# Patient Record
Sex: MALE | Race: WHITE | URBAN - METROPOLITAN AREA
[De-identification: names, ages, dates, MRNs, and addresses within clinical notes are randomized per-mention and may not be internally consistent; named-entity substitution may affect disease eponyms.]

---

## 2024-01-22 NOTE — CARE COORDINATION
Per PM&R physiatrist Dr. Danica De La Paz, patient appropriate for Acute Inpatient Rehabilitation level of care.    Eligibility & Benefits Verified - See Note    Prior authorization request for admission initiated with primary insurance [employer] via: Prior-Authorization Fax Request Form    Pending Case Reference/Authorization # Not Provided    Clinical documentation submitted via Fax to [phone]    Francy Alcazar CM: Via Telephone Conversation at this time at phone/extension: [phone]. Will need coordination with neurosurgery if approved.

## 2024-01-23 NOTE — CARE COORDINATION
Received Fax from payor Jeffy Magallanes.     Patient authorized for admission to Acute Inpatient Rehabilitation Stay under Auth/CaseRef# [id number]     Patient approved for 7 days for Acute Inpatient Rehabilitation level of care    Next Review date: 1/29/2024  (Please include clinical updates from 1/27/24-1/29/24)   Continued stay clinical documentation to be submitted via: Fax: [phone]  Utilization Management : Aliyah MATTA , Phone: [phone]    Updated Ottoniel PANTOJA: Via Telephone Conversation at this time at phone/extension: [phone]   Anticipated transport 1/25/24 due to flight availability. Patient will come via [employer] plane and ambulance from airport to [facility]. CM to call writer back with flight plans when available.     Update: Spoke with Aliyah Bardales CM. Authorization good for 14 days, send admission notice when patient arrives and NRD and LOS will be updated.

## 2024-01-24 NOTE — CARE COORDINATION
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  Yes, As Listed Below   On 23 Dr. Jameson Parsons (neurosurgery) performed R sided decompressive hemicraniectomy with evacuation of IPH x2 sites with dural repair.    Physicians:   Jameson Parsons MD  Neurosurgery    Kelsey Wheatley MD  Physical Medicine and Rehab    Risk for Clinical Complications:  High     Co-morbidities:    Hx CAD, MI, CABG, PCI, L ICA thrombus/CVA , chronic idiopathic thrombocytopenia, HLD, 2 PPD smoker, neurogenic bowel/bladder    Financial Information  Primary insurance: Commercial Insurance:[employer]      Secondary Insurance: None     Precautions:   []Cardiac Precautions: No Cardiac Precautions  []Total hip precautions: No Total Hip Precautions  []Weight Bearing status: No Weight Bearing Restrictions  [x]Safety Precautions/Concerns:  Fall Risk, General Precautions, Bone flap/helmet, impulsive  []Visually impaired: Within Functional Limits   []Hard of Hearing: Within Functional Limits       Communication Aids, Devices or Interpretation Services Required: None    Isolation Precautions:  None: Standard Precautions       Physiatrist: Dr. Danica De La Paz      Patients Occupation: Employed full time    Reviewed Lab and Diagnostic reports from Current Admission: Yes    Patients Prior Functional  Level:  Independent    History of current illness, per PM&R Consult:  59 year old male who admitted 23 with 4 days of new L arm weakness and headache. He  screening assessment completed by the Inpatient Rehabilitation Admissions Coordinator.

## 2024-01-25 NOTE — CARE COORDINATION
ACUTE INPATIENT REHABILITATION  Financial Disclosure Statement: Eligibility and Benefit Verification    Patient Name: Brennen Mcclure MRN: [id number]     Disclosure Statement  [facility] Acute Inpatient Rehabilitation at [facility] provides 24 hour individualized service to patients with functional limitations due to, but not limited to stroke, brain injury, spinal cord injury, major multiple trauma, hip fracture, amputation, and neurological disorders.  Acute Inpatient Rehabilitation provides rehabilitative nursing, physician coverage, as well as physical therapy, occupational therapy, speech therapy, recreational therapy and other services as deemed necessary by our Board Certified Physical Medicine and Rehabilitation Physicians, Dr. Beck Mason and Dr. Danica De La Paz and Dr. Dipika Angulo.    [facility] Acute Inpatient Rehabilitation at [facility] is fully accredited by the Commission on Accreditation of Rehabilitation Facilities (CARF) and The Joint Commission (TJC).    At a minimum, you will receive 5 days of therapy services totaling at least 15 hours per week. Your treatment program will consist of physical therapy at least 7.5 hours per week; occupational therapy 7.5 hours per week; and speech therapy 1.5 hours per week, as applicable.    Your estimated length of stay is currently:  Approximately 2 Weeks  Please Note: Estimated length of stay is based on individual condition and Acute Inpatient Rehabilitation specific needs. Length of stay may vary based upon interdisciplinary team assessment, insurance approval, and patient progression.    Your insurance coverage has been verified as follows:   Date Verified: 01/25/2024   Method:  Phone Call: Call Ref# [id number], Representative: Valeria     Primary Insurance: [employer]  Coverage: Per Benefit Period  Plan Effective Date: 01/01/2024 Status: Active  Deductible: $700 (Amount Currently Met: $0)  Co-Pay: None  Co-Insurance: 25%

## 2024-01-26 RX ORDER — ENOXAPARIN SODIUM 100 MG/ML
40 INJECTION SUBCUTANEOUS DAILY
Status: DISCONTINUED | OUTPATIENT
Start: 2024-01-26 | End: 2024-01-26

## 2024-01-26 RX ORDER — POLYETHYLENE GLYCOL 3350 17 G/17G
17 POWDER, FOR SOLUTION ORAL DAILY
Status: DISCONTINUED | OUTPATIENT
Start: 2024-01-27 | End: 2024-01-26

## 2024-01-26 RX ORDER — OXYCODONE HYDROCHLORIDE 10 MG/1
10 TABLET ORAL EVERY 6 HOURS PRN
Status: DISCONTINUED | OUTPATIENT
Start: 2024-01-26 | End: 2024-01-26

## 2024-01-26 RX ORDER — GAUZE BANDAGE 2" X 2"
100 BANDAGE TOPICAL DAILY
Status: DISCONTINUED | OUTPATIENT
Start: 2024-01-27 | End: 2024-01-26

## 2024-01-26 RX ORDER — CITALOPRAM 20 MG/1
20 TABLET ORAL DAILY
Status: DISCONTINUED | OUTPATIENT
Start: 2024-01-27 | End: 2024-01-26

## 2024-01-26 RX ORDER — LANOLIN ALCOHOL/MO/W.PET/CERES
400 CREAM (GRAM) TOPICAL DAILY
Status: DISCONTINUED | OUTPATIENT
Start: 2024-01-27 | End: 2024-01-26

## 2024-01-26 RX ORDER — VITAMIN B COMPLEX
5000 TABLET ORAL DAILY
Status: DISCONTINUED | OUTPATIENT
Start: 2024-01-27 | End: 2024-01-26

## 2024-01-26 RX ORDER — FAMOTIDINE 20 MG/1
20 TABLET, FILM COATED ORAL 2 TIMES DAILY
Status: DISCONTINUED | OUTPATIENT
Start: 2024-01-26 | End: 2024-01-26

## 2024-01-26 RX ORDER — OXYCODONE HYDROCHLORIDE 5 MG/1
5 TABLET ORAL EVERY 6 HOURS PRN
Status: DISCONTINUED | OUTPATIENT
Start: 2024-01-26 | End: 2024-01-26

## 2024-01-26 RX ORDER — LABETALOL 200 MG/1
200 TABLET, FILM COATED ORAL EVERY 8 HOURS SCHEDULED
Status: DISCONTINUED | OUTPATIENT
Start: 2024-01-26 | End: 2024-01-26

## 2024-01-26 RX ORDER — CEPHALEXIN 500 MG/1
500 CAPSULE ORAL 3 TIMES DAILY
Status: DISCONTINUED | OUTPATIENT
Start: 2024-01-26 | End: 2024-01-26

## 2024-01-26 RX ORDER — IPRATROPIUM BROMIDE AND ALBUTEROL SULFATE 2.5; .5 MG/3ML; MG/3ML
1 SOLUTION RESPIRATORY (INHALATION) EVERY 6 HOURS PRN
Status: DISCONTINUED | OUTPATIENT
Start: 2024-01-26 | End: 2024-01-26

## 2024-01-26 RX ORDER — SENNOSIDES A AND B 8.6 MG/1
1 TABLET, FILM COATED ORAL NIGHTLY
Status: DISCONTINUED | OUTPATIENT
Start: 2024-01-26 | End: 2024-01-26

## 2024-01-26 RX ORDER — AMLODIPINE BESYLATE 10 MG/1
10 TABLET ORAL DAILY
Status: DISCONTINUED | OUTPATIENT
Start: 2024-01-27 | End: 2024-01-26

## 2024-01-26 NOTE — CARE COORDINATION
ACUTE INPATIENT REHABILITATION  [facility]  PRE-ADMISSION ASSESSMENT     Patient Name: Brennen Mcclure        MRN:   [id number]    : 1965  (59 y.o.)  Gender: male   Ethnicity: Not , /a or Uzbek Origin  Race: White     Admitted from:  Other Facility: [facility]       Type of Admission:  New Admission      Date of Onset / Admission to the Acute Hospital:  23     Inpatient Rehabilitation Admitting Diagnosis:  Hemorrhagic CVA/IPH s/p craniectomy with L hemiparesis     Did patient have surgery/procedures?  Yes, As Listed Below   On 23 Dr. Jameson Parsons (neurosurgery) performed R sided decompressive hemicraniectomy with evacuation of IPH x2 sites with dural repair.     Physicians:   Jameson Parsons MD  Neurosurgery     Kelsey Wheatley MD  Physical Medicine and Rehab     Risk for Clinical Complications:  High      Co-morbidities:    Hx CAD, MI, CABG, PCI, L ICA thrombus/CVA , chronic idiopathic thrombocytopenia, HLD, 2 PPD smoker, neurogenic bowel/bladder     Financial Information  Primary insurance: Commercial Insurance:[employer]       Secondary Insurance: None      Precautions:   []Cardiac Precautions: No Cardiac Precautions  []Total hip precautions: No Total Hip Precautions  []Weight Bearing status: No Weight Bearing Restrictions  [x]Safety Precautions/Concerns:  Fall Risk, General Precautions, Bone flap/helmet, impulsive  []Visually impaired: Within Functional Limits              []Hard of Hearing: Within Functional Limits        Communication Aids, Devices or Interpretation Services Required: None     Isolation Precautions:  None: Standard Precautions                  Physiatrist: Dr. Danica De La Paz       Patients Occupation: Employed full time     Reviewed Lab and Diagnostic reports from Current Admission: Yes     Patients Prior Functional  Level:  Independent     History of current illness, per PM&R Consult:  59 year old male who admitted 23 with 4

## 2024-01-26 NOTE — CARE COORDINATION
Received voicemail from Padmini at [employer]. Patient's flight has been delayed due to weather. Flight had to be rescheduled for Saturday 1/27/24, anticipated arrival to [facility] at 9:00 pm. Anticipated arrival to [facility] by 10:00 pm.     [facility] at phone number [phone]. Confirmed information that was received and gave them ARU nursing phone number for any potential delays or change in plans should they occur on Saturday.     Electronically signed by Sarika Chávez PTA on 1/26/2024 at 8:26 AM    Received call from Ildefonso at [facility]. Flight plan confirmed to land in [place] at 1:45 pm on 1/27/2024. Patient should arrive to [facility] by 2:30 pm. Ildefonso direct number [phone]. Confirmed if any plans change over weekend to please call ARU nursing staff.     Electronically signed by Sarika Chávez PTA on 1/26/2024 at 10:32 AM    [facility] RN's will call report tomorrow morning, patient set to leave [place] at 7:00 am.     Any questions on weekend, please call [place] RN station at [phone]    Electronically signed by Sarika Chávez PTA on 1/26/2024 at 1:40 PM

## 2024-01-27 ENCOUNTER — HOSPITAL ENCOUNTER (INPATIENT)
Age: 59
LOS: 30 days | Discharge: HOME HEALTH | DRG: 056 | End: 2024-02-26
Attending: STUDENT IN AN ORGANIZED HEALTH CARE EDUCATION/TRAINING PROGRAM | Admitting: STUDENT IN AN ORGANIZED HEALTH CARE EDUCATION/TRAINING PROGRAM
Payer: COMMERCIAL

## 2024-01-27 DIAGNOSIS — I82.5Y1 CHRONIC DEEP VEIN THROMBOSIS (DVT) OF PROXIMAL VEIN OF RIGHT LOWER EXTREMITY (HCC): ICD-10-CM

## 2024-01-27 DIAGNOSIS — R56.9 SEIZURE (HCC): Primary | ICD-10-CM

## 2024-01-27 DIAGNOSIS — R41.840 ATTENTION AND CONCENTRATION DEFICIT: ICD-10-CM

## 2024-01-27 DIAGNOSIS — J43.9 PULMONARY EMPHYSEMA, UNSPECIFIED EMPHYSEMA TYPE (HCC): ICD-10-CM

## 2024-01-27 DIAGNOSIS — M79.605 PAIN OF LEFT LOWER EXTREMITY: ICD-10-CM

## 2024-01-27 PROBLEM — I69.152: Status: ACTIVE | Noted: 2024-01-27

## 2024-01-27 PROCEDURE — 1180000000 HC REHAB R&B

## 2024-01-27 PROCEDURE — 6370000000 HC RX 637 (ALT 250 FOR IP): Performed by: PHYSICAL MEDICINE & REHABILITATION

## 2024-01-27 PROCEDURE — 99223 1ST HOSP IP/OBS HIGH 75: CPT | Performed by: INTERNAL MEDICINE

## 2024-01-27 PROCEDURE — 6370000000 HC RX 637 (ALT 250 FOR IP): Performed by: STUDENT IN AN ORGANIZED HEALTH CARE EDUCATION/TRAINING PROGRAM

## 2024-01-27 RX ORDER — SENNOSIDES A AND B 8.6 MG/1
2 TABLET, FILM COATED ORAL NIGHTLY
Status: DISCONTINUED | OUTPATIENT
Start: 2024-01-27 | End: 2024-02-26 | Stop reason: HOSPADM

## 2024-01-27 RX ORDER — BISACODYL 10 MG
10 SUPPOSITORY, RECTAL RECTAL DAILY PRN
Status: DISCONTINUED | OUTPATIENT
Start: 2024-01-27 | End: 2024-02-26 | Stop reason: HOSPADM

## 2024-01-27 RX ORDER — ATORVASTATIN CALCIUM 80 MG/1
80 TABLET, FILM COATED ORAL NIGHTLY
Status: DISCONTINUED | OUTPATIENT
Start: 2024-01-27 | End: 2024-02-26 | Stop reason: HOSPADM

## 2024-01-27 RX ORDER — MODAFINIL 100 MG/1
100 TABLET ORAL DAILY
Status: DISCONTINUED | OUTPATIENT
Start: 2024-01-27 | End: 2024-01-27

## 2024-01-27 RX ORDER — ATORVASTATIN CALCIUM 40 MG/1
40 TABLET, FILM COATED ORAL NIGHTLY
Status: DISCONTINUED | OUTPATIENT
Start: 2024-01-27 | End: 2024-01-27

## 2024-01-27 RX ORDER — ACETAMINOPHEN 325 MG/1
650 TABLET ORAL EVERY 4 HOURS PRN
Status: DISCONTINUED | OUTPATIENT
Start: 2024-01-27 | End: 2024-01-27

## 2024-01-27 RX ORDER — M-VIT,TX,IRON,MINS/CALC/FOLIC 27MG-0.4MG
1 TABLET ORAL DAILY
Status: DISCONTINUED | OUTPATIENT
Start: 2024-01-28 | End: 2024-02-26 | Stop reason: HOSPADM

## 2024-01-27 RX ORDER — ACETAMINOPHEN 325 MG/1
650 TABLET ORAL EVERY 4 HOURS PRN
Status: DISCONTINUED | OUTPATIENT
Start: 2024-01-27 | End: 2024-02-26 | Stop reason: HOSPADM

## 2024-01-27 RX ORDER — CEPHALEXIN 500 MG/1
500 CAPSULE ORAL 4 TIMES DAILY
Status: COMPLETED | OUTPATIENT
Start: 2024-01-27 | End: 2024-01-29

## 2024-01-27 RX ORDER — MODAFINIL 100 MG/1
200 TABLET ORAL DAILY
Status: DISCONTINUED | OUTPATIENT
Start: 2024-01-28 | End: 2024-02-26 | Stop reason: HOSPADM

## 2024-01-27 RX ORDER — ASPIRIN 81 MG/1
81 TABLET, CHEWABLE ORAL DAILY
Status: DISCONTINUED | OUTPATIENT
Start: 2024-01-27 | End: 2024-02-26 | Stop reason: HOSPADM

## 2024-01-27 RX ORDER — FERROUS SULFATE 325(65) MG
325 TABLET ORAL EVERY OTHER DAY
Status: DISCONTINUED | OUTPATIENT
Start: 2024-01-28 | End: 2024-02-26 | Stop reason: HOSPADM

## 2024-01-27 RX ORDER — LACOSAMIDE 100 MG/1
100 TABLET ORAL 2 TIMES DAILY
Status: DISCONTINUED | OUTPATIENT
Start: 2024-01-27 | End: 2024-02-26 | Stop reason: HOSPADM

## 2024-01-27 RX ORDER — POLYETHYLENE GLYCOL 3350 17 G/17G
17 POWDER, FOR SOLUTION ORAL DAILY
Status: DISCONTINUED | OUTPATIENT
Start: 2024-01-27 | End: 2024-02-06

## 2024-01-27 RX ORDER — LISINOPRIL 20 MG/1
20 TABLET ORAL DAILY
Status: DISCONTINUED | OUTPATIENT
Start: 2024-01-27 | End: 2024-01-29

## 2024-01-27 RX ORDER — LIDOCAINE 4 G/G
1 PATCH TOPICAL DAILY PRN
Status: DISCONTINUED | OUTPATIENT
Start: 2024-01-27 | End: 2024-02-26 | Stop reason: HOSPADM

## 2024-01-27 RX ORDER — CEPHALEXIN 500 MG/1
500 CAPSULE ORAL 4 TIMES DAILY
Status: DISCONTINUED | OUTPATIENT
Start: 2024-01-27 | End: 2024-01-27

## 2024-01-27 RX ORDER — LANOLIN ALCOHOL/MO/W.PET/CERES
6 CREAM (GRAM) TOPICAL NIGHTLY
Status: DISCONTINUED | OUTPATIENT
Start: 2024-01-27 | End: 2024-02-26 | Stop reason: HOSPADM

## 2024-01-27 RX ORDER — SENNOSIDES A AND B 8.6 MG/1
2 TABLET, FILM COATED ORAL DAILY PRN
Status: DISCONTINUED | OUTPATIENT
Start: 2024-01-27 | End: 2024-01-27

## 2024-01-27 RX ORDER — CITALOPRAM HYDROBROMIDE 10 MG/1
10 TABLET ORAL DAILY
Status: DISCONTINUED | OUTPATIENT
Start: 2024-01-27 | End: 2024-02-26 | Stop reason: HOSPADM

## 2024-01-27 RX ORDER — GABAPENTIN 100 MG/1
100 CAPSULE ORAL 3 TIMES DAILY
Status: DISCONTINUED | OUTPATIENT
Start: 2024-01-27 | End: 2024-02-26 | Stop reason: HOSPADM

## 2024-01-27 RX ORDER — CARVEDILOL 3.12 MG/1
3.12 TABLET ORAL EVERY 12 HOURS
Status: DISCONTINUED | OUTPATIENT
Start: 2024-01-27 | End: 2024-01-28

## 2024-01-27 RX ORDER — LANOLIN ALCOHOL/MO/W.PET/CERES
3 CREAM (GRAM) TOPICAL NIGHTLY PRN
Status: DISCONTINUED | OUTPATIENT
Start: 2024-01-27 | End: 2024-01-27

## 2024-01-27 RX ADMIN — ATORVASTATIN CALCIUM 80 MG: 80 TABLET, FILM COATED ORAL at 20:54

## 2024-01-27 RX ADMIN — GABAPENTIN 100 MG: 100 CAPSULE ORAL at 20:54

## 2024-01-27 RX ADMIN — ACETAMINOPHEN 650 MG: 325 TABLET, FILM COATED ORAL at 18:13

## 2024-01-27 RX ADMIN — CEPHALEXIN 500 MG: 500 CAPSULE ORAL at 20:54

## 2024-01-27 RX ADMIN — Medication 6 MG: at 20:54

## 2024-01-27 RX ADMIN — APIXABAN 2.5 MG: 2.5 TABLET, FILM COATED ORAL at 20:54

## 2024-01-27 RX ADMIN — LACOSAMIDE 100 MG: 100 TABLET, FILM COATED ORAL at 20:54

## 2024-01-27 RX ADMIN — CARVEDILOL 3.12 MG: 3.12 TABLET, FILM COATED ORAL at 18:14

## 2024-01-27 RX ADMIN — CEPHALEXIN 500 MG: 500 CAPSULE ORAL at 18:14

## 2024-01-27 RX ADMIN — SENNOSIDES 17.2 MG: 8.6 TABLET, FILM COATED ORAL at 20:54

## 2024-01-27 RX ADMIN — GABAPENTIN 100 MG: 100 CAPSULE ORAL at 18:14

## 2024-01-27 ASSESSMENT — PAIN SCALES - GENERAL: PAINLEVEL_OUTOF10: 7

## 2024-01-27 ASSESSMENT — PAIN DESCRIPTION - LOCATION: LOCATION: HEAD

## 2024-01-27 NOTE — CONSULTS
file.    Review of Systems:     Positive and Negative as described in HPI.    CONSTITUTIONAL:  negative for fevers, chills, sweats, fatigue, weight loss  HEENT:  negative for vision, hearing changes, runny nose, throat pain  RESPIRATORY:  negative for shortness of breath, cough, congestion, wheezing.  CARDIOVASCULAR:  negative for chest pain, palpitations.  GASTROINTESTINAL:  negative for nausea, vomiting, diarrhea, constipation, change in bowel habits, abdominal pain   GENITOURINARY:  negative for difficulty of urination, burning with urination, frequency   INTEGUMENT:  negative for rash, skin lesions, easy bruising   HEMATOLOGIC/LYMPHATIC:  negative for swelling/edema   ALLERGIC/IMMUNOLOGIC:  negative for urticaria , itching  ENDOCRINE:  negative increase in drinking, increase in urination, hot or cold intolerance  MUSCULOSKELETAL:  negative joint pains, muscle aches, swelling of joints  NEUROLOGICAL: For weakness  BEHAVIOR/PSYCH:  negative for depression, anxiety    Physical Exam:   /76   Pulse 70   Temp 97.8 °F (36.6 °C) (Oral)   Resp 18   SpO2 98%   Temp (24hrs), Av.8 °F (36.6 °C), Min:97.8 °F (36.6 °C), Max:97.8 °F (36.6 °C)    No results for input(s): \"POCGLU\" in the last 72 hours.  No intake or output data in the 24 hours ending 24 1715    General Appearance: Lousy but easily arousable mental status: Oriented    Lungs: Bilateral equal air entry, clear to ausculation, no wheezing, rales or rhonchi, normal effort  Cardiovascular: normal rate, regular rhythm, no murmur, gallop, rub.  Abdomen: Soft, nontender, nondistended, normal bowel sounds, no hepatomegaly or splenomegaly  Neurologic: Left-sided hemiparesis skin: No gross lesions, rashes, bruising or bleeding on exposed skin area  Extremities:  peripheral pulses palpable, no pedal edema or calf pain with palpation      Investigations:      Laboratory Testing:  No results found for this or any previous visit (from the past 24  of recurrent DVTs was on Pradaxa earlier  Should consider hematology oncology consult due to his complicated history    ITP platelet count yesterday was 103, has been stable patient received IVIG at outlying facility, consider hematology oncology consult    On Flex for cellulitis last dose 1/29    CAD s/p CABG in 2009 status post PCI 2023, on aspirin Lipitor    Hypertension blood pressures currently stable on lisinopril, continue to monitor    DVT prophylaxis      Consultations:   IP CONSULT TO DIETITIAN  IP CONSULT TO SOCIAL WORK  IP CONSULT TO INTERNAL MEDICINE     Patient is admitted as inpatient status because of co-morbidities listed above, severity of signs and symptoms as outlined, requirement for current medical therapies and most importantly because of direct risk to patient if care not provided in a hospital setting.    Blanca Ren MD  1/27/2024  5:15 PM    Copy sent to Dr. Stack primary care provider on file.    Please note that this chart was generated using voice recognition Dragon dictation software.  Although every effort was made to ensure the accuracy of this automated transcription, some errors in transcription may have occurred.

## 2024-01-27 NOTE — PROGRESS NOTES
ACUTE INPATIENT REHABILITATION  [facility]    Case Management Assessment: IRF PAM 4.0     Patient Health Questionnaire-9 (PHQ-2 to 9)   Over the last 2 weeks, how often have you been bothered by any of the following problems?    1. Little Interest or pleasure in doing things?   Never or 1 Day - Score 0    2. Feeling down, depressed or hopeless?   Never or 1 Day - Score 0    3. Trouble falling or staying asleep, or sleeping too much?   Never or 1 Day - Score 0    4. Feeling tired or having little energy?   2-6 Days (Several Days) - Score 1    5. Poor appetite or overeating?   2-6 Days (Several Days) - Score 1    6. Feeling bad about yourself-or that you are a failure or have let yourself or your family down?   Never or 1 Day - Score 0    7. Trouble concentrating on things, such as reading the newspaper or watching television?    2-6 Days (Several Days) - Score 1    8. Moving or speaking so slowly that other people could have noticed? Or the opposite-being so fidgety or restless that you have been moving around a lot more than usual?  2-6 Days (Several Days) - Score 1    9. Thoughts that you would be better off dead or of hurting yourself in some way?   Never or 1 Day - Score 0    Total Score: 4  If score is above 15, Notify PM&R Physician    If you checked off any problems, how difficult have these problems made it for you to do your work, take care of things at home, or get along with other people?   [x] Not difficult at all     [] Somewhat Difficult     [] Very Difficult     [] Extremely Difficult         Social Isolation     How often do you feel lonely or isolated from those around you?  Never       Access To Transportation     2.In the past six months to a year, has lack of transportation kept you from medical appointments or from getting your medications?”     No    3.In the past six months to a year, has lack of transportation kept you from non-medical meetings, appointments, work, or from getting

## 2024-01-27 NOTE — PROGRESS NOTES
ACUTE INPATIENT REHABILITATION ADMISSION  [facility]    Patient Name: Brennen Mcclure  MRN: [id number]   Date of Admit: 1/27/2024  Time of Arrival: 1600    Patient admitted to the Acute Inpatient Rehabilitation Unit via Stretcher    Patient oriented to room, unit and fall prevention safety measures.  Education provided on the rehabilitation routine and therapy schedules.    Drug / Medication Regimen Review   Admitting medication orders compared with acute stay medications; home medication list reviewed with patient/family.      Medication Issues Identified ? Yes If Yes, Check All That Apply   []  Allergy to medication   []  Drug interactions (drug/drug, drug/food, drug/disease interactions)   []  Duplicate drug   []  Omission (drug missing from prescribed regimen)   []  Non adherence   []  Adverse reaction   []  Wrong patient, drug, dose, route, time error   []  Ineffective drug therapy       High-Risk Drug Classes: Use and Indication   Check if the patient is taking any medications by pharmacological classification If yes, check if there is an indication noted for all meds in the drug class   Antipsychotic No If yes: indication noted?  [] If no indication noted, follow up with provider for order clarification   Anticoagulant Yes If yes: indication noted?  []    Antibiotic Yes If yes: indication noted?  []    Opioid Yes If yes: indication noted?  []    Antiplatelet No If yes: indication noted?  []    Hypoglycemic (Including Insulin) No If yes: indication noted?  []      Attending Physical Medicine & Rehabilitation (PM&R) Admitting Order Review   Admission orders reviewed with Acute Inpatient Rehabilitation Attending PM&R Physician: Dipika Angulo MD     Skin Assessment   Skin assessment performed by two nurses: all active alterations in skin integrity, wounds, lines, drains and airways assessed, measured, recorded and reconciled. Refer to LDA avatar and LDA flowsheet for additional information.    Nurse 1

## 2024-01-28 LAB
ALBUMIN SERPL-MCNC: 3.9 G/DL (ref 3.5–5.2)
ALP SERPL-CCNC: 115 U/L (ref 40–129)
ALT SERPL-CCNC: 42 U/L (ref 5–41)
ANION GAP SERPL CALCULATED.3IONS-SCNC: 12 MMOL/L (ref 9–17)
AST SERPL-CCNC: 31 U/L
BASOPHILS # BLD: 0 K/UL (ref 0–0.2)
BASOPHILS NFR BLD: 0 % (ref 0–2)
BILIRUB DIRECT SERPL-MCNC: <0.1 MG/DL
BILIRUB INDIRECT SERPL-MCNC: ABNORMAL MG/DL (ref 0–1)
BILIRUB SERPL-MCNC: 0.2 MG/DL (ref 0.3–1.2)
BUN SERPL-MCNC: 24 MG/DL (ref 8–23)
CALCIUM SERPL-MCNC: 10.1 MG/DL (ref 8.6–10.4)
CHLORIDE SERPL-SCNC: 102 MMOL/L (ref 98–107)
CO2 SERPL-SCNC: 25 MMOL/L (ref 20–31)
CREAT SERPL-MCNC: 1 MG/DL (ref 0.7–1.2)
EOSINOPHIL # BLD: 0.53 K/UL (ref 0–0.4)
EOSINOPHILS RELATIVE PERCENT: 8 % (ref 0–4)
ERYTHROCYTE [DISTWIDTH] IN BLOOD BY AUTOMATED COUNT: 27 % (ref 11.5–14.9)
GFR SERPL CREATININE-BSD FRML MDRD: >60 ML/MIN/1.73M2
GLUCOSE SERPL-MCNC: 100 MG/DL (ref 70–99)
HCT VFR BLD AUTO: 43.9 % (ref 41–53)
HGB BLD-MCNC: 14.3 G/DL (ref 13.5–17.5)
LYMPHOCYTES NFR BLD: 0.73 K/UL (ref 1–4.8)
LYMPHOCYTES RELATIVE PERCENT: 11 % (ref 24–44)
MCH RBC QN AUTO: 27.5 PG (ref 26–34)
MCHC RBC AUTO-ENTMCNC: 32.5 G/DL (ref 31–37)
MCV RBC AUTO: 84.7 FL (ref 80–100)
MONOCYTES NFR BLD: 0.99 K/UL (ref 0.1–1.3)
MONOCYTES NFR BLD: 15 % (ref 1–7)
MORPHOLOGY: ABNORMAL
MORPHOLOGY: ABNORMAL
NEUTROPHILS NFR BLD: 66 % (ref 36–66)
NEUTS SEG NFR BLD: 4.35 K/UL (ref 1.3–9.1)
PLATELET # BLD AUTO: 153 K/UL (ref 150–450)
PMV BLD AUTO: 9.8 FL (ref 6–12)
POTASSIUM SERPL-SCNC: 5.1 MMOL/L (ref 3.7–5.3)
PROT SERPL-MCNC: 7.4 G/DL (ref 6.4–8.3)
RBC # BLD AUTO: 5.18 M/UL (ref 4.5–5.9)
SODIUM SERPL-SCNC: 139 MMOL/L (ref 135–144)
WBC OTHER # BLD: 6.6 K/UL (ref 3.5–11)

## 2024-01-28 PROCEDURE — 92610 EVALUATE SWALLOWING FUNCTION: CPT

## 2024-01-28 PROCEDURE — 97116 GAIT TRAINING THERAPY: CPT

## 2024-01-28 PROCEDURE — 85025 COMPLETE CBC W/AUTO DIFF WBC: CPT

## 2024-01-28 PROCEDURE — 92523 SPEECH SOUND LANG COMPREHEN: CPT

## 2024-01-28 PROCEDURE — 97530 THERAPEUTIC ACTIVITIES: CPT

## 2024-01-28 PROCEDURE — 36415 COLL VENOUS BLD VENIPUNCTURE: CPT

## 2024-01-28 PROCEDURE — 97535 SELF CARE MNGMENT TRAINING: CPT

## 2024-01-28 PROCEDURE — 80048 BASIC METABOLIC PNL TOTAL CA: CPT

## 2024-01-28 PROCEDURE — 99223 1ST HOSP IP/OBS HIGH 75: CPT | Performed by: STUDENT IN AN ORGANIZED HEALTH CARE EDUCATION/TRAINING PROGRAM

## 2024-01-28 PROCEDURE — 97112 NEUROMUSCULAR REEDUCATION: CPT

## 2024-01-28 PROCEDURE — 97163 PT EVAL HIGH COMPLEX 45 MIN: CPT

## 2024-01-28 PROCEDURE — 97110 THERAPEUTIC EXERCISES: CPT

## 2024-01-28 PROCEDURE — 80076 HEPATIC FUNCTION PANEL: CPT

## 2024-01-28 PROCEDURE — 6370000000 HC RX 637 (ALT 250 FOR IP): Performed by: STUDENT IN AN ORGANIZED HEALTH CARE EDUCATION/TRAINING PROGRAM

## 2024-01-28 PROCEDURE — 1180000000 HC REHAB R&B

## 2024-01-28 PROCEDURE — 97167 OT EVAL HIGH COMPLEX 60 MIN: CPT

## 2024-01-28 RX ORDER — CARVEDILOL 3.12 MG/1
3.12 TABLET ORAL 2 TIMES DAILY WITH MEALS
Status: DISCONTINUED | OUTPATIENT
Start: 2024-01-28 | End: 2024-02-03

## 2024-01-28 RX ORDER — BUTALBITAL, ACETAMINOPHEN AND CAFFEINE 300; 40; 50 MG/1; MG/1; MG/1
1 CAPSULE ORAL EVERY 4 HOURS PRN
Status: DISCONTINUED | OUTPATIENT
Start: 2024-01-28 | End: 2024-02-07

## 2024-01-28 RX ADMIN — APIXABAN 2.5 MG: 2.5 TABLET, FILM COATED ORAL at 09:15

## 2024-01-28 RX ADMIN — SENNOSIDES 17.2 MG: 8.6 TABLET, FILM COATED ORAL at 21:57

## 2024-01-28 RX ADMIN — Medication 6 MG: at 21:57

## 2024-01-28 RX ADMIN — CEPHALEXIN 500 MG: 500 CAPSULE ORAL at 21:57

## 2024-01-28 RX ADMIN — LACOSAMIDE 100 MG: 100 TABLET, FILM COATED ORAL at 09:15

## 2024-01-28 RX ADMIN — CARVEDILOL 3.12 MG: 3.12 TABLET, FILM COATED ORAL at 09:15

## 2024-01-28 RX ADMIN — CEPHALEXIN 500 MG: 500 CAPSULE ORAL at 16:52

## 2024-01-28 RX ADMIN — ATORVASTATIN CALCIUM 80 MG: 80 TABLET, FILM COATED ORAL at 21:57

## 2024-01-28 RX ADMIN — GABAPENTIN 100 MG: 100 CAPSULE ORAL at 09:16

## 2024-01-28 RX ADMIN — GABAPENTIN 100 MG: 100 CAPSULE ORAL at 14:57

## 2024-01-28 RX ADMIN — LISINOPRIL 20 MG: 20 TABLET ORAL at 09:16

## 2024-01-28 RX ADMIN — LACOSAMIDE 100 MG: 100 TABLET, FILM COATED ORAL at 21:57

## 2024-01-28 RX ADMIN — CITALOPRAM HYDROBROMIDE 10 MG: 10 TABLET ORAL at 09:15

## 2024-01-28 RX ADMIN — Medication 1 TABLET: at 09:15

## 2024-01-28 RX ADMIN — CEPHALEXIN 500 MG: 500 CAPSULE ORAL at 14:57

## 2024-01-28 RX ADMIN — APIXABAN 2.5 MG: 2.5 TABLET, FILM COATED ORAL at 21:57

## 2024-01-28 RX ADMIN — MODAFINIL 200 MG: 100 TABLET ORAL at 09:15

## 2024-01-28 RX ADMIN — FERROUS SULFATE TAB 325 MG (65 MG ELEMENTAL FE) 325 MG: 325 (65 FE) TAB at 09:15

## 2024-01-28 RX ADMIN — CEPHALEXIN 500 MG: 500 CAPSULE ORAL at 09:15

## 2024-01-28 RX ADMIN — ASPIRIN 81 MG 81 MG: 81 TABLET ORAL at 09:15

## 2024-01-28 RX ADMIN — GABAPENTIN 100 MG: 100 CAPSULE ORAL at 21:57

## 2024-01-28 RX ADMIN — BUTALBITA,ACETAMINOPHEN AND CAFFEINE 1 CAPSULE: 50; 300; 40 CAPSULE ORAL at 15:25

## 2024-01-28 ASSESSMENT — PAIN SCALES - GENERAL
PAINLEVEL_OUTOF10: 6
PAINLEVEL_OUTOF10: 5

## 2024-01-28 ASSESSMENT — PAIN DESCRIPTION - LOCATION
LOCATION: HEAD
LOCATION: HEAD

## 2024-01-28 ASSESSMENT — 9 HOLE PEG TEST
TESTTIME_SECONDS: 32.8
TEST_RESULT: IMPAIRED
TEST_RESULT: NOT TESTED

## 2024-01-28 NOTE — PROGRESS NOTES
SPEECH LANGUAGE PATHOLOGY  Facility/Department: [facility] ACUTE REHAB  Initial Speech/Language/Cognitive Assessment    NAME: Brennen Mcclure  : 1965   MRN: [id number]  ADMISSION DATE: 2024  ADMITTING DIAGNOSIS: has 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 (HCC) on their problem list.    Date of Eval: 2024   Evaluating Therapist: NISREEN Huff    RECENT RESULTS  CT OF HEAD/MRI: CT Head ():  FINDINGS:     Surgical changes right hemicraniectomy with minimal herniation of brain parenchyma through the posterior aspect of the craniectomy defect, unchanged. Intraparenchymal hemorrhages within the right temporal lobe and right centrum semiovale have slightly decreased in size compared 2024. No new or enlarging intracranial hemorrhage. Diffuse hypoattenuation throughout the right cerebral hemisphere is unchanged in extent compared to 2024. Unchanged loss of gray-white matter differentiation along the right posterior frontal and parietal lobes no new loss of gray-white matter differentiation. Similar hypoattenuating fluid collection deep to the right craniectomy measuring up to 8 mm in width on series 4 image 27. The ventricles are unchanged in size and morphology with similar mass effect on the right temporal horn. No hydrocephalus. No significant midline shift. Basal cistern effacement.     Status post bilateral lens surgery. The orbits are otherwise unremarkable. The paranasal sinuses are well-aerated. Trace right mastoid fluid. Atherosclerotic calcifications of the intracranial ICA is and left V4 vertebral artery.     Primary Complaint: Per PM&R consult:  59 year old male who admitted 23 with 4 days of new L arm weakness and headache. He had known history of MI and CABG - on dabigatran but with inconsistent use due to prescription running out. He had previous treatment for L ICA thrombus and R sided symptoms treated with “clot busting medication” in . CT head

## 2024-01-28 NOTE — PROGRESS NOTES
Physical Therapy  Facility/Department: [facility] ACUTE REHAB  Rehabilitation Physical Therapy Initial Assessment    NAME: Brennen Mcclure  : 1965 (59 y.o.)  MRN: [id number]  CODE STATUS: Full Code    Date of Service: 24      Past Medical History:   Diagnosis Date    CAD (coronary artery disease)     Chronic deep vein thrombosis (DVT) of both lower extremities (HCC)     Chronic idiopathic thrombocytopenia (HCC)     CVA (cerebral vascular accident) (HCC)     HLD (hyperlipidemia)      Past Surgical History:   Procedure Laterality Date    CORONARY ARTERY BYPASS GRAFT      CORONARY STENT PLACEMENT         Chart Reviewed: Yes  Patient assessed for rehabilitation services?: Yes  Additional Pertinent Hx: 59 year old male who admitted 23 with 4 days of new L arm weakness and headache. He had known history of MI and CABG - on dabigatran but with inconsistent use due to prescription running out. He had previous treatment for L ICA thrombus and R sided symptoms treated with “clot busting medication” in . 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 He was transferred from [place], [place] to [facility] for medical management and was admitted to neuro critical care. His LUE numbness/weakness worsened to near complete hemiparesis. Initial GCS 15. He was initially treated with heparin gtt for DVST. On 23 Dr. Jameson Parsons (neurosurgery) performed R sided decompressive hemicraniectomy with evacuation of IPH x2 sites with dural repair. He was stable post op on heparin drip and extubated 23 after stable head CT 23. He was then bridged to warfarin. He is currently being treated with warfarin with therapeutic INR since 23  Family / Caregiver Present: No  Referring Practitioner: Danica De La Paz MD  Referral Date : 24  Diagnosis: 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  General  Comment  Comments: Sling Use for Mobility/transfers only    Restrictions:  Restrictions/Precautions: General Precautions;Fall Risk;Up as Tolerated  Required Braces or Orthoses  Other:  (Helmet on when OOB)  Position Activity Restriction  Other position/activity restrictions: Helmet on when OOB     SUBJECTIVE  Subjective: Pt pleasant and agreeable for therapy assessment; highly motivated. Emotional lability t/o, pt easily becomes tearfulk in conversation  Pain: pt reports generalized Left UE/LE pain.       Prior Level of Function:  Social/Functional History  Lives With: Spouse (Anila)  Type of Home: House  Home Layout: One level  Home Access: Stairs to enter without rails  Entrance Stairs - Number of Steps: 3 APURVA; pt reports family is currently working on installing a ramp  Bathroom Shower/Tub: Walk-in shower, Shower chair with back  Bathroom Toilet: Handicap height  Bathroom Equipment:  (Toilet safety frame on wheels)  Bathroom Accessibility: Accessible  Home Equipment: Cane, Rollator  Receives Help From: Family  ADL Assistance: Independent  Homemaking Assistance: Independent  Homemaking Responsibilities: Yes  Ambulation Assistance: Independent  Transfer Assistance: Independent  Leisure & Hobbies: Deep Sea fishing  IADL Comments: cares for 3 Maori shepherds; adjustable bed recently purchased by Wife.  Additional Comments: Pt provides adequate info but d/t recent plans for home renovation and ramp installation Writer and pt call spouse Anila for further detail. Prior to Arrival in ARU, pt was working  with hospital PT to ambulate with Harrison walker.    OBJECTIVE  Vision  Vision: Within Functional Limits    Hearing  Hearing: Within functional limits    Cognition  Overall Cognitive Status: Exceptions  Arousal/Alertness: Appropriate responses to stimuli  Following Commands: Follows one step commands consistently  Attention Span: Attends with cues to redirect  Memory: Appears intact  Safety Judgement: Decreased  skilled nursing facility

## 2024-01-28 NOTE — PROGRESS NOTES
Physical Therapy  Facility/Department: [facility] [[email or web address] ACUTE REHAB  Rehabilitation Physical Therapy Daily Treatment Note    NAME: Brennen Mcclure  : 1965 (59 y.o.)  MRN: [id number]  CODE STATUS: Full Code    Date of Service: 24      Past Medical History:   Diagnosis Date    CAD (coronary artery disease)     Chronic deep vein thrombosis (DVT) of both lower extremities (HCC)     Chronic idiopathic thrombocytopenia (HCC)     CVA (cerebral vascular accident) (HCC)     HLD (hyperlipidemia)      Past Surgical History:   Procedure Laterality Date    CORONARY ARTERY BYPASS GRAFT      CORONARY STENT PLACEMENT         Chart Reviewed: Yes  Patient assessed for rehabilitation services?: Yes  Additional Pertinent Hx: 59 year old male who admitted 23 with 4 days of new L arm weakness and headache. He had known history of MI and CABG - on dabigatran but with inconsistent use due to prescription running out. He had previous treatment for L ICA thrombus and R sided symptoms treated with “clot busting medication” in . 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 He was transferred from [place], [place] to [facility] for medical management and was admitted to neuro critical care. His LUE numbness/weakness worsened to near complete hemiparesis. Initial GCS 15. He was initially treated with heparin gtt for DVST. On 23 Dr. Jameson Parsons (neurosurgery) performed R sided decompressive hemicraniectomy with evacuation of IPH x2 sites with dural repair. He was stable post op on heparin drip and extubated 23 after stable head CT 23. He was then bridged to warfarin. He is currently being treated with warfarin with therapeutic INR since 23  Family / Caregiver Present: No  Referring Practitioner: Danica De La Paz MD  Referral Date : 24  Diagnosis: Hemiplga following ntrm intcrbl hemor aff left dominant  during ADL participation  Additional Goals?: Yes  Long term goal 6: Pt to demo actual or simulated car transfer using device and Norm  Long term goal 7: Pt to demo WC propulsion of household distances with R/L turns and SBA.  Long term goal 8: Pt and family to demonstrate satisfactory performance/assistance for desired transfer and mobility tasks for a safe DC home.    PLAN OF CARE  Frequency: 1-2 treatment sessions per day, 5-7 days per week  Physical Therapy Plan  General Plan: Other (See Comment) (900 minutes of combined PT/OT/ST d/t pt's need for 2 skilled therapists to safely and therapeutically perform desired functional tasks)  Specific Instructions for Next Treatment: FES; Sitting/standing balance and midline orienting, pre-gait activity progressing to transfers/Amb with hemiwalker.  Current Treatment Recommendations: Strengthening;ROM;Balance training;Functional mobility training;Transfer training;Gait training;Stair training;Neuromuscular re-education;Pain management;Home exercise program;Safety education & training;Patient/Caregiver education & training;Equipment evaluation, education, & procurement;Positioning;Therapeutic activities (Appropriate for FES modalities ASAP and LiteGait)  Safety Devices  Type of Devices: All fall risk precautions in place;Call light within reach;Chair alarm in place;Gait belt;Patient at risk for falls;Nurse notified;Left in bed;Bed alarm in place  Restraints  Restraints Initially in Place: No    EDUCATION  Education  Education Given To: Patient;Family  Education Provided: Role of Therapy;Plan of Care;Safety;Mobility Training;Transfer Training;Equipment;Fall Prevention Strategies;DME/Home Modifications  Education Method: Demonstration;Verbal  Barriers to Learning: Cognition  Education Outcome: Verbalized understanding;Demonstrated understanding;Continued education needed        Therapy Time   Individual Concurrent Group Co-treatment   Time In 140         Time Out 6030

## 2024-01-28 NOTE — PROGRESS NOTES
[facility]   Acute Rehabilitation Occupational Therapy Evaluation     Date: 24  Patient Name: Brennen Mcclure       Room: 2633/2633-01  MRN: [id number]  Account: [id number]   : 1965  (59 y.o.) Gender: male     Referring Practitioner: Danica De La Paz MD  Diagnosis: Hemiplegia following ntrm intcrbl hemor aff left dominant side  Additional Pertinent Hx: 59 year old male who admitted 23 with 4 days of new L arm weakness and headache. He had known history of MI and CABG - on dabigatran but with inconsistent use due to prescription running out. He had previous treatment for L ICA thrombus and R sided symptoms treated with “clot busting medication” in . 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 He was transferred from [place], [place] to [facility] for medical management and was admitted to neuro critical care. His LUE numbness/weakness worsened to near complete hemiparesis. Initial GCS 15. He was initially treated with heparin gtt for DVST. On 23 Dr. Jameson Parsons (neurosurgery) performed R sided decompressive hemicraniectomy with evacuation of IPH x2 sites with dural repair. He was stable post op on heparin drip and extubated 23 after stable head CT 23. He was then bridged to warfarin. He is currently being treated with warfarin with therapeutic INR since 23. Hb stable at 13.6 on 24. Thrombocytopenia noted with platelet count 30 - hematology being reconsulted - previously recommended count >40. Currently requiring timed voids for incontinence. PM&R anticipating likely wheelchair mobility with Naveen for transfers.    Treatment Diagnosis: Impaired self-care status    Past Medical History:  has a past medical history of CAD (coronary artery disease), Chronic deep vein thrombosis (DVT) of both lower extremities (HCC), Chronic idiopathic thrombocytopenia  ambulate with Harrison walker.    Objective  Vision  Vision: Within Functional Limits    Hearing  Hearing: Within functional limits    Sensation  Overall Sensation Status: Impaired (Diminished sensation LUE/LLE)    Observation/Palpation  Posture: Poor  Observation: L lateral lean in sitting and standing    Cognition  Overall Orientation Status: Within Functional Limits  Orientation Level: Oriented X4    Cognition  Overall Cognitive Status: Exceptions  Arousal/Alertness: Appropriate responses to stimuli  Following Commands: Follows one step commands consistently  Attention Span: Attends with cues to redirect  Memory: Appears intact  Safety Judgement: Decreased awareness of need for safety, Decreased awareness of need for assistance  Problem Solving: Assistance required to generate solutions, Assistance required to implement solutions, Assistance required to identify errors made, Assistance required to correct errors made, Decreased awareness of errors  Insights: Decreased awareness of deficits  Initiation: Requires cues for some  Sequencing: Requires cues for all  Cognition Comment: emotional lability; becoming suddenly tearful during conversation  Patient affect::  (Emotionally Labile)    Activities of Daily Living  Feeding: Minimal assistance  Feeding Skilled Clinical Factors: Per pt report    Grooming: Minimal assistance  Grooming Skilled Clinical Factors: This writer opened mouthwash for pt, pt able to use R hand to pour mouthwash in mouth and spit into basin held by this writer    UE Bathing: Moderate assistance  UE Bathing Skilled Clinical Factors: Pt able to cleanse face and L arm/abdomen with SBA while seated in w/c. Assist to wash R arm and abdomen as well as back seated in w/c. Pt perseverating on cleansing L arm requiring VC to cease and let this writer assist with cleansing R arm    LE Bathing: Dependent/Total  LE Bathing Skilled Clinical Factors: TA to cleanse thighs, lower legs and rhiannon/buttocks area    UE

## 2024-01-28 NOTE — PLAN OF CARE
Problem: Discharge Planning  Goal: Discharge to home or other facility with appropriate resources  1/28/2024 1406 by Tamy Celestin LPN  Outcome: Progressing     Problem: Skin/Tissue Integrity  Goal: Absence of new skin breakdown  Description: 1.  Monitor for areas of redness and/or skin breakdown  2.  Assess vascular access sites hourly  3.  Every 4-6 hours minimum:  Change oxygen saturation probe site  4.  Every 4-6 hours:  If on nasal continuous positive airway pressure, respiratory therapy assess nares and determine need for appliance change or resting period.  1/28/2024 1406 by Tamy Celestin LPN  Outcome: Progressing     Problem: Safety - Adult  Goal: Free from fall injury  1/28/2024 1406 by Tamy Celestin LPN  Outcome: Progressing     Problem: ABCDS Injury Assessment  Goal: Absence of physical injury  1/28/2024 1406 by Tamy Celestin LPN  Outcome: Progressing

## 2024-01-28 NOTE — H&P
Physical Medicine & Rehabilitation History and Physical  [facility] Acute Rehabilitation Unit     Primary care provider:  No primary care provider on file.     Chief Complaint and Reason for Rehabilitation Admission:   ADL and mobility deficits secondary to right-sided hemorrhagic CVA secondary to dural venous sinus thrombosis    History of Present Illness:  Brennen Mcclure is a 59 y.o. male with history of CAD with MI s/p CABG (2009) and stenting (2023), left ICA thrombus/CVA (2021), HLD, chronic bilateral lower limb DVTs (on dabigatran with inconsistent use), chronic idiopathic thrombocytopenia admitted to [facility] on 1/27/2024.  He was originally admitted to [facility] on 11/22/23.    He initially presented with headache for 3 days and acute-onset left upper limb weakness.  CT head showed patchy right frontoparietal intraparenchymal hemorrhage, and he was also found to have dural venous sinus thrombosis in the sagittal sinus extending into the right jugular bulb on imaging.  He was transferred from [place], [place] to [facility] for management.  Left upper limb weakness worsened to near-complete paralysis.  Initial GCS 15.  He was initially treated with heparin drip for the dural venous sinus thrombosis.  MRI brain on 11/25/23 showed new right fontal and right temporal venous intraparenchymal hemorrhages.  He developed left lower limb weakness, dysarthria, and mild left facial droop.  He required intubation on 12/1/23 due to worsening exam to GCS 8.  Heparin was reverse with protamine.  He ultimately underwent right decompressive hemicraniectomy with evacuation of intraparenchymal hemorrhage at 2 sites with dural repair on 12/1/23 (Dr. Jameson Parsons).  He was stable post-operatively on heparin drip and then later started a bridged to coumadin.  Extubated 12/7/23.  He became therapeutic on coumadin on 12/25/23 with INR 2.  On 1/5/23, he was noted to have increased  to wash R arm and abdomen as well as back seated in w/c. Pt perseverating on cleansing L arm requiring VC to cease and let this writer assist with cleansing R arm  LE Bathing: Dependent/Total  LE Bathing Skilled Clinical Factors: TA to cleanse thighs, lower legs and rhiannon/buttocks area  UE Dressing: Maximum assistance  UE Dressing Skilled Clinical Factors: Seated in w/c, pt req assist to thread LUE into sleeve then assist to pull down OH  LE Dressing: Dependent/Total  LE Dressing Skilled Clinical Factors: TA to thread brief and pants over BLE, TA to manage up over hip with Max A x2 to maintain balance d/t L lateral lean  Toileting: Dependent/Total  Toileting Skilled Clinical Factors: Dependent for hygiene post BM in brief  Functional Mobility: Unable to assess(comment)  Functional Mobility Skilled Clinical Factors: Safety concerns  Additional Comments: Pt agreeable to completing self-care this AM in w/c. Use of scotty lyledy for standing portions, initially 2 assist for transfers however fatigues easily and required 2 assist to maintain balance while this writer completed LB self-care. Pt is currently limited by impaired balance, safety awareness, increased pain, decreased activity tolerance, and general weakness which impacts the pt's ability to safely and independently complete self-care/mobility          Balance  Sitting Balance: Dependent/Total (Dependent x1-2 in scotty stedy d/t heavy L lateral lean)  Standing Balance: Dependent/Total (Dependent x1-2 in scotty stedy d/t heavy L lateral lean)  Standing Balance  Time: 2-3 minutes  Activity: Functional transfers, static  scotty lyledy  Comment: RUE on scotty lyledy ANA long supported in sling  Functional Mobility  Functional - Mobility Device: Wheelchair  Activity: To/From therapy gym  Assist Level: Dependent/Total  Functional Mobility Comments: Did not self-propel     Transfers  Sit to stand: 2 Person assistance, Maximum assistance  Stand to sit: 2 Person assistance,

## 2024-01-28 NOTE — PROGRESS NOTES
Patient was off the floor not seen, chart reviewed borderline hyperkalemia, will check BMP tomorrow thrombocytopenia has ruled

## 2024-01-28 NOTE — PROGRESS NOTES
SPEECH LANGUAGE PATHOLOGY  Facility/Department: [facility] ACUTE REHAB   CLINICAL BEDSIDE SWALLOW EVALUATION    NAME: Brennen Mcclure  : 1965  MRN: [id number]    ADMISSION DATE: 2024  ADMITTING DIAGNOSIS: has 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 (HCC) on their problem list.    Recent Chest Xray/CT of Chest: n/a    Date of Eval: 2024  Evaluating Therapist: NISREEN Huff    Current Diet level:  Current Diet : Easy to chew  Current Liquid Diet : Thin    Primary Complaint  Pt denies trouble swallowing.    Pain:  Pain Assessment  Pain Assessment: 0-10  Pain Level: 6  Pain Location: Head    Reason for Referral  Brennen Mcclure was referred for a bedside swallow evaluation to assess the efficiency of his swallow function, identify signs and symptoms of aspiration and make recommendations regarding safe dietary consistencies, effective compensatory strategies, and safe eating environment.    Impression  Dysphagia Diagnosis: Mild oral stage dysphagia  Dysphagia Impression : Pt presents with probable safe swallow for Easy to chew diet with thin liquids as evidenced by no overt s/s of aspiration noted with consistencies tested. Recommend check for pocketing on L side, small sips and bites, only feed when alert and awake and upright at 90 degrees for all PO intake. Recommend close monitoring for overt/clinical s/s of aspiration and D/C PO intake and complete Modified Barium Swallow Study should they occur. Results and recommendations reported to RN.  Dysphagia Outcome Severity Scale: Level 5: Mild dysphagia- Distant supervision. May need one diet consistency restricted     Treatment Plan  Requires SLP Intervention: Yes     D/C Recommendations: Ongoing speech therapy is recommended during this hospitalization;Ongoing speech therapy is recommended at next level of care       Recommended Diet and Intervention  Diet Solids Recommendation: Easy to Chew  Liquid Consistency Recommendation:

## 2024-01-29 LAB
ANION GAP SERPL CALCULATED.3IONS-SCNC: 9 MMOL/L (ref 9–17)
BUN SERPL-MCNC: 25 MG/DL (ref 8–23)
CALCIUM SERPL-MCNC: 10.2 MG/DL (ref 8.6–10.4)
CHLORIDE SERPL-SCNC: 99 MMOL/L (ref 98–107)
CO2 SERPL-SCNC: 26 MMOL/L (ref 20–31)
CREAT SERPL-MCNC: 0.8 MG/DL (ref 0.7–1.2)
GFR SERPL CREATININE-BSD FRML MDRD: >60 ML/MIN/1.73M2
GLUCOSE SERPL-MCNC: 106 MG/DL (ref 70–99)
POTASSIUM SERPL-SCNC: 4.3 MMOL/L (ref 3.7–5.3)
SODIUM SERPL-SCNC: 134 MMOL/L (ref 135–144)

## 2024-01-29 PROCEDURE — 1180000000 HC REHAB R&B

## 2024-01-29 PROCEDURE — 97129 THER IVNTJ 1ST 15 MIN: CPT

## 2024-01-29 PROCEDURE — 99232 SBSQ HOSP IP/OBS MODERATE 35: CPT | Performed by: INTERNAL MEDICINE

## 2024-01-29 PROCEDURE — 6370000000 HC RX 637 (ALT 250 FOR IP): Performed by: STUDENT IN AN ORGANIZED HEALTH CARE EDUCATION/TRAINING PROGRAM

## 2024-01-29 PROCEDURE — 97112 NEUROMUSCULAR REEDUCATION: CPT

## 2024-01-29 PROCEDURE — 97110 THERAPEUTIC EXERCISES: CPT

## 2024-01-29 PROCEDURE — 36415 COLL VENOUS BLD VENIPUNCTURE: CPT

## 2024-01-29 PROCEDURE — 97530 THERAPEUTIC ACTIVITIES: CPT

## 2024-01-29 PROCEDURE — 99232 SBSQ HOSP IP/OBS MODERATE 35: CPT | Performed by: PHYSICAL MEDICINE & REHABILITATION

## 2024-01-29 PROCEDURE — 97535 SELF CARE MNGMENT TRAINING: CPT

## 2024-01-29 PROCEDURE — 92507 TX SP LANG VOICE COMM INDIV: CPT

## 2024-01-29 PROCEDURE — 6370000000 HC RX 637 (ALT 250 FOR IP): Performed by: PHYSICAL MEDICINE & REHABILITATION

## 2024-01-29 PROCEDURE — 80048 BASIC METABOLIC PNL TOTAL CA: CPT

## 2024-01-29 PROCEDURE — 97116 GAIT TRAINING THERAPY: CPT

## 2024-01-29 RX ORDER — LISINOPRIL 10 MG/1
10 TABLET ORAL DAILY
Status: DISCONTINUED | OUTPATIENT
Start: 2024-01-30 | End: 2024-02-09

## 2024-01-29 RX ADMIN — POLYETHYLENE GLYCOL 3350 17 G: 17 POWDER, FOR SOLUTION ORAL at 08:47

## 2024-01-29 RX ADMIN — BUTALBITA,ACETAMINOPHEN AND CAFFEINE 1 CAPSULE: 50; 300; 40 CAPSULE ORAL at 23:56

## 2024-01-29 RX ADMIN — CITALOPRAM HYDROBROMIDE 10 MG: 10 TABLET ORAL at 08:47

## 2024-01-29 RX ADMIN — ASPIRIN 81 MG 81 MG: 81 TABLET ORAL at 08:47

## 2024-01-29 RX ADMIN — GABAPENTIN 100 MG: 100 CAPSULE ORAL at 22:10

## 2024-01-29 RX ADMIN — SENNOSIDES 17.2 MG: 8.6 TABLET, FILM COATED ORAL at 22:10

## 2024-01-29 RX ADMIN — CEPHALEXIN 500 MG: 500 CAPSULE ORAL at 13:53

## 2024-01-29 RX ADMIN — GABAPENTIN 100 MG: 100 CAPSULE ORAL at 13:53

## 2024-01-29 RX ADMIN — CEPHALEXIN 500 MG: 500 CAPSULE ORAL at 22:10

## 2024-01-29 RX ADMIN — Medication 6 MG: at 22:10

## 2024-01-29 RX ADMIN — MODAFINIL 200 MG: 100 TABLET ORAL at 08:47

## 2024-01-29 RX ADMIN — APIXABAN 2.5 MG: 2.5 TABLET, FILM COATED ORAL at 08:47

## 2024-01-29 RX ADMIN — GABAPENTIN 100 MG: 100 CAPSULE ORAL at 08:47

## 2024-01-29 RX ADMIN — APIXABAN 2.5 MG: 2.5 TABLET, FILM COATED ORAL at 22:10

## 2024-01-29 RX ADMIN — BUTALBITA,ACETAMINOPHEN AND CAFFEINE 1 CAPSULE: 50; 300; 40 CAPSULE ORAL at 16:16

## 2024-01-29 RX ADMIN — CEPHALEXIN 500 MG: 500 CAPSULE ORAL at 08:47

## 2024-01-29 RX ADMIN — CEPHALEXIN 500 MG: 500 CAPSULE ORAL at 16:16

## 2024-01-29 RX ADMIN — LACOSAMIDE 100 MG: 100 TABLET, FILM COATED ORAL at 08:47

## 2024-01-29 RX ADMIN — BUTALBITA,ACETAMINOPHEN AND CAFFEINE 1 CAPSULE: 50; 300; 40 CAPSULE ORAL at 05:41

## 2024-01-29 RX ADMIN — LISINOPRIL 20 MG: 20 TABLET ORAL at 08:47

## 2024-01-29 RX ADMIN — ATORVASTATIN CALCIUM 80 MG: 80 TABLET, FILM COATED ORAL at 22:10

## 2024-01-29 RX ADMIN — LACOSAMIDE 100 MG: 100 TABLET, FILM COATED ORAL at 22:10

## 2024-01-29 RX ADMIN — CARVEDILOL 3.12 MG: 3.12 TABLET, FILM COATED ORAL at 08:47

## 2024-01-29 RX ADMIN — Medication 1 TABLET: at 08:47

## 2024-01-29 ASSESSMENT — PAIN DESCRIPTION - LOCATION
LOCATION: HEAD;LEG
LOCATION: HEAD

## 2024-01-29 ASSESSMENT — PAIN SCALES - GENERAL
PAINLEVEL_OUTOF10: 8
PAINLEVEL_OUTOF10: 8
PAINLEVEL_OUTOF10: 9

## 2024-01-29 ASSESSMENT — PAIN DESCRIPTION - DESCRIPTORS: DESCRIPTORS: THROBBING

## 2024-01-29 ASSESSMENT — PAIN DESCRIPTION - ORIENTATION: ORIENTATION: RIGHT

## 2024-01-29 NOTE — PLAN OF CARE
Problem: Discharge Planning  Goal: Discharge to home or other facility with appropriate resources  1/29/2024 0323 by Nanette Vidales, RN  Outcome: Progressing     Problem: Skin/Tissue Integrity  Goal: Absence of new skin breakdown  Description: 1.  Monitor for areas of redness and/or skin breakdown  1/29/2024 0323 by Nanette Vidales, RN  Outcome: Progressing     Problem: Safety - Adult  Goal: Free from fall injury  1/29/2024 0323 by Nanette Vidales, RN  Outcome: Progressing     Problem: ABCDS Injury Assessment  Goal: Absence of physical injury  1/29/2024 0323 by Nanette Vidales, RN  Outcome: Progressing     Problem: Pain  Goal: Verbalizes/displays adequate comfort level or baseline comfort level  Outcome: Progressing

## 2024-01-29 NOTE — PROGRESS NOTES
SPEECH LANGUAGE PATHOLOGY  Speech Language Pathology  [facility] ACUTE REHAB    Cognitive Treatment Note    Date: 1/29/2024  Patient’s Name: Brennen Mcclure  MRN: [id number]  Diagnosis:   Patient Active Problem List   Diagnosis Code    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 (HCC) I69.152       Pain: 0/10    Cognitive Treatment    Treatment time: 2983-4787      Subjective: [x] Alert [x] Cooperative     [] Confused     [] Agitated    [] Lethargic      Objective/Assessment:  Attention: Occasional repetition and redirection required.  Toward end of session, Pt lethargy level increasing, falling asleep, required more frequent cueing.      Orientation: Orientation log administered, O-Log score- 17/30 (disoriented to DENEEN, date, year, time and pathology deficits).     Recall: Image retention (yesica, 15 min delay)- 92% accuracy (I), increasing to 100% cued.     Organization: n/a    Problem Solving/Reasoning: n/a    Speech: Pt presents with mild-moderate dysarthria characterized by imprecise articulation, blended word boundaries, rapid rate and reduced vocal intensity.  Speech judged to be ~75% intelligible in conversation.  Education provided re: compensatory dysarthria strategies (e.g., overexaggeration of articulatory movement, breath support, maintaining vocal intensity and reduced rate).  Pt verbalized understanding of all strategies, but required mod A to facilitate strategies in conversation.  Pt able to complete OMEs x3 sets in reps of 20 c mod cues.         Other: No family present.  Pt demonstrated signs of Pseudobulbar affect during session.  Pt c/o difficulty controlling emotions (crying).    Plan:  [x] Continue ST services    [] Discharge from ST:      Discharge recommendations: [] Inpatient Rehab   [] Skilled Nursing Facility   [] Outpatient Therapy  [] Follow up at trauma clinic   [] Other:       Treatment completed by: Zofia Martinez M.A., CCC-SLP

## 2024-01-29 NOTE — CARE COORDINATION
[facility] Acute Inpatient Rehabilitation  Case Management Assessment  Initial Evaluation    Date/Time of Evaluation: 1/29/2024 3:57 PM  Assessment Completed by: Claude Barajas RN    If patient is discharged prior to next notation, then this note serves as note for discharge by case management.    Patient Name: Brennen Mcclure                     Date / Time: 1/27/2024  3:01 PM  YOB: 1965  Diagnosis: 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 (HCC) [I69.152]                     Patient Admission Status: REHAB IP   Readmission Risk (Low < 19, Mod (19-27), High > 27): Readmission Risk Score: 12.6      Current PCP: No primary care provider on file.  PCP verified by CM?    Chart Reviewed: Yes      History Provided by: Patient, spouse, and chart  Patient Orientation: Oriented x4  Patient Cognition: Alert    Advance Directives:    Code Status: Full Code   Patient's Primary Decision Maker is: Legal Next of Kin      Current Services Prior to Admission:  Current Financial resources: Employed  Current community resources: None  Current services prior to admission: None  Type of Home Care services:  None  Current Home DME: Cane, Adjustable bed, Toilet safety frame on wheels, Shower chair with back.  Do you have a wheelchair ramp/Plans to build? No-  pt states family is currently working on installing a ramp   Handicap Placard: Needs    Discharge Planning:  Patient lives with: Spouse  Type of Home: House-  one level. 3 steps to enter.  Primary Care Giver: Spouse  Marital Status:   Patient Support Systems include: Spouse, children  Family can provide assistance at DC: Yes  Does patient have 24 hour assistance at home:  Yes  Is patient agreeable to VNS or Outpatient therapy Yes  [place] of choice provided: Yes  List of Home Care Agencies/Outpatient therapy provided: Yes  VNS/Outpatient therapy chosen: Not yet    Does patient go to outpatient dialysis: No  If yes, location and

## 2024-01-29 NOTE — PLAN OF CARE
Individualized Plan of Care  [facility] Rehabilitation Unit    Rehabilitation physician: Dr. Mason     Admit Date: 1/27/2024     Rehabilitation Diagnosis: 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 (HCC)     Rehabilitation impairments: self care, mobility, motor dysfunction, bowel/bladder management, pain management, safety, and cognitive function    Factors facilitating achievement of predicted outcomes: Family support, Motivated, Cooperative, and Pleasant  Barriers to the achievement of predicted outcomes: Pain, Depression, Decreased endurance, Upper extremity weakness, Lower extremity weakness, Medical complications, Skin Care, and Medication managment    Patient Goals: Improve independence with mobility, Improvement of mobility at a wheelchair level, Increase overall strength and endurance, Increase balance, Increase endurance, Increase independence with activities of daily living, Improve cognition, Increase self-awareness, Increase safety awareness, Increase community integration, Increase socialization, Functional communication with caregivers, Integrate appropriate pain management plan, Assure adequate nutritional option for discharge, Continence of bowel and bladder, and Provide appropriate patient and family education      NURSING:  Nursing goals for Brennen Mcclure while on the rehabilitation unit will include:  Continence of bowel and bladder, Adequate number of bowel movements, Urinate with no urinary retention >300ml in bladder, Complete bladder protocol with rodriges removal, Maintain O2 SATs at an acceptable level during stay, Effective pain management while on the rehabilitation unit, Establish adequate pain control plan for discharge, Absence of skin breakdown while on the rehabilitation unit, Improved skin integrity via assessments including wound measurements, Avoidance of any hospital acquired infections, No signs/symptoms of infection at the wound site, Freedom from  rehabilitation team that will be involved in the patient's plan of care include recreational therapy, dietary, respiratory therapy, and neuropsychology.    Medical issues being managed closely and that require 24 hour availability of a physician:  Swallowing precautions, Bowel/Bladder function, Wound care, Pain management, Infection protection, DVT prophylaxis, Fall precautions, Fluid/Electrolyte balance, Nutritional status, Respiratory needs, Anemia, and History of heart disease                                           Physician anticipated functional outcomes: Improved independence with functional measures   Estimated length of stay for this admission 21 days  Medical Prognosis: Fair  Anticipated disposition: Home.      The potential to achieve the above medical and rehabilitative goals is fair.    This plan of care has been developed with the assistance and input of the multidisciplinary rehabilitation team.  The plan was reviewed with the patient on 1/29/2024.  The patient has had the opportunity to provide input to the therapy team.    I have reviewed this Individualized Plan of Care and agree with its contents.  Above documentation has been expanded, modified, adjusted to reflect the findings of my evaluations and goals for the patient.    Physician:  Electronically signed by Beck Shi MD on 1/29/24 at 1:22 PM EST

## 2024-01-29 NOTE — PROGRESS NOTES
[facility]   IN-PATIENT SERVICE   [facility]    Consult note            Date:   1/29/2024  Patient name:  Brennen Mcclure  Date of admission:  1/27/2024  3:01 PM  MRN:   [id number]  Account:  [id number]  YOB: 1965  PCP:    No primary care provider on file.  Room:   [address] [place] [address]  Code Status:    Full Code    Chief Complaint:     No chief complaint on file.      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, started on heparin drip also had patchy IPH, complicated by expansion of intraparenchymal hemorrhage with mass effect, s/p craniectomy, and evacuation of IPH x 2 with dural repair last CT head on on 1/24 showed evolution of the hemorrhage with reduction in the mass effect, Hospital course further complicated by ITP, was seen by hematology oncology, given IVIG and platelet transfusion,, started on Vimpat for seizure prevention, had cellulitis on Celexa  Patient was seen by hematology oncology over there and was started on Eliquis 2.5 twice daily  Patient was drowsy but arousable on my encounter,  Family was present at the bedside    Denies any chest pain shortness of breath        Past Medical History:     Past Medical History:   Diagnosis Date    CAD (coronary artery disease)     Chronic deep vein thrombosis (DVT) of both lower extremities (HCC)     Chronic idiopathic thrombocytopenia (HCC)     CVA (cerebral vascular accident) (HCC)     HLD (hyperlipidemia)         Past Surgical History:     Past Surgical History:   Procedure Laterality Date    CORONARY ARTERY BYPASS GRAFT      CORONARY STENT PLACEMENT          Medications Prior to Admission:     Prior to  requirement for current medical therapies and most importantly because of direct risk to patient if care not provided in a hospital setting.    Blanca Ren MD  1/29/2024  3:42 PM    Copy sent to Dr. Stack primary care provider on file.    Please note that this chart was generated using voice recognition Dragon dictation software.  Although every effort was made to ensure the accuracy of this automated transcription, some errors in transcription may have occurred.

## 2024-01-29 NOTE — INTERDISCIPLINARY ROUNDS
[facility] Acute Inpatient Rehabilitation  INTERDISCIPLINARY MEETING  Date: 24  Patient Name: Brennen Mcclure       Room: 2633/2633-01  MRN: [id number]       : 1965  (59 y.o.)     Gender: male     Patient's care team, including nursing, speech language pathologist, occupational therapist, and/or physical therapist, met to discuss patient's care needs. Any patient concerns, change in medical status, and current transfer/mobility status were identified at this time.     Any Additional Pertinent Information:   2 A with Radha lazaro. Emotionally labile.     Participating Team Members:  Nurse: Sophie Shell, GARY     Occupational Therapist:  Bhupinder Gutierrez OT   Physical Therapist: Fany Kelly PT

## 2024-01-29 NOTE — PLAN OF CARE
Problem: Discharge Planning  Goal: Discharge to home or other facility with appropriate resources  1/29/2024 1001 by Sophie Shell, RN  Outcome: Progressing     Problem: Skin/Tissue Integrity  Goal: Absence of new skin breakdown  Description: 1.  Monitor for areas of redness and/or skin breakdown  2.  Assess vascular access sites hourly  3.  Every 4-6 hours minimum:  Change oxygen saturation probe site  4.  Every 4-6 hours:  If on nasal continuous positive airway pressure, respiratory therapy assess nares and determine need for appliance change or resting period.  1/29/2024 1001 by Sophie Shell, RN  Outcome: Progressing     Problem: Safety - Adult  Goal: Free from fall injury  1/29/2024 1001 by Sophie Shell, RN  Outcome: Progressing     Problem: ABCDS Injury Assessment  Goal: Absence of physical injury  1/29/2024 1001 by Sophie Shell, RN  Outcome: Progressing     Problem: Pain  Goal: Verbalizes/displays adequate comfort level or baseline comfort level  1/29/2024 1001 by Sophie Shell, RN  Outcome: Progressing

## 2024-01-29 NOTE — PROGRESS NOTES
Chronic idiopathic thrombocytopenia (HCC), CVA (cerebral vascular accident) (HCC), and HLD (hyperlipidemia).    Past Surgical History:   has a past surgical history that includes Coronary artery bypass graft and Coronary stent placement.    Restrictions  Restrictions/Precautions  Restrictions/Precautions: General Precautions, Fall Risk, Up as Tolerated  Required Braces or Orthoses?: Yes (helmet on when on OOB)  Required Braces or Orthoses  Other:  (helmet on when OOB)  Left Upper Extremity Brace/Splint: Resting hand (pt has resting hand splint in room for nighttime wear)  Position Activity Restriction  Other position/activity restrictions: Helmet on when OOB        Subjective  Subjective  Subjective: Pt pleasant and cooperative for therapy. Emotionally liable throuhgout treatment with discussion of family and pets; emotional support provided.  Pain Assessment  Pain Assessment: None - Denies Pain  Pain Level: 8  Pain Location: Head;Leg    Objective  Cognition  Overall Orientation Status: Within Functional Limits  Orientation Level: Oriented X4    Cognition  Overall Cognitive Status: Exceptions  Arousal/Alertness: Appropriate responses to stimuli  Following Commands: Follows one step commands consistently;Follows one step commands with repetition  Attention Span: Attends with cues to redirect  Memory: Appears intact  Safety Judgement: Decreased awareness of need for safety;Decreased awareness of need for assistance  Problem Solving: Assistance required to generate solutions;Assistance required to implement solutions;Assistance required to identify errors made;Assistance required to correct errors made;Decreased awareness of errors  Insights: Decreased awareness of deficits  Initiation: Requires cues for some  Sequencing: Requires cues for some  Cognition Comment: emotional lability; becoming suddenly tearful during conversation    Activities of Daily Living     Grooming/Oral Hygiene  Assistance Level: Stand by  demonstrate improved awareness of LUE AEB ability to maintain safe positioning of L hand and wrist during ADL routine without assist  Long Term Goal 6: Pt will verbalize/demonstrate good understanding of home safety/fall prevention strategies to promote safety and independence with self-care and mobility  Long Term Goal 7: Pt will complete self-care with improved FMC and  strength AEB 10 second and 10# improvemenet on 9HPT and dynamometer  Long Term Goal 8: Pt will complete self-feeding and oral hygiene with Mod I using AE/DME/Modified techniques as needed  Long Term Goal 9: Pt will identify 3 positive leisure activities to increased overall quality of life  Long Term Goal 10: Pt will verbalize/demonstrate good understanding of 3 positive coping strategies to manage emotions/anxiety to promote maximal participation in self-care and functional tasks    Plan  Occupational Therapy Plan  Times Per Week: 900 minutes of OT/PT/SLP  Current Treatment Recommendations: Self-Care / ADL, Strengthening, ROM, Balance training, Functional mobility training, Endurance training, Wheelchair mobility training, Neuromuscular re-education, Cognitive reorientation, Pain management, Safety education & training, Patient/Caregiver education & training, Equipment evaluation, education, & procurement, Positioning, Home management training, Coordination training, Co-Treatment     01/29/24 1014 01/29/24 1538   OT Individual Minutes   Time In 0902 1400   Time Out 1003 1435   Minutes 61 35         Electronically signed by FORTINO Ortiz on 1/29/24 at 3:58 PM EST

## 2024-01-29 NOTE — PROGRESS NOTES
Physical Therapy  Facility/Department: [facility] ACUTE REHAB  Rehabilitation Physical Therapy Daily Treatment Note    NAME: Brennen Mcclure  : 1965 (59 y.o.)  MRN: [id number]  CODE STATUS: Full Code    Date of Service: 24      Past Medical History:   Diagnosis Date    CAD (coronary artery disease)     Chronic deep vein thrombosis (DVT) of both lower extremities (HCC)     Chronic idiopathic thrombocytopenia (HCC)     CVA (cerebral vascular accident) (HCC)     HLD (hyperlipidemia)      Past Surgical History:   Procedure Laterality Date    CORONARY ARTERY BYPASS GRAFT      CORONARY STENT PLACEMENT         Chart Reviewed: Yes  Patient assessed for rehabilitation services?: Yes  Additional Pertinent Hx: 59 year old male who admitted 23 with 4 days of new L arm weakness and headache. He had known history of MI and CABG - on dabigatran but with inconsistent use due to prescription running out. He had previous treatment for L ICA thrombus and R sided symptoms treated with “clot busting medication” in . 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 He was transferred from [place], [place] to [facility] for medical management and was admitted to neuro critical care. His LUE numbness/weakness worsened to near complete hemiparesis. Initial GCS 15. He was initially treated with heparin gtt for DVST. On 23 Dr. Jameson Parsons (neurosurgery) performed R sided decompressive hemicraniectomy with evacuation of IPH x2 sites with dural repair. He was stable post op on heparin drip and extubated 23 after stable head CT 23. He was then bridged to warfarin. He is currently being treated with warfarin with therapeutic INR since 23  Family / Caregiver Present: No  Referring Practitioner: Danica De La Paz MD  Referral Date : 24  Diagnosis: Hemiplga following ntrm intcrbl hemor aff left dominant

## 2024-01-29 NOTE — PATIENT CARE CONFERENCE
[facility] Acute Inpatient Rehabilitation  TEAM CONFERENCE NOTE  Date: 24  Patient Name: Brennen Mcclure       Room: 2633/2633-01  MRN: [id number]       : 1965  (59 y.o.)     Gender: male     Referring Practitioner: Danica De La Paz MD     PRINCIPAL DIAGNOSIS: 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 (HCC)    NURSING    Bladder Continence  Incontinent Daily  Bowel Continence Frequently Incontinent    Date of Last BM: 2024    Bladder/Bowel Program Interventions: Both Bowel & Bladder Program In Place     Wounds/Incisions/Ulcers: Incision healing well    Pain Control: Patient's pain currently uncontrolled, PM&R physician notified and adjustments to pain regimen will be made as follows:    Pain Medication Regimen Usage Pattern: MAR reviewed and pain medications are being used at the following frequency (Specify Medication, # Doses Administered on average per day, identified patterns of use - for example: time of day, prior to activity/therapy  acetaminophen (TYLENOL) tablet 650 mg  Q4 PRN given 2 times over the 3 days, usually during the night.     butalbital-APAP-caffeine -40 MG per capsule 1 capsule Q4 PRN  given 4 times in 2 days, 1 before breakfast, 2 before supper, 1 at HS    gabapentin (NEURONTIN) capsule 100 mg  scheduled 3x daily           Fall Risk:  Falling star program initiated    Medication Education Program: Patient able to manage medications and being educated by nursing    Discharge Preparation Patient/Responsible Party Education In Progress:   Use of pino when OOB; Use of Left hand splint    Nursing specific communication for TEAM: No additional information identified requiring communication at this time    PHYSICAL THERAPY  Bed mobility  Bridging: Minimal assistance (Position/support LLE, pt able to minimally clear buttocks from bed.)  Rolling to Left: Moderate assistance  Rolling to Right: Maximum assistance  Supine to Sit: Maximum assistance;2

## 2024-01-29 NOTE — PROGRESS NOTES
Physical Medicine & Rehabilitation  Progress Note    1/29/2024 1:13 PM     CC: Ambulatory and ADL dysfunction due to     Subjective:   No complaints.  Positive BM.  Very emotional    ROS:  Denies fevers, chills, sweats.  No chest pain, palpitations, lightheadedness.  Denies coughing, wheezing or shortness of breath.  Denies abdominal pain, nausea, diarrhea or constipation.  No new areas of joint pain.  Denies new areas of numbness or weakness.  Denies new anxiety or depression issues.  No new skin problems.    Rehabilitation:   PT:  Restrictions/Precautions: General Precautions, Fall Risk, Up as Tolerated  Other position/activity restrictions: Helmet on when OOB  Required Braces or Orthoses  Other:  (helmet on when OOB)  Left Upper Extremity Brace/Splint: Resting hand (pt has resting hand splint in room for nighttime wear)   Transfers  Sit to Stand: Maximum Assistance (2nd SBA for safety. In both // bars and with scotty stedy)  Stand to Sit: Moderate Assistance  Stand Pivot Transfers: 2 Person Assistance, Dependent/Total (Scotty Stedy)  Ambulation  Surface: Level tile  Device: Parallel Bars  Assistance: Maximum assistance, 2 Person assistance (3A. MaxA for advancing LLE, CGA with pt, and wheelchair follow)  Quality of Gait: Max assist to brace L knee and advance LLE. Verbal cues for sequencing  Gait Deviations: Slow Sabrina, Decreased step length, Decreased step height, Shuffles  Distance: 8' x 2  Comments: Distance limited 2* pt's complaints of LLE pain \"It really hurts\". Pt encouraged to complete full distance desired.      OT:  ADL  Feeding: Minimal assistance  Feeding Skilled Clinical Factors: Per pt report  Grooming: Minimal assistance  Grooming Skilled Clinical Factors: This writer opened mouthwash for pt, pt able to use R hand to pour mouthwash in mouth and spit into basin held by this writer  UE Bathing: Moderate assistance  UE Bathing Skilled Clinical Factors: Pt able to cleanse face and L arm/abdomen with SBA    Recent Labs     01/28/24  0619 01/29/24  0632    134*   K 5.1 4.3    99   CO2 25 26   BUN 24* 25*   CREATININE 1.0 0.8     BNP: No results for input(s): \"BNP\" in the last 72 hours.  PT/INR: No results for input(s): \"PROTIME\", \"INR\" in the last 72 hours.  APTT: No results for input(s): \"APTT\" in the last 72 hours.  CARDIAC ENZYMES: No results for input(s): \"CKMB\", \"CKMBINDEX\", \"TROPONINT\" in the last 72 hours.    Invalid input(s): \"CKTOTAL;3\"  FASTING LIPID PANEL:No results found for: \"CHOL\", \"HDL\", \"TRIG\"  LIVER PROFILE:   Recent Labs     01/28/24  0619   AST 31   ALT 42*   BILIDIR <0.1   BILITOT 0.2*   ALKPHOS 115        I/O (24Hr):  No intake or output data in the 24 hours ending 01/29/24 1313    Glu last 24 hour  No results for input(s): \"POCGLU\" in the last 72 hours.    No results for input(s): \"CLARITYU\", \"COLORU\", \"PHUR\", \"SPECGRAV\", \"PROTEINU\", \"RBCUA\", \"BLOODU\", \"BACTERIA\", \"NITRU\", \"WBCUA\", \"LEUKOCYTESUR\", \"YEAST\", \"GLUCOSEU\", \"BILIRUBINUR\" in the last 72 hours.      Impression/Plan:    Right-sided hemorrhagic CVA with left hemiparesis:  Secondary to dural venous sinus thrombosis.  S/p right decompressive hemicraniectomy with evacuation of intraparenchymal hemorrhage at 2 sites with dural repair on 12/1/23 (Dr. Jameson Parsons).  PT/OT for gait, mobility, strengthening, endurance, ADLs, and self care.  ST for speech, cognition.  Has helmet when head of bed greater than 45 degrees. ,To follow up with Dr. Ahammad for cranioplasty (discussed prior to transfer, will need appointment scheduled).  Discussed with therapist require significant assist of 2 disciplines question 900 minutes-agree  Dural venous sinus thrombosis on eliquis (started 1/18/24) with intentional dose reduction due to bleeding on therapeutic heparin and warfarin.    Seizure on vimpat for seizure prophylaxis.      On modafinil for neurostimulation.-Doing well, will consider tapering  Headache:  Has tylenol as needed.  States that

## 2024-01-29 NOTE — CONSULTS
Comprehensive Nutrition Assessment    Type and Reason for Visit:  Initial, Consult (Evaluate and Treat)    Nutrition Recommendations/Plan:   Continue current; cut foods for pt as needed.   Provide High Calorie, High Protein Oral Nutrition Supplement x 1 daily.     Malnutrition Assessment:  Malnutrition Status:  Moderate malnutrition (01/29/24 1604)    Context:  Acute Illness     Findings of the 6 clinical characteristics of malnutrition:  Energy Intake:  75% or less of estimated energy requirements for 7 or more days  Weight Loss:  Greater than 5% over 1 month     Body Fat Loss:  Moderate body fat loss Triceps   Muscle Mass Loss:  Unable to assess (Wife reports notable loss; some loss likely attributed to deconditioning.)    Fluid Accumulation:  No significant fluid accumulation     Strength:  Not Performed    Nutrition Assessment:    Pt admitted to rehab due to ADL and mobility deficits related to right-sided hemorrhagic CVA secondary to dural venous sinus thrombosis. Pt had been on a tube feeding for a portion of his acute hospital stay. It appears that oral diet was initiated in late December with the majority of intake since that time % of meals. Pt has lost a significant amount of wt. Wife states muscle mass loss noted with redistribution of fat in the abdomen. She also states pt needs soft foods and some items need to be cut up for him. Clinical Bedside Swallow Evaluation completed by SLP- Easy to Chew diet ordered. Pt  willing to receive Ensure x 1 daily.    Nutrition Related Findings:    Na: 134. Other labs and meds reviewed. No edema. Hx: CAD, Chronic deep vein thrombosis (DVT) of both lower extremities,Chronic idiopathic thrombocytopenia, CVA (cerebral vascular accident), HLD.         Current Nutrition Intake & Therapies:    Average Meal Intake: %     ADULT DIET; Easy to Chew    Anthropometric Measures:  Height: 177.8 cm (5' 10\")  Ideal Body Weight (IBW): 166 lbs (75 kg)    Admission Body

## 2024-01-30 PROBLEM — F48.2 PSEUDOBULBAR AFFECT: Status: ACTIVE | Noted: 2024-01-30

## 2024-01-30 PROCEDURE — 97129 THER IVNTJ 1ST 15 MIN: CPT

## 2024-01-30 PROCEDURE — 6370000000 HC RX 637 (ALT 250 FOR IP): Performed by: PHYSICAL MEDICINE & REHABILITATION

## 2024-01-30 PROCEDURE — 97530 THERAPEUTIC ACTIVITIES: CPT

## 2024-01-30 PROCEDURE — 99232 SBSQ HOSP IP/OBS MODERATE 35: CPT | Performed by: INTERNAL MEDICINE

## 2024-01-30 PROCEDURE — 99222 1ST HOSP IP/OBS MODERATE 55: CPT | Performed by: PSYCHIATRY & NEUROLOGY

## 2024-01-30 PROCEDURE — 97112 NEUROMUSCULAR REEDUCATION: CPT

## 2024-01-30 PROCEDURE — 6370000000 HC RX 637 (ALT 250 FOR IP): Performed by: STUDENT IN AN ORGANIZED HEALTH CARE EDUCATION/TRAINING PROGRAM

## 2024-01-30 PROCEDURE — 99232 SBSQ HOSP IP/OBS MODERATE 35: CPT | Performed by: PHYSICAL MEDICINE & REHABILITATION

## 2024-01-30 PROCEDURE — 97116 GAIT TRAINING THERAPY: CPT

## 2024-01-30 PROCEDURE — APPSS60 APP SPLIT SHARED TIME 46-60 MINUTES

## 2024-01-30 PROCEDURE — 92507 TX SP LANG VOICE COMM INDIV: CPT

## 2024-01-30 PROCEDURE — 6370000000 HC RX 637 (ALT 250 FOR IP): Performed by: INTERNAL MEDICINE

## 2024-01-30 PROCEDURE — 1180000000 HC REHAB R&B

## 2024-01-30 PROCEDURE — 6370000000 HC RX 637 (ALT 250 FOR IP): Performed by: PSYCHIATRY & NEUROLOGY

## 2024-01-30 PROCEDURE — 97110 THERAPEUTIC EXERCISES: CPT

## 2024-01-30 PROCEDURE — 97535 SELF CARE MNGMENT TRAINING: CPT

## 2024-01-30 RX ORDER — DEXTROMETHORPHAN POLISTIREX 30 MG/5ML
15 SUSPENSION ORAL DAILY
Status: DISCONTINUED | OUTPATIENT
Start: 2024-01-30 | End: 2024-01-31

## 2024-01-30 RX ADMIN — CITALOPRAM HYDROBROMIDE 10 MG: 10 TABLET ORAL at 07:55

## 2024-01-30 RX ADMIN — ATORVASTATIN CALCIUM 80 MG: 80 TABLET, FILM COATED ORAL at 21:16

## 2024-01-30 RX ADMIN — GABAPENTIN 100 MG: 100 CAPSULE ORAL at 07:54

## 2024-01-30 RX ADMIN — MODAFINIL 200 MG: 100 TABLET ORAL at 07:53

## 2024-01-30 RX ADMIN — BUTALBITA,ACETAMINOPHEN AND CAFFEINE 1 CAPSULE: 50; 300; 40 CAPSULE ORAL at 14:21

## 2024-01-30 RX ADMIN — DICLOFENAC SODIUM 2 G: 10 GEL TOPICAL at 21:17

## 2024-01-30 RX ADMIN — POLYETHYLENE GLYCOL 3350 17 G: 17 POWDER, FOR SOLUTION ORAL at 14:21

## 2024-01-30 RX ADMIN — DICLOFENAC SODIUM 2 G: 10 GEL TOPICAL at 18:25

## 2024-01-30 RX ADMIN — LACOSAMIDE 100 MG: 100 TABLET, FILM COATED ORAL at 21:16

## 2024-01-30 RX ADMIN — ACETAMINOPHEN 650 MG: 325 TABLET, FILM COATED ORAL at 02:34

## 2024-01-30 RX ADMIN — APIXABAN 2.5 MG: 2.5 TABLET, FILM COATED ORAL at 21:16

## 2024-01-30 RX ADMIN — FERROUS SULFATE TAB 325 MG (65 MG ELEMENTAL FE) 325 MG: 325 (65 FE) TAB at 07:54

## 2024-01-30 RX ADMIN — BUTALBITA,ACETAMINOPHEN AND CAFFEINE 1 CAPSULE: 50; 300; 40 CAPSULE ORAL at 07:56

## 2024-01-30 RX ADMIN — LISINOPRIL 10 MG: 10 TABLET ORAL at 07:53

## 2024-01-30 RX ADMIN — LACOSAMIDE 100 MG: 100 TABLET, FILM COATED ORAL at 07:55

## 2024-01-30 RX ADMIN — SENNOSIDES 17.2 MG: 8.6 TABLET, FILM COATED ORAL at 21:16

## 2024-01-30 RX ADMIN — CARVEDILOL 3.12 MG: 3.12 TABLET, FILM COATED ORAL at 18:25

## 2024-01-30 RX ADMIN — APIXABAN 2.5 MG: 2.5 TABLET, FILM COATED ORAL at 07:54

## 2024-01-30 RX ADMIN — GABAPENTIN 100 MG: 100 CAPSULE ORAL at 21:16

## 2024-01-30 RX ADMIN — GABAPENTIN 100 MG: 100 CAPSULE ORAL at 14:21

## 2024-01-30 RX ADMIN — Medication 1 TABLET: at 07:53

## 2024-01-30 RX ADMIN — Medication 15 MG: at 21:16

## 2024-01-30 RX ADMIN — CARVEDILOL 3.12 MG: 3.12 TABLET, FILM COATED ORAL at 07:54

## 2024-01-30 RX ADMIN — ASPIRIN 81 MG 81 MG: 81 TABLET ORAL at 07:54

## 2024-01-30 RX ADMIN — Medication 6 MG: at 21:16

## 2024-01-30 ASSESSMENT — PAIN SCALES - GENERAL
PAINLEVEL_OUTOF10: 9
PAINLEVEL_OUTOF10: 2
PAINLEVEL_OUTOF10: 9

## 2024-01-30 ASSESSMENT — PAIN DESCRIPTION - LOCATION
LOCATION: ABDOMEN
LOCATION: HEAD

## 2024-01-30 ASSESSMENT — PAIN DESCRIPTION - DESCRIPTORS
DESCRIPTORS: THROBBING
DESCRIPTORS: CRAMPING

## 2024-01-30 ASSESSMENT — PAIN DESCRIPTION - ORIENTATION: ORIENTATION: POSTERIOR

## 2024-01-30 NOTE — PROGRESS NOTES
Physical Medicine & Rehabilitation  Progress Note    1/30/2024 4:21 PM     CC: Ambulatory and ADL dysfunction due to     Subjective:   No complaints.  Very emotional notes pain in joints would like Voltaren gel.  Positive BM    ROS:  Denies fevers, chills, sweats.  No chest pain, palpitations, lightheadedness.  Denies coughing, wheezing or shortness of breath.  Denies abdominal pain, nausea, diarrhea or constipation.  No new areas of joint pain.  Denies new areas of numbness or weakness.  Denies new anxiety or depression issues.  No new skin problems.    Rehabilitation:   PT:  Restrictions/Precautions: General Precautions, Fall Risk, Up as Tolerated  Other position/activity restrictions: Helmet on when OOB  Required Braces or Orthoses  Other:  (helmet on when OOB)  Left Upper Extremity Brace/Splint: Resting hand (pt has resting hand splint in room for nighttime wear)   Transfers  Sit to Stand: Moderate Assistance, 2 Person Assistance (Heavy L lateral lean when standing. Completed most stands in // bars. 2 transfers completed in Scotty stedy when getting in/out of bed)  Stand to Sit: Moderate Assistance, 2 Person Assistance  Stand Pivot Transfers: Dependent/Total, 2 Person Assistance (scotty stedy)  Ambulation  Surface: Level tile  Device: Parallel Bars  Assistance: Maximum assistance, 2 Person assistance (3A. MaxA for advancing LLE, MaxAx2 to maintain standing balance, weight shift, and midline orientation)  Quality of Gait: Max assist to brace L knee and advance LLE.  Constant Verbal cues for sequencing. Requires assistance for upright posture, correcting L lateral lean, assistance with weight shifting  Gait Deviations: Slow Sabrina, Decreased step length, Decreased step height, Shuffles  Distance: 8ft fwd, 2 x 8ft fwd+ retro in AM ; 2 x 8ft fwd in PM  Comments: Pt may benefit from other supportive devices to ambulate, trial walker lift?      OT:  ADL  Feeding: Minimal assistance  Feeding Skilled Clinical Factors: Per  bilateral lower limb DVTs:  Home dabigatran discontinued.  On eliquis as above.  Depression, anxiety:  On celexa  Bowel Management: Miralax daily, senokot nightly, dulcolax prn.  DVT Prophylaxis:   On eliquis  Internal Medicine for medical management  Follow up PCP 1-2 weeks, Neurosurgery - Dr. Corbett-cranioplasty, PM&R 4-6 weeks, Hematology, infrarenal AAA 4.5 cm will need follow-up with vascular, severe emphysema will need follow-up with pulmonary      Beck Shi MD       This note is created with the assistance of a speech recognition program.  While intending to generate a document that actually reflects the content of the visit, the document can still have some errors including those of syntax and sound a like substitutions which may escape proof reading.  In such instances, actual meaning can be extrapolated by contextual diversion

## 2024-01-30 NOTE — PROGRESS NOTES
Monitor for symptoms   Physical Therapy  Facility/Department: [facility] ACUTE REHAB  Rehabilitation Physical Therapy Daily Treatment Note    NAME: Brennen Mcclure  : 1965 (59 y.o.)  MRN: [id number]  CODE STATUS: Full Code    Date of Service: 24      Past Medical History:   Diagnosis Date    CAD (coronary artery disease)     Chronic deep vein thrombosis (DVT) of both lower extremities (HCC)     Chronic idiopathic thrombocytopenia (HCC)     CVA (cerebral vascular accident) (HCC)     HLD (hyperlipidemia)      Past Surgical History:   Procedure Laterality Date    CORONARY ARTERY BYPASS GRAFT      CORONARY STENT PLACEMENT         Chart Reviewed: Yes  Patient assessed for rehabilitation services?: Yes  Additional Pertinent Hx: 59 year old male who admitted 23 with 4 days of new L arm weakness and headache. He had known history of MI and CABG - on dabigatran but with inconsistent use due to prescription running out. He had previous treatment for L ICA thrombus and R sided symptoms treated with “clot busting medication” in . 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 He was transferred from [place], [place] to [facility] for medical management and was admitted to neuro critical care. His LUE numbness/weakness worsened to near complete hemiparesis. Initial GCS 15. He was initially treated with heparin gtt for DVST. On 23 Dr. Jameson Parsons (neurosurgery) performed R sided decompressive hemicraniectomy with evacuation of IPH x2 sites with dural repair. He was stable post op on heparin drip and extubated 23 after stable head CT 23. He was then bridged to warfarin. He is currently being treated with warfarin with therapeutic INR since 23  Family / Caregiver Present: No  Referring Practitioner: Danica De La Paz MD  Referral Date : 24  Diagnosis: Hemiplga following ntrm intcrbl hemor aff left dominant

## 2024-01-30 NOTE — CONSULTS
drug use.    Social History:     RESIDENCE: House with spouse  : Yes    CHILDREN: 4 adult children  OCCUPATION:  however worried if able to return to work  EDUCATION: GED    Past Medical History:        Diagnosis Date    CAD (coronary artery disease)     Chronic deep vein thrombosis (DVT) of both lower extremities (HCC)     Chronic idiopathic thrombocytopenia (HCC)     CVA (cerebral vascular accident) (HCC)     HLD (hyperlipidemia)        Past Surgical History:        Procedure Laterality Date    CORONARY ARTERY BYPASS GRAFT      CORONARY STENT PLACEMENT         Family Medical and Psychiatric History:     Patient denies family psychiatric history        Problem Relation Age of Onset    Coronary Art Dis Mother     Stroke Other        Medications Prior to Admission:   No medications prior to admission.    Allergies:  Patient has no known allergies.    Lifetime Psychiatric Review of Systems         Obsessions and Compulsions: Denies       Sari or Hypomania: Denies     Hallucinations: Denies     Panic Attacks: Endorses recent panic attack     Delusions:  Denies     Phobias:  Denies     Trauma: Denies    Prior to Admission medications    Not on File        Medications:    Current Facility-Administered Medications: lisinopril (PRINIVIL;ZESTRIL) tablet 10 mg, 10 mg, Oral, Daily  carvedilol (COREG) tablet 3.125 mg, 3.125 mg, Oral, BID WC  butalbital-APAP-caffeine -40 MG per capsule 1 capsule, 1 capsule, Oral, Q4H PRN  acetaminophen (TYLENOL) tablet 650 mg, 650 mg, Oral, Q4H PRN  polyethylene glycol (GLYCOLAX) packet 17 g, 17 g, Oral, Daily  bisacodyl (DULCOLAX) suppository 10 mg, 10 mg, Rectal, Daily PRN  apixaban (ELIQUIS) tablet 2.5 mg, 2.5 mg, Oral, BID  citalopram (CELEXA) tablet 10 mg, 10 mg, Oral, Daily  ferrous sulfate (IRON 325) tablet 325 mg, 325 mg, Oral, Every Other Day  gabapentin (NEURONTIN) capsule 100 mg, 100 mg, Oral, TID  lacosamide (VIMPAT) tablet 100 mg, 100 mg, Oral,

## 2024-01-30 NOTE — PLAN OF CARE
Problem: Discharge Planning  Goal: Discharge to home or other facility with appropriate resources  Outcome: Progressing     Problem: Skin/Tissue Integrity  Goal: Absence of new skin breakdown  Description: 1.  Monitor for areas of redness and/or skin breakdown  2.  Assess vascular access sites hourly  3.  Every 4-6 hours minimum:  Change oxygen saturation probe site  4.  Every 4-6 hours:  If on nasal continuous positive airway pressure, respiratory therapy assess nares and determine need for appliance change or resting period.  Outcome: Progressing     Problem: Safety - Adult  Goal: Free from fall injury  Outcome: Progressing     Problem: ABCDS Injury Assessment  Goal: Absence of physical injury  Outcome: Progressing     Problem: Pain  Goal: Verbalizes/displays adequate comfort level or baseline comfort level  Outcome: Progressing     Problem: Nutrition Deficit:  Goal: Optimize nutritional status  Outcome: Progressing     Problem: Chronic Conditions and Co-morbidities  Goal: Patient's chronic conditions and co-morbidity symptoms are monitored and maintained or improved  Outcome: Progressing

## 2024-01-30 NOTE — PROGRESS NOTES
[facility]   IN-PATIENT SERVICE   [facility]    Consult note            Date:   1/30/2024  Patient name:  Brennen Mcclure  Date of admission:  1/27/2024  3:01 PM  MRN:   [id number]  Account:  [id number]  YOB: 1965  PCP:    No primary care provider on file.  Room:   [address] [place] [address]  Code Status:    Full Code    Chief Complaint:     No chief complaint on file.      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, started on heparin drip also had patchy IPH, complicated by expansion of intraparenchymal hemorrhage with mass effect, s/p craniectomy, and evacuation of IPH x 2 with dural repair last CT head on on 1/24 showed evolution of the hemorrhage with reduction in the mass effect, Hospital course further complicated by ITP, was seen by hematology oncology, given IVIG and platelet transfusion,, started on Vimpat for seizure prevention, had cellulitis on Celexa  Patient was seen by hematology oncology over there and was started on Eliquis 2.5 twice daily  Patient was drowsy but arousable on my encounter,  Family was present at the bedside    Denies any chest pain shortness of breath        Past Medical History:     Past Medical History:   Diagnosis Date    CAD (coronary artery disease)     Chronic deep vein thrombosis (DVT) of both lower extremities (HCC)     Chronic idiopathic thrombocytopenia (HCC)     CVA (cerebral vascular accident) (HCC)     HLD (hyperlipidemia)         Past Surgical History:     Past Surgical History:   Procedure Laterality Date    CORONARY ARTERY BYPASS GRAFT      CORONARY STENT PLACEMENT          Medications Prior to Admission:     Prior to  ausculation, no wheezing, rales or rhonchi, normal effort  Cardiovascular: normal rate, regular rhythm, no murmur, gallop, rub.  Abdomen: Soft, nontender, nondistended, normal bowel sounds, no hepatomegaly or splenomegaly  Neurologic: Left-sided hemiparesis skin: No gross lesions, rashes, bruising or bleeding on exposed skin area  Extremities:  peripheral pulses palpable, no pedal edema or calf pain with palpation      Investigations:      Laboratory Testing:  No results found for this or any previous visit (from the past 24 hour(s)).      Imaging/Diagnostics:    XR Chest Limited  ** FINAL **  Procedure:  RAD  Feb 5 2013 11:39AM [id number]  CHEST SINGLE VIEW  Reason for Exam:  CHEST PAIN ARM PAIN    FULL RESULT:   Examination: Chest one view    Date: February 5, 2013, 1129 hrs.    Clinical history: 48-year-old with chest pain.    Comparison: None    Findings:    A single portable frontal view of the chest was obtained.    Sternotomy wires are present. The cardiac silhouette is upper limits of   normal. The trachea is normally positioned. The lungs are clear of focal   airspace disease bilaterally. No sizable pleural effusion or convincing   evidence for pneumothorax is identified. No free air is noted beneath the   diaphragm.    IMPRESSION:       1. The cardiac silhouette is upper limits of normal.  2. No focal airspace disease.    Report Electronically signed by Esther Campos M.D. on 2/5/2013   12:23 PM  Transcribed by: [facility] on Feb 5 2013 12:25P   Read by:  ESTHER CAMPOS M.D.  [id number] on Feb 5 2013 12:25P   Electronically Signed by:  DR. ESTHER CAMPOS M.D. 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

## 2024-01-30 NOTE — PROGRESS NOTES
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  Toward middle of session, Pt lethargy level increasing, falling asleep, required more frequent cueing.      Orientation: Orientation log administered, O-Log score- 28/30 (disoriented to DENEEN and pathology deficits).     Recall: n/a    Organization: Convergent categorization, ID category of 3 similar items (concrete)- 90% accuracy (I), 100% cued.  Divergent thinking, add 1 to list of 3 similar items (concrete)- 90% accuracy (I), 100% cued.     Problem Solving/Reasoning: n/a    Speech: Pt presents with mild-moderate dysarthria characterized by imprecise articulation, blended word boundaries, rapid rate and reduced vocal intensity.  Speech judged to be ~70% intelligible in conversation.  Continued education provided re: compensatory dysarthria strategies (e.g., overexaggeration of articulatory movement, breath support, maintaining vocal intensity and reduced rate).  Pt verbalized understanding of all strategies, but required mod A to facilitate strategies in conversation and with articulation drills task (alliterations).  Pt able to complete OMEs x5 sets in reps of 20 c mod cues.  Reduced labial and lingual strength, coordination and ROM observed with exercise attempts.     Other: No family present.  Pt demonstrated signs of Pseudobulbar affect during session.  Pt c/o difficulty controlling emotions (crying).    Plan:  [x] Continue ST services    [] Discharge from ST:       Discharge recommendations: [] Inpatient Rehab   [] Skilled Nursing Facility   [] Outpatient Therapy  [] Follow up at trauma clinic   [] Other:       Treatment completed by: Zofia Martinez M.A., CCC-SLP

## 2024-01-30 NOTE — PROGRESS NOTES
Occupational Therapy  [facility]   Acute Rehabilitation Occupational Therapy Daily Treatment Note    Date: 24  Patient Name: Brennen Mcclure       Room: 2633/2633-01  MRN: [id number]  Account: [id number]   : 1965  (59 y.o.) Gender: male       Referring Practitioner: Danica De La Paz MD  Diagnosis: Hemiplegia following ntrm intcrbl hemor aff left dominant side  Additional Pertinent Hx: 59 year old male who admitted 23 with 4 days of new L arm weakness and headache. He had known history of MI and CABG - on dabigatran but with inconsistent use due to prescription running out. He had previous treatment for L ICA thrombus and R sided symptoms treated with “clot busting medication” in . 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 He was transferred from [place], [place] to [facility] for medical management and was admitted to neuro critical care. His LUE numbness/weakness worsened to near complete hemiparesis. Initial GCS 15. He was initially treated with heparin gtt for DVST. On 23 Dr. Jameson Parsons (neurosurgery) performed R sided decompressive hemicraniectomy with evacuation of IPH x2 sites with dural repair. He was stable post op on heparin drip and extubated 23 after stable head CT 23. He was then bridged to warfarin. He is currently being treated with warfarin with therapeutic INR since 23. Hb stable at 13.6 on 24. Thrombocytopenia noted with platelet count 30 - hematology being reconsulted - previously recommended count >40. Currently requiring timed voids for incontinence. PM&R anticipating likely wheelchair mobility with Naveen for transfers.    Treatment Diagnosis: Impaired self-care status    Past Medical History:  has a past medical history of CAD (coronary artery disease), Chronic deep vein thrombosis (DVT) of both lower extremities (HCC),  training, Functional mobility training, Endurance training, Wheelchair mobility training, Neuromuscular re-education, Cognitive reorientation, Pain management, Safety education & training, Patient/Caregiver education & training, Equipment evaluation, education, & procurement, Positioning, Home management training, Coordination training, Co-Treatment       01/30/24 1005 01/30/24 1412   OT Individual Minutes   Time In 0912  --    Time Out 1002  --    Minutes 50  --    OT Co-Treatment Minutes   Time In  --  1302   Time Out  --  1407   Minutes  --  65       Electronically signed by FORTINO Ortiz on 1/30/24 at 3:33 PM EST

## 2024-01-30 NOTE — PLAN OF CARE
Problem: Discharge Planning  Goal: Discharge to home or other facility with appropriate resources  Outcome: Progressing  Flowsheets (Taken 1/30/2024 1602)  Discharge to home or other facility with appropriate resources:   Identify barriers to discharge with patient and caregiver   Arrange for needed discharge resources and transportation as appropriate   Identify discharge learning needs (meds, wound care, etc)   Refer to discharge planning if patient needs post-hospital services based on physician order or complex needs related to functional status, cognitive ability or social support system     Problem: Skin/Tissue Integrity  Goal: Absence of new skin breakdown  Description: 1.  Monitor for areas of redness and/or skin breakdown  2.  Assess vascular access sites hourly  3.  Every 4-6 hours minimum:  Change oxygen saturation probe site  4.  Every 4-6 hours:  If on nasal continuous positive airway pressure, respiratory therapy assess nares and determine need for appliance change or resting period.  Outcome: Progressing  Note: Assess and document any skin issues every shift.     Problem: Safety - Adult  Goal: Free from fall injury  Outcome: Progressing  Flowsheets (Taken 1/30/2024 1602)  Free From Fall Injury: Instruct family/caregiver on patient safety     Problem: ABCDS Injury Assessment  Goal: Absence of physical injury  Outcome: Progressing  Flowsheets (Taken 1/30/2024 1602)  Absence of Physical Injury: Implement safety measures based on patient assessment  Note: Instruct pt to call out for assistance to get up. Call light in reach at all times.  Uses call light appropriately. Remains free from injury.     Problem: Nutrition Deficit:  Goal: Optimize nutritional status  Outcome: Progressing  Flowsheets (Taken 1/30/2024 1602)  Nutrient intake appropriate for improving, restoring, or maintaining nutritional needs:   Assess nutritional status and recommend course of action   Recommend appropriate diets, oral

## 2024-01-30 NOTE — PROGRESS NOTES
Pt requested to have a order placed for voltaren gel for arms, legs and shoulders. Perfect serve sent to [facility]. Pending response.

## 2024-01-31 ENCOUNTER — APPOINTMENT (OUTPATIENT)
Dept: CT IMAGING | Age: 59
DRG: 056 | End: 2024-01-31
Attending: STUDENT IN AN ORGANIZED HEALTH CARE EDUCATION/TRAINING PROGRAM
Payer: COMMERCIAL

## 2024-01-31 PROBLEM — I62.9 INTRACRANIAL HEMORRHAGE (HCC): Status: ACTIVE | Noted: 2024-01-31

## 2024-01-31 PROBLEM — R51.9 INTRACTABLE HEADACHE: Status: ACTIVE | Noted: 2024-01-31

## 2024-01-31 LAB
ANION GAP SERPL CALCULATED.3IONS-SCNC: 12 MMOL/L (ref 9–17)
BUN SERPL-MCNC: 18 MG/DL (ref 8–23)
CALCIUM SERPL-MCNC: 10.4 MG/DL (ref 8.6–10.4)
CHLORIDE SERPL-SCNC: 97 MMOL/L (ref 98–107)
CO2 SERPL-SCNC: 27 MMOL/L (ref 20–31)
CREAT SERPL-MCNC: 0.8 MG/DL (ref 0.7–1.2)
GFR SERPL CREATININE-BSD FRML MDRD: >60 ML/MIN/1.73M2
GLUCOSE SERPL-MCNC: 97 MG/DL (ref 70–99)
POTASSIUM SERPL-SCNC: 4.4 MMOL/L (ref 3.7–5.3)
SODIUM SERPL-SCNC: 136 MMOL/L (ref 135–144)

## 2024-01-31 PROCEDURE — 6370000000 HC RX 637 (ALT 250 FOR IP): Performed by: PHYSICAL MEDICINE & REHABILITATION

## 2024-01-31 PROCEDURE — 97110 THERAPEUTIC EXERCISES: CPT

## 2024-01-31 PROCEDURE — 70450 CT HEAD/BRAIN W/O DYE: CPT

## 2024-01-31 PROCEDURE — 97112 NEUROMUSCULAR REEDUCATION: CPT

## 2024-01-31 PROCEDURE — 97129 THER IVNTJ 1ST 15 MIN: CPT

## 2024-01-31 PROCEDURE — 6370000000 HC RX 637 (ALT 250 FOR IP): Performed by: PSYCHIATRY & NEUROLOGY

## 2024-01-31 PROCEDURE — 97116 GAIT TRAINING THERAPY: CPT

## 2024-01-31 PROCEDURE — 1180000000 HC REHAB R&B

## 2024-01-31 PROCEDURE — 6370000000 HC RX 637 (ALT 250 FOR IP): Performed by: INTERNAL MEDICINE

## 2024-01-31 PROCEDURE — 6370000000 HC RX 637 (ALT 250 FOR IP): Performed by: STUDENT IN AN ORGANIZED HEALTH CARE EDUCATION/TRAINING PROGRAM

## 2024-01-31 PROCEDURE — 99232 SBSQ HOSP IP/OBS MODERATE 35: CPT | Performed by: PSYCHIATRY & NEUROLOGY

## 2024-01-31 PROCEDURE — 80048 BASIC METABOLIC PNL TOTAL CA: CPT

## 2024-01-31 PROCEDURE — 36415 COLL VENOUS BLD VENIPUNCTURE: CPT

## 2024-01-31 PROCEDURE — 99223 1ST HOSP IP/OBS HIGH 75: CPT | Performed by: PSYCHIATRY & NEUROLOGY

## 2024-01-31 PROCEDURE — 92526 ORAL FUNCTION THERAPY: CPT

## 2024-01-31 PROCEDURE — 97530 THERAPEUTIC ACTIVITIES: CPT

## 2024-01-31 PROCEDURE — 97535 SELF CARE MNGMENT TRAINING: CPT

## 2024-01-31 PROCEDURE — 97542 WHEELCHAIR MNGMENT TRAINING: CPT

## 2024-01-31 PROCEDURE — 99232 SBSQ HOSP IP/OBS MODERATE 35: CPT | Performed by: PHYSICAL MEDICINE & REHABILITATION

## 2024-01-31 RX ORDER — DIVALPROEX SODIUM 500 MG/1
500 TABLET, EXTENDED RELEASE ORAL 2 TIMES DAILY
Status: DISCONTINUED | OUTPATIENT
Start: 2024-01-31 | End: 2024-02-07

## 2024-01-31 RX ORDER — DEXTROMETHORPHAN POLISTIREX 30 MG/5ML
20 SUSPENSION ORAL DAILY
Status: DISCONTINUED | OUTPATIENT
Start: 2024-02-01 | End: 2024-02-08

## 2024-01-31 RX ORDER — AMITRIPTYLINE HYDROCHLORIDE 25 MG/1
12.5 TABLET, FILM COATED ORAL NIGHTLY
Status: DISCONTINUED | OUTPATIENT
Start: 2024-01-31 | End: 2024-02-07

## 2024-01-31 RX ADMIN — Medication 6 MG: at 20:46

## 2024-01-31 RX ADMIN — LISINOPRIL 10 MG: 10 TABLET ORAL at 07:53

## 2024-01-31 RX ADMIN — DIVALPROEX SODIUM 500 MG: 500 TABLET, EXTENDED RELEASE ORAL at 21:44

## 2024-01-31 RX ADMIN — LACOSAMIDE 100 MG: 100 TABLET, FILM COATED ORAL at 20:46

## 2024-01-31 RX ADMIN — APIXABAN 2.5 MG: 2.5 TABLET, FILM COATED ORAL at 07:53

## 2024-01-31 RX ADMIN — SENNOSIDES 17.2 MG: 8.6 TABLET, FILM COATED ORAL at 20:46

## 2024-01-31 RX ADMIN — LACOSAMIDE 100 MG: 100 TABLET, FILM COATED ORAL at 07:53

## 2024-01-31 RX ADMIN — DICLOFENAC SODIUM 2 G: 10 GEL TOPICAL at 20:49

## 2024-01-31 RX ADMIN — BUTALBITA,ACETAMINOPHEN AND CAFFEINE 1 CAPSULE: 50; 300; 40 CAPSULE ORAL at 19:59

## 2024-01-31 RX ADMIN — APIXABAN 2.5 MG: 2.5 TABLET, FILM COATED ORAL at 20:46

## 2024-01-31 RX ADMIN — MODAFINIL 200 MG: 100 TABLET ORAL at 07:52

## 2024-01-31 RX ADMIN — CARVEDILOL 3.12 MG: 3.12 TABLET, FILM COATED ORAL at 07:53

## 2024-01-31 RX ADMIN — AMITRIPTYLINE HYDROCHLORIDE 12.5 MG: 25 TABLET, FILM COATED ORAL at 21:44

## 2024-01-31 RX ADMIN — BUTALBITA,ACETAMINOPHEN AND CAFFEINE 1 CAPSULE: 50; 300; 40 CAPSULE ORAL at 14:09

## 2024-01-31 RX ADMIN — ATORVASTATIN CALCIUM 80 MG: 80 TABLET, FILM COATED ORAL at 20:46

## 2024-01-31 RX ADMIN — GABAPENTIN 100 MG: 100 CAPSULE ORAL at 14:09

## 2024-01-31 RX ADMIN — ASPIRIN 81 MG 81 MG: 81 TABLET ORAL at 07:53

## 2024-01-31 RX ADMIN — ACETAMINOPHEN 650 MG: 325 TABLET, FILM COATED ORAL at 20:46

## 2024-01-31 RX ADMIN — DICLOFENAC SODIUM 2 G: 10 GEL TOPICAL at 07:58

## 2024-01-31 RX ADMIN — ACETAMINOPHEN 650 MG: 325 TABLET, FILM COATED ORAL at 02:19

## 2024-01-31 RX ADMIN — GABAPENTIN 100 MG: 100 CAPSULE ORAL at 20:46

## 2024-01-31 RX ADMIN — Medication 1 TABLET: at 07:53

## 2024-01-31 RX ADMIN — CITALOPRAM HYDROBROMIDE 10 MG: 10 TABLET ORAL at 07:53

## 2024-01-31 RX ADMIN — Medication 15 MG: at 07:53

## 2024-01-31 RX ADMIN — GABAPENTIN 100 MG: 100 CAPSULE ORAL at 07:53

## 2024-01-31 RX ADMIN — BUTALBITA,ACETAMINOPHEN AND CAFFEINE 1 CAPSULE: 50; 300; 40 CAPSULE ORAL at 06:15

## 2024-01-31 ASSESSMENT — PAIN DESCRIPTION - LOCATION
LOCATION: HEAD

## 2024-01-31 ASSESSMENT — PAIN SCALES - GENERAL
PAINLEVEL_OUTOF10: 6
PAINLEVEL_OUTOF10: 8

## 2024-01-31 NOTE — PROGRESS NOTES
in PM    Restrictions:  Restrictions/Precautions: General Precautions;Fall Risk;Up as Tolerated  Required Braces or Orthoses  Left Upper Extremity Brace/Splint: Resting hand  Position Activity Restriction  Other position/activity restrictions: Helmet on when OOB; Sling for transfers/mobility only     SUBJECTIVE  Subjective: Pt is pleasant and agreeable to therapy treatment, expressing fatigue at end of AM/PM sessions.  Pain: Pt reporting 7/10 pain in LUE, with mobility complaining of continued LLE pain, and Pain in Right side of head with coughing.     OBJECTIVE     Cognition  Overall Cognitive Status: Exceptions  Arousal/Alertness: Appropriate responses to stimuli  Following Commands: Follows one step commands with repetition  Attention Span: Difficulty dividing attention;Attends with cues to redirect;Unable to maintain attention  Memory: Appears intact  Safety Judgement: Decreased awareness of need for safety;Decreased awareness of need for assistance  Problem Solving: Assistance required to generate solutions;Assistance required to implement solutions;Assistance required to identify errors made;Assistance required to correct errors made;Decreased awareness of errors  Insights: Decreased awareness of deficits  Initiation: Requires cues for some  Sequencing: Requires cues for all  Cognition Comment: emotional lability; becoming suddenly tearful very briefly during conversation    Strength  Strength LLE  Strength LLE: Exception  Comment: 0/5 grossly; except pt newly able to wiggle toes. Noting Brisk and trace LLE quad activation when in stance phase and repositioning RLE during standing tasks.    Quality of Movement  Tone LLE  LLE Tone: Clonus  Tone Description: Multiple beats of clonus at foot/ankle     Functional Mobility  Bed mobility  Bridging: Minimal assistance (Position/support LLE, pt able to minimally clear buttocks from bed.)  Rolling to Left: Maximum assistance;2 Person assistance  Rolling to Right:  functional tasks)  Specific Instructions for Next Treatment: FES; Sitting/standing balance and midline orienting, pre-gait activity progressing to transfers/Amb with hemiwalker.  Current Treatment Recommendations: Strengthening;ROM;Balance training;Functional mobility training;Transfer training;Gait training;Stair training;Neuromuscular re-education;Pain management;Home exercise program;Safety education & training;Patient/Caregiver education & training;Equipment evaluation, education, & procurement;Positioning;Therapeutic activities (Appropriate for FES modalities ASAP and LiteGait)    EDUCATION  Education  Education Given To: Patient  Education Provided: Safety;Mobility Training;Transfer Training;Equipment;Fall Prevention Strategies;DME/Home Modifications;Plan of Care  Education Provided Comments: Functional Electrical stimulation  Education Method: Demonstration;Verbal  Barriers to Learning: Cognition (attention/awareness deficits)  Education Outcome: Verbalized understanding;Demonstrated understanding;Continued education needed      Therapy Time   01/31/24 1100 01/31/24 1300 01/31/24 1351   Time Code Minutes   Timed Code Treatment Minutes 65 Minutes 25 Minutes 21 Minutes   PT Individual Minutes   Time In 1100  --  1351   Time Out 1205  --  1412   Minutes 65  --  21   PT Concurrent Minutes   Time In  --    (1300)  --    Time Out  --    (1350)  --           Fany Kelly, PT, 01/31/24 at 3:07 PM

## 2024-01-31 NOTE — PROGRESS NOTES
Physical Medicine & Rehabilitation  Progress Note    1/31/2024 1:53 PM     CC: Ambulatory and ADL dysfunction due to     Subjective:   No complaints.  Feels Voltaren gel is helping, positive BM.  Continues with headache-discussed with wife, questions answered    ROS:  Denies fevers, chills, sweats.  No chest pain, palpitations, lightheadedness.  Denies coughing, wheezing or shortness of breath.  Denies abdominal pain, nausea, diarrhea or constipation.  No new areas of joint pain.  Denies new areas of numbness or weakness.  Denies new anxiety or depression issues.  No new skin problems.    Rehabilitation:   PT:  Restrictions/Precautions: General Precautions, Fall Risk, Up as Tolerated  Other position/activity restrictions: Helmet on when OOB  Required Braces or Orthoses  Other:  (Helmet on when OOB)  Left Upper Extremity Brace/Splint: Resting hand (Pt has resting hand split in room for nighttime wear)   Transfers  Sit to Stand: Moderate Assistance (L lateral lean with standing but improved from previous date. Completed with Radha steady, in // bars, and walker lift)  Stand to Sit: Moderate Assistance, 2 Person Assistance  Bed to Chair: Maximum assistance, 2 Person Assistance (SPT towards affected side from bed -> w/c. L knee blocked while completing. Cues for sequencing)  Stand Pivot Transfers: 2 Person Assistance, Maximum Assistance (Completed 2x this date, one time in each direction. Pt was MaxA1 to strong side transferring from w/c -> bed. L knee blocked while completing. Cues for sequencing)  Ambulation  Surface: Level tile  Device:  (Green walker lift)  Other Apparatus: Slider (L foot)  Assistance: Dependent/Total, Maximum assistance (4 assist. 1 assist on each side of pt, 1 assist to help with advancement of LLE and sequencing with gait pattern and 1 wheelchair follow. Constant cueing while completing. Requires assist to block L knee and maintain L hip positioning for improved stabi)  Quality of Gait: Max assist  Mental Manipulation: Word Progression 60% (I), increasing to 80% given mod-max A.      Problem Solving/Reasoning: Problem Solving: Answering Questions 100% (I). ST encouraged patient to provide more detail in answers.     Other: Patient completed 1 set x 10 reps OMEs given mod A. ST noticed reduced lingual and labial strength and coordination while completing exercises.  No family present. Patient had difficulty controlling his emotions (crying) when asked about his dogs.     Plan:  [x] Continue  services    [] Discharge from :      Objective:  /87   Pulse 68   Temp 98.1 °F (36.7 °C)   Resp 16   Ht 1.778 m (5' 10\")   Wt 77.1 kg (170 lb)   SpO2 95%   BMI 24.39 kg/m²  I Body mass index is 24.39 kg/m². I   Wt Readings from Last 1 Encounters:   24 77.1 kg (170 lb)      Temp (24hrs), Av.8 °F (36.6 °C), Min:97.5 °F (36.4 °C), Max:98.1 °F (36.7 °C)         GEN: well developed, well nourished, no acute distress  HEENT: Normocephalic atraumatic, EOMI, mucous membranes pink and moist, left facial droop, depressed skull right secondary craniotomy  CV: RRR, no murmurs, rubs or gallops  PULM: CTAB, no rales or rhonchi. Respirations WNL and unlabored  ABD: soft, NT, ND, +BS and equal  NEURO: A&O x3. Sensation intact to light touch.   MSK: 5/5 right upper extremity and distal right lower extremity straight leg raise, minimal movement left lower extremity and toes, no movement left upper extremity  EXTREMITIES: No calf tenderness to palpation bilaterally. No edema BLEs  SKIN: warm dry and intact with good turgor, right hemicraniectomy well-healed  PSYCH: appropriately interactive. Affect WNL.          Medications   Scheduled Meds:   diclofenac sodium  2 g Topical BID    dextromethorphan  15 mg Oral Daily    lisinopril  10 mg Oral Daily    carvedilol  3.125 mg Oral BID WC    polyethylene glycol  17 g Oral Daily    apixaban  2.5 mg Oral BID    citalopram  10 mg Oral Daily    ferrous sulfate  325 mg Oral

## 2024-01-31 NOTE — PLAN OF CARE
Problem: Discharge Planning  Goal: Discharge to home or other facility with appropriate resources  1/31/2024 1405 by Tamy Celestin LPN  Outcome: Progressing     Problem: Skin/Tissue Integrity  Goal: Absence of new skin breakdown  Description: 1.  Monitor for areas of redness and/or skin breakdown  2.  Assess vascular access sites hourly  3.  Every 4-6 hours minimum:  Change oxygen saturation probe site  4.  Every 4-6 hours:  If on nasal continuous positive airway pressure, respiratory therapy assess nares and determine need for appliance change or resting period.  1/31/2024 1405 by Tamy Celestin LPN  Outcome: Progressing     Problem: Safety - Adult  Goal: Free from fall injury  1/31/2024 1405 by Tamy Celestin LPN  Outcome: Progressing     Problem: ABCDS Injury Assessment  Goal: Absence of physical injury  1/31/2024 1405 by Tamy Celestin LPN  Outcome: Progressing     Problem: Pain  Goal: Verbalizes/displays adequate comfort level or baseline comfort level  1/31/2024 1405 by Tamy Celestin LPN  Outcome: Progressing     Problem: Nutrition Deficit:  Goal: Optimize nutritional status  1/31/2024 1405 by Tamy Celestin LPN  Outcome: Progressing     Problem: Chronic Conditions and Co-morbidities  Goal: Patient's chronic conditions and co-morbidity symptoms are monitored and maintained or improved  1/31/2024 1405 by Tamy Celestin LPN  Outcome: Progressing

## 2024-01-31 NOTE — PROGRESS NOTES
Danica De La Paz MD, 650 mg at 01/31/24 0219    polyethylene glycol (GLYCOLAX) packet 17 g, 17 g, Oral, Daily, Danica De La Paz MD, 17 g at 01/30/24 1421    bisacodyl (DULCOLAX) suppository 10 mg, 10 mg, Rectal, Daily PRN, Danica De La Paz MD    apixaban (ELIQUIS) tablet 2.5 mg, 2.5 mg, Oral, BID, Dipika Angulo MD, 2.5 mg at 01/31/24 0753    citalopram (CELEXA) tablet 10 mg, 10 mg, Oral, Daily, Dipika Angulo MD, 10 mg at 01/31/24 0753    ferrous sulfate (IRON 325) tablet 325 mg, 325 mg, Oral, Every Other Day, Dipika Angulo MD, 325 mg at 01/30/24 0754    gabapentin (NEURONTIN) capsule 100 mg, 100 mg, Oral, TID, Dipika Angulo MD, 100 mg at 01/31/24 1409    lacosamide (VIMPAT) tablet 100 mg, 100 mg, Oral, BID, Dipika Angulo MD, 100 mg at 01/31/24 0753    lidocaine 4 % external patch 1 patch, 1 patch, TransDERmal, Daily PRN, Dipika Angulo MD    aspirin chewable tablet 81 mg, 81 mg, Oral, Daily, Dipika Angulo MD, 81 mg at 01/31/24 0753    atorvastatin (LIPITOR) tablet 80 mg, 80 mg, Oral, Nightly, Dipika Angulo MD, 80 mg at 01/30/24 2116    melatonin tablet 6 mg, 6 mg, Oral, Nightly, Dipika Angulo MD, 6 mg at 01/30/24 2116    modafinil (PROVIGIL) tablet 200 mg, 200 mg, Oral, Daily, Dipika Angulo MD, 200 mg at 01/31/24 0752    therapeutic multivitamin-minerals 1 tablet, 1 tablet, Oral, Daily, Dipika Angulo MD, 1 tablet at 01/31/24 0753    senna (SENOKOT) tablet 17.2 mg, 2 tablet, Oral, Nightly, Dipika Angulo MD, 17.2 mg at 01/30/24 2116      Examination:  /87   Pulse 68   Temp 98.1 °F (36.7 °C) (Oral)   Resp 16   Ht 1.778 m (5' 10\")   Wt 77.1 kg (170 lb)   SpO2 95%   BMI 24.39 kg/m²   Gait - steady  Medication side effects(SE):     Mental Status Examination:    Level of consciousness:  within normal limits   Appearance:  fair grooming and fair hygiene  Behavior/Motor:  no abnormalities noted  Attitude toward examiner:  cooperative, inattentive, and poor eye  the patient and treatment team.    PSYCHOTHERAPY/COUNSELING:  [] Therapeutic interview  [x] Supportive  [] CBT  [] Ongoing  [] Other                                               Brennen Mcclure is a 59 y.o. male being evaluated face to face.     --Jenniffer Rodas MD on 1/31/2024 at 3:28 PM    An electronic signature was used to authenticate this note.     **This report has been created using voice recognition software. It may contain minor errors which are inherent in voice recognition technology.**  I independently saw and evaluated the patient.  I reviewed the  documentation above    .  Any additional comments or changes to the   documentation are stated below otherwise agree with assessment.      The patient is actively participating in therapy.  He appears to be in no distress when participating in therapy but becomes very labile over when asked about how he is feeling.  The patient has taken dextromethorphan but found little benefit with it.  He is taking Elavil and Celexa as well.  The hospital does not carry quinidine which is one of the components of Nuedexta    Medications Changed Today.  A discussion of risks, benefits, and alternatives was held with the patient and this provider with regards to medication changes.  After this discussion we mutually agreed to proceed with the medication changes.  Dextromethorphan increased to 20mg daily.   PLAN  Medications as noted above  Attempt to develop insight  Psycho-education conducted.  Supportive Therapy conducted.  Follow-up daily while on inpatient unit    Electronically signed by LEAH ESCALANTE MD on 1/31/24 at 10:02 PM EST

## 2024-01-31 NOTE — PROGRESS NOTES
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 Occasional repetition and redirection required.    Orientation: Patient was able to state his dogs names, wife's name, occupation and current location.    Recall: Picture Recall (kitchen) 40% (I), increasing to 70% given mod-max A.     Organization: Mental Manipulation: Word Progression 60% (I), increasing to 80% given mod-max A.     Problem Solving/Reasoning: Problem Solving: Answering Questions 100% (I). ST encouraged patient to provide more detail in answers.    Other: Patient completed 1 set x 10 reps OMEs given mod A. ST noticed reduced lingual and labial strength and coordination while completing exercises.  No family present. Patient had difficulty controlling his emotions (crying) when asked about his dogs.    Plan:  [x] Continue ST services    [] Discharge from ST:      Discharge recommendations:  [x] Further therapy recommended at discharge.   [] No therapy recommended at discharge.      Treatment completed by: KORTNEY Ahmadi,  Clinician

## 2024-01-31 NOTE — INTERDISCIPLINARY ROUNDS
[facility] Acute Inpatient Rehabilitation  INTERDISCIPLINARY MEETING  Date: 24  Patient Name: Brennen Mcclure       Room: 2633/2633-01  MRN: [id number]       : 1965  (59 y.o.)     Gender: male     Patient's care team, including nursing, speech language pathologist, occupational therapist, and/or physical therapist, met to discuss patient's care needs. Any patient concerns, change in medical status, and current transfer/mobility status were identified at this time.     Any Additional Pertinent Information:   Not Applicable    Participating Team Members:  Nurse: Tamy Celestin LPN    Occupational Therapist:  Bhupinder Gutierrez OT   Physical Therapist: Fany Kelly PT  Speech Therapist:  Radha Yarbrough M.A., ARUN-SLP

## 2024-01-31 NOTE — PLAN OF CARE
deterioration   Update acute care plan with appropriate goals if chronic or comorbid symptoms are exacerbated and prevent overall improvement and discharge

## 2024-01-31 NOTE — PROGRESS NOTES
standing frame activity prn for increased comfort.    OT Exercises  Dynamic Standing Balance Exercises: standoing at raised mat table WB into LUE forearm reaching across body with RUE to stack cones as directed.  Postural Correction Exercises: midline standing, weight shifting correction, cues for upright visual awareness to assist in standing posture, flucuating min-max A to correct due to L lateral lean.       Electrical Stimulation  Electrical Stimulation Action: Xcite completed with LUE for functional reaching patterns    XCITE -- please see patient profile to see parameters of electrical stim.   Patient ID : [id number]      Patient PIN: [id number]     Below are the muscles stimulated in the functional activities. Comments below reflect if parameters are changed from preloaded or previous sessions.   Muscle stimulated  Pad size Intensity comments  Pulse width comments   Frequency comments    L Scapula Stabilizer    290 40   L Ant. Delt.    220 40   L Triceps   230 40   L posterior delt    200 40                   Program  Activity  Reps  Comments/modifications   Completed 1/31/2024   Functional reaching patterns  Reaching for cones standi at raised humaira ~10 Little to no muscle activation noted this date                     **skin inspection: noted mild redness but no blisters on pad removal. This subsides by the time pt leaves clinic**     TOTAL TIME WITH XCITE: AM: ~30mins to set parameters PM: ~20mins functional reaching activity      Assessment  Assessment  Activity Tolerance: Treatment limited secondary to decreased cognition  Discharge Recommendations: Continue to assess pending progress    Safety Devices  Safety Devices in place: Yes  Type of devices: Left in chair;Chair alarm in place;Call light within reach;All fall risk precautions in place       Goals  Patient Goals   Patient goals : \"To get home\"  Short Term Goals  Time Frame for Short Term Goals: By 1 week  Short Term Goal 1: Pt will complete UB ADLs with Mod  A, good safety, and use of AE/DME/modified techniques as needed  Short Term Goal 2: Pt will complete LB ADLs with Max A, good safety, and use of AE/DME/Modified techniques as needed  Short Term Goal 3: Pt will complete sit to stand transfers in [facility] with Mod A x2 and good safety in preparation for functional transfers  Short Term Goal 4: Pt will actively participate in 30+ minutes of therapeutic exercise/functional activity/social participation for increased safety and independence with self-care for improved quality of life  Short Term Goal 5: Pt will be educated on and explore use of AE/DME/Modified techniques as needed to complete self-care and mobility  Additional Goals?: Yes  Short Term Goal 6: Pt will participate in dynamic unsupported sitting tasks with Max A  facilitating reaching out of base of support and weight bearing through L extremity to increase input through extremity and improved core strength to increase ease with LB self-care tasks  Short Term Goal 7: Pt will participate in neuromuscular re-education activities (weight bearing, e-stim) as tolerated, to facilitate movement in LUE  Short Term Goal 8: Patient will perform self-care routine with Fair sustained attention with 1-2 VCs for attention to task  Short Term Goal 9: Pt will tolerate standing for 2+ minutes, Max A, with 0-1 UE support and no LOB during self-care/functional activity for improved balance/tolerance  Short Term Goal 10: Pt will identify 2 non-pharmacological pain relief techniques during self-care tasks    Long Term Goals  Time Frame for Long Term Goals : By discharge  Long Term Goal 1: Pt will complete UB ADLs with CGA, good safety, and use of AE/DME/modified techniques as needed  Long Term Goal 2: Pt will complete LB ADLs with Mod A, good safety, and use of AE/DME/Modified techniques as needed  Long Term Goal 3: Pt will complete stand pivot transfer from bed <> wheelchair/chair/toilet with Min A, good safety, and use of

## 2024-02-01 PROCEDURE — 97535 SELF CARE MNGMENT TRAINING: CPT

## 2024-02-01 PROCEDURE — 99232 SBSQ HOSP IP/OBS MODERATE 35: CPT | Performed by: PHYSICAL MEDICINE & REHABILITATION

## 2024-02-01 PROCEDURE — 97112 NEUROMUSCULAR REEDUCATION: CPT

## 2024-02-01 PROCEDURE — 99232 SBSQ HOSP IP/OBS MODERATE 35: CPT | Performed by: PSYCHIATRY & NEUROLOGY

## 2024-02-01 PROCEDURE — 97542 WHEELCHAIR MNGMENT TRAINING: CPT

## 2024-02-01 PROCEDURE — 97129 THER IVNTJ 1ST 15 MIN: CPT

## 2024-02-01 PROCEDURE — 97530 THERAPEUTIC ACTIVITIES: CPT

## 2024-02-01 PROCEDURE — 6370000000 HC RX 637 (ALT 250 FOR IP): Performed by: INTERNAL MEDICINE

## 2024-02-01 PROCEDURE — 92507 TX SP LANG VOICE COMM INDIV: CPT

## 2024-02-01 PROCEDURE — 6370000000 HC RX 637 (ALT 250 FOR IP): Performed by: STUDENT IN AN ORGANIZED HEALTH CARE EDUCATION/TRAINING PROGRAM

## 2024-02-01 PROCEDURE — 6370000000 HC RX 637 (ALT 250 FOR IP): Performed by: PSYCHIATRY & NEUROLOGY

## 2024-02-01 PROCEDURE — 1180000000 HC REHAB R&B

## 2024-02-01 PROCEDURE — 99232 SBSQ HOSP IP/OBS MODERATE 35: CPT | Performed by: INTERNAL MEDICINE

## 2024-02-01 RX ADMIN — MODAFINIL 200 MG: 100 TABLET ORAL at 08:22

## 2024-02-01 RX ADMIN — CITALOPRAM HYDROBROMIDE 10 MG: 10 TABLET ORAL at 08:23

## 2024-02-01 RX ADMIN — ASPIRIN 81 MG 81 MG: 81 TABLET ORAL at 08:23

## 2024-02-01 RX ADMIN — AMITRIPTYLINE HYDROCHLORIDE 12.5 MG: 25 TABLET, FILM COATED ORAL at 20:56

## 2024-02-01 RX ADMIN — FERROUS SULFATE TAB 325 MG (65 MG ELEMENTAL FE) 325 MG: 325 (65 FE) TAB at 08:23

## 2024-02-01 RX ADMIN — BUTALBITA,ACETAMINOPHEN AND CAFFEINE 1 CAPSULE: 50; 300; 40 CAPSULE ORAL at 12:25

## 2024-02-01 RX ADMIN — GABAPENTIN 100 MG: 100 CAPSULE ORAL at 08:23

## 2024-02-01 RX ADMIN — GABAPENTIN 100 MG: 100 CAPSULE ORAL at 17:08

## 2024-02-01 RX ADMIN — APIXABAN 2.5 MG: 2.5 TABLET, FILM COATED ORAL at 08:23

## 2024-02-01 RX ADMIN — APIXABAN 2.5 MG: 2.5 TABLET, FILM COATED ORAL at 20:55

## 2024-02-01 RX ADMIN — DICLOFENAC SODIUM 2 G: 10 GEL TOPICAL at 08:27

## 2024-02-01 RX ADMIN — Medication 19.8 MG: at 08:22

## 2024-02-01 RX ADMIN — LISINOPRIL 10 MG: 10 TABLET ORAL at 08:23

## 2024-02-01 RX ADMIN — CARVEDILOL 3.12 MG: 3.12 TABLET, FILM COATED ORAL at 17:08

## 2024-02-01 RX ADMIN — GABAPENTIN 100 MG: 100 CAPSULE ORAL at 20:55

## 2024-02-01 RX ADMIN — CARVEDILOL 3.12 MG: 3.12 TABLET, FILM COATED ORAL at 08:23

## 2024-02-01 RX ADMIN — DICLOFENAC SODIUM 2 G: 10 GEL TOPICAL at 20:57

## 2024-02-01 RX ADMIN — LACOSAMIDE 100 MG: 100 TABLET, FILM COATED ORAL at 20:55

## 2024-02-01 RX ADMIN — BUTALBITA,ACETAMINOPHEN AND CAFFEINE 1 CAPSULE: 50; 300; 40 CAPSULE ORAL at 06:28

## 2024-02-01 RX ADMIN — ATORVASTATIN CALCIUM 80 MG: 80 TABLET, FILM COATED ORAL at 20:55

## 2024-02-01 RX ADMIN — DIVALPROEX SODIUM 500 MG: 500 TABLET, EXTENDED RELEASE ORAL at 08:26

## 2024-02-01 RX ADMIN — DIVALPROEX SODIUM 500 MG: 500 TABLET, EXTENDED RELEASE ORAL at 20:55

## 2024-02-01 RX ADMIN — LACOSAMIDE 100 MG: 100 TABLET, FILM COATED ORAL at 08:23

## 2024-02-01 RX ADMIN — SENNOSIDES 17.2 MG: 8.6 TABLET, FILM COATED ORAL at 20:55

## 2024-02-01 RX ADMIN — Medication 1 TABLET: at 08:22

## 2024-02-01 RX ADMIN — Medication 6 MG: at 20:55

## 2024-02-01 ASSESSMENT — PAIN DESCRIPTION - ORIENTATION: ORIENTATION: RIGHT

## 2024-02-01 ASSESSMENT — PAIN DESCRIPTION - LOCATION
LOCATION: HEAD
LOCATION: HEAD

## 2024-02-01 ASSESSMENT — PAIN SCALES - GENERAL
PAINLEVEL_OUTOF10: 8
PAINLEVEL_OUTOF10: 7

## 2024-02-01 NOTE — PROGRESS NOTES
Physical Therapy  Facility/Department: [facility] ACUTE REHAB  Treatment note    NAME: Brennen Mcclure  : 1965 (59 y.o.)  MRN: [id number]  CODE STATUS: Full Code  Date of Service: 24    Past Medical History:   Diagnosis Date    CAD (coronary artery disease)     Chronic deep vein thrombosis (DVT) of both lower extremities (HCC)     Chronic idiopathic thrombocytopenia (HCC)     CVA (cerebral vascular accident) (HCC)     HLD (hyperlipidemia)      Past Surgical History:   Procedure Laterality Date    CORONARY ARTERY BYPASS GRAFT      CORONARY STENT PLACEMENT         Family / Caregiver Present: Yes (daughter Tiffany partly in PM)  General Comment  Comments: emotionally labile several times, Pt states when he thinks about what he wants to do.    Restrictions:  Restrictions/Precautions: General Precautions;Fall Risk;Up as Tolerated  Required Braces or Orthoses  Left Upper Extremity Brace/Splint: Resting hand  Position Activity Restriction  Other position/activity restrictions: Helmet on when OOB; Sling for transfers/mobility only     SUBJECTIVE  Subjective: Pt is pleasant and agreeable to therapy treatment, states he wants to return to bed, but agreeable in PM to remain in wheelchair to complete phone meeting.  Pain: L UE pain, forearm AM & PM, shoulder PM.    OBJECTIVE  Functional Mobility  Bed mobility  Supine to Sit: Maximum assistance (HOB ~45º, rail used.)  Sit to Supine: Moderate assistance;2 Person assistance (Bed flat, rails down.)  Scooting: Maximal assistance (hips to EOB, laterally in wheelchair.)  Transfers  Sit to Stand: 2 Person Assistance;Moderate Assistance (parallel bars)  Stand to Sit: 2 Person Assistance;Moderate Assistance  Bed to Chair: 2 Person Assistance;Maximum assistance (Stand pivot to pt's L, no device, bed to wheelchair, armrest removed.)  Stand Pivot Transfers: 2 Person Assistance;Maximum Assistance (to pt's L, no device, bed to wheelchair, armrest removed.)  Balance  Posture: Poor  Sitting -

## 2024-02-01 NOTE — PROGRESS NOTES
[facility]   IN-PATIENT SERVICE   [facility]    Consult note            Date:   2/1/2024  Patient name:  Brennen Mcclure  Date of admission:  1/27/2024  3:01 PM  MRN:   [id number]  Account:  [id number]  YOB: 1965  PCP:    No primary care provider on file.  Room:   [address] [place] [address]  Code Status:    Full Code    Chief Complaint:     No chief complaint on file.      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, started on heparin drip also had patchy IPH, complicated by expansion of intraparenchymal hemorrhage with mass effect, s/p craniectomy, and evacuation of IPH x 2 with dural repair last CT head on on 1/24 showed evolution of the hemorrhage with reduction in the mass effect, Hospital course further complicated by ITP, was seen by hematology oncology, given IVIG and platelet transfusion,, started on Vimpat for seizure prevention, had cellulitis on Celexa  Patient was seen by hematology oncology over there and was started on Eliquis 2.5 twice daily  Patient was drowsy but arousable on my encounter,  Family was present at the bedside    Denies any chest pain shortness of breath        Past Medical History:     Past Medical History:   Diagnosis Date    CAD (coronary artery disease)     Chronic deep vein thrombosis (DVT) of both lower extremities (HCC)     Chronic idiopathic thrombocytopenia (HCC)     CVA (cerebral vascular accident) (HCC)     HLD (hyperlipidemia)         Past Surgical History:     Past Surgical History:   Procedure Laterality Date    CORONARY ARTERY BYPASS GRAFT      CORONARY STENT PLACEMENT          Medications Prior to Admission:     Prior to Admission  seen and examined, his vitals have been stable, labs reviewed, patient advised to follow-up with oncology as outpatient  Thrombocytopenia has resolved  Patient on Eliquis reduced dose as per oncology recommendations in [place]  Finish course of Keflex    2/1  Patient seen and examined  Currently alert oriented  On Eliquis at 2.5 twice daily as recommended by oncologist at [place]  Patient advised to follow-up with oncology as outpatient due to recurrent DVTs next  Vital stable thrombocytopenia resolved    Consultations:   IP CONSULT TO DIETITIAN  IP CONSULT TO SOCIAL WORK  IP CONSULT TO INTERNAL MEDICINE  IP CONSULT TO PSYCHIATRY  IP CONSULT TO NEUROLOGY     Patient is admitted as inpatient status because of co-morbidities listed above, severity of signs and symptoms as outlined, requirement for current medical therapies and most importantly because of direct risk to patient if care not provided in a hospital setting.    Blanca Ren MD  2/1/2024  5:10 PM    Copy sent to Dr. Stack primary care provider on file.    Please note that this chart was generated using voice recognition Dragon dictation software.  Although every effort was made to ensure the accuracy of this automated transcription, some errors in transcription may have occurred.

## 2024-02-01 NOTE — PROGRESS NOTES
Physical Medicine & Rehabilitation  Progress Note    2/1/2024 2:27 PM     CC: Ambulatory and ADL dysfunction due to     Subjective:   No complaints.  Feels Voltaren gel is helping, positive BM.      ROS:  Denies fevers, chills, sweats.  No chest pain, palpitations, lightheadedness.  Denies coughing, wheezing or shortness of breath.  Denies abdominal pain, nausea, diarrhea or constipation.  No new areas of joint pain.  Denies new areas of numbness or weakness.  Denies new anxiety or depression issues.  No new skin problems.    Rehabilitation:   PT:  Restrictions/Precautions: General Precautions, Fall Risk, Up as Tolerated  Other position/activity restrictions: Helmet on when OOB; Sling for transfers/mobility only  Required Braces or Orthoses  Other:  (helmet on when OOB)  Left Upper Extremity Brace/Splint: Resting hand   Transfers  Sit to Stand: 2 Person Assistance, Moderate Assistance (Multiple STS transfers from WC and EOM.)  Stand to Sit: 2 Person Assistance, Moderate Assistance  Bed to Chair: 2 Person Assistance, Moderate assistance (with hemiwalker, SPT)  Stand Pivot Transfers: 2 Person Assistance, Moderate Assistance (Completed 2x this date, one time in each direction. L knee blocked while completing. Cues for sequencing with hemiwalker)  Squat Pivot Transfers:  (Attempted however terminated as pt was unable to understand the technique, pt attempting to fully stand.)  Comment: 1 SPT was attempted without device, to strong side, from WC to bed without Hemiwalker device with Maximal A from this writer however pt was very unsteady, transferring without device not recommended.  Ambulation  Surface: Level tile  Device:  (Green forearm walker lift with support straps)  Other Apparatus:  (Left foot slider not used this date.)  Assistance: Dependent/Total, Maximum assistance (2 Assist with maximal cueing for posture/sequencing/attention to task; Writer advances LLE thru swing and braces hip/knee in stance. Pt not yet  able to initiate LLE activity volitionally. OT provides LUE stabilization and postural cues.)  Quality of Gait: Max assist to brace L knee and advance LLE.  Constant Verbal cues for sequencing. Requires assistance for upright posture, correcting L lateral lean, assistance with weight shifting, maintaining hip position/stability  Gait Deviations: Slow Sabrina, Decreased step length, Decreased step height, Shuffles  Distance: 19'  Comments: Pt may benefit from other supportive devices to ambulate, trial walker lift?      OT:  ADL  Feeding: Minimal assistance  Feeding Skilled Clinical Factors: Per pt report  Grooming: Minimal assistance  Grooming Skilled Clinical Factors: This writer opened mouthwash for pt, pt able to use R hand to pour mouthwash in mouth and spit into basin held by this writer  UE Bathing: Moderate assistance  UE Bathing Skilled Clinical Factors: Pt able to cleanse face and L arm/abdomen with SBA while seated in w/c. Assist to wash R arm and abdomen as well as back seated in w/c. Pt perseverating on cleansing L arm requiring VC to cease and let this writer assist with cleansing R arm  LE Bathing: Dependent/Total  LE Bathing Skilled Clinical Factors: TA to cleanse thighs, lower legs and rhiannon/buttocks area  UE Dressing: Maximum assistance  UE Dressing Skilled Clinical Factors: Seated in w/c, pt req assist to thread LUE into sleeve then assist to pull down OH  LE Dressing: Dependent/Total  LE Dressing Skilled Clinical Factors: TA to thread brief and pants over BLE, TA to manage up over hip with Max A x2 to maintain balance d/t L lateral lean  Toileting: Dependent/Total  Toileting Skilled Clinical Factors: Dependent for hygiene post BM in brief  Functional Mobility: Unable to assess(comment)  Functional Mobility Skilled Clinical Factors: Safety concerns  Additional Comments: Pt agreeable to completing self-care this AM in w/c. Use of scotty stedy for standing portions, initially 2 assist for transfers

## 2024-02-01 NOTE — PLAN OF CARE
Problem: Discharge Planning  Goal: Discharge to home or other facility with appropriate resources  2/1/2024 1515 by Tamy Celestin LPN  Outcome: Progressing     Problem: Skin/Tissue Integrity  Goal: Absence of new skin breakdown  Description: 1.  Monitor for areas of redness and/or skin breakdown  2.  Assess vascular access sites hourly  3.  Every 4-6 hours minimum:  Change oxygen saturation probe site  4.  Every 4-6 hours:  If on nasal continuous positive airway pressure, respiratory therapy assess nares and determine need for appliance change or resting period.  2/1/2024 1515 by Tamy Celestin LPN  Outcome: Progressing     Problem: Safety - Adult  Goal: Free from fall injury  2/1/2024 1515 by Tamy Celestin LPN  Outcome: Progressing     Problem: ABCDS Injury Assessment  Goal: Absence of physical injury  2/1/2024 1515 by Tamy Celestin LPN  Outcome: Progressing     Problem: Pain  Goal: Verbalizes/displays adequate comfort level or baseline comfort level  2/1/2024 1515 by Tamy Celestin LPN  Outcome: Progressing     Problem: Nutrition Deficit:  Goal: Optimize nutritional status  2/1/2024 1515 by Tamy Celestin LPN  Outcome: Progressing     Problem: Chronic Conditions and Co-morbidities  Goal: Patient's chronic conditions and co-morbidity symptoms are monitored and maintained or improved  2/1/2024 1515 by Tamy Celestin LPN  Outcome: Progressing

## 2024-02-01 NOTE — CARE COORDINATION
ARU CASE MANAGEMENT NOTE:    Patient is alert and oriented x4.    Spoke with patient and spouse Mara regarding discharge plan: Return home and would like to have OP therapy. Spouse states that she wants to wait to see how patient progresses with therapy if patient is not able to do car transfers with minimal assistance will consider home healthcare.     Outside appointments while in ARU: None    Will continue to follow for additional discharge needs.      Electronically signed by Claude Barajas RN on 2/1/2024 at 4:41 PM

## 2024-02-01 NOTE — PROGRESS NOTES
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      Orientation: Oriented x4.     Recall: Pt. Education provided re: compensatory strategies to facilitate recall (e.g., ID key words, repetition and rehearsal).  Pt. Verbalized understanding and facilitating strategy to complete the following task:    Paragraph recall (3-5 sentences): 89% accuracy (I), increasing to 100% c cues.    Organization: Convergent categorization, ID category of 3 similar items (abstract)- 90% accuracy (I), 100% cued.  Divergent thinking, add 1 to list of 3 similar items (abstract)- 90% accuracy (I), 100% cued.     Problem Solving/Reasoning: n/a    Speech: Pt presents with mild dysarthria characterized by imprecise articulation, blended word boundaries, rapid rate and reduced vocal intensity.  Speech judged to be ~80% intelligible in conversation.  Continued education provided re: compensatory dysarthria strategies.  Pt verbalized understanding of all strategies, but required mod A to facilitate strategies in conversation and with articulation drills task (alliterations).  Pt able to complete OMEs x4 sets in reps of 20 c mod cues.  Reduced labial and lingual strength, coordination and ROM observed with exercise attempts.     Other: No family present.  Pt demonstrated signs of Pseudobulbar affect during session.  Pt c/o difficulty controlling

## 2024-02-01 NOTE — PLAN OF CARE
Problem: Discharge Planning  Goal: Discharge to home or other facility with appropriate resources  2/1/2024 0115 by Tutu Wright RN  Outcome: Progressing  1/31/2024 1405 by Tamy Celestin LPN  Outcome: Progressing     Problem: Skin/Tissue Integrity  Goal: Absence of new skin breakdown  Description: 1.  Monitor for areas of redness and/or skin breakdown  2.  Assess vascular access sites hourly  3.  Every 4-6 hours minimum:  Change oxygen saturation probe site  4.  Every 4-6 hours:  If on nasal continuous positive airway pressure, respiratory therapy assess nares and determine need for appliance change or resting period.  2/1/2024 0115 by Tutu Wright RN  Outcome: Progressing  1/31/2024 1405 by Tamy Celestin LPN  Outcome: Progressing     Problem: Safety - Adult  Goal: Free from fall injury  2/1/2024 0115 by Tutu Wright RN  Outcome: Progressing  1/31/2024 1405 by Tamy Celestin LPN  Outcome: Progressing     Problem: ABCDS Injury Assessment  Goal: Absence of physical injury  2/1/2024 0115 by Tutu Wright RN  Outcome: Progressing  1/31/2024 1405 by Tamy Celestin LPN  Outcome: Progressing     Problem: Pain  Goal: Verbalizes/displays adequate comfort level or baseline comfort level  2/1/2024 0115 by Tutu Wright RN  Outcome: Progressing  1/31/2024 1405 by Tamy Celestin LPN  Outcome: Progressing     Problem: Nutrition Deficit:  Goal: Optimize nutritional status  2/1/2024 0115 by Tutu Wright RN  Outcome: Progressing  1/31/2024 1405 by Tamy Celestin LPN  Outcome: Progressing     Problem: Chronic Conditions and Co-morbidities  Goal: Patient's chronic conditions and co-morbidity symptoms are monitored and maintained or improved  2/1/2024 0115 by Tutu Wright RN  Outcome: Progressing  1/31/2024 1405 by Tamy Celestin LPN  Outcome: Progressing
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
## 2024-02-01 NOTE — PROGRESS NOTES
Occupational Therapy  [facility]   Acute Rehabilitation Occupational Therapy Daily Treatment Note    Date: 24  Patient Name: Brennen Mcclure       Room: 2633/2633-01  MRN: [id number]  Account: [id number]   : 1965  (59 y.o.) Gender: male       Referring Practitioner: Danica De La Paz MD  Diagnosis: Hemiplegia following ntrm intcrbl hemor aff left dominant side  Additional Pertinent Hx: 59 year old male who admitted 23 with 4 days of new L arm weakness and headache. He had known history of MI and CABG - on dabigatran but with inconsistent use due to prescription running out. He had previous treatment for L ICA thrombus and R sided symptoms treated with “clot busting medication” in . 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 He was transferred from [place], [place] to [facility] for medical management and was admitted to neuro critical care. His LUE numbness/weakness worsened to near complete hemiparesis. Initial GCS 15. He was initially treated with heparin gtt for DVST. On 23 Dr. Jameson Parsons (neurosurgery) performed R sided decompressive hemicraniectomy with evacuation of IPH x2 sites with dural repair. He was stable post op on heparin drip and extubated 23 after stable head CT 23. He was then bridged to warfarin. He is currently being treated with warfarin with therapeutic INR since 23. Hb stable at 13.6 on 24. Thrombocytopenia noted with platelet count 30 - hematology being reconsulted - previously recommended count >40. Currently requiring timed voids for incontinence. PM&R anticipating likely wheelchair mobility with Naveen for transfers.    Treatment Diagnosis: Impaired self-care status    Past Medical History:  has a past medical history of CAD (coronary artery disease), Chronic deep vein thrombosis (DVT) of both lower extremities (HCC),

## 2024-02-02 ENCOUNTER — APPOINTMENT (OUTPATIENT)
Dept: GENERAL RADIOLOGY | Age: 59
DRG: 056 | End: 2024-02-02
Attending: STUDENT IN AN ORGANIZED HEALTH CARE EDUCATION/TRAINING PROGRAM
Payer: COMMERCIAL

## 2024-02-02 PROCEDURE — 97112 NEUROMUSCULAR REEDUCATION: CPT

## 2024-02-02 PROCEDURE — 6370000000 HC RX 637 (ALT 250 FOR IP): Performed by: STUDENT IN AN ORGANIZED HEALTH CARE EDUCATION/TRAINING PROGRAM

## 2024-02-02 PROCEDURE — 97535 SELF CARE MNGMENT TRAINING: CPT

## 2024-02-02 PROCEDURE — 71045 X-RAY EXAM CHEST 1 VIEW: CPT

## 2024-02-02 PROCEDURE — 6370000000 HC RX 637 (ALT 250 FOR IP): Performed by: PSYCHIATRY & NEUROLOGY

## 2024-02-02 PROCEDURE — 1180000000 HC REHAB R&B

## 2024-02-02 PROCEDURE — 97130 THER IVNTJ EA ADDL 15 MIN: CPT

## 2024-02-02 PROCEDURE — 99232 SBSQ HOSP IP/OBS MODERATE 35: CPT | Performed by: PHYSICAL MEDICINE & REHABILITATION

## 2024-02-02 PROCEDURE — 99232 SBSQ HOSP IP/OBS MODERATE 35: CPT | Performed by: INTERNAL MEDICINE

## 2024-02-02 PROCEDURE — 99232 SBSQ HOSP IP/OBS MODERATE 35: CPT | Performed by: PSYCHIATRY & NEUROLOGY

## 2024-02-02 PROCEDURE — 94640 AIRWAY INHALATION TREATMENT: CPT

## 2024-02-02 PROCEDURE — 6370000000 HC RX 637 (ALT 250 FOR IP): Performed by: INTERNAL MEDICINE

## 2024-02-02 PROCEDURE — 97129 THER IVNTJ 1ST 15 MIN: CPT

## 2024-02-02 PROCEDURE — 6370000000 HC RX 637 (ALT 250 FOR IP): Performed by: PHYSICAL MEDICINE & REHABILITATION

## 2024-02-02 PROCEDURE — 97542 WHEELCHAIR MNGMENT TRAINING: CPT

## 2024-02-02 PROCEDURE — 97530 THERAPEUTIC ACTIVITIES: CPT

## 2024-02-02 PROCEDURE — 92526 ORAL FUNCTION THERAPY: CPT

## 2024-02-02 PROCEDURE — 97110 THERAPEUTIC EXERCISES: CPT

## 2024-02-02 RX ORDER — IPRATROPIUM BROMIDE AND ALBUTEROL SULFATE 2.5; .5 MG/3ML; MG/3ML
1 SOLUTION RESPIRATORY (INHALATION)
Status: DISCONTINUED | OUTPATIENT
Start: 2024-02-02 | End: 2024-02-11

## 2024-02-02 RX ORDER — BACLOFEN 10 MG/1
5 TABLET ORAL 2 TIMES DAILY
Status: DISCONTINUED | OUTPATIENT
Start: 2024-02-02 | End: 2024-02-26 | Stop reason: HOSPADM

## 2024-02-02 RX ADMIN — BUTALBITA,ACETAMINOPHEN AND CAFFEINE 1 CAPSULE: 50; 300; 40 CAPSULE ORAL at 21:14

## 2024-02-02 RX ADMIN — ASPIRIN 81 MG 81 MG: 81 TABLET ORAL at 08:30

## 2024-02-02 RX ADMIN — BUTALBITA,ACETAMINOPHEN AND CAFFEINE 1 CAPSULE: 50; 300; 40 CAPSULE ORAL at 06:35

## 2024-02-02 RX ADMIN — GABAPENTIN 100 MG: 100 CAPSULE ORAL at 08:30

## 2024-02-02 RX ADMIN — ACETAMINOPHEN 650 MG: 325 TABLET, FILM COATED ORAL at 11:52

## 2024-02-02 RX ADMIN — GABAPENTIN 100 MG: 100 CAPSULE ORAL at 21:15

## 2024-02-02 RX ADMIN — CARVEDILOL 3.12 MG: 3.12 TABLET, FILM COATED ORAL at 16:43

## 2024-02-02 RX ADMIN — CARVEDILOL 3.12 MG: 3.12 TABLET, FILM COATED ORAL at 08:30

## 2024-02-02 RX ADMIN — DICLOFENAC SODIUM 2 G: 10 GEL TOPICAL at 21:19

## 2024-02-02 RX ADMIN — APIXABAN 2.5 MG: 2.5 TABLET, FILM COATED ORAL at 08:30

## 2024-02-02 RX ADMIN — LACOSAMIDE 100 MG: 100 TABLET, FILM COATED ORAL at 08:30

## 2024-02-02 RX ADMIN — POLYETHYLENE GLYCOL 3350 17 G: 17 POWDER, FOR SOLUTION ORAL at 08:29

## 2024-02-02 RX ADMIN — AMITRIPTYLINE HYDROCHLORIDE 12.5 MG: 25 TABLET, FILM COATED ORAL at 21:15

## 2024-02-02 RX ADMIN — LISINOPRIL 10 MG: 10 TABLET ORAL at 08:30

## 2024-02-02 RX ADMIN — CITALOPRAM HYDROBROMIDE 10 MG: 10 TABLET ORAL at 08:30

## 2024-02-02 RX ADMIN — IPRATROPIUM BROMIDE AND ALBUTEROL SULFATE 1 DOSE: 2.5; .5 SOLUTION RESPIRATORY (INHALATION) at 21:42

## 2024-02-02 RX ADMIN — MODAFINIL 200 MG: 100 TABLET ORAL at 08:29

## 2024-02-02 RX ADMIN — Medication 6 MG: at 21:15

## 2024-02-02 RX ADMIN — DIVALPROEX SODIUM 500 MG: 500 TABLET, EXTENDED RELEASE ORAL at 08:31

## 2024-02-02 RX ADMIN — BACLOFEN 5 MG: 10 TABLET ORAL at 21:16

## 2024-02-02 RX ADMIN — Medication 19.8 MG: at 08:29

## 2024-02-02 RX ADMIN — ATORVASTATIN CALCIUM 80 MG: 80 TABLET, FILM COATED ORAL at 21:16

## 2024-02-02 RX ADMIN — GABAPENTIN 100 MG: 100 CAPSULE ORAL at 15:18

## 2024-02-02 RX ADMIN — Medication 1 TABLET: at 08:29

## 2024-02-02 RX ADMIN — DIVALPROEX SODIUM 500 MG: 500 TABLET, EXTENDED RELEASE ORAL at 21:14

## 2024-02-02 RX ADMIN — APIXABAN 2.5 MG: 2.5 TABLET, FILM COATED ORAL at 21:15

## 2024-02-02 RX ADMIN — DICLOFENAC SODIUM 2 G: 10 GEL TOPICAL at 08:31

## 2024-02-02 RX ADMIN — SENNOSIDES 17.2 MG: 8.6 TABLET, FILM COATED ORAL at 21:19

## 2024-02-02 RX ADMIN — BUTALBITA,ACETAMINOPHEN AND CAFFEINE 1 CAPSULE: 50; 300; 40 CAPSULE ORAL at 16:44

## 2024-02-02 RX ADMIN — LACOSAMIDE 100 MG: 100 TABLET, FILM COATED ORAL at 21:15

## 2024-02-02 ASSESSMENT — PAIN DESCRIPTION - LOCATION
LOCATION: SHOULDER
LOCATION: LEG
LOCATION: HEAD

## 2024-02-02 ASSESSMENT — PAIN SCALES - GENERAL
PAINLEVEL_OUTOF10: 8

## 2024-02-02 ASSESSMENT — PAIN DESCRIPTION - ORIENTATION: ORIENTATION: LEFT

## 2024-02-02 ASSESSMENT — PAIN DESCRIPTION - DESCRIPTORS: DESCRIPTORS: THROBBING

## 2024-02-02 ASSESSMENT — PAIN - FUNCTIONAL ASSESSMENT: PAIN_FUNCTIONAL_ASSESSMENT: PREVENTS OR INTERFERES SOME ACTIVE ACTIVITIES AND ADLS

## 2024-02-02 NOTE — PROGRESS NOTES
Physical Therapy  Facility/Department: [facility] [[email or web address] ACUTE REHAB  Treatment note    NAME: Brennen Mcclure  : 1965 (59 y.o.)  MRN: [id number]  CODE STATUS: Full Code  Date of Service: 24    Past Medical History:   Diagnosis Date    CAD (coronary artery disease)     Chronic deep vein thrombosis (DVT) of both lower extremities (HCC)     Chronic idiopathic thrombocytopenia (HCC)     CVA (cerebral vascular accident) (HCC)     HLD (hyperlipidemia)      Past Surgical History:   Procedure Laterality Date    CORONARY ARTERY BYPASS GRAFT      CORONARY STENT PLACEMENT       Family / Caregiver Present: Yes (daughter Tiffany partly in PM)  General Comment  Comments: emotionally lability continues; Pt states he did not receive medication to alleviate. Largely flat affect, occasional laughter.    Restrictions:  Restrictions/Precautions: General Precautions;Fall Risk;Up as Tolerated  Required Braces or Orthoses  Left Upper Extremity Brace/Splint: Resting hand  Position Activity Restriction  Other position/activity restrictions: Helmet on when OOB; Sling for transfers/mobility only     SUBJECTIVE  Subjective: Daughter expressing interest in family training.  Pain: Pt reporting pain in L shoulder, hip, shin (last during Radha Stedy transfer c shin firmly against guard); also reporting pain in back during Xcite due to stimulation when corresponding stimulation was reduced to 0.    OBJECTIVE  Functional Mobility  Bed mobility  Bridging: Minimal assistance (Minimal buttock clearance on L.)  Rolling to Left: Moderate assistance;2 Person assistance (Assist to initiate & maintain sidelying position.)  Rolling to Right: Maximum assistance;2 Person assistance (L LE & UE assist, assist to initate & hold position.)  Supine to Sit: Moderate assistance;2 Person assistance  Sit to Supine: Moderate assistance;2 Person assistance (Bed flat, rails down.)  Scooting: Maximal assistance;2 Person assistance (Max x1 hips to EOB, x2 to HOB.)  Bed Mobility  and mobility tasks for a safe DC home.    PLAN OF CARE  Frequency: 1-2 treatment sessions per day, 5-7 days per week  Physical Therapy Plan  General Plan: Other (See Comment) (900 minutes of combined PT/OT/ST d/t pt's need for 2 skilled therapists to safely and therapeutically perform desired functional tasks)  Specific Instructions for Next Treatment: FES; Sitting/standing balance and midline orienting, pre-gait activity progressing to transfers/Amb with hemiwalker.  Current Treatment Recommendations: Strengthening;ROM;Balance training;Functional mobility training;Transfer training;Gait training;Stair training;Neuromuscular re-education;Pain management;Home exercise program;Safety education & training;Patient/Caregiver education & training;Equipment evaluation, education, & procurement;Positioning;Therapeutic activities (Appropriate for FES modalities ASAP and LiteGait)  Safety Devices  Type of Devices: Gait belt;All fall risk precautions in place;Bed alarm in place;[place] light within reach;Patient at risk for falls;Left in bed;Left in chair (Pt left in bed AM c alarm; in wheelchair in PM c family.)    Therapy Time     02/02/24 0756 02/02/24 0757   Time Code Minutes   Timed Code Treatment Minutes 89 Minutes  (including 22 min. from 40 min. co-tx raya Sung)  --    PT Individual Minutes   Time In 0900 1500   Time Out 1007 1515   Minutes 67 15   PT Co-Treatment Minutes   Time In   (1300)  --    Time Out   (1340)  --      Keira Garcia PTA, 02/02/24 at 5:40 PM

## 2024-02-02 NOTE — PROGRESS NOTES
Occupational Therapy  [facility]   Acute Rehabilitation Occupational Therapy Daily Treatment Note    Date: 24  Patient Name: Brennen Mcclure       Room: 2633/2633-01  MRN: [id number]  Account: [id number]   : 1965  (59 y.o.) Gender: male       Referring Practitioner: Danica De La Paz MD  Diagnosis: Hemiplegia following ntrm intcrbl hemor aff left dominant side  Additional Pertinent Hx: 59 year old male who admitted 23 with 4 days of new L arm weakness and headache. He had known history of MI and CABG - on dabigatran but with inconsistent use due to prescription running out. He had previous treatment for L ICA thrombus and R sided symptoms treated with “clot busting medication” in . 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 He was transferred from [place], [place] to [facility] for medical management and was admitted to neuro critical care. His LUE numbness/weakness worsened to near complete hemiparesis. Initial GCS 15. He was initially treated with heparin gtt for DVST. On 23 Dr. Jameson Parsons (neurosurgery) performed R sided decompressive hemicraniectomy with evacuation of IPH x2 sites with dural repair. He was stable post op on heparin drip and extubated 23 after stable head CT 23. He was then bridged to warfarin. He is currently being treated with warfarin with therapeutic INR since 23. Hb stable at 13.6 on 24. Thrombocytopenia noted with platelet count 30 - hematology being reconsulted - previously recommended count >40. Currently requiring timed voids for incontinence. PM&R anticipating likely wheelchair mobility with Naveen for transfers.    Treatment Diagnosis: Impaired self-care status    Past Medical History:  has a past medical history of CAD (coronary artery disease), Chronic deep vein thrombosis (DVT) of both lower extremities (HCC),

## 2024-02-02 NOTE — PROGRESS NOTES
BEHAVIORAL HEALTH FOLLOW-UP NOTE     2/1/2024     Patient was seen and examined in person, Chart reviewed   Patient's case discussed with staff/team    Chief Complaint: Anxious    Interim History:     The patient was seen face-to-face.  He reports an improvement in his mood.  He did not have a breakdown today.  He denies any depressive symptoms today.  We discussed that the medications he is receiving seem to be helping.  He agrees with this assessment.  He has no suicidal thoughts.  No changes have been made to his medications  .    /87   Pulse 76   Temp 97.9 °F (36.6 °C)   Resp 18   Ht 1.778 m (5' 10\")   Wt 77.1 kg (170 lb)   SpO2 93%   BMI 24.39 kg/m²   Appetite:   [x] Normal/Unchanged  [] Increased  [] Decreased      Sleep:       [x] Normal/Unchanged  [] Fair       [] Poor              Energy:    [x] Normal/Unchanged  [] Increased  [] Decreased        Aggression:  [] yes  [x] no    Patient is [x] able  [] unable to CONTRACT FOR SAFETY ON THE UNIT    PAST MEDICAL/PSYCHIATRIC HISTORY:   Past Medical History:   Diagnosis Date    CAD (coronary artery disease)     Chronic deep vein thrombosis (DVT) of both lower extremities (HCC)     Chronic idiopathic thrombocytopenia (HCC)     CVA (cerebral vascular accident) (HCC)     HLD (hyperlipidemia)        FAMILY/SOCIAL HISTORY:  Family History   Problem Relation Age of Onset    Coronary Art Dis Mother     Stroke Other      Social History     Socioeconomic History    Marital status:      Spouse name: Not on file    Number of children: Not on file    Years of education: Not on file    Highest education level: Not on file   Occupational History    Not on file   Tobacco Use    Smoking status: Not on file    Smokeless tobacco: Not on file   Substance and Sexual Activity    Alcohol use: Not on file    Drug use: Not on file    Sexual activity: Not on file   Other Topics Concern    Not on file   Social History Narrative    Not on file     Social Determinants of  disturbance  Cognition:  oriented to person, place, and time   Concentration distractible  Insight fair   Judgement fair     ASSESSMENT:   Patient symptoms are:  [] Well controlled  [x] Improving  [] Worsening  [] No change      Diagnosis:   Principal Problem:    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 (HCC)  Active Problems:    Pseudobulbar affect    Intractable headache    Intracranial hemorrhage (HCC)  Resolved Problems:    * No resolved hospital problems. *      LABS:    No results for input(s): \"WBC\", \"HGB\", \"PLT\" in the last 72 hours.  Recent Labs     01/31/24  0618      K 4.4   CL 97*   CO2 27   BUN 18   CREATININE 0.8   GLUCOSE 97     No results for input(s): \"BILITOT\", \"ALKPHOS\", \"AST\", \"ALT\" in the last 72 hours.  No results found for: \"LABAMPH\", \"BARBSCNU\", \"LABBENZ\", \"CANNAB\", \"COCAINESCRN\", \"LABMETH\", \"OPIATESCREENURINE\", \"PHENCYCLIDINESCREENURINE\", \"PPXUR\", \"ETOH\"  No results found for: \"TSH\", \"FREET4\"  No results found for: \"LITHIUM\"  No results found for: \"VALPROATE\", \"CBMZ\"    RISK ASSESSMENT: moderate - not likely benefit from inpatient psychiatric hospitalization     Treatment Plan:  Reviewed current Medications with the patient.   No Medication Changes Today  On Dextromethorphan 20  mg oral daily.   Per pharmacy, quinidine is not available in [facility]. Will consider alternative.    Risks, benefits, side effects, drug-to-drug interactions and alternatives to treatment were discussed. The patient  consented to treatment.     Encourage patient to attend group and other milieu activities.  Discharge planning discussed with the patient and treatment team.    PSYCHOTHERAPY/COUNSELING:  [] Therapeutic interview  [x] Supportive  [] CBT  [] Ongoing  [] Other                                               Brennen Mcclure is a 59 y.o. male being evaluated face to face.     --LEAH ESCALANTE MD on 2/1/2024 at 9:25 PM    An electronic signature was used to authenticate this note.

## 2024-02-02 NOTE — PROGRESS NOTES
SPEECH LANGUAGE PATHOLOGY  Speech Language Pathology  [facility] ACUTE REHAB    Cognitive Treatment Note    Date: 2/2/2024  Patient’s Name: Brennen Mcclure  MRN: [id number]  Diagnosis:   Patient Active Problem List   Diagnosis Code    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 (HCC) I69.152    Pseudobulbar affect F48.2    Intractable headache R51.9    Intracranial hemorrhage (HCC) I62.9       Pain: 6/10 -- Left Leg     Cognitive Treatment    Treatment time: 1794-4542 and 2764-5432      Subjective: [x] Alert [x] Cooperative     [] Confused     [x] Agitated    [] Lethargic      Objective/Assessment:  Attention: AM and PM: Occasional repetition and redirection required.    Orientation: AM: Oriented x4.    Recall: AM and PM: NT    Organization:   AM: Category Exclusion 60% (I), increasing to 100% with mod-max A (semantic cues, phonemic cues, and phrase completion). Two-Step Directions 100% (I), progressing to three-step directions 90% (I) given min cues (extra time).   PM: Mental Manipulation (three objects in alphabetical order) 40% (I), increasing to 100% with mod-max A (semantic cues and phonemic cues).     Problem Solving/Reasoning:   AM: Multiple Uses for Objects, patient was able to name one use 100% (I), patient was able to name another use 100% with mod-max A (semantic cues, phonemic cues, phrase completion).   PM: Functional Time Calculations 70%, increasing to 100% given visual and mod-max cues.    Other:   AM: Patient completed facial strengthening exercises 2 sets x 10 reps given mod A. ST observed reduced lingual and labial strength and coordination while completing exercises. No family present.  PM: Patient's daughter and granddaughter present. Patient agitated t/o session due to being in the wheelchair and not in bed.    Plan:  [x] Continue ST services    [] Discharge from ST:      Discharge recommendations:  [x] Further therapy recommended at discharge.   [] No therapy recommended at  discharge.      Treatment completed by: KORTNEY Ahmadi,  Clinician

## 2024-02-02 NOTE — PROGRESS NOTES
along with worsening left-sided weakness extending to left lower extremity.  He was on ventilator in first week of December.  He underwent right decompressive hemicraniectomy.  Patient was extubated on 12/7/2023.  Since then patient was on Coumadin.    Beginning of this year he was noted to have increased lethargy.  CT head showed new right basal ganglia and right temporal lobe intraparenchymal hemorrhages for which he has had vitamin K and platelet transfusion.  Also has had thrombocytopenia for which he was under care of hematology.  Required platelet transfusions.  Also has had IVIG and Promacta.  Then his anticoagulant med was switched to Eliquis.  Patient has been on Vimpat for seizure prophylaxis.    Most recent CT head on 1/24/2024 was stable.  Patient has been on helmet.  Prior to discharge; neurosurgery team contact local neurosurgeon, Dr. Corbett, who accepted the transfer and planning for cranioplasty in future.     2/1/2024: Chart reviewed and discussed with caregivers.  Headaches are improving.  Patient's granddaughter is at bedside.  CT head done last night findings are reviewed with them.  Patient felt extremely happy with pseudobulbar affect.    2/2/2024: Chart reviewed and discussed with caregivers.  Patient states \"no headaches\".  Patient's family member at bedside.  Patient is presently on video chat with his wife.  She is wondering about Voltaren gel for left shoulder discomfort.  But patient denies any neck pain radiating down upper extremities.      No current facility-administered medications on file prior to encounter.     No current outpatient medications on file prior to encounter.     Allergies: Brennen BEDOYA Ren has No Known Allergies.    Past Medical History:   Diagnosis Date    CAD (coronary artery disease)     Chronic deep vein thrombosis (DVT) of both lower extremities (HCC)     Chronic idiopathic thrombocytopenia (HCC)     CVA (cerebral vascular accident) (HCC)     HLD (hyperlipidemia)   \"T3MXKQI\", \"C8OPFCJ\", \"FT4\", \"TSH\", \"AST\", \"ALT\", \"LDH\", \"AMMONIA\", \"CHOL\", \"HDL\", \"LDLCHOLESTEROL\", \"CHOLHDLRATIO\", \"TRIG\", \"VLDL\", \"CKTOTAL\", \"CKMB\", \"CKMBINDEX\", \"RF\", \"SILVA\" in the last 72 hours.    No results found for: \"PHENYTOIN\", \"PHENOBARB\", \"VALPROATE\", \"CBMZ\"    Diagnostic data reviewed:  CT head 1/17/2024 done at [facility] : Multifocal intraparenchymal hemorrhages including decreased size of the right temporal intracranial hemorrhage and unchanged right frontal intra-axial hemorrhage. No new or enlarging hemorrhage. Unchanged surrounding vasogenic edema. No midline shift. Basal cisterns are patent.  Postsurgical changes of prior right hemicraniectomy with unchanged mild herniation of parenchyma outside the craniectomy defect and a stable 7 mm extra-axial collection at the right frontal convexity.    CTA head and neck 1/17/2024: Unchanged sequela of prior dural venous thrombosis with wall thickening as above. No new/acute venous thrombosis.    MRI brain 11/25/2023 done at [facility]: 1. New hemorrhagic venous infarcts within the right frontal lobe and right temporal lobe with associated regional temporal lobe sulcal effacement and increased right frontal sulcal subarachnoid hemorrhage. No midline shift.     2. Unchanged previously described right frontal hemorrhagic venous infarct.   3. Persistent partially occlusive venous thrombosis from the superior sagittal sinus at the vertex through the torcula, right transverse sinus and right sigmoid sinus, with overall increase in the degree of luminal contrast opacification.     2D echo:      CT Head 1/31/2024: No acute intracranial abnormality. Right cerebral convexity encephalomalacia.         Impression and Plan: Mr. Brennen Mcclure is a 59 y.o. male with   Persistent migrainous headache; was started on Depakote ER for headache prophylaxis.  Also amitriptyline nightly for sleep; these are helpful; therefore to continue same.  CT head on 1/31/24 night was -ve for

## 2024-02-02 NOTE — PROGRESS NOTES
\"CHOL\", \"HDL\", \"TRIG\"  LIVER PROFILE:   No results for input(s): \"AST\", \"ALT\", \"ALB\", \"BILIDIR\", \"BILITOT\", \"ALKPHOS\" in the last 72 hours.       I/O (24Hr):    Intake/Output Summary (Last 24 hours) at 2/2/2024 1435  Last data filed at 2/2/2024 1401  Gross per 24 hour   Intake 480 ml   Output --   Net 480 ml         Glu last 24 hour  No results for input(s): \"POCGLU\" in the last 72 hours.    No results for input(s): \"CLARITYU\", \"COLORU\", \"PHUR\", \"SPECGRAV\", \"PROTEINU\", \"RBCUA\", \"BLOODU\", \"BACTERIA\", \"NITRU\", \"WBCUA\", \"LEUKOCYTESUR\", \"YEAST\", \"GLUCOSEU\", \"BILIRUBINUR\" in the last 72 hours.    CT HEAD WO CONTRAST    Result Date: 1/31/2024  No acute intracranial abnormality. Right cerebral convexity encephalomalacia.       Impression/Plan:    Right-sided hemorrhagic CVA with left hemiparesis:  Secondary to dural venous sinus thrombosis.  S/p right decompressive hemicraniectomy with evacuation of intraparenchymal hemorrhage at 2 sites with dural repair on 12/1/23 (Dr. Jameson Parsons).  Continue rehab efforts-PT/OT/speech/cognition has helmet when head of bed greater than 45 degrees. ,To follow up with Dr. Corbett for cranioplasty (discussed prior to transfer, will need appointment scheduled).    Questional mild spasticity left upper extremity monitor may we will add low-dose baclofen-monitor for sedation, continue range of motion  Dural venous sinus thrombosis on eliquis (started 1/18/24) with intentional dose reduction due to bleeding on therapeutic heparin and warfarin.    Headache continues, on Fioricet reviewed with wife, neurology consulted-appreciate follow-up started on Depakote and amitriptyline, had repeat CT head as above  Seizure on vimpat for seizure prophylaxis.      On modafinil for neurostimulation.-Doing well, consider tapering  Left upper limb pain:  On gabapentin.  Has lidocaine patch as needed.  Joint pain-knees-added Voltaren gel patient notes helping, also with shoulder pain-questional some increased  tone-will add baclofen for both muscle laxer and tone  Insomnia:  On melatonin nightly  Mild oral stage dysphagia:  On easy to chew solids, thin liquids.  SLP to follow.  Thrombocytopenia:  Improved.  S/p multiple platelet transfusions in acute care.  S/p IVIG on 1/9-1/10.  Treated with promacta.  Platelet goal > 75.  Platelets 153 on 1/28.  Will monitor.  Anemia:  Improved.  Hemoglobin 14.3 on 1/28.  S/p IV iron 12/15-12/17.  On oral iron supplementation.  Cellulitis related to peripheral IV:  On keflex through 1/29/24.  Severe emphysema:  Seen on CT chest at [facility].  Currently on room air.  Infrarenal AAA:  Seen on CT A/P at [facility], measure 4.5 cm.  Follow up as outpatient.  Mild hyponatremia 134 recheck 136  Extensive abdominal venous thrombosis:  On eliquis as above.  CAD with MI s/p CABG (2009), stenting (2023):  On aspirin, atorvastatin.  Home plavix discontinued.  History of left ICA thrombus/CVA:  On aspirin, atorvastatin  HTN:  SBP goal < 140.  On carvedilol (switched from home metoprolol), lisinopril decreased per internal medicine  HLD:  On atorvastatin  Chronic bilateral lower limb DVTs:  Home dabigatran discontinued.  On eliquis as above.  Depression, anxiety:  On celexa  Bowel Management: Miralax daily, senokot nightly, dulcolax prn.  Last BM 2/1  DVT Prophylaxis:   On eliquis  Internal Medicine for medical management  Follow up PCP 1-2 weeks, Neurosurgery - Dr. Corbett-cranioplasty, PM&R 4-6 weeks, Hematology, infrarenal AAA 4.5 cm will need follow-up with vascular, severe emphysema will need follow-up with pulmonary      Beck Shi MD     Brennen Mcclure requires a bedside commode due to being confined to a single room, and is physically incapable of utilizing regular toilet facilities. Current body weight: Weight - Scale: 77.1 kg (170 lb).     This note is created with the assistance of a speech recognition program.  While intending to generate a document that actually

## 2024-02-02 NOTE — PROGRESS NOTES
[facility]   IN-PATIENT SERVICE   [facility]    Consult note            Date:   2/2/2024  Patient name:  Brennen Mcclure  Date of admission:  1/27/2024  3:01 PM  MRN:   [id number]  Account:  [id number]  YOB: 1965  PCP:    No primary care provider on file.  Room:   [address] [place] [address]  Code Status:    Full Code    Chief Complaint:     No chief complaint on file.      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, started on heparin drip also had patchy IPH, complicated by expansion of intraparenchymal hemorrhage with mass effect, s/p craniectomy, and evacuation of IPH x 2 with dural repair last CT head on on 1/24 showed evolution of the hemorrhage with reduction in the mass effect, Hospital course further complicated by ITP, was seen by hematology oncology, given IVIG and platelet transfusion,, started on Vimpat for seizure prevention, had cellulitis on Celexa  Patient was seen by hematology oncology over there and was started on Eliquis 2.5 twice daily  Patient was drowsy but arousable on my encounter,  Family was present at the bedside    Denies any chest pain shortness of breath        Past Medical History:     Past Medical History:   Diagnosis Date    CAD (coronary artery disease)     Chronic deep vein thrombosis (DVT) of both lower extremities (HCC)     Chronic idiopathic thrombocytopenia (HCC)     CVA (cerebral vascular accident) (HCC)     HLD (hyperlipidemia)         Past Surgical History:     Past Surgical History:   Procedure Laterality Date    CORONARY ARTERY BYPASS GRAFT      CORONARY STENT PLACEMENT          Medications Prior to Admission:     Prior to Admission  as outpatient  Voiced understanding  Wife is present at the bedside updated  1/30  Patient seen and examined, his vitals have been stable, labs reviewed, patient advised to follow-up with oncology as outpatient  Thrombocytopenia has resolved  Patient on Eliquis reduced dose as per oncology recommendations in [place]  Finish course of Keflex    2/1  Patient seen and examined  Currently alert oriented  On Eliquis at 2.5 twice daily as recommended by oncologist at [place]  Patient advised to follow-up with oncology as outpatient due to recurrent DVTs next  Vital stable thrombocytopenia resolved    2/2  Intraparenchymal hemorrhage status post craniectomy, left-sided hemiparesis seizure prophylaxis, cerebral venous sinus thrombosis on Eliquis, recent ITP however platelet counts have normalized will continue to monitor next labs due tomorrow  Patient complaining of new cough which has been there for last couple of days; reports recent diagnosis of emphysema/COPD.  Will get chest x-ray, ordering DuoNebs as needed.  No wheezing on lung exam.  Patient also complaining of irritation in throat ordering Cepacol lozenges.    Consultations:   IP CONSULT TO DIETITIAN  IP CONSULT TO SOCIAL WORK  IP CONSULT TO INTERNAL MEDICINE  IP CONSULT TO PSYCHIATRY  IP CONSULT TO NEUROLOGY     Patient is admitted as inpatient status because of co-morbidities listed above, severity of signs and symptoms as outlined, requirement for current medical therapies and most importantly because of direct risk to patient if care not provided in a hospital setting.    Shannan Drake MD  2/2/2024  5:27 PM    Copy sent to Dr. Stack primary care provider on file.    Please note that this chart was generated using voice recognition Dragon dictation software.  Although every effort was made to ensure the accuracy of this automated transcription, some errors in transcription may have occurred.

## 2024-02-03 LAB
ERYTHROCYTE [DISTWIDTH] IN BLOOD BY AUTOMATED COUNT: 25 % (ref 11.5–14.9)
HCT VFR BLD AUTO: 41.1 % (ref 41–53)
HGB BLD-MCNC: 13.3 G/DL (ref 13.5–17.5)
MCH RBC QN AUTO: 27.4 PG (ref 26–34)
MCHC RBC AUTO-ENTMCNC: 32.3 G/DL (ref 31–37)
MCV RBC AUTO: 84.9 FL (ref 80–100)
PLATELET # BLD AUTO: 123 K/UL (ref 150–450)
PMV BLD AUTO: 9.3 FL (ref 6–12)
RBC # BLD AUTO: 4.84 M/UL (ref 4.5–5.9)
WBC OTHER # BLD: 4.7 K/UL (ref 3.5–11)

## 2024-02-03 PROCEDURE — 6370000000 HC RX 637 (ALT 250 FOR IP): Performed by: STUDENT IN AN ORGANIZED HEALTH CARE EDUCATION/TRAINING PROGRAM

## 2024-02-03 PROCEDURE — 6370000000 HC RX 637 (ALT 250 FOR IP): Performed by: INTERNAL MEDICINE

## 2024-02-03 PROCEDURE — 97535 SELF CARE MNGMENT TRAINING: CPT

## 2024-02-03 PROCEDURE — 1180000000 HC REHAB R&B

## 2024-02-03 PROCEDURE — 6370000000 HC RX 637 (ALT 250 FOR IP): Performed by: PHYSICAL MEDICINE & REHABILITATION

## 2024-02-03 PROCEDURE — 97110 THERAPEUTIC EXERCISES: CPT

## 2024-02-03 PROCEDURE — 97530 THERAPEUTIC ACTIVITIES: CPT

## 2024-02-03 PROCEDURE — 6370000000 HC RX 637 (ALT 250 FOR IP): Performed by: PSYCHIATRY & NEUROLOGY

## 2024-02-03 PROCEDURE — 97112 NEUROMUSCULAR REEDUCATION: CPT

## 2024-02-03 PROCEDURE — 99231 SBSQ HOSP IP/OBS SF/LOW 25: CPT | Performed by: INTERNAL MEDICINE

## 2024-02-03 PROCEDURE — 92507 TX SP LANG VOICE COMM INDIV: CPT

## 2024-02-03 PROCEDURE — 94640 AIRWAY INHALATION TREATMENT: CPT

## 2024-02-03 PROCEDURE — 94761 N-INVAS EAR/PLS OXIMETRY MLT: CPT

## 2024-02-03 PROCEDURE — 97129 THER IVNTJ 1ST 15 MIN: CPT

## 2024-02-03 PROCEDURE — 36415 COLL VENOUS BLD VENIPUNCTURE: CPT

## 2024-02-03 PROCEDURE — 85027 COMPLETE CBC AUTOMATED: CPT

## 2024-02-03 PROCEDURE — 99232 SBSQ HOSP IP/OBS MODERATE 35: CPT | Performed by: PSYCHIATRY & NEUROLOGY

## 2024-02-03 RX ADMIN — CITALOPRAM HYDROBROMIDE 10 MG: 10 TABLET ORAL at 08:14

## 2024-02-03 RX ADMIN — LACOSAMIDE 100 MG: 100 TABLET, FILM COATED ORAL at 21:37

## 2024-02-03 RX ADMIN — DIVALPROEX SODIUM 500 MG: 500 TABLET, EXTENDED RELEASE ORAL at 08:17

## 2024-02-03 RX ADMIN — BUTALBITA,ACETAMINOPHEN AND CAFFEINE 1 CAPSULE: 50; 300; 40 CAPSULE ORAL at 17:03

## 2024-02-03 RX ADMIN — BUTALBITA,ACETAMINOPHEN AND CAFFEINE 1 CAPSULE: 50; 300; 40 CAPSULE ORAL at 06:43

## 2024-02-03 RX ADMIN — GABAPENTIN 100 MG: 100 CAPSULE ORAL at 21:37

## 2024-02-03 RX ADMIN — GABAPENTIN 100 MG: 100 CAPSULE ORAL at 08:14

## 2024-02-03 RX ADMIN — IPRATROPIUM BROMIDE AND ALBUTEROL SULFATE 1 DOSE: 2.5; .5 SOLUTION RESPIRATORY (INHALATION) at 06:57

## 2024-02-03 RX ADMIN — MODAFINIL 200 MG: 100 TABLET ORAL at 08:14

## 2024-02-03 RX ADMIN — LACOSAMIDE 100 MG: 100 TABLET, FILM COATED ORAL at 08:15

## 2024-02-03 RX ADMIN — FERROUS SULFATE TAB 325 MG (65 MG ELEMENTAL FE) 325 MG: 325 (65 FE) TAB at 08:14

## 2024-02-03 RX ADMIN — DICLOFENAC SODIUM 2 G: 10 GEL TOPICAL at 08:17

## 2024-02-03 RX ADMIN — DICLOFENAC SODIUM 2 G: 10 GEL TOPICAL at 21:44

## 2024-02-03 RX ADMIN — CARVEDILOL 3.12 MG: 3.12 TABLET, FILM COATED ORAL at 08:14

## 2024-02-03 RX ADMIN — Medication 1 TABLET: at 08:14

## 2024-02-03 RX ADMIN — APIXABAN 2.5 MG: 2.5 TABLET, FILM COATED ORAL at 21:37

## 2024-02-03 RX ADMIN — ATORVASTATIN CALCIUM 80 MG: 80 TABLET, FILM COATED ORAL at 21:37

## 2024-02-03 RX ADMIN — APIXABAN 2.5 MG: 2.5 TABLET, FILM COATED ORAL at 08:15

## 2024-02-03 RX ADMIN — BACLOFEN 5 MG: 10 TABLET ORAL at 21:36

## 2024-02-03 RX ADMIN — DIVALPROEX SODIUM 500 MG: 500 TABLET, EXTENDED RELEASE ORAL at 21:55

## 2024-02-03 RX ADMIN — AMITRIPTYLINE HYDROCHLORIDE 12.5 MG: 25 TABLET, FILM COATED ORAL at 21:55

## 2024-02-03 RX ADMIN — ACETAMINOPHEN 650 MG: 325 TABLET, FILM COATED ORAL at 12:39

## 2024-02-03 RX ADMIN — Medication 6 MG: at 21:37

## 2024-02-03 RX ADMIN — LISINOPRIL 10 MG: 10 TABLET ORAL at 08:14

## 2024-02-03 RX ADMIN — Medication 19.8 MG: at 08:15

## 2024-02-03 RX ADMIN — GABAPENTIN 100 MG: 100 CAPSULE ORAL at 15:01

## 2024-02-03 RX ADMIN — IPRATROPIUM BROMIDE AND ALBUTEROL SULFATE 1 DOSE: 2.5; .5 SOLUTION RESPIRATORY (INHALATION) at 21:00

## 2024-02-03 RX ADMIN — BUTALBITA,ACETAMINOPHEN AND CAFFEINE 1 CAPSULE: 50; 300; 40 CAPSULE ORAL at 21:37

## 2024-02-03 RX ADMIN — BACLOFEN 5 MG: 10 TABLET ORAL at 08:14

## 2024-02-03 RX ADMIN — IPRATROPIUM BROMIDE AND ALBUTEROL SULFATE 1 DOSE: 2.5; .5 SOLUTION RESPIRATORY (INHALATION) at 15:02

## 2024-02-03 RX ADMIN — ASPIRIN 81 MG 81 MG: 81 TABLET ORAL at 08:14

## 2024-02-03 RX ADMIN — SENNOSIDES 17.2 MG: 8.6 TABLET, FILM COATED ORAL at 21:37

## 2024-02-03 ASSESSMENT — PAIN SCALES - GENERAL
PAINLEVEL_OUTOF10: 7
PAINLEVEL_OUTOF10: 8

## 2024-02-03 NOTE — PROGRESS NOTES
Physical Medicine & Rehabilitation  Progress Note    2/3/2024 12:09 PM     CC: Ambulatory and ADL dysfunction due to     Subjective:   No complaints. No events overnight. Feels Voltaren gel is helping his knees, continues with some shoulder discomfort, B/B ok . Asking about NS consult for the future cranial flap.    ROS:  Denies fevers, chills, sweats.  No chest pain, palpitations, lightheadedness.  Denies coughing, wheezing or shortness of breath.  Denies abdominal pain, nausea, diarrhea or constipation.  No new areas of joint pain.  Denies new areas of numbness or weakness.  Denies new anxiety or depression issues.  No new skin problems.    Rehabilitation:       PT:      Bed mobility  Bridging: Minimal assistance (Minimal buttock clearance on L.)  Rolling to Left: Moderate assistance;2 Person assistance (Assist to initiate & maintain sidelying position.)  Rolling to Right: Maximum assistance;2 Person assistance (L LE & UE assist, assist to initate & hold position.)  Supine to Sit: Moderate assistance;2 Person assistance  Sit to Supine: Moderate assistance;2 Person assistance (Bed flat, rails down.)  Scooting: Maximal assistance;2 Person assistance (Max x1 hips to EOB, x2 to HOB.)  Bed Mobility Comments: Rails, bed largely flat for most mobility.  Transfers  Sit to Stand: 2 Person Assistance;Moderate Assistance (Radha Morrison; strong L lean. Cues to align to vertical environment (doorways, window frames, cabinets))  Stand to Sit: 2 Person Assistance;Minimal Assistance (Tends to lean L upon sitting.)  Bed to Chair: Dependent/Total;Maximum assistance;2 Person Assistance (D/T c raya King fatigue; Max x2 stand pivot to Pt's strong side s device.)  Stand Pivot Transfers: 2 Person Assistance;Maximum Assistance (Max x2 stand pivot to Pt's strong side, s device.)  Comment: Take hemiwalker for transfer training.  Balance  Posture: Poor  Sitting - Static: Poor;+ (EOB, edge of mat. visual & verbal cues to align to  midline)  Sitting - Dynamic: Poor;- (EOB, posterior losses of balance c fatigue)  Standing - Static: Poor (Max cues for posture, weight shifting for pivot transfer.)  Standing - Dynamic: Poor (requires L LE & UE support to reach outside base of support/across midline.)     Environmental Mobility  Stairs/Curb  Stairs?: No  Wheelchair Activities  Wheelchair Size: 18\"  Wheelchair Type: Recliner (high back; Left Arm tray)  Wheelchair Cushion: Standard  Level of Assistance for pressure relief activities: 2 Person assistance;Maximum assistance  Wheelchair Parts Management: No  Propulsion: Yes  Propulsion 1  Propulsion: Manual  Level: Level Tile  Method: RUE  Level of Assistance: Minimal assistance  Description/ Details: Straight path, 2 90º L turns. Assistance for L-sided obstacle avoidance.  Distance: 275'    OT    Activities of Daily Living  Feeding  Assistance Level: Set-up     Grooming/Oral Hygiene  Assistance Level: Stand by assist  Skilled Clinical Factors: pt utilizes natacha tech to open/apply toothpaste. Writer provided dycem to assist in stabilizing toothbrush for toothpaste application.     Upper Extremity Dressing  Assistance Level: Moderate assistance  Skilled Clinical Factors: Assist to unthread/thread LUE using natacha technique, manage off/on trunk. minimal assist with proper orientation and minor cues for sequencing.     Lower Extremity Dressing  Assistance Level: Dependent;Requires x 2 assistance  Skilled Clinical Factors: TA to unthread/thread while seated EOB this date. Ax2 for standing in SS to manage over hips heavy lean noted this date.     Putting On/Taking Off Footwear  Assistance Level: Dependent  Skilled Clinical Factors: TA for B socks/shoes/L AFO. Pt declines TEDs     Toileting  Assistance Level: Dependent  Skilled Clinical Factors: TA for brief and hygiene      ST:       Subjective: [x] Alert     [x] Cooperative     [] Confused     [] Agitated    [x] Lethargic

## 2024-02-03 NOTE — PROGRESS NOTES
Physical Therapy  Facility/Department: [facility] ACUTE REHAB  Rehabilitation Physical Therapy Daily Treatment Note    NAME: Brennen Mcclure  : 1965 (59 y.o.)  MRN: [id number]  CODE STATUS: Full Code    Date of Service: 2/3/24      Past Medical History:   Diagnosis Date    CAD (coronary artery disease)     Chronic deep vein thrombosis (DVT) of both lower extremities (HCC)     Chronic idiopathic thrombocytopenia (HCC)     CVA (cerebral vascular accident) (HCC)     HLD (hyperlipidemia)      Past Surgical History:   Procedure Laterality Date    CORONARY ARTERY BYPASS GRAFT      CORONARY STENT PLACEMENT         Family / Caregiver Present: Yes (Wife Mara in AM and PM)  Referring Practitioner: Danica De La Paz MD  Referral Date : 24  Diagnosis: 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  General Comment  Comments: emotionally lability continues; but improved since writers previous session with pt from 3 days prior    Restrictions:  Restrictions/Precautions: General Precautions;Fall Risk;Up as Tolerated  Required Braces or Orthoses  Other:  (helmet on when OOB)  Left Upper Extremity Brace/Splint: Resting hand  Position Activity Restriction  Other position/activity restrictions: Helmet on when OOB; Sling for transfers/mobility only     SUBJECTIVE  Subjective: Pt up in wheelchair with CHO in AM finishing up OT session. Agreeable and cooperative throughout session. Cotx in PM with MARQUIS Jewell  Pain: Pt reporting pain through L side, primary complaints in L shoulder and hand        OBJECTIVE  Vision  Vision: Within Functional Limits    Hearing  Hearing: Within functional limits    Cognition  Overall Cognitive Status: Exceptions  Arousal/Alertness: Appropriate responses to stimuli  Following Commands: Follows one step commands with repetition  Attention Span: Difficulty dividing attention;Attends with cues to redirect  Memory: Appears intact  Safety Judgement: Decreased awareness of need for  chair    EDUCATION  Education  Education Given To: Patient  Education Provided: Safety;Mobility Training;Transfer Training;Equipment;Plan of Care;Fall Prevention Strategies  Education Provided Comments: Transfer training and postural awareness  Education Method: Demonstration;Verbal  Barriers to Learning: Cognition (attention/awareness deficits)  Education Outcome: Verbalized understanding;Demonstrated understanding;Continued education needed          Therapy Time   Individual Concurrent Group Co-treatment   Time In 1107      (1300)   Time Out 1210      (1353)   Minutes 63      53   53 minutes of co treatment time with pt, resulting in 26 minutes of billable time from writer.    Timed Code Treatment Minutes: 26 Minutes       Wilson Ordonez PTA, 02/03/24 at 4:45 PM

## 2024-02-03 NOTE — PLAN OF CARE
Problem: Discharge Planning  Goal: Discharge to home or other facility with appropriate resources  Outcome: Progressing  Flowsheets (Taken 2/2/2024 2105)  Discharge to home or other facility with appropriate resources:   Identify barriers to discharge with patient and caregiver   Arrange for needed discharge resources and transportation as appropriate   Identify discharge learning needs (meds, wound care, etc)   Refer to discharge planning if patient needs post-hospital services based on physician order or complex needs related to functional status, cognitive ability or social support system     Problem: Skin/Tissue Integrity  Goal: Absence of new skin breakdown  Description: 1.  Monitor for areas of redness and/or skin breakdown  2.  Assess vascular access sites hourly  3.  Every 4-6 hours minimum:  Change oxygen saturation probe site  4.  Every 4-6 hours:  If on nasal continuous positive airway pressure, respiratory therapy assess nares and determine need for appliance change or resting period.  Outcome: Progressing     Problem: Safety - Adult  Goal: Free from fall injury  Outcome: Progressing     Problem: ABCDS Injury Assessment  Goal: Absence of physical injury  Outcome: Progressing     Problem: Pain  Goal: Verbalizes/displays adequate comfort level or baseline comfort level  Outcome: Progressing     Problem: Nutrition Deficit:  Goal: Optimize nutritional status  Outcome: Progressing     Problem: Chronic Conditions and Co-morbidities  Goal: Patient's chronic conditions and co-morbidity symptoms are monitored and maintained or improved  Outcome: Progressing  Flowsheets (Taken 2/2/2024 2105)  Care Plan - Patient's Chronic Conditions and Co-Morbidity Symptoms are Monitored and Maintained or Improved:   Monitor and assess patient's chronic conditions and comorbid symptoms for stability, deterioration, or improvement   Collaborate with multidisciplinary team to address chronic and comorbid conditions and prevent  exacerbation or deterioration   Update acute care plan with appropriate goals if chronic or comorbid symptoms are exacerbated and prevent overall improvement and discharge

## 2024-02-03 NOTE — PLAN OF CARE
Problem: Discharge Planning  Goal: Discharge to home or other facility with appropriate resources  2/3/2024 1109 by Sophie Shell, RN  Outcome: Progressing     Problem: Skin/Tissue Integrity  Goal: Absence of new skin breakdown  Description: 1.  Monitor for areas of redness and/or skin breakdown  2.  Assess vascular access sites hourly  3.  Every 4-6 hours minimum:  Change oxygen saturation probe site  4.  Every 4-6 hours:  If on nasal continuous positive airway pressure, respiratory therapy assess nares and determine need for appliance change or resting period.  2/3/2024 1109 by Sophie Shell, RN  Outcome: Progressing     Problem: Safety - Adult  Goal: Free from fall injury  2/3/2024 1109 by Sophie Shell, RN  Outcome: Progressing     Problem: ABCDS Injury Assessment  Goal: Absence of physical injury  2/3/2024 1109 by Sophie Shell, RN  Outcome: Progressing     Problem: Nutrition Deficit:  Goal: Optimize nutritional status  2/3/2024 1109 by Sophie Shell, RN  Outcome: Progressing     Problem: Chronic Conditions and Co-morbidities  Goal: Patient's chronic conditions and co-morbidity symptoms are monitored and maintained or improved  2/3/2024 1109 by Sophie Shell, RN  Outcome: Progressing

## 2024-02-03 NOTE — PROGRESS NOTES
BEHAVIORAL HEALTH FOLLOW-UP NOTE     2/2/2024     Patient was seen and examined in person, Chart reviewed   Patient's case discussed with staff/team    Chief Complaint: Anxious    Interim History:     The patient does not feel much better and his mood when asked about it today but he did not start crying.  The patient seems to be improving overall.  He has no suicidal thoughts.  He has been actively participating in therapy.  He has no side effects with medications    .    /77   Pulse 85   Temp 98.3 °F (36.8 °C) (Oral)   Resp 18   Ht 1.778 m (5' 10\")   Wt 77.1 kg (170 lb)   SpO2 93%   BMI 24.39 kg/m²   Appetite:   [x] Normal/Unchanged  [] Increased  [] Decreased      Sleep:       [x] Normal/Unchanged  [] Fair       [] Poor              Energy:    [x] Normal/Unchanged  [] Increased  [] Decreased        Aggression:  [] yes  [x] no    Patient is [x] able  [] unable to CONTRACT FOR SAFETY ON THE UNIT    PAST MEDICAL/PSYCHIATRIC HISTORY:   Past Medical History:   Diagnosis Date    CAD (coronary artery disease)     Chronic deep vein thrombosis (DVT) of both lower extremities (HCC)     Chronic idiopathic thrombocytopenia (HCC)     CVA (cerebral vascular accident) (HCC)     HLD (hyperlipidemia)        FAMILY/SOCIAL HISTORY:  Family History   Problem Relation Age of Onset    Coronary Art Dis Mother     Stroke Other      Social History     Socioeconomic History    Marital status:      Spouse name: Not on file    Number of children: Not on file    Years of education: Not on file    Highest education level: Not on file   Occupational History    Not on file   Tobacco Use    Smoking status: Not on file    Smokeless tobacco: Not on file   Substance and Sexual Activity    Alcohol use: Not on file    Drug use: Not on file    Sexual activity: Not on file   Other Topics Concern    Not on file   Social History Narrative    Not on file     Social Determinants of Health     Financial Resource Strain: Not on file  Ongoing  [] Other                                               Brennen Mcclure is a 59 y.o. male being evaluated face to face.     --LEAH ESCALANTE MD on 2/2/2024 at 7:20 PM    An electronic signature was used to authenticate this note.     **This report has been created using voice recognition software. It may contain minor errors which are inherent in voice recognition technology.**
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
## 2024-02-03 NOTE — PROGRESS NOTES
[facility]   Acute Rehabilitation Occupational Therapy Daily Treatment Note    Date: 2/3/24  Patient Name: Brennen Mcclure       Room: 2633/2633-01  MRN: [id number]  Account: [id number]   : 1965  (59 y.o.) Gender: male       Referring Practitioner: Danica De La Paz MD  Diagnosis: Hemiplegia following ntrm intcrbl hemor aff left dominant side  Additional Pertinent Hx: 59 year old male who admitted 23 with 4 days of new L arm weakness and headache. He had known history of MI and CABG - on dabigatran but with inconsistent use due to prescription running out. He had previous treatment for L ICA thrombus and R sided symptoms treated with “clot busting medication” in . 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 He was transferred from [place], [place] to [facility] for medical management and was admitted to neuro critical care. His LUE numbness/weakness worsened to near complete hemiparesis. Initial GCS 15. He was initially treated with heparin gtt for DVST. On 23 Dr. Jameson Parsons (neurosurgery) performed R sided decompressive hemicraniectomy with evacuation of IPH x2 sites with dural repair. He was stable post op on heparin drip and extubated 23 after stable head CT 23. He was then bridged to warfarin. He is currently being treated with warfarin with therapeutic INR since 23. Hb stable at 13.6 on 24. Thrombocytopenia noted with platelet count 30 - hematology being reconsulted - previously recommended count >40. Currently requiring timed voids for incontinence. PM&R anticipating likely wheelchair mobility with [place] for transfers.    Treatment Diagnosis: Impaired self-care status    Past Medical History:  has a past medical history of CAD (coronary artery disease), Chronic deep vein thrombosis (DVT) of both lower extremities (HCC), Chronic idiopathic  to return to midline  Weight Bearing  Weight Bearing Technique: Yes  RUE Weight Bearing: Forearm seated  LUE Weight Bearing: Forearm seated  Response To Weight Bearing Technique: for core correction while seated EOM; LUE support provided throughout    OT Exercises  Dynamic Sitting Balance Exercises: forarm WB LUE and RUE returning to midline between sets completed 5x each                   Assessment  Assessment  Activity Tolerance: Treatment limited secondary to decreased cognition;Patient limited by fatigue;Patient tolerated treatment well;Patient limited by pain    Patient Education  Education  Education Given To: Family  Education Provided: Role of Therapy;Plan of Care;Safety;ADL Function;Transfer Training;Precautions;Mobility Training;Energy Conservation  Education Provided Comments: proper wearing schedule of L arm sling, proper LUE positioning. And use of SS                 Goals  Patient Goals   Patient goals : \"To get home\"  Short Term Goals  Time Frame for Short Term Goals: By 1 week  Short Term Goal 1: Pt will complete UB ADLs with Mod A, good safety, and use of AE/DME/modified techniques as needed  Short Term Goal 2: Pt will complete LB ADLs with Max A, good safety, and use of AE/DME/Modified techniques as needed  Short Term Goal 3: Pt will complete sit to stand transfers in [facility] with Mod A x2 and good safety in preparation for functional transfers  Short Term Goal 4: Pt will actively participate in 30+ minutes of therapeutic exercise/functional activity/social participation for increased safety and independence with self-care for improved quality of life  Short Term Goal 5: Pt will be educated on and explore use of AE/DME/Modified techniques as needed to complete self-care and mobility  Additional Goals?: Yes  Short Term Goal 6: Pt will participate in dynamic unsupported sitting tasks with Max A  facilitating reaching out of base of support and weight bearing through L extremity to increase input

## 2024-02-03 NOTE — PROGRESS NOTES
[facility]   IN-PATIENT SERVICE   [facility]    Consult note            Date:   2/3/2024  Patient name:  Brennen Mcclure  Date of admission:  1/27/2024  3:01 PM  MRN:   [id number]  Account:  [id number]  YOB: 1965  PCP:    No primary care provider on file.  Room:   [address] [place] [address]  Code Status:    Full Code    Chief Complaint:     No chief complaint on file.      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, started on heparin drip also had patchy IPH, complicated by expansion of intraparenchymal hemorrhage with mass effect, s/p craniectomy, and evacuation of IPH x 2 with dural repair last CT head on on 1/24 showed evolution of the hemorrhage with reduction in the mass effect, Hospital course further complicated by ITP, was seen by hematology oncology, given IVIG and platelet transfusion,, started on Vimpat for seizure prevention, had cellulitis on Celexa  Patient was seen by hematology oncology over there and was started on Eliquis 2.5 twice daily  Patient was drowsy but arousable on my encounter,  Family was present at the bedside    Denies any chest pain shortness of breath        Past Medical History:     Past Medical History:   Diagnosis Date    CAD (coronary artery disease)     Chronic deep vein thrombosis (DVT) of both lower extremities (HCC)     Chronic idiopathic thrombocytopenia (HCC)     CVA (cerebral vascular accident) (HCC)     HLD (hyperlipidemia)         Past Surgical History:     Past Surgical History:   Procedure Laterality Date    CORONARY ARTERY BYPASS GRAFT      CORONARY STENT PLACEMENT          Medications Prior to Admission:     Prior to Admission  craniectomy with evacuation x 2 and dural repair  Left sided hemiparesis secondary to above  On seizure prophylaxis with lacosamide,  Neurostimulation on modafinil    Cerebral venous sinus thrombosis, on Eliquis 2.5 twice daily, was seen by 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 was on Pradaxa earlier  Should consider hematology oncology consult due to his complicated history    ITP platelet count yesterday was 103, has been stable patient received IVIG at outlying facility, consider hematology oncology consult    On Flex for cellulitis last dose 1/29    CAD s/p CABG in 2009 status post PCI 2023, on aspirin Lipitor    Hypertension blood pressures currently stable on lisinopril, continue to monitor    DVT prophylaxis        1/29  Patient seen and examined  Working with PT OT  Blood pressure has been borderline lower side decrease lisinopril to 10  Patient denies any dizziness lightheadedness  Thrombocytopenia has resolved  Patient also had 4.5 cm abdominal aortic aneurysm  Advised to follow-up with vascular hematology oncology and PCP as outpatient  Voiced understanding  Wife is present at the bedside updated  1/30  Patient seen and examined, his vitals have been stable, labs reviewed, patient advised to follow-up with oncology as outpatient  Thrombocytopenia has resolved  Patient on Eliquis reduced dose as per oncology recommendations in [place]  Finish course of Keflex    2/1  Patient seen and examined  Currently alert oriented  On Eliquis at 2.5 twice daily as recommended by oncologist at [place]  Patient advised to follow-up with oncology as outpatient due to recurrent DVTs next  Vital stable thrombocytopenia resolved    2/2  Intraparenchymal hemorrhage status post craniectomy, left-sided hemiparesis seizure prophylaxis, cerebral venous sinus thrombosis on Eliquis, recent ITP however platelet counts have normalized will continue to monitor next labs due tomorrow  Patient

## 2024-02-03 NOTE — PROGRESS NOTES
SPEECH LANGUAGE PATHOLOGY  Speech Language Pathology  [facility]    Cognitive/Speech Treatment Note    Date: 2/3/2024  Patient’s Name: Brennen Mcclure  MRN: [id number]  Diagnosis:   Patient Active Problem List   Diagnosis Code    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 (HCC) I69.152    Pseudobulbar affect F48.2    Intractable headache R51.9    Intracranial hemorrhage (HCC) I62.9       Pain: Pt c/o headache, states already received pain meds    Cognitive/Speech Treatment    Treatment time:       Subjective: [x] Alert [x] Cooperative     [] Confused     [] Agitated    [] Lethargic      Objective/Assessment:  Attention: ST modified environment and cued Pt to put away phone to reduce distractions.    Orientation: Oriented  to place/purpose/time MI. Pt aware of planned discharge date and states he is \"counting down.\"    Recall: Delayed word recall: 1/3 min A, 3/3 max A.   Recalled 2/3 target activities from prior day's therapy session min A, improved to 3/3 mod A.     Organization: Convergent namin% (I), improved to 90% accuracy with mod verbal cues/repeated stimuli.     Problem Solving/Reasoning: NT    Speech: ST provided eduction to Pt re: dysarthria speaking strategies including over-articulation, decreased rate, and increased vocal intensity. Pt required mod cues to consistently implement during session.     Other: No family present.     Plan:  [x] Continue ST services    [] Discharge from ST:      Discharge recommendations:  [x] Further therapy recommended at discharge.   [] No therapy recommended at discharge.      Treatment completed by: Brian Multani M.S., CCC-SLP

## 2024-02-04 LAB
ANION GAP SERPL CALCULATED.3IONS-SCNC: 10 MMOL/L (ref 9–17)
BASOPHILS # BLD: 0.05 K/UL (ref 0–0.2)
BASOPHILS NFR BLD: 1 % (ref 0–2)
BUN SERPL-MCNC: 18 MG/DL (ref 8–23)
CALCIUM SERPL-MCNC: 9.9 MG/DL (ref 8.6–10.4)
CHLORIDE SERPL-SCNC: 98 MMOL/L (ref 98–107)
CO2 SERPL-SCNC: 27 MMOL/L (ref 20–31)
CREAT SERPL-MCNC: 0.7 MG/DL (ref 0.7–1.2)
EOSINOPHIL # BLD: 0.27 K/UL (ref 0–0.4)
EOSINOPHILS RELATIVE PERCENT: 5 % (ref 0–4)
ERYTHROCYTE [DISTWIDTH] IN BLOOD BY AUTOMATED COUNT: 25.2 % (ref 11.5–14.9)
GFR SERPL CREATININE-BSD FRML MDRD: >60 ML/MIN/1.73M2
GLUCOSE SERPL-MCNC: 107 MG/DL (ref 70–99)
HCT VFR BLD AUTO: 42.3 % (ref 41–53)
HGB BLD-MCNC: 13.4 G/DL (ref 13.5–17.5)
LYMPHOCYTES NFR BLD: 0.8 K/UL (ref 1–4.8)
LYMPHOCYTES RELATIVE PERCENT: 15 % (ref 24–44)
MCH RBC QN AUTO: 27.2 PG (ref 26–34)
MCHC RBC AUTO-ENTMCNC: 31.6 G/DL (ref 31–37)
MCV RBC AUTO: 86 FL (ref 80–100)
MONOCYTES NFR BLD: 0.42 K/UL (ref 0.1–1.3)
MONOCYTES NFR BLD: 8 % (ref 1–7)
MORPHOLOGY: ABNORMAL
MORPHOLOGY: ABNORMAL
NEUTROPHILS NFR BLD: 71 % (ref 36–66)
NEUTS SEG NFR BLD: 3.76 K/UL (ref 1.3–9.1)
PLATELET # BLD AUTO: 128 K/UL (ref 150–450)
PMV BLD AUTO: 9.8 FL (ref 6–12)
POTASSIUM SERPL-SCNC: 4.3 MMOL/L (ref 3.7–5.3)
RBC # BLD AUTO: 4.92 M/UL (ref 4.5–5.9)
SODIUM SERPL-SCNC: 135 MMOL/L (ref 135–144)
WBC OTHER # BLD: 5.3 K/UL (ref 3.5–11)

## 2024-02-04 PROCEDURE — 6370000000 HC RX 637 (ALT 250 FOR IP): Performed by: PSYCHIATRY & NEUROLOGY

## 2024-02-04 PROCEDURE — 80048 BASIC METABOLIC PNL TOTAL CA: CPT

## 2024-02-04 PROCEDURE — 97530 THERAPEUTIC ACTIVITIES: CPT

## 2024-02-04 PROCEDURE — 97112 NEUROMUSCULAR REEDUCATION: CPT

## 2024-02-04 PROCEDURE — 36415 COLL VENOUS BLD VENIPUNCTURE: CPT

## 2024-02-04 PROCEDURE — 97535 SELF CARE MNGMENT TRAINING: CPT

## 2024-02-04 PROCEDURE — 6370000000 HC RX 637 (ALT 250 FOR IP): Performed by: INTERNAL MEDICINE

## 2024-02-04 PROCEDURE — 94761 N-INVAS EAR/PLS OXIMETRY MLT: CPT

## 2024-02-04 PROCEDURE — 99232 SBSQ HOSP IP/OBS MODERATE 35: CPT | Performed by: PSYCHIATRY & NEUROLOGY

## 2024-02-04 PROCEDURE — 6370000000 HC RX 637 (ALT 250 FOR IP): Performed by: STUDENT IN AN ORGANIZED HEALTH CARE EDUCATION/TRAINING PROGRAM

## 2024-02-04 PROCEDURE — 97130 THER IVNTJ EA ADDL 15 MIN: CPT

## 2024-02-04 PROCEDURE — 99231 SBSQ HOSP IP/OBS SF/LOW 25: CPT | Performed by: INTERNAL MEDICINE

## 2024-02-04 PROCEDURE — 1180000000 HC REHAB R&B

## 2024-02-04 PROCEDURE — 94640 AIRWAY INHALATION TREATMENT: CPT

## 2024-02-04 PROCEDURE — 97129 THER IVNTJ 1ST 15 MIN: CPT

## 2024-02-04 PROCEDURE — 85025 COMPLETE CBC W/AUTO DIFF WBC: CPT

## 2024-02-04 PROCEDURE — 6370000000 HC RX 637 (ALT 250 FOR IP): Performed by: PHYSICAL MEDICINE & REHABILITATION

## 2024-02-04 PROCEDURE — 97110 THERAPEUTIC EXERCISES: CPT

## 2024-02-04 RX ADMIN — LISINOPRIL 10 MG: 10 TABLET ORAL at 08:08

## 2024-02-04 RX ADMIN — LACOSAMIDE 100 MG: 100 TABLET, FILM COATED ORAL at 21:03

## 2024-02-04 RX ADMIN — IPRATROPIUM BROMIDE AND ALBUTEROL SULFATE 1 DOSE: 2.5; .5 SOLUTION RESPIRATORY (INHALATION) at 20:08

## 2024-02-04 RX ADMIN — DIVALPROEX SODIUM 500 MG: 500 TABLET, EXTENDED RELEASE ORAL at 21:04

## 2024-02-04 RX ADMIN — APIXABAN 2.5 MG: 2.5 TABLET, FILM COATED ORAL at 21:03

## 2024-02-04 RX ADMIN — BACLOFEN 5 MG: 10 TABLET ORAL at 21:03

## 2024-02-04 RX ADMIN — BUTALBITA,ACETAMINOPHEN AND CAFFEINE 1 CAPSULE: 50; 300; 40 CAPSULE ORAL at 08:08

## 2024-02-04 RX ADMIN — Medication 19.8 MG: at 08:09

## 2024-02-04 RX ADMIN — DICLOFENAC SODIUM 2 G: 10 GEL TOPICAL at 21:05

## 2024-02-04 RX ADMIN — GABAPENTIN 100 MG: 100 CAPSULE ORAL at 08:08

## 2024-02-04 RX ADMIN — DICLOFENAC SODIUM 2 G: 10 GEL TOPICAL at 08:13

## 2024-02-04 RX ADMIN — DIVALPROEX SODIUM 500 MG: 500 TABLET, EXTENDED RELEASE ORAL at 08:09

## 2024-02-04 RX ADMIN — BUTALBITA,ACETAMINOPHEN AND CAFFEINE 1 CAPSULE: 50; 300; 40 CAPSULE ORAL at 12:17

## 2024-02-04 RX ADMIN — Medication 1 TABLET: at 08:08

## 2024-02-04 RX ADMIN — SENNOSIDES 17.2 MG: 8.6 TABLET, FILM COATED ORAL at 21:03

## 2024-02-04 RX ADMIN — IPRATROPIUM BROMIDE AND ALBUTEROL SULFATE 1 DOSE: 2.5; .5 SOLUTION RESPIRATORY (INHALATION) at 15:46

## 2024-02-04 RX ADMIN — APIXABAN 2.5 MG: 2.5 TABLET, FILM COATED ORAL at 08:08

## 2024-02-04 RX ADMIN — AMITRIPTYLINE HYDROCHLORIDE 12.5 MG: 25 TABLET, FILM COATED ORAL at 21:04

## 2024-02-04 RX ADMIN — CITALOPRAM HYDROBROMIDE 10 MG: 10 TABLET ORAL at 08:07

## 2024-02-04 RX ADMIN — MODAFINIL 200 MG: 100 TABLET ORAL at 08:08

## 2024-02-04 RX ADMIN — BUTALBITA,ACETAMINOPHEN AND CAFFEINE 1 CAPSULE: 50; 300; 40 CAPSULE ORAL at 21:02

## 2024-02-04 RX ADMIN — Medication 6 MG: at 21:03

## 2024-02-04 RX ADMIN — LACOSAMIDE 100 MG: 100 TABLET, FILM COATED ORAL at 08:08

## 2024-02-04 RX ADMIN — IPRATROPIUM BROMIDE AND ALBUTEROL SULFATE 1 DOSE: 2.5; .5 SOLUTION RESPIRATORY (INHALATION) at 07:04

## 2024-02-04 RX ADMIN — ATORVASTATIN CALCIUM 80 MG: 80 TABLET, FILM COATED ORAL at 21:03

## 2024-02-04 RX ADMIN — GABAPENTIN 100 MG: 100 CAPSULE ORAL at 14:09

## 2024-02-04 RX ADMIN — ASPIRIN 81 MG 81 MG: 81 TABLET ORAL at 08:08

## 2024-02-04 RX ADMIN — GABAPENTIN 100 MG: 100 CAPSULE ORAL at 21:03

## 2024-02-04 ASSESSMENT — PAIN DESCRIPTION - LOCATION: LOCATION: HEAD

## 2024-02-04 ASSESSMENT — PAIN SCALES - GENERAL: PAINLEVEL_OUTOF10: 8

## 2024-02-04 NOTE — PLAN OF CARE
Problem: Discharge Planning  Goal: Discharge to home or other facility with appropriate resources  2/4/2024 1020 by Sophie Shell RN  Outcome: Progressing     Problem: Skin/Tissue Integrity  Goal: Absence of new skin breakdown  Description: 1.  Monitor for areas of redness and/or skin breakdown  2.  Assess vascular access sites hourly  3.  Every 4-6 hours minimum:  Change oxygen saturation probe site  4.  Every 4-6 hours:  If on nasal continuous positive airway pressure, respiratory therapy assess nares and determine need for appliance change or resting period.  2/4/2024 1020 by Sophie Shell RN  Outcome: Progressing     Problem: Safety - Adult  Goal: Free from fall injury  2/4/2024 1020 by Sophie Shell RN  Outcome: Progressing     Problem: ABCDS Injury Assessment  Goal: Absence of physical injury  2/4/2024 1020 by Sophie Shell RN  Outcome: Progressing     Problem: Pain  Goal: Verbalizes/displays adequate comfort level or baseline comfort level  2/4/2024 1020 by Sophie Shell RN  Outcome: Progressing     Problem: Nutrition Deficit:  Goal: Optimize nutritional status  2/4/2024 1020 by Sophie Shell RN  Outcome: Progressing     Problem: Chronic Conditions and Co-morbidities  Goal: Patient's chronic conditions and co-morbidity symptoms are monitored and maintained or improved  2/4/2024 1020 by Sophie Shell RN  Outcome: Progressing

## 2024-02-04 NOTE — PROGRESS NOTES
Physical Medicine & Rehabilitation  Progress Note    2/4/2024 10:51 AM     CC: Ambulatory and ADL dysfunction due to     Subjective:   Feels well. No complaints. No events overnight. B/B ok .     ROS:  Denies fevers, chills, sweats.  No chest pain, palpitations, lightheadedness.  Denies coughing, wheezing or shortness of breath.  Denies abdominal pain, nausea, diarrhea or constipation.  No new areas of joint pain.  Denies new areas of numbness or weakness.  Denies new anxiety or depression issues.  No new skin problems.    Rehabilitation:       PT:     Functional Mobility  Bed mobility  Bridging: Minimal assistance  Sit to Supine: Maximum assistance;2 Person assistance (bed flat, rails down)  Scooting: Maximal assistance;2 Person assistance (MaxA1 hips to EOB, MaxA2 to HOB)  Transfers  Sit to Stand: Maximum Assistance (Therapist standing in front of pt, L knee blocked. Heavy L lateral lean. Cues for upright posture and forward gaze. Once standing, pt using hemiwalker for support)  Stand to Sit: Maximum Assistance (Leans to L upon sitting, cues to move hips towards R side when going to sit)  Bed to Chair: Maximum assistance;2 Person Assistance;Minimal assistance (SPT from bed to chair, with natacha walker requires increased assist from 2nd person. Pt with difficulty following direction in technique. Also performed without device. Decreased stability without hemiwalker, but pt was better able to follow cues.)  Stand Pivot Transfers: Maximum Assistance;2 Person Assistance;Minimal Assistance (Towards strong side. completed 7x total this date)  Balance  Posture: Poor  Sitting - Static: Poor;+ (EOB)  Sitting - Dynamic: Poor;- (EOB)  Standing - Static: Poor  Standing - Dynamic: Poor     Environmental Mobility  Propulsion 1  Propulsion: Manual  Level: Level Tile  Method: RUE;RLE  Level of Assistance: Minimal assistance  Description/ Details: Straight path, 2 turns, requires assistance to maintain straight path, assistance with  Last BM 2/1  DVT Prophylaxis:   On eliquis  Internal Medicine for medical management  Follow up PCP 1-2 weeks, Neurosurgery - Dr. Corbett as an out patient -cranioplasty, PM&R 4-6 weeks, Hematology, infrarenal AAA 4.5 cm will need follow-up with vascular, severe emphysema will need follow-up with pulmonary      KISHOR MOON MD     Brennen Mcclure requires a bedside commode due to being confined to a single room, and is physically incapable of utilizing regular toilet facilities. Current body weight: Weight - Scale: 77.1 kg (170 lb).
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
## 2024-02-04 NOTE — PROGRESS NOTES
[facility]   IN-PATIENT SERVICE   [facility]    Consult note            Date:   2/4/2024  Patient name:  Brennen Mcclure  Date of admission:  1/27/2024  3:01 PM  MRN:   [id number]  Account:  [id number]  YOB: 1965  PCP:    No primary care provider on file.  Room:   [address] [place] [address]  Code Status:    Full Code    Chief Complaint:     No chief complaint on file.      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, started on heparin drip also had patchy IPH, complicated by expansion of intraparenchymal hemorrhage with mass effect, s/p craniectomy, and evacuation of IPH x 2 with dural repair last CT head on on 1/24 showed evolution of the hemorrhage with reduction in the mass effect, Hospital course further complicated by ITP, was seen by hematology oncology, given IVIG and platelet transfusion,, started on Vimpat for seizure prevention, had cellulitis on Celexa  Patient was seen by hematology oncology over there and was started on Eliquis 2.5 twice daily  Patient was drowsy but arousable on my encounter,  Family was present at the bedside    Denies any chest pain shortness of breath        Past Medical History:     Past Medical History:   Diagnosis Date    CAD (coronary artery disease)     Chronic deep vein thrombosis (DVT) of both lower extremities (HCC)     Chronic idiopathic thrombocytopenia (HCC)     CVA (cerebral vascular accident) (HCC)     HLD (hyperlipidemia)         Past Surgical History:     Past Surgical History:   Procedure Laterality Date    CORONARY ARTERY BYPASS GRAFT      CORONARY STENT PLACEMENT          Medications Prior to Admission:     Prior to Admission  of direct risk to patient if care not provided in a hospital setting.    Shannan Drake MD  2/4/2024  6:28 PM    Copy sent to Dr. Stack primary care provider on file.    Please note that this chart was generated using voice recognition Dragon dictation software.  Although every effort was made to ensure the accuracy of this automated transcription, some errors in transcription may have occurred.

## 2024-02-04 NOTE — PROGRESS NOTES
SPEECH LANGUAGE PATHOLOGY  Speech Language Pathology  ST ACUTE REHAB    Cognitive Treatment Note    Date: 2/4/2024  Patient’s Name: Brennen Mcclure  MRN: [id number]  Diagnosis:   Patient Active Problem List   Diagnosis Code    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 (HCC) I69.152    Pseudobulbar affect F48.2    Intractable headache R51.9    Intracranial hemorrhage (HCC) I62.9       Pain: Pt c/o headache, states already received pain meds    Cognitive Treatment    Treatment time: 8089-8089      Subjective: [] Alert [x] Cooperative     [] Confused     [] Agitated    [x] Lethargic      Objective/Assessment:  Attention: Pt requiring increased cues as session progressed due to lethargy resulting in decreased attention.     Orientation: Referred to visual supports (I) to recall day/date/month. Required min A for year. Purpose and location (I).     Recall: Delayed image recall (house picture, 5 minutes): 50% (I)  Recalled 2/3 target activities from prior day's therapy session min A, improved to 3/3 mod A.     Organization: Object compare/contrast: 60% (I), improved to 90% with mod verbal cues to expand detail in responses.    Problem Solving/Reasoning: NT    Speech: ST provided eduction to Pt re: dysarthria speaking strategies including over-articulation, decreased rate, and increased vocal intensity. Pt required mod cues to consistently implement during session.     Other: Family present during session.     Plan:  [x] Continue ST services    [] Discharge from ST:      Discharge recommendations:  [x] Further therapy recommended at discharge.   [] No therapy recommended at discharge.      Treatment completed by: Brian Multani M.S., CCC-SLP

## 2024-02-04 NOTE — PROGRESS NOTES
[facility]   Acute Rehabilitation Occupational Therapy Daily Treatment Note    Date: 24  Patient Name: Brennen Mcclure       Room: 2633/2633-01  MRN: [id number]  Account: [id number]   : 1965  (59 y.o.) Gender: male       Referring Practitioner: Danica De La Paz MD  Diagnosis: Hemiplegia following ntrm intcrbl hemor aff left dominant side  Additional Pertinent Hx: 59 year old male who admitted 23 with 4 days of new L arm weakness and headache. He had known history of MI and CABG - on dabigatran but with inconsistent use due to prescription running out. He had previous treatment for L ICA thrombus and R sided symptoms treated with “clot busting medication” in . 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 He was transferred from [place], [place] to [facility] for medical management and was admitted to neuro critical care. His LUE numbness/weakness worsened to near complete hemiparesis. Initial GCS 15. He was initially treated with heparin gtt for DVST. On 23 Dr. Jameson Parsons (neurosurgery) performed R sided decompressive hemicraniectomy with evacuation of IPH x2 sites with dural repair. He was stable post op on heparin drip and extubated 23 after stable head CT 23. He was then bridged to warfarin. He is currently being treated with warfarin with therapeutic INR since 23. Hb stable at 13.6 on 24. Thrombocytopenia noted with platelet count 30 - hematology being reconsulted - previously recommended count >40. Currently requiring timed voids for incontinence. PM&R anticipating likely wheelchair mobility with Naveen for transfers.    Treatment Diagnosis: Impaired self-care status    Past Medical History:  has a past medical history of CAD (coronary artery disease), Chronic deep vein thrombosis (DVT) of both lower extremities (HCC), Chronic idiopathic  02/04/24 0800 02/04/24 1500 02/04/24 1520   Time Code Minutes    Timed Code Treatment Minutes 7 Minutes  (OT claiming 7 mins from co-tx)  --  23 Minutes  (OT claiming from co-tx)   OT Individual Minutes   Time In 0800 0914  --    Time Out 0900 0920  --    Minutes 60 6  --    OT Co-Treatment Minutes   Time In 0900  --  1300   Time Out 0914  --  1346   Minutes 14  --  46       Electronically signed by FORTINO Dave on 2/4/24 at 4:07 PM EST

## 2024-02-04 NOTE — PROGRESS NOTES
equipment and 2A  Long Term Goal 5: Pt to improve static/dynamic sitting & standing balance to Fair or better to dec risk for falls during ADL participation  Additional Goals?: Yes  Long term goal 6: Pt to demo actual or simulated car transfer using device and Norm  Long term goal 7: Pt to demo WC propulsion of household distances with R/L turns and SBA.  Long term goal 8: Pt and family to demonstrate satisfactory performance/assistance for desired transfer and mobility tasks for a safe DC home.    PLAN OF CARE  Frequency: 1-2 treatment sessions per day, 5-7 days per week  Physical Therapy Plan  General Plan: Other (See Comment) (900 mintues of combined PT/OT/ST d/t pt's need for 2 skilled therapists to safely and therapeutically performed desired functional tasks)  Specific Instructions for Next Treatment: FES; Sitting/standing balance and midline orienting, pre-gait activity progressing to transfers/Amb with hemiwalker.  Current Treatment Recommendations: Strengthening;ROM;Balance training;Functional mobility training;Transfer training;Gait training;Stair training;Neuromuscular re-education;Pain management;Home exercise program;Safety education & training;Patient/Caregiver education & training;Equipment evaluation, education, & procurement;Positioning;Therapeutic activities (Appropriate for FES modalities ASAP and LiteGait)  Safety Devices  Type of Devices: Gait belt;All fall risk precautions in place;Bed alarm in place;Call light within reach;Patient at risk for falls;Left in bed;Left in chair  Restraints  Restraints Initially in Place: No    EDUCATION  Education  Education Given To: Patient  Education Provided: Safety;Mobility Training;Transfer Training;Equipment;Plan of Care;Fall Prevention Strategies  Education Method: Demonstration;Verbal  Barriers to Learning: Cognition (attention/awareness deficits)  Education Outcome: Verbalized understanding;Demonstrated understanding;Continued education needed    ELOS:           Therapy Time     02/04/24 0900 02/04/24 1106 02/04/24 1300   PT Individual Minutes   Time In  --  1106  --    Time Out  --  1205  --    Minutes  --  59  --    PT Co-Treatment Minutes   Time In   (900)  --    (1300)   Time Out   (914)  --    (1346)     60 minutes of total cotx time with CHO, resulting in 30 minutes of billable time during cotx. 89 total minutes of billable time from writer.          Wilson Ordonez, PTA, 02/04/24 at 5:17 PM

## 2024-02-04 NOTE — PLAN OF CARE
Problem: Discharge Planning  Goal: Discharge to home or other facility with appropriate resources  2/4/2024 0104 by Greer Hernández LPN  Outcome: Progressing     Problem: Skin/Tissue Integrity  Goal: Absence of new skin breakdown  Description: 1.  Monitor for areas of redness and/or skin breakdown  2.  Assess vascular access sites hourly  3.  Every 4-6 hours minimum:  Change oxygen saturation probe site  4.  Every 4-6 hours:  If on nasal continuous positive airway pressure, respiratory therapy assess nares and determine need for appliance change or resting period.  2/4/2024 0104 by Greer Hernández LPN  Outcome: Progressing     Problem: Safety - Adult  Goal: Free from fall injury  2/4/2024 0104 by Greer Hernández LPN  Outcome: Progressing     Problem: ABCDS Injury Assessment  Goal: Absence of physical injury  2/4/2024 0104 by Greer Hernández LPN  Outcome: Progressing     Problem: Pain  Goal: Verbalizes/displays adequate comfort level or baseline comfort level  2/4/2024 0104 by Greer Hernández LPN  Outcome: Progressing     Problem: Nutrition Deficit:  Goal: Optimize nutritional status  2/4/2024 0104 by Greer Hernández LPN  Outcome: Progressing     Problem: Chronic Conditions and Co-morbidities  Goal: Patient's chronic conditions and co-morbidity symptoms are monitored and maintained or improved  2/4/2024 0104 by Greer Hernández LPN  Outcome: Progressing

## 2024-02-05 PROCEDURE — 94761 N-INVAS EAR/PLS OXIMETRY MLT: CPT

## 2024-02-05 PROCEDURE — 6370000000 HC RX 637 (ALT 250 FOR IP): Performed by: INTERNAL MEDICINE

## 2024-02-05 PROCEDURE — 97530 THERAPEUTIC ACTIVITIES: CPT

## 2024-02-05 PROCEDURE — 6370000000 HC RX 637 (ALT 250 FOR IP): Performed by: PSYCHIATRY & NEUROLOGY

## 2024-02-05 PROCEDURE — 6370000000 HC RX 637 (ALT 250 FOR IP): Performed by: PHYSICAL MEDICINE & REHABILITATION

## 2024-02-05 PROCEDURE — 97116 GAIT TRAINING THERAPY: CPT

## 2024-02-05 PROCEDURE — 99232 SBSQ HOSP IP/OBS MODERATE 35: CPT | Performed by: PSYCHIATRY & NEUROLOGY

## 2024-02-05 PROCEDURE — 97110 THERAPEUTIC EXERCISES: CPT

## 2024-02-05 PROCEDURE — 94760 N-INVAS EAR/PLS OXIMETRY 1: CPT

## 2024-02-05 PROCEDURE — 97129 THER IVNTJ 1ST 15 MIN: CPT

## 2024-02-05 PROCEDURE — 94640 AIRWAY INHALATION TREATMENT: CPT

## 2024-02-05 PROCEDURE — 99231 SBSQ HOSP IP/OBS SF/LOW 25: CPT | Performed by: INTERNAL MEDICINE

## 2024-02-05 PROCEDURE — 97542 WHEELCHAIR MNGMENT TRAINING: CPT

## 2024-02-05 PROCEDURE — 1180000000 HC REHAB R&B

## 2024-02-05 PROCEDURE — 97112 NEUROMUSCULAR REEDUCATION: CPT

## 2024-02-05 PROCEDURE — 6370000000 HC RX 637 (ALT 250 FOR IP): Performed by: STUDENT IN AN ORGANIZED HEALTH CARE EDUCATION/TRAINING PROGRAM

## 2024-02-05 PROCEDURE — 97535 SELF CARE MNGMENT TRAINING: CPT

## 2024-02-05 PROCEDURE — 92507 TX SP LANG VOICE COMM INDIV: CPT

## 2024-02-05 PROCEDURE — 99232 SBSQ HOSP IP/OBS MODERATE 35: CPT | Performed by: PHYSICAL MEDICINE & REHABILITATION

## 2024-02-05 RX ADMIN — Medication 19.8 MG: at 08:23

## 2024-02-05 RX ADMIN — IPRATROPIUM BROMIDE AND ALBUTEROL SULFATE 1 DOSE: 2.5; .5 SOLUTION RESPIRATORY (INHALATION) at 20:04

## 2024-02-05 RX ADMIN — APIXABAN 2.5 MG: 2.5 TABLET, FILM COATED ORAL at 22:01

## 2024-02-05 RX ADMIN — GABAPENTIN 100 MG: 100 CAPSULE ORAL at 22:02

## 2024-02-05 RX ADMIN — DIVALPROEX SODIUM 500 MG: 500 TABLET, EXTENDED RELEASE ORAL at 08:24

## 2024-02-05 RX ADMIN — LISINOPRIL 10 MG: 10 TABLET ORAL at 08:21

## 2024-02-05 RX ADMIN — DIVALPROEX SODIUM 500 MG: 500 TABLET, EXTENDED RELEASE ORAL at 22:02

## 2024-02-05 RX ADMIN — ASPIRIN 81 MG 81 MG: 81 TABLET ORAL at 08:21

## 2024-02-05 RX ADMIN — LACOSAMIDE 100 MG: 100 TABLET, FILM COATED ORAL at 08:21

## 2024-02-05 RX ADMIN — LACOSAMIDE 100 MG: 100 TABLET, FILM COATED ORAL at 22:01

## 2024-02-05 RX ADMIN — FERROUS SULFATE TAB 325 MG (65 MG ELEMENTAL FE) 325 MG: 325 (65 FE) TAB at 08:22

## 2024-02-05 RX ADMIN — POLYETHYLENE GLYCOL 3350 17 G: 17 POWDER, FOR SOLUTION ORAL at 08:20

## 2024-02-05 RX ADMIN — Medication 1 TABLET: at 08:21

## 2024-02-05 RX ADMIN — BACLOFEN 5 MG: 10 TABLET ORAL at 22:01

## 2024-02-05 RX ADMIN — IPRATROPIUM BROMIDE AND ALBUTEROL SULFATE 1 DOSE: 2.5; .5 SOLUTION RESPIRATORY (INHALATION) at 10:41

## 2024-02-05 RX ADMIN — MODAFINIL 200 MG: 100 TABLET ORAL at 08:21

## 2024-02-05 RX ADMIN — GABAPENTIN 100 MG: 100 CAPSULE ORAL at 16:39

## 2024-02-05 RX ADMIN — DICLOFENAC SODIUM 2 G: 10 GEL TOPICAL at 08:25

## 2024-02-05 RX ADMIN — GABAPENTIN 100 MG: 100 CAPSULE ORAL at 08:21

## 2024-02-05 RX ADMIN — IPRATROPIUM BROMIDE AND ALBUTEROL SULFATE 1 DOSE: 2.5; .5 SOLUTION RESPIRATORY (INHALATION) at 06:54

## 2024-02-05 RX ADMIN — ATORVASTATIN CALCIUM 80 MG: 80 TABLET, FILM COATED ORAL at 22:02

## 2024-02-05 RX ADMIN — IPRATROPIUM BROMIDE AND ALBUTEROL SULFATE 1 DOSE: 2.5; .5 SOLUTION RESPIRATORY (INHALATION) at 15:43

## 2024-02-05 RX ADMIN — DICLOFENAC SODIUM 2 G: 10 GEL TOPICAL at 22:06

## 2024-02-05 RX ADMIN — CITALOPRAM HYDROBROMIDE 10 MG: 10 TABLET ORAL at 08:21

## 2024-02-05 RX ADMIN — APIXABAN 2.5 MG: 2.5 TABLET, FILM COATED ORAL at 08:21

## 2024-02-05 RX ADMIN — AMITRIPTYLINE HYDROCHLORIDE 12.5 MG: 25 TABLET, FILM COATED ORAL at 22:02

## 2024-02-05 RX ADMIN — Medication 6 MG: at 22:02

## 2024-02-05 RX ADMIN — BUTALBITA,ACETAMINOPHEN AND CAFFEINE 1 CAPSULE: 50; 300; 40 CAPSULE ORAL at 06:40

## 2024-02-05 NOTE — PROGRESS NOTES
on Pradaxa earlier  Should consider hematology oncology consult due to his complicated history    ITP platelet count yesterday was 103, has been stable patient received IVIG at outlying facility, consider hematology oncology consult    On Flex for cellulitis last dose 1/29    CAD s/p CABG in 2009 status post PCI 2023, on aspirin Lipitor    Hypertension blood pressures currently stable on lisinopril, continue to monitor    DVT prophylaxis        1/29  Patient seen and examined  Working with PT OT  Blood pressure has been borderline lower side decrease lisinopril to 10  Patient denies any dizziness lightheadedness  Thrombocytopenia has resolved  Patient also had 4.5 cm abdominal aortic aneurysm  Advised to follow-up with vascular hematology oncology and PCP as outpatient  Voiced understanding  Wife is present at the bedside updated  1/30  Patient seen and examined, his vitals have been stable, labs reviewed, patient advised to follow-up with oncology as outpatient  Thrombocytopenia has resolved  Patient on Eliquis reduced dose as per oncology recommendations in [place]  Finish course of Keflex    2/1  Patient seen and examined  Currently alert oriented  On Eliquis at 2.5 twice daily as recommended by oncologist at [place]  Patient advised to follow-up with oncology as outpatient due to recurrent DVTs next  Vital stable thrombocytopenia resolved    2/2  Intraparenchymal hemorrhage status post craniectomy, left-sided hemiparesis seizure prophylaxis, cerebral venous sinus thrombosis on Eliquis, recent ITP however platelet counts have normalized will continue to monitor next labs due tomorrow  Patient complaining of new cough which has been there for last couple of days; reports recent diagnosis of emphysema/COPD.  Will get chest x-ray, ordering DuoNebs as needed.  No wheezing on lung exam.  Patient also complaining of irritation in throat ordering Cepacol lozenges.    2/3  Cough better.  Continue with DuoNebs as needed  Chest  x-ray unremarkable  Hemoglobin 13.3, platelets 123, will continue to monitor continuing with Eliquis  Blood pressure running low-Coreg discontinued no history of A-fib/CHF    2/4  Blood pressure control is good  Patient appears comfortable cough is improving patient worked with therapy  Labs reviewed from today and satisfactory  Continue current management    2/5  Patient reports no new complaints. Engaging with physical therapy. Labs and vitals reviewed. No new issues. Continue with current care.         Consultations:   IP CONSULT TO DIETITIAN  IP CONSULT TO SOCIAL WORK  IP CONSULT TO INTERNAL MEDICINE  IP CONSULT TO PSYCHIATRY  IP CONSULT TO NEUROLOGY     Patient is admitted as inpatient status because of co-morbidities listed above, severity of signs and symptoms as outlined, requirement for current medical therapies and most importantly because of direct risk to patient if care not provided in a hospital setting.    Shannan Drake MD  2/5/2024  2:45 PM    Copy sent to Dr. Stack primary care provider on file.    Please note that this chart was generated using voice recognition Dragon dictation software.  Although every effort was made to ensure the accuracy of this automated transcription, some errors in transcription may have occurred.

## 2024-02-05 NOTE — PROGRESS NOTES
Occupational Therapy  [facility]   Acute Rehabilitation Occupational Therapy Daily Treatment Note    Date: 24  Patient Name: Brennen Mcclure       Room: 2633/2633-01  MRN: [id number]  Account: [id number]   : 1965  (59 y.o.) Gender: male       Referring Practitioner: Danica De La Paz MD  Diagnosis: Hemiplegia following ntrm intcrbl hemor aff left dominant side  Additional Pertinent Hx: 59 year old male who admitted 23 with 4 days of new L arm weakness and headache. He had known history of MI and CABG - on dabigatran but with inconsistent use due to prescription running out. He had previous treatment for L ICA thrombus and R sided symptoms treated with “clot busting medication” in . 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 He was transferred from [place], [place] to [facility] for medical management and was admitted to neuro critical care. His LUE numbness/weakness worsened to near complete hemiparesis. Initial GCS 15. He was initially treated with heparin gtt for DVST. On 23 Dr. Jameson Parsons (neurosurgery) performed R sided decompressive hemicraniectomy with evacuation of IPH x2 sites with dural repair. He was stable post op on heparin drip and extubated 23 after stable head CT 23. He was then bridged to warfarin. He is currently being treated with warfarin with therapeutic INR since 23. Hb stable at 13.6 on 24. Thrombocytopenia noted with platelet count 30 - hematology being reconsulted - previously recommended count >40. Currently requiring timed voids for incontinence. PM&R anticipating likely wheelchair mobility with Naveen for transfers.    Treatment Diagnosis: Impaired self-care status    Past Medical History:  has a past medical history of CAD (coronary artery disease), Chronic deep vein thrombosis (DVT) of both lower extremities (HCC),  Chronic idiopathic thrombocytopenia (HCC), CVA (cerebral vascular accident) (HCC), and HLD (hyperlipidemia).    Past Surgical History:   has a past surgical history that includes Coronary artery bypass graft and Coronary stent placement.    Restrictions  Restrictions/Precautions  Restrictions/Precautions: General Precautions, Fall Risk, Up as Tolerated  Required Braces or Orthoses?: Yes  Required Braces or Orthoses  Other:  (Helmet on when OOB)  Left Upper Extremity Brace/Splint: Resting hand  Position Activity Restriction  Other position/activity restrictions: Helmet on when OOB; Sling for transfers/mobility only        Subjective  Subjective  Subjective: \"first time in two months\" pt reports happiness regarding receiving shower this weekend.  Pain Assessment  Pain Assessment: None - Denies Pain    Objective  Cognition  Overall Orientation Status: Within Functional Limits  Orientation Level: Oriented X4    Cognition  Overall Cognitive Status: Exceptions  Arousal/Alertness: Appropriate responses to stimuli  Following Commands: Follows one step commands consistently;Follows multistep commands with increased time;Follows multistep commands with repitition  Memory: Appears intact  Safety Judgement: Decreased awareness of need for safety;Decreased awareness of need for assistance  Problem Solving: Assistance required to generate solutions;Assistance required to implement solutions;Assistance required to identify errors made;Assistance required to correct errors made;Decreased awareness of errors  Insights: Decreased awareness of deficits  Initiation: Requires cues for some  Sequencing: Requires cues for some  Cognition Comment: emotional lability; becoming suddenly tearful very briefly during conversation.    Activities of Daily Living  Feeding  Assistance Level: Set-up    Grooming/Oral Hygiene  Assistance Level: Stand by assist  Skilled Clinical Factors: pt utilizes natacha tech to open/apply toothpaste. Writer provided

## 2024-02-05 NOTE — PROGRESS NOTES
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 Patient is  observed in physical therapy today working on sitting balance/trunk control. He reports daily morning headache which he feels is responding to medications.     ROS:  Denies fevers, chills, sweats.  No chest pain, palpitations, lightheadedness.  Denies coughing, wheezing or shortness of breath.  Denies abdominal pain, nausea, diarrhea or constipation.  No new areas of joint pain.  Denies new areas of numbness or weakness.  Denies new anxiety or depression issues.  No new skin problems.    Rehabilitation:       PT:    Bed mobility  Bridging: Minimal assistance  Rolling to Left: Moderate assistance (assist in reaching for rail)  Rolling to Right: Maximum assistance, 2 Person assistance (L LE & UE assist, assist to initate & hold position.)  Supine to Sit: Moderate assistance, 2 Person assistance (HOB slightly elevated)  Sit to Supine: Moderate assistance, 2 Person assistance (bed flat, rails down)  Scooting: Minimal assistance, Contact guard assistance, Stand by assistance (Scooting towards EOB and lateral scooting along EOB)  Bed Mobility Comments: Completed 2x         Transfers  Sit to Stand: Maximum Assistance (Therapist standing in front of pt, L knee blocked. Heavy L lateral lean, Cues for upright posture and forward gaze.)  Stand to Sit: Maximum Assistance (Leans to L upon sitting, cues to move hips towards R side when going to sit)  Bed to Chair: Moderate assistance, 2 Person Assistance (stand pivot from bed to chair towards strong side.)  Stand Pivot Transfers: Moderate Assistance, 2 Person Assistance (to strong side. L knee blocked by writers BLE, Cues for sequencing to pt. Manual assistance to advance LLE)  Squat Pivot Transfers:  (Attempted however terminated as pt was unable to understand the technique, pt attempting to fully stand.)  Comment: Take  bleeding/anemia  Dysphagia: SLP treating. Mild oral stage  Spasticity: on baclofen - low dose BID - wife has been refusing am dose per nursing staff for daytime drowsiness.  Headache: Neurology following. On depakote, amitriptyline, prn Fioricet  Seizure: on Vimpat  Impaired focus/attention: on modafinil - titrate to effective dose  Thrombocytopenia: improved. Monitoring   Anemia: improved. Monitoring  L arm neurologic pain: on gabapentin and lidoderm patch  OA B knees: has Voltaren gel  Cellulitis: related to peripheral IV. Completed Keflex 1/29/24.  Severe emphysema: stable on room air  Infrarenal AAA: measures 4.5 cm. For outpatient follow up  Mild hyponatremia: Stable. Monitoring  Insomnia: on melatonin  CAD: Hx MI, CVA and CABG, stents. On ASA, atorvastatin.   HTN: on carvedilol, lisinopril - IM titrating to effective dose  HLD: on atorvastatin.  Depression/anxiety: on Celexa. Psychiatry following.   Bowel Management: Miralax daily, Senokot prn, Dulcolax prn  Internal medicine for medical management  Chronic BLE DVT:  on Eliquis  Follow up: PCP 1-2 weeks, Neurosurgery, PM&R, Hematology, Vascular, Pulmonology    Electronically signed by MUKUND JOHNSON MD on 2/5/2024 at 10:39 AM      This note is created with the assistance of a speech recognition program.  While intending to generate a document that actually reflects the content of the visit, the document can still have some errors including those of syntax and sound a like substitutions which may escape proof reading.  In such instances, actual meaning can be extrapolated by contextual diversion.

## 2024-02-05 NOTE — CARE COORDINATION
Writer returned the pt cousin,kristal, phone call. The pt gave this writer verbal pemission to talk to kristal. Kristal had contacted the ability center and started the process of getting a temporary ramp. Writer checked the fax machine and there was a packet of info from the ability  [facility]. As per kristal request writer put the packet of info in the pt room by the sink. Writer also made a copy of the packet and put on the cm desk kristal phone number is [phone].

## 2024-02-05 NOTE — INTERDISCIPLINARY ROUNDS
[facility] Acute Inpatient Rehabilitation  INTERDISCIPLINARY MEETING  Date: 24  Patient Name: Brennen Mcclure       Room: 2633/2633-01  MRN: [id number]       : 1965  (59 y.o.)     Gender: male     Patient's care team, including nursing, speech language pathologist, occupational therapist, and/or physical therapist, met to discuss patient's care needs. Any patient concerns, change in medical status, and current transfer/mobility status were identified at this time.     Any Additional Pertinent Information:   Not Applicable    Participating Team Members:  Nurse: Tamy Celestin LPN    Occupational Therapist: Nga Mueller OT   Physical Therapist: Fany Kelly  PT  Speech Therapist:  Zofia Martinez M.A., CCC-SLP

## 2024-02-05 NOTE — PATIENT CARE CONFERENCE
[facility] Acute Inpatient Rehabilitation  TEAM CONFERENCE NOTE  Date: 24  Patient Name: Brennen Mcclure       Room: 2633/2633-01  MRN: [id number]       : 1965  (59 y.o.)     Gender: male     Referring Practitioner: Danica De La Paz MD     PRINCIPAL DIAGNOSIS: 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 (HCC)    NURSING    Bladder Continence  Incontinent Daily  Bowel Continence Always Continent    Date of Last BM: 2024    Bladder/Bowel Program Interventions: Both Bowel & Bladder Program In Place     Wounds/Incisions/Ulcers: Incision healing well     Pain Control: Patient's pain currently controlled and pain regimen effective as ordered    Pain Medication Regimen Usage Pattern: MAR reviewed and pain medications are being used at the following frequency (Specify Medication, # Doses Administered on average per day, identified patterns of use - for example: time of day, prior to activity/therapy)  Tyenol  650mg every day,  Toradol 30 mg once a day, Butalbital APAP caffeine 1 capsule once a day for headaches    Fall Risk:  Falling star program initiated    Medication Education Program: Patient able to manage medications and being educated by nursing    Discharge Preparation Patient/Responsible Party Education In Progress:   Family education with transfers, equipment and safety    Nursing specific communication for TEAM: No additional information identified requiring communication at this time    PHYSICAL THERAPY  Bed mobility  Bridging: Minimal assistance  Rolling to Left: Moderate assistance (assist in reaching for rail)  Supine to Sit: Moderate assistance;2 Person assistance  Sit to Supine: Moderate assistance;2 Person assistance  Scooting: Minimal assistance;Contact guard assistance;Stand by assistance  Bed Mobility Comments: Completed 2x    Transfers:  Sit to Stand: Maximum Assistance (Therapist standing in front of pt, L knee blocked. Heavy L lateral lean, Cues for upright   Zofia Martinez M.A., [facility]-SLP   Nurse: Devora Sam RN    Dietary/Nutrition: Laura Llamas RD, LD  Pastoral Care: Chaplain Macy Mims  CMG: Keyla Russell RN    I attest to leading the interdisciplinary team meeting, required attendees are present as listed above, I approve the established interdisciplinary plan of care as documented within the medical record of Brennen Mcclure.    Danica De La Paz MD

## 2024-02-05 NOTE — PROGRESS NOTES
BEHAVIORAL HEALTH FOLLOW-UP NOTE     2/5/2024     Patient was seen and examined in person, Chart reviewed   Patient's case discussed with staff/team    Chief Complaint: \"Still emotional\"    Interim History:   No acute overnight events. Vitals wnl.   Patient seen and examined at bedside. Niece present in room. He reports no change in mood. He does continue to have spontaneous crying episodes. His son passed away a year ago and it was his birthday yesterday. Patient becomes upset whenever he thinks about this. He does not endorse any suicidal or homicidal thoughts.  He has been actively participating in therapy.  He has no side effects with medications.    /73   Pulse 84   Temp 97.8 °F (36.6 °C) (Oral)   Resp 20   Ht 1.778 m (5' 10\")   Wt 77.1 kg (170 lb)   SpO2 92%   BMI 24.39 kg/m²   Appetite:   [x] Normal/Unchanged  [] Increased  [] Decreased      Sleep:       [x] Normal/Unchanged  [] Fair       [] Poor              Energy:    [x] Normal/Unchanged  [] Increased  [] Decreased        Aggression:  [] yes  [x] no    Patient is [x] able  [] unable to CONTRACT FOR SAFETY ON THE UNIT    PAST MEDICAL/PSYCHIATRIC HISTORY:   Past Medical History:   Diagnosis Date    CAD (coronary artery disease)     Chronic deep vein thrombosis (DVT) of both lower extremities (HCC)     Chronic idiopathic thrombocytopenia (HCC)     CVA (cerebral vascular accident) (HCC)     HLD (hyperlipidemia)        FAMILY/SOCIAL HISTORY:  Family History   Problem Relation Age of Onset    Coronary Art Dis Mother     Stroke Other      Social History     Socioeconomic History    Marital status:      Spouse name: Not on file    Number of children: Not on file    Years of education: Not on file    Highest education level: Not on file   Occupational History    Not on file   Tobacco Use    Smoking status: Not on file    Smokeless tobacco: Not on file   Substance and Sexual Activity    Alcohol use: Not on file    Drug use: Not on file    Sexual  planning discussed with the patient and treatment team.    PSYCHOTHERAPY/COUNSELING:  [] Therapeutic interview  [x] Supportive  [] CBT  [] Ongoing  [] Other      Brennen cMclure is a 59 y.o. male being evaluated face to face.     --Lanie Mukherjee MD on 2/5/2024 at 11:51 AM    An electronic signature was used to authenticate this note.     **This report has been created using voice recognition software. It may contain minor errors which are inherent in voice recognition technology.**  I independently saw and evaluated the patient.  I reviewed the  documentation above    .  Any additional comments or changes to the   documentation are stated below otherwise agree with assessment.      Vitals:    02/05/24 0800 02/05/24 1543 02/05/24 1940 02/05/24 2004   BP: 105/73  101/70    Pulse: 84 69 80    Resp: 20 16 18    Temp: 97.8 °F (36.6 °C)  97.5 °F (36.4 °C)    TempSrc: Oral      SpO2: 92% 94% 95% 93%   Weight:       Height:         Current Facility-Administered Medications   Medication Dose Route Frequency Provider Last Rate Last Admin    baclofen (LIORESAL) tablet 5 mg  5 mg Oral BID Beck Mason MD   5 mg at 02/05/24 2201    ipratropium 0.5 mg-albuterol 2.5 mg (DUONEB) nebulizer solution 1 Dose  1 Dose Inhalation Q4H WA RT Shannan Drake MD   1 Dose at 02/05/24 2004    Benzocaine-Menthol (CEPACOL) 1 lozenge  1 lozenge Oral Q2H PRN Shannan Drake MD        divalproex (DEPAKOTE ER) extended release tablet 500 mg  500 mg Oral BID Nhan Cha MD   500 mg at 02/05/24 2202    amitriptyline (ELAVIL) tablet 12.5 mg  12.5 mg Oral Nightly Nhan Cha MD   12.5 mg at 02/05/24 2202    dextromethorphan (DELSYM) 30 MG/5ML extended release liquid 19.8 mg  19.8 mg Oral Daily Asher Seo MD   19.8 mg at 02/05/24 0823    diclofenac sodium (VOLTAREN) 1 % gel 2 g  2 g Topical BID Beck Mason MD   2 g at 02/05/24 2206    lisinopril (PRINIVIL;ZESTRIL) tablet 10 mg  10 mg Oral Daily Blanca Ren MD

## 2024-02-05 NOTE — PROGRESS NOTES
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  Pt more lethargic in PM, falling asleep and requiring more cues/repetition for alertness.     Orientation: Oriented x4.     Recall: Pt able to recall 2/3 trained dysarthria strategies (I), increasing to 3/3 c min cue.     Organization: n/a    Problem Solving/Reasoning: Reasoning, ID similar category items/odd one out (field of 3)- 67% accuracy (I), increasing to 100% c min semantic cues.      Speech: Pt presents with mild dysarthria characterized by imprecise articulation, blended word boundaries, rapid rate and reduced vocal intensity.  Pt recalling some strategies independently, but required mod A (verbal cues) to facilitate strategies in conversation and MAX A with articulation drills task (paragraph reading task).  Speech judged to be ~80% intelligible in conversation.  Intelligibility noted to decline with duration -- ~50% intelligible at paragraph level.  Pt able to complete OMEs x5 sets in reps of 20 c mod cues.  Reduced labial and lingual strength, coordination and ROM observed with exercise attempts.     Other: No family present in AM.  Pt's uncle and cousin present for PM session.         Plan:  [x] Continue ST services    [] Discharge from ST:      Discharge recommendations: [] Inpatient Rehab   [] Skilled Nursing Facility   [] Outpatient Therapy  [] Follow up at trauma clinic   [] Other:       Treatment completed by: Zofia Martinez M.A., CCC-SLP

## 2024-02-05 NOTE — PLAN OF CARE
Problem: Discharge Planning  Goal: Discharge to home or other facility with appropriate resources  Outcome: Progressing  Flowsheets (Taken 2/4/2024 2055)  Discharge to home or other facility with appropriate resources:   Identify barriers to discharge with patient and caregiver   Arrange for needed discharge resources and transportation as appropriate   Identify discharge learning needs (meds, wound care, etc)   Refer to discharge planning if patient needs post-hospital services based on physician order or complex needs related to functional status, cognitive ability or social support system     Problem: Skin/Tissue Integrity  Goal: Absence of new skin breakdown  Description: 1.  Monitor for areas of redness and/or skin breakdown  2.  Assess vascular access sites hourly  3.  Every 4-6 hours minimum:  Change oxygen saturation probe site  4.  Every 4-6 hours:  If on nasal continuous positive airway pressure, respiratory therapy assess nares and determine need for appliance change or resting period.  Outcome: Progressing     Problem: Safety - Adult  Goal: Free from fall injury  Outcome: Progressing     Problem: ABCDS Injury Assessment  Goal: Absence of physical injury  Outcome: Progressing     Problem: Pain  Goal: Verbalizes/displays adequate comfort level or baseline comfort level  Outcome: Progressing     Problem: Nutrition Deficit:  Goal: Optimize nutritional status  Outcome: Progressing     Problem: Chronic Conditions and Co-morbidities  Goal: Patient's chronic conditions and co-morbidity symptoms are monitored and maintained or improved  Outcome: Progressing  Flowsheets (Taken 2/4/2024 2055)  Care Plan - Patient's Chronic Conditions and Co-Morbidity Symptoms are Monitored and Maintained or Improved:   Monitor and assess patient's chronic conditions and comorbid symptoms for stability, deterioration, or improvement   Collaborate with multidisciplinary team to address chronic and comorbid conditions and prevent

## 2024-02-05 NOTE — PROGRESS NOTES
Physical Therapy  Facility/Department: [facility] ACUTE REHAB      NAME: Brennen Mcclure  : 1965 (59 y.o.)  MRN: [id number]  CODE STATUS: Full Code    Date of Service: 24      Past Medical History:   Diagnosis Date    CAD (coronary artery disease)     Chronic deep vein thrombosis (DVT) of both lower extremities (HCC)     Chronic idiopathic thrombocytopenia (HCC)     CVA (cerebral vascular accident) (HCC)     HLD (hyperlipidemia)      Past Surgical History:   Procedure Laterality Date    CORONARY ARTERY BYPASS GRAFT      CORONARY STENT PLACEMENT         Chart Reviewed: Yes  Additional Pertinent Hx: 59 year old male who admitted 23 with 4 days of new L arm weakness and headache. He had known history of MI and CABG - on dabigatran but with inconsistent use due to prescription running out. He had previous treatment for L ICA thrombus and R sided symptoms treated with “clot busting medication” in . 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 He was transferred from [place], [place] to [facility] for medical management and was admitted to neuro critical care. His LUE numbness/weakness worsened to near complete hemiparesis. Initial GCS 15. He was initially treated with heparin gtt for DVST. On 23 Dr. Jameson Parsons (neurosurgery) performed R sided decompressive hemicraniectomy with evacuation of IPH x2 sites with dural repair. He was stable post op on heparin drip and extubated 23 after stable head CT 23. He was then bridged to warfarin. He is currently being treated with warfarin with therapeutic INR since 23  Family / Caregiver Present: No  Referring Practitioner: Danica De La Paz MD  Diagnosis: 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    Restrictions:  Restrictions/Precautions: General Precautions;Fall Risk;Up as Tolerated  Required Braces or Orthoses  Other:  (Helmet

## 2024-02-05 NOTE — PROGRESS NOTES
Comprehensive Nutrition Assessment    Type and Reason for Visit:  Reassess    Nutrition Recommendations/Plan:   Continue current diet.   Continue oral nutrition supplement.     Malnutrition Assessment:  Malnutrition Status:  Moderate malnutrition (01/29/24 1604)    Context:  Acute Illness     Findings of the 6 clinical characteristics of malnutrition:  Energy Intake:  75% or less of estimated energy requirements for 7 or more days  Weight Loss:  Greater than 5% over 1 month     Body Fat Loss:  Moderate body fat loss Triceps   Muscle Mass Loss:  Unable to assess (Wife reports notable loss; some loss likely attributed to deconditioning.)    Fluid Accumulation:  No significant fluid accumulation     Strength:  Not Performed    Nutrition Assessment:    Pt was working with therapy at time of attempted visit. Nurse states pt has been eating well. Observed lunch tray with % of meal consumed.    Nutrition Related Findings:    Na: 135. Other labs and meds reviewed. No edema. Wound Type: Skin Tears       Current Nutrition Intake & Therapies:    Average Meal Intake: %  Average Supplements Intake: 51-75% (Estimated)  ADULT DIET; Easy to Chew  ADULT ORAL NUTRITION SUPPLEMENT; Lunch; Standard High Calorie/High Protein Oral Supplement    Anthropometric Measures:  Height: 177.8 cm (5' 10\")  Ideal Body Weight (IBW): 166 lbs (75 kg)    Admission Body Weight: 77.1 kg (169 lb 15.6 oz)  Current Body Weight: 77.1 kg (169 lb 15.6 oz), 102.4 % IBW. Weight Source: Bed Scale  Current BMI (kg/m2): 24.4  Usual Body Weight: 96.9 kg (213 lb 10 oz) (11/22/23)  % Weight Change (Calculated): -20.4                    BMI Categories: Normal Weight (BMI 18.5-24.9)    Estimated Daily Nutrient Needs:  Energy Requirements Based On: Formula  Weight Used for Energy Requirements: Admission  Energy (kcal/day): 3192-7935 based on Josephine-St. Jeor with 1.2-1.3 factor  Weight Used for Protein Requirements: Admission  Protein (g/day): 100-108  based on 1.3-1.4 gm per kg     Fluid (ml/day): per physician    Nutrition Diagnosis:   Moderate malnutrition related to inadequate protein-energy intake as evidenced by weight loss 7.5% in 3 months, moderate loss of subcutaneous fat    Nutrition Interventions:   Food and/or Nutrient Delivery: Continue Current Diet, Continue Oral Nutrition Supplement  Nutrition Education/Counseling: No recommendation at this time  Coordination of Nutrition Care: Continue to monitor while inpatient       Goals:  Previous Goal Met: Progressing toward Goal(s)  Goals: Meet at least 75% of estimated needs       Nutrition Monitoring and Evaluation:      Food/Nutrient Intake Outcomes: Food and Nutrient Intake, Supplement Intake  Physical Signs/Symptoms Outcomes: Biochemical Data, Chewing or Swallowing, GI Status, Weight    Discharge Planning:    Too soon to determine     Laura Llamas RD, LD  Contact: [phone]

## 2024-02-06 PROCEDURE — 97535 SELF CARE MNGMENT TRAINING: CPT

## 2024-02-06 PROCEDURE — 6370000000 HC RX 637 (ALT 250 FOR IP): Performed by: INTERNAL MEDICINE

## 2024-02-06 PROCEDURE — 99233 SBSQ HOSP IP/OBS HIGH 50: CPT | Performed by: PHYSICAL MEDICINE & REHABILITATION

## 2024-02-06 PROCEDURE — 1180000000 HC REHAB R&B

## 2024-02-06 PROCEDURE — 6370000000 HC RX 637 (ALT 250 FOR IP): Performed by: STUDENT IN AN ORGANIZED HEALTH CARE EDUCATION/TRAINING PROGRAM

## 2024-02-06 PROCEDURE — 97112 NEUROMUSCULAR REEDUCATION: CPT

## 2024-02-06 PROCEDURE — 94760 N-INVAS EAR/PLS OXIMETRY 1: CPT

## 2024-02-06 PROCEDURE — 97530 THERAPEUTIC ACTIVITIES: CPT

## 2024-02-06 PROCEDURE — 6370000000 HC RX 637 (ALT 250 FOR IP): Performed by: PHYSICAL MEDICINE & REHABILITATION

## 2024-02-06 PROCEDURE — 6370000000 HC RX 637 (ALT 250 FOR IP): Performed by: PSYCHIATRY & NEUROLOGY

## 2024-02-06 PROCEDURE — 92507 TX SP LANG VOICE COMM INDIV: CPT

## 2024-02-06 PROCEDURE — 99231 SBSQ HOSP IP/OBS SF/LOW 25: CPT | Performed by: INTERNAL MEDICINE

## 2024-02-06 PROCEDURE — 97129 THER IVNTJ 1ST 15 MIN: CPT

## 2024-02-06 PROCEDURE — 94640 AIRWAY INHALATION TREATMENT: CPT

## 2024-02-06 RX ORDER — POLYETHYLENE GLYCOL 3350 17 G/17G
17 POWDER, FOR SOLUTION ORAL DAILY PRN
Status: DISCONTINUED | OUTPATIENT
Start: 2024-02-06 | End: 2024-02-26 | Stop reason: HOSPADM

## 2024-02-06 RX ADMIN — LACOSAMIDE 100 MG: 100 TABLET, FILM COATED ORAL at 20:17

## 2024-02-06 RX ADMIN — BACLOFEN 5 MG: 10 TABLET ORAL at 08:32

## 2024-02-06 RX ADMIN — APIXABAN 2.5 MG: 2.5 TABLET, FILM COATED ORAL at 08:31

## 2024-02-06 RX ADMIN — GABAPENTIN 100 MG: 100 CAPSULE ORAL at 14:05

## 2024-02-06 RX ADMIN — DICLOFENAC SODIUM 2 G: 10 GEL TOPICAL at 20:18

## 2024-02-06 RX ADMIN — BUTALBITA,ACETAMINOPHEN AND CAFFEINE 1 CAPSULE: 50; 300; 40 CAPSULE ORAL at 09:18

## 2024-02-06 RX ADMIN — CITALOPRAM HYDROBROMIDE 10 MG: 10 TABLET ORAL at 08:32

## 2024-02-06 RX ADMIN — IPRATROPIUM BROMIDE AND ALBUTEROL SULFATE 1 DOSE: 2.5; .5 SOLUTION RESPIRATORY (INHALATION) at 20:50

## 2024-02-06 RX ADMIN — Medication 6 MG: at 20:17

## 2024-02-06 RX ADMIN — DIVALPROEX SODIUM 500 MG: 500 TABLET, EXTENDED RELEASE ORAL at 08:31

## 2024-02-06 RX ADMIN — LISINOPRIL 10 MG: 10 TABLET ORAL at 08:31

## 2024-02-06 RX ADMIN — IPRATROPIUM BROMIDE AND ALBUTEROL SULFATE 1 DOSE: 2.5; .5 SOLUTION RESPIRATORY (INHALATION) at 10:33

## 2024-02-06 RX ADMIN — APIXABAN 2.5 MG: 2.5 TABLET, FILM COATED ORAL at 20:16

## 2024-02-06 RX ADMIN — LACOSAMIDE 100 MG: 100 TABLET, FILM COATED ORAL at 08:32

## 2024-02-06 RX ADMIN — BACLOFEN 5 MG: 10 TABLET ORAL at 20:16

## 2024-02-06 RX ADMIN — Medication 1 TABLET: at 08:32

## 2024-02-06 RX ADMIN — MODAFINIL 200 MG: 100 TABLET ORAL at 08:31

## 2024-02-06 RX ADMIN — ATORVASTATIN CALCIUM 80 MG: 80 TABLET, FILM COATED ORAL at 20:16

## 2024-02-06 RX ADMIN — AMITRIPTYLINE HYDROCHLORIDE 12.5 MG: 25 TABLET, FILM COATED ORAL at 20:17

## 2024-02-06 RX ADMIN — GABAPENTIN 100 MG: 100 CAPSULE ORAL at 20:17

## 2024-02-06 RX ADMIN — IPRATROPIUM BROMIDE AND ALBUTEROL SULFATE 1 DOSE: 2.5; .5 SOLUTION RESPIRATORY (INHALATION) at 14:43

## 2024-02-06 RX ADMIN — Medication 19.8 MG: at 08:31

## 2024-02-06 RX ADMIN — DIVALPROEX SODIUM 500 MG: 500 TABLET, EXTENDED RELEASE ORAL at 20:17

## 2024-02-06 RX ADMIN — GABAPENTIN 100 MG: 100 CAPSULE ORAL at 08:31

## 2024-02-06 RX ADMIN — ASPIRIN 81 MG 81 MG: 81 TABLET ORAL at 08:31

## 2024-02-06 RX ADMIN — DICLOFENAC SODIUM 2 G: 10 GEL TOPICAL at 09:16

## 2024-02-06 RX ADMIN — IPRATROPIUM BROMIDE AND ALBUTEROL SULFATE 1 DOSE: 2.5; .5 SOLUTION RESPIRATORY (INHALATION) at 06:59

## 2024-02-06 RX ADMIN — SENNOSIDES 17.2 MG: 8.6 TABLET, FILM COATED ORAL at 20:17

## 2024-02-06 ASSESSMENT — PAIN SCALES - GENERAL
PAINLEVEL_OUTOF10: 9
PAINLEVEL_OUTOF10: 9

## 2024-02-06 ASSESSMENT — PAIN DESCRIPTION - LOCATION
LOCATION: HEAD
LOCATION: HEAD

## 2024-02-06 NOTE — PROGRESS NOTES
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      Orientation: Oriented x4.     Recall: Pt. Education provided re: compensatory strategies to facilitate recall (e.g., ID key words, repetition and rehearsal).  Pt. Verbalized understanding and facilitating strategy to complete the following task:    Paragraph recall (3-5 sentences): 82% accuracy (I), increasing to 100% c cues.    Organization: n/a    Problem Solving/Reasoning: n/a    Speech: Pt presents with mild dysarthria characterized by imprecise articulation, blended word boundaries, rapid rate and reduced vocal intensity.  Pt required mod A (verbal cues) to facilitate strategies in conversation and MAX A with articulation drills task (paragraph reading task).  Speech judged to be ~90% intelligible in conversation.  Intelligibility noted to decline with duration -- ~50% intelligible at paragraph level.  Pt able to complete OMEs x5 sets in reps of 20 c mod cues.  Reduced labial and lingual strength, coordination and ROM observed with exercise attempts.     Other: No family present.        Plan:  [x] Continue ST services    [] Discharge from ST:      Discharge recommendations: [] Inpatient Rehab   [] Skilled Nursing Facility   [] Outpatient Therapy  [] Follow up at trauma clinic   [] Other:       Treatment completed by: Zofia

## 2024-02-06 NOTE — PROGRESS NOTES
Physical Therapy  Facility/Department: [facility] [[email or web address]] ACUTE REHAB  Treatment note    NAME: Brennen Mcclure  : 1965 (59 y.o.)  MRN: [id number]  CODE STATUS: Full Code  Date of Service: 24    Past Medical History:   Diagnosis Date    CAD (coronary artery disease)     Chronic deep vein thrombosis (DVT) of both lower extremities (HCC)     Chronic idiopathic thrombocytopenia (HCC)     CVA (cerebral vascular accident) (HCC)     HLD (hyperlipidemia)      Past Surgical History:   Procedure Laterality Date    CORONARY ARTERY BYPASS GRAFT      CORONARY STENT PLACEMENT       General Comment  Comments: Pt fatigued & easily distracted AM & PM, requires multiple attempts to redirect to task; increased L lateral & posterior lean as result of lack of attention. PM co-tx raya Jewell.    Restrictions:  Restrictions/Precautions: General Precautions;Fall Risk;Up as Tolerated  Required Braces or Orthoses  Other:  (Helmet on when OOB)  Left Upper Extremity Brace/Splint: Resting hand  Position Activity Restriction  Other position/activity restrictions: Helmet on when OOB; Sling for transfers/mobility only     SUBJECTIVE  Subjective: In wheelchair in AM, had hit call light to return to bed prior to entry but agreeable to PT. Returned to bed at end of session at his request (repeats previous statement that L LE gets painful on w/c legrest).  Pain: Reports L shoulder/UE pain.    OBJECTIVE  Functional Mobility  Bed mobility  Supine to Sit: Moderate assistance;2 Person assistance (Trunk assist)  Sit to Supine: Moderate assistance;2 Person assistance  Scooting: Moderate assistance (To EOB; less to adjust hips along edge of mat prior to stand pivot, but Pt demonstrates no recall of positioning s cue.)  Transfers  Sit to Stand: 2 Person Assistance;Moderate Assistance (L knee blocked, assist for L UE on parallel bar. Heavy L lateral lean, Cues for upright posture and forward gaze; P return, requires multiple cues. Mirror feedback PRN.)  Stand to  carryover.    ASSESSMENT  Activity Tolerance  Activity Tolerance: Treatment limited secondary to decreased cognition;Patient limited by fatigue;Patient limited by pain    GOALS  Short Term Goals  Time Frame for Short Term Goals: 10 days  Short Term Goal 1: Pt to demo bed mobility with ModA  Short Term Goal 2: Pt to Demo functional transfers with unilateral device and ModA  Short Term Goal 3: Pt to ambulate at least 10-15' with unilateral device using supportive equipment as needed and ModA  Short Term Goal 4: Pt to improve static/dynamic sitting & standing balance to Fair - or better to dec risk for falls during ADL participation  Long Term Goals  Time Frame for Long Term Goals : Until DC  Long Term Goal 1: Pt to demo bed mobility with adjustable HOB and Norm/CGA to dec burden of care  Long Term Goal 2: Pt to Demo functional transfers with unilateral device and Norm/CGA  Long Term Goal 3: Pt to ambulate household distances with unilateral device and  Min-ModAx1  Long Term Goal 4: pt to progress to step negotiation x3-4 steps using UE support, supportive lift equipment and 2A  Long Term Goal 5: Pt to improve static/dynamic sitting & standing balance to Fair or better to dec risk for falls during ADL participation  Additional Goals?: Yes  Long term goal 6: Pt to demo actual or simulated car transfer using device and Norm  Long term goal 7: Pt to demo WC propulsion of household distances with R/L turns and SBA.  Long term goal 8: Pt and family to demonstrate satisfactory performance/assistance for desired transfer and mobility tasks for a safe DC home.    PLAN OF CARE  Frequency: 1-2 treatment sessions per day, 5-7 days per week  Physical Therapy Plan  General Plan: Other (See Comment) (900 mintues of combined PT/OT/ST d/t pt's need for 2 skilled therapists to safely and therapeutically performed desired functional tasks)  Specific Instructions for Next Treatment: FES; Sitting/standing balance and midline orienting,

## 2024-02-06 NOTE — PLAN OF CARE
Problem: Discharge Planning  Goal: Discharge to home or other facility with appropriate resources  Outcome: Progressing     Problem: Skin/Tissue Integrity  Goal: Absence of new skin breakdown  Description: 1.  Monitor for areas of redness and/or skin breakdown  Outcome: Progressing     Problem: Safety - Adult  Goal: Free from fall injury  Outcome: Progressing     Problem: ABCDS Injury Assessment  Goal: Absence of physical injury  Outcome: Progressing     Problem: Pain  Goal: Verbalizes/displays adequate comfort level or baseline comfort level  Outcome: Progressing     Problem: Nutrition Deficit:  Goal: Optimize nutritional status  Outcome: Progressing     Problem: Chronic Conditions and Co-morbidities  Goal: Patient's chronic conditions and co-morbidity symptoms are monitored and maintained or improved  Outcome: Progressing

## 2024-02-06 NOTE — PROGRESS NOTES
for improved quality of life  Short Term Goal 5: Pt will be educated on and explore use of AE/DME/Modified techniques as needed to complete self-care and mobility  Additional Goals?: Yes  Short Term Goal 6: Pt will participate in dynamic unsupported sitting tasks with Max A  facilitating reaching out of base of support and weight bearing through L extremity to increase input through extremity and improved core strength to increase ease with LB self-care tasks  Short Term Goal 7: Pt will participate in neuromuscular re-education activities (weight bearing, e-stim) as tolerated, to facilitate movement in LUE  Short Term Goal 8: Patient will perform self-care routine with Fair sustained attention with 1-2 VCs for attention to task  Short Term Goal 9: Pt will tolerate standing for 2+ minutes, Max A, with 0-1 UE support and no LOB during self-care/functional activity for improved balance/tolerance  Short Term Goal 10: Pt will identify 2 non-pharmacological pain relief techniques during self-care tasks    Long Term Goals  Time Frame for Long Term Goals : By discharge  Long Term Goal 1: Pt will complete UB ADLs with CGA, good safety, and use of AE/DME/modified techniques as needed  Long Term Goal 2: Pt will complete LB ADLs with Mod A, good safety, and use of AE/DME/Modified techniques as needed  Long Term Goal 3: Pt will complete stand pivot transfer from bed <> wheelchair/chair/toilet with Min A, good safety, and use of least restrictive device  Long Term Goal 4: Pt will tolerate standing for 5+ minutes, min A, with 0-1 UE support and no LOB during self-care/functional activity for improved balance/tolerance  Long Term Goal 5: Pt will demonstrate improved awareness of LUE AEB ability to maintain safe positioning of L hand and wrist during ADL routine without assist  Long Term Goal 6: Pt will verbalize/demonstrate good understanding of home safety/fall prevention strategies to promote safety and independence with  self-care and mobility  Long Term Goal 7: Pt will complete self-care with improved FMC and  strength AEB 10 second and 10# improvemenet on 9HPT and dynamometer  Long Term Goal 8: Pt will complete self-feeding and oral hygiene with Mod I using AE/DME/Modified techniques as needed  Long Term Goal 9: Pt will identify 3 positive leisure activities to increased overall quality of life  Long Term Goal 10: Pt will verbalize/demonstrate good understanding of 3 positive coping strategies to manage emotions/anxiety to promote maximal participation in self-care and functional tasks    Plan  Occupational Therapy Plan  Times Per Week: 900 minutes of OT/PT/SLP  Current Treatment Recommendations: Self-Care / ADL, Strengthening, ROM, Balance training, Functional mobility training, Endurance training, Wheelchair mobility training, Neuromuscular re-education, Cognitive reorientation, Pain management, Safety education & training, Patient/Caregiver education & training, Equipment evaluation, education, & procurement, Positioning, Home management training, Coordination training, Co-Treatment       02/06/24 0930 02/06/24 1500   OT Individual Minutes   Time In 0930  --    Time Out 1035  --    Minutes 65  --    OT Co-Treatment Minutes   Time In  --  1300   Time Out  --  1340  (Co-tx with VICK ramirez OT claiming 20 mins ')   Minutes  --  40         Electronically signed by FORTINO Dave on 2/6/24 at 3:59 PM EST

## 2024-02-06 NOTE — PROGRESS NOTES
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 Patient is continuing with daily headache. It is responding to prn Fioricet but current medications are not preventing the headaches from occurring daily. No new issues with sleep, appetite, bowel, or bladder.     ROS:  Denies fevers, chills, sweats.  No chest pain, palpitations, lightheadedness.  Denies coughing, wheezing or shortness of breath.  Denies abdominal pain, nausea, diarrhea or constipation.  No new areas of joint pain.  Denies new areas of numbness or weakness.  Denies new anxiety or depression issues.  No new skin problems.    Rehabilitation:       PT:    Bed mobility  Bridging: Minimal assistance  Rolling to Left: Moderate assistance (assist in reaching for rail)  Rolling to Right: Maximum assistance, 2 Person assistance (L LE & UE assist, assist to initate & hold position.)  Supine to Sit: Moderate assistance, 2 Person assistance  Sit to Supine: Moderate assistance, 2 Person assistance  Scooting: Minimal assistance, Contact guard assistance, Stand by assistance  Bed Mobility Comments: Completed 2x         Transfers  Sit to Stand: Maximum Assistance (Therapist standing in front of pt, L knee blocked. Heavy L lateral lean, Cues for upright posture and forward gaze.)  Stand to Sit: Maximum Assistance (Leans to L upon sitting, cues to move hips towards R side when going to sit)  Bed to Chair: Moderate assistance, 2 Person Assistance  Stand Pivot Transfers: Moderate Assistance, 2 Person Assistance (to strong side. L knee blocked by writers BLE, Cues for sequencing to pt. Manual assistance to advance LLE)  Squat Pivot Transfers:  (Attempted however terminated as pt was unable to understand the technique, pt attempting to fully stand.)  Comment: Take [facility] for transfer training.         Ambulation  Surface: Level tile  Device:  (green walker lift)  Other  Apparatus: Slider (L foot)  Assistance: Dependent/Total, Maximum assistance (2 Assist with maximal cueing for posture/sequencing/attention to task; Writer advances LLE thru swing and braces hip/knee in stance. Pt not yet able to initiate LLE activity volitionally. PT provides LUE stabilization and postural cues.)  Quality of Gait: Max assist to brace L knee and advance LLE.  Constant Verbal cues for sequencing. Requires assistance for upright posture, correcting L lateral lean, assistance with weight shifting, maintaining hip position/stability  Gait Deviations: Slow Sabrina, Decreased step length, Decreased step height, Shuffles  Distance: 20ft  Comments: Pt may benefit from other supportive devices to ambulate, trial walker lift?       OT:  Grooming/Oral Hygiene  Assistance Level: Stand by assist  Skilled Clinical Factors: pt utilizes natacha tech to open/apply toothpaste. Writer provided dycem to assist in stabilizing LUE to hold toothbrush for toothpaste application. Pt engages in facial shaving with electric razor and face washing. Cues provided throughout to adjust sitting balance, noted good awareness of both sides of face.  Upper Extremity Bathing  Assistance Level: Dependent  Skilled Clinical Factors: Educated natacha technique with bathing under RUE, assist to stabilize LUE. Under arms only completed and deodorant applied this date.  Lower Extremity Bathing  Assistance Level: Dependent  Skilled Clinical Factors: Mod A to maintain standing balance GARY Barrios completed rhiannon care and LB bathing.  Upper Extremity Dressing  Assistance Level: Moderate assistance  Skilled Clinical Factors: Assist to unthread/thread LUE using natacha technique, manage off/on trunk. minimal assist with proper orientation and minor cues for sequencing.  Lower Extremity Dressing  Assistance Level: Maximum assistance  Skilled Clinical Factors: TA to thread LLE, pt able to thread RLE while long sitting upright in bed, pt able to pull over R hip

## 2024-02-07 PROCEDURE — 6370000000 HC RX 637 (ALT 250 FOR IP): Performed by: INTERNAL MEDICINE

## 2024-02-07 PROCEDURE — 99232 SBSQ HOSP IP/OBS MODERATE 35: CPT | Performed by: PHYSICAL MEDICINE & REHABILITATION

## 2024-02-07 PROCEDURE — 97110 THERAPEUTIC EXERCISES: CPT

## 2024-02-07 PROCEDURE — 6370000000 HC RX 637 (ALT 250 FOR IP): Performed by: PSYCHIATRY & NEUROLOGY

## 2024-02-07 PROCEDURE — 97130 THER IVNTJ EA ADDL 15 MIN: CPT

## 2024-02-07 PROCEDURE — 99233 SBSQ HOSP IP/OBS HIGH 50: CPT | Performed by: PSYCHIATRY & NEUROLOGY

## 2024-02-07 PROCEDURE — 6370000000 HC RX 637 (ALT 250 FOR IP): Performed by: PHYSICAL MEDICINE & REHABILITATION

## 2024-02-07 PROCEDURE — 97112 NEUROMUSCULAR REEDUCATION: CPT

## 2024-02-07 PROCEDURE — 99231 SBSQ HOSP IP/OBS SF/LOW 25: CPT | Performed by: INTERNAL MEDICINE

## 2024-02-07 PROCEDURE — 1180000000 HC REHAB R&B

## 2024-02-07 PROCEDURE — 94761 N-INVAS EAR/PLS OXIMETRY MLT: CPT

## 2024-02-07 PROCEDURE — 97535 SELF CARE MNGMENT TRAINING: CPT

## 2024-02-07 PROCEDURE — 97542 WHEELCHAIR MNGMENT TRAINING: CPT

## 2024-02-07 PROCEDURE — 6360000002 HC RX W HCPCS: Performed by: PSYCHIATRY & NEUROLOGY

## 2024-02-07 PROCEDURE — 94640 AIRWAY INHALATION TREATMENT: CPT

## 2024-02-07 PROCEDURE — 97129 THER IVNTJ 1ST 15 MIN: CPT

## 2024-02-07 PROCEDURE — 99232 SBSQ HOSP IP/OBS MODERATE 35: CPT | Performed by: PSYCHIATRY & NEUROLOGY

## 2024-02-07 PROCEDURE — 97530 THERAPEUTIC ACTIVITIES: CPT

## 2024-02-07 PROCEDURE — 92507 TX SP LANG VOICE COMM INDIV: CPT

## 2024-02-07 PROCEDURE — 6370000000 HC RX 637 (ALT 250 FOR IP): Performed by: STUDENT IN AN ORGANIZED HEALTH CARE EDUCATION/TRAINING PROGRAM

## 2024-02-07 RX ORDER — KETOROLAC TROMETHAMINE 30 MG/ML
30 INJECTION, SOLUTION INTRAMUSCULAR; INTRAVENOUS ONCE
Status: COMPLETED | OUTPATIENT
Start: 2024-02-07 | End: 2024-02-07

## 2024-02-07 RX ORDER — METOCLOPRAMIDE HYDROCHLORIDE 5 MG/ML
10 INJECTION INTRAMUSCULAR; INTRAVENOUS ONCE
Status: DISCONTINUED | OUTPATIENT
Start: 2024-02-07 | End: 2024-02-07

## 2024-02-07 RX ORDER — DIVALPROEX SODIUM 250 MG/1
250 TABLET, EXTENDED RELEASE ORAL 2 TIMES DAILY
Status: COMPLETED | OUTPATIENT
Start: 2024-02-07 | End: 2024-02-09

## 2024-02-07 RX ORDER — METOCLOPRAMIDE HYDROCHLORIDE 5 MG/ML
10 INJECTION INTRAMUSCULAR; INTRAVENOUS ONCE
Status: COMPLETED | OUTPATIENT
Start: 2024-02-07 | End: 2024-02-07

## 2024-02-07 RX ORDER — KETOROLAC TROMETHAMINE 30 MG/ML
30 INJECTION, SOLUTION INTRAMUSCULAR; INTRAVENOUS ONCE
Status: DISCONTINUED | OUTPATIENT
Start: 2024-02-07 | End: 2024-02-07

## 2024-02-07 RX ORDER — AMITRIPTYLINE HYDROCHLORIDE 25 MG/1
25 TABLET, FILM COATED ORAL NIGHTLY
Status: DISCONTINUED | OUTPATIENT
Start: 2024-02-07 | End: 2024-02-23

## 2024-02-07 RX ORDER — TOPIRAMATE 25 MG/1
25 TABLET ORAL 2 TIMES DAILY
Status: DISCONTINUED | OUTPATIENT
Start: 2024-02-07 | End: 2024-02-23

## 2024-02-07 RX ORDER — MAGNESIUM SULFATE 1 G/100ML
1000 INJECTION INTRAVENOUS ONCE
Status: DISCONTINUED | OUTPATIENT
Start: 2024-02-07 | End: 2024-02-07

## 2024-02-07 RX ORDER — BUTALBITAL, ACETAMINOPHEN AND CAFFEINE 300; 40; 50 MG/1; MG/1; MG/1
1 CAPSULE ORAL DAILY PRN
Status: DISCONTINUED | OUTPATIENT
Start: 2024-02-08 | End: 2024-02-24

## 2024-02-07 RX ADMIN — ACETAMINOPHEN 650 MG: 325 TABLET, FILM COATED ORAL at 07:23

## 2024-02-07 RX ADMIN — AMITRIPTYLINE HYDROCHLORIDE 25 MG: 25 TABLET, FILM COATED ORAL at 21:11

## 2024-02-07 RX ADMIN — FERROUS SULFATE TAB 325 MG (65 MG ELEMENTAL FE) 325 MG: 325 (65 FE) TAB at 07:24

## 2024-02-07 RX ADMIN — KETOROLAC TROMETHAMINE 30 MG: 30 INJECTION, SOLUTION INTRAMUSCULAR; INTRAVENOUS at 16:33

## 2024-02-07 RX ADMIN — GABAPENTIN 100 MG: 100 CAPSULE ORAL at 07:23

## 2024-02-07 RX ADMIN — LACOSAMIDE 100 MG: 100 TABLET, FILM COATED ORAL at 21:12

## 2024-02-07 RX ADMIN — DICLOFENAC SODIUM 2 G: 10 GEL TOPICAL at 21:10

## 2024-02-07 RX ADMIN — METOCLOPRAMIDE HYDROCHLORIDE 10 MG: 5 INJECTION INTRAMUSCULAR; INTRAVENOUS at 16:33

## 2024-02-07 RX ADMIN — BACLOFEN 5 MG: 10 TABLET ORAL at 07:24

## 2024-02-07 RX ADMIN — GABAPENTIN 100 MG: 100 CAPSULE ORAL at 14:46

## 2024-02-07 RX ADMIN — GABAPENTIN 100 MG: 100 CAPSULE ORAL at 21:12

## 2024-02-07 RX ADMIN — APIXABAN 2.5 MG: 2.5 TABLET, FILM COATED ORAL at 07:24

## 2024-02-07 RX ADMIN — ASPIRIN 81 MG 81 MG: 81 TABLET ORAL at 07:23

## 2024-02-07 RX ADMIN — ATORVASTATIN CALCIUM 80 MG: 80 TABLET, FILM COATED ORAL at 21:11

## 2024-02-07 RX ADMIN — IPRATROPIUM BROMIDE AND ALBUTEROL SULFATE 1 DOSE: 2.5; .5 SOLUTION RESPIRATORY (INHALATION) at 19:49

## 2024-02-07 RX ADMIN — IPRATROPIUM BROMIDE AND ALBUTEROL SULFATE 1 DOSE: 2.5; .5 SOLUTION RESPIRATORY (INHALATION) at 10:34

## 2024-02-07 RX ADMIN — Medication 1 TABLET: at 07:23

## 2024-02-07 RX ADMIN — LACOSAMIDE 100 MG: 100 TABLET, FILM COATED ORAL at 07:24

## 2024-02-07 RX ADMIN — Medication 6 MG: at 21:11

## 2024-02-07 RX ADMIN — CITALOPRAM HYDROBROMIDE 10 MG: 10 TABLET ORAL at 07:23

## 2024-02-07 RX ADMIN — TOPIRAMATE 25 MG: 25 TABLET, FILM COATED ORAL at 21:11

## 2024-02-07 RX ADMIN — LISINOPRIL 10 MG: 10 TABLET ORAL at 07:24

## 2024-02-07 RX ADMIN — APIXABAN 2.5 MG: 2.5 TABLET, FILM COATED ORAL at 21:11

## 2024-02-07 RX ADMIN — DIVALPROEX SODIUM 500 MG: 500 TABLET, EXTENDED RELEASE ORAL at 07:25

## 2024-02-07 RX ADMIN — Medication 19.8 MG: at 07:23

## 2024-02-07 RX ADMIN — BACLOFEN 5 MG: 10 TABLET ORAL at 21:11

## 2024-02-07 RX ADMIN — DICLOFENAC SODIUM 2 G: 10 GEL TOPICAL at 07:25

## 2024-02-07 RX ADMIN — IPRATROPIUM BROMIDE AND ALBUTEROL SULFATE 1 DOSE: 2.5; .5 SOLUTION RESPIRATORY (INHALATION) at 14:58

## 2024-02-07 RX ADMIN — SENNOSIDES 17.2 MG: 8.6 TABLET, FILM COATED ORAL at 21:12

## 2024-02-07 RX ADMIN — DIVALPROEX SODIUM 250 MG: 250 TABLET, EXTENDED RELEASE ORAL at 21:11

## 2024-02-07 RX ADMIN — IPRATROPIUM BROMIDE AND ALBUTEROL SULFATE 1 DOSE: 2.5; .5 SOLUTION RESPIRATORY (INHALATION) at 07:51

## 2024-02-07 RX ADMIN — MODAFINIL 200 MG: 100 TABLET ORAL at 07:23

## 2024-02-07 ASSESSMENT — PAIN DESCRIPTION - LOCATION
LOCATION: HEAD
LOCATION: LEG

## 2024-02-07 ASSESSMENT — PAIN SCALES - GENERAL
PAINLEVEL_OUTOF10: 8

## 2024-02-07 NOTE — PROGRESS NOTES
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 Patient is observed in physical therapy today. He continues with daily headache. Encouraged him today to spend more time out of bed and to be in chair for meals.    ROS:  Denies fevers, chills, sweats.  No chest pain, palpitations, lightheadedness.  Denies coughing, wheezing or shortness of breath.  Denies abdominal pain, nausea, diarrhea or constipation.  No new areas of joint pain.  Denies new areas of numbness or weakness.  Denies new anxiety or depression issues.  No new skin problems.    Rehabilitation:       PT:    Bed mobility  Bridging: Minimal assistance  Rolling to Left: Moderate assistance (assist in reaching for rail)  Rolling to Right: Maximum assistance, 2 Person assistance (L LE & UE assist, assist to initate & hold position.)  Supine to Sit: Moderate assistance, 2 Person assistance (Trunk assist)  Sit to Supine: Moderate assistance, 2 Person assistance  Scooting: Moderate assistance (To EOB; less to adjust hips along edge of mat prior to stand pivot, but Pt demonstrates no recall of positioning s cue.)  Bed Mobility Comments: Completed 2x         Transfers  Sit to Stand: 2 Person Assistance, Moderate Assistance (L knee blocked, assist for L UE on parallel bar. Heavy L lateral lean, Cues for upright posture and forward gaze; P return, requires multiple cues. Mirror feedback PRN.)  Stand to Sit: Maximum Assistance (Leans to L upon sitting, cues to move hips towards R side when going to sit)  Bed to Chair: 2 Person Assistance, Dependent/Total, Maximum assistance (AM transfers c Pt fatigued, to strong side, Max x2; Pt impulsive & attempting to sit before squared to chair. Radha Morrison used in AM 2º fatigue. Mod x2 PM, but continued distraction.)  Stand Pivot Transfers: Dependent/Total, Maximum Assistance, 2 Person Assistance (AM transfers c Pt fatigued, to

## 2024-02-07 NOTE — PLAN OF CARE
Problem: Discharge Planning  Goal: Discharge to home or other facility with appropriate resources  2/7/2024 1458 by Deepali Ovalle RN  Outcome: Progressing  2/7/2024 0236 by Nanette Vidales RN  Outcome: Progressing     Problem: Skin/Tissue Integrity  Goal: Absence of new skin breakdown  Description: 1.  Monitor for areas of redness and/or skin breakdown  2.  Assess vascular access sites hourly  3.  Every 4-6 hours minimum:  Change oxygen saturation probe site  4.  Every 4-6 hours:  If on nasal continuous positive airway pressure, respiratory therapy assess nares and determine need for appliance change or resting period.  2/7/2024 1458 by Deepali Ovalle RN  Outcome: Progressing  2/7/2024 0236 by Nanette Vidales RN  Outcome: Progressing     Problem: Safety - Adult  Goal: Free from fall injury  2/7/2024 1458 by Deepali Ovalle RN  Outcome: Progressing  2/7/2024 0236 by Nanette Vidales RN  Outcome: Progressing     Problem: ABCDS Injury Assessment  Goal: Absence of physical injury  2/7/2024 1458 by Deepali Ovalle RN  Outcome: Progressing  2/7/2024 0236 by Nanette Vidales RN  Outcome: Progressing     Problem: Nutrition Deficit:  Goal: Optimize nutritional status  2/7/2024 1458 by Deepali Ovalle RN  Outcome: Progressing  2/7/2024 0236 by Nanette Vidales RN  Outcome: Progressing     Problem: Chronic Conditions and Co-morbidities  Goal: Patient's chronic conditions and co-morbidity symptoms are monitored and maintained or improved  2/7/2024 1458 by Deepali Ovalle RN  Outcome: Progressing  2/7/2024 0236 by Nanette Vidales RN  Outcome: Progressing

## 2024-02-07 NOTE — PROGRESS NOTES
04-Nov-2021 19.8 mg, 19.8 mg, Oral, Daily, Asher Seo MD, 19.8 mg at 02/07/24 0723    diclofenac sodium (VOLTAREN) 1 % gel 2 g, 2 g, Topical, BID, Beck Mason MD, 2 g at 02/07/24 0725    lisinopril (PRINIVIL;ZESTRIL) tablet 10 mg, 10 mg, Oral, Daily, Blanca Ren MD, 10 mg at 02/07/24 0724    butalbital-APAP-caffeine -40 MG per capsule 1 capsule, 1 capsule, Oral, Q4H PRN, Dipika Angulo MD, 1 capsule at 02/06/24 0918    acetaminophen (TYLENOL) tablet 650 mg, 650 mg, Oral, Q4H PRN, Danica De La Paz MD, 650 mg at 02/07/24 0723    bisacodyl (DULCOLAX) suppository 10 mg, 10 mg, Rectal, Daily PRN, Danica De La Paz MD    apixaban (ELIQUIS) tablet 2.5 mg, 2.5 mg, Oral, BID, Dipika Angulo MD, 2.5 mg at 02/07/24 0724    citalopram (CELEXA) tablet 10 mg, 10 mg, Oral, Daily, Dipika Angulo MD, 10 mg at 02/07/24 0723    ferrous sulfate (IRON 325) tablet 325 mg, 325 mg, Oral, Every Other Day, Dipika Angulo MD, 325 mg at 02/07/24 0724    gabapentin (NEURONTIN) capsule 100 mg, 100 mg, Oral, TID, Dipika Angulo MD, 100 mg at 02/07/24 0723    lacosamide (VIMPAT) tablet 100 mg, 100 mg, Oral, BID, Dipika Angulo MD, 100 mg at 02/07/24 0724    lidocaine 4 % external patch 1 patch, 1 patch, TransDERmal, Daily PRN, Dipika Angulo MD    aspirin chewable tablet 81 mg, 81 mg, Oral, Daily, Dipika Angulo MD, 81 mg at 02/07/24 0723    atorvastatin (LIPITOR) tablet 80 mg, 80 mg, Oral, Nightly, Dipika Angulo MD, 80 mg at 02/06/24 2016    melatonin tablet 6 mg, 6 mg, Oral, Nightly, Dipika Angulo MD, 6 mg at 02/06/24 2017    modafinil (PROVIGIL) tablet 200 mg, 200 mg, Oral, Daily, Dipika Angulo MD, 200 mg at 02/07/24 0723    therapeutic multivitamin-minerals 1 tablet, 1 tablet, Oral, Daily, Dipika Angulo MD, 1 tablet at 02/07/24 0723    senna (SENOKOT) tablet 17.2 mg, 2 tablet, Oral, Nightly, Dipika Angulo MD, 17.2 mg at 02/06/24 2017      Examination:  BP (!) 149/65   Pulse 76   Temp 97.4  04-Nov-2021 baclofen (LIORESAL) tablet 5 mg  5 mg Oral BID Beck Mason MD   5 mg at 02/07/24 2111    ipratropium 0.5 mg-albuterol 2.5 mg (DUONEB) nebulizer solution 1 Dose  1 Dose Inhalation Q4H WA RT Shannan Drake MD   1 Dose at 02/07/24 1949    Benzocaine-Menthol (CEPACOL) 1 lozenge  1 lozenge Oral Q2H PRN Shannan Drake MD        dextromethorphan (DELSYM) 30 MG/5ML extended release liquid 19.8 mg  19.8 mg Oral Daily Asher Seo MD   19.8 mg at 02/07/24 0723    diclofenac sodium (VOLTAREN) 1 % gel 2 g  2 g Topical BID Beck Mason MD   2 g at 02/07/24 2110    lisinopril (PRINIVIL;ZESTRIL) tablet 10 mg  10 mg Oral Daily Blanca Ren MD   10 mg at 02/07/24 0724    acetaminophen (TYLENOL) tablet 650 mg  650 mg Oral Q4H PRN Danica De La Paz MD   650 mg at 02/07/24 0723    bisacodyl (DULCOLAX) suppository 10 mg  10 mg Rectal Daily PRN Danica De La Paz MD        apixaban (ELIQUIS) tablet 2.5 mg  2.5 mg Oral BID Dipika Angulo MD   2.5 mg at 02/07/24 2111    citalopram (CELEXA) tablet 10 mg  10 mg Oral Daily Dipika Angulo MD   10 mg at 02/07/24 0723    ferrous sulfate (IRON 325) tablet 325 mg  325 mg Oral Every Other Day Dipika Angulo MD   325 mg at 02/07/24 0724    gabapentin (NEURONTIN) capsule 100 mg  100 mg Oral TID Dipika Angulo MD   100 mg at 02/07/24 2112    lacosamide (VIMPAT) tablet 100 mg  100 mg Oral BID Dipika Angulo MD   100 mg at 02/07/24 2112    lidocaine 4 % external patch 1 patch  1 patch TransDERmal Daily PRN Dipika Angulo MD        aspirin chewable tablet 81 mg  81 mg Oral Daily Dipika Angulo MD   81 mg at 02/07/24 0723    atorvastatin (LIPITOR) tablet 80 mg  80 mg Oral Nightly Dipika Angulo MD   80 mg at 02/07/24 2111    melatonin tablet 6 mg  6 mg Oral Nightly Dipika Angulo MD   6 mg at 02/07/24 2111    modafinil (PROVIGIL) tablet 200 mg  200 mg Oral Daily Dipika Angulo MD   200 mg at 02/07/24 0723    therapeutic multivitamin-minerals 1 tablet  1  04-Nov-2021 04-Nov-2021 10-Nov-2021 04-Nov-2021 10-Nov-2021 04-Nov-2021 04-Nov-2021 04-Nov-2021 04-Nov-2021

## 2024-02-07 NOTE — PROGRESS NOTES
[facility]   IN-PATIENT SERVICE   [facility]    Consult note            Date:   2/7/2024  Patient name:  Brennen Mcclure  Date of admission:  1/27/2024  3:01 PM  MRN:   [id number]  Account:  [id number]  YOB: 1965  PCP:    No primary care provider on file.  Room:   [address] [place] [address]  Code Status:    Full Code    Chief Complaint:     No chief complaint on file.      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, started on heparin drip also had patchy IPH, complicated by expansion of intraparenchymal hemorrhage with mass effect, s/p craniectomy, and evacuation of IPH x 2 with dural repair last CT head on on 1/24 showed evolution of the hemorrhage with reduction in the mass effect, Hospital course further complicated by ITP, was seen by hematology oncology, given IVIG and platelet transfusion,, started on Vimpat for seizure prevention, had cellulitis on Celexa  Patient was seen by hematology oncology over there and was started on Eliquis 2.5 twice daily  Patient was drowsy but arousable on my encounter,  Family was present at the bedside    Denies any chest pain shortness of breath        Past Medical History:     Past Medical History:   Diagnosis Date    CAD (coronary artery disease)     Chronic deep vein thrombosis (DVT) of both lower extremities (HCC)     Chronic idiopathic thrombocytopenia (HCC)     CVA (cerebral vascular accident) (HCC)     HLD (hyperlipidemia)         Past Surgical History:     Past Surgical History:   Procedure Laterality Date    CORONARY ARTERY BYPASS GRAFT      CORONARY STENT PLACEMENT          Medications Prior to Admission:     Prior to Admission  ordering Cepacol lozenges.    2/3  Cough better.  Continue with DuoNebs as needed  Chest x-ray unremarkable  Hemoglobin 13.3, platelets 123, will continue to monitor continuing with Eliquis  Blood pressure running low-Coreg discontinued no history of A-fib/CHF    2/4  Blood pressure control is good  Patient appears comfortable cough is improving patient worked with therapy  Labs reviewed from today and satisfactory  Continue current management    2/7  Patient reports no new complaints. Engaging with physical therapy. Labs and vitals reviewed. No new issues. Continue with current care.         Consultations:   IP CONSULT TO DIETITIAN  IP CONSULT TO SOCIAL WORK  IP CONSULT TO INTERNAL MEDICINE  IP CONSULT TO PSYCHIATRY  IP CONSULT TO NEUROLOGY     Patient is admitted as inpatient status because of co-morbidities listed above, severity of signs and symptoms as outlined, requirement for current medical therapies and most importantly because of direct risk to patient if care not provided in a hospital setting.    Shannan Drake MD  2/7/2024  4:16 PM    Copy sent to Dr. Stack primary care provider on file.    Please note that this chart was generated using voice recognition Dragon dictation software.  Although every effort was made to ensure the accuracy of this automated transcription, some errors in transcription may have occurred.

## 2024-02-07 NOTE — PLAN OF CARE
Problem: Discharge Planning  Goal: Discharge to home or other facility with appropriate resources  2/7/2024 0236 by Nanette Vidales RN  Outcome: Progressing     Problem: Skin/Tissue Integrity  Goal: Absence of new skin breakdown  Description: 1.  Monitor for areas of redness and/or skin breakdown  2/7/2024 0236 by Nanette Vidales RN  Outcome: Progressing     Problem: Safety - Adult  Goal: Free from fall injury  2/7/2024 0236 by Nanette Vidales RN  Outcome: Progressing     Problem: ABCDS Injury Assessment  Goal: Absence of physical injury  2/7/2024 0236 by Nanette Vidales RN  Outcome: Progressing     Problem: Pain  Goal: Verbalizes/displays adequate comfort level or baseline comfort level  2/7/2024 0236 by Nanette Vidales RN  Outcome: Progressing     Problem: Nutrition Deficit:  Goal: Optimize nutritional status  2/7/2024 0236 by Nanette Vidales RN  Outcome: Progressing     Problem: Chronic Conditions and Co-morbidities  Goal: Patient's chronic conditions and co-morbidity symptoms are monitored and maintained or improved  2/7/2024 0236 by Nanette Vidales RN  Outcome: Progressing

## 2024-02-07 NOTE — PROGRESS NOTES
SPEECH LANGUAGE PATHOLOGY  Speech Language Pathology  [facility]    Cognitive Treatment Note    Date: 2/7/2024  Patient’s Name: Brennen Mcclure  MRN: [id number]  Diagnosis:   Patient Active Problem List   Diagnosis Code    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 (HCC) I69.152    Pseudobulbar affect F48.2    Intractable headache R51.9    Intracranial hemorrhage (HCC) I62.9       Pain: 7/10 (L side)    Cognitive Treatment    Treatment time: 2265-1586      Subjective: [x] Alert [x] Cooperative     [] Confused     [] Agitated    [x] Lethargic      Objective/Assessment:  Attention: Occasional repetition and redirection required.      Orientation: Oriented x4.     Recall: Pt. Education provided re: compensatory strategies to facilitate recall (e.g., ID key words, repetition and rehearsal).  Pt. Verbalized understanding and facilitating strategy to complete the following task:    Paragraph recall (4-7 sentences): 87% accuracy (I), increasing to 100% c cues.      Organization: n/a    Problem Solving/Reasoning: n/a    Speech: Pt presents with mild dysarthria characterized by imprecise articulation, blended word boundaries, rapid rate and reduced vocal intensity.  Pt required mod A (verbal cues) to facilitate strategies in conversation and MAX A with articulation drills task (paragraph reading task).  Speech judged to be ~90% intelligible in conversation.  Intelligibility noted to decline with duration -- ~50% intelligible at paragraph level.  Pt able to complete OMEs x4 sets in reps of 20 c mod cues.  Reduced labial and lingual strength, coordination and ROM observed with exercise attempts.  Reduced endurance noted with duration/repetitions.     Other: No family present.        Plan:  [x] Continue ST services    [] Discharge from ST:      Discharge recommendations: [] Inpatient Rehab   [] Skilled Nursing Facility   [] Outpatient Therapy  [] Follow up at trauma clinic   [] Other:       Treatment  completed by: Zofia Martinez M.A., CCC-SLP

## 2024-02-07 NOTE — PROGRESS NOTES
Physical Therapy  [facility]     Date: 24  Patient Name: Brennen Mcclure       Room: 2633/2633-01  MRN: [id number]  Account: [id number]   : 1965  (59 y.o.) Gender: male   Patient Height Height: 177.8 cm (5' 10\")  Patient Weight 77.1 kg (170 lb)     D/t patient's current deficits and mobility limitations, a custom manual WC is warranted for the pt to safely mobilize in a home environment. Writer discusses pt's clinical presentation and mobility deficits with [employer] representative Leo during his on-site visit for WC assessment. Measurements are recorded and recommendations are discussed for the neccessary accessories/components to the WC. Pt is present to receive education and writer's reasoning for ordering this mobility device as he likely will NOT be at Household-Ambulator status upon discharge.      24 1300   Time Code Minutes   Timed Code Treatment Minutes 20 Minutes   PT Individual Minutes   Time In 1300   Time Out 1320   Minutes 20     Electronically signed by Fany Kelly PT on 2024 at 2:49 PM

## 2024-02-07 NOTE — PROGRESS NOTES
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    Orientation: Oriented x3. Not oriented to the month (patient responded January).    Recall: Delayed Image Recall (downtown) 60% (I), increasing to 90% given mod-max cues.    Organization: Adding (one-three items) to [place] Categories (given first letter) 80% (I) increasing to 100% given mod cues. ST observed patient benefited from additional time to think of items to add to category.     Problem Solving/Reasoning: Paragraph Retention ([employer] [place]) 60% (I), increasing to 100% given mod-max cues. ST educated patient on compensatory strategies he could implement to help with problem solving/reasoning tasks.    Other: No family present. Patient left with occupational therapy at 0926.    Plan:  [x] Continue  services    [] Discharge from ST:      Discharge recommendations:  [x] Further therapy recommended at discharge.   [] No therapy recommended at discharge.      Treatment completed by: KORTNEY Ahmadi,  Clinician        Well Child Exam: age 4 year old     Chief Complaint   Patient presents with   • Physical     4 yr       History:  Anjum Davis  is a 4 year old  male and is here for a well child exam, he is here with his Mom, Dad and Brother    Parental concerns include:   Vomiting last night.    Nursing notes reviewed and accepted.    PAST MEDICAL HISTORY:  Patient Active Problem List    Diagnosis Date Noted   • Hypermetropia of both eyes 02/12/2018     Priority: Low   • JXG (juvenile xanthogranuloma)  02/02/2018     Priority: Low   • Twin birth 2018     Priority: Low     37 2/7 weeks, vaginal delivery, breech         MEDICATIONS:  Current Outpatient Medications   Medication Sig Dispense Refill   • acetaminophen, TYLENOL, 80 MG/0.8ML solution Take 10 mg/kg by mouth every 4 hours as needed for Fever.     • CHILDRENS IBUPROFEN PO Take by mouth as needed.        No current facility-administered medications for this visit.       ALLERGIES:  Allergies as of 11/02/2022   • (No Known Allergies)       Interim History:  Has been well overall    Nursing notes reviewed.      Hearing/vision concerns:    None   Hearing Screening    Method: Audiometry    125Hz 250Hz 500Hz 1000Hz 2000Hz 3000Hz 4000Hz 6000Hz 8000Hz   Right ear:   20 20 20  20     Left ear:   20 20 20  20        Visual Acuity Screening    Right eye Left eye Both eyes   Without correction: 20/20 20/20    With correction:          School:  Developmental/Behavior concerns:  None  Grade:  (5K)      School: [employer] school: Yes  Does well in school: Yes  Bullying: No    Social History:  Lives with Mom, Dad and Sisters and Brother  Recent changes/stresses: No    Nutrition/ Activity:  Drinking:  Mostly water, some milk at lunch    Diet:   Varied and balanced    Activities:   Play for halloween, drive remote control cars and legos    Elimination:   Normal.  No concerns    Sleep:  Concerns:  None    Safety/Screening:  Using appropriate booster seat /  seat belt:  Yes  Using helmet for bike riding (other protective equipment):  Yes    Guns in the home: Yes, locked  Second hand smoke exposure: No    Dental: Has had visit in past 12 months    Cholesterol / Heart Risk:  Low  TB Risk:  Low  Lead risk  Low    Anticipatory guidance:  Anticipatory guidance (discussed or on handout including but not limited to)  · Reading and brain development  · Safety (Car and other recreational activities)  · Nutrition  · Activity and limiting screen time    REVIEW OF SYSTEMS:   A 10 point review of systems was performed including constitutional, EENT, cardiovascular, respiratory, gastrointestinal, genitourinary, skin, musculoskeletal and neurologic systems.  Apart from those items mentioned above, it is negative.    Physical:   Visit Vitals  BP 94/60   Pulse 108   Temp 100.3 °F (37.9 °C) (Tympanic)   Resp 28   Ht 3' 9.5\" (1.156 m)   Wt 23 kg (50 lb 9.5 oz)   BMI 17.18 kg/m²     76 %ile (Z= 0.70) based on CDC (Boys, 2-20 Years) weight-for-age data using vitals from 11/2/2022.  51 %ile (Z= 0.02) based on CDC (Boys, 2-20 Years) Stature-for-age data based on Stature recorded on 11/2/2022.  87 %ile (Z= 1.12) based on CDC (Boys, 2-20 Years) BMI-for-age based on BMI available as of 11/2/2022.    General: Alert, interactive, well appearing  Eyes:  Normal conjunctivae and pupils. Funduscopic exam normal.  Ears:  Tympanic membrane on the right with some clear fluid on the left more cloudy with some erythema but also some clearing. Ear canals normal.  Nose: Normal  Oropharynx: Moist mucous membranes without erythema.  Tonsils are 3+  Neck: Supple without significant adenopathy  Lungs: CTA (clear to auscultation), no retractions  Heart: Regular rate and rhythm with normal heart sounds, no murmur. Precordium normal.  Abdomen:  Soft and nontender, without mass or hepatosplenomegaly  Skin: Well-perfused without rash. Normal skin turgor.  Extremities: Gross range of motion exam normal for upper and  lower extremities, normal gait  Back:  Straight  Neurologic: Normal muscle tone, strength and deep tendon reflexes. Facial movements normal and symmetric. Normal gait  Genitalia: Normal male Puberty Stage 1  and Testicles Normal Bilaterally    Assessment:   · 4 year old male  · 87 %ile (Z= 1.12) based on CDC (Boys, 2-20 Years) BMI-for-age based on BMI available as of 11/2/2022.   · Left-sided acute otitis media but with also history of what seems to be new onset viral gastroenteritis  · Sleeping with some stertor but no obvious snoring or apnea appreciated    Plan:   · We discussed the natural history of ear infections this age in for the time being will continue to monitor  · Discussed sleep disordered breathing with the family and will continue to monitor for the time being, ENT if noticing more concerning features or if other secondary symptoms of sleep disordered breathing occur    · Follow up in one year for well , sooner if concerns     · Handout for age was provided      · Labs: None  · Immunizations: Flu and COVID at another date   Patient WIR ([place] Immunization Registry) reviewed.         Cornel Covarrubias MD

## 2024-02-07 NOTE — PROGRESS NOTES
Physical Therapy  Facility/Department: [facility] ACUTE REHAB  Treatment note    NAME: Brennen Mcclure  : 1965 (59 y.o.)  MRN: [id number]  CODE STATUS: Full Code  Date of Service: 24  XCITE   Patient ID : [id number]      Patient PIN: [id number]   (More details below)    Past Medical History:   Diagnosis Date    CAD (coronary artery disease)     Chronic deep vein thrombosis (DVT) of both lower extremities (HCC)     Chronic idiopathic thrombocytopenia (HCC)     CVA (cerebral vascular accident) (HCC)     HLD (hyperlipidemia)      Past Surgical History:   Procedure Laterality Date    CORONARY ARTERY BYPASS GRAFT      CORONARY STENT PLACEMENT       Family / Caregiver Present: No  General Comment  Comments: Slightly improved focus/redirectability, but loses direction easily during tasks, particularly c fatigue. L lateral & posterior lean continues; Pt more redirectable for same today, but little carryover. Per Dr. De La Paz, Pt left in recliner to eat lunch, wedge at L shoulder for postural correction & L hand/UE on pillow; Pt declines LE elevation. Chair alarm applied & nursing notified.    Restrictions:  Restrictions/Precautions: General Precautions;Fall Risk;Up as Tolerated  Required Braces or Orthoses  Other:  (Helmet on when OOB)  Left Upper Extremity Brace/Splint: Resting hand  Position Activity Restriction  Other position/activity restrictions: Helmet on when OOB; Sling for transfers/mobility only     SUBJECTIVE  Subjective: In wheelchair in AM, agreeable to PT.  Pain: Per MARQUIS Jewell, Pt had reported increased L thigh pain.    OBJECTIVE  Functional Mobility  Bed mobility  Rolling to Left:  (Min x2 to roll to supine from R sidelying.)  Rolling to Right: Maximum assistance;2 Person assistance (Inc. positioning of L LE & max A to maintain sidelying for electrode application.)  Supine to Sit: Moderate assistance;2 Person assistance (G return to cues for R LE involvement/initiation.)  Sit to Supine: Moderate assistance;2 Person

## 2024-02-07 NOTE — PROGRESS NOTES
[facility]   Acute Rehabilitation Occupational Therapy Daily Treatment Note    Date: 24  Patient Name: Brennen Mcclure       Room: 2633/2633-01  MRN: [id number]  Account: [id number]   : 1965  (59 y.o.) Gender: male       Referring Practitioner: Danica De La Paz MD  Diagnosis: Hemiplegia following ntrm intcrbl hemor aff left dominant side  Additional Pertinent Hx: 59 year old male who admitted 23 with 4 days of new L arm weakness and headache. He had known history of MI and CABG - on dabigatran but with inconsistent use due to prescription running out. He had previous treatment for L ICA thrombus and R sided symptoms treated with “clot busting medication” in . 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 He was transferred from [place], [place] to [facility] for medical management and was admitted to neuro critical care. His LUE numbness/weakness worsened to near complete hemiparesis. Initial GCS 15. He was initially treated with heparin gtt for DVST. On 23 Dr. Jameson Parsons (neurosurgery) performed R sided decompressive hemicraniectomy with evacuation of IPH x2 sites with dural repair. He was stable post op on heparin drip and extubated 23 after stable head CT 23. He was then bridged to warfarin. He is currently being treated with warfarin with therapeutic INR since 23. Hb stable at 13.6 on 24. Thrombocytopenia noted with platelet count 30 - hematology being reconsulted - previously recommended count >40. Currently requiring timed voids for incontinence. PM&R anticipating likely wheelchair mobility with Naveen for transfers.    Treatment Diagnosis: Impaired self-care status    Past Medical History:  has a past medical history of CAD (coronary artery disease), Chronic deep vein thrombosis (DVT) of both lower extremities (HCC), Chronic idiopathic  thrombocytopenia (HCC), CVA (cerebral vascular accident) (HCC), and HLD (hyperlipidemia).    Past Surgical History:   has a past surgical history that includes Coronary artery bypass graft and Coronary stent placement.    Restrictions  Restrictions/Precautions  Restrictions/Precautions: General Precautions, Fall Risk, Up as Tolerated  Required Braces or Orthoses?: Yes  Required Braces or Orthoses  Other:  (Helmet on when OOB)  Left Upper Extremity Brace/Splint: Resting hand  Position Activity Restriction  Other position/activity restrictions: Helmet on when OOB; Sling for transfers/mobility only          Vitals        Subjective  Subjective  Subjective: \"Oh man can we please show Mara my hands going!\"  Pain: no c/o pain       Objective  Cognition  Overall Orientation Status: Within Functional Limits  Orientation Level: Oriented X4    Cognition  Overall Cognitive Status: Exceptions    Activities of Daily Living  Feeding  Assistance Level: Set-up              Lower Extremity Bathing  Assistance Level: Dependent  Skilled Clinical Factors: 2 people required for bed mobility, rolling in bed this date to change breif.    Upper Extremity Dressing  Assistance Level: Moderate assistance  Skilled Clinical Factors: Assist to thread LUE using natacha technique, manage off/on trunk. minimal assist with proper orientation and minor cues for sequencing. Mod A to maintain sitting balance throughout task    Lower Extremity Dressing  Assistance Level: Maximum assistance  Skilled Clinical Factors: TA to thread LLE, pt able to thread RLE whileSitting EOB but requires assist to maintian sitting balance, pt able to pull over R hip in bridging, Assist to mange over over L hip.    Putting On/Taking Off Footwear  Assistance Level: Dependent  Skilled Clinical Factors: TA for B socks/shoes/L AFO. Pt declines TEDs      Mobility  Bed Mobility  Overall Assistance Level: Maximum Assistance  Additional Factors: With handrails;Head of bed

## 2024-02-07 NOTE — PROGRESS NOTES
[facility]   IN-PATIENT SERVICE   [facility]    Consult note            Date:   2/6/2024  Patient name:  Brennen Mcclure  Date of admission:  1/27/2024  3:01 PM  MRN:   [id number]  Account:  [id number]  YOB: 1965  PCP:    No primary care provider on file.  Room:   [address] [place] [address]  Code Status:    Full Code    Chief Complaint:     No chief complaint on file.      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, started on heparin drip also had patchy IPH, complicated by expansion of intraparenchymal hemorrhage with mass effect, s/p craniectomy, and evacuation of IPH x 2 with dural repair last CT head on on 1/24 showed evolution of the hemorrhage with reduction in the mass effect, Hospital course further complicated by ITP, was seen by hematology oncology, given IVIG and platelet transfusion,, started on Vimpat for seizure prevention, had cellulitis on Celexa  Patient was seen by hematology oncology over there and was started on Eliquis 2.5 twice daily  Patient was drowsy but arousable on my encounter,  Family was present at the bedside    Denies any chest pain shortness of breath        Past Medical History:     Past Medical History:   Diagnosis Date    CAD (coronary artery disease)     Chronic deep vein thrombosis (DVT) of both lower extremities (HCC)     Chronic idiopathic thrombocytopenia (HCC)     CVA (cerebral vascular accident) (HCC)     HLD (hyperlipidemia)         Past Surgical History:     Past Surgical History:   Procedure Laterality Date    CORONARY ARTERY BYPASS GRAFT      CORONARY STENT PLACEMENT          Medications Prior to Admission:     Prior to Admission
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
## 2024-02-08 PROCEDURE — 97112 NEUROMUSCULAR REEDUCATION: CPT

## 2024-02-08 PROCEDURE — 6370000000 HC RX 637 (ALT 250 FOR IP): Performed by: INTERNAL MEDICINE

## 2024-02-08 PROCEDURE — 97110 THERAPEUTIC EXERCISES: CPT

## 2024-02-08 PROCEDURE — 6370000000 HC RX 637 (ALT 250 FOR IP): Performed by: PSYCHIATRY & NEUROLOGY

## 2024-02-08 PROCEDURE — 6370000000 HC RX 637 (ALT 250 FOR IP): Performed by: STUDENT IN AN ORGANIZED HEALTH CARE EDUCATION/TRAINING PROGRAM

## 2024-02-08 PROCEDURE — 92507 TX SP LANG VOICE COMM INDIV: CPT

## 2024-02-08 PROCEDURE — 1180000000 HC REHAB R&B

## 2024-02-08 PROCEDURE — 99232 SBSQ HOSP IP/OBS MODERATE 35: CPT | Performed by: PSYCHIATRY & NEUROLOGY

## 2024-02-08 PROCEDURE — 94664 DEMO&/EVAL PT USE INHALER: CPT

## 2024-02-08 PROCEDURE — 97530 THERAPEUTIC ACTIVITIES: CPT

## 2024-02-08 PROCEDURE — 6370000000 HC RX 637 (ALT 250 FOR IP): Performed by: PHYSICAL MEDICINE & REHABILITATION

## 2024-02-08 PROCEDURE — 97116 GAIT TRAINING THERAPY: CPT

## 2024-02-08 PROCEDURE — 94640 AIRWAY INHALATION TREATMENT: CPT

## 2024-02-08 PROCEDURE — 94760 N-INVAS EAR/PLS OXIMETRY 1: CPT

## 2024-02-08 PROCEDURE — 97129 THER IVNTJ 1ST 15 MIN: CPT

## 2024-02-08 PROCEDURE — 6360000002 HC RX W HCPCS: Performed by: PSYCHIATRY & NEUROLOGY

## 2024-02-08 RX ORDER — KETOROLAC TROMETHAMINE 30 MG/ML
30 INJECTION, SOLUTION INTRAMUSCULAR; INTRAVENOUS EVERY 6 HOURS PRN
Status: DISCONTINUED | OUTPATIENT
Start: 2024-02-08 | End: 2024-02-08

## 2024-02-08 RX ORDER — KETOROLAC TROMETHAMINE 30 MG/ML
30 INJECTION, SOLUTION INTRAMUSCULAR; INTRAVENOUS EVERY 6 HOURS PRN
Status: DISPENSED | OUTPATIENT
Start: 2024-02-08 | End: 2024-02-11

## 2024-02-08 RX ORDER — DEXTROMETHORPHAN POLISTIREX 30 MG/5ML
30 SUSPENSION ORAL DAILY
Status: DISCONTINUED | OUTPATIENT
Start: 2024-02-09 | End: 2024-02-26 | Stop reason: HOSPADM

## 2024-02-08 RX ADMIN — Medication 1 TABLET: at 08:39

## 2024-02-08 RX ADMIN — IPRATROPIUM BROMIDE AND ALBUTEROL SULFATE 1 DOSE: 2.5; .5 SOLUTION RESPIRATORY (INHALATION) at 15:15

## 2024-02-08 RX ADMIN — Medication 6 MG: at 20:44

## 2024-02-08 RX ADMIN — Medication 19.8 MG: at 08:39

## 2024-02-08 RX ADMIN — LISINOPRIL 10 MG: 10 TABLET ORAL at 08:39

## 2024-02-08 RX ADMIN — BACLOFEN 5 MG: 10 TABLET ORAL at 20:45

## 2024-02-08 RX ADMIN — MODAFINIL 200 MG: 100 TABLET ORAL at 08:39

## 2024-02-08 RX ADMIN — DICLOFENAC SODIUM 2 G: 10 GEL TOPICAL at 08:42

## 2024-02-08 RX ADMIN — ASPIRIN 81 MG 81 MG: 81 TABLET ORAL at 08:39

## 2024-02-08 RX ADMIN — LACOSAMIDE 100 MG: 100 TABLET, FILM COATED ORAL at 08:39

## 2024-02-08 RX ADMIN — DICLOFENAC SODIUM 2 G: 10 GEL TOPICAL at 20:44

## 2024-02-08 RX ADMIN — TOPIRAMATE 25 MG: 25 TABLET, FILM COATED ORAL at 20:47

## 2024-02-08 RX ADMIN — GABAPENTIN 100 MG: 100 CAPSULE ORAL at 20:45

## 2024-02-08 RX ADMIN — KETOROLAC TROMETHAMINE 30 MG: 30 INJECTION, SOLUTION INTRAMUSCULAR; INTRAVENOUS at 16:52

## 2024-02-08 RX ADMIN — LACOSAMIDE 100 MG: 100 TABLET, FILM COATED ORAL at 20:45

## 2024-02-08 RX ADMIN — TOPIRAMATE 25 MG: 25 TABLET, FILM COATED ORAL at 08:41

## 2024-02-08 RX ADMIN — IPRATROPIUM BROMIDE AND ALBUTEROL SULFATE 1 DOSE: 2.5; .5 SOLUTION RESPIRATORY (INHALATION) at 10:39

## 2024-02-08 RX ADMIN — GABAPENTIN 100 MG: 100 CAPSULE ORAL at 15:24

## 2024-02-08 RX ADMIN — DIVALPROEX SODIUM 250 MG: 250 TABLET, EXTENDED RELEASE ORAL at 20:47

## 2024-02-08 RX ADMIN — GABAPENTIN 100 MG: 100 CAPSULE ORAL at 08:38

## 2024-02-08 RX ADMIN — SENNOSIDES 17.2 MG: 8.6 TABLET, FILM COATED ORAL at 20:44

## 2024-02-08 RX ADMIN — APIXABAN 2.5 MG: 2.5 TABLET, FILM COATED ORAL at 08:39

## 2024-02-08 RX ADMIN — IPRATROPIUM BROMIDE AND ALBUTEROL SULFATE 1 DOSE: 2.5; .5 SOLUTION RESPIRATORY (INHALATION) at 07:02

## 2024-02-08 RX ADMIN — CITALOPRAM HYDROBROMIDE 10 MG: 10 TABLET ORAL at 08:39

## 2024-02-08 RX ADMIN — ATORVASTATIN CALCIUM 80 MG: 80 TABLET, FILM COATED ORAL at 20:45

## 2024-02-08 RX ADMIN — IPRATROPIUM BROMIDE AND ALBUTEROL SULFATE 1 DOSE: 2.5; .5 SOLUTION RESPIRATORY (INHALATION) at 20:13

## 2024-02-08 RX ADMIN — DIVALPROEX SODIUM 250 MG: 250 TABLET, EXTENDED RELEASE ORAL at 08:41

## 2024-02-08 RX ADMIN — APIXABAN 2.5 MG: 2.5 TABLET, FILM COATED ORAL at 20:45

## 2024-02-08 RX ADMIN — AMITRIPTYLINE HYDROCHLORIDE 25 MG: 25 TABLET, FILM COATED ORAL at 20:47

## 2024-02-08 RX ADMIN — BUTALBITA,ACETAMINOPHEN AND CAFFEINE 1 CAPSULE: 50; 300; 40 CAPSULE ORAL at 08:39

## 2024-02-08 ASSESSMENT — PAIN DESCRIPTION - LOCATION
LOCATION: HEAD
LOCATION: HEAD

## 2024-02-08 ASSESSMENT — PAIN SCALES - GENERAL
PAINLEVEL_OUTOF10: 8
PAINLEVEL_OUTOF10: 6
PAINLEVEL_OUTOF10: 9

## 2024-02-08 NOTE — PLAN OF CARE
Problem: Discharge Planning  Goal: Discharge to home or other facility with appropriate resources  2/8/2024 1506 by Tamy Celestin LPN  Outcome: Progressing     Problem: Skin/Tissue Integrity  Goal: Absence of new skin breakdown  Description: 1.  Monitor for areas of redness and/or skin breakdown  2.  Assess vascular access sites hourly  3.  Every 4-6 hours minimum:  Change oxygen saturation probe site  4.  Every 4-6 hours:  If on nasal continuous positive airway pressure, respiratory therapy assess nares and determine need for appliance change or resting period.  2/8/2024 1506 by Tamy Celestin LPN  Outcome: Progressing     Problem: Safety - Adult  Goal: Free from fall injury  2/8/2024 1506 by Tamy Celestin LPN  Outcome: Progressing     Problem: ABCDS Injury Assessment  Goal: Absence of physical injury  2/8/2024 1506 by Tamy Celestin LPN  Outcome: Progressing     Problem: Pain  Goal: Verbalizes/displays adequate comfort level or baseline comfort level  2/8/2024 1506 by Tamy Celestin LPN  Outcome: Progressing     Problem: Chronic Conditions and Co-morbidities  Goal: Patient's chronic conditions and co-morbidity symptoms are monitored and maintained or improved  2/8/2024 1506 by Tamy Celestin LPN  Outcome: Progressing

## 2024-02-08 NOTE — PROGRESS NOTES
BEHAVIORAL HEALTH FOLLOW-UP NOTE     2024     Patient was seen and examined in person, Chart reviewed   Patient's case discussed with staff/team    Chief Complaint: Depression    Interim History:   No acute overnight events.     Patient seen and examined at bedside.  His wife was present.  The patient states he feels good.  He has had several crying episodes today.  His wife agrees that he is having and less frequently but they are still happening every day.  The patient has no side effect with the medication.  He is agreeable to an increase in the dose dextromethorphan.    /71   Pulse 74   Temp 98.4 °F (36.9 °C)   Resp 16   Ht 1.778 m (5' 10\")   Wt 77.1 kg (170 lb)   SpO2 96%   BMI 24.39 kg/m²   Appetite:  [x] Normal/Unchanged  [] Increased  [] Decreased      Sleep:       [x] Normal/Unchanged  [] Fair       [] Poor              Energy:    [x] Normal/Unchanged  [] Increased  [] Decreased        Aggression:  [] yes  [x] no    Patient is [x] able  [] unable to CONTRACT FOR SAFETY ON THE UNIT    PAST MEDICAL/PSYCHIATRIC HISTORY:   Past Medical History:   Diagnosis Date    CAD (coronary artery disease)     Chronic deep vein thrombosis (DVT) of both lower extremities (HCC)     Chronic idiopathic thrombocytopenia (HCC)     CVA (cerebral vascular accident) (HCC)     HLD (hyperlipidemia)        FAMILY/SOCIAL HISTORY:  Family History   Problem Relation Age of Onset    Coronary Art Dis Mother     Stroke Other      Social History     Socioeconomic History    Marital status:      Spouse name: Not on file    Number of children: Not on file    Years of education: Not on file    Highest education level: Not on file   Occupational History    Not on file   Tobacco Use    Smoking status: Former     Current packs/day: 0.00     Types: Cigarettes     Quit date: 2023     Years since quittin.2    Smokeless tobacco: Never   Substance and Sexual Activity    Alcohol use: Not on file    Drug use: Not on file  ASSESSMENT: Low risk of suicide or harm to others    Treatment Plan:  Reviewed current Medications with the patient.   Increase dextromethorphan to 30 mg daily    - Continue Celexa 10 mg QD  - Neurology weaning depakote and initiating Topamax. Elavil also increased to 25 mg QHS.     Risks, benefits, side effects, drug-to-drug interactions and alternatives to treatment were discussed. The patient consented to treatment.     Encourage patient to attend group and other milieu activities.  Discharge planning discussed with the patient and treatment team.    PSYCHOTHERAPY/COUNSELING:  [] Therapeutic interview  [x] Supportive  [] CBT  [] Ongoing  [] Other      Brennen Mcclure is a 59 y.o. male being evaluated face to face.     --LEAH ESCALANTE MD on 2/8/2024 at 2:11 PM    An electronic signature was used to authenticate this note.     **This report has been created using voice recognition software. It may contain minor errors which are inherent in voice recognition technology.**

## 2024-02-08 NOTE — PROGRESS NOTES
SPEECH LANGUAGE PATHOLOGY  Speech Language Pathology  [facility] ACUTE REHAB    Cognitive Treatment Note    Date: 2/8/2024  Patient’s Name: Brennen Mcclure  MRN: [id number]  Diagnosis:   Patient Active Problem List   Diagnosis Code    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 (HCC) I69.152    Pseudobulbar affect F48.2    Intractable headache R51.9    Intracranial hemorrhage (HCC) I62.9       Pain: 0/10    Cognitive Treatment    Treatment time: 0422-3506 & 2723-4297  *late start d/t Pt still working with PT/OT upon arrival       Subjective: [x] Alert [x] Cooperative     [] Confused     [] Agitated    [x] Lethargic      Objective/Assessment:  Attention: Occasional repetition and redirection required.      Orientation: Oriented x4.     Recall: Word list retention (4 units, category inclusion)- 75% accuracy (I), increasing to 100% cued.     Organization: n/a    Problem Solving/Reasoning: Multiple definitions- 20% accuracy (I), increasing to 100% cued.  Max cues required for Pt to provide second meaning of target word; perseverating on initial meaning provided.      Speech: Pt presents with mild dysarthria characterized by imprecise articulation, blended word boundaries, rapid rate and reduced vocal intensity.  Pt required mod A (verbal cues) to facilitate strategies in conversation and MAX A with articulation drills tasks (alliterations and paragraph reading tasks).  Speech judged to be ~90% intelligible in conversation; ~80% intelligible during alliterations task; ~50% intelligible during paragraph reading task.  Intelligibility noted to decline with duration.  Pt able to complete OMEs x4 sets in reps of 20 c mod cues in AM and PM.  Reduced labial and lingual strength, coordination and ROM observed with exercise attempts.  Reduced endurance noted with duration/repetitions.     Other: No family present in AM.  Pt's wife present in PM.       Plan:  [x] Continue ST services    [] Discharge from ST:      Discharge

## 2024-02-08 NOTE — PROGRESS NOTES
[facility] Neurology   IN-PATIENT SERVICE      NEUROLOGY PROGRESS  NOTE                Interval History:     States headache is mildly better today.  Believes that the IM Toradol and Reglan helped yesterday.  Currently going to work with therapy.    History of Present Illness:     The patient is a 59 y.o. male who presents with right hemispheric stroke.  . The patient was seen and examined and the chart was reviewed.  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  He was hospitalized in [place] requiring right hemicraniectomy.  Patient had suffered from left-sided hemiplegia eventually being transferred to [facility] acute rehab unit for post stroke care.  He has been seen by our neurology team for headache management.    Physical Exam:   /71   Pulse 74   Temp 98.4 °F (36.9 °C)   Resp 16   Ht 1.778 m (5' 10\")   Wt 77.1 kg (170 lb)   SpO2 96%   BMI 24.39 kg/m²   Temp (24hrs), Av.4 °F (36.9 °C), Min:98.4 °F (36.9 °C), Max:98.4 °F (36.9 °C)      Neurological examination:    Mental status    Alert and oriented x 3; following all commands; speech is mildly dysarthric.   Cranial nerves    II -left visual field cut; pupils reactive  III, IV, VI - extraocular muscles intact; no EPHRAIM; no nystagmus; no ptosis   V - normal facial sensation                                                               VII -decreased left nasolabial fold.                                                           VIII - intact hearing                                                                             IX, X - symmetrical palate elevation                                               XI - symmetrical shoulder shrug                                                       XII - midline tongue without atrophy or fasciculation      Motor function  Strength: 0/5 LUE and LLE.  5/5 RUE and RLE.  Normal bulk and  tone.   No tremors                       Sensory function Intact to touch, pin, vibration, proprioception throughout      Cerebellar Intact finger-nose-finger testing on right.   Reflex function 2/4 on right.  3/4 on left.   Gait                  Not assessed due to left hemiplegia             Diagnostics:      Laboratory Testing:  CBC: No results for input(s): \"WBC\", \"HGB\", \"PLT\" in the last 72 hours.  BMP:  No results for input(s): \"NA\", \"K\", \"CL\", \"CO2\", \"BUN\", \"CREATININE\", \"GLUCOSE\" in the last 72 hours.      Lab Results   Component Value Date    ALT 42 (H) 01/28/2024    AST 31 01/28/2024         Impression:      Right cerebral hemorrhagic venous infarction in the setting of venous sinus thrombosis status post hemicrani November, 2023.  Residual left hemiplegia.  Chronic daily headache secondary to above    Plan:     Continue increased dose of Elavil 25 mg nightly as well as Topamax 25 mg twice daily for headache prophylaxis  Fioricet once in the morning daily PRN  Will also make IM Toradol as needed  Cannot give magnesium in rehab setting.  Continue therapies      Electronically signed by Heri Nguyen DO on 2/8/2024 at 12:55 PM      Heri Nguyen DO  [facility]  Neurology

## 2024-02-08 NOTE — PLAN OF CARE
Problem: Discharge Planning  Goal: Discharge to home or other facility with appropriate resources  2/8/2024 0124 by Agata Gramajo RN  Outcome: Progressing  Flowsheets (Taken 2/7/2024 2105)  Discharge to home or other facility with appropriate resources: Identify barriers to discharge with patient and caregiver     Problem: Skin/Tissue Integrity  Goal: Absence of new skin breakdown  Description: 1.  Monitor for areas of redness and/or skin breakdown  2.  Assess vascular access sites hourly  3.  Every 4-6 hours minimum:  Change oxygen saturation probe site  4.  Every 4-6 hours:  If on nasal continuous positive airway pressure, respiratory therapy assess nares and determine need for appliance change or resting period.  2/8/2024 0124 by Agata Gramajo RN  Outcome: Progressing     Problem: Safety - Adult  Goal: Free from fall injury  2/8/2024 0124 by Agata Gramajo RN  Outcome: Progressing     Problem: ABCDS Injury Assessment  Goal: Absence of physical injury  2/8/2024 0124 by Agata Gramajo RN  Outcome: Progressing     Problem: Pain  Goal: Verbalizes/displays adequate comfort level or baseline comfort level  2/8/2024 0124 by Agata Gramajo RN  Flowsheets (Taken 2/8/2024 0124)  Verbalizes/displays adequate comfort level or baseline comfort level:   Encourage patient to monitor pain and request assistance   Assess pain using appropriate pain scale   Implement non-pharmacological measures as appropriate and evaluate response   Administer analgesics based on type and severity of pain and evaluate response     Problem: Nutrition Deficit:  Goal: Optimize nutritional status  2/8/2024 0124 by Agata Gramajo RN  Outcome: Progressing     Problem: Chronic Conditions and Co-morbidities  Goal: Patient's chronic conditions and co-morbidity symptoms are monitored and maintained or improved  2/8/2024 0124 by Agata Gramajo RN  Outcome: Progressing  Flowsheets (Taken 2/7/2024 2105)  Care Plan

## 2024-02-08 NOTE — PROGRESS NOTES
(postural, sequential) today, but still attempts to sit before squaring to seat.)  Squat Pivot Transfers:  (Attempted however terminated as pt was unable to understand the technique, pt attempting to fully stand.)  Comment: Multiple cues to redirect c improved return.         Ambulation  Surface: Level tile  Device:  (green walker lift)  Other Apparatus: Slider (L foot)  Assistance: Dependent/Total, Maximum assistance (2 Assist with maximal cueing for posture/sequencing/attention to task; Writer advances LLE thru swing and braces hip/knee in stance. Pt not yet able to initiate LLE activity volitionally. PT provides LUE stabilization and postural cues.)  Quality of Gait: Max assist to brace L knee and advance LLE.  Constant Verbal cues for sequencing. Requires assistance for upright posture, correcting L lateral lean, assistance with weight shifting, maintaining hip position/stability  Gait Deviations: Slow Sabrina, Decreased step length, Decreased step height, Shuffles  Distance: 20ft  Comments: Pt may benefit from other supportive devices to ambulate, trial walker lift?       OT:  Grooming/Oral Hygiene  Assistance Level: Stand by assist  Skilled Clinical Factors: pt utilizes natacha tech to open/apply toothpaste. Writer provided dycem to assist in stabilizing LUE to hold toothbrush for toothpaste application. Pt engages in facial shaving with electric razor and face washing. Cues provided throughout to adjust sitting balance, noted good awareness of both sides of face.  Upper Extremity Bathing  Assistance Level: Dependent  Skilled Clinical Factors: Educated natacha technique with bathing under RUE, assist to stabilize LUE. Under arms only completed and deodorant applied this date.  Lower Extremity Bathing  Assistance Level: Dependent  Skilled Clinical Factors: 2 people required for bed mobility, rolling in bed this date to change breif.  Upper Extremity Dressing  Assistance Level: Moderate assistance  Skilled Clinical  \"TRIG\"  LIVER PROFILE: No results for input(s): \"AST\", \"ALT\", \"ALB\", \"BILIDIR\", \"BILITOT\", \"ALKPHOS\" in the last 72 hours.     Current Medications:   Current Facility-Administered Medications: divalproex (DEPAKOTE ER) extended release tablet 250 mg, 250 mg, Oral, BID  topiramate (TOPAMAX) tablet 25 mg, 25 mg, Oral, BID  amitriptyline (ELAVIL) tablet 25 mg, 25 mg, Oral, Nightly  butalbital-APAP-caffeine -40 MG per capsule 1 capsule, 1 capsule, Oral, Daily PRN  polyethylene glycol (GLYCOLAX) packet 17 g, 17 g, Oral, Daily PRN  baclofen (LIORESAL) tablet 5 mg, 5 mg, Oral, BID  ipratropium 0.5 mg-albuterol 2.5 mg (DUONEB) nebulizer solution 1 Dose, 1 Dose, Inhalation, Q4H WA RT  Benzocaine-Menthol (CEPACOL) 1 lozenge, 1 lozenge, Oral, Q2H PRN  dextromethorphan (DELSYM) 30 MG/5ML extended release liquid 19.8 mg, 19.8 mg, Oral, Daily  diclofenac sodium (VOLTAREN) 1 % gel 2 g, 2 g, Topical, BID  lisinopril (PRINIVIL;ZESTRIL) tablet 10 mg, 10 mg, Oral, Daily  acetaminophen (TYLENOL) tablet 650 mg, 650 mg, Oral, Q4H PRN  bisacodyl (DULCOLAX) suppository 10 mg, 10 mg, Rectal, Daily PRN  apixaban (ELIQUIS) tablet 2.5 mg, 2.5 mg, Oral, BID  citalopram (CELEXA) tablet 10 mg, 10 mg, Oral, Daily  ferrous sulfate (IRON 325) tablet 325 mg, 325 mg, Oral, Every Other Day  gabapentin (NEURONTIN) capsule 100 mg, 100 mg, Oral, TID  lacosamide (VIMPAT) tablet 100 mg, 100 mg, Oral, BID  lidocaine 4 % external patch 1 patch, 1 patch, TransDERmal, Daily PRN  aspirin chewable tablet 81 mg, 81 mg, Oral, Daily  atorvastatin (LIPITOR) tablet 80 mg, 80 mg, Oral, Nightly  melatonin tablet 6 mg, 6 mg, Oral, Nightly  modafinil (PROVIGIL) tablet 200 mg, 200 mg, Oral, Daily  therapeutic multivitamin-minerals 1 tablet, 1 tablet, Oral, Daily  senna (SENOKOT) tablet 17.2 mg, 2 tablet, Oral, Nightly      Impression/Plan:   Impaired ADLs, gait, and mobility due to:      L nondominant hemiparesis secondary to hemorrhagic CVA: S/p right decompressive

## 2024-02-08 NOTE — PROGRESS NOTES
Term Goals: By 1 week  Short Term Goal 1: Pt will complete UB ADLs with Mod A, good safety, and use of AE/DME/modified techniques as needed  Short Term Goal 2: Pt will complete LB ADLs with Max A, good safety, and use of AE/DME/Modified techniques as needed  Short Term Goal 3: Pt will complete sit to stand transfers in [facility] with Mod A x2 and good safety in preparation for functional transfers  Short Term Goal 4: Pt will actively participate in 30+ minutes of therapeutic exercise/functional activity/social participation for increased safety and independence with self-care for improved quality of life  Short Term Goal 5: Pt will be educated on and explore use of AE/DME/Modified techniques as needed to complete self-care and mobility  Additional Goals?: Yes  Short Term Goal 6: Pt will participate in dynamic unsupported sitting tasks with Max A  facilitating reaching out of base of support and weight bearing through L extremity to increase input through extremity and improved core strength to increase ease with LB self-care tasks  Short Term Goal 7: Pt will participate in neuromuscular re-education activities (weight bearing, e-stim) as tolerated, to facilitate movement in LUE  Short Term Goal 8: Patient will perform self-care routine with Fair sustained attention with 1-2 VCs for attention to task  Short Term Goal 9: Pt will tolerate standing for 2+ minutes, Max A, with 0-1 UE support and no LOB during self-care/functional activity for improved balance/tolerance  Short Term Goal 10: Pt will identify 2 non-pharmacological pain relief techniques during self-care tasks    Long Term Goals  Time Frame for Long Term Goals : By discharge  Long Term Goal 1: Pt will complete UB ADLs with CGA, good safety, and use of AE/DME/modified techniques as needed  Long Term Goal 2: Pt will complete LB ADLs with Mod A, good safety, and use of AE/DME/Modified techniques as needed  Long Term Goal 3: Pt will complete stand pivot  50         Electronically signed by FORTINO Dave on 2/8/24 at 4:09 PM EST

## 2024-02-08 NOTE — PROGRESS NOTES
Physical Therapy  Facility/Department: [facility] ACUTE REHAB  Treatment note    NAME: Brennen Mcclure  : 1965 (59 y.o.)  MRN: [id number]  CODE STATUS: Full Code  Date of Service: 24    Past Medical History:   Diagnosis Date    CAD (coronary artery disease)     Chronic deep vein thrombosis (DVT) of both lower extremities (HCC)     Chronic idiopathic thrombocytopenia (HCC)     CVA (cerebral vascular accident) (HCC)     HLD (hyperlipidemia)      Past Surgical History:   Procedure Laterality Date    CORONARY ARTERY BYPASS GRAFT      CORONARY STENT PLACEMENT       Family / Caregiver Present: Yes (SO Mara in PM)  General Comment  Comments: Slightly improved focus/redirectability, particularly in day room, but loses direction easily during tasks, particularly c fatigue, requiring multiple/max cues. L lateral & posterior lean continues (not safe to leave EOB). Pt reports recliner hurts his rear, continued to complain w/c legrest hurts L LE. agreeable to attempting use of waffle cushion; reported to writer get cárdenas that it worked well, but subsequently told SO that his \"butt hurts.\"    Restrictions:  Restrictions/Precautions: General Precautions;Fall Risk;Up as Tolerated  Required Braces or Orthoses  Other:  (Helmet on when OOB)  Left Upper Extremity Brace/Splint: Resting hand  Position Activity Restriction  Other position/activity restrictions: Helmet on when OOB; Sling for transfers/mobility only     SUBJECTIVE  Subjective: In wheelchair in AM, agreeable to PT.  Pain: C/o pain in L shoulder during prone positioning; MARQUIS Jewell reports that they have been discussing the difference between pain & soreness.    OBJECTIVE  Functional Mobility  Bed mobility  Rolling to Left: Moderate assistance;2 Person assistance (Postioning of L LE & assist to initate rolling from prone.)  Rolling to Right: Maximum assistance;2 Person assistance (Improved performance c use of momentum c R UE; to attain prone positioning for NMR of L

## 2024-02-09 LAB — GLUCOSE BLD-MCNC: 112 MG/DL (ref 75–110)

## 2024-02-09 PROCEDURE — 6370000000 HC RX 637 (ALT 250 FOR IP): Performed by: PSYCHIATRY & NEUROLOGY

## 2024-02-09 PROCEDURE — 6370000000 HC RX 637 (ALT 250 FOR IP): Performed by: PHYSICAL MEDICINE & REHABILITATION

## 2024-02-09 PROCEDURE — 82947 ASSAY GLUCOSE BLOOD QUANT: CPT

## 2024-02-09 PROCEDURE — 94761 N-INVAS EAR/PLS OXIMETRY MLT: CPT

## 2024-02-09 PROCEDURE — 6370000000 HC RX 637 (ALT 250 FOR IP): Performed by: INTERNAL MEDICINE

## 2024-02-09 PROCEDURE — 97535 SELF CARE MNGMENT TRAINING: CPT

## 2024-02-09 PROCEDURE — 97530 THERAPEUTIC ACTIVITIES: CPT

## 2024-02-09 PROCEDURE — 1180000000 HC REHAB R&B

## 2024-02-09 PROCEDURE — 99231 SBSQ HOSP IP/OBS SF/LOW 25: CPT | Performed by: PSYCHIATRY & NEUROLOGY

## 2024-02-09 PROCEDURE — 97542 WHEELCHAIR MNGMENT TRAINING: CPT

## 2024-02-09 PROCEDURE — 97129 THER IVNTJ 1ST 15 MIN: CPT

## 2024-02-09 PROCEDURE — 99232 SBSQ HOSP IP/OBS MODERATE 35: CPT | Performed by: INTERNAL MEDICINE

## 2024-02-09 PROCEDURE — 6370000000 HC RX 637 (ALT 250 FOR IP): Performed by: STUDENT IN AN ORGANIZED HEALTH CARE EDUCATION/TRAINING PROGRAM

## 2024-02-09 PROCEDURE — 99232 SBSQ HOSP IP/OBS MODERATE 35: CPT | Performed by: PHYSICAL MEDICINE & REHABILITATION

## 2024-02-09 PROCEDURE — 97116 GAIT TRAINING THERAPY: CPT

## 2024-02-09 PROCEDURE — 92507 TX SP LANG VOICE COMM INDIV: CPT

## 2024-02-09 PROCEDURE — 94640 AIRWAY INHALATION TREATMENT: CPT

## 2024-02-09 PROCEDURE — 99232 SBSQ HOSP IP/OBS MODERATE 35: CPT | Performed by: PSYCHIATRY & NEUROLOGY

## 2024-02-09 RX ORDER — ALBUTEROL SULFATE 2.5 MG/3ML
SOLUTION RESPIRATORY (INHALATION)
Status: DISPENSED
Start: 2024-02-09 | End: 2024-02-10

## 2024-02-09 RX ORDER — LISINOPRIL 5 MG/1
5 TABLET ORAL DAILY
Status: DISCONTINUED | OUTPATIENT
Start: 2024-02-10 | End: 2024-02-26 | Stop reason: HOSPADM

## 2024-02-09 RX ADMIN — IPRATROPIUM BROMIDE AND ALBUTEROL SULFATE 1 DOSE: 2.5; .5 SOLUTION RESPIRATORY (INHALATION) at 12:26

## 2024-02-09 RX ADMIN — BUTALBITA,ACETAMINOPHEN AND CAFFEINE 1 CAPSULE: 50; 300; 40 CAPSULE ORAL at 21:35

## 2024-02-09 RX ADMIN — DICLOFENAC SODIUM 2 G: 10 GEL TOPICAL at 21:35

## 2024-02-09 RX ADMIN — DIVALPROEX SODIUM 250 MG: 250 TABLET, EXTENDED RELEASE ORAL at 08:43

## 2024-02-09 RX ADMIN — MODAFINIL 200 MG: 100 TABLET ORAL at 08:40

## 2024-02-09 RX ADMIN — Medication 6 MG: at 21:35

## 2024-02-09 RX ADMIN — BACLOFEN 5 MG: 10 TABLET ORAL at 21:35

## 2024-02-09 RX ADMIN — ASPIRIN 81 MG 81 MG: 81 TABLET ORAL at 08:41

## 2024-02-09 RX ADMIN — Medication 1 TABLET: at 08:40

## 2024-02-09 RX ADMIN — DICLOFENAC SODIUM 2 G: 10 GEL TOPICAL at 08:44

## 2024-02-09 RX ADMIN — IPRATROPIUM BROMIDE AND ALBUTEROL SULFATE 1 DOSE: 2.5; .5 SOLUTION RESPIRATORY (INHALATION) at 15:35

## 2024-02-09 RX ADMIN — APIXABAN 2.5 MG: 2.5 TABLET, FILM COATED ORAL at 21:35

## 2024-02-09 RX ADMIN — TOPIRAMATE 25 MG: 25 TABLET, FILM COATED ORAL at 08:41

## 2024-02-09 RX ADMIN — GABAPENTIN 100 MG: 100 CAPSULE ORAL at 08:40

## 2024-02-09 RX ADMIN — TOPIRAMATE 25 MG: 25 TABLET, FILM COATED ORAL at 21:36

## 2024-02-09 RX ADMIN — GABAPENTIN 100 MG: 100 CAPSULE ORAL at 21:35

## 2024-02-09 RX ADMIN — CITALOPRAM HYDROBROMIDE 10 MG: 10 TABLET ORAL at 08:40

## 2024-02-09 RX ADMIN — GABAPENTIN 100 MG: 100 CAPSULE ORAL at 14:00

## 2024-02-09 RX ADMIN — AMITRIPTYLINE HYDROCHLORIDE 25 MG: 25 TABLET, FILM COATED ORAL at 21:36

## 2024-02-09 RX ADMIN — APIXABAN 2.5 MG: 2.5 TABLET, FILM COATED ORAL at 08:41

## 2024-02-09 RX ADMIN — ACETAMINOPHEN 650 MG: 325 TABLET, FILM COATED ORAL at 08:40

## 2024-02-09 RX ADMIN — ATORVASTATIN CALCIUM 80 MG: 80 TABLET, FILM COATED ORAL at 21:35

## 2024-02-09 RX ADMIN — LACOSAMIDE 100 MG: 100 TABLET, FILM COATED ORAL at 08:40

## 2024-02-09 RX ADMIN — Medication 30 MG: at 08:41

## 2024-02-09 RX ADMIN — FERROUS SULFATE TAB 325 MG (65 MG ELEMENTAL FE) 325 MG: 325 (65 FE) TAB at 08:40

## 2024-02-09 RX ADMIN — LACOSAMIDE 100 MG: 100 TABLET, FILM COATED ORAL at 21:34

## 2024-02-09 RX ADMIN — IPRATROPIUM BROMIDE AND ALBUTEROL SULFATE 1 DOSE: 2.5; .5 SOLUTION RESPIRATORY (INHALATION) at 07:44

## 2024-02-09 RX ADMIN — LISINOPRIL 10 MG: 10 TABLET ORAL at 08:41

## 2024-02-09 RX ADMIN — SENNOSIDES 17.2 MG: 8.6 TABLET, FILM COATED ORAL at 21:35

## 2024-02-09 RX ADMIN — IPRATROPIUM BROMIDE AND ALBUTEROL SULFATE 1 DOSE: 2.5; .5 SOLUTION RESPIRATORY (INHALATION) at 19:45

## 2024-02-09 RX ADMIN — BACLOFEN 5 MG: 10 TABLET ORAL at 08:41

## 2024-02-09 ASSESSMENT — PAIN DESCRIPTION - LOCATION
LOCATION: HEAD
LOCATION: HEAD

## 2024-02-09 ASSESSMENT — PAIN SCALES - GENERAL
PAINLEVEL_OUTOF10: 8
PAINLEVEL_OUTOF10: 8

## 2024-02-09 NOTE — PROGRESS NOTES
Physical Therapy  Facility/Department: [facility] ACUTE REHAB  Treatment note    NAME: Brennen Mcclure  : 1965 (59 y.o.)  MRN: [id number]  CODE STATUS: Full Code  Date of Service: 24    Past Medical History:   Diagnosis Date    CAD (coronary artery disease)     Chronic deep vein thrombosis (DVT) of both lower extremities (HCC)     Chronic idiopathic thrombocytopenia (HCC)     CVA (cerebral vascular accident) (HCC)     HLD (hyperlipidemia)      Past Surgical History:   Procedure Laterality Date    CORONARY ARTERY BYPASS GRAFT      CORONARY STENT PLACEMENT       Family / Caregiver Present: Yes (SO Mara in PM)  General Comment  Comments: Daughter present in PM, states pt's SO can be present for family training Saturday; MARQUIS Jewell reports receiving phone call later from SO stating they can't come Saturday.    Restrictions:  Restrictions/Precautions: General Precautions;Fall Risk;Up as Tolerated  Required Braces or Orthoses  Other:  (Helmet on when OOB)  Left Upper Extremity Brace/Splint: Resting hand  Position Activity Restriction  Other position/activity restrictions: Helmet on when OOB; Sling for transfers/mobility only     SUBJECTIVE  Subjective: Pt agreeable to staying in wheelchair for lunch as daughter is to visit and take him outside (Dr. De La Paz encouraging sitting up in chair as much as possible, including for lunch); in bed on PM return, continues to state L LE hurts when he sits in the wheelchair; transferred to recliner at end of PM session.  Pain: L UE/shoulder pain, L LE.    OBJECTIVE  Functional Mobility  Bed mobility  Supine to Sit: Moderate assistance (G return to cues for R LE involvement/initiation for bilateral LE movement.)  Scooting: Moderate assistance (For EOB positioning & SPT preparation; requires cues for completion of appropriate positioning.)  Bed Mobility Comments: HOB ~45º, rail used.  Transfers  Sit to Stand: Moderate Assistance (L knee blocked, L UE in sling or supported; standing in  simulated car transfer using device and Norm  Long term goal 7: Pt to demo WC propulsion of household distances with R/L turns and SBA.  Long term goal 8: Pt and family to demonstrate satisfactory performance/assistance for desired transfer and mobility tasks for a safe DC home.    PLAN OF CARE  Frequency: 1-2 treatment sessions per day, 5-7 days per week  Physical Therapy Plan  General Plan: Other (See Comment) (900 mintues of combined PT/OT/ST d/t pt's need for 2 skilled therapists to safely and therapeutically performed desired functional tasks)  Specific Instructions for Next Treatment: FES; Sitting/standing balance and midline orienting, pre-gait activity progressing to transfers/Amb with hemiwalker.  Current Treatment Recommendations: Strengthening;ROM;Balance training;Functional mobility training;Transfer training;Gait training;Stair training;Neuromuscular re-education;Pain management;Home exercise program;Safety education & training;Patient/Caregiver education & training;Equipment evaluation, education, & procurement;Positioning;Therapeutic activities  Safety Devices  Type of Devices: Gait belt;All fall risk precautions in place;Call light within reach;Patient at risk for falls;Chair alarm in place;Left in chair (Pt returned to room in PM by MARQUIS Jewell.)    Therapy Time     02/09/24 0757 02/09/24 0758   Time Code Minutes   Timed Code Treatment Minutes 30 Minutes  (Including 30 min. from 60-min. co-tx c CHO Fanta.) 19 Minutes  (19 min. from 38-min. co-tx c CHO Fanta.)   PT Co-Treatment Minutes   Time In   (1049)   (1301)   Time Out   (1149)   (1339)     Keira Garcia PTA, 02/09/24 at 6:30 PM

## 2024-02-09 NOTE — PLAN OF CARE
Problem: Discharge Planning  Goal: Discharge to home or other facility with appropriate resources  2/9/2024 1728 by Deepali Ovalle RN  Outcome: Progressing  2/9/2024 0410 by Agata Gramajo RN  Outcome: Progressing     Problem: Skin/Tissue Integrity  Goal: Absence of new skin breakdown  Description: 1.  Monitor for areas of redness and/or skin breakdown  2.  Assess vascular access sites hourly  3.  Every 4-6 hours minimum:  Change oxygen saturation probe site  4.  Every 4-6 hours:  If on nasal continuous positive airway pressure, respiratory therapy assess nares and determine need for appliance change or resting period.  Outcome: Progressing     Problem: Safety - Adult  Goal: Free from fall injury  2/9/2024 1728 by Deepali Ovalle RN  Outcome: Progressing  2/9/2024 0410 by Agata Gramajo RN  Outcome: Progressing     Problem: ABCDS Injury Assessment  Goal: Absence of physical injury  2/9/2024 1728 by Deepali Ovalle RN  Outcome: Progressing  2/9/2024 0410 by Agata Gramajo RN  Outcome: Progressing     Problem: Pain  Goal: Verbalizes/displays adequate comfort level or baseline comfort level  Outcome: Progressing     Problem: Nutrition Deficit:  Goal: Optimize nutritional status  2/9/2024 1728 by Deepali Ovalle RN  Outcome: Progressing  2/9/2024 0410 by Agata Gramajo RN  Outcome: Progressing     Problem: Chronic Conditions and Co-morbidities  Goal: Patient's chronic conditions and co-morbidity symptoms are monitored and maintained or improved  2/9/2024 1728 by Deepali Ovalle RN  Outcome: Progressing  2/9/2024 0410 by Agata Gramajo RN  Outcome: Progressing

## 2024-02-09 NOTE — PROGRESS NOTES
BEHAVIORAL HEALTH FOLLOW-UP NOTE     2024     Patient was seen and examined in person, Chart reviewed   Patient's case discussed with staff/team    Chief Complaint: Depression    Interim History:   No acute overnight events.     Patient seen and examined at bedside.  The patient daughter was present.  He is in a good mood today.  He has slept well.  He has no side effect from the increase in dextromethorphan.  The patient denies any suicidal thoughts.  He has had a lot of crying spells today which is lower than his usual frequency.  BP 90/67   Pulse 79   Temp 97 °F (36.1 °C) (Oral)   Resp 18   Ht 1.778 m (5' 10\")   Wt 77.1 kg (170 lb)   SpO2 93%   BMI 24.39 kg/m²   Appetite:  [x] Normal/Unchanged  [] Increased  [] Decreased      Sleep:       [x] Normal/Unchanged  [] Fair       [] Poor              Energy:    [x] Normal/Unchanged  [] Increased  [] Decreased        Aggression:  [] yes  [x] no    Patient is [x] able  [] unable to CONTRACT FOR SAFETY ON THE UNIT    PAST MEDICAL/PSYCHIATRIC HISTORY:   Past Medical History:   Diagnosis Date    CAD (coronary artery disease)     Chronic deep vein thrombosis (DVT) of both lower extremities (HCC)     Chronic idiopathic thrombocytopenia (HCC)     CVA (cerebral vascular accident) (HCC)     HLD (hyperlipidemia)        FAMILY/SOCIAL HISTORY:  Family History   Problem Relation Age of Onset    Coronary Art Dis Mother     Stroke Other      Social History     Socioeconomic History    Marital status:      Spouse name: Not on file    Number of children: Not on file    Years of education: Not on file    Highest education level: Not on file   Occupational History    Not on file   Tobacco Use    Smoking status: Former     Current packs/day: 0.00     Types: Cigarettes     Quit date: 2023     Years since quittin.2    Smokeless tobacco: Never   Substance and Sexual Activity    Alcohol use: Not on file    Drug use: Not on file    Sexual activity: Not on file   Other  patient.   Continue dextromethorphan at 30 mg daily  Noted that Depakote was discontinued by neurology.  No other medications changes today    Risks, benefits, side effects, drug-to-drug interactions and alternatives to treatment were discussed. The patient consented to treatment.     Encourage patient to attend group and other milieu activities.  Discharge planning discussed with the patient and treatment team.    PSYCHOTHERAPY/COUNSELING:  [] Therapeutic interview  [x] Supportive  [] CBT  [] Ongoing  [] Other      Brennen Mcclure is a 59 y.o. male being evaluated face to face.     --LEAH ESCALANTE MD on 2/9/2024 at 2:34 PM    An electronic signature was used to authenticate this note.     **This report has been created using voice recognition software. It may contain minor errors which are inherent in voice recognition technology.**

## 2024-02-09 NOTE — PLAN OF CARE
Problem: Discharge Planning  Goal: Discharge to home or other facility with appropriate resources  2/9/2024 0410 by Agata Gramajo RN  Outcome: Progressing     Problem: Safety - Adult  Goal: Free from fall injury  2/9/2024 0410 by Agata Gramajo RN  Outcome: Progressing     Problem: ABCDS Injury Assessment  Goal: Absence of physical injury  2/9/2024 0410 by Agata Gramajo, RN  Outcome: Progressing     Problem: Chronic Conditions and Co-morbidities  Goal: Patient's chronic conditions and co-morbidity symptoms are monitored and maintained or improved  2/9/2024 0410 by Agata Gramajo, RN  Outcome: Progressing     Problem: Nutrition Deficit:  Goal: Optimize nutritional status  2/9/2024 0410 by Agata Gramajo, RN  Outcome: Progressing

## 2024-02-09 NOTE — CARE COORDINATION
ARU CASE MANAGEMENT NOTE:    Patient is alert and oriented x4.    Spoke with patient and spouse regarding discharge plan: Return home with spouse and would like to have home healthcare. A list of Home Healthcare Agencies was provided. Spouse states ramp has been installed.     Outside appointments while in ARU: None    Will continue to follow for additional discharge needs.      Electronically signed by Claude Barajas RN on 2/9/2024 at 3:27 PM
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
## 2024-02-09 NOTE — PROGRESS NOTES
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 was on Pradaxa earlier  Should consider hematology oncology consult due to his complicated history    ITP platelet count yesterday was 103, has been stable patient received IVIG at outlying facility, consider hematology oncology consult    On Flex for cellulitis last dose 1/29    CAD s/p CABG in 2009 status post PCI 2023, on aspirin Lipitor    Hypertension blood pressures currently stable on lisinopril, continue to monitor    DVT prophylaxis        1/29  Patient seen and examined  Working with PT OT  Blood pressure has been borderline lower side decrease lisinopril to 10  Patient denies any dizziness lightheadedness  Thrombocytopenia has resolved  Patient also had 4.5 cm abdominal aortic aneurysm  Advised to follow-up with vascular hematology oncology and PCP as outpatient  Voiced understanding  Wife is present at the bedside updated  1/30  Patient seen and examined, his vitals have been stable, labs reviewed, patient advised to follow-up with oncology as outpatient  Thrombocytopenia has resolved  Patient on Eliquis reduced dose as per oncology recommendations in [place]  Finish course of Keflex    2/1  Patient seen and examined  Currently alert oriented  On Eliquis at 2.5 twice daily as recommended by oncologist at [place]  Patient advised to follow-up with oncology as outpatient due to recurrent DVTs next  Vital stable thrombocytopenia resolved    2/2  Intraparenchymal hemorrhage status post craniectomy, left-sided hemiparesis seizure prophylaxis, cerebral venous sinus thrombosis on Eliquis, recent ITP however platelet counts have normalized will continue to monitor next labs due tomorrow  Patient complaining of new cough which has been there for last couple of days; reports recent diagnosis of emphysema/COPD.  Will get chest x-ray, ordering DuoNebs as needed.  No wheezing on lung exam.  Patient also complaining of irritation in throat

## 2024-02-09 NOTE — PROGRESS NOTES
assistance  Skilled Clinical Factors: education on natacha tech. Mod A required to maintain sitting balance    Lower Extremity Bathing  Assistance Level: Maximum assistance  Skilled Clinical Factors: pt able to address LB requires cues to address LLE perseverates on RLE requires Max vc to maintain sitting balance. SBA-MAx A to maintain sitting balance throughout task    Upper Extremity Dressing  Assistance Level: Moderate assistance  Skilled Clinical Factors: Assist to thread LUE using natacha technique, manage off/on trunk. minimal assist with proper orientation and minor cues for sequencing. Mod A to maintain sitting balance throughout task    Lower Extremity Dressing  Assistance Level: Maximum assistance  Skilled Clinical Factors: TA to thread LLE, pt able to thread RLE whileSitting EOB but requires assist to maintian sitting balance, pt able to pull over R hip in bridging, Assist to mange over over L hip.    Putting On/Taking Off Footwear  Assistance Level: Dependent  Skilled Clinical Factors: TA for B socks/shoes/L AFO. Pt declines TEDs              Tub/Shower Transfers  Type: Shower  Transfer From: Wheelchair  Transfer To: Tub transfer bench  Additional Factors: With handrails;Cues for hand placement;Increased time to complete;Verbal cues  Assistance Level: Requires x 2 assistance;Moderate assistance  Skilled Clinical Factors: SPT with Ax2 vc for technique    Mobility     Sit to Supine  Assistance Level: Moderate assistance  Skilled Clinical Factors: to progress trunk and LLE  Supine to Sit  Assistance Level: Moderate assistance  Skilled Clinical Factors: to progress trunk and LLE  Scooting  Assistance Level: Moderate assistance  Skilled Clinical Factors: hips towards EOB/EOM       Sit to Stand  Assistance Level: Moderate assistance  Skilled Clinical Factors: Mod A and vc for technique  Stand to Sit  Assistance Level: Moderate assistance  Skilled Clinical Factors: for controlled sit     Stand Pivot  Assistance  in dynamic unsupported sitting tasks with Max A  facilitating reaching out of base of support and weight bearing through L extremity to increase input through extremity and improved core strength to increase ease with LB self-care tasks  Short Term Goal 7: Pt will participate in neuromuscular re-education activities (weight bearing, e-stim) as tolerated, to facilitate movement in LUE  Short Term Goal 8: Patient will perform self-care routine with Fair sustained attention with 1-2 VCs for attention to task  Short Term Goal 9: Pt will tolerate standing for 2+ minutes, Max A, with 0-1 UE support and no LOB during self-care/functional activity for improved balance/tolerance  Short Term Goal 10: Pt will identify 2 non-pharmacological pain relief techniques during self-care tasks    Long Term Goals  Time Frame for Long Term Goals : By discharge  Long Term Goal 1: Pt will complete UB ADLs with CGA, good safety, and use of AE/DME/modified techniques as needed  Long Term Goal 2: Pt will complete LB ADLs with Mod A, good safety, and use of AE/DME/Modified techniques as needed  Long Term Goal 3: Pt will complete stand pivot transfer from bed <> wheelchair/chair/toilet with Min A, good safety, and use of least restrictive device  Long Term Goal 4: Pt will tolerate standing for 5+ minutes, min A, with 0-1 UE support and no LOB during self-care/functional activity for improved balance/tolerance  Long Term Goal 5: Pt will demonstrate improved awareness of LUE AEB ability to maintain safe positioning of L hand and wrist during ADL routine without assist  Long Term Goal 6: Pt will verbalize/demonstrate good understanding of home safety/fall prevention strategies to promote safety and independence with self-care and mobility  Long Term Goal 7: Pt will complete self-care with improved FMC and  strength AEB 10 second and 10# improvemenet on 9HPT and dynamometer  Long Term Goal 8: Pt will complete self-feeding and oral hygiene with

## 2024-02-09 NOTE — INTERDISCIPLINARY ROUNDS
[facility] Inpatient Rehabilitation  INTERDISCIPLINARY MEETING  Date: 24  Patient Name: Brennen Mcclure       Room: 2633/2633-01  MRN: [id number]       : 1965  (59 y.o.)     Gender: male     Patient's care team, including nursing, speech language pathologist, occupational therapist, and/or physical therapist, met to discuss patient's care needs. Any patient concerns, change in medical status, and current transfer/mobility status were identified at this time.     Any Additional Pertinent Information:   Not Applicable    Participating Team Members:  Nurse: Deepali Ovalle RN     Occupational Therapist: Fifi Cantu OTR/L  Physical Therapist: Fany Kelly PT  Speech Therapist:  Brian Multani M.S., CCC-SLP
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
## 2024-02-09 NOTE — PROGRESS NOTES
SPEECH LANGUAGE PATHOLOGY  Speech Language Pathology  [facility] ACUTE REHAB    Cognitive Treatment Note    Date: 2/9/2024  Patient’s Name: Brennen Mcclure  MRN: [id number]  Diagnosis:   Patient Active Problem List   Diagnosis Code    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 (HCC) I69.152    Pseudobulbar affect F48.2    Intractable headache R51.9    Intracranial hemorrhage (HCC) I62.9       Pain: 0/10    Cognitive Treatment    Treatment time: 2953-3578 & 1785-6324  *shortened PM session d/t Pt returning to room late from PT       Subjective: [x] Alert [x] Cooperative     [] Confused     [] Agitated    [x] Lethargic      Objective/Assessment:  Attention: Occasional repetition and redirection required.  Pt more lethargic during PM session, demonstrating longer response latency and requiring additional repetition.      Orientation: Oriented x4.     Recall: n/a    Organization: n/a    Problem Solving/Reasoning: Reasoning, ID 3 reasons for scenario- 92% accuracy (I), increasing to 100% cued.  Reasoning, ID similar category items/odd one out (field of 3)- 70% accuracy (I), increasing to 100% cued.      Speech: Pt presents with mild dysarthria characterized by imprecise articulation, blended word boundaries, rapid rate and reduced vocal intensity.  Pt required mod A (verbal cues) to facilitate strategies in conversation and MAX A with articulation drills tasks (alliterations and paragraph reading tasks).  Speech judged to be ~90% intelligible in conversation; ~80% intelligible during alliterations task.  Intelligibility noted to decline with duration.  Pt able to complete OMEs x4 sets in reps of 20 c mod cues in AM and PM.  Reduced labial and lingual strength, coordination and ROM observed with exercise attempts.  Reduced endurance noted with duration/repetitions.     Dysphagia: Diet tolerance monitoring completed with lunch tray.  Therapeutic feeding trials of easy to chew solids x8 and thin liquid per cup x5  presented with no overt s/s of aspiration observed.  Prolonged mastication noted with easy to chew consistency, required additional time and double swallow to breakdown bolus and clear oral cavity.      Other: No family present in AM.  Pt's daughter present for PM session.       Plan:  [x] Continue ST services    [] Discharge from ST:      Discharge recommendations: [] Inpatient Rehab   [] Skilled Nursing Facility   [] Outpatient Therapy  [] Follow up at trauma clinic   [] Other:       Treatment completed by: Zofia Martinez M.A., CCC-SLP

## 2024-02-10 PROCEDURE — 1180000000 HC REHAB R&B

## 2024-02-10 PROCEDURE — 97112 NEUROMUSCULAR REEDUCATION: CPT

## 2024-02-10 PROCEDURE — 99232 SBSQ HOSP IP/OBS MODERATE 35: CPT | Performed by: PSYCHIATRY & NEUROLOGY

## 2024-02-10 PROCEDURE — 94761 N-INVAS EAR/PLS OXIMETRY MLT: CPT

## 2024-02-10 PROCEDURE — 97535 SELF CARE MNGMENT TRAINING: CPT

## 2024-02-10 PROCEDURE — 6370000000 HC RX 637 (ALT 250 FOR IP): Performed by: PSYCHIATRY & NEUROLOGY

## 2024-02-10 PROCEDURE — 94640 AIRWAY INHALATION TREATMENT: CPT

## 2024-02-10 PROCEDURE — 6370000000 HC RX 637 (ALT 250 FOR IP): Performed by: STUDENT IN AN ORGANIZED HEALTH CARE EDUCATION/TRAINING PROGRAM

## 2024-02-10 PROCEDURE — 6370000000 HC RX 637 (ALT 250 FOR IP): Performed by: INTERNAL MEDICINE

## 2024-02-10 PROCEDURE — 92507 TX SP LANG VOICE COMM INDIV: CPT

## 2024-02-10 PROCEDURE — 97110 THERAPEUTIC EXERCISES: CPT

## 2024-02-10 PROCEDURE — 99232 SBSQ HOSP IP/OBS MODERATE 35: CPT | Performed by: INTERNAL MEDICINE

## 2024-02-10 PROCEDURE — 97129 THER IVNTJ 1ST 15 MIN: CPT

## 2024-02-10 PROCEDURE — 97530 THERAPEUTIC ACTIVITIES: CPT

## 2024-02-10 PROCEDURE — 97542 WHEELCHAIR MNGMENT TRAINING: CPT

## 2024-02-10 PROCEDURE — 99232 SBSQ HOSP IP/OBS MODERATE 35: CPT | Performed by: PHYSICAL MEDICINE & REHABILITATION

## 2024-02-10 PROCEDURE — 6370000000 HC RX 637 (ALT 250 FOR IP): Performed by: PHYSICAL MEDICINE & REHABILITATION

## 2024-02-10 RX ADMIN — DICLOFENAC SODIUM 2 G: 10 GEL TOPICAL at 22:06

## 2024-02-10 RX ADMIN — AMITRIPTYLINE HYDROCHLORIDE 25 MG: 25 TABLET, FILM COATED ORAL at 22:05

## 2024-02-10 RX ADMIN — IPRATROPIUM BROMIDE AND ALBUTEROL SULFATE 1 DOSE: 2.5; .5 SOLUTION RESPIRATORY (INHALATION) at 16:14

## 2024-02-10 RX ADMIN — TOPIRAMATE 25 MG: 25 TABLET, FILM COATED ORAL at 09:41

## 2024-02-10 RX ADMIN — DICLOFENAC SODIUM 2 G: 10 GEL TOPICAL at 09:39

## 2024-02-10 RX ADMIN — GABAPENTIN 100 MG: 100 CAPSULE ORAL at 14:40

## 2024-02-10 RX ADMIN — GABAPENTIN 100 MG: 100 CAPSULE ORAL at 22:04

## 2024-02-10 RX ADMIN — IPRATROPIUM BROMIDE AND ALBUTEROL SULFATE 1 DOSE: 2.5; .5 SOLUTION RESPIRATORY (INHALATION) at 20:46

## 2024-02-10 RX ADMIN — Medication 6 MG: at 22:04

## 2024-02-10 RX ADMIN — APIXABAN 2.5 MG: 2.5 TABLET, FILM COATED ORAL at 09:38

## 2024-02-10 RX ADMIN — IPRATROPIUM BROMIDE AND ALBUTEROL SULFATE 1 DOSE: 2.5; .5 SOLUTION RESPIRATORY (INHALATION) at 07:25

## 2024-02-10 RX ADMIN — LACOSAMIDE 100 MG: 100 TABLET, FILM COATED ORAL at 09:38

## 2024-02-10 RX ADMIN — CITALOPRAM HYDROBROMIDE 10 MG: 10 TABLET ORAL at 09:38

## 2024-02-10 RX ADMIN — BACLOFEN 5 MG: 10 TABLET ORAL at 09:38

## 2024-02-10 RX ADMIN — SENNOSIDES 17.2 MG: 8.6 TABLET, FILM COATED ORAL at 22:04

## 2024-02-10 RX ADMIN — ASPIRIN 81 MG 81 MG: 81 TABLET ORAL at 09:38

## 2024-02-10 RX ADMIN — Medication 1 TABLET: at 09:38

## 2024-02-10 RX ADMIN — GABAPENTIN 100 MG: 100 CAPSULE ORAL at 09:38

## 2024-02-10 RX ADMIN — BUTALBITA,ACETAMINOPHEN AND CAFFEINE 1 CAPSULE: 50; 300; 40 CAPSULE ORAL at 16:04

## 2024-02-10 RX ADMIN — MODAFINIL 200 MG: 100 TABLET ORAL at 09:38

## 2024-02-10 RX ADMIN — LACOSAMIDE 100 MG: 100 TABLET, FILM COATED ORAL at 22:04

## 2024-02-10 RX ADMIN — APIXABAN 2.5 MG: 2.5 TABLET, FILM COATED ORAL at 22:04

## 2024-02-10 RX ADMIN — ATORVASTATIN CALCIUM 80 MG: 80 TABLET, FILM COATED ORAL at 22:04

## 2024-02-10 RX ADMIN — TOPIRAMATE 25 MG: 25 TABLET, FILM COATED ORAL at 22:05

## 2024-02-10 RX ADMIN — BACLOFEN 5 MG: 10 TABLET ORAL at 22:04

## 2024-02-10 NOTE — PLAN OF CARE
Problem: Discharge Planning  Goal: Discharge to home or other facility with appropriate resources  2/10/2024 1543 by Devora Sam RN  Outcome: Progressing  Flowsheets (Taken 2/10/2024 0900)  Discharge to home or other facility with appropriate resources:   Identify barriers to discharge with patient and caregiver   Arrange for needed discharge resources and transportation as appropriate  2/10/2024 0448 by Greer Hernández LPN  Outcome: Progressing  Flowsheets (Taken 2/9/2024 2125)  Discharge to home or other facility with appropriate resources:   Identify barriers to discharge with patient and caregiver   Arrange for needed discharge resources and transportation as appropriate   Identify discharge learning needs (meds, wound care, etc)   Refer to discharge planning if patient needs post-hospital services based on physician order or complex needs related to functional status, cognitive ability or social support system     Problem: Skin/Tissue Integrity  Goal: Absence of new skin breakdown  Description: 1.  Monitor for areas of redness and/or skin breakdown  2.  Assess vascular access sites hourly  3.  Every 4-6 hours minimum:  Change oxygen saturation probe site  4.  Every 4-6 hours:  If on nasal continuous positive airway pressure, respiratory therapy assess nares and determine need for appliance change or resting period.  2/10/2024 1543 by Devora Sam RN  Outcome: Progressing  2/10/2024 0448 by Greer Hernández LPN  Outcome: Progressing     Problem: Safety - Adult  Goal: Free from fall injury  2/10/2024 1543 by Devora Sam RN  Outcome: Progressing  2/10/2024 0448 by Greer Hernández LPN  Outcome: Progressing     Problem: ABCDS Injury Assessment  Goal: Absence of physical injury  2/10/2024 1543 by Devora Sam RN  Outcome: Progressing  2/10/2024 0448 by Greer Hernández LPN  Outcome: Progressing     Problem: Pain  Goal: Verbalizes/displays adequate comfort level or baseline comfort level  2/10/2024 1543 by Devora Sam
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
## 2024-02-10 NOTE — PROGRESS NOTES
Occupational Therapy  [facility]   Acute Rehabilitation Occupational Therapy Daily Treatment Note    Date: 2/10/24  Patient Name: Brennen Mcclure       Room: 2633/2633-01  MRN: [id number]  Account: [id number]   : 1965  (59 y.o.) Gender: male       Referring Practitioner: Danica De La Paz MD  Diagnosis: Hemiplegia following ntrm intcrbl hemor aff left dominant side  Additional Pertinent Hx: 59 year old male who admitted 23 with 4 days of new L arm weakness and headache. He had known history of MI and CABG - on dabigatran but with inconsistent use due to prescription running out. He had previous treatment for L ICA thrombus and R sided symptoms treated with “clot busting medication” in . 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 He was transferred from [place], [place] to [facility] for medical management and was admitted to neuro critical care. His LUE numbness/weakness worsened to near complete hemiparesis. Initial GCS 15. He was initially treated with heparin gtt for DVST. On 23 Dr. Jameson Parsons (neurosurgery) performed R sided decompressive hemicraniectomy with evacuation of IPH x2 sites with dural repair. He was stable post op on heparin drip and extubated 23 after stable head CT 23. He was then bridged to warfarin. He is currently being treated with warfarin with therapeutic INR since 23. Hb stable at 13.6 on 24. Thrombocytopenia noted with platelet count 30 - hematology being reconsulted - previously recommended count >40. Currently requiring timed voids for incontinence. PM&R anticipating likely wheelchair mobility with Naveen for transfers.    Treatment Diagnosis: Impaired self-care status    Past Medical History:  has a past medical history of CAD (coronary artery disease), Chronic deep vein thrombosis (DVT) of both lower extremities (HCC),  open/apply toothpaste. Writer provided dycem to assist in stabilizing LUE to hold toothbrush for toothpaste application.     Lower Extremity Bathing  Assistance Level: Maximum assistance    Upper Extremity Dressing  Assistance Level: Moderate assistance  Skilled Clinical Factors: per nursing and pt this date.    Lower Extremity Dressing  Assistance Level: Moderate assistance  Skilled Clinical Factors: TA to thread LLE, pt able to thread RLE while sitting upright in bed, pt able to pull over R hip in bridging, Assist to mange over L hip.    Putting On/Taking Off Footwear  Assistance Level: Dependent  Skilled Clinical Factors: TA for B socks/shoes/L AFO. Pt declines TEDs    Toileting  Assistance Level: Dependent  Skilled Clinical Factors: TA for brief and hygiene mgmt while in bed per nursing this date.    Mobility     Sit to Supine  Assistance Level: Maximum assistance  Skilled Clinical Factors: to progress trunk and BLEs towards R side of bed this date.     Scooting  Assistance Level: Moderate assistance  Skilled Clinical Factors: hips towards EOB/EOM     Sit to Stand  Assistance Level: Maximum assistance  Skilled Clinical Factors: Mod A and vc for technique, LUE support throughout.  Stand to Sit  Assistance Level: Moderate assistance  Skilled Clinical Factors: for controlled sit, cueing for RUE placement, support/protection to LUE     Stand Pivot  Assistance Level: Requires x 2 assistance;Maximum assistance  Skilled Clinical Factors: AM: EOB>w/c towards affected L side this date. PM: Max A x1 with CGA x1 for SPT w/c>EOM towards strong R side, Max A x2 SPT EOM>w/c towards affected L side.     Neuromuscular Education  Neuromuscular education: Yes  NDT Treatment: Upper extremity;Lower extremity  Taping: endura tape applied to L shoulder to prevent subluxation and winging. Fair tolerance with core stability in w/c for proper positioning.  Trunk Control: addressed while EOB/EOM with lateral weight shifting, postureal

## 2024-02-10 NOTE — PROGRESS NOTES
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 Patient is continuing to c/o moderate-severe daily headache. He is noting some shoulder pain at the site of a Toradol injection. He is observed performing scotty steady transfer today with significant left lean precluding him from using the toilet. No new issues with sleep, appetite, bowel, or bladder.     ROS:  Denies fevers, chills, sweats.  No chest pain, palpitations, lightheadedness.  Denies coughing, wheezing or shortness of breath.  Denies abdominal pain, nausea, diarrhea or constipation.  No new areas of joint pain.  Denies new areas of numbness or weakness.  Denies new anxiety or depression issues.  No new skin problems.    Rehabilitation:       PT:    Bed mobility  Bridging: Minimal assistance  Rolling to Left: Moderate assistance, 2 Person assistance (Postioning of L LE & assist to initate rolling from prone.)  Rolling to Right: Maximum assistance, 2 Person assistance (Improved performance c use of momentum c R UE; to attain prone positioning for NMR of L UE.)  Supine to Sit: Moderate assistance (G return to cues for R LE involvement/initiation for bilateral LE movement.)  Sit to Supine: 2 Person assistance, Minimal assistance (Trunk + L LE)  Scooting: Moderate assistance (For EOB positioning & SPT preparation; requires cues for completion of appropriate positioning.)  Bed Mobility Comments: HOB ~45º, rail used.         Transfers  Sit to Stand: Moderate Assistance (L knee blocked, L UE in sling or supported; standing in shower. Moderate L lateral lean, cues for upright posture and forward gaze; P return, requires multiple cues.)  Stand to Sit: Minimal Assistance, 2 Person Assistance (Min x2. Cues for eccentric control; situational support for L UE/LE PRN.)  Bed to Chair: 2 Person Assistance, Moderate assistance (Blocking/facilitating L knee extension & LE  to thread RLE while sitting upright in bed, pt able to pull over R hip in bridging, Assist to mange over L hip.  Putting On/Taking Off Footwear  Assistance Level: Dependent  Skilled Clinical Factors: TA for B socks/shoes/L AFO. Pt declines TEDs  Toileting  Assistance Level: Dependent  Skilled Clinical Factors: TA for brief and hygiene mgmt while in bed per nursing this date.   Toilet Transfers  Technique:  (scotty stedy)  Additional Factors: Verbal cues, Increased time to complete, Cues for hand placement, Set-up  Assistance Level: Requires x 2 assistance, Dependent  Skilled Clinical Factors: use of scotty stedy, 2 person assist for safety due to heavy L lateral lean. SBA-CGA provided for safety/correction of L lateral lean, pt remained within scotty stedy while seated on toilet.      SPEECH:  Subjective: [x] Alert     [x] Cooperative     [] Confused     [] Agitated    [] Lethargic        Objective/Assessment:  Attention: Occasional repetition and redirection required.  Long  response latency demo. T/o.       Recall: 3  word controlled memory and mental manipulation (uncontrolled)- 86%, 100% c cues.  Controlled- 42%, 75% c cues.     Organization: divergent naming (kitchen items)- 8 items in 60 seconds, +5 c additional time and cues.      Problem Solving/Reasoning: n/a     Speech: Pt presents with mild dysarthria characterized by imprecise articulation, blended word boundaries, rapid rate and reduced vocal intensity.  Pt required mod A (verbal cues) to facilitate strategies in conversation and mod A with articulation drills tasks (alliterations).  Speech judged to be ~90% intelligible in conversation and during structured tasks.  Intelligibility noted to decline with duration.  Pt able to complete OMEs x3 sets in reps of 10 c mod cues.  Reduced labial and lingual strength, coordination and ROM observed with exercise attempts.  Reduced endurance noted with duration/repetitions.      Dysphagia: n/a       Other: No family

## 2024-02-10 NOTE — PROGRESS NOTES
[facility]   IN-PATIENT SERVICE   [facility]    Consult note            Date:   2/10/2024  Patient name:  Brennen Mcclure  Date of admission:  1/27/2024  3:01 PM  MRN:   [id number]  Account:  [id number]  YOB: 1965  PCP:    No primary care provider on file.  Room:   [address] [place] [address]  Code Status:    Full Code    Chief Complaint:     No chief complaint on file.      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, started on heparin drip also had patchy IPH, complicated by expansion of intraparenchymal hemorrhage with mass effect, s/p craniectomy, and evacuation of IPH x 2 with dural repair last CT head on on 1/24 showed evolution of the hemorrhage with reduction in the mass effect, Hospital course further complicated by ITP, was seen by hematology oncology, given IVIG and platelet transfusion,, started on Vimpat for seizure prevention, had cellulitis on Celexa  Patient was seen by hematology oncology over there and was started on Eliquis 2.5 twice daily  Patient was drowsy but arousable on my encounter,  Family was present at the bedside    Denies any chest pain shortness of breath        Past Medical History:     Past Medical History:   Diagnosis Date    CAD (coronary artery disease)     Chronic deep vein thrombosis (DVT) of both lower extremities (HCC)     Chronic idiopathic thrombocytopenia (HCC)     CVA (cerebral vascular accident) (HCC)     HLD (hyperlipidemia)         Past Surgical History:     Past Surgical History:   Procedure Laterality Date    CORONARY ARTERY BYPASS GRAFT      CORONARY STENT PLACEMENT          Medications Prior to Admission:     Prior to  x-ray, ordering DuoNebs as needed.  No wheezing on lung exam.  Patient also complaining of irritation in throat ordering Cepacol lozenges.    2/3  Cough better.  Continue with DuoNebs as needed  Chest x-ray unremarkable  Hemoglobin 13.3, platelets 123, will continue to monitor continuing with Eliquis  Blood pressure running low-Coreg discontinued no history of A-fib/CHF    2/4  Blood pressure control is good  Patient appears comfortable cough is improving patient worked with therapy  Labs reviewed from today and satisfactory  Continue current management    2/10  Seen in face-to-face,  Labs, medications, vitals reviewed   blood pressure still running on the lower side,   Albert stop lisinopril   Patient reports no new complaints. Engaging with physical therapy. Labs and vitals reviewed. No new issues. Continue with current care.         Consultations:   IP CONSULT TO DIETITIAN  IP CONSULT TO SOCIAL WORK  IP CONSULT TO INTERNAL MEDICINE  IP CONSULT TO PSYCHIATRY     Patient is admitted as inpatient status because of co-morbidities listed above, severity of signs and symptoms as outlined, requirement for current medical therapies and most importantly because of direct risk to patient if care not provided in a hospital setting.    Evita Pinzon MD  2/10/2024  6:23 PM    Copy sent to Dr. Stack primary care provider on file.    Please note that this chart was generated using voice recognition Dragon dictation software.  Although every effort was made to ensure the accuracy of this automated transcription, some errors in transcription may have occurred.

## 2024-02-10 NOTE — PROGRESS NOTES
Physical Therapy  Facility/Department: [facility] ACUTE REHAB  Rehabilitation Physical Therapy Treatment Note    NAME: Brennen Mcclure  : 1965 (59 y.o.)  MRN: [id number]  CODE STATUS: Full Code    Date of Service: 2/10/24       Restrictions:  Restrictions/Precautions: General Precautions, Fall Risk, Up as Tolerated  Required Braces or Orthoses  Other:  (Helmet on when OOB)  Left Upper Extremity Brace/Splint: Resting hand  Position Activity Restriction  Other position/activity restrictions: Helmet on when OOB; Sling for transfers/mobility only     SUBJECTIVE  Subjective  Subjective: Pt seated in WC post OT tx, agreeable to continuing with PT tx and wanting to focus on balance this date.  Pain: pt denies            OBJECTIVE  Cognition  Overall Cognitive Status: Exceptions  Arousal/Alertness: Appropriate responses to stimuli  Following Commands: Follows one step commands consistently;Follows multistep commands with increased time;Follows multistep commands with repitition  Attention Span: Difficulty dividing attention;Attends with cues to redirect  Memory: Appears intact  Safety Judgement: Decreased awareness of need for safety;Decreased awareness of need for assistance  Problem Solving: Assistance required to generate solutions;Assistance required to implement solutions;Assistance required to identify errors made;Assistance required to correct errors made;Decreased awareness of errors  Insights: Decreased awareness of deficits  Initiation: Requires cues for some  Sequencing: Requires cues for some  Cognition Comment: emotional lability; becoming suddenly tearful very briefly during conversation. PT demo's increased difficulty to attend to task this date.  Orientation  Overall Orientation Status: Within Functional Limits    Functional Mobility  Transfers  Surface: Wheelchair  Additional Factors: With handrails  Device:  (// bars)  Sit to Stand  Assistance Level: Maximum assistance  Skilled Clinical Factors: Writer

## 2024-02-10 NOTE — PLAN OF CARE
Problem: Discharge Planning  Goal: Discharge to home or other facility with appropriate resources  2/10/2024 0448 by Greer Hernández LPN  Outcome: Progressing  Flowsheets (Taken 2/9/2024 2125)  Discharge to home or other facility with appropriate resources:   Identify barriers to discharge with patient and caregiver   Arrange for needed discharge resources and transportation as appropriate   Identify discharge learning needs (meds, wound care, etc)   Refer to discharge planning if patient needs post-hospital services based on physician order or complex needs related to functional status, cognitive ability or social support system     Problem: Skin/Tissue Integrity  Goal: Absence of new skin breakdown  Description: 1.  Monitor for areas of redness and/or skin breakdown  2.  Assess vascular access sites hourly  3.  Every 4-6 hours minimum:  Change oxygen saturation probe site  4.  Every 4-6 hours:  If on nasal continuous positive airway pressure, respiratory therapy assess nares and determine need for appliance change or resting period.  2/10/2024 0448 by Greer Hernández LPN  Outcome: Progressing     Problem: Safety - Adult  Goal: Free from fall injury  2/10/2024 0448 by Greer Hernández LPN  Outcome: Progressing     Problem: ABCDS Injury Assessment  Goal: Absence of physical injury  2/10/2024 0448 by Greer Hernández LPN  Outcome: Progressing     Problem: Pain  Goal: Verbalizes/displays adequate comfort level or baseline comfort level  2/10/2024 0448 by Greer Hernández LPN  Outcome: Progressing     Problem: Nutrition Deficit:  Goal: Optimize nutritional status  2/10/2024 0448 by Greer Hernández LPN  Outcome: Progressing     Problem: Chronic Conditions and Co-morbidities  Goal: Patient's chronic conditions and co-morbidity symptoms are monitored and maintained or improved  2/10/2024 0448 by Greer Hernández LPN  Outcome: Progressing  Flowsheets (Taken 2/9/2024 2125)  Care Plan - Patient's Chronic Conditions and Co-Morbidity Symptoms  are Monitored and Maintained or Improved:   Monitor and assess patient's chronic conditions and comorbid symptoms for stability, deterioration, or improvement   Collaborate with multidisciplinary team to address chronic and comorbid conditions and prevent exacerbation or deterioration   Update acute care plan with appropriate goals if chronic or comorbid symptoms are exacerbated and prevent overall improvement and discharge

## 2024-02-10 NOTE — PROGRESS NOTES
SPEECH LANGUAGE PATHOLOGY  Speech Language Pathology  [facility] ACUTE REHAB    Cognitive Treatment Note    Date: 2/10/2024  Patient’s Name: Brennen Mcclure  MRN: [id number]  Diagnosis:   Patient Active Problem List   Diagnosis Code    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 (HCC) I69.152    Pseudobulbar affect F48.2    Intractable headache R51.9    Intracranial hemorrhage (HCC) I62.9       Pain: 0/10    Cognitive Treatment    Treatment time: 0859-0929      Subjective: [x] Alert [x] Cooperative     [] Confused     [] Agitated    [] Lethargic      Objective/Assessment:  Attention: Occasional repetition and redirection required.  Long  response latency demo. T/o.      Recall: 3  word controlled memory and mental manipulation (uncontrolled)- 86%, 100% c cues.  Controlled- 42%, 75% c cues.    Organization: divergent naming (kitchen items)- 8 items in 60 seconds, +5 c additional time and cues.     Problem Solving/Reasoning: n/a    Speech: Pt presents with mild dysarthria characterized by imprecise articulation, blended word boundaries, rapid rate and reduced vocal intensity.  Pt required mod A (verbal cues) to facilitate strategies in conversation and mod A with articulation drills tasks (alliterations).  Speech judged to be ~90% intelligible in conversation and during structured tasks.  Intelligibility noted to decline with duration.  Pt able to complete OMEs x3 sets in reps of 10 c mod cues.  Reduced labial and lingual strength, coordination and ROM observed with exercise attempts.  Reduced endurance noted with duration/repetitions.     Dysphagia: n/a      Other: No family present.       Plan:  [x] Continue ST services    [] Discharge from ST:      Discharge recommendations: [] Inpatient Rehab   [] Skilled Nursing Facility   [] Outpatient Therapy  [] Follow up at trauma clinic   [] Other:       Treatment completed by: Radha Yarbrough M.A.CCC/SLP

## 2024-02-11 LAB
ANION GAP SERPL CALCULATED.3IONS-SCNC: 11 MMOL/L (ref 9–17)
BASOPHILS # BLD: 0.05 K/UL (ref 0–0.2)
BASOPHILS NFR BLD: 1 % (ref 0–2)
BUN SERPL-MCNC: 28 MG/DL (ref 8–23)
CALCIUM SERPL-MCNC: 10.3 MG/DL (ref 8.6–10.4)
CHLORIDE SERPL-SCNC: 98 MMOL/L (ref 98–107)
CO2 SERPL-SCNC: 26 MMOL/L (ref 20–31)
CREAT SERPL-MCNC: 0.9 MG/DL (ref 0.7–1.2)
EOSINOPHIL # BLD: 0.34 K/UL (ref 0–0.4)
EOSINOPHILS RELATIVE PERCENT: 7 % (ref 0–4)
ERYTHROCYTE [DISTWIDTH] IN BLOOD BY AUTOMATED COUNT: 24.3 % (ref 11.5–14.9)
ERYTHROCYTE [DISTWIDTH] IN BLOOD BY AUTOMATED COUNT: 24.7 % (ref 11.5–14.9)
GFR SERPL CREATININE-BSD FRML MDRD: >60 ML/MIN/1.73M2
GLUCOSE SERPL-MCNC: 91 MG/DL (ref 70–99)
HCT VFR BLD AUTO: 44.5 % (ref 41–53)
HCT VFR BLD AUTO: 47.8 % (ref 41–53)
HGB BLD-MCNC: 14.5 G/DL (ref 13.5–17.5)
HGB BLD-MCNC: 15.7 G/DL (ref 13.5–17.5)
LYMPHOCYTES NFR BLD: 1.06 K/UL (ref 1–4.8)
LYMPHOCYTES RELATIVE PERCENT: 22 % (ref 24–44)
MCH RBC QN AUTO: 27.8 PG (ref 26–34)
MCH RBC QN AUTO: 27.9 PG (ref 26–34)
MCHC RBC AUTO-ENTMCNC: 32.6 G/DL (ref 31–37)
MCHC RBC AUTO-ENTMCNC: 32.8 G/DL (ref 31–37)
MCV RBC AUTO: 85.1 FL (ref 80–100)
MCV RBC AUTO: 85.3 FL (ref 80–100)
MONOCYTES NFR BLD: 0.48 K/UL (ref 0.1–1.3)
MONOCYTES NFR BLD: 10 % (ref 1–7)
MORPHOLOGY: ABNORMAL
NEUTROPHILS NFR BLD: 60 % (ref 36–66)
NEUTS SEG NFR BLD: 2.87 K/UL (ref 1.3–9.1)
PLATELET # BLD AUTO: 63 K/UL (ref 150–450)
PLATELET # BLD AUTO: 83 K/UL (ref 150–450)
PMV BLD AUTO: 9.3 FL (ref 6–12)
PMV BLD AUTO: 9.8 FL (ref 6–12)
POTASSIUM SERPL-SCNC: 4.7 MMOL/L (ref 3.7–5.3)
RBC # BLD AUTO: 5.22 M/UL (ref 4.5–5.9)
RBC # BLD AUTO: 5.61 M/UL (ref 4.5–5.9)
SODIUM SERPL-SCNC: 135 MMOL/L (ref 135–144)
WBC OTHER # BLD: 4.8 K/UL (ref 3.5–11)
WBC OTHER # BLD: 9.1 K/UL (ref 3.5–11)

## 2024-02-11 PROCEDURE — 6370000000 HC RX 637 (ALT 250 FOR IP): Performed by: PHYSICAL MEDICINE & REHABILITATION

## 2024-02-11 PROCEDURE — 6370000000 HC RX 637 (ALT 250 FOR IP): Performed by: PSYCHIATRY & NEUROLOGY

## 2024-02-11 PROCEDURE — 97110 THERAPEUTIC EXERCISES: CPT

## 2024-02-11 PROCEDURE — 94760 N-INVAS EAR/PLS OXIMETRY 1: CPT

## 2024-02-11 PROCEDURE — 99231 SBSQ HOSP IP/OBS SF/LOW 25: CPT | Performed by: PSYCHIATRY & NEUROLOGY

## 2024-02-11 PROCEDURE — 85025 COMPLETE CBC W/AUTO DIFF WBC: CPT

## 2024-02-11 PROCEDURE — 99232 SBSQ HOSP IP/OBS MODERATE 35: CPT | Performed by: PHYSICAL MEDICINE & REHABILITATION

## 2024-02-11 PROCEDURE — 97530 THERAPEUTIC ACTIVITIES: CPT

## 2024-02-11 PROCEDURE — 97116 GAIT TRAINING THERAPY: CPT

## 2024-02-11 PROCEDURE — 6370000000 HC RX 637 (ALT 250 FOR IP): Performed by: STUDENT IN AN ORGANIZED HEALTH CARE EDUCATION/TRAINING PROGRAM

## 2024-02-11 PROCEDURE — 94640 AIRWAY INHALATION TREATMENT: CPT

## 2024-02-11 PROCEDURE — 80048 BASIC METABOLIC PNL TOTAL CA: CPT

## 2024-02-11 PROCEDURE — 36415 COLL VENOUS BLD VENIPUNCTURE: CPT

## 2024-02-11 PROCEDURE — 6370000000 HC RX 637 (ALT 250 FOR IP): Performed by: INTERNAL MEDICINE

## 2024-02-11 PROCEDURE — 99231 SBSQ HOSP IP/OBS SF/LOW 25: CPT | Performed by: INTERNAL MEDICINE

## 2024-02-11 PROCEDURE — 97542 WHEELCHAIR MNGMENT TRAINING: CPT

## 2024-02-11 PROCEDURE — 97535 SELF CARE MNGMENT TRAINING: CPT

## 2024-02-11 PROCEDURE — 85027 COMPLETE CBC AUTOMATED: CPT

## 2024-02-11 PROCEDURE — 1180000000 HC REHAB R&B

## 2024-02-11 RX ORDER — IPRATROPIUM BROMIDE AND ALBUTEROL SULFATE 2.5; .5 MG/3ML; MG/3ML
1 SOLUTION RESPIRATORY (INHALATION)
Status: DISCONTINUED | OUTPATIENT
Start: 2024-02-11 | End: 2024-02-19

## 2024-02-11 RX ADMIN — FERROUS SULFATE TAB 325 MG (65 MG ELEMENTAL FE) 325 MG: 325 (65 FE) TAB at 08:31

## 2024-02-11 RX ADMIN — APIXABAN 2.5 MG: 2.5 TABLET, FILM COATED ORAL at 08:31

## 2024-02-11 RX ADMIN — CITALOPRAM HYDROBROMIDE 10 MG: 10 TABLET ORAL at 08:31

## 2024-02-11 RX ADMIN — TOPIRAMATE 25 MG: 25 TABLET, FILM COATED ORAL at 08:47

## 2024-02-11 RX ADMIN — ACETAMINOPHEN 650 MG: 325 TABLET, FILM COATED ORAL at 22:27

## 2024-02-11 RX ADMIN — POLYETHYLENE GLYCOL 3350 17 G: 17 POWDER, FOR SOLUTION ORAL at 12:34

## 2024-02-11 RX ADMIN — IPRATROPIUM BROMIDE AND ALBUTEROL SULFATE 1 DOSE: 2.5; .5 SOLUTION RESPIRATORY (INHALATION) at 14:21

## 2024-02-11 RX ADMIN — MODAFINIL 200 MG: 100 TABLET ORAL at 08:31

## 2024-02-11 RX ADMIN — BACLOFEN 5 MG: 10 TABLET ORAL at 20:10

## 2024-02-11 RX ADMIN — SENNOSIDES 17.2 MG: 8.6 TABLET, FILM COATED ORAL at 20:09

## 2024-02-11 RX ADMIN — BACLOFEN 5 MG: 10 TABLET ORAL at 08:31

## 2024-02-11 RX ADMIN — DICLOFENAC SODIUM 2 G: 10 GEL TOPICAL at 20:15

## 2024-02-11 RX ADMIN — AMITRIPTYLINE HYDROCHLORIDE 25 MG: 25 TABLET, FILM COATED ORAL at 20:15

## 2024-02-11 RX ADMIN — Medication 1 TABLET: at 08:31

## 2024-02-11 RX ADMIN — GABAPENTIN 100 MG: 100 CAPSULE ORAL at 12:34

## 2024-02-11 RX ADMIN — IPRATROPIUM BROMIDE AND ALBUTEROL SULFATE 1 DOSE: 2.5; .5 SOLUTION RESPIRATORY (INHALATION) at 07:09

## 2024-02-11 RX ADMIN — BUTALBITA,ACETAMINOPHEN AND CAFFEINE 1 CAPSULE: 50; 300; 40 CAPSULE ORAL at 01:50

## 2024-02-11 RX ADMIN — Medication 6 MG: at 20:09

## 2024-02-11 RX ADMIN — LACOSAMIDE 100 MG: 100 TABLET, FILM COATED ORAL at 20:10

## 2024-02-11 RX ADMIN — GABAPENTIN 100 MG: 100 CAPSULE ORAL at 08:31

## 2024-02-11 RX ADMIN — TOPIRAMATE 25 MG: 25 TABLET, FILM COATED ORAL at 20:14

## 2024-02-11 RX ADMIN — GABAPENTIN 100 MG: 100 CAPSULE ORAL at 20:10

## 2024-02-11 RX ADMIN — APIXABAN 2.5 MG: 2.5 TABLET, FILM COATED ORAL at 20:10

## 2024-02-11 RX ADMIN — ASPIRIN 81 MG 81 MG: 81 TABLET ORAL at 08:31

## 2024-02-11 RX ADMIN — DICLOFENAC SODIUM 2 G: 10 GEL TOPICAL at 08:43

## 2024-02-11 RX ADMIN — ATORVASTATIN CALCIUM 80 MG: 80 TABLET, FILM COATED ORAL at 20:10

## 2024-02-11 RX ADMIN — LACOSAMIDE 100 MG: 100 TABLET, FILM COATED ORAL at 08:31

## 2024-02-11 ASSESSMENT — PAIN DESCRIPTION - LOCATION: LOCATION: HIP

## 2024-02-11 ASSESSMENT — PAIN SCALES - GENERAL: PAINLEVEL_OUTOF10: 9

## 2024-02-11 NOTE — PROGRESS NOTES
[facility]   IN-PATIENT SERVICE   [facility]    Consult note            Date:   2/11/2024  Patient name:  Brennen Mcclure  Date of admission:  1/27/2024  3:01 PM  MRN:   [id number]  Account:  [id number]  YOB: 1965  PCP:    No primary care provider on file.  Room:   [address] [place] [address]  Code Status:    Full Code    Chief Complaint:     No chief complaint on file.      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, started on heparin drip also had patchy IPH, complicated by expansion of intraparenchymal hemorrhage with mass effect, s/p craniectomy, and evacuation of IPH x 2 with dural repair last CT head on on 1/24 showed evolution of the hemorrhage with reduction in the mass effect, Hospital course further complicated by ITP, was seen by hematology oncology, given IVIG and platelet transfusion,, started on Vimpat for seizure prevention, had cellulitis on Celexa  Patient was seen by hematology oncology over there and was started on Eliquis 2.5 twice daily  Patient was drowsy but arousable on my encounter,  Family was present at the bedside    Denies any chest pain shortness of breath        Past Medical History:     Past Medical History:   Diagnosis Date    CAD (coronary artery disease)     Chronic deep vein thrombosis (DVT) of both lower extremities (HCC)     Chronic idiopathic thrombocytopenia (HCC)     CVA (cerebral vascular accident) (HCC)     HLD (hyperlipidemia)         Past Surgical History:     Past Surgical History:   Procedure Laterality Date    CORONARY ARTERY BYPASS GRAFT      CORONARY STENT PLACEMENT          Medications Prior to Admission:     Prior to  WORK  IP CONSULT TO INTERNAL MEDICINE  IP CONSULT TO PSYCHIATRY     Patient is admitted as inpatient status because of co-morbidities listed above, severity of signs and symptoms as outlined, requirement for current medical therapies and most importantly because of direct risk to patient if care not provided in a hospital setting.    Evita Pinzon MD  2/11/2024  5:07 PM    Copy sent to Dr. Stack primary care provider on file.    Please note that this chart was generated using voice recognition Dragon dictation software.  Although every effort was made to ensure the accuracy of this automated transcription, some errors in transcription may have occurred.

## 2024-02-11 NOTE — PROGRESS NOTES
Physical Therapy  Facility/Department: [facility] ACUTE REHAB  Rehabilitation Physical Therapy Treatment Note    NAME: Brennen Mcclure  : 1965 (59 y.o.)  MRN: [id number]  CODE STATUS: Full Code    Date of Service: 24       Restrictions:  Restrictions/Precautions: General Precautions, Fall Risk, Up as Tolerated  Required Braces or Orthoses  Other:  (Helmet on when OOB)  Left Upper Extremity Brace/Splint: Resting hand  Position Activity Restriction  Other position/activity restrictions: Helmet on when OOB; Sling for transfers/mobility only     SUBJECTIVE  Subjective  Subjective: Pt seate d  in    WC agreeable for tx.   RN Devora present  at the start of tx  Pain: Pt declines pain        OBJECTIVE  Cognition  Overall Cognitive Status: Exceptions  Arousal/Alertness: Delayed responses to stimuli  Following Commands: Follows one step commands consistently;Follows multistep commands with increased time;Follows multistep commands with repitition  Attention Span: Difficulty dividing attention;Attends with cues to redirect  Memory: Appears intact  Safety Judgement: Decreased awareness of need for safety;Decreased awareness of need for assistance  Problem Solving: Assistance required to generate solutions;Assistance required to implement solutions;Assistance required to identify errors made;Assistance required to correct errors made;Decreased awareness of errors  Insights: Decreased awareness of deficits  Initiation: Requires cues for all  Sequencing: Requires cues for some  Cognition Comment: Increased cueing for attention to task and L visual field this date.  Orientation  Overall Orientation Status: Within Functional Limits    Functional Mobility  Supine to Sit  Assistance Level: Maximum assistance  Skilled Clinical Factors: for trunk progression  Transfers  Surface: Wheelchair  Additional Factors: With handrails  Device:  (// bars)  Sit to Stand  Assistance Level: Maximum assistance  Skilled Clinical Factors: Writer

## 2024-02-11 NOTE — PLAN OF CARE
Problem: Discharge Planning  Goal: Discharge to home or other facility with appropriate resources  Outcome: Progressing     Problem: Skin/Tissue Integrity  Goal: Absence of new skin breakdown  Description: 1.  Monitor for areas of redness and/or skin breakdown  2.  Assess vascular access sites hourly  3.  Every 4-6 hours minimum:  Change oxygen saturation probe site  4.  Every 4-6 hours:  If on nasal continuous positive airway pressure, respiratory therapy assess nares and determine need for appliance change or resting period.  Outcome: Progressing     Problem: Safety - Adult  Goal: Free from fall injury  Outcome: Progressing     Problem: ABCDS Injury Assessment  Goal: Absence of physical injury  Outcome: Progressing     Problem: Pain  Goal: Verbalizes/displays adequate comfort level or baseline comfort level  Outcome: Progressing     Problem: Nutrition Deficit:  Goal: Optimize nutritional status  Outcome: Progressing     Problem: Chronic Conditions and Co-morbidities  Goal: Patient's chronic conditions and co-morbidity symptoms are monitored and maintained or improved  Outcome: Progressing     Problem: Respiratory - Adult  Goal: Achieves optimal ventilation and oxygenation  2/11/2024 1051 by Devora Sam RN  Outcome: Progressing  2/11/2024 0710 by Janay Underwood RCP  Outcome: Progressing

## 2024-02-11 NOTE — PROGRESS NOTES
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 Patient is noting improved headache today. He continues with moderate aching L shoulder pain but declining steroid injection. Having some constipation today. He was able to void on the toilet today with transfer/assist by OT.     ROS:  Denies fevers, chills, sweats.  No chest pain, palpitations, lightheadedness.  Denies coughing, wheezing or shortness of breath.  Denies abdominal pain, nausea, diarrhea  No new areas of joint pain.  Denies new areas of numbness or weakness.  Denies new anxiety or depression issues.  No new skin problems.    Rehabilitation:       PT:    Bed mobility  Bridging: Minimal assistance  Rolling to Left: Moderate assistance, 2 Person assistance (Postioning of L LE & assist to initate rolling from prone.)  Rolling to Right: Maximum assistance, 2 Person assistance (Improved performance c use of momentum c R UE; to attain prone positioning for NMR of L UE.)  Supine to Sit: Moderate assistance (G return to cues for R LE involvement/initiation for bilateral LE movement.)  Sit to Supine: 2 Person assistance, Minimal assistance (Trunk + L LE)  Scooting: Moderate assistance (For EOB positioning & SPT preparation; requires cues for completion of appropriate positioning.)  Bed Mobility Comments: HOB ~45º, rail used.         Transfers  Sit to Stand: Moderate Assistance (L knee blocked, L UE in sling or supported; standing in shower. Moderate L lateral lean, cues for upright posture and forward gaze; P return, requires multiple cues.)  Stand to Sit: Minimal Assistance, 2 Person Assistance (Min x2. Cues for eccentric control; situational support for L UE/LE PRN.)  Bed to Chair: 2 Person Assistance, Moderate assistance (Blocking/facilitating L knee extension & LE movement. Cues for weight shifting c limited return.)  Stand Pivot Transfers: 2 Person  Daily  ferrous sulfate (IRON 325) tablet 325 mg, 325 mg, Oral, Every Other Day  gabapentin (NEURONTIN) capsule 100 mg, 100 mg, Oral, TID  lacosamide (VIMPAT) tablet 100 mg, 100 mg, Oral, BID  lidocaine 4 % external patch 1 patch, 1 patch, TransDERmal, Daily PRN  aspirin chewable tablet 81 mg, 81 mg, Oral, Daily  atorvastatin (LIPITOR) tablet 80 mg, 80 mg, Oral, Nightly  melatonin tablet 6 mg, 6 mg, Oral, Nightly  modafinil (PROVIGIL) tablet 200 mg, 200 mg, Oral, Daily  therapeutic multivitamin-minerals 1 tablet, 1 tablet, Oral, Daily  senna (SENOKOT) tablet 17.2 mg, 2 tablet, Oral, Nightly      Impression/Plan:   Impaired ADLs, gait, and mobility due to:      L nondominant hemiparesis secondary to hemorrhagic CVA: S/p right decompressive hemicraniectomy with evacuation of intraparenchymal hemorrhage at 2 sites with dural repair on 12/1/23. PT/OT  for gait, mobility, strengthening, endurance, ADLs, and self care. For cranioplasty with Dr. Corbett - will need appointment to establish care.   Dural venous sinus thrombosis/abdominal venous thrombosis: on Eliquis - lower dose due to bleeding/anemia  Dysphagia: SLP treating. Mild oral stage  Spasticity: on baclofen - low dose BID  Headache: Neurology adjusted medications for headache. Weaned from Depakote. Increased Elavil and Topamax. Has Fioricet prn and Toradol IM prn. Neuro signed off.  Seizure: on Vimpat  Pseudobulbar affect: psychiatry treating with dextromethorphan.   Impaired focus/attention: Improved. on modafinil   Thrombocytopenia: worsened today. Discussed with Dr. Pinzon - he will recheck in am and monitor Eliquis closely.   Anemia: improved. Monitoring  L arm neurologic pain: on gabapentin and lidoderm patch  OA B knees: has Voltaren gel  Cellulitis: related to peripheral IV. Completed Keflex 1/29/24.  Severe emphysema: stable on room air  Infrarenal AAA: measures 4.5 cm. For outpatient follow up  Mild hyponatremia: Stable. Monitoring  Insomnia: on

## 2024-02-11 NOTE — PROGRESS NOTES
Occupational Therapy  [facility]   Acute Rehabilitation Occupational Therapy Daily Treatment Note    Date: 24  Patient Name: Brennen Mcclure       Room: 2633/2633-01  MRN: [id number]  Account: [id number]   : 1965  (59 y.o.) Gender: male       Referring Practitioner: Danica De La Paz MD  Diagnosis: Hemiplegia following ntrm intcrbl hemor aff left dominant side  Additional Pertinent Hx: 59 year old male who admitted 23 with 4 days of new L arm weakness and headache. He had known history of MI and CABG - on dabigatran but with inconsistent use due to prescription running out. He had previous treatment for L ICA thrombus and R sided symptoms treated with “clot busting medication” in . 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 He was transferred from [place], [place] to [facility] for medical management and was admitted to neuro critical care. His LUE numbness/weakness worsened to near complete hemiparesis. Initial GCS 15. He was initially treated with heparin gtt for DVST. On 23 Dr. Jameson Parsons (neurosurgery) performed R sided decompressive hemicraniectomy with evacuation of IPH x2 sites with dural repair. He was stable post op on heparin drip and extubated 23 after stable head CT 23. He was then bridged to warfarin. He is currently being treated with warfarin with therapeutic INR since 23. Hb stable at 13.6 on 24. Thrombocytopenia noted with platelet count 30 - hematology being reconsulted - previously recommended count >40. Currently requiring timed voids for incontinence. PM&R anticipating likely wheelchair mobility with [place] for transfers.    Treatment Diagnosis: Impaired self-care status    Past Medical History:  has a past medical history of CAD (coronary artery disease), Chronic deep vein thrombosis (DVT) of both lower extremities (HCC),

## 2024-02-11 NOTE — PROGRESS NOTES
[facility] Neurology   IN-PATIENT SERVICE      NEUROLOGY PROGRESS  NOTE                Interval History:     Doing well, overall.  States he still has a headache in the mornings but overall it is improved.    History of Present Illness:     The patient is a 59 y.o. male who presents with right hemispheric stroke.  . The patient was seen and examined and the chart was reviewed.  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  He was hospitalized in [place] requiring right hemicraniectomy.  Patient had suffered from left-sided hemiplegia eventually being transferred to [facility] acute rehab unit for post stroke care.  He has been seen by our neurology team for headache management.    Physical Exam:   /75   Pulse 87   Temp 98.1 °F (36.7 °C) (Oral)   Resp 18   Ht 1.778 m (5' 10\")   Wt 77.1 kg (170 lb)   SpO2 94%   BMI 24.39 kg/m²   Temp (24hrs), Av.2 °F (36.8 °C), Min:98.1 °F (36.7 °C), Max:98.2 °F (36.8 °C)      Neurological examination:    Mental status    Alert and oriented x 3; following all commands; speech is mildly dysarthric.   Cranial nerves    II -left visual field cut; pupils reactive  III, IV, VI - extraocular muscles intact; no EPHRAIM; no nystagmus; no ptosis   V - normal facial sensation                                                               VII -decreased left nasolabial fold.                                                           VIII - intact hearing                                                                             IX, X - symmetrical palate elevation                                               XI - symmetrical shoulder shrug                                                       XII - midline tongue without atrophy or fasciculation      Motor function  Strength: 0/5 LUE and LLE.  5/5 RUE and RLE.  Normal bulk and tone.   No tremors                        Sensory function Intact to touch, pin, vibration, proprioception throughout      Cerebellar Intact finger-nose-finger testing on right.   Reflex function 2/4 on right.  3/4 on left.   Gait                  Not assessed due to left hemiplegia             Diagnostics:      Laboratory Testing:  CBC:   Recent Labs     02/11/24  0654   WBC 4.8   HGB 14.5   PLT 63*     BMP:    Recent Labs     02/11/24  0654      K 4.7   CL 98   CO2 26   BUN 28*   CREATININE 0.9   GLUCOSE 91         Lab Results   Component Value Date    ALT 42 (H) 01/28/2024    AST 31 01/28/2024         Impression:      Right cerebral hemorrhagic venous infarction in the setting of venous sinus thrombosis status post hemicrani November, 2023.  Residual left hemiplegia.  Chronic daily headache secondary to above    Plan:     Maintain Elavil 50 mg nightly, Topamax 25 mg twice daily   Weaned off of Depakote  Fioricet once in the morning daily PRN  IM Toradol as needed  Continue therapies  Outpatient follow-up with neurology in 6 weeks.  We will sign off at this time, please do not hesitate to contact with any further questions or concerns.      Electronically signed by Heri Nguyen DO on 2/11/2024 at 12:37 PM      Heri Nguyen DO  [facility]  Neurology

## 2024-02-11 NOTE — RT PROTOCOL NOTE
RT Inhaler-Nebulizer Bronchodilator Protocol Note    There is a bronchodilator order in the chart from a provider indicating to follow the RT Bronchodilator Protocol and there is an “Initiate RT Inhaler-Nebulizer Bronchodilator Protocol” order as well (see protocol at bottom of note).    CXR Findings:  No results found.    The findings from the last RT Protocol Assessment were as follows:   History Pulmonary Disease: Chronic pulmonary disease  Respiratory Pattern: Regular pattern and RR 12-20 bpm  Breath Sounds: Slightly diminished and/or crackles  Cough: Weak, non-productive  Indication for Bronchodilator Therapy: None  Bronchodilator Assessment Score: 7    Aerosolized bronchodilator medication orders have been revised according to the RT Inhaler-Nebulizer Bronchodilator Protocol below.    Respiratory Therapist to perform RT Therapy Protocol Assessment initially then follow the protocol.  Repeat RT Therapy Protocol Assessment PRN for score 0-3 or on second treatment, BID, and PRN for scores above 3.    No Indications - adjust the frequency to every 6 hours PRN wheezing or bronchospasm, if no treatments needed after 48 hours then discontinue using Per Protocol order mode.     If indication present, adjust the RT bronchodilator orders based on the Bronchodilator Assessment Score as indicated below.  Use Inhaler orders unless patient has one or more of the following: on home nebulizer, not able to hold breath for 10 seconds, is not alert and oriented, cannot activate and use MDI correctly, or respiratory rate 25 breaths per minute or more, then use the equivalent nebulizer order(s) with same Frequency and PRN reasons based on the score.  If a patient is on this medication at home then do not decrease Frequency below that used at home.    0-3 - enter or revise RT bronchodilator order(s) to equivalent RT Bronchodilator order with Frequency of every 4 hours PRN for wheezing or increased work of breathing using Per

## 2024-02-12 ENCOUNTER — TELEPHONE (OUTPATIENT)
Dept: NEUROSURGERY | Age: 59
End: 2024-02-12

## 2024-02-12 PROBLEM — D69.6 THROMBOCYTOPENIA (HCC): Status: ACTIVE | Noted: 2024-02-12

## 2024-02-12 PROBLEM — D64.9 ANEMIA: Status: ACTIVE | Noted: 2024-02-12

## 2024-02-12 LAB
ALBUMIN SERPL-MCNC: 4.1 G/DL (ref 3.5–5.2)
ALP SERPL-CCNC: 122 U/L (ref 40–129)
ALT SERPL-CCNC: 29 U/L (ref 5–41)
ANION GAP SERPL CALCULATED.3IONS-SCNC: 12 MMOL/L (ref 9–17)
AST SERPL-CCNC: 27 U/L
BASOPHILS # BLD: 0.07 K/UL (ref 0–0.2)
BASOPHILS # BLD: 0.08 K/UL (ref 0–0.2)
BASOPHILS NFR BLD: 1 % (ref 0–2)
BASOPHILS NFR BLD: 1 % (ref 0–2)
BILIRUB SERPL-MCNC: 0.2 MG/DL (ref 0.3–1.2)
BUN SERPL-MCNC: 29 MG/DL (ref 8–23)
CALCIUM SERPL-MCNC: 10.4 MG/DL (ref 8.6–10.4)
CHLORIDE SERPL-SCNC: 98 MMOL/L (ref 98–107)
CO2 SERPL-SCNC: 25 MMOL/L (ref 20–31)
CREAT SERPL-MCNC: 0.9 MG/DL (ref 0.7–1.2)
EOSINOPHIL # BLD: 0.32 K/UL (ref 0–0.4)
EOSINOPHIL # BLD: 0.36 K/UL (ref 0–0.4)
EOSINOPHILS RELATIVE PERCENT: 4 % (ref 1–4)
EOSINOPHILS RELATIVE PERCENT: 5 % (ref 0–4)
ERYTHROCYTE [DISTWIDTH] IN BLOOD BY AUTOMATED COUNT: 21.7 % (ref 11.8–14.4)
ERYTHROCYTE [DISTWIDTH] IN BLOOD BY AUTOMATED COUNT: 23.9 % (ref 11.5–14.9)
FERRITIN SERPL-MCNC: 296 NG/ML (ref 30–400)
FOLATE SERPL-MCNC: 18.8 NG/ML
GFR SERPL CREATININE-BSD FRML MDRD: >60 ML/MIN/1.73M2
GLUCOSE SERPL-MCNC: 102 MG/DL (ref 70–99)
HCT VFR BLD AUTO: 47.2 % (ref 41–53)
HCT VFR BLD AUTO: 48.2 % (ref 40.7–50.3)
HGB BLD-MCNC: 15.4 G/DL (ref 13–17)
HGB BLD-MCNC: 15.6 G/DL (ref 13.5–17.5)
IMM GRANULOCYTES # BLD AUTO: 0 K/UL (ref 0–0.3)
IMM GRANULOCYTES NFR BLD: 0 %
IMM RETICS NFR: 15.6 % (ref 2.7–18.3)
IRON SATN MFR SERPL: 29 % (ref 20–55)
IRON SERPL-MCNC: 74 UG/DL (ref 59–158)
LYMPHOCYTES NFR BLD: 1.07 K/UL (ref 1–4.8)
LYMPHOCYTES NFR BLD: 1.52 K/UL (ref 1–4.8)
LYMPHOCYTES RELATIVE PERCENT: 15 % (ref 24–44)
LYMPHOCYTES RELATIVE PERCENT: 19 % (ref 24–44)
MCH RBC QN AUTO: 27.6 PG (ref 25.2–33.5)
MCH RBC QN AUTO: 28.1 PG (ref 26–34)
MCHC RBC AUTO-ENTMCNC: 32 G/DL (ref 28.4–34.8)
MCHC RBC AUTO-ENTMCNC: 33 G/DL (ref 31–37)
MCV RBC AUTO: 85.1 FL (ref 80–100)
MCV RBC AUTO: 86.5 FL (ref 82.6–102.9)
MONOCYTES NFR BLD: 0.71 K/UL (ref 0.1–1.3)
MONOCYTES NFR BLD: 0.88 K/UL (ref 0.1–0.8)
MONOCYTES NFR BLD: 10 % (ref 1–7)
MONOCYTES NFR BLD: 11 % (ref 1–7)
MORPHOLOGY: ABNORMAL
MORPHOLOGY: ABNORMAL
MORPHOLOGY: NORMAL
NEUTROPHILS NFR BLD: 65 % (ref 36–66)
NEUTROPHILS NFR BLD: 69 % (ref 36–66)
NEUTS SEG NFR BLD: 4.89 K/UL (ref 1.3–9.1)
NEUTS SEG NFR BLD: 5.2 K/UL (ref 1.8–7.7)
NRBC BLD-RTO: 0 PER 100 WBC
PLATELET # BLD AUTO: 64 K/UL (ref 150–450)
PLATELET # BLD AUTO: ABNORMAL K/UL (ref 138–453)
PLATELET, FLUORESCENCE: 82 K/UL (ref 138–453)
PLATELETS.RETICULATED NFR BLD AUTO: 11.3 % (ref 1.1–10.3)
PMV BLD AUTO: 9.8 FL (ref 6–12)
POTASSIUM SERPL-SCNC: 4.2 MMOL/L (ref 3.7–5.3)
PROT SERPL-MCNC: 7.4 G/DL (ref 6.4–8.3)
RBC # BLD AUTO: 5.54 M/UL (ref 4.5–5.9)
RBC # BLD AUTO: 5.57 M/UL (ref 4.21–5.77)
RETIC HEMOGLOBIN: 33.5 PG (ref 28.2–35.7)
RETICS # AUTO: 0.07 M/UL (ref 0.03–0.08)
RETICS/RBC NFR AUTO: 1.3 % (ref 0.5–1.9)
SODIUM SERPL-SCNC: 135 MMOL/L (ref 135–144)
TIBC SERPL-MCNC: 251 UG/DL (ref 250–450)
UNSATURATED IRON BINDING CAPACITY: 177 UG/DL (ref 112–347)
VIT B12 SERPL-MCNC: 915 PG/ML (ref 232–1245)
WBC OTHER # BLD: 7.1 K/UL (ref 3.5–11)
WBC OTHER # BLD: 8 K/UL (ref 3.5–11.3)

## 2024-02-12 PROCEDURE — 6370000000 HC RX 637 (ALT 250 FOR IP): Performed by: PHYSICAL MEDICINE & REHABILITATION

## 2024-02-12 PROCEDURE — 97535 SELF CARE MNGMENT TRAINING: CPT

## 2024-02-12 PROCEDURE — 82728 ASSAY OF FERRITIN: CPT

## 2024-02-12 PROCEDURE — 6370000000 HC RX 637 (ALT 250 FOR IP): Performed by: PSYCHIATRY & NEUROLOGY

## 2024-02-12 PROCEDURE — 94761 N-INVAS EAR/PLS OXIMETRY MLT: CPT

## 2024-02-12 PROCEDURE — 6370000000 HC RX 637 (ALT 250 FOR IP): Performed by: INTERNAL MEDICINE

## 2024-02-12 PROCEDURE — 97530 THERAPEUTIC ACTIVITIES: CPT

## 2024-02-12 PROCEDURE — 6370000000 HC RX 637 (ALT 250 FOR IP): Performed by: STUDENT IN AN ORGANIZED HEALTH CARE EDUCATION/TRAINING PROGRAM

## 2024-02-12 PROCEDURE — 97129 THER IVNTJ 1ST 15 MIN: CPT

## 2024-02-12 PROCEDURE — 80053 COMPREHEN METABOLIC PANEL: CPT

## 2024-02-12 PROCEDURE — 99231 SBSQ HOSP IP/OBS SF/LOW 25: CPT | Performed by: INTERNAL MEDICINE

## 2024-02-12 PROCEDURE — 85045 AUTOMATED RETICULOCYTE COUNT: CPT

## 2024-02-12 PROCEDURE — 82746 ASSAY OF FOLIC ACID SERUM: CPT

## 2024-02-12 PROCEDURE — 99232 SBSQ HOSP IP/OBS MODERATE 35: CPT | Performed by: PSYCHIATRY & NEUROLOGY

## 2024-02-12 PROCEDURE — 99232 SBSQ HOSP IP/OBS MODERATE 35: CPT | Performed by: PHYSICAL MEDICINE & REHABILITATION

## 2024-02-12 PROCEDURE — 97112 NEUROMUSCULAR REEDUCATION: CPT

## 2024-02-12 PROCEDURE — 83540 ASSAY OF IRON: CPT

## 2024-02-12 PROCEDURE — 82607 VITAMIN B-12: CPT

## 2024-02-12 PROCEDURE — 36415 COLL VENOUS BLD VENIPUNCTURE: CPT

## 2024-02-12 PROCEDURE — 85025 COMPLETE CBC W/AUTO DIFF WBC: CPT

## 2024-02-12 PROCEDURE — 1180000000 HC REHAB R&B

## 2024-02-12 PROCEDURE — 94664 DEMO&/EVAL PT USE INHALER: CPT

## 2024-02-12 PROCEDURE — 83550 IRON BINDING TEST: CPT

## 2024-02-12 PROCEDURE — 99254 IP/OBS CNSLTJ NEW/EST MOD 60: CPT | Performed by: INTERNAL MEDICINE

## 2024-02-12 PROCEDURE — 94640 AIRWAY INHALATION TREATMENT: CPT

## 2024-02-12 PROCEDURE — 97542 WHEELCHAIR MNGMENT TRAINING: CPT

## 2024-02-12 PROCEDURE — 92507 TX SP LANG VOICE COMM INDIV: CPT

## 2024-02-12 PROCEDURE — 85055 RETICULATED PLATELET ASSAY: CPT

## 2024-02-12 RX ADMIN — Medication 6 MG: at 20:39

## 2024-02-12 RX ADMIN — AMITRIPTYLINE HYDROCHLORIDE 25 MG: 25 TABLET, FILM COATED ORAL at 20:41

## 2024-02-12 RX ADMIN — IPRATROPIUM BROMIDE AND ALBUTEROL SULFATE 1 DOSE: 2.5; .5 SOLUTION RESPIRATORY (INHALATION) at 21:06

## 2024-02-12 RX ADMIN — Medication 1 TABLET: at 08:33

## 2024-02-12 RX ADMIN — TOPIRAMATE 25 MG: 25 TABLET, FILM COATED ORAL at 08:39

## 2024-02-12 RX ADMIN — IPRATROPIUM BROMIDE AND ALBUTEROL SULFATE 1 DOSE: 2.5; .5 SOLUTION RESPIRATORY (INHALATION) at 15:01

## 2024-02-12 RX ADMIN — ATORVASTATIN CALCIUM 80 MG: 80 TABLET, FILM COATED ORAL at 20:39

## 2024-02-12 RX ADMIN — DICLOFENAC SODIUM 2 G: 10 GEL TOPICAL at 20:40

## 2024-02-12 RX ADMIN — TOPIRAMATE 25 MG: 25 TABLET, FILM COATED ORAL at 20:40

## 2024-02-12 RX ADMIN — ACETAMINOPHEN 650 MG: 325 TABLET, FILM COATED ORAL at 17:48

## 2024-02-12 RX ADMIN — MODAFINIL 200 MG: 100 TABLET ORAL at 08:39

## 2024-02-12 RX ADMIN — APIXABAN 2.5 MG: 2.5 TABLET, FILM COATED ORAL at 20:39

## 2024-02-12 RX ADMIN — SENNOSIDES 17.2 MG: 8.6 TABLET, FILM COATED ORAL at 20:39

## 2024-02-12 RX ADMIN — GABAPENTIN 100 MG: 100 CAPSULE ORAL at 20:39

## 2024-02-12 RX ADMIN — IPRATROPIUM BROMIDE AND ALBUTEROL SULFATE 1 DOSE: 2.5; .5 SOLUTION RESPIRATORY (INHALATION) at 07:07

## 2024-02-12 RX ADMIN — BACLOFEN 5 MG: 10 TABLET ORAL at 08:33

## 2024-02-12 RX ADMIN — LACOSAMIDE 100 MG: 100 TABLET, FILM COATED ORAL at 20:39

## 2024-02-12 RX ADMIN — ASPIRIN 81 MG 81 MG: 81 TABLET ORAL at 08:33

## 2024-02-12 RX ADMIN — APIXABAN 2.5 MG: 2.5 TABLET, FILM COATED ORAL at 08:32

## 2024-02-12 RX ADMIN — CITALOPRAM HYDROBROMIDE 10 MG: 10 TABLET ORAL at 08:33

## 2024-02-12 RX ADMIN — BACLOFEN 5 MG: 10 TABLET ORAL at 20:39

## 2024-02-12 RX ADMIN — GABAPENTIN 100 MG: 100 CAPSULE ORAL at 08:33

## 2024-02-12 RX ADMIN — Medication 30 MG: at 08:33

## 2024-02-12 RX ADMIN — DICLOFENAC SODIUM 2 G: 10 GEL TOPICAL at 08:40

## 2024-02-12 RX ADMIN — GABAPENTIN 100 MG: 100 CAPSULE ORAL at 14:14

## 2024-02-12 RX ADMIN — LACOSAMIDE 100 MG: 100 TABLET, FILM COATED ORAL at 09:20

## 2024-02-12 ASSESSMENT — PAIN DESCRIPTION - ORIENTATION
ORIENTATION: LEFT
ORIENTATION: LEFT

## 2024-02-12 ASSESSMENT — PAIN DESCRIPTION - DESCRIPTORS
DESCRIPTORS: ACHING
DESCRIPTORS: ACHING

## 2024-02-12 ASSESSMENT — PAIN DESCRIPTION - LOCATION
LOCATION: LEG
LOCATION: LEG

## 2024-02-12 ASSESSMENT — PAIN SCALES - GENERAL
PAINLEVEL_OUTOF10: 8
PAINLEVEL_OUTOF10: 10

## 2024-02-12 NOTE — INTERDISCIPLINARY ROUNDS
[facility] Acute Inpatient Rehabilitation  INTERDISCIPLINARY MEETING  Date: 24  Patient Name: Brennen Mcclure       Room: 2633/2633-01  MRN: [id number]       : 1965  (59 y.o.)     Gender: male     Patient's care team, including nursing, speech language pathologist, occupational therapist, and/or physical therapist, met to discuss patient's care needs. Any patient concerns, change in medical status, and current transfer/mobility status were identified at this time.     Any Additional Pertinent Information:   Pt's Spouse participating in Training today with staff. Resting Hand splint to be used at rest and especially at Night Time, Nightly.     Participating Team Members:  Nurse: Macy Horne II, LPN    Occupational Therapist:  Erica Hernandez OT   Physical Therapist: Fany Kelly  PT  Speech Therapist:  Zofia Martinez M.A., CCC-SLP

## 2024-02-12 NOTE — PROGRESS NOTES
BEHAVIORAL HEALTH FOLLOW-UP NOTE     2024     Patient was seen and examined in person, Chart reviewed   Patient's case discussed with staff/team    Chief Complaint: Depression    Interim History:   No acute overnight events. Vitals wnl.     Patient seen and examined at bedside. His wife was also present. Patient has been actively participating in therapy. He was tired and sleepy following therapy. He is tolerating medications well. Denies any SI. He does continue to endorse crying episodes, but per his wife and nursing the episode have decreased in frequency and duration.     /85   Pulse (!) 101   Temp 97.5 °F (36.4 °C) (Oral)   Resp 16   Ht 1.778 m (5' 10\")   Wt 77.1 kg (170 lb)   SpO2 98%   BMI 24.39 kg/m²   Appetite:   [x] Normal/Unchanged  [] Increased  [] Decreased      Sleep:       [x] Normal/Unchanged  [] Fair       [] Poor              Energy:    [x] Normal/Unchanged  [] Increased  [] Decreased        Aggression:  [] yes  [x] no    Patient is [x] able  [] unable to CONTRACT FOR SAFETY ON THE UNIT    PAST MEDICAL/PSYCHIATRIC HISTORY:   Past Medical History:   Diagnosis Date    CAD (coronary artery disease)     Chronic deep vein thrombosis (DVT) of both lower extremities (HCC)     Chronic idiopathic thrombocytopenia (HCC)     CVA (cerebral vascular accident) (HCC)     HLD (hyperlipidemia)        FAMILY/SOCIAL HISTORY:  Family History   Problem Relation Age of Onset    Coronary Art Dis Mother     Stroke Other      Social History     Socioeconomic History    Marital status:      Spouse name: Not on file    Number of children: Not on file    Years of education: Not on file    Highest education level: Not on file   Occupational History    Not on file   Tobacco Use    Smoking status: Former     Current packs/day: 0.00     Types: Cigarettes     Quit date: 2023     Years since quittin.2    Smokeless tobacco: Never   Substance and Sexual Activity    Alcohol use: Not on file    Drug  found for: \"VALPROATE\", \"CBMZ\"    RISK ASSESSMENT: low risk for suicide or harm to others    Treatment Plan:  Reviewed current Medications with the patient.   No Medication Changes Today    Risks, benefits, side effects, drug-to-drug interactions and alternatives to treatment were discussed. The patient consented to treatment.     Encourage patient to attend group and other milieu activities.  Discharge planning discussed with the patient and treatment team.    PSYCHOTHERAPY/COUNSELING:  [] Therapeutic interview  [x] Supportive  [] CBT  [] Ongoing  [] Other    Brennen Mcclure is a 59 y.o. male being evaluated face to face.     --Lanie Mukherjee MD on 2/12/2024 at 11:25 AM    An electronic signature was used to authenticate this note.     **This report has been created using voice recognition software. It may contain minor errors which are inherent in voice recognition technology.**  I independently saw and evaluated the patient.  I reviewed the  documentation above    .  Any additional comments or changes to the   documentation are stated below otherwise agree with assessment.        The patient's mood is improving.  He still has some crying spells but they are infrequent.  He denies suicidal thoughts    No Medication Changes Today  PLAN  Medications as noted above  Attempt to develop insight  Psycho-education conducted.  Supportive Therapy conducted.  Follow-up daily while on inpatient unit    Electronically signed by LEAH ESCALANTE MD on 2/12/24 at 7:27 PM EST

## 2024-02-12 NOTE — PLAN OF CARE
Problem: Discharge Planning  Goal: Discharge to home or other facility with appropriate resources  Outcome: Progressing  Flowsheets (Taken 2/10/2024 0900 by Devora Sam, RN)  Discharge to home or other facility with appropriate resources:   Identify barriers to discharge with patient and caregiver   Arrange for needed discharge resources and transportation as appropriate     Problem: Skin/Tissue Integrity  Goal: Absence of new skin breakdown  Description: 1.  Monitor for areas of redness and/or skin breakdown  2.  Assess vascular access sites hourly  3.  Every 4-6 hours minimum:  Change oxygen saturation probe site  4.  Every 4-6 hours:  If on nasal continuous positive airway pressure, respiratory therapy assess nares and determine need for appliance change or resting period.  Outcome: Progressing     Problem: Safety - Adult  Goal: Free from fall injury  Outcome: Progressing  Flowsheets (Taken 2/2/2024 1405 by Fátima Callaway LPN)  Free From Fall Injury: Instruct family/caregiver on patient safety     Problem: ABCDS Injury Assessment  Goal: Absence of physical injury  Outcome: Progressing  Flowsheets (Taken 2/2/2024 1405 by Fátima Callaway LPN)  Absence of Physical Injury: Implement safety measures based on patient assessment     Problem: Pain  Goal: Verbalizes/displays adequate comfort level or baseline comfort level  Outcome: Progressing  Flowsheets (Taken 2/8/2024 0124 by Agata Gramajo, RN)  Verbalizes/displays adequate comfort level or baseline comfort level:   Encourage patient to monitor pain and request assistance   Assess pain using appropriate pain scale   Implement non-pharmacological measures as appropriate and evaluate response   Administer analgesics based on type and severity of pain and evaluate response     Problem: Nutrition Deficit:  Goal: Optimize nutritional status  Outcome: Progressing  Flowsheets (Taken 2/5/2024 0934 by Laura Llamas, RD, LD)  Nutrient intake appropriate for

## 2024-02-12 NOTE — PROGRESS NOTES
SPEECH LANGUAGE PATHOLOGY  Speech Language Pathology  [facility] ACUTE REHAB    Cognitive Treatment Note    Date: 2/12/2024  Patient’s Name: Brennen Mcclure  MRN: [id number]  Diagnosis:   Patient Active Problem List   Diagnosis Code    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 (HCC) I69.152    Pseudobulbar affect F48.2    Intractable headache R51.9    Intracranial hemorrhage (HCC) I62.9       Pain: 0/10    Cognitive Treatment    Treatment time: 9444-2623 & 3568-6982  *shortened PM session d/t Pt still working with PT/OT upon arrival       Subjective: [x] Alert [x] Cooperative     [] Confused     [] Agitated    [] Lethargic      Objective/Assessment:  Attention: Occasional repetition and redirection required.  Long response latency demo. T/o.      Visual scanning task, reading paragraph- Completed with 70% accuracy (I), increasing to 90% c MAX cues to attend to L side.     Recall: Working memory:   Mental manipulation (scrambled sentences, 3 units): 80% accuracy (I), increasing to 100% cued.   Mental manipulation (scrambled sentences, 4 units): 80% accuracy (I), increasing to 100% cued.     Organization: Multiple uses for objects- 80% accuracy (I), increasing to 100% cued.     Problem Solving/Reasoning: n/a    Speech: Pt presents with mild dysarthria characterized by imprecise articulation, blended word boundaries, rapid rate and reduced vocal intensity.  Pt required mod A (verbal cues) to facilitate strategies in conversation and max A with articulation drills tasks (paragraph reading task).  Speech judged to be ~90% intelligible in conversation and ~60% intelligible at paragraph level.  Intelligibility noted to decline with duration.  Pt able to complete OMEs x4 sets in reps of 20 c mod cues.  Reduced labial and lingual strength, coordination and ROM observed with exercise attempts.  Reduced endurance noted with duration/repetitions.      Other: Pt's wife present in AM and PM.  Pt and wife tearful during AM

## 2024-02-12 NOTE — TELEPHONE ENCOUNTER
Relayed message to Dr. Corbett about pt being currently located in [facility], but needs an appt with him per Dr. Cha's notes. Scheduled pt for first open available appt in 4/24.     Per Dr. Corbett 2/12/24: Please setup followup with me next 1-2wks     Called pt's wife and LM for her to call back to get pt rescheduled for sooner

## 2024-02-12 NOTE — PROGRESS NOTES
[facility]   IN-PATIENT SERVICE   [facility]    Consult note            Date:   2/12/2024  Patient name:  Brennen Mcclure  Date of admission:  1/27/2024  3:01 PM  MRN:   [id number]  Account:  [id number]  YOB: 1965  PCP:    No primary care provider on file.  Room:   [address] [place] [address]  Code Status:    Full Code    Chief Complaint:     No chief complaint on file.      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, started on heparin drip also had patchy IPH, complicated by expansion of intraparenchymal hemorrhage with mass effect, s/p craniectomy, and evacuation of IPH x 2 with dural repair last CT head on on 1/24 showed evolution of the hemorrhage with reduction in the mass effect, Hospital course further complicated by ITP, was seen by hematology oncology, given IVIG and platelet transfusion,, started on Vimpat for seizure prevention, had cellulitis on Celexa  Patient was seen by hematology oncology over there and was started on Eliquis 2.5 twice daily  Patient was drowsy but arousable on my encounter,  Family was present at the bedside    Denies any chest pain shortness of breath        Past Medical History:     Past Medical History:   Diagnosis Date    CAD (coronary artery disease)     Chronic deep vein thrombosis (DVT) of both lower extremities (HCC)     Chronic idiopathic thrombocytopenia (HCC)     CVA (cerebral vascular accident) (HCC)     HLD (hyperlipidemia)         Past Surgical History:     Past Surgical History:   Procedure Laterality Date    CORONARY ARTERY BYPASS GRAFT      CORONARY STENT PLACEMENT          Medications Prior to Admission:     Prior to    blood pressure running good now   Patient reports no new complaints.   Engaging with physical therapy.    Consultations:   IP CONSULT TO DIETITIAN  IP CONSULT TO SOCIAL WORK  IP CONSULT TO INTERNAL MEDICINE  IP CONSULT TO PSYCHIATRY  IP CONSULT TO ONCOLOGY     Patient is admitted as inpatient status because of co-morbidities listed above, severity of signs and symptoms as outlined, requirement for current medical therapies and most importantly because of direct risk to patient if care not provided in a hospital setting.    Evita Pinzon MD  2/12/2024  6:09 PM    Copy sent to Dr. Stack primary care provider on file.    Please note that this chart was generated using voice recognition Dragon dictation software.  Although every effort was made to ensure the accuracy of this automated transcription, some errors in transcription may have occurred.

## 2024-02-12 NOTE — PROGRESS NOTES
Physical Therapy  Facility/Department: [facility] ACUTE REHAB   Physical Therapy     NAME: Brennen Mcclure  : 1965 (59 y.o.)  MRN: [id number]  CODE STATUS: Full Code    Date of Service: 24      Past Medical History:   Diagnosis Date    CAD (coronary artery disease)     Chronic deep vein thrombosis (DVT) of both lower extremities (HCC)     Chronic idiopathic thrombocytopenia (HCC)     CVA (cerebral vascular accident) (HCC)     HLD (hyperlipidemia)      Past Surgical History:   Procedure Laterality Date    CORONARY ARTERY BYPASS GRAFT      CORONARY STENT PLACEMENT         Chart Reviewed: Yes  Patient assessed for rehabilitation services?: Yes  Additional Pertinent Hx: 59 year old male who admitted 23 with 4 days of new L arm weakness and headache. He had known history of MI and CABG - on dabigatran but with inconsistent use due to prescription running out. He had previous treatment for L ICA thrombus and R sided symptoms treated with “clot busting medication” in . 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 He was transferred from [place], [place] to [facility] for medical management and was admitted to neuro critical care. His LUE numbness/weakness worsened to near complete hemiparesis. Initial GCS 15. He was initially treated with heparin gtt for DVST. On 23 Dr. Jameson Parsons (neurosurgery) performed R sided decompressive hemicraniectomy with evacuation of IPH x2 sites with dural repair. He was stable post op on heparin drip and extubated 23 after stable head CT 23. He was then bridged to warfarin. He is currently being treated with warfarin with therapeutic INR since 23  Referring Practitioner: Danica De La Paz MD  Referral Date : 24  Diagnosis: Hemiplegia following ntrm intcrbl hemor effecting  left dominant side  General Comment  Comments: Pt's wife Anila present for family  chair    EDUCATION  Education  Education Given To: Patient;Family  Education Provided: Safety;Mobility Training;Transfer Training;Equipment;Fall Prevention Strategies  Education Provided Comments: Transfer training and postural awareness  Education Method: Demonstration;Verbal  Barriers to Learning: Cognition (attention/awareness deficits)  Education Outcome: Verbalized understanding;Demonstrated understanding;Continued education needed        Therapy Time   02/12/24 1714 02/12/24 1716   Time Code Minutes   Timed Code Treatment Minutes 80 Minutes 30 Minutes  (Co-treat with MARQUIS Sung. total time together 39 minutes. 19 minutes only billable by PT.)   PT Individual Minutes   Time In 1015 1339   Time Out 1135 1350   Minutes 80 11   PT Co-Treatment Minutes   Time In  --  1300   Time Out  --  1339   Minutes  --  39         Corinna Gallegos PTA, 02/12/24 at 5:33 PM

## 2024-02-12 NOTE — CONSULTS
Today's Date: 2/12/2024  Patient Name: Brennen Mcclure  Date of admission: 1/27/2024  3:01 PM  Patient's age: 59 y.o., 1965  Admission Dx: 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 (HCC) [I69.152]    Reason for Consult:  Low platelets   Requesting Physician: Dipika Angulo MD    CHIEF COMPLAINT:  No chief complaint on file.      History Obtained From:  patient and chart    HISTORY OF PRESENT ILLNESS:      Brennen Mcclure is a 59 y.o. male who is admitted to the hospital on 1/27/2024  for acute rehab after recent hospitalization for acute stroke.    Patient had recent hospitalization for left hemiparesis secondary to hemorrhagic CVA and subsequently discharged to acute rehab at [facility].  Patient has history of dural venous sinus thrombosis and also history of DVT and was chronically maintained on Eliquis.    Patient currently is on Eliquis and now noted to have thrombocytopenia which is progressively getting worse therefore hematology was consulted.  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  Patient had a evaluation at [facility] back in 2020 as well and as per the chart.    Past Medical History:   has a past medical history of CAD (coronary artery disease), Chronic deep vein thrombosis (DVT) of both lower extremities (HCC), Chronic idiopathic thrombocytopenia (HCC), CVA (cerebral vascular accident) (HCC), and HLD (hyperlipidemia).    Past Surgical History:   has a past surgical history that includes Coronary artery bypass graft and Coronary stent placement.     Medications:    Prior to Admission medications    Not on File     Current Facility-Administered Medications   Medication Dose Route Frequency Provider Last Rate Last Admin    ipratropium 0.5 mg-albuterol 2.5 mg (DUONEB) nebulizer solution 1 Dose  1 Dose Inhalation TID RT Jerome Hassan MD   1  01/31/2024    Pseudobulbar affect [F48.2] 01/30/2024    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 (HCC) [I69.152] 01/27/2024         IMPRESSION:   Recent acute hemorrhagic stroke  History of dural venous sinus thrombosis  History of DVT  Seizure   Thrombocytopenia  Anemia    RECOMMENDATIONS:  I reviewed the laboratory data, imaging studies, prior records, outside records, discussed diagnosis and treatment recommendations  He has a mild thrombocytopenia at baseline and subsequently appears to be getting worse over the past few days  I will order peripheral blood smear, B12 folic acid level and iron studies  Okay to continue anticoagulation if platelet count stays above 50K and there is no any evidence of bleeding  His thrombocytopenia appears chronic and has had evaluation with hematology in the past and was thought to be immune mediated  His baseline platelets around 80-90K  If platelet counts drop below 50 I would consider IVIG or prednisone treatment  Additionally recently his amitriptyline dose has been increased which can contribute to medication induced thrombocytopenia as well  Monitor counts closely and watch for symptoms of bleeding    Discussed with patient and Nurse.    Thank you for asking us to see this patient.  Lam Chang MD  Hematologist/Medical Oncologist      This note is created with the assistance of a speech recognition program.  While intending to generate a document that actually reflects the content of the visit, the document can still have some errors including those of syntax and sound a like substitutions which may escape proof reading.  It such instances, actual meaning can be extrapolated by contextual diversion.

## 2024-02-12 NOTE — PROGRESS NOTES
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 Patient is noting headache each morning when he wakes. He does feel that it is tolerable now and resolves quickly. No new issues with sleep, appetite, bowel, or bladder. He has used toilet with assist past 2 days. Wife noted some fatigue yesterday which was a symptom present when his platelet count was low in the acute hospital in [place].     ROS:  Denies fevers, chills, sweats.  No chest pain, palpitations, lightheadedness.  Denies coughing, wheezing or shortness of breath.  Denies abdominal pain, nausea, diarrhea  No new areas of joint pain.  Denies new areas of numbness or weakness.  Denies new anxiety or depression issues.  No new skin problems.    Rehabilitation:       PT:    Bed mobility  Bridging: Minimal assistance  Rolling to Left: Moderate assistance, 2 Person assistance (Postioning of L LE & assist to initate rolling from prone.)  Rolling to Right: Maximum assistance, 2 Person assistance (Improved performance c use of momentum c R UE; to attain prone positioning for NMR of L UE.)  Supine to Sit: Moderate assistance (G return to cues for R LE involvement/initiation for bilateral LE movement.)  Sit to Supine: 2 Person assistance, Minimal assistance (Trunk + L LE)  Scooting: Moderate assistance (For EOB positioning & SPT preparation; requires cues for completion of appropriate positioning.)  Bed Mobility Comments: HOB ~45º, rail used.  Supine to Sit  Assistance Level: Maximum assistance  Skilled Clinical Factors: for trunk progression      Transfers  Sit to Stand: Moderate Assistance (L knee blocked, L UE in sling or supported; standing in shower. Moderate L lateral lean, cues for upright posture and forward gaze; P return, requires multiple cues.)  Stand to Sit: Minimal Assistance, 2 Person Assistance (Min x2. Cues for eccentric control; situational support for

## 2024-02-12 NOTE — PLAN OF CARE
Problem: Discharge Planning  Goal: Discharge to home or other facility with appropriate resources  2/11/2024 2236 by Maddie Barragan RN  Outcome: Progressing  2/11/2024 1051 by Devora Sam RN  Outcome: Progressing     Problem: Skin/Tissue Integrity  Goal: Absence of new skin breakdown  Description: 1.  Monitor for areas of redness and/or skin breakdown  2.  Assess vascular access sites hourly  3.  Every 4-6 hours minimum:  Change oxygen saturation probe site  4.  Every 4-6 hours:  If on nasal continuous positive airway pressure, respiratory therapy assess nares and determine need for appliance change or resting period.  2/11/2024 2236 by Maddie Barragan RN  Outcome: Progressing  2/11/2024 1051 by Devora Sam RN  Outcome: Progressing     Problem: Safety - Adult  Goal: Free from fall injury  2/11/2024 2236 by Maddie Barragan RN  Outcome: Progressing  2/11/2024 1051 by Devora Sam RN  Outcome: Progressing     Problem: ABCDS Injury Assessment  Goal: Absence of physical injury  2/11/2024 2236 by Maddie Barragan RN  Outcome: Progressing  2/11/2024 1051 by Devora Sam RN  Outcome: Progressing     Problem: Pain  Goal: Verbalizes/displays adequate comfort level or baseline comfort level  2/11/2024 2236 by Maddie Barragan RN  Outcome: Progressing  2/11/2024 1051 by Devora Sam RN  Outcome: Progressing     Problem: Nutrition Deficit:  Goal: Optimize nutritional status  2/11/2024 2236 by Maddie Barragan RN  Outcome: Progressing  2/11/2024 1051 by Devora Sam RN  Outcome: Progressing     Problem: Chronic Conditions and Co-morbidities  Goal: Patient's chronic conditions and co-morbidity symptoms are monitored and maintained or improved  2/11/2024 2236 by Maddie Barragan RN  Outcome: Progressing  2/11/2024 1051 by Devora Sam RN  Outcome: Progressing     Problem: Respiratory - Adult  Goal: Achieves optimal ventilation and oxygenation  2/11/2024 2236 by Maddie Barragan RN  Outcome: Progressing  2/11/2024 1051 by

## 2024-02-12 NOTE — PATIENT CARE CONFERENCE
[facility] Acute Inpatient Rehabilitation  TEAM CONFERENCE NOTE  Date: 24  Patient Name: Brennen Mcclure       Room: 2633/2633-01  MRN: [id number]       : 1965  (59 y.o.)     Gender: male     Referring Practitioner: Danica De La Paz MD     PRINCIPAL DIAGNOSIS: 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 (HCC)    NURSING    Bladder Continence  Incontinent less than daily  - Time voids   Bowel Continence Always Continent    Date of Last BM: 2024    Bladder/Bowel Program Interventions: Both Bowel & Bladder Program In Place     Wounds/Incisions/Ulcers: No skin issues identified scars on right side of head from carionotomy    Pain Control: Patient's pain currently controlled and pain regimen effective as ordered    Pain Medication Regimen Usage Pattern: MAR reviewed and pain medications are being used at the following frequency (Specify Medication, # Doses Administered on average per day, identified patterns of use - for example: time of day, prior to activity/therapy)  Tylenol 650 mg PRN Q 4 Hrs  - Caffeine tablet  40Mg capsule daily- given this morning       Fall Risk:  Falling star program initiated    Medication Education Program: Patient family able to manage medications     Discharge Preparation Patient/Responsible Party Education In Progress:   Fall risk prevention, stroke education and helmet use education    Nursing specific communication for TEAM: No additional information identified requiring communication at this time    PHYSICAL THERAPY    Bed mobility  Bridging: Minimal assistance (with LLE placment/stabilization. Minimal clearance)  Rolling to Left: Moderate assistance (bed flat, using HR and gravity to assist)  Rolling to Right: Maximum assistance (bed flat using rail and gravity to assist along with spouse using comfort glide straps to pull on at hips.)  Supine to Sit: Moderate assistance (with HOB elevated and using  HR simulating home set up with  verbalize/demonstrate good understanding of 3 positive coping strategies to manage emotions/anxiety to promote maximal participation in self-care and functional tasks  ST: Mod I for expression (dysarthria) and memory; Supervision for problem solving; Diet at least restrictive consistency     Treating Interdisciplinary Team Professionals/Participating Team Members with current knowledge of Brennen Mcclure:  /:  Claude Barajas RN  Occupational Therapist:  Bhupinder Gutierrez OT   Physical Therapist: Fany Kelly PT  Speech Therapist:  Zofia Martinez M.A., CCC-SLP   Nurse: Maddie Barragan RN   Dietary/Nutrition: Laura Llamas RD, LD  Pastoral Care: Chaplain Macy Mims  CMG: Keyla Russell RN    I attest to leading the interdisciplinary team meeting, required attendees are present as listed above, I approve the established interdisciplinary plan of care as documented within the medical record of Brennen Mcclure. Patient still having fluctuating strength and requiring max assist for transfers. He is medically stable. Headache has improved.     Danica De La Paz MD

## 2024-02-12 NOTE — PROGRESS NOTES
Mobility  Additional Factors: Set-up;Verbal cues;Increased time to complete;Head of bed raised;With handrails     Supine to Sit  Assistance Level: Moderate assistance  Skilled Clinical Factors: Cues and increased time for hooking method to advance BLEs over EOB, A for trunk upright progression from R side.  Scooting  Assistance Level: Moderate assistance  Skilled Clinical Factors: hips towards EOB using bed rail for assist.       Sit to Stand  Assistance Level: Maximum assistance (mod-max A)  Skilled Clinical Factors: vc for technique, LUE support throughout.  Stand to Sit  Assistance Level: Moderate assistance  Skilled Clinical Factors: for controlled sit, cueing for RUE placement, support/protection to LUE     Stand Pivot  Assistance Level: Requires x 2 assistance;Maximum assistance  Skilled Clinical Factors: EOB>w/c<>recliner towards both R/L side this date for optimal training and practice to prepare for safe home discharge. Increased educ on proper set up/positioning/transfer technique including safe body mechanics for caregiver. x1 significant LOB with wife completing transfer (therapists provideding close SBA and cues), max assist provided from both CHO/PTA to correct LOB.              Functional Mobility  Device: Wheelchair  Activity: To/From bathroom  Assistance Level: Dependent (for manuverability in room due to multiple enviromental space limitations and to allow for proper positioning in prep for safe tranfers.)        OT Exercises  Dynamic Sitting Balance Exercises: 15 min tolerated EOB for dynamic reaching/core correction with mod-dep Assist  due to significant L lateral and posterior lean. Pt with decreased awareness to assist in correcting, increased cueing and time to correct, using hand rail at foot of bed for reaching and supporting self to midline using LUE.    Assessment  Assessment  Activity Tolerance: Patient tolerated treatment well  Discharge Recommendations: Continue to assess pending  Term Goal 7: Pt will complete self-care with improved FMC and  strength AEB 10 second and 10# improvemenet on 9HPT and dynamometer  Long Term Goal 8: Pt will complete self-feeding and oral hygiene with Mod I using AE/DME/Modified techniques as needed  Long Term Goal 9: Pt will identify 3 positive leisure activities to increased overall quality of life  Long Term Goal 10: Pt will verbalize/demonstrate good understanding of 3 positive coping strategies to manage emotions/anxiety to promote maximal participation in self-care and functional tasks    Plan  Occupational Therapy Plan  Times Per Week: 900 minutes of OT/PT/SLP  Current Treatment Recommendations: Self-Care / ADL, Strengthening, ROM, Balance training, Functional mobility training, Endurance training, Wheelchair mobility training, Neuromuscular re-education, Cognitive reorientation, Pain management, Safety education & training, Patient/Caregiver education & training, Equipment evaluation, education, & procurement, Positioning, Home management training, Coordination training, Co-Treatment       02/12/24 0930 02/12/24 1430   OT Individual Minutes   Time In 0802  --    Time Out 0920  --    Minutes 78  --    OT Co-Treatment Minutes   Time In  --  1305   Time Out  --  1337   Minutes  --  32   Time Code Minutes    Timed Code Treatment Minutes  --  94 Minutes         Electronically signed by FORTINO Ortiz on 2/12/24 at 3:09 PM EST

## 2024-02-13 LAB
BASOPHILS # BLD: 0.11 K/UL (ref 0–0.2)
BASOPHILS NFR BLD: 1 % (ref 0–2)
EOSINOPHIL # BLD: 0.33 K/UL (ref 0–0.4)
EOSINOPHILS RELATIVE PERCENT: 3 % (ref 0–4)
ERYTHROCYTE [DISTWIDTH] IN BLOOD BY AUTOMATED COUNT: 23.9 % (ref 11.5–14.9)
HCT VFR BLD AUTO: 45.9 % (ref 41–53)
HGB BLD-MCNC: 15 G/DL (ref 13.5–17.5)
LYMPHOCYTES NFR BLD: 0.99 K/UL (ref 1–4.8)
LYMPHOCYTES RELATIVE PERCENT: 9 % (ref 24–44)
MCH RBC QN AUTO: 27.9 PG (ref 26–34)
MCHC RBC AUTO-ENTMCNC: 32.6 G/DL (ref 31–37)
MCV RBC AUTO: 85.6 FL (ref 80–100)
MONOCYTES NFR BLD: 0.99 K/UL (ref 0.1–1.3)
MONOCYTES NFR BLD: 9 % (ref 1–7)
MORPHOLOGY: ABNORMAL
NEUTROPHILS NFR BLD: 78 % (ref 36–66)
NEUTS SEG NFR BLD: 8.58 K/UL (ref 1.3–9.1)
PATH REV BLD -IMP: NORMAL
PLATELET # BLD AUTO: 80 K/UL (ref 150–450)
PMV BLD AUTO: 9.9 FL (ref 6–12)
RBC # BLD AUTO: 5.36 M/UL (ref 4.5–5.9)
SURGICAL PATHOLOGY REPORT: NORMAL
WBC OTHER # BLD: 11 K/UL (ref 3.5–11)

## 2024-02-13 PROCEDURE — 6370000000 HC RX 637 (ALT 250 FOR IP): Performed by: PSYCHIATRY & NEUROLOGY

## 2024-02-13 PROCEDURE — 6370000000 HC RX 637 (ALT 250 FOR IP): Performed by: INTERNAL MEDICINE

## 2024-02-13 PROCEDURE — 99233 SBSQ HOSP IP/OBS HIGH 50: CPT | Performed by: PHYSICAL MEDICINE & REHABILITATION

## 2024-02-13 PROCEDURE — 6370000000 HC RX 637 (ALT 250 FOR IP): Performed by: STUDENT IN AN ORGANIZED HEALTH CARE EDUCATION/TRAINING PROGRAM

## 2024-02-13 PROCEDURE — 1180000000 HC REHAB R&B

## 2024-02-13 PROCEDURE — 97530 THERAPEUTIC ACTIVITIES: CPT

## 2024-02-13 PROCEDURE — 97535 SELF CARE MNGMENT TRAINING: CPT

## 2024-02-13 PROCEDURE — 85025 COMPLETE CBC W/AUTO DIFF WBC: CPT

## 2024-02-13 PROCEDURE — 94640 AIRWAY INHALATION TREATMENT: CPT

## 2024-02-13 PROCEDURE — 94761 N-INVAS EAR/PLS OXIMETRY MLT: CPT

## 2024-02-13 PROCEDURE — 92507 TX SP LANG VOICE COMM INDIV: CPT

## 2024-02-13 PROCEDURE — 6370000000 HC RX 637 (ALT 250 FOR IP): Performed by: PHYSICAL MEDICINE & REHABILITATION

## 2024-02-13 PROCEDURE — 99232 SBSQ HOSP IP/OBS MODERATE 35: CPT | Performed by: PSYCHIATRY & NEUROLOGY

## 2024-02-13 PROCEDURE — 36415 COLL VENOUS BLD VENIPUNCTURE: CPT

## 2024-02-13 PROCEDURE — 97129 THER IVNTJ 1ST 15 MIN: CPT

## 2024-02-13 PROCEDURE — 97112 NEUROMUSCULAR REEDUCATION: CPT

## 2024-02-13 RX ADMIN — GABAPENTIN 100 MG: 100 CAPSULE ORAL at 14:44

## 2024-02-13 RX ADMIN — BACLOFEN 5 MG: 10 TABLET ORAL at 21:40

## 2024-02-13 RX ADMIN — IPRATROPIUM BROMIDE AND ALBUTEROL SULFATE 1 DOSE: 2.5; .5 SOLUTION RESPIRATORY (INHALATION) at 07:07

## 2024-02-13 RX ADMIN — MODAFINIL 200 MG: 100 TABLET ORAL at 07:44

## 2024-02-13 RX ADMIN — DICLOFENAC SODIUM 2 G: 10 GEL TOPICAL at 07:50

## 2024-02-13 RX ADMIN — GABAPENTIN 100 MG: 100 CAPSULE ORAL at 07:45

## 2024-02-13 RX ADMIN — DICLOFENAC SODIUM 2 G: 10 GEL TOPICAL at 21:40

## 2024-02-13 RX ADMIN — FERROUS SULFATE TAB 325 MG (65 MG ELEMENTAL FE) 325 MG: 325 (65 FE) TAB at 07:45

## 2024-02-13 RX ADMIN — ASPIRIN 81 MG 81 MG: 81 TABLET ORAL at 07:44

## 2024-02-13 RX ADMIN — IPRATROPIUM BROMIDE AND ALBUTEROL SULFATE 1 DOSE: 2.5; .5 SOLUTION RESPIRATORY (INHALATION) at 21:13

## 2024-02-13 RX ADMIN — LACOSAMIDE 100 MG: 100 TABLET, FILM COATED ORAL at 21:40

## 2024-02-13 RX ADMIN — Medication 1 TABLET: at 07:45

## 2024-02-13 RX ADMIN — APIXABAN 2.5 MG: 2.5 TABLET, FILM COATED ORAL at 07:45

## 2024-02-13 RX ADMIN — APIXABAN 2.5 MG: 2.5 TABLET, FILM COATED ORAL at 21:41

## 2024-02-13 RX ADMIN — IPRATROPIUM BROMIDE AND ALBUTEROL SULFATE 1 DOSE: 2.5; .5 SOLUTION RESPIRATORY (INHALATION) at 14:50

## 2024-02-13 RX ADMIN — Medication 30 MG: at 07:44

## 2024-02-13 RX ADMIN — LACOSAMIDE 100 MG: 100 TABLET, FILM COATED ORAL at 07:44

## 2024-02-13 RX ADMIN — GABAPENTIN 100 MG: 100 CAPSULE ORAL at 21:40

## 2024-02-13 RX ADMIN — BUTALBITA,ACETAMINOPHEN AND CAFFEINE 1 CAPSULE: 50; 300; 40 CAPSULE ORAL at 07:47

## 2024-02-13 RX ADMIN — SENNOSIDES 17.2 MG: 8.6 TABLET, FILM COATED ORAL at 21:40

## 2024-02-13 RX ADMIN — ATORVASTATIN CALCIUM 80 MG: 80 TABLET, FILM COATED ORAL at 21:40

## 2024-02-13 RX ADMIN — AMITRIPTYLINE HYDROCHLORIDE 25 MG: 25 TABLET, FILM COATED ORAL at 20:05

## 2024-02-13 RX ADMIN — CITALOPRAM HYDROBROMIDE 10 MG: 10 TABLET ORAL at 07:45

## 2024-02-13 RX ADMIN — Medication 6 MG: at 21:40

## 2024-02-13 RX ADMIN — BACLOFEN 5 MG: 10 TABLET ORAL at 07:45

## 2024-02-13 RX ADMIN — TOPIRAMATE 25 MG: 25 TABLET, FILM COATED ORAL at 07:49

## 2024-02-13 RX ADMIN — TOPIRAMATE 25 MG: 25 TABLET, FILM COATED ORAL at 20:05

## 2024-02-13 ASSESSMENT — PAIN SCALES - GENERAL
PAINLEVEL_OUTOF10: 8

## 2024-02-13 ASSESSMENT — PAIN DESCRIPTION - LOCATION
LOCATION: HEAD
LOCATION: HEAD

## 2024-02-13 NOTE — PLAN OF CARE
Problem: Discharge Planning  Goal: Discharge to home or other facility with appropriate resources  2/12/2024 2235 by Maddie Barragan RN  Outcome: Progressing  2/12/2024 1446 by Macy Horne II, LPN  Outcome: Progressing  Flowsheets (Taken 2/10/2024 0900 by Devora Sam, RN)  Discharge to home or other facility with appropriate resources:   Identify barriers to discharge with patient and caregiver   Arrange for needed discharge resources and transportation as appropriate     Problem: Skin/Tissue Integrity  Goal: Absence of new skin breakdown  Description: 1.  Monitor for areas of redness and/or skin breakdown  2.  Assess vascular access sites hourly  3.  Every 4-6 hours minimum:  Change oxygen saturation probe site  4.  Every 4-6 hours:  If on nasal continuous positive airway pressure, respiratory therapy assess nares and determine need for appliance change or resting period.  2/12/2024 2235 by Maddie Barragan RN  Outcome: Progressing  2/12/2024 1446 by Macy Horne II, LPN  Outcome: Progressing     Problem: Safety - Adult  Goal: Free from fall injury  2/12/2024 2235 by Maddie Barragan RN  Outcome: Progressing  2/12/2024 1446 by Macy Horne II, LPN  Outcome: Progressing  Flowsheets (Taken 2/2/2024 1405 by Fátima Callaway LPN)  Free From Fall Injury: Instruct family/caregiver on patient safety     Problem: ABCDS Injury Assessment  Goal: Absence of physical injury  2/12/2024 2235 by Maddie Barragan RN  Outcome: Progressing  2/12/2024 1446 by Macy Horne II, LPN  Outcome: Progressing  Flowsheets (Taken 2/2/2024 1405 by Fátima Callaway LPN)  Absence of Physical Injury: Implement safety measures based on patient assessment     Problem: Pain  Goal: Verbalizes/displays adequate comfort level or baseline comfort level  2/12/2024 2235 by Maddie Barragan RN  Outcome: Progressing  2/12/2024 1446 by Macy Horne II, LPN  Outcome: Progressing  Flowsheets (Taken 2/8/2024 0124 by Agata Gramajo  RN)  Verbalizes/displays adequate comfort level or baseline comfort level:   Encourage patient to monitor pain and request assistance   Assess pain using appropriate pain scale   Implement non-pharmacological measures as appropriate and evaluate response   Administer analgesics based on type and severity of pain and evaluate response     Problem: Nutrition Deficit:  Goal: Optimize nutritional status  2/12/2024 2235 by Maddie Barragan RN  Outcome: Progressing  2/12/2024 1446 by Macy Horne II, LPN  Outcome: Progressing  Flowsheets (Taken 2/5/2024 0934 by Laura Llamas RD, LD)  Nutrient intake appropriate for improving, restoring, or maintaining nutritional needs: Monitor oral intake, labs, and treatment plans     Problem: Chronic Conditions and Co-morbidities  Goal: Patient's chronic conditions and co-morbidity symptoms are monitored and maintained or improved  2/12/2024 2235 by Maddie Barragan RN  Outcome: Progressing  2/12/2024 1446 by Macy Horne II, LPN  Outcome: Progressing  Flowsheets (Taken 2/10/2024 0900 by Devora Sam RN)  Care Plan - Patient's Chronic Conditions and Co-Morbidity Symptoms are Monitored and Maintained or Improved:   Monitor and assess patient's chronic conditions and comorbid symptoms for stability, deterioration, or improvement   Collaborate with multidisciplinary team to address chronic and comorbid conditions and prevent exacerbation or deterioration     Problem: Respiratory - Adult  Goal: Achieves optimal ventilation and oxygenation  2/12/2024 2235 by Maddie Barragan RN  Outcome: Progressing  2/12/2024 1446 by Macy Horne II, LPN  Outcome: Progressing  Flowsheets (Taken 2/11/2024 0800 by Devora Sam RN)  Achieves optimal ventilation and oxygenation:   Assess for changes in respiratory status   Assess for changes in mentation and behavior

## 2024-02-13 NOTE — PROGRESS NOTES
Oral, Nightly, Dipika Angulo MD, 6 mg at 02/12/24 2039    modafinil (PROVIGIL) tablet 200 mg, 200 mg, Oral, Daily, Dipika Angulo MD, 200 mg at 02/13/24 0744    therapeutic multivitamin-minerals 1 tablet, 1 tablet, Oral, Daily, Dipika Angulo MD, 1 tablet at 02/13/24 0745    senna (SENOKOT) tablet 17.2 mg, 2 tablet, Oral, Nightly, Dipika Angulo MD, 17.2 mg at 02/12/24 2039      Examination:  /69   Pulse 92   Temp 98.2 °F (36.8 °C) (Oral)   Resp 16   Ht 1.778 m (5' 10\")   Wt 77.1 kg (170 lb)   SpO2 95%   BMI 24.39 kg/m²     Medication side effects(SE): none    Mental Status Examination:    Level of consciousness:  within normal limits   Appearance:  fair grooming and fair hygiene  Behavior/Motor:  no abnormalities noted  Attitude toward examiner:  cooperative, attentive, and good eye contact  Speech:  spontaneous, normal rate, and normal volume   Mood: within normal limits  Affect:  mood congruent  Thought processes:  linear, goal directed, and coherent   Thought content:  Homicidal ideation - none  Suicidal Ideation:  denies suicidal ideation  Delusions:  no evidence of delusions  Perceptual Disturbance:  denies any perceptual disturbance  Cognition:  oriented to person, place, and time   Concentration intact  Insight fair   Judgement fair     ASSESSMENT:   Patient symptoms are:  [] Well controlled  [x] Improving  [] Worsening  [] No change      Diagnosis:   Principal Problem:    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 (HCC)  Active Problems:    Pseudobulbar affect    Intractable headache    Intracranial hemorrhage (HCC)    Thrombocytopenia (HCC)    Anemia  Resolved Problems:    * No resolved hospital problems. *      LABS:    Recent Labs     02/11/24  1725 02/12/24  0626 02/12/24  1548   WBC 9.1 7.1 8.0   HGB 15.7 15.6 15.4   PLT 83* 64* See Reflexed IPF Result       Recent Labs     02/11/24  0654 02/12/24  0626    135   K 4.7 4.2   CL 98 98   CO2 26 25   BUN

## 2024-02-13 NOTE — PROGRESS NOTES
Physical Therapy  Facility/Department: [facility] ACUTE REHAB  Treatment note    NAME: Brennen Mcclure  : 1965 (59 y.o.)  MRN: [id number]  CODE STATUS: Full Code  Date of Service: 24    Past Medical History:   Diagnosis Date    CAD (coronary artery disease)     Chronic deep vein thrombosis (DVT) of both lower extremities (HCC)     Chronic idiopathic thrombocytopenia (HCC)     CVA (cerebral vascular accident) (HCC)     HLD (hyperlipidemia)      Past Surgical History:   Procedure Laterality Date    CORONARY ARTERY BYPASS GRAFT      CORONARY STENT PLACEMENT         Family / Caregiver Present: Yes (SO Mara, daughter Tiffany)  General Comment  Comments: Pt's wife Mara & daughter Tiffany present for family training on this date, including use of Radha Plus for sit <> stand, toilet transfer & car transfer; also practiced car transfer without device to/from wheelchair. Pt does not demonstrate ability to move L LE today during transfers without assistive device, requiring 2nd pair of hands to facilitate + 3rd for safety.    Restrictions:  Restrictions/Precautions: General Precautions;Fall Risk;Up as Tolerated  Required Braces or Orthoses  Other:  (Helmet on when OOB)  Left Upper Extremity Brace/Splint: Resting hand  Position Activity Restriction  Other position/activity restrictions: Helmet on when OOB; Sling for transfers/mobility only     SUBJECTIVE  Subjective: Mara states she will try to determine if she needs something similar to Radha Plus in lieu of Radha Stedy-type lift she has.  Pain: Pt c/o pain (tender to touch, weight-bearing) in L UE throughout sessions, although he did state at one time his L UE was not in pain (while cradled in Radha Plus support, a position he'd previously complained about). C/o headache in PM, stated effective caffeine pills were d/c'd.     OBJECTIVE  Functional Mobility  Bed mobility  Bridging: Minimal assistance (with LLE placement/stabilization. Minimal clearance. Mara attempting to use

## 2024-02-13 NOTE — PLAN OF CARE
Problem: Discharge Planning  Goal: Discharge to home or other facility with appropriate resources  2/13/2024 1146 by Sophie Shell RN  Outcome: Progressing     Problem: Skin/Tissue Integrity  Goal: Absence of new skin breakdown  Description: 1.  Monitor for areas of redness and/or skin breakdown  2.  Assess vascular access sites hourly  3.  Every 4-6 hours minimum:  Change oxygen saturation probe site  4.  Every 4-6 hours:  If on nasal continuous positive airway pressure, respiratory therapy assess nares and determine need for appliance change or resting period.  2/13/2024 1146 by Sophie Shell RN  Outcome: Progressing     Problem: Safety - Adult  Goal: Free from fall injury  2/13/2024 1146 by Sophie Shell RN  Outcome: Progressing     Problem: ABCDS Injury Assessment  Goal: Absence of physical injury  2/13/2024 1146 by Sophie Shell RN  Outcome: Progressing     Problem: Nutrition Deficit:  Goal: Optimize nutritional status  2/13/2024 1146 by Sophie Shell RN  Outcome: Progressing     Problem: Chronic Conditions and Co-morbidities  Goal: Patient's chronic conditions and co-morbidity symptoms are monitored and maintained or improved  2/13/2024 1146 by Sophie Shell RN  Outcome: Progressing     Problem: Respiratory - Adult  Goal: Achieves optimal ventilation and oxygenation  2/13/2024 1146 by Sophie Shell RN  Outcome: Progressing

## 2024-02-13 NOTE — CARE COORDINATION
ARU CASE MANAGEMENT NOTE:    Patient is alert and oriented x4.    Spoke with patient and spouse regarding discharge plan: Return home with spouse and would like to have home healthcare. A referral was sent to [facility].    Outside appointments while in ARU: None    Will continue to follow for additional discharge needs.      Electronically signed by Claude Barajas RN on 2/13/2024 at 2:48 PM

## 2024-02-13 NOTE — PROGRESS NOTES
SPEECH LANGUAGE PATHOLOGY  Speech Language Pathology  [facility] ACUTE REHAB    Cognitive Treatment Note    Date: 2/13/2024  Patient’s Name: Brennen Mcclure  MRN: [id number]  Diagnosis:   Patient Active Problem List   Diagnosis Code    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 (HCC) I69.152    Pseudobulbar affect F48.2    Intractable headache R51.9    Intracranial hemorrhage (HCC) I62.9    Thrombocytopenia (HCC) D69.6    Anemia D64.9       Pain: 0/10    Cognitive Treatment    Treatment time: 0935-1001 & [id number]  *shortened AM session d/t Pt toileting (RN present) upon arrival        Subjective: [x] Alert [x] Cooperative     [] Confused     [] Agitated    [] Lethargic      Objective/Assessment:  Attention: Occasional repetition and redirection required.  Long response latency demo. T/o.      Recall: Working memory:  Mental manipulation (3 units, word order)- 90% accuracy (I), 100% c cues.      Organization: n/a    Problem Solving/Reasoning: Reasoning, ID similarities between 2 items- 100% accuracy (I).  ID differences between 2 items- 100% accuracy (I).      Speech: Pt presents with mild dysarthria characterized by imprecise articulation, blended word boundaries, rapid rate and reduced vocal intensity.  Pt required min-mod A (verbal cues) to facilitate strategies in conversation and with articulation drills tasks (alliterations).  Speech judged to be ~90% intelligible during session.  Pt able to complete OMEs x4 sets in reps of 20 c mod cues.  Pt continues to demonstrate reduced labial and lingual strength, coordination and ROM with exercise attempts.  Improved endurance noted with duration/repetitions.      Other: Pt's wife and daughter present in AM.  No family present in PM.  Pt and wife tearful during AM session re: deficits.  Emotional support provided by writer.        Plan:  [x] Continue ST services    [] Discharge from ST:      Discharge recommendations: [] Inpatient Rehab   [] Skilled Nursing

## 2024-02-13 NOTE — PROGRESS NOTES
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 Patient is doing well today. No new issues with pain, sleep, appetite, bowel, or bladder. Progressing with therapies.     ROS:  Denies fevers, chills, sweats.  No chest pain, palpitations, lightheadedness.  Denies coughing, wheezing or shortness of breath.  Denies abdominal pain, nausea, diarrhea  No new areas of joint pain.  Denies new areas of numbness or weakness.  Denies new anxiety or depression issues.  No new skin problems.    Rehabilitation:       PT:    Bed mobility  Bridging: Minimal assistance (with LLE placment/stabilization. Minimal clearance)  Rolling to Left: Moderate assistance (bed flat, using HR and gravity to assist)  Rolling to Right: Maximum assistance (bed flat using rail and gravity to assist along with spouse using comfort glide straps to pull on at hips.)  Supine to Sit: Moderate assistance (with HOB elevated and using  HR simulating home set up with hospital bed. Max cues for technique)  Sit to Supine: 2 Person assistance, Moderate assistance (trunk control and assist with LE's)  Scooting: Moderate assistance (For EOB positioning &  preparation for stand pivot; requires cues for completion of appropriate positioning.)  Bed Mobility Comments: family training  Supine to Sit  Assistance Level: Maximum assistance  Skilled Clinical Factors: for trunk progression      Transfers  Sit to Stand: Moderate Assistance (L knee blocked, L UE in sling or supported; Moderate L lateral lean, cues for upright posture and forward gaze; P return, requires multiple cues.)  Stand to Sit: Minimal Assistance, 2 Person Assistance, Moderate Assistance (Mod x 1/Min x 1. Cues for eccentric control; situational support for L UE/LE PRN.)  Bed to Chair: 2 Person Assistance, Moderate assistance (Blocking/facilitating L knee extension & LE movement. Cues for weight shifting c  limited return.)  Stand Pivot Transfers: 2 Person Assistance, Moderate Assistance (Blocking/facilitating L knee extension & LE movement. Cues for weight shifting c limited return.)  Squat Pivot Transfers:  (Attempted however terminated as pt was unable to understand the technique, pt attempting to fully stand.)  Comment: Attempt using scotty 3000 for safe transfers at home. Hoping sling around pt's trunk will provide him with increase lateral trunk support to allow pt's wife to be able to transfer pt safely with decreasing risk of getting hurt and to allow for increase ease with toileting/rhiannon care.  Transfers  Surface: Wheelchair  Additional Factors: With handrails  Device:  (// bars)  Sit to Stand  Assistance Level: Maximum assistance  Skilled Clinical Factors: Writer blocking L LE and  L UE support  Stand to Sit  Assistance Level: Maximum assistance  Stand Pivot  Assistance Level: Maximum assistance, Minimal assistance, Requires x 2 assistance  Skilled Clinical Factors: CGA utilized for L UE management      Ambulation  Surface: Level tile  Device: Enflick  Other Apparatus: Wheelchair follow (+ UE sling)  Assistance: Dependent/Total (hands-on assist x2 + follow)  Quality of Gait: Lacks L knee & hip extension, P weight shift; facilitation, cues & assist for same (largely dependent for L LE advancement). No return to postural cues or assist for trunk. Narrow base of support, minimal return to cues.  Gait Deviations: Slow Sabrina, Decreased step length, Decreased step height, Deviated path  Distance: 7'  Comments: Further ambulation should take place in LiteGait to help manage Pt's poor core control & minimize potential staff injury.  Ambulation  Surface: Level surface  Device:  (Lite Gait)  Distance: 6ft  Activity: Within Unit  Additional Factors: Increased time to complete, Left AFO, Hand placement cues  Assistance Level: Requires x 2 assistance, Dependent  Gait Deviations: Path deviations, Unsteady gait, Narrow

## 2024-02-13 NOTE — PROGRESS NOTES
Occupational Therapy  [facility]   Acute Rehabilitation Occupational Therapy Daily Treatment Note    Date: 24  Patient Name: Brennen Mcclure       Room: 2633/2633-01  MRN: [id number]  Account: [id number]   : 1965  (59 y.o.) Gender: male       Referring Practitioner: Danica De La Paz MD  Diagnosis: Hemiplegia following ntrm intcrbl hemor aff left dominant side  Additional Pertinent Hx: 59 year old male who admitted 23 with 4 days of new L arm weakness and headache. He had known history of MI and CABG - on dabigatran but with inconsistent use due to prescription running out. He had previous treatment for L ICA thrombus and R sided symptoms treated with “clot busting medication” in . 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 He was transferred from [place], [place] to [facility] for medical management and was admitted to neuro critical care. His LUE numbness/weakness worsened to near complete hemiparesis. Initial GCS 15. He was initially treated with heparin gtt for DVST. On 23 Dr. Jameson Parsons (neurosurgery) performed R sided decompressive hemicraniectomy with evacuation of IPH x2 sites with dural repair. He was stable post op on heparin drip and extubated 23 after stable head CT 23. He was then bridged to warfarin. He is currently being treated with warfarin with therapeutic INR since 23. Hb stable at 13.6 on 24. Thrombocytopenia noted with platelet count 30 - hematology being reconsulted - previously recommended count >40. Currently requiring timed voids for incontinence. PM&R anticipating likely wheelchair mobility with Naveen for transfers.    Treatment Diagnosis: Impaired self-care status    Past Medical History:  has a past medical history of CAD (coronary artery disease), Chronic deep vein thrombosis (DVT) of both lower extremities (HCC),  demonstrate improved awareness of LUE AEB ability to maintain safe positioning of L hand and wrist during ADL routine without assist  Long Term Goal 6: Pt will verbalize/demonstrate good understanding of home safety/fall prevention strategies to promote safety and independence with self-care and mobility  Long Term Goal 7: Pt will complete self-care with improved FMC and  strength AEB 10 second and 10# improvemenet on 9HPT and dynamometer  Long Term Goal 8: Pt will complete self-feeding and oral hygiene with Mod I using AE/DME/Modified techniques as needed  Long Term Goal 9: Pt will identify 3 positive leisure activities to increased overall quality of life  Long Term Goal 10: Pt will verbalize/demonstrate good understanding of 3 positive coping strategies to manage emotions/anxiety to promote maximal participation in self-care and functional tasks    Plan  Occupational Therapy Plan  Times Per Week: 900 minutes of OT/PT/SLP  Current Treatment Recommendations: Self-Care / ADL, Strengthening, ROM, Balance training, Functional mobility training, Endurance training, Wheelchair mobility training, Neuromuscular re-education, Cognitive reorientation, Pain management, Safety education & training, Patient/Caregiver education & training, Equipment evaluation, education, & procurement, Positioning, Home management training, Coordination training, Co-Treatment       02/13/24 1009 02/13/24 1423 02/13/24 1442   OT Individual Minutes   Time In 0805  --   --    Time Out 0906  --   --    Minutes 61  --   --    OT Co-Treatment Minutes   Time In  --  1304  --    Time Out  --  1421  --    Minutes  --  77  --    Time Code Minutes    Timed Code Treatment Minutes  --   --  99 Minutes           Electronically signed by FORTINO Ortiz on 2/13/24 at 3:04 PM EST

## 2024-02-14 PROCEDURE — 97535 SELF CARE MNGMENT TRAINING: CPT

## 2024-02-14 PROCEDURE — 92507 TX SP LANG VOICE COMM INDIV: CPT

## 2024-02-14 PROCEDURE — 6370000000 HC RX 637 (ALT 250 FOR IP): Performed by: PHYSICAL MEDICINE & REHABILITATION

## 2024-02-14 PROCEDURE — 97530 THERAPEUTIC ACTIVITIES: CPT

## 2024-02-14 PROCEDURE — 99232 SBSQ HOSP IP/OBS MODERATE 35: CPT | Performed by: PHYSICAL MEDICINE & REHABILITATION

## 2024-02-14 PROCEDURE — 6370000000 HC RX 637 (ALT 250 FOR IP): Performed by: PSYCHIATRY & NEUROLOGY

## 2024-02-14 PROCEDURE — 6370000000 HC RX 637 (ALT 250 FOR IP): Performed by: STUDENT IN AN ORGANIZED HEALTH CARE EDUCATION/TRAINING PROGRAM

## 2024-02-14 PROCEDURE — 97129 THER IVNTJ 1ST 15 MIN: CPT

## 2024-02-14 PROCEDURE — 99232 SBSQ HOSP IP/OBS MODERATE 35: CPT | Performed by: INTERNAL MEDICINE

## 2024-02-14 PROCEDURE — 1180000000 HC REHAB R&B

## 2024-02-14 PROCEDURE — 97130 THER IVNTJ EA ADDL 15 MIN: CPT

## 2024-02-14 PROCEDURE — 6370000000 HC RX 637 (ALT 250 FOR IP): Performed by: INTERNAL MEDICINE

## 2024-02-14 PROCEDURE — 94640 AIRWAY INHALATION TREATMENT: CPT

## 2024-02-14 PROCEDURE — 97542 WHEELCHAIR MNGMENT TRAINING: CPT

## 2024-02-14 PROCEDURE — 94761 N-INVAS EAR/PLS OXIMETRY MLT: CPT

## 2024-02-14 PROCEDURE — 99232 SBSQ HOSP IP/OBS MODERATE 35: CPT | Performed by: PSYCHIATRY & NEUROLOGY

## 2024-02-14 RX ADMIN — IPRATROPIUM BROMIDE AND ALBUTEROL SULFATE 1 DOSE: 2.5; .5 SOLUTION RESPIRATORY (INHALATION) at 06:57

## 2024-02-14 RX ADMIN — AMITRIPTYLINE HYDROCHLORIDE 25 MG: 25 TABLET, FILM COATED ORAL at 20:51

## 2024-02-14 RX ADMIN — LACOSAMIDE 100 MG: 100 TABLET, FILM COATED ORAL at 20:50

## 2024-02-14 RX ADMIN — ACETAMINOPHEN 650 MG: 325 TABLET, FILM COATED ORAL at 12:48

## 2024-02-14 RX ADMIN — CITALOPRAM HYDROBROMIDE 10 MG: 10 TABLET ORAL at 09:33

## 2024-02-14 RX ADMIN — ASPIRIN 81 MG 81 MG: 81 TABLET ORAL at 09:32

## 2024-02-14 RX ADMIN — BUTALBITA,ACETAMINOPHEN AND CAFFEINE 1 CAPSULE: 50; 300; 40 CAPSULE ORAL at 20:50

## 2024-02-14 RX ADMIN — Medication 1 TABLET: at 09:32

## 2024-02-14 RX ADMIN — APIXABAN 2.5 MG: 2.5 TABLET, FILM COATED ORAL at 09:33

## 2024-02-14 RX ADMIN — MODAFINIL 200 MG: 100 TABLET ORAL at 09:33

## 2024-02-14 RX ADMIN — BACLOFEN 5 MG: 10 TABLET ORAL at 20:50

## 2024-02-14 RX ADMIN — ATORVASTATIN CALCIUM 80 MG: 80 TABLET, FILM COATED ORAL at 20:50

## 2024-02-14 RX ADMIN — DICLOFENAC SODIUM 2 G: 10 GEL TOPICAL at 20:50

## 2024-02-14 RX ADMIN — GABAPENTIN 100 MG: 100 CAPSULE ORAL at 20:50

## 2024-02-14 RX ADMIN — GABAPENTIN 100 MG: 100 CAPSULE ORAL at 12:48

## 2024-02-14 RX ADMIN — TOPIRAMATE 25 MG: 25 TABLET, FILM COATED ORAL at 09:36

## 2024-02-14 RX ADMIN — LACOSAMIDE 100 MG: 100 TABLET, FILM COATED ORAL at 09:33

## 2024-02-14 RX ADMIN — APIXABAN 2.5 MG: 2.5 TABLET, FILM COATED ORAL at 20:50

## 2024-02-14 RX ADMIN — SENNOSIDES 17.2 MG: 8.6 TABLET, FILM COATED ORAL at 20:50

## 2024-02-14 RX ADMIN — GABAPENTIN 100 MG: 100 CAPSULE ORAL at 09:33

## 2024-02-14 RX ADMIN — IPRATROPIUM BROMIDE AND ALBUTEROL SULFATE 1 DOSE: 2.5; .5 SOLUTION RESPIRATORY (INHALATION) at 21:01

## 2024-02-14 RX ADMIN — DICLOFENAC SODIUM 2 G: 10 GEL TOPICAL at 09:39

## 2024-02-14 RX ADMIN — TOPIRAMATE 25 MG: 25 TABLET, FILM COATED ORAL at 20:51

## 2024-02-14 RX ADMIN — BACLOFEN 5 MG: 10 TABLET ORAL at 09:33

## 2024-02-14 RX ADMIN — Medication 6 MG: at 20:50

## 2024-02-14 RX ADMIN — IPRATROPIUM BROMIDE AND ALBUTEROL SULFATE 1 DOSE: 2.5; .5 SOLUTION RESPIRATORY (INHALATION) at 15:43

## 2024-02-14 RX ADMIN — Medication 30 MG: at 09:33

## 2024-02-14 ASSESSMENT — PAIN DESCRIPTION - LOCATION
LOCATION: HEAD
LOCATION: HEAD

## 2024-02-14 ASSESSMENT — PAIN SCALES - GENERAL
PAINLEVEL_OUTOF10: 8
PAINLEVEL_OUTOF10: 8

## 2024-02-14 ASSESSMENT — PAIN DESCRIPTION - DESCRIPTORS: DESCRIPTORS: ACHING

## 2024-02-14 NOTE — PROGRESS NOTES
Physical Therapy  Facility/Department: [facility] ACUTE REHAB  Treatment note    NAME: Brennen Mcclure  : 1965 (59 y.o.)  MRN: [id number]  CODE STATUS: Full Code  Date of Service: 24    Past Medical History:   Diagnosis Date    CAD (coronary artery disease)     Chronic deep vein thrombosis (DVT) of both lower extremities (HCC)     Chronic idiopathic thrombocytopenia (HCC)     CVA (cerebral vascular accident) (HCC)     HLD (hyperlipidemia)      Past Surgical History:   Procedure Laterality Date    CORONARY ARTERY BYPASS GRAFT      CORONARY STENT PLACEMENT         Family / Caregiver Present: Yes (SO Mara)  General Comment  Comments: Pt's wife Mara present for family training on this date, including use of Radha Stedy & bariatric lift (more similar to the one family has purchased for home use) for sit <> stand & toilet transfer.    Restrictions:  Restrictions/Precautions: General Precautions;Fall Risk;Up as Tolerated  Required Braces or Orthoses  Other:  (Helmet on when OOB)  Left Upper Extremity Brace/Splint: Resting hand  Position Activity Restriction  Other position/activity restrictions: Helmet on when OOB; Sling for transfers/mobility only     SUBJECTIVE  Subjective: Pt had several tearful episodes today regarding finances without him working, potential to hurt Mara during care.  Pain: Pt c/o pain in L LE during weight bearing during AM sessions; Pt's severe L lateral lean during transfer c Radha Stedy resulted in excess pressure on L UE and hip; MARQUIS Jewell educated Pt in PM regarding improving seated position (and education throughout regarding posture) to alleviate pain.    OBJECTIVE  Functional Mobility  Bed mobility  Scooting: Stand by assistance;Maximal assistance;Dependent/Total (Pt able to scoot forward in seat (SBA) & perform some positioning c cues, but as attention wanes & fatigue increases, Pt appears unable to focus on tasks & requires more A (up to Max A to scoot laterally in w/c & dependent to adjust  of combined PT/OT/ST d/t pt's need for 2 skilled therapists to safely and therapeutically performed desired functional tasks)  Specific Instructions for Next Treatment: FES; Sitting/standing balance and midline orienting, pre-gait activity progressing to transfers/Amb with hemiwalker.  Current Treatment Recommendations: Strengthening;ROM;Balance training;Functional mobility training;Transfer training;Gait training;Stair training;Neuromuscular re-education;Pain management;Home exercise program;Safety education & training;Patient/Caregiver education & training;Equipment evaluation, education, & procurement;Positioning;Therapeutic activities  Safety Devices  Type of Devices: Gait belt;All fall risk precautions in place;Call light within reach;Patient at risk for falls;Left in chair;Chair alarm in place (In recliner AM for lunch, wheelchair PM for ST.)    EDUCATION  Education  Education Given To: Patient;Family  Education Provided: Safety;Mobility Training;Transfer Training;Equipment;Fall Prevention Strategies  Education Method: Demonstration;Verbal;Teach Back  Barriers to Learning: Cognition (attention/awareness deficits)  Education Outcome: Verbalized understanding;Demonstrated understanding;Continued education needed    Therapy Time   Individual Concurrent Group Co-treatment   Time In 1108      (1300)   Time Out 1220      (1336)   Minutes 72           Timed Code Treatment Minutes: 90 Minutes (including 18 min. claimed from 36 min. co-tx raya Jewell.)     Keira Garcai PTA, 02/14/24 at 5:31 PM

## 2024-02-14 NOTE — PROGRESS NOTES
SPEECH LANGUAGE PATHOLOGY  Speech Language Pathology  [facility] ACUTE REHAB    Cognitive Treatment Note    Date: 2/14/2024  Patient’s Name: Brennen Mcclure  MRN: [id number]  Diagnosis:   Patient Active Problem List   Diagnosis Code    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 (HCC) I69.152    Pseudobulbar affect F48.2    Intractable headache R51.9    Intracranial hemorrhage (HCC) I62.9    Thrombocytopenia (HCC) D69.6    Anemia D64.9       Pain: 0/10    Cognitive Treatment    Treatment time: 1336--1400  *shortened tx time d/t Pt still working with OT/PT upon arrival       Subjective: [x] Alert [x] Cooperative     [] Confused     [] Agitated    [] Lethargic      Objective/Assessment:  Attention: Occasional repetition and redirection required.  Long response latency demo. T/o.      Recall: Pt education provided re: internal memory aide (i.e., association strategy) to assist in recall.  Pt verbalized understanding.  Following task completed with Pt utilizing trained strategy:   5 unit image retention (person/place association, immediate recall)- 100% accuracy (I).  5 unit image retention (person/place association, 15 min delay)- 100% accuracy (I).      Organization: n/a    Problem Solving/Reasoning: n/a    Speech: Pt presents with mild dysarthria characterized by imprecise articulation, blended word boundaries, rapid rate and reduced vocal intensity.  Pt required min-mod A (verbal cues) to facilitate strategies in conversation and with articulation drills tasks (alliterations).  Speech judged to be ~90% intelligible during session.  Pt able to complete OMEs x4 sets in reps of 20 c mod cues.  Pt continues to demonstrate reduced labial and lingual strength, coordination and ROM with exercise attempts.  Improved endurance noted with duration/repetitions.      Other: Pt's wife present during first half of session.        Plan:  [x] Continue ST services    [] Discharge from ST:      Discharge recommendations: []

## 2024-02-14 NOTE — PROGRESS NOTES
BEHAVIORAL HEALTH FOLLOW-UP NOTE     2/14/2024     Patient was seen and examined in person, Chart reviewed   Patient's case discussed with staff/team    Chief Complaint: Depression    Interim History:      Patient seen and examined at bedside. His wife was also present. Patient has been actively participating in PT/OT/Speech therapy. He is tolerating medications well with no side effects. Denies any SI. He is feeling anxious and nervous. He endorses crying episodes, but per his wife the episode have decreased in frequency and duration. Patient and wife endorses that he sees his dog and his sister that passed away years ago. Per wife, it occurs several times per week. A crying episode was witnessed again during examination. Patient's wife was talking about how Brennen sees his dog and sister who have passed away. Denies any delusions or paranoia. The crying stopped after a a few seconds. Patient was cooperative and pleasant. Per patient, he has been sleeping better.     The plan for discharge is to go home and continue his medications. Additionally, patient's wife asked if there was a way to do a tele visit for outpatient psychiatry appointments.     /79   Pulse 90   Temp 97.5 °F (36.4 °C) (Oral)   Resp 14   Ht 1.778 m (5' 10\")   Wt 77.1 kg (170 lb)   SpO2 95%   BMI 24.39 kg/m²   Appetite:   [x] Normal/Unchanged  [] Increased  [] Decreased      Sleep:       [x] Normal/Unchanged  [] Fair       [] Poor              Energy:    [x] Normal/Unchanged  [] Increased  [] Decreased        Aggression:  [] yes  [x] no    Patient is [x] able  [] unable to CONTRACT FOR SAFETY ON THE UNIT    PAST MEDICAL/PSYCHIATRIC HISTORY:   Past Medical History:   Diagnosis Date    CAD (coronary artery disease)     Chronic deep vein thrombosis (DVT) of both lower extremities (HCC)     Chronic idiopathic thrombocytopenia (HCC)     CVA (cerebral vascular accident) (HCC)     HLD (hyperlipidemia)        FAMILY/SOCIAL HISTORY:  Family  No resolved hospital problems. *      LABS:    Recent Labs     02/12/24  0626 02/12/24  1548 02/13/24  1615   WBC 7.1 8.0 11.0   HGB 15.6 15.4 15.0   PLT 64* See Reflexed IPF Result 80*       Recent Labs     02/12/24  0626      K 4.2   CL 98   CO2 25   BUN 29*   CREATININE 0.9   GLUCOSE 102*       Recent Labs     02/12/24  0626   BILITOT 0.2*   ALKPHOS 122   AST 27   ALT 29       No results found for: \"LABAMPH\", \"BARBSCNU\", \"LABBENZ\", \"CANNAB\", \"COCAINESCRN\", \"LABMETH\", \"OPIATESCREENURINE\", \"PHENCYCLIDINESCREENURINE\", \"PPXUR\", \"ETOH\"  No results found for: \"TSH\", \"FREET4\"  No results found for: \"LITHIUM\"  No results found for: \"VALPROATE\", \"CBMZ\"    RISK ASSESSMENT: low risk for suicide or harm to others    Treatment Plan:  Reviewed current Medications with the patient.   No Medication Changes Today    Risks, benefits, side effects, drug-to-drug interactions and alternatives to treatment were discussed. The patient consented to treatment.     Encourage patient to attend group and other milieu activities.  Discharge planning discussed with the patient and treatment team.    PSYCHOTHERAPY/COUNSELING:  [] Therapeutic interview  [x] Supportive  [] CBT  [] Ongoing  [] Other    Brennen Mcclure is a 59 y.o. male being evaluated face to face.     --Thomas Carroll MS3 on 2/14/2024 at 2:22 PM  Please wait for Dr. Seo's cosign     An electronic signature was used to authenticate this note.     **This report has been created using voice recognition software. It may contain minor errors which are inherent in voice recognition technology.**  I independently saw and evaluated the patient.  I reviewed the  documentation above    .  Any additional comments or changes to the   documentation are stated below otherwise agree with assessment.      No results found for: \"QTC\"    The patient's mood is stable.  He is denying any suicidal thoughts.  He has no side effect from his medication    No Medication Changes Today  PLAN  Will

## 2024-02-14 NOTE — CARE COORDINATION
[facility]  Acute Inpatient Rehabilitation Unit    Date: 2/14/2024  Patient’s Name: Brennen Mcclure  MRN: [id number]      UPDATE: Received call from [facility], they are OON with Pt's insurance. Orders sent to [facility] 2/14/24.    Order received for the following item(s): hospital bed, bedside commode. Face-to-face is complete; order sent to [facility] 2/14/24.      Brian Multani, SLP

## 2024-02-14 NOTE — PROGRESS NOTES
[facility]   IN-PATIENT SERVICE   [facility]    Consult note            Date:   2/14/2024  Patient name:  Brennen Mcclure  Date of admission:  1/27/2024  3:01 PM  MRN:   [id number]  Account:  [id number]  YOB: 1965  PCP:    No primary care provider on file.  Room:   [address] [place] [address]  Code Status:    Full Code    Chief Complaint:     No chief complaint on file.      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, started on heparin drip also had patchy IPH, complicated by expansion of intraparenchymal hemorrhage with mass effect, s/p craniectomy, and evacuation of IPH x 2 with dural repair last CT head on on 1/24 showed evolution of the hemorrhage with reduction in the mass effect, Hospital course further complicated by ITP, was seen by hematology oncology, given IVIG and platelet transfusion,, started on Vimpat for seizure prevention, had cellulitis on Celexa  Patient was seen by hematology oncology over there and was started on Eliquis 2.5 twice daily  Patient was drowsy but arousable on my encounter,  Family was present at the bedside    Denies any chest pain shortness of breath        Past Medical History:     Past Medical History:   Diagnosis Date    CAD (coronary artery disease)     Chronic deep vein thrombosis (DVT) of both lower extremities (HCC)     Chronic idiopathic thrombocytopenia (HCC)     CVA (cerebral vascular accident) (HCC)     HLD (hyperlipidemia)         Past Surgical History:     Past Surgical History:   Procedure Laterality Date    CORONARY ARTERY BYPASS GRAFT      CORONARY STENT PLACEMENT          Medications Prior to Admission:     Prior to  rales or rhonchi, normal effort  Cardiovascular: normal rate, regular rhythm, no murmur, gallop, rub.  Abdomen: Soft, nontender, nondistended, normal bowel sounds, no hepatomegaly or splenomegaly  Neurologic: Left-sided hemiparesis, depressed skull on the right skin: No gross lesions, rashes, bruising or bleeding on exposed skin area  Extremities:  peripheral pulses palpable, no pedal edema or calf pain with palpation      Investigations:      Laboratory Testing:  No results found for this or any previous visit (from the past 24 hour(s)).          Imaging/Diagnostics:    XR CHEST PORTABLE  Narrative: EXAMINATION:  ONE XRAY VIEW OF THE CHEST    2/2/2024 6:24 pm    COMPARISON:  None.    HISTORY:  ORDERING SYSTEM PROVIDED HISTORY: Cough , SOB  TECHNOLOGIST PROVIDED HISTORY:  Cough , SOB  Reason for Exam: Cough , SOB    FINDINGS:  Cardiomediastinal silhouette is unremarkable without a scleroses of the  aortic arch.  No pneumothorax or pleural effusion.  No focal lung  consolidation.  No acute osseous abnormality.  Median sternotomy wires appear  intact.  Impression: No acute cardiopulmonary process.       Assessment :      Primary Problem  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 (HCC)    Active Hospital Problems    Diagnosis Date Noted    Thrombocytopenia (HCC) [D69.6] 02/12/2024    Anemia [D64.9] 02/12/2024    Intractable headache [R51.9] 01/31/2024    Intracranial hemorrhage (HCC) [I62.9] 01/31/2024    Pseudobulbar affect [F48.2] 01/30/2024    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 (HCC) [I69.152] 01/27/2024       Plan:     Patient is 59-year-old male complicated medical history was admitted in hospital for over 2 months at [place] now admitted at [facility] acute rehab for further care.    Intraparenchymal hemorrhage s/p craniectomy with evacuation x 2 and dural repair  Left sided hemiparesis secondary to above  On seizure prophylaxis with lacosamide,  Neurostimulation

## 2024-02-14 NOTE — PLAN OF CARE
Problem: Discharge Planning  Goal: Discharge to home or other facility with appropriate resources  Outcome: Progressing  Flowsheets (Taken 2/13/2024 2115)  Discharge to home or other facility with appropriate resources:   Identify barriers to discharge with patient and caregiver   Arrange for needed discharge resources and transportation as appropriate   Identify discharge learning needs (meds, wound care, etc)   Refer to discharge planning if patient needs post-hospital services based on physician order or complex needs related to functional status, cognitive ability or social support system     Problem: Skin/Tissue Integrity  Goal: Absence of new skin breakdown  Description: 1.  Monitor for areas of redness and/or skin breakdown  2.  Assess vascular access sites hourly  3.  Every 4-6 hours minimum:  Change oxygen saturation probe site  4.  Every 4-6 hours:  If on nasal continuous positive airway pressure, respiratory therapy assess nares and determine need for appliance change or resting period.  Outcome: Progressing     Problem: Safety - Adult  Goal: Free from fall injury  Outcome: Progressing     Problem: ABCDS Injury Assessment  Goal: Absence of physical injury  Outcome: Progressing     Problem: Pain  Goal: Verbalizes/displays adequate comfort level or baseline comfort level  Outcome: Progressing     Problem: Nutrition Deficit:  Goal: Optimize nutritional status  Outcome: Progressing     Problem: Chronic Conditions and Co-morbidities  Goal: Patient's chronic conditions and co-morbidity symptoms are monitored and maintained or improved  Outcome: Progressing  Flowsheets (Taken 2/13/2024 2115)  Care Plan - Patient's Chronic Conditions and Co-Morbidity Symptoms are Monitored and Maintained or Improved:   Monitor and assess patient's chronic conditions and comorbid symptoms for stability, deterioration, or improvement   Collaborate with multidisciplinary team to address chronic and comorbid conditions and prevent  exacerbation or deterioration   Update acute care plan with appropriate goals if chronic or comorbid symptoms are exacerbated and prevent overall improvement and discharge     Problem: Respiratory - Adult  Goal: Achieves optimal ventilation and oxygenation  Outcome: Progressing

## 2024-02-14 NOTE — PROGRESS NOTES
[place]   Acute Rehabilitation Occupational Therapy Daily Treatment Note    Date: 24  Patient Name: Brennen Mcclure       Room: 2633/2633-01  MRN: [id number]  Account: [id number]   : 1965  (59 y.o.) Gender: male       Referring Practitioner: Danica De La Paz MD  Diagnosis: Hemiplegia following ntrm intcrbl hemor aff left dominant side  Additional Pertinent Hx: 59 year old male who admitted 23 with 4 days of new L arm weakness and headache. He had known history of MI and CABG - on dabigatran but with inconsistent use due to prescription running out. He had previous treatment for L ICA thrombus and R sided symptoms treated with “clot busting medication” in . 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 He was transferred from [place], [place] to [facility] for medical management and was admitted to neuro critical care. His LUE numbness/weakness worsened to near complete hemiparesis. Initial GCS 15. He was initially treated with heparin gtt for DVST. On 23 Dr. Jameson Parsons (neurosurgery) performed R sided decompressive hemicraniectomy with evacuation of IPH x2 sites with dural repair. He was stable post op on heparin drip and extubated 23 after stable head CT 23. He was then bridged to warfarin. He is currently being treated with warfarin with therapeutic INR since 23. Hb stable at 13.6 on 24. Thrombocytopenia noted with platelet count 30 - hematology being reconsulted - previously recommended count >40. Currently requiring timed voids for incontinence. PM&R anticipating likely wheelchair mobility with Naveen for transfers.    Treatment Diagnosis: Impaired self-care status    Past Medical History:  has a past medical history of CAD (coronary artery disease), Chronic deep vein thrombosis (DVT) of both lower extremities (HCC), Chronic idiopathic  for Long Term Goals : By discharge  Long Term Goal 1: Pt will complete UB ADLs with CGA, good safety, and use of AE/DME/modified techniques as needed  Long Term Goal 2: Pt will complete LB ADLs with Mod A, good safety, and use of AE/DME/Modified techniques as needed  Long Term Goal 3: Pt will complete stand pivot transfer from bed <> wheelchair/chair/toilet with Min A, good safety, and use of least restrictive device  Long Term Goal 4: Pt will tolerate standing for 5+ minutes, min A, with 0-1 UE support and no LOB during self-care/functional activity for improved balance/tolerance  Long Term Goal 5: Pt will demonstrate improved awareness of LUE AEB ability to maintain safe positioning of L hand and wrist during ADL routine without assist  Long Term Goal 6: Pt will verbalize/demonstrate good understanding of home safety/fall prevention strategies to promote safety and independence with self-care and mobility  Long Term Goal 7: Pt will complete self-care with improved FMC and  strength AEB 10 second and 10# improvemenet on 9HPT and dynamometer  Long Term Goal 8: Pt will complete self-feeding and oral hygiene with Mod I using AE/DME/Modified techniques as needed  Long Term Goal 9: Pt will identify 3 positive leisure activities to increased overall quality of life  Long Term Goal 10: Pt will verbalize/demonstrate good understanding of 3 positive coping strategies to manage emotions/anxiety to promote maximal participation in self-care and functional tasks    Plan  Occupational Therapy Plan  Times Per Week: 900 minutes of OT/PT/SLP  Current Treatment Recommendations: Self-Care / ADL, Strengthening, ROM, Balance training, Functional mobility training, Endurance training, Wheelchair mobility training, Neuromuscular re-education, Cognitive reorientation, Pain management, Safety education & training, Patient/Caregiver education & training, Equipment evaluation, education, & procurement, Positioning, Home management

## 2024-02-14 NOTE — PROGRESS NOTES
LiteGait to help manage Pt's poor core control & minimize potential staff injury.  Ambulation  Surface: Level surface  Device:  (Lite Gait)  Distance: 6ft  Activity: Within Unit  Additional Factors: Increased time to complete, Left AFO, Hand placement cues  Assistance Level: Requires x 2 assistance, Dependent  Gait Deviations: Path deviations, Unsteady gait, Narrow base of support, Slow tamanna  Skilled Clinical Factors: Pt with heavy L Lean, writer controling L LE, with 2nd assist maintaining balance and navigating machine.    OT:  Grooming/Oral Hygiene  Assistance Level: Stand by assist  Skilled Clinical Factors: pt utilizes natacha tech to open/apply toothpaste with use of dycem for stabilization support. Cues to scan towards L side of sink for some item location. Cues and assist for sitting midline in w/c at sinkside.  Upper Extremity Bathing  Assistance Level: Moderate assistance  Skilled Clinical Factors: education on natacha tech. Assist to support LUE, cues to address bathing, assist to bathe RUE. Cues for throughness of chest/abdomen.  Lower Extremity Bathing  Assistance Level: Maximum assistance  Skilled Clinical Factors: pt able to address LB requires cues to address LLE perseverates on RLE requires Max vc to maintain sitting balance. SBA-MAx A to maintain sitting balance throughout task  Upper Extremity Dressing  Assistance Level: Moderate assistance  Skilled Clinical Factors: Incre  Lower Extremity Dressing  Assistance Level: Moderate assistance  Skilled Clinical Factors: TA to thread LLE, pt able to thread RLE while sitting upright/pulling leg up in bed, pt able to pull over R hip in bridging, Assist to mange over L hip.  Putting On/Taking Off Footwear  Assistance Level: Maximum assistance  Skilled Clinical Factors: don B TEDs, shoes and L AFO  Toileting  Assistance Level: Maximum assistance  Skilled Clinical Factors: Pt engaged in rhiannon area hygiene while lying in bed. Max assist to brief mgmt, TA for  emphysema: stable on room air  Infrarenal AAA: measures 4.5 cm. For outpatient follow up  Mild hyponatremia: Stable. Monitoring  Insomnia: on melatonin  CAD: Hx MI, CVA and CABG, stents. On ASA, atorvastatin.   HTN: on carvedilol, lisinopril - IM titrating to effective dose and discontinued lisinopril for hypotension  HLD: on atorvastatin.  Seborrheic dermatitis: OK for wife to apply baby oil topically but with no pressure over craniectomy site  Depression/anxiety: on Celexa. Psychiatry following and managing.   Bowel Management: Miralax daily - will change to prn for multiple stools, Senokot prn, Dulcolax prn. Add Enemeez prn for constipation.   Internal medicine for medical management  Chronic BLE DVT:  on Eliquis  Follow up: PCP 1-2 weeks, Neurosurgery Dr. Corbett 2/26/24 to establish plan for cranioplasty, PM&R, Hematology, Vascular, Pulmonology    FACE TO FACE    Brennen Mcclure requires a bedside commode due to being confined to a single room, and is physically incapable of utilizing regular toilet facilities. Current body weight: Weight - Scale: 77.1 kg (170 lb).     Brennen Mcclure was evaluated today and a DME order was entered for a semi-electric hospital bed because he requires assistance for positioning needs not possible in an ordinary bed.  Patient requires frequent and immediate positioning changes for relief of pain and care needs related to medical diagnoses.  Patient needs variability of bed height requirements to perform patient transfers and for bathing, toileting, personal cares, ambulating, dressing upper body, and dressing lower body.  Current body Weight - Scale: 77.1 kg (170 lb).  The need for this equipment was discussed with the patient and he understands and is in agreement. Patient has no active movement of RUE and has significant trunk lean with all mobility. He is unable to perform any bed mobility without assistance. He will require positioning assistance from semi-electric bed with B

## 2024-02-14 NOTE — PROGRESS NOTES
SPEECH LANGUAGE PATHOLOGY  Speech Language Pathology  [facility] ACUTE REHAB    Cognitive Treatment Note    Date: 2/14/2024  Patient’s Name: Brennen Mcclure  MRN: [id number]  Diagnosis:   Patient Active Problem List   Diagnosis Code    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 (HCC) I69.152    Pseudobulbar affect F48.2    Intractable headache R51.9    Intracranial hemorrhage (HCC) I62.9    Thrombocytopenia (HCC) D69.6    Anemia D64.9       Pain: 8/10 -- head    Cognitive Treatment    Treatment time: 7514-4304      Subjective: [x] Alert [x] Cooperative     [] Confused     [] Agitated    [] Lethargic      Objective/Assessment:  Attention: Frequent redirection and repetition required.     Orientation: Oriented x1 (I), Oriented x3 with visual support (month, date, year)    Recall: Delayed Image Recall (rainy garden) 70% (I), increasing to 100% with mod-max A.    Organization: NT    Problem Solving/Reasoning: Multiple Definitions (two) 50% (I), increasing to 100% with mod-max A. Word Deduction 80% (I), increasing to 100% given min A (additional time).    Other: Wife arrived half way through session, patient needing frequent redirection and repetition as he appeared to be distracted by wife. Patient left with call light in recliner.     Plan:  [x] Continue ST services    [] Discharge from ST:      Discharge recommendations:  [x] Further therapy recommended at discharge.   [] No therapy recommended at discharge.      Treatment completed by: KORTNEY Ahmadi,  Clinician

## 2024-02-15 PROCEDURE — 97530 THERAPEUTIC ACTIVITIES: CPT

## 2024-02-15 PROCEDURE — 92526 ORAL FUNCTION THERAPY: CPT

## 2024-02-15 PROCEDURE — 99232 SBSQ HOSP IP/OBS MODERATE 35: CPT | Performed by: PHYSICAL MEDICINE & REHABILITATION

## 2024-02-15 PROCEDURE — 97542 WHEELCHAIR MNGMENT TRAINING: CPT

## 2024-02-15 PROCEDURE — 1180000000 HC REHAB R&B

## 2024-02-15 PROCEDURE — 99231 SBSQ HOSP IP/OBS SF/LOW 25: CPT | Performed by: INTERNAL MEDICINE

## 2024-02-15 PROCEDURE — 97535 SELF CARE MNGMENT TRAINING: CPT

## 2024-02-15 PROCEDURE — 92507 TX SP LANG VOICE COMM INDIV: CPT

## 2024-02-15 PROCEDURE — 97129 THER IVNTJ 1ST 15 MIN: CPT

## 2024-02-15 PROCEDURE — 94761 N-INVAS EAR/PLS OXIMETRY MLT: CPT

## 2024-02-15 PROCEDURE — 6370000000 HC RX 637 (ALT 250 FOR IP): Performed by: PHYSICAL MEDICINE & REHABILITATION

## 2024-02-15 PROCEDURE — 97112 NEUROMUSCULAR REEDUCATION: CPT

## 2024-02-15 PROCEDURE — 6370000000 HC RX 637 (ALT 250 FOR IP): Performed by: STUDENT IN AN ORGANIZED HEALTH CARE EDUCATION/TRAINING PROGRAM

## 2024-02-15 PROCEDURE — 99232 SBSQ HOSP IP/OBS MODERATE 35: CPT | Performed by: INTERNAL MEDICINE

## 2024-02-15 PROCEDURE — 6370000000 HC RX 637 (ALT 250 FOR IP): Performed by: PSYCHIATRY & NEUROLOGY

## 2024-02-15 PROCEDURE — 94640 AIRWAY INHALATION TREATMENT: CPT

## 2024-02-15 PROCEDURE — 97110 THERAPEUTIC EXERCISES: CPT

## 2024-02-15 PROCEDURE — 6370000000 HC RX 637 (ALT 250 FOR IP): Performed by: INTERNAL MEDICINE

## 2024-02-15 RX ADMIN — ASPIRIN 81 MG 81 MG: 81 TABLET ORAL at 08:34

## 2024-02-15 RX ADMIN — LACOSAMIDE 100 MG: 100 TABLET, FILM COATED ORAL at 21:17

## 2024-02-15 RX ADMIN — Medication 6 MG: at 21:17

## 2024-02-15 RX ADMIN — IPRATROPIUM BROMIDE AND ALBUTEROL SULFATE 1 DOSE: 2.5; .5 SOLUTION RESPIRATORY (INHALATION) at 06:57

## 2024-02-15 RX ADMIN — TOPIRAMATE 25 MG: 25 TABLET, FILM COATED ORAL at 08:36

## 2024-02-15 RX ADMIN — Medication 30 MG: at 08:36

## 2024-02-15 RX ADMIN — GABAPENTIN 100 MG: 100 CAPSULE ORAL at 08:34

## 2024-02-15 RX ADMIN — DICLOFENAC SODIUM 2 G: 10 GEL TOPICAL at 21:26

## 2024-02-15 RX ADMIN — TOPIRAMATE 25 MG: 25 TABLET, FILM COATED ORAL at 21:17

## 2024-02-15 RX ADMIN — GABAPENTIN 100 MG: 100 CAPSULE ORAL at 21:17

## 2024-02-15 RX ADMIN — GABAPENTIN 100 MG: 100 CAPSULE ORAL at 14:39

## 2024-02-15 RX ADMIN — DICLOFENAC SODIUM 2 G: 10 GEL TOPICAL at 08:37

## 2024-02-15 RX ADMIN — ACETAMINOPHEN 650 MG: 325 TABLET, FILM COATED ORAL at 21:16

## 2024-02-15 RX ADMIN — DOCUSATE SODIUM 283 MG: 283 LIQUID RECTAL at 18:00

## 2024-02-15 RX ADMIN — BUTALBITA,ACETAMINOPHEN AND CAFFEINE 1 CAPSULE: 50; 300; 40 CAPSULE ORAL at 08:38

## 2024-02-15 RX ADMIN — MODAFINIL 200 MG: 100 TABLET ORAL at 08:34

## 2024-02-15 RX ADMIN — CITALOPRAM HYDROBROMIDE 10 MG: 10 TABLET ORAL at 08:34

## 2024-02-15 RX ADMIN — FERROUS SULFATE TAB 325 MG (65 MG ELEMENTAL FE) 325 MG: 325 (65 FE) TAB at 08:34

## 2024-02-15 RX ADMIN — AMITRIPTYLINE HYDROCHLORIDE 25 MG: 25 TABLET, FILM COATED ORAL at 21:17

## 2024-02-15 RX ADMIN — BACLOFEN 5 MG: 10 TABLET ORAL at 21:16

## 2024-02-15 RX ADMIN — LACOSAMIDE 100 MG: 100 TABLET, FILM COATED ORAL at 08:34

## 2024-02-15 RX ADMIN — BACLOFEN 5 MG: 10 TABLET ORAL at 08:34

## 2024-02-15 RX ADMIN — ATORVASTATIN CALCIUM 80 MG: 80 TABLET, FILM COATED ORAL at 21:17

## 2024-02-15 RX ADMIN — APIXABAN 2.5 MG: 2.5 TABLET, FILM COATED ORAL at 08:34

## 2024-02-15 RX ADMIN — IPRATROPIUM BROMIDE AND ALBUTEROL SULFATE 1 DOSE: 2.5; .5 SOLUTION RESPIRATORY (INHALATION) at 14:43

## 2024-02-15 RX ADMIN — APIXABAN 2.5 MG: 2.5 TABLET, FILM COATED ORAL at 21:17

## 2024-02-15 RX ADMIN — Medication 1 TABLET: at 08:34

## 2024-02-15 RX ADMIN — SENNOSIDES 17.2 MG: 8.6 TABLET, FILM COATED ORAL at 21:17

## 2024-02-15 RX ADMIN — IPRATROPIUM BROMIDE AND ALBUTEROL SULFATE 1 DOSE: 2.5; .5 SOLUTION RESPIRATORY (INHALATION) at 20:02

## 2024-02-15 ASSESSMENT — PAIN SCALES - GENERAL
PAINLEVEL_OUTOF10: 9
PAINLEVEL_OUTOF10: 7
PAINLEVEL_OUTOF10: 7

## 2024-02-15 ASSESSMENT — PAIN DESCRIPTION - LOCATION: LOCATION: HEAD

## 2024-02-15 NOTE — PLAN OF CARE
Problem: Discharge Planning  Goal: Discharge to home or other facility with appropriate resources  Outcome: Progressing     Problem: Skin/Tissue Integrity  Goal: Absence of new skin breakdown  Description: 1.  Monitor for areas of redness and/or skin breakdown  2.  Assess vascular access sites hourly  3.  Every 4-6 hours minimum:  Change oxygen saturation probe site  4.  Every 4-6 hours:  If on nasal continuous positive airway pressure, respiratory therapy assess nares and determine need for appliance change or resting period.  Outcome: Progressing     Problem: Safety - Adult  Goal: Free from fall injury  Outcome: Progressing     Problem: ABCDS Injury Assessment  Goal: Absence of physical injury  Outcome: Progressing     Problem: Pain  Goal: Verbalizes/displays adequate comfort level or baseline comfort level  Outcome: Progressing     Problem: Nutrition Deficit:  Goal: Optimize nutritional status  Outcome: Progressing     Problem: Chronic Conditions and Co-morbidities  Goal: Patient's chronic conditions and co-morbidity symptoms are monitored and maintained or improved  Outcome: Progressing     Problem: Respiratory - Adult  Goal: Achieves optimal ventilation and oxygenation  Outcome: Progressing

## 2024-02-15 NOTE — PROGRESS NOTES
Physical Therapy  Facility/Department: [facility] ACUTE REHAB    NAME: Brennen Mcclure  : 1965 (59 y.o.)  MRN: [id number]  CODE STATUS: Full Code    Date of Service: 2/15/24      Past Medical History:   Diagnosis Date    CAD (coronary artery disease)     Chronic deep vein thrombosis (DVT) of both lower extremities (HCC)     Chronic idiopathic thrombocytopenia (HCC)     CVA (cerebral vascular accident) (HCC)     HLD (hyperlipidemia)      Past Surgical History:   Procedure Laterality Date    CORONARY ARTERY BYPASS GRAFT      CORONARY STENT PLACEMENT         Chart Reviewed: Yes  Additional Pertinent Hx: 59 year old male who admitted 23 with 4 days of new L arm weakness and headache. He had known history of MI and CABG - on dabigatran but with inconsistent use due to prescription running out. He had previous treatment for L ICA thrombus and R sided symptoms treated with “clot busting medication” in . 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 He was transferred from [place], [place] to [facility] for medical management and was admitted to neuro critical care. His LUE numbness/weakness worsened to near complete hemiparesis. Initial GCS 15. He was initially treated with heparin gtt for DVST. On 23 Dr. Jameson Parsons (neurosurgery) performed R sided decompressive hemicraniectomy with evacuation of IPH x2 sites with dural repair. He was stable post op on heparin drip and extubated 23 after stable head CT 23. He was then bridged to warfarin. He is currently being treated with warfarin with therapeutic INR since 23  Referring Practitioner: Danica De La Paz MD  Diagnosis: Hemiplegia following ntrm intcrbl hemor effecting  left dominant side  General Comment  Comments: Pt's wife Mara and daughter Tiffany present for family training on this date, including car rani in

## 2024-02-15 NOTE — CARE COORDINATION
Received a call from Abran with [employer], stating hospital bed and bedside commode will be delivered to patient's home. Abran states she will contact spouse Mara to schedule delivery time.

## 2024-02-15 NOTE — PROGRESS NOTES
SPEECH LANGUAGE PATHOLOGY  Speech Language Pathology  [facility] ACUTE REHAB    Cognitive Treatment Note    Date: 2/15/2024  Patient’s Name: Brennen Mcclure  MRN: [id number]  Diagnosis:   Patient Active Problem List   Diagnosis Code    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 (HCC) I69.152    Pseudobulbar affect F48.2    Intractable headache R51.9    Intracranial hemorrhage (HCC) I62.9    Thrombocytopenia (HCC) D69.6    Anemia D64.9       Pain: 0/10    Cognitive Treatment    Treatment time: 9585-1318 & 5113-7036       Subjective: [x] Alert [x] Cooperative     [] Confused     [] Agitated    [] Lethargic      Objective/Assessment:  Attention: Pt easily distracted when family members present in room (daughter and wife).  Family able to recognize attention deficit and leaving room to allow Pt ability to focus better on ST tasks.  Occasional repetition and redirection required throughout.  Long response latency demo. T/o.      Recall: n/a     Organization: Reading comprehension, prescription label- 70% accuracy (I), increasing to 100% cued.  Reviewed strategies (e.g., decreasing rate, rereading direction to ensure understanding, cueing self to attend to L side) to assist c completion of reading tasks in future.  Pt verbalized understanding and agreeable.     Problem Solving/Reasoning: Word deductions- 80% accuracy (I), increasing to 100% cued.     Speech: Pt presents with mild dysarthria characterized by imprecise articulation, blended word boundaries, rapid rate and reduced vocal intensity.  Pt required min-mod A (verbal cues) to facilitate strategies in conversation and with articulation drills tasks (alliterations -- 4-7 word phrases).  Speech judged to be ~90% intelligible during session.  Pt able to complete OMEs x5 sets in reps of 20 c mod cues.  Pt continues to demonstrate reduced labial and lingual strength, coordination and ROM with exercise attempts.     Dysphagia: Diet tolerance monitoring completed  with breakfast.  Pt self-feeding.  Therapeutic feeding trials of easy to chew solid x11 and thin liquid per straw x5 presented.  No overt s/s of aspiration observed with consistencies.  Mildly prolonged mastication noted with easy to chew solid, but with additional time, able to functionally breakdown bolus and clear oral cavity.  Provided education re: swallowing safety strategies (e.g., bolus size modification, rate modification, and sitting upright c PO).  Pt verbalized understanding and agreeable.     Other: Pt's daughter present during first half of AM session; wife and daughter initially present for PM session (leaving shortly after session began).        Plan:  [x] Continue ST services    [] Discharge from ST:      Discharge recommendations: [] Inpatient Rehab   [] Skilled Nursing Facility   [] Outpatient Therapy  [] Follow up at trauma clinic   [] Other:       Treatment completed by: Zofia Martinez M.A., CCC-SLP

## 2024-02-15 NOTE — CONSULTS
Today's Date: 2/14/2024  Patient Name: Brennen Mcclure  Date of admission: 1/27/2024  3:01 PM  Patient's age: 59 y.o., 1965  Admission Dx: 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 (HCC) [I69.152]    Reason for Consult:  Low platelets   Requesting Physician: Dipika Angulo MD    CHIEF COMPLAINT:  No chief complaint on file.      History Obtained From:  patient and chart  INTERVAL HISTORY:    Patient seen and examined  No new events  Platelets slightly better  No evidence of bleeding noted    HISTORY OF PRESENT ILLNESS:    Brennen Mcclure is a 59 y.o. male who is admitted to the hospital on 1/27/2024  for acute rehab after recent hospitalization for acute stroke.    Patient had recent hospitalization for left hemiparesis secondary to hemorrhagic CVA and subsequently discharged to acute rehab at [facility].  Patient has history of dural venous sinus thrombosis and also history of DVT and was chronically maintained on Eliquis.    Patient currently is on Eliquis and now noted to have thrombocytopenia which is progressively getting worse therefore hematology was consulted.  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  Patient had a evaluation at [facility] back in 2020 as well and as per the chart.    Past Medical History:   has a past medical history of CAD (coronary artery disease), Chronic deep vein thrombosis (DVT) of both lower extremities (HCC), Chronic idiopathic thrombocytopenia (HCC), CVA (cerebral vascular accident) (HCC), and HLD (hyperlipidemia).    Past Surgical History:   has a past surgical history that includes Coronary artery bypass graft and Coronary stent placement.     Medications:    Prior to Admission medications    Not on File     Current Facility-Administered Medications   Medication Dose Route Frequency Provider Last Rate Last Admin     bone pain   Neurological: negative for headaches, dizziness, seizures, weakness, numbness    PHYSICAL EXAM:      /81   Pulse 82   Temp 98.4 °F (36.9 °C)   Resp 16   Ht 1.778 m (5' 10\")   Wt 77.1 kg (170 lb)   SpO2 94%   BMI 24.39 kg/m²    Temp (24hrs), Av °F (36.7 °C), Min:97.5 °F (36.4 °C), Max:98.4 °F (36.9 °C)    General appearance - well appearing, no in pain or distress   Mental status - alert and cooperative   Eyes - pupils equal and reactive, extraocular eye movements intact   Ears - bilateral TM's and external ear canals normal   Mouth - mucous membranes moist, pharynx normal without lesions   Neck - supple, no significant adenopathy   Lymphatics - no palpable lymphadenopathy, no hepatosplenomegaly   Chest - clear to auscultation, no wheezes, rales or rhonchi, symmetric air entry   Heart - normal rate, regular rhythm, normal S1, S2, no murmurs  Abdomen - soft, nontender, nondistended, no masses or organomegaly   Neurological - alert, oriented, left-sided weakness  Musculoskeletal - no joint tenderness, deformity or swelling   Extremities - peripheral pulses normal, no pedal edema, no clubbing or cyanosis   Skin - normal coloration and turgor, no rashes, no suspicious skin lesions noted ,    DATA:    Labs:   CBC:   Recent Labs     24  1548 24  1615   WBC 8.0 11.0   HGB 15.4 15.0   HCT 48.2 45.9   PLT See Reflexed IPF Result 80*       BMP:   Recent Labs     24  0626      K 4.2   CO2 25   BUN 29*   CREATININE 0.9   LABGLOM >60   GLUCOSE 102*       PT/INR: No results for input(s): \"PROTIME\", \"INR\" in the last 72 hours.    IMAGING DATA:  XR CHEST PORTABLE   Final Result   No acute cardiopulmonary process.         CT HEAD WO CONTRAST   Final Result   No acute intracranial abnormality. Right cerebral convexity encephalomalacia.             Primary Problem  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 (HCC)    Active Hospital Problems    Diagnosis Date Noted

## 2024-02-15 NOTE — PROGRESS NOTES
rales or rhonchi, normal effort  Cardiovascular: normal rate, regular rhythm, no murmur, gallop, rub.  Abdomen: Soft, nontender, nondistended, normal bowel sounds, no hepatomegaly or splenomegaly  Neurologic: Left-sided hemiparesis, depressed skull on the right skin: No gross lesions, rashes, bruising or bleeding on exposed skin area  Extremities:  peripheral pulses palpable, no pedal edema or calf pain with palpation      Investigations:      Laboratory Testing:  No results found for this or any previous visit (from the past 24 hour(s)).          Imaging/Diagnostics:    XR CHEST PORTABLE  Narrative: EXAMINATION:  ONE XRAY VIEW OF THE CHEST    2/2/2024 6:24 pm    COMPARISON:  None.    HISTORY:  ORDERING SYSTEM PROVIDED HISTORY: Cough , SOB  TECHNOLOGIST PROVIDED HISTORY:  Cough , SOB  Reason for Exam: Cough , SOB    FINDINGS:  Cardiomediastinal silhouette is unremarkable without a scleroses of the  aortic arch.  No pneumothorax or pleural effusion.  No focal lung  consolidation.  No acute osseous abnormality.  Median sternotomy wires appear  intact.  Impression: No acute cardiopulmonary process.       Assessment :      Primary Problem  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 (HCC)    Active Hospital Problems    Diagnosis Date Noted    Thrombocytopenia (HCC) [D69.6] 02/12/2024    Anemia [D64.9] 02/12/2024    Intractable headache [R51.9] 01/31/2024    Intracranial hemorrhage (HCC) [I62.9] 01/31/2024    Pseudobulbar affect [F48.2] 01/30/2024    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 (HCC) [I69.152] 01/27/2024       Plan:     Patient is 59-year-old male complicated medical history was admitted in hospital for over 2 months at [place] now admitted at [facility] acute rehab for further care.    Intraparenchymal hemorrhage s/p craniectomy with evacuation x 2 and dural repair  Left sided hemiparesis secondary to above  On seizure prophylaxis with lacosamide,  Neurostimulation  on modafinil    Cerebral venous sinus thrombosis, on Eliquis 2.5 twice daily, was seen by 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 was on Pradaxa earlier  Should consider hematology oncology consult due to his complicated history    ITP platelet count yesterday was 103, has been stable patient received IVIG at outlying facility, consider hematology oncology consult    On Flex for cellulitis last dose 1/29    CAD s/p CABG in 2009 status post PCI 2023, on aspirin Lipitor    Hypertension blood pressures currently stable on lisinopril, continue to monitor    DVT prophylaxis        1/29  Patient seen and examined  Working with PT OT  Blood pressure has been borderline lower side decrease lisinopril to 10  Patient denies any dizziness lightheadedness  Thrombocytopenia has resolved  Patient also had 4.5 cm abdominal aortic aneurysm  Advised to follow-up with vascular hematology oncology and PCP as outpatient  Voiced understanding  Wife is present at the bedside updated  1/30  Patient seen and examined, his vitals have been stable, labs reviewed, patient advised to follow-up with oncology as outpatient  Thrombocytopenia has resolved  Patient on Eliquis reduced dose as per oncology recommendations in [place]  Finish course of Keflex    2/1  Patient seen and examined  Currently alert oriented  On Eliquis at 2.5 twice daily as recommended by oncologist at [place]  Patient advised to follow-up with oncology as outpatient due to recurrent DVTs next  Vital stable thrombocytopenia resolved    2/2  Intraparenchymal hemorrhage status post craniectomy, left-sided hemiparesis seizure prophylaxis, cerebral venous sinus thrombosis on Eliquis, recent ITP however platelet counts have normalized will continue to monitor next labs due tomorrow  Patient complaining of new cough which has been there for last couple of days; reports recent diagnosis of emphysema/COPD.  Will get chest x-ray, ordering Amanda as

## 2024-02-15 NOTE — CARE COORDINATION
ARU CASE MANAGEMENT NOTE:    Patient is alert and oriented x3.    Spoke with patient and spouse regarding discharge plan: Return home with spouse and would like to have home healthcare. Writer is having a hard time finding a home healthcare agency that accepts patient's insurance. [facility], [facility], [facility], [facility], [facility], [facility], [facility], [facility], [facility], [facility], and [facility] do not accept patient's insurance.     [facility] do not provide therapy services.    [facility]- out of service area. Do not provide services in [place].    [facility]- are unable to meet patient's needs.     Writer placed a call to insurance company and spoke with Charanjit. Charanjit states that [facility], [facility], and [facility] accepts insurance. Writer will send referral.    1642: [facility]- are only accepting referrals for residents of [facility].    [facility]- only accepting referrals for assisted living or independent living at this time.     Outside appointments while in ARU: None    Will continue to follow for additional discharge needs.      Electronically signed by Claude Barajas RN on 2/15/2024 at 3:17 PM

## 2024-02-15 NOTE — PROGRESS NOTES
pain    Patient Education  Education  Education Given To: Patient;Family;Caregiver  Education Provided: Role of Therapy;Plan of Care;Safety;ADL Function;Transfer Training;Precautions;Mobility Training;Energy Conservation;Family Education;Equipment;DME/Home Modifications  Education Provided Comments: proper wearing schedule of L arm sling, proper LUE positioning. functional transfers, Self care tasks.  Education Method: Verbal;Demonstration  Barriers to Learning: Cognition  Education Outcome: Verbalized understanding;Demonstrated understanding;Continued education needed         Safety Devices  Safety Devices in place: Yes  Type of devices: Left in chair;Chair alarm in place;Call light within reach;All fall risk precautions in place;Gait belt;Nurse notified       Goals  Patient Goals   Patient goals : \"To get home\"  Short Term Goals  Time Frame for Short Term Goals: By 1 week  Short Term Goal 1: Pt will complete UB ADLs with Mod A, good safety, and use of AE/DME/modified techniques as needed  Short Term Goal 2: Pt will complete LB ADLs with Max A, good safety, and use of AE/DME/Modified techniques as needed  Short Term Goal 3: Pt will complete sit to stand transfers in [facility] with Mod A x2 and good safety in preparation for functional transfers  Short Term Goal 4: Pt will actively participate in 30+ minutes of therapeutic exercise/functional activity/social participation for increased safety and independence with self-care for improved quality of life  Short Term Goal 5: Pt will be educated on and explore use of AE/DME/Modified techniques as needed to complete self-care and mobility  Additional Goals?: Yes  Short Term Goal 6: Pt will participate in dynamic unsupported sitting tasks with Max A  facilitating reaching out of base of support and weight bearing through L extremity to increase input through extremity and improved core strength to increase ease with LB self-care tasks  Short Term Goal 7: Pt will

## 2024-02-15 NOTE — PROGRESS NOTES
Thrombocytopenia (HCC) [D69.6] 02/12/2024    Anemia [D64.9] 02/12/2024    Intractable headache [R51.9] 01/31/2024    Intracranial hemorrhage (HCC) [I62.9] 01/31/2024    Pseudobulbar affect [F48.2] 01/30/2024    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 (HCC) [I69.152] 01/27/2024         IMPRESSION:   Recent acute hemorrhagic stroke  History of dural venous sinus thrombosis  History of DVT  Seizure   Thrombocytopenia  Anemia    RECOMMENDATIONS:  I reviewed the laboratory data, imaging studies, prior records, outside records, discussed diagnosis and treatment recommendations  He has a mild thrombocytopenia at baseline and subsequently appears to be getting worse over the past few days  Normal B12 folic acid level and iron studies  Okay to continue anticoagulation if platelet count stays above 50K and there is no any evidence of bleeding  His thrombocytopenia appears chronic and has had evaluation with hematology in the past and was thought to be immune mediated  His baseline platelets around 80-90K  If platelet counts drop below 50K I would consider IVIG or prednisone treatment  Discharge planning as per primary team       Discussed with patient and Nurse.    Thank you for asking us to see this patient.  Lam Chang MD  Hematologist/Medical Oncologist      This note is created with the assistance of a speech recognition program.  While intending to generate a document that actually reflects the content of the visit, the document can still have some errors including those of syntax and sound a like substitutions which may escape proof reading.  It such instances, actual meaning can be extrapolated by contextual diversion.

## 2024-02-15 NOTE — PLAN OF CARE
BRONCHOSPASM/BRONCHOCONSTRICTION   [x]  IMPROVE AERATION/BREATH SOUNDS  [x]   ADMINISTER BRONCHODILATOR THERAPY AS APPROPRIATE  [x]   ASSESS BREATH SOUNDS  []   IMPLEMENT AEROSOL/MDI PROTOCOL  [x]   PATIENT EDUCATION AS NEEDED

## 2024-02-15 NOTE — PROGRESS NOTES
Writer met patient's wife  Mara in corridor, patient in restroom before going to therapy.  Spiritual care will remain available to patient and family as needed.     02/15/24 1130   Encounter Summary   Encounter Overview/Reason  Spiritual/Emotional Needs   Service Provided For: Family  (Wife Mara)   [facility]   Support System Spouse   Last Encounter  02/15/24   Complexity of Encounter Low   Begin Time 1130   End Time  1140   Total Time Calculated 10 min   Spiritual/Emotional needs   Type Spiritual Support   Assessment/Intervention/Outcome   Assessment Calm   Intervention Active listening;Explored/Affirmed feelings, thoughts, concerns;Sustaining Presence/Ministry of presence   Outcome Acceptance

## 2024-02-15 NOTE — PLAN OF CARE
Problem: Discharge Planning  Goal: Discharge to home or other facility with appropriate resources  2/14/2024 2350 by Greer Hernández LPN  Outcome: Progressing  Flowsheets (Taken 2/14/2024 2133)  Discharge to home or other facility with appropriate resources:   Arrange for needed discharge resources and transportation as appropriate   Identify barriers to discharge with patient and caregiver   Identify discharge learning needs (meds, wound care, etc)   Refer to discharge planning if patient needs post-hospital services based on physician order or complex needs related to functional status, cognitive ability or social support system     Problem: Skin/Tissue Integrity  Goal: Absence of new skin breakdown  Description: 1.  Monitor for areas of redness and/or skin breakdown  2.  Assess vascular access sites hourly  3.  Every 4-6 hours minimum:  Change oxygen saturation probe site  4.  Every 4-6 hours:  If on nasal continuous positive airway pressure, respiratory therapy assess nares and determine need for appliance change or resting period.  2/14/2024 2350 by Greer Hernández LPN  Outcome: Progressing     Problem: Safety - Adult  Goal: Free from fall injury  2/14/2024 2350 by Greer Hernández LPN  Outcome: Progressing     Problem: ABCDS Injury Assessment  Goal: Absence of physical injury  2/14/2024 2350 by Greer Hernández LPN  Outcome: Progressing     Problem: Pain  Goal: Verbalizes/displays adequate comfort level or baseline comfort level  2/14/2024 2350 by Greer Hernández LPN  Outcome: Progressing     Problem: Nutrition Deficit:  Goal: Optimize nutritional status  2/14/2024 2350 by Greer Hernández LPN  Outcome: Progressing     Problem: Chronic Conditions and Co-morbidities  Goal: Patient's chronic conditions and co-morbidity symptoms are monitored and maintained or improved  2/14/2024 2350 by Greer Hernández LPN  Outcome: Progressing  Flowsheets (Taken 2/14/2024 2133)  Care Plan - Patient's Chronic Conditions and Co-Morbidity

## 2024-02-16 ENCOUNTER — APPOINTMENT (OUTPATIENT)
Dept: CT IMAGING | Age: 59
DRG: 056 | End: 2024-02-16
Attending: STUDENT IN AN ORGANIZED HEALTH CARE EDUCATION/TRAINING PROGRAM
Payer: COMMERCIAL

## 2024-02-16 LAB
BASOPHILS # BLD: 0.05 K/UL (ref 0–0.2)
BASOPHILS NFR BLD: 1 % (ref 0–2)
EOSINOPHIL # BLD: 0.39 K/UL (ref 0–0.4)
EOSINOPHILS RELATIVE PERCENT: 8 % (ref 0–4)
ERYTHROCYTE [DISTWIDTH] IN BLOOD BY AUTOMATED COUNT: 23.3 % (ref 11.5–14.9)
HCT VFR BLD AUTO: 41.8 % (ref 41–53)
HGB BLD-MCNC: 13.8 G/DL (ref 13.5–17.5)
LYMPHOCYTES NFR BLD: 0.98 K/UL (ref 1–4.8)
LYMPHOCYTES RELATIVE PERCENT: 20 % (ref 24–44)
MCH RBC QN AUTO: 28.3 PG (ref 26–34)
MCHC RBC AUTO-ENTMCNC: 32.9 G/DL (ref 31–37)
MCV RBC AUTO: 86.1 FL (ref 80–100)
MONOCYTES NFR BLD: 0.39 K/UL (ref 0.1–1.3)
MONOCYTES NFR BLD: 8 % (ref 1–7)
MORPHOLOGY: ABNORMAL
NEUTROPHILS NFR BLD: 63 % (ref 36–66)
NEUTS SEG NFR BLD: 3.09 K/UL (ref 1.3–9.1)
PLATELET # BLD AUTO: 67 K/UL (ref 150–450)
PMV BLD AUTO: 9.9 FL (ref 6–12)
RBC # BLD AUTO: 4.86 M/UL (ref 4.5–5.9)
WBC OTHER # BLD: 4.9 K/UL (ref 3.5–11)

## 2024-02-16 PROCEDURE — 99231 SBSQ HOSP IP/OBS SF/LOW 25: CPT | Performed by: INTERNAL MEDICINE

## 2024-02-16 PROCEDURE — 1180000000 HC REHAB R&B

## 2024-02-16 PROCEDURE — 99232 SBSQ HOSP IP/OBS MODERATE 35: CPT | Performed by: PHYSICAL MEDICINE & REHABILITATION

## 2024-02-16 PROCEDURE — 6370000000 HC RX 637 (ALT 250 FOR IP): Performed by: STUDENT IN AN ORGANIZED HEALTH CARE EDUCATION/TRAINING PROGRAM

## 2024-02-16 PROCEDURE — 6370000000 HC RX 637 (ALT 250 FOR IP): Performed by: PHYSICAL MEDICINE & REHABILITATION

## 2024-02-16 PROCEDURE — 97530 THERAPEUTIC ACTIVITIES: CPT

## 2024-02-16 PROCEDURE — 97535 SELF CARE MNGMENT TRAINING: CPT

## 2024-02-16 PROCEDURE — 94761 N-INVAS EAR/PLS OXIMETRY MLT: CPT

## 2024-02-16 PROCEDURE — 97542 WHEELCHAIR MNGMENT TRAINING: CPT

## 2024-02-16 PROCEDURE — 6370000000 HC RX 637 (ALT 250 FOR IP): Performed by: INTERNAL MEDICINE

## 2024-02-16 PROCEDURE — 70450 CT HEAD/BRAIN W/O DYE: CPT

## 2024-02-16 PROCEDURE — 94640 AIRWAY INHALATION TREATMENT: CPT

## 2024-02-16 PROCEDURE — 6370000000 HC RX 637 (ALT 250 FOR IP): Performed by: PSYCHIATRY & NEUROLOGY

## 2024-02-16 PROCEDURE — 36415 COLL VENOUS BLD VENIPUNCTURE: CPT

## 2024-02-16 PROCEDURE — 97129 THER IVNTJ 1ST 15 MIN: CPT

## 2024-02-16 PROCEDURE — 92507 TX SP LANG VOICE COMM INDIV: CPT

## 2024-02-16 PROCEDURE — 99232 SBSQ HOSP IP/OBS MODERATE 35: CPT | Performed by: INTERNAL MEDICINE

## 2024-02-16 PROCEDURE — 97110 THERAPEUTIC EXERCISES: CPT

## 2024-02-16 PROCEDURE — 85025 COMPLETE CBC W/AUTO DIFF WBC: CPT

## 2024-02-16 RX ADMIN — AMITRIPTYLINE HYDROCHLORIDE 25 MG: 25 TABLET, FILM COATED ORAL at 21:15

## 2024-02-16 RX ADMIN — Medication 1 TABLET: at 07:58

## 2024-02-16 RX ADMIN — GABAPENTIN 100 MG: 100 CAPSULE ORAL at 21:08

## 2024-02-16 RX ADMIN — GABAPENTIN 100 MG: 100 CAPSULE ORAL at 07:58

## 2024-02-16 RX ADMIN — IPRATROPIUM BROMIDE AND ALBUTEROL SULFATE 1 DOSE: 2.5; .5 SOLUTION RESPIRATORY (INHALATION) at 14:31

## 2024-02-16 RX ADMIN — DICLOFENAC SODIUM 2 G: 10 GEL TOPICAL at 21:15

## 2024-02-16 RX ADMIN — CITALOPRAM HYDROBROMIDE 10 MG: 10 TABLET ORAL at 07:58

## 2024-02-16 RX ADMIN — BUTALBITA,ACETAMINOPHEN AND CAFFEINE 1 CAPSULE: 50; 300; 40 CAPSULE ORAL at 07:56

## 2024-02-16 RX ADMIN — SENNOSIDES 17.2 MG: 8.6 TABLET, FILM COATED ORAL at 21:08

## 2024-02-16 RX ADMIN — DOCUSATE SODIUM 283 MG: 283 LIQUID RECTAL at 21:09

## 2024-02-16 RX ADMIN — IPRATROPIUM BROMIDE AND ALBUTEROL SULFATE 1 DOSE: 2.5; .5 SOLUTION RESPIRATORY (INHALATION) at 06:56

## 2024-02-16 RX ADMIN — Medication 30 MG: at 07:58

## 2024-02-16 RX ADMIN — IPRATROPIUM BROMIDE AND ALBUTEROL SULFATE 1 DOSE: 2.5; .5 SOLUTION RESPIRATORY (INHALATION) at 20:09

## 2024-02-16 RX ADMIN — MODAFINIL 200 MG: 100 TABLET ORAL at 07:56

## 2024-02-16 RX ADMIN — GABAPENTIN 100 MG: 100 CAPSULE ORAL at 14:45

## 2024-02-16 RX ADMIN — DICLOFENAC SODIUM 2 G: 10 GEL TOPICAL at 08:01

## 2024-02-16 RX ADMIN — BACLOFEN 5 MG: 10 TABLET ORAL at 07:57

## 2024-02-16 RX ADMIN — ATORVASTATIN CALCIUM 80 MG: 80 TABLET, FILM COATED ORAL at 21:08

## 2024-02-16 RX ADMIN — TOPIRAMATE 25 MG: 25 TABLET, FILM COATED ORAL at 08:01

## 2024-02-16 RX ADMIN — LACOSAMIDE 100 MG: 100 TABLET, FILM COATED ORAL at 07:58

## 2024-02-16 RX ADMIN — ASPIRIN 81 MG 81 MG: 81 TABLET ORAL at 07:57

## 2024-02-16 RX ADMIN — TOPIRAMATE 25 MG: 25 TABLET, FILM COATED ORAL at 21:15

## 2024-02-16 RX ADMIN — BACLOFEN 5 MG: 10 TABLET ORAL at 21:08

## 2024-02-16 RX ADMIN — APIXABAN 2.5 MG: 2.5 TABLET, FILM COATED ORAL at 21:08

## 2024-02-16 RX ADMIN — LACOSAMIDE 100 MG: 100 TABLET, FILM COATED ORAL at 21:08

## 2024-02-16 RX ADMIN — Medication 6 MG: at 21:08

## 2024-02-16 RX ADMIN — APIXABAN 2.5 MG: 2.5 TABLET, FILM COATED ORAL at 07:57

## 2024-02-16 RX ADMIN — ACETAMINOPHEN 650 MG: 325 TABLET, FILM COATED ORAL at 21:08

## 2024-02-16 ASSESSMENT — PAIN SCALES - GENERAL: PAINLEVEL_OUTOF10: 8

## 2024-02-16 ASSESSMENT — PAIN DESCRIPTION - LOCATION: LOCATION: HEAD

## 2024-02-16 NOTE — DISCHARGE INSTR - COC
Continuity of Care Form    Patient Name: Brennen Mcclure   :  1965  MRN:  [id number]    Admit date:  2024  Discharge date:  2024    Code Status Order: Full Code   Advance Directives:     Admitting Physician:  Dipika Angulo MD  PCP: No primary care provider on file.    Discharging Nurse: Tamy Mendez LPN  Discharging Hospital Unit/Room#: 2633/2633-01  Discharging Unit Phone Number: [phone]    Emergency Contact:   Extended Emergency Contact Information  Primary Emergency Contact: Mara Mcclure  Address: [address] [place] [address]  Home Phone: [phone]  Relation: Spouse    Past Surgical History:  Past Surgical History:   Procedure Laterality Date    CORONARY ARTERY BYPASS GRAFT      CORONARY STENT PLACEMENT         Immunization History:     There is no immunization history on file for this patient.    Active Problems:  Patient Active Problem List   Diagnosis Code    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 (HCC) I69.152    Pseudobulbar affect F48.2    Intractable headache R51.9    Intracranial hemorrhage (HCC) I62.9    Thrombocytopenia (HCC) D69.6    Anemia D64.9       Isolation/Infection:   Isolation            No Isolation          Patient Infection Status       None to display            Nurse Assessment:  Last Vital Signs: /76   Pulse 75   Temp 97.6 °F (36.4 °C) (Oral)   Resp 20   Ht 1.778 m (5' 10\")   Wt 77.1 kg (170 lb)   SpO2 93%   BMI 24.39 kg/m²     Last documented pain score (0-10 scale): Pain Level: 8  Last Weight:   Wt Readings from Last 1 Encounters:   24 77.1 kg (170 lb)     Mental Status:  oriented    IV Access:  - None    Nursing Mobility/ADLs:  Walking   Dependent  Transfer  Dependent  Bathing  Dependent  Dressing  Dependent  Toileting  Dependent  Feeding  Independent  Med Admin  Dependent  Med Delivery   whole    Wound Care Documentation and Therapy:        Elimination:  Continence:   Bowel: No  Bladder:

## 2024-02-16 NOTE — PROGRESS NOTES
[facility]   Acute Rehabilitation Occupational Therapy Daily Treatment Note    Date: 24  Patient Name: Brennen Mcclure       Room: 2633/2633-01  MRN: [id number]  Account: [id number]   : 1965  (59 y.o.) Gender: male       Referring Practitioner: Danica De La Paz MD  Diagnosis: Hemiplegia following ntrm intcrbl hemor aff left dominant side  Additional Pertinent Hx: 59 year old male who admitted 23 with 4 days of new L arm weakness and headache. He had known history of MI and CABG - on dabigatran but with inconsistent use due to prescription running out. He had previous treatment for L ICA thrombus and R sided symptoms treated with “clot busting medication” in . 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 He was transferred from [place], [place] to [facility] for medical management and was admitted to neuro critical care. His LUE numbness/weakness worsened to near complete hemiparesis. Initial GCS 15. He was initially treated with heparin gtt for DVST. On 23 Dr. Jameson Parsons (neurosurgery) performed R sided decompressive hemicraniectomy with evacuation of IPH x2 sites with dural repair. He was stable post op on heparin drip and extubated 23 after stable head CT 23. He was then bridged to warfarin. He is currently being treated with warfarin with therapeutic INR since 23. Hb stable at 13.6 on 24. Thrombocytopenia noted with platelet count 30 - hematology being reconsulted - previously recommended count >40. Currently requiring timed voids for incontinence. PM&R anticipating likely wheelchair mobility with Naveen for transfers.    Treatment Diagnosis: Impaired self-care status    Past Medical History:  has a past medical history of CAD (coronary artery disease), Chronic deep vein thrombosis (DVT) of both lower extremities (HCC), Chronic idiopathic

## 2024-02-16 NOTE — PROGRESS NOTES
Physical Therapy  Facility/Department: [facility] ACUTE REHAB  Treatment note    NAME: Brennen Mcclure  : 1965 (59 y.o.)  MRN: [id number]  CODE STATUS: Full Code  Date of Service: 24    Past Medical History:   Diagnosis Date    CAD (coronary artery disease)     Chronic deep vein thrombosis (DVT) of both lower extremities (HCC)     Chronic idiopathic thrombocytopenia (HCC)     CVA (cerebral vascular accident) (HCC)     HLD (hyperlipidemia)      Past Surgical History:   Procedure Laterality Date    CORONARY ARTERY BYPASS GRAFT      CORONARY STENT PLACEMENT       Restrictions:  Restrictions/Precautions: General Precautions;Fall Risk;Up as Tolerated  Required Braces or Orthoses  Other:  (Helmet on when OOB)  Left Upper Extremity Brace/Splint: Resting hand  Position Activity Restriction  Other position/activity restrictions: Helmet on when OOB; Sling for transfers/mobility only     SUBJECTIVE  Subjective: Pt sitting in recliner, agreeable to tx at this time.    OBJECTIVE  Functional Mobility  Transfers  Squat Pivot Transfers: 2 Person Assistance;Dependent/Total  Comment: Two transfers to Pt's R, w/c armrest removed; G R & L LE pivot on initial SPT, slightly short of squared to chair, Max x1. Second attempt, Pt scooted forward on cushion to very edge of chair, states he needs to scoot farther, but would be off seat; Pt requires several cues for safety. Pt does not get enough lift under his rear for this SPT, attempts to sit in space between w/c and recliner; second assist required to get Pt to recliner seat. Continues c impulsive movement, P ability to  fatigue.    ASSESSMENT  Activity Tolerance  Activity Tolerance: Patient limited by fatigue    GOALS  Short Term Goals  Time Frame for Short Term Goals: 10 days  Short Term Goal 1: Pt to demo bed mobility with ModA  Short Term Goal 2: Pt to Demo functional transfers with unilateral device and ModA  Short Term Goal 3: Pt to ambulate at least 10-15' with unilateral

## 2024-02-16 NOTE — CARE COORDINATION
Writer has not been able to find a home healthcare agency that accepts patient's insurance.   [facility], [facility], [facility], [facility] do not accept patient's insurance. Writer notified Dr. De La Paz and patient's spouse.    Awaiting to hear from [facility]. Writer left voicemail to return call.     Spoke with Carter from [facility], Carter states they are unable to provide speech therapy.       1444: Carter from [facility] states being able to accept patient. Spouse is okay with patient not having speech therapy at home.

## 2024-02-16 NOTE — INTERDISCIPLINARY ROUNDS
[facility] Acute Inpatient Rehabilitation  INTERDISCIPLINARY MEETING  Date: 24  Patient Name: Brennen Mcclure       Room: 2633/2633-01  MRN: [id number]       : 1965  (59 y.o.)     Gender: male     Patient's care team, including nursing, speech language pathologist, occupational therapist, and/or physical therapist, met to discuss patient's care needs. Any patient concerns, change in medical status, and current transfer/mobility status were identified at this time.     Any Additional Pertinent Information:     Heavy L side lean still. 2-3 with scotty lazaro. Completing home eval.     Participating Team Members:  Nurse: Sophie Shell RN     Occupational Therapist: Marivel CHO/MENDEZ  Physical Therapist: Asia Tran PTA  Speech Therapist:  Brian Multani M.S., CCC-SLP

## 2024-02-16 NOTE — PROGRESS NOTES
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 Patient is doing well today. He is noting some fullness/swelling at interior/posterior aspect of craniectomy defect. He had home evaluation today with spouse and therapy. They both feel that it went well and would like to keep discharge date for 2/19/24.     ROS:  Denies fevers, chills, sweats.  No chest pain, palpitations, lightheadedness.  Denies coughing, wheezing or shortness of breath.  Denies abdominal pain, nausea, diarrhea  No new areas of joint pain.  Denies new areas of numbness or weakness.  Denies new anxiety or depression issues.  No new skin problems.    Rehabilitation:       PT:    Bed mobility  Bridging: Minimal assistance (with LLE placement/stabilization. Minimal clearance. Mara attempting to use cushion designed to make pants up/down easier; Pt states it is not painful, is comfortable.)  Rolling to Left: Moderate assistance (bed flat, using rail, comfort glide.)  Rolling to Right: Minimal assistance (bed flat, using rail, comfort glide.)  Supine to Sit: Moderate assistance (with HOB elevated and using  HR simulating home set up with hospital bed. Max cues for technique)  Sit to Supine: 2 Person assistance, Moderate assistance (trunk control and assist with LE's)  Scooting: Stand by assistance, Maximal assistance, Dependent/Total (Pt able to scoot forward in seat (SBA) & perform some positioning c cues, but as attention wanes & fatigue increases, Pt appears unable to focus on tasks & requires more A (up to Max A to scoot laterally in w/c & dependent to adjust position on toilet).)  Bed Mobility Comments: family training throughout.  Supine to Sit  Assistance Level: Maximum assistance  Skilled Clinical Factors: for trunk progression      Transfers  Sit to Stand: Maximum Assistance  Stand to Sit: Maximum Assistance  Bed to Chair: Dependent/Total (Stand

## 2024-02-16 NOTE — PROGRESS NOTES
[facility]   IN-PATIENT SERVICE   [facility]    Consult note            Date:   2/16/2024  Patient name:  Brennen Mcclure  Date of admission:  1/27/2024  3:01 PM  MRN:   [id number]  Account:  [id number]  YOB: 1965  PCP:    No primary care provider on file.  Room:   [address] [place] [address]  Code Status:    Full Code    Chief Complaint:     No chief complaint on file.      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, started on heparin drip also had patchy IPH, complicated by expansion of intraparenchymal hemorrhage with mass effect, s/p craniectomy, and evacuation of IPH x 2 with dural repair last CT head on on 1/24 showed evolution of the hemorrhage with reduction in the mass effect, Hospital course further complicated by ITP, was seen by hematology oncology, given IVIG and platelet transfusion,, started on Vimpat for seizure prevention, had cellulitis on Celexa  Patient was seen by hematology oncology over there and was started on Eliquis 2.5 twice daily  Patient was drowsy but arousable on my encounter,  Family was present at the bedside    Denies any chest pain shortness of breath        Past Medical History:     Past Medical History:   Diagnosis Date    CAD (coronary artery disease)     Chronic deep vein thrombosis (DVT) of both lower extremities (HCC)     Chronic idiopathic thrombocytopenia (HCC)     CVA (cerebral vascular accident) (HCC)     HLD (hyperlipidemia)         Past Surgical History:     Past Surgical History:   Procedure Laterality Date    CORONARY ARTERY BYPASS GRAFT      CORONARY STENT PLACEMENT          Medications Prior to Admission:     Prior to

## 2024-02-16 NOTE — PLAN OF CARE
Problem: Discharge Planning  Goal: Discharge to home or other facility with appropriate resources  2/16/2024 0931 by Rand Dhaliwal RN  Outcome: Progressing     Problem: Skin/Tissue Integrity  Goal: Absence of new skin breakdown  Description: 1.  Monitor for areas of redness and/or skin breakdown  2.  Assess vascular access sites hourly  3.  Every 4-6 hours minimum:  Change oxygen saturation probe site  4.  Every 4-6 hours:  If on nasal continuous positive airway pressure, respiratory therapy assess nares and determine need for appliance change or resting period.  2/16/2024 0931 by Rand Dhaliwal RN  Outcome: Progressing    Problem: Skin/Tissue Integrity  Goal: Absence of new skin breakdown  Description: 1.  Monitor for areas of redness and/or skin breakdown  2.  Assess vascular access sites hourly  3.  Every 4-6 hours minimum:  Change oxygen saturation probe site  4.  Every 4-6 hours:  If on nasal continuous positive airway pressure, respiratory therapy assess nares and determine need for appliance change or resting period.  2/16/2024 0931 by Rand Dhaliwal RN  Outcome: Progressing     Problem: Safety - Adult  Goal: Free from fall injury  2/16/2024 0931 by Rand Dhaliwal RN  Outcome: Progressing     Problem: ABCDS Injury Assessment  Goal: Absence of physical injury  2/16/2024 0931 by Rand Dhaliwal RN  Outcome: Progressing     Problem: Pain  Goal: Verbalizes/displays adequate comfort level or baseline comfort level  2/16/2024 0931 by Rand Dhaliwal RN  Outcome: Progressing     Problem: Nutrition Deficit:  Goal: Optimize nutritional status  2/16/2024 0931 by Rand Dhaliwal RN  Outcome: Progressing     Problem: Chronic Conditions and Co-morbidities  Goal: Patient's chronic conditions and co-morbidity symptoms are monitored and maintained or improved  2/16/2024 0931 by Rand Dhaliwal RN  Outcome: Progressing     Problem: Respiratory - Adult  Goal: Achieves optimal ventilation and

## 2024-02-16 NOTE — PROGRESS NOTES
[facility] [address], [address]  Consent for Procedure/Sedation    Date:     Time:      1. I authorize the performance upon Kaylene Guillen the following:  INFERIOR VENA CAVA REMOVAL     2.  I authorize Dr. Alissa Handley (and whomever is ishan SPEECH LANGUAGE PATHOLOGY  Speech Language Pathology  [facility]    Cognitive Treatment Note    Date: 2/16/2024  Patient’s Name: Brennen Mcclure  MRN: [id number]  Diagnosis:   Patient Active Problem List   Diagnosis Code    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 (HCC) I69.152    Pseudobulbar affect F48.2    Intractable headache R51.9    Intracranial hemorrhage (HCC) I62.9    Thrombocytopenia (HCC) D69.6    Anemia D64.9       Pain: 0/10    Cognitive Treatment    Treatment time: 4039-4037      Subjective: [x] Alert [x] Cooperative     [] Confused     [] Agitated    [] Lethargic      Objective/Assessment:  Attention: Pt easily distracted when wife present in room.  Family able to recognize attention deficit and leaving room to allow Pt ability to focus better on ST tasks.  Occasional repetition and redirection required throughout.  Long response latency demo. T/o.      Recall: Image retention (fishing, 15 min delay)- 90% accuracy (I), increasing to 100% cued.      Organization: n/a    Problem Solving/Reasoning: Problem solving, missing equipment- 100% accuracy (I).  Improved response time and accuracy noted with problem solving task from time of initial evaluation/sessions.  Reinforced this.     Speech: Pt presents with mild dysarthria characterized by imprecise articulation, blended word boundaries, rapid rate and reduced vocal intensity.  Pt required min (verbal cues) to facilitate strategies in conversation and with articulation drills tasks (alliterations -- 4-7 word phrases).  Speech judged to be ~95% intelligible during session.  Pt able to complete OMEs x5 sets in reps of 20 c mod cues.  Pt continues to demonstrate reduced labial and lingual strength, coordination and ROM with exercise attempts.     Other: Pt's wife present for first half of session.  Pt and wife expressing excitement re: how well home evaluation with PT/OT when this AM.  Emotional support provided by writer.     ________________________________    ___________________    Witness: _________________________      Date: ___________________    Printed: 2018   4:24 PM  Patient Name: Betsey Musa        : 7/15/1948       Medical Record #: [id number]

## 2024-02-16 NOTE — PLAN OF CARE
Problem: Discharge Planning  Goal: Discharge to home or other facility with appropriate resources  2/16/2024 0028 by Greer Hernández LPN  Outcome: Progressing  Flowsheets (Taken 2/15/2024 2115)  Discharge to home or other facility with appropriate resources:   Identify barriers to discharge with patient and caregiver   Arrange for needed discharge resources and transportation as appropriate   Identify discharge learning needs (meds, wound care, etc)   Refer to discharge planning if patient needs post-hospital services based on physician order or complex needs related to functional status, cognitive ability or social support system     Problem: Skin/Tissue Integrity  Goal: Absence of new skin breakdown  Description: 1.  Monitor for areas of redness and/or skin breakdown  2.  Assess vascular access sites hourly  3.  Every 4-6 hours minimum:  Change oxygen saturation probe site  4.  Every 4-6 hours:  If on nasal continuous positive airway pressure, respiratory therapy assess nares and determine need for appliance change or resting period.  2/16/2024 0028 by Greer Hernández LPN  Outcome: Progressing     Problem: Safety - Adult  Goal: Free from fall injury  2/16/2024 0028 by Greer Hernández LPN  Outcome: Progressing     Problem: ABCDS Injury Assessment  Goal: Absence of physical injury  2/16/2024 0028 by Greer Hernández LPN  Outcome: Progressing     Problem: Pain  Goal: Verbalizes/displays adequate comfort level or baseline comfort level  2/16/2024 0028 by Greer Hernández LPN  Outcome: Progressing     Problem: Nutrition Deficit:  Goal: Optimize nutritional status  2/16/2024 0028 by Greer Hernández LPN  Outcome: Progressing     Problem: Chronic Conditions and Co-morbidities  Goal: Patient's chronic conditions and co-morbidity symptoms are monitored and maintained or improved  2/16/2024 0028 by Greer Hernández LPN  Outcome: Progressing  Flowsheets (Taken 2/15/2024 2115)  Care Plan - Patient's Chronic Conditions and Co-Morbidity  Symptoms are Monitored and Maintained or Improved:   Monitor and assess patient's chronic conditions and comorbid symptoms for stability, deterioration, or improvement   Collaborate with multidisciplinary team to address chronic and comorbid conditions and prevent exacerbation or deterioration   Update acute care plan with appropriate goals if chronic or comorbid symptoms are exacerbated and prevent overall improvement and discharge     Problem: Respiratory - Adult  Goal: Achieves optimal ventilation and oxygenation  2/16/2024 0028 by Greer Hernández LPN  Outcome: Progressing

## 2024-02-16 NOTE — PROGRESS NOTES
[facility]   OCCUPATIONAL THERAPY HOME EVALUATION NOTE  PHYSICAL THERAPY HOME EVALUATION NOTE   ACUTE REHAB  Date: 24  Patient Name: Brennen Mcclure       Room: 2633/2633-01  MRN: [id number]   Account #: [id number]    : 1965  (59 y.o.)  Gender: male   Referring Practitioner: Danica De La Paz MD  Diagnosis: Hemiplegia following ntrm intcrbl hemor aff left dominant side         Home evaluation completed this this date with wife Mara, daughter Tiffany, granddaughter Sandra, physical therapist Fany Kelly, occupational therapist Bhupinder Gutierrez, and Occupational therapy assistant Fanta Gan.     Therapy billable time:   [phone] 1210-1231    Physical therapy:   Patient and family/caregiver(s) participated in functional mobility tasks within home environment with use of current equipment and discussion about additional equipment and safety recommendations (recommendations can be found in paper home eval).     (See PT Tx note reflecting mobility tasks performed and assistance levels required.)  (See PT note regarding Education/cues provided to enhance caregiver assistance techniques to ensure use of proper body mechanics and safety of pt and caregiver(s)    In the event of a fall, writers recommend to call EMS/Fire department to provide safe lift assistance.    Safety recommendations include limiting distractions/clear communication/pacing task performance/Delegating a leader to safely navigate through the task and provide necessary cues. Edu provided on techniques for manual/verbal/visual cues and physical mechanics of blocking/supporting parts of the patient's body.    Family members very receptive to training and all questions are answered by this writer at time of assessment and training.     Further goals identified to target limitations in preparation for discharge with family in agreement. PT to continue to address safe techniques with transfers and WC mobility  mobility and access into kitchen with Good understanding of family support to provide meal set-up. Family/patient educated on fall prevention strategies in the house of removing area rugs or adding non skid taping for safety with transitions. Education provided on safety with pet care within the home. Suggest animals be maintained to one area until patient is ready and safe for animals with harness to maintain safety of the patient. Family/patient educated on adaptive sling to assist with pain management and reduce risk of contracture. OT to further assess during next session. Family and patient demonstrated increased confidence about home discharge and safety and all questions answered. Further goals identified to target limitations in preparation for discharge with family in agreement. OT to continue to address safety and transfers with toileting task with use of stand up lift with stand assist sling, sit pivot transfers, continued family educated on body mechanics and safety, wheelchair mobility, and left upper extremity positioning. See paper home evaluation for further information and measurement.     Electronically signed by GARRETT Browne/L on 2/16/2024 at 2:58 PM

## 2024-02-16 NOTE — DISCHARGE SUMMARY
transition to outpatient therapy)      Discharge Medications:         Medication List        START taking these medications      acetaminophen 325 MG tablet  Commonly known as: TYLENOL  Take 2 tablets by mouth every 6 hours as needed for Pain     amitriptyline 50 MG tablet  Commonly known as: ELAVIL  Take 1 tablet by mouth nightly     atorvastatin 80 MG tablet  Commonly known as: LIPITOR  Take 1 tablet by mouth nightly     Baclofen 5 MG tablet  Commonly known as: LIORESAL  Take 1 tablet by mouth 2 times daily     butalbital-APAP-caffeine -40 MG Caps per capsule  Take 1 capsule by mouth 2 times daily as needed for Headaches     citalopram 10 MG tablet  Commonly known as: CELEXA  Take 1 tablet by mouth daily     dextromethorphan 30 MG/5ML extended release liquid  Commonly known as: DELSYM  Take 5 mLs by mouth daily     diclofenac sodium 1 % Gel  Commonly known as: VOLTAREN  Apply 2 g topically 2 times daily Apply to knees     docusate sodium 283 MG enema  Commonly known as: ENEMEEZ  Place 5 mLs rectally every 24 hours     ferrous sulfate 325 (65 Fe) MG tablet  Commonly known as: IRON 325  Take 1 tablet by mouth every other day     gabapentin 100 MG capsule  Commonly known as: NEURONTIN  Take 1 capsule by mouth 3 times daily for 90 days.     Handicap Placard Misc  by Does not apply route Duration: 1 year / Dx: Stroke     ipratropium 0.5 mg-albuterol 2.5 mg 0.5-2.5 (3) MG/3ML Soln nebulizer solution  Commonly known as: DUONEB  Inhale 3 mLs into the lungs every 6 hours as needed for Shortness of Breath     lacosamide 100 MG Tabs tablet  Commonly known as: VIMPAT  Take 1 tablet by mouth 2 times daily for 90 days. Max Daily Amount: 200 mg     melatonin 3 MG Tabs tablet  Take 2 tablets by mouth at bedtime     modafinil 200 MG tablet  Commonly known as: PROVIGIL  Take 1 tablet by mouth daily for 30 days. Max Daily Amount: 200 mg     polyethylene glycol 17 g packet  Commonly known as: GLYCOLAX  Take 1 packet by mouth  daily as needed for Constipation     senna 8.6 MG tablet  Commonly known as: SENOKOT  Take 2 tablets by mouth nightly     therapeutic multivitamin-minerals tablet  Take 1 tablet by mouth daily     topiramate 50 MG tablet  Commonly known as: TOPAMAX  Take 1 tablet by mouth 2 times daily            STOP taking these medications      Magic Mouthwash  Commonly known as: Miracle Mouthwash     thiamine 100 MG/ML injection  Commonly known as: B-1     Vitamin D3 125 MCG (5000 UT) Tabs tablet  Generic drug: vitamin D3               Where to Get Your Medications        These medications were sent to [id number] - [place], [place] - [address] [facility] -  [phone] - [phone]  [address] [place] [address]      Phone: [phone]   amitriptyline 50 MG tablet  atorvastatin 80 MG tablet  Baclofen 5 MG tablet  butalbital-APAP-caffeine -40 MG Caps per capsule  citalopram 10 MG tablet  dextromethorphan 30 MG/5ML extended release liquid  ferrous sulfate 325 (65 Fe) MG tablet  gabapentin 100 MG capsule  ipratropium 0.5 mg-albuterol 2.5 mg 0.5-2.5 (3) MG/3ML Soln nebulizer solution  lacosamide 100 MG Tabs tablet  modafinil 200 MG tablet  topiramate 50 MG tablet       You can get these medications from any pharmacy    Bring a paper prescription for each of these medications  Handicap [facility]  You don't need a prescription for these medications  acetaminophen 325 MG tablet  diclofenac sodium 1 % Gel  docusate sodium 283 MG enema  melatonin 3 MG Tabs tablet  polyethylene glycol 17 g packet  senna 8.6 MG tablet  therapeutic multivitamin-minerals tablet          Discharge summary forwarded to PCP: No primary care provider on file.       Beck Shi MD

## 2024-02-16 NOTE — PROGRESS NOTES
Physical Therapy  Facility/Department: [facility] ACUTE REHAB  Physical Therapy Treatment/Home Assessment    Name: Brennen Mcclure  : 1965  MRN: [id number]  Date of Service: 2024    Discharge Recommendations:  Therapy recommended at discharge          Patient Diagnosis(es): There were no encounter diagnoses.  Past Medical History:  has a past medical history of CAD (coronary artery disease), Chronic deep vein thrombosis (DVT) of both lower extremities (HCC), Chronic idiopathic thrombocytopenia (HCC), CVA (cerebral vascular accident) (HCC), and HLD (hyperlipidemia).  Past Surgical History:  has a past surgical history that includes Coronary artery bypass graft and Coronary stent placement.    Assessment   Assessment: Patient and family able to demonstrate satisfactory performance of home environment mobility with Fair ability of family to provide adequate physical assist and cues to patient to maintain safety of all involved. This writer brings awareness to the importance of clear communications between pt and caregiver(s) as well as practicing pacing through the steps to complete a task all to ensure pt and caregiver safety. At this time PT/OT writers both agree that the patient is safest with 2A for all transfers, and 1 Assist for WC mobility. Mobility tasks to focus on moving forward until day of DC include: WC mobility skills, lateral transfers(using slide board if needed)/squat pivot transfers.  Specific Instructions for Next Treatment: FES; Sitting/standing balance and midline orienting, pre-gait activity progressing to transfers/Amb with hemiwalker.  Requires PT Follow-Up: Yes  Activity Tolerance  Activity Tolerance: Patient limited by fatigue;Patient tolerated treatment well  Activity Tolerance Comments: Pt able to tolerate entire length of home assessments/mobility tasks with seated rest breaks throughout.     Plan   Physical Therapy Plan  General Plan: Other (See Comment) (900 minutes of combined PT/OT/ST  Goals?: Yes  Long term goal 6: Pt to demo actual or simulated car transfer using device and Norm  Long term goal 7: Pt to demo WC propulsion of household distances with R/L turns and SBA.  Long term goal 8: Pt and family to demonstrate satisfactory performance/assistance for desired transfer and mobility tasks for a safe DC home.  Patient Goals   Patient Goals : Return home with family support       Education  Patient Education  Education Given To: Patient;Family;Caregiver  Education Provided: Plan of Care;Role of Therapy;Transfer Training;Equipment;Fall Prevention Strategies;Family Education;Energy Conservation  Education Provided Comments: Safety modifications in assist technique and home environment. Role of Home therapy to assess further aspects of mobility as pt progresses further beyond ARU DC  Education Method: Verbal;Demonstration;Teach Back  Barriers to Learning: Cognition (pt's cognition/attention; limited by fatigue)  Education Outcome: Verbalized understanding;Demonstrated understanding;Continued education needed       02/16/24 0901 02/16/24 0938 02/16/24 1210   Time Code Minutes   Timed Code Treatment Minutes 12 Minutes 71 Minutes 10 Minutes   PT Co-Treatment Minutes   Time In 0901 0938 1210   Time Out 0924 1200 1231   Minutes 23 142 21     Co-Tx and Co-HomeEval     Fany Kelly, PT

## 2024-02-16 NOTE — PROGRESS NOTES
Today's Date: 2/16/2024  Patient Name: Brennen Mcclure  Date of admission: 1/27/2024  3:01 PM  Patient's age: 59 y.o., 1965  Admission Dx: 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 (HCC) [I69.152]    Reason for Consult:  Low platelets   Requesting Physician: Dipika Angulo MD    CHIEF COMPLAINT:  No chief complaint on file.      History Obtained From:  patient and chart  INTERVAL HISTORY:    Patient seen and examined  No bleeding  No new events    HISTORY OF PRESENT ILLNESS:    Brennen Mcclure is a 59 y.o. male who is admitted to the hospital on 1/27/2024  for acute rehab after recent hospitalization for acute stroke.    Patient had recent hospitalization for left hemiparesis secondary to hemorrhagic CVA and subsequently discharged to acute rehab at [facility].  Patient has history of dural venous sinus thrombosis and also history of DVT and was chronically maintained on Eliquis.    Patient currently is on Eliquis and now noted to have thrombocytopenia which is progressively getting worse therefore hematology was consulted.  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  Patient had a evaluation at [facility] back in 2020 as well and as per the chart.    Past Medical History:   has a past medical history of CAD (coronary artery disease), Chronic deep vein thrombosis (DVT) of both lower extremities (HCC), Chronic idiopathic thrombocytopenia (HCC), CVA (cerebral vascular accident) (HCC), and HLD (hyperlipidemia).    Past Surgical History:   has a past surgical history that includes Coronary artery bypass graft and Coronary stent placement.     Medications:    Prior to Admission medications    Not on File     Current Facility-Administered Medications   Medication Dose Route Frequency Provider Last Rate Last Admin    docusate sodium (ENEMEEZ) enema 283 mg  1  Intractable headache [R51.9] 01/31/2024    Intracranial hemorrhage (HCC) [I62.9] 01/31/2024    Pseudobulbar affect [F48.2] 01/30/2024    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 (HCC) [I69.152] 01/27/2024         IMPRESSION:   Recent acute hemorrhagic stroke  History of dural venous sinus thrombosis  History of DVT  Seizure   Thrombocytopenia  Anemia    RECOMMENDATIONS:  I reviewed the laboratory data, imaging studies, prior records, outside records, discussed diagnosis and treatment recommendations  He has a mild thrombocytopenia at baseline and subsequently appears to be getting worse over the past few days  Normal B12 folic acid level and iron studies  Okay to continue anticoagulation if platelet count stays above 50K and there is no any evidence of bleeding  His thrombocytopenia appears chronic and has had evaluation with hematology in the past and was thought to be immune mediated  His baseline platelets around 80-90K  If platelet counts drop below 50K I would consider IVIG or prednisone treatment  Discharge planning as per primary team       Discussed with patient and Nurse.    Thank you for asking us to see this patient.  Lam Chang MD  Hematologist/Medical Oncologist      This note is created with the assistance of a speech recognition program.  While intending to generate a document that actually reflects the content of the visit, the document can still have some errors including those of syntax and sound a like substitutions which may escape proof reading.  It such instances, actual meaning can be extrapolated by contextual diversion.

## 2024-02-17 PROCEDURE — 94664 DEMO&/EVAL PT USE INHALER: CPT

## 2024-02-17 PROCEDURE — 94640 AIRWAY INHALATION TREATMENT: CPT

## 2024-02-17 PROCEDURE — 97535 SELF CARE MNGMENT TRAINING: CPT

## 2024-02-17 PROCEDURE — 6370000000 HC RX 637 (ALT 250 FOR IP): Performed by: PSYCHIATRY & NEUROLOGY

## 2024-02-17 PROCEDURE — 1180000000 HC REHAB R&B

## 2024-02-17 PROCEDURE — 6370000000 HC RX 637 (ALT 250 FOR IP): Performed by: INTERNAL MEDICINE

## 2024-02-17 PROCEDURE — 97530 THERAPEUTIC ACTIVITIES: CPT

## 2024-02-17 PROCEDURE — 6370000000 HC RX 637 (ALT 250 FOR IP): Performed by: PHYSICAL MEDICINE & REHABILITATION

## 2024-02-17 PROCEDURE — 99232 SBSQ HOSP IP/OBS MODERATE 35: CPT | Performed by: PHYSICAL MEDICINE & REHABILITATION

## 2024-02-17 PROCEDURE — 6370000000 HC RX 637 (ALT 250 FOR IP): Performed by: STUDENT IN AN ORGANIZED HEALTH CARE EDUCATION/TRAINING PROGRAM

## 2024-02-17 PROCEDURE — 94761 N-INVAS EAR/PLS OXIMETRY MLT: CPT

## 2024-02-17 PROCEDURE — 99232 SBSQ HOSP IP/OBS MODERATE 35: CPT | Performed by: INTERNAL MEDICINE

## 2024-02-17 PROCEDURE — 97542 WHEELCHAIR MNGMENT TRAINING: CPT

## 2024-02-17 PROCEDURE — 97129 THER IVNTJ 1ST 15 MIN: CPT

## 2024-02-17 PROCEDURE — 92526 ORAL FUNCTION THERAPY: CPT

## 2024-02-17 RX ADMIN — TOPIRAMATE 25 MG: 25 TABLET, FILM COATED ORAL at 07:44

## 2024-02-17 RX ADMIN — LACOSAMIDE 100 MG: 100 TABLET, FILM COATED ORAL at 21:51

## 2024-02-17 RX ADMIN — FERROUS SULFATE TAB 325 MG (65 MG ELEMENTAL FE) 325 MG: 325 (65 FE) TAB at 07:37

## 2024-02-17 RX ADMIN — TOPIRAMATE 25 MG: 25 TABLET, FILM COATED ORAL at 21:52

## 2024-02-17 RX ADMIN — ASPIRIN 81 MG 81 MG: 81 TABLET ORAL at 07:37

## 2024-02-17 RX ADMIN — AMITRIPTYLINE HYDROCHLORIDE 25 MG: 25 TABLET, FILM COATED ORAL at 21:52

## 2024-02-17 RX ADMIN — IPRATROPIUM BROMIDE AND ALBUTEROL SULFATE 1 DOSE: 2.5; .5 SOLUTION RESPIRATORY (INHALATION) at 07:10

## 2024-02-17 RX ADMIN — BACLOFEN 5 MG: 10 TABLET ORAL at 21:51

## 2024-02-17 RX ADMIN — GABAPENTIN 100 MG: 100 CAPSULE ORAL at 07:38

## 2024-02-17 RX ADMIN — APIXABAN 2.5 MG: 2.5 TABLET, FILM COATED ORAL at 07:37

## 2024-02-17 RX ADMIN — GABAPENTIN 100 MG: 100 CAPSULE ORAL at 21:51

## 2024-02-17 RX ADMIN — CITALOPRAM HYDROBROMIDE 10 MG: 10 TABLET ORAL at 07:37

## 2024-02-17 RX ADMIN — BUTALBITA,ACETAMINOPHEN AND CAFFEINE 1 CAPSULE: 50; 300; 40 CAPSULE ORAL at 07:37

## 2024-02-17 RX ADMIN — IPRATROPIUM BROMIDE AND ALBUTEROL SULFATE 1 DOSE: 2.5; .5 SOLUTION RESPIRATORY (INHALATION) at 15:20

## 2024-02-17 RX ADMIN — GABAPENTIN 100 MG: 100 CAPSULE ORAL at 13:34

## 2024-02-17 RX ADMIN — Medication 1 TABLET: at 07:37

## 2024-02-17 RX ADMIN — Medication 30 MG: at 07:36

## 2024-02-17 RX ADMIN — IPRATROPIUM BROMIDE AND ALBUTEROL SULFATE 1 DOSE: 2.5; .5 SOLUTION RESPIRATORY (INHALATION) at 20:47

## 2024-02-17 RX ADMIN — ATORVASTATIN CALCIUM 80 MG: 80 TABLET, FILM COATED ORAL at 21:51

## 2024-02-17 RX ADMIN — LACOSAMIDE 100 MG: 100 TABLET, FILM COATED ORAL at 07:37

## 2024-02-17 RX ADMIN — DOCUSATE SODIUM 283 MG: 283 LIQUID RECTAL at 17:34

## 2024-02-17 RX ADMIN — BACLOFEN 5 MG: 10 TABLET ORAL at 07:37

## 2024-02-17 RX ADMIN — DICLOFENAC SODIUM 2 G: 10 GEL TOPICAL at 07:38

## 2024-02-17 RX ADMIN — MODAFINIL 200 MG: 100 TABLET ORAL at 07:37

## 2024-02-17 RX ADMIN — Medication 6 MG: at 21:51

## 2024-02-17 RX ADMIN — DICLOFENAC SODIUM 2 G: 10 GEL TOPICAL at 21:52

## 2024-02-17 RX ADMIN — SENNOSIDES 17.2 MG: 8.6 TABLET, FILM COATED ORAL at 21:51

## 2024-02-17 RX ADMIN — APIXABAN 2.5 MG: 2.5 TABLET, FILM COATED ORAL at 21:51

## 2024-02-17 ASSESSMENT — PAIN SCALES - GENERAL
PAINLEVEL_OUTOF10: 6
PAINLEVEL_OUTOF10: 8
PAINLEVEL_OUTOF10: 8

## 2024-02-17 ASSESSMENT — PAIN DESCRIPTION - LOCATION
LOCATION: HEAD
LOCATION: HEAD

## 2024-02-17 NOTE — PROGRESS NOTES
w/c. Use of scotty stedy for standing portions, initially 2 assist for transfers however fatigues easily and required 2 assist to maintain balance while this writer completed LB self-care. Pt is currently limited by impaired balance, safety awareness, increased pain, decreased activity tolerance, and general weakness which impacts the pt's ability to safely and independently complete self-care/mobility  Skin Care: Bath wipes         Balance  Sitting Balance: Dependent/Total (Dependent x1-2 in scotty stedy d/t heavy L lateral lean)  Standing Balance: Dependent/Total (Dependent x1-2 in scotty stedy d/t heavy L lateral lean)   Standing Balance  Time: 2-3 minutes  Activity: Functional transfers, static  scotty stedy  Comment: RUE on scotty stedy bar, LUE supported in sling  Functional Mobility  Functional - Mobility Device: Wheelchair  Activity: To/From therapy gym  Assist Level: Dependent/Total  Functional Mobility Comments: Did not self-propel     Bed mobility  Bridging: Minimal assistance (with LLE placement/stabilization. Minimal clearance. Mara attempting to use cushion designed to make pants up/down easier; Pt states it is not painful, is comfortable.)  Rolling to Left: Moderate assistance (bed flat, using rail, comfort glide.)  Rolling to Right: Minimal assistance (bed flat, using rail, comfort glide.)  Supine to Sit: Moderate assistance (with HOB elevated and using  HR simulating home set up with hospital bed. Max cues for technique)  Sit to Supine: 2 Person assistance, Moderate assistance (trunk control and assist with LE's)  Scooting: Stand by assistance, Maximal assistance, Dependent/Total (Pt able to scoot forward in seat (SBA) & perform some positioning c cues, but as attention wanes & fatigue increases, Pt appears unable to focus on tasks & requires more A (up to Max A to scoot laterally in w/c & dependent to adjust position on toilet).)  Bed Mobility Comments: Bed mobility not assessed at hospital or home  Monitoring  Insomnia: on melatonin  CAD: Hx MI, CVA and CABG, stents. On ASA, atorvastatin.   HTN: on carvedilol, lisinopril - IM titrating to effective dose and discontinued lisinopril for hypotension  HLD: on atorvastatin.  Seborrheic dermatitis: OK for wife to apply baby oil topically but with no pressure over craniectomy site  Depression/anxiety: on Celexa. Psychiatry following and managing.   Bowel Management: Miralax prn, Senokot prn, Dulcolax prn.  Dr. De La Paz discussed with patient and family today. Will plan on daily Enemeez to regulate bowels.   Internal medicine for medical management  Chronic BLE DVT:  on Eliquis  Follow up: PCP 1-2 weeks, Neurosurgery Dr. Corbett 2/26/24 to establish plan for cranioplasty, PM&R, Hematology, Vascular, Pulmonology, psychiatry regarding pseudobulbar affect  Home evaluation went well 2/16. Family planning for discharge 2/19/24 with home health - aware that only provider who accepts their insurance does not provide speech therapy. They would like Meds to Beds      Beck Shi MD       This note is created with the assistance of a speech recognition program.  While intending to generate a document that actually reflects the content of the visit, the document can still have some errors including those of syntax and sound a like substitutions which may escape proof reading.  In such instances, actual meaning can be extrapolated by contextual diversion

## 2024-02-17 NOTE — PROGRESS NOTES
[facility]   IN-PATIENT SERVICE   [facility]    Consult note            Date:   2/17/2024  Patient name:  Brennen Mcclure  Date of admission:  1/27/2024  3:01 PM  MRN:   [id number]  Account:  [id number]  YOB: 1965  PCP:    No primary care provider on file.  Room:   [address] [place] [address]  Code Status:    Full Code    Chief Complaint:     No chief complaint on file.      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, started on heparin drip also had patchy IPH, complicated by expansion of intraparenchymal hemorrhage with mass effect, s/p craniectomy, and evacuation of IPH x 2 with dural repair last CT head on on 1/24 showed evolution of the hemorrhage with reduction in the mass effect, Hospital course further complicated by ITP, was seen by hematology oncology, given IVIG and platelet transfusion,, started on Vimpat for seizure prevention, had cellulitis on Celexa  Patient was seen by hematology oncology over there and was started on Eliquis 2.5 twice daily  Patient was drowsy but arousable on my encounter,  Family was present at the bedside    Denies any chest pain shortness of breath        Past Medical History:     Past Medical History:   Diagnosis Date    CAD (coronary artery disease)     Chronic deep vein thrombosis (DVT) of both lower extremities (HCC)     Chronic idiopathic thrombocytopenia (HCC)     CVA (cerebral vascular accident) (HCC)     HLD (hyperlipidemia)         Past Surgical History:     Past Surgical History:   Procedure Laterality Date    CORONARY ARTERY BYPASS GRAFT      CORONARY STENT PLACEMENT          Medications Prior to Admission:     Prior to  alert, depressed:  Lungs: Bilateral equal air entry, clear to ausculation, no wheezing, rales or rhonchi, normal effort  Cardiovascular: normal rate, regular rhythm, no murmur, gallop, rub.  Abdomen: Soft, nontender, nondistended, normal bowel sounds, no hepatomegaly or splenomegaly  Neurologic: Left-sided hemiparesis, depressed skull on the right skin: No gross lesions, rashes, bruising or bleeding on exposed skin area  Extremities:  peripheral pulses palpable, no pedal edema or calf pain with palpation      Investigations:      Laboratory Testing:  No results found for this or any previous visit (from the past 24 hour(s)).            Imaging/Diagnostics:    CT HEAD WO CONTRAST  Narrative: EXAMINATION:  CT OF THE HEAD WITHOUT CONTRAST  2/16/2024 5:23 pm    TECHNIQUE:  CT of the head was performed without the administration of intravenous  contrast. Automated exposure control, iterative reconstruction, and/or weight  based adjustment of the mA/kV was utilized to reduce the radiation dose to as  low as reasonably achievable.    COMPARISON:  01/31/2024    HISTORY:  New palpable fluid along right craniectomy defect. Persistent daily headache.    FINDINGS:  Evaluation is suboptimal as the patient is rotated and there is mild motion  artifact.    Stable postsurgical changes from right-sided craniectomy and cranioplasty.  Encephalomalacia in the right cerebral hemisphere is unchanged and there is  stable size of the ventricular system.  No acute hemorrhage.  No significant  midline shift.    No subcutaneous fluid collection.  No acute abnormality of the orbits and  globes.  No significant paranasal sinus disease.  Impression: Stable CT without acute abnormality.       Assessment :      Primary Problem  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 (HCC)    Active Hospital Problems    Diagnosis Date Noted    Thrombocytopenia (HCC) [D69.6] 02/12/2024    Anemia [D64.9] 02/12/2024    Intractable headache

## 2024-02-17 NOTE — PROGRESS NOTES
SPEECH LANGUAGE PATHOLOGY  Speech Language Pathology  [facility] ACUTE REHAB    Cognitive Treatment Note    Date: 2024  Patient’s Name: Brennen Mcclure  MRN: [id number]  Diagnosis:   Patient Active Problem List   Diagnosis Code    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 (HCC) I69.152    Pseudobulbar affect F48.2    Intractable headache R51.9    Intracranial hemorrhage (HCC) I62.9    Thrombocytopenia (HCC) D69.6    Anemia D64.9       Pain: 6/10 (L arm, Pt reports already received pain medication)    Cognitive Treatment    Treatment time: 2350-3507      Subjective: [x] Alert [x] Cooperative     [] Confused     [] Agitated    [] Lethargic      Objective/Assessment:  Attention: Environment modified to reduce distractions (television off,phone put away). Pt required occasional cues to redirect from tangential statements.    Orientation: x3/4 temporal, 2/2 spatial concepts (I). Pt responded to min cues for DENEEN.     Recall: Recalled previous day's events (home eval, therapy) with min details (I), improved to moderate level of detail with mod verbal cues.     Organization: Abstract namin% (I), improved to 93% with mod-max A.     Problem Solving/Reasoning: NT    Dysphagia: ST observed Pt for small portion of breakfast meal of easy to chew solids with thin liquids. Pt demo decreased labial sensation (L) side requiring cues to remove food residue from face and latent cough x 1 following thin liquids via     Other: No family present. Pt demo good carryover of facial exercises; ST reinforced independent completion of exercises after discharge.     Plan:  [x] Continue ST services    [] Discharge from ST:      Discharge recommendations:  [x] Further therapy recommended at discharge.   [] No therapy recommended at discharge.      Treatment completed by: Brian Multani M.S., CCC-SLP

## 2024-02-17 NOTE — PROGRESS NOTES
thread RLE and manage over hips supine in bed without assist.    Putting On/Taking Off Footwear  Assistance Level: Maximum assistance  Skilled Clinical Factors: dept to lotus Edmondson, pt attempting to provide some assist with shoes and L AFO    Toileting  Assistance Level: Requires x 2 assistance  Skilled Clinical Factors: requires 2 assist to maintain balance due to L side lean pt unable to maintain balance    Toilet Transfers  Technique:  (in SS)  Equipment: Raised toilet seat without arms  Additional Factors: Verbal cues;Increased time to complete;Cues for hand placement;Set-up  Assistance Level: Requires x 2 assistance;Moderate assistance  Skilled Clinical Factors: Mod x 2 si- SS from toilet increased L side lean noted d/t poor positioning after pt straining on toilet during BM.    Mobility     Bridging  Assistance Level: Stand by assist  Skilled Clinical Factors: pt able to bridge up R hip for managing pants.  Roll Left  Assistance Level: Minimal assistance  Skilled Clinical Factors: min A this date pt attending to cues well and utilizing grabbars PRN  Roll Right  Assistance Level: Minimal assistance  Skilled Clinical Factors: min A this date pt attending to cues well and utilizing grabbars PRN  Sit to Supine  Assistance Level: Minimal assistance  Skilled Clinical Factors: pt requires some assist to manage trunk but requires vc to hook LLE to assist into bed  Supine to Sit  Assistance Level: Moderate assistance  Skilled Clinical Factors: Cues and increased time for hooking method to advance BLEs over EOB, A for trunk upright progression from R side.          Sit to Stand  Assistance Level: Moderate assistance  Skilled Clinical Factors: completed in SS this date to increase pt's familiarity with use for return to home.  Stand to Sit  Assistance Level: Moderate assistance  Skilled Clinical Factors: for controlled sit       Functional Mobility  Device: Wheelchair  Activity: To/From therapy gym  Assistance  ROM, Balance training, Functional mobility training, Endurance training, Wheelchair mobility training, Neuromuscular re-education, Cognitive reorientation, Pain management, Safety education & training, Patient/Caregiver education & training, Equipment evaluation, education, & procurement, Positioning, Home management training, Coordination training, Co-Treatment       02/17/24 1132 02/17/24 1500 02/17/24 1508   Time Code Minutes    Timed Code Treatment Minutes  --   --  22 Minutes   OT Individual Minutes   Time In 0953 1336  --    Time Out 1105 1416  --    Minutes 72 40  --    OT Co-Treatment Minutes   Time In  --   --  1416   Time Out  --   --  1500   Minutes  --   --  44         Electronically signed by FORTINO Dave on 2/17/24 at 3:49 PM EST

## 2024-02-17 NOTE — PROGRESS NOTES
Today's Date: 2/17/2024  Patient Name: Brennen Mcclure  Date of admission: 1/27/2024  3:01 PM  Patient's age: 59 y.o., 1965  Admission Dx: 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 (HCC) [I69.152]    Reason for Consult:  Low platelets   Requesting Physician: Dipika Angulo MD    CHIEF COMPLAINT:  No chief complaint on file.      History Obtained From:  patient and chart  INTERVAL HISTORY:    Patient seen and examined  No bleeding  No new events    HISTORY OF PRESENT ILLNESS:    Brennen Mcclure is a 59 y.o. male who is admitted to the hospital on 1/27/2024  for acute rehab after recent hospitalization for acute stroke.    Patient had recent hospitalization for left hemiparesis secondary to hemorrhagic CVA and subsequently discharged to acute rehab at [facility].  Patient has history of dural venous sinus thrombosis and also history of DVT and was chronically maintained on Eliquis.    Patient currently is on Eliquis and now noted to have thrombocytopenia which is progressively getting worse therefore hematology was consulted.  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  Patient had a evaluation at [facility] back in 2020 as well and as per the chart.    Past Medical History:   has a past medical history of CAD (coronary artery disease), Chronic deep vein thrombosis (DVT) of both lower extremities (HCC), Chronic idiopathic thrombocytopenia (HCC), CVA (cerebral vascular accident) (HCC), and HLD (hyperlipidemia).    Past Surgical History:   has a past surgical history that includes Coronary artery bypass graft and Coronary stent placement.     Medications:    Prior to Admission medications    Not on File     Current Facility-Administered Medications   Medication Dose Route Frequency Provider Last Rate Last Admin    docusate sodium (ENEMEEZ) enema 283 mg  1  enema Rectal Q24H Danica De La Paz MD   283 mg at 02/16/24 2109    ipratropium 0.5 mg-albuterol 2.5 mg (DUONEB) nebulizer solution 1 Dose  1 Dose Inhalation TID RT Jerome Hassan MD   1 Dose at 02/17/24 0710    [Held by provider] lisinopril (PRINIVIL;ZESTRIL) tablet 5 mg  5 mg Oral Daily Evita Pinzon MD        dextromethorphan (DELSYM) 30 MG/5ML extended release liquid 30 mg  30 mg Oral Daily Asher Seo MD   30 mg at 02/17/24 0736    topiramate (TOPAMAX) tablet 25 mg  25 mg Oral BID Heri Nguyen, DO   25 mg at 02/17/24 0744    amitriptyline (ELAVIL) tablet 25 mg  25 mg Oral Nightly Chirri Heri, DO   25 mg at 02/16/24 2115    butalbital-APAP-caffeine -40 MG per capsule 1 capsule  1 capsule Oral Daily PRN Heri Nguyen, DO   1 capsule at 02/17/24 0737    polyethylene glycol (GLYCOLAX) packet 17 g  17 g Oral Daily PRN Danica De La Paz MD   17 g at 02/11/24 1234    baclofen (LIORESAL) tablet 5 mg  5 mg Oral BID Beck Mason MD   5 mg at 02/17/24 0737    Benzocaine-Menthol (CEPACOL) 1 lozenge  1 lozenge Oral Q2H PRN Shannan Drake MD        diclofenac sodium (VOLTAREN) 1 % gel 2 g  2 g Topical BID Beck Mason MD   2 g at 02/17/24 0738    acetaminophen (TYLENOL) tablet 650 mg  650 mg Oral Q4H PRN Danica De La Paz MD   650 mg at 02/16/24 2108    bisacodyl (DULCOLAX) suppository 10 mg  10 mg Rectal Daily PRN Danica De La Paz MD        apixaban (ELIQUIS) tablet 2.5 mg  2.5 mg Oral BID Dipika Angulo MD   2.5 mg at 02/17/24 0737    citalopram (CELEXA) tablet 10 mg  10 mg Oral Daily Dipika Angulo MD   10 mg at 02/17/24 0737    ferrous sulfate (IRON 325) tablet 325 mg  325 mg Oral Every Other Day Dipika Angulo MD   325 mg at 02/17/24 0737    gabapentin (NEURONTIN) capsule 100 mg  100 mg Oral TID Dipika Angulo MD   100 mg at 02/17/24 0738    lacosamide (VIMPAT) tablet 100 mg  100 mg Oral BID Dipika Angulo MD   100 mg at 02/17/24 0737    lidocaine 4 % external patch 1 patch

## 2024-02-17 NOTE — PROGRESS NOTES
tasks)  Specific Instructions for Next Treatment: FES; Sitting/standing balance and midline orienting, pre-gait activity progressing to transfers/Amb with hemiwalker.  Current Treatment Recommendations: Strengthening;ROM;Balance training;Functional mobility training;Transfer training;Gait training;Stair training;Neuromuscular re-education;Pain management;Home exercise program;Safety education & training;Patient/Caregiver education & training;Equipment evaluation, education, & procurement;Positioning;Therapeutic activities  Safety Devices  Type of Devices: Gait belt;All fall risk precautions in place;Call light within reach;Patient at risk for falls;Left in chair;Chair alarm in place;Heels elevated for pressure relief;Bed alarm in place;Left in bed (In chair AM, bed PM.)    EDUCATION  Education  Skilled Clinical Factors: HEP issued; to be discussed Sunday.    Therapy Time   Individual Concurrent Group Co-treatment   Time In 1105      (1416)   Time Out 1215      (1500)   Minutes 70           Timed Code Treatment Minutes: 92 Minutes (Including 22 min. from co-tx raya Jewell)     Keira Garcia PTA, 02/17/24 at 4:46 PM

## 2024-02-17 NOTE — PLAN OF CARE
Problem: Discharge Planning  Goal: Discharge to home or other facility with appropriate resources  Outcome: Progressing  Flowsheets (Taken 2/17/2024 0743)  Discharge to home or other facility with appropriate resources:   Identify barriers to discharge with patient and caregiver   Arrange for needed discharge resources and transportation as appropriate   Identify discharge learning needs (meds, wound care, etc)     Problem: Skin/Tissue Integrity  Goal: Absence of new skin breakdown  Description: 1.  Monitor for areas of redness and/or skin breakdown  2.  Assess vascular access sites hourly  3.  Every 4-6 hours minimum:  Change oxygen saturation probe site  4.  Every 4-6 hours:  If on nasal continuous positive airway pressure, respiratory therapy assess nares and determine need for appliance change or resting period.  Outcome: Progressing     Problem: Safety - Adult  Goal: Free from fall injury  Outcome: Progressing     Problem: ABCDS Injury Assessment  Goal: Absence of physical injury  Outcome: Progressing  Flowsheets (Taken 2/17/2024 1125)  Absence of Physical Injury: Implement safety measures based on patient assessment     Problem: Pain  Goal: Verbalizes/displays adequate comfort level or baseline comfort level  Outcome: Progressing     Problem: Nutrition Deficit:  Goal: Optimize nutritional status  Outcome: Progressing     Problem: Chronic Conditions and Co-morbidities  Goal: Patient's chronic conditions and co-morbidity symptoms are monitored and maintained or improved  Outcome: Progressing  Flowsheets (Taken 2/17/2024 0743)  Care Plan - Patient's Chronic Conditions and Co-Morbidity Symptoms are Monitored and Maintained or Improved:   Monitor and assess patient's chronic conditions and comorbid symptoms for stability, deterioration, or improvement   Collaborate with multidisciplinary team to address chronic and comorbid conditions and prevent exacerbation or deterioration   Update acute care plan with  appropriate goals if chronic or comorbid symptoms are exacerbated and prevent overall improvement and discharge     Problem: Respiratory - Adult  Goal: Achieves optimal ventilation and oxygenation  Outcome: Progressing  Flowsheets (Taken 2/17/2024 0613)  Achieves optimal ventilation and oxygenation:   Assess for changes in respiratory status   Assess for changes in mentation and behavior

## 2024-02-18 ENCOUNTER — APPOINTMENT (OUTPATIENT)
Dept: CT IMAGING | Age: 59
DRG: 056 | End: 2024-02-18
Attending: STUDENT IN AN ORGANIZED HEALTH CARE EDUCATION/TRAINING PROGRAM
Payer: COMMERCIAL

## 2024-02-18 PROBLEM — R56.9 SEIZURE (HCC): Status: ACTIVE | Noted: 2024-02-18

## 2024-02-18 PROBLEM — D69.3 CHRONIC IDIOPATHIC THROMBOCYTOPENIC PURPURA (HCC): Status: ACTIVE | Noted: 2024-02-18

## 2024-02-18 PROBLEM — G08 DURAL VENOUS SINUS THROMBOSIS: Status: ACTIVE | Noted: 2024-02-18

## 2024-02-18 LAB
ABSOLUTE BANDS: 0.09 K/UL (ref 0–1)
ANION GAP SERPL CALCULATED.3IONS-SCNC: 10 MMOL/L (ref 9–17)
BANDS: 2 % (ref 0–10)
BASOPHILS # BLD: 0 K/UL (ref 0–0.2)
BASOPHILS NFR BLD: 0 % (ref 0–2)
BUN SERPL-MCNC: 13 MG/DL (ref 8–23)
CALCIUM SERPL-MCNC: 10.1 MG/DL (ref 8.6–10.4)
CHLORIDE SERPL-SCNC: 104 MMOL/L (ref 98–107)
CO2 SERPL-SCNC: 27 MMOL/L (ref 20–31)
CREAT SERPL-MCNC: 0.8 MG/DL (ref 0.7–1.2)
EOSINOPHIL # BLD: 0.3 K/UL (ref 0–0.4)
EOSINOPHILS RELATIVE PERCENT: 7 % (ref 0–4)
ERYTHROCYTE [DISTWIDTH] IN BLOOD BY AUTOMATED COUNT: 23.3 % (ref 11.5–14.9)
GFR SERPL CREATININE-BSD FRML MDRD: >60 ML/MIN/1.73M2
GLUCOSE SERPL-MCNC: 98 MG/DL (ref 70–99)
HCT VFR BLD AUTO: 39 % (ref 41–53)
HGB BLD-MCNC: 12.7 G/DL (ref 13.5–17.5)
LYMPHOCYTES NFR BLD: 0.73 K/UL (ref 1–4.8)
LYMPHOCYTES RELATIVE PERCENT: 17 % (ref 24–44)
MCH RBC QN AUTO: 28.3 PG (ref 26–34)
MCHC RBC AUTO-ENTMCNC: 32.6 G/DL (ref 31–37)
MCV RBC AUTO: 86.6 FL (ref 80–100)
MONOCYTES NFR BLD: 0.69 K/UL (ref 0.1–1.3)
MONOCYTES NFR BLD: 16 % (ref 1–7)
MORPHOLOGY: ABNORMAL
MORPHOLOGY: ABNORMAL
NEUTROPHILS NFR BLD: 58 % (ref 36–66)
NEUTS SEG NFR BLD: 2.49 K/UL (ref 1.3–9.1)
PLATELET # BLD AUTO: 44 K/UL (ref 150–450)
PMV BLD AUTO: 9.9 FL (ref 6–12)
POTASSIUM SERPL-SCNC: 4.8 MMOL/L (ref 3.7–5.3)
RBC # BLD AUTO: 4.5 M/UL (ref 4.5–5.9)
SODIUM SERPL-SCNC: 141 MMOL/L (ref 135–144)
WBC OTHER # BLD: 4.3 K/UL (ref 3.5–11)

## 2024-02-18 PROCEDURE — 97530 THERAPEUTIC ACTIVITIES: CPT

## 2024-02-18 PROCEDURE — 6370000000 HC RX 637 (ALT 250 FOR IP): Performed by: INTERNAL MEDICINE

## 2024-02-18 PROCEDURE — 94761 N-INVAS EAR/PLS OXIMETRY MLT: CPT

## 2024-02-18 PROCEDURE — 85025 COMPLETE CBC W/AUTO DIFF WBC: CPT

## 2024-02-18 PROCEDURE — 97535 SELF CARE MNGMENT TRAINING: CPT

## 2024-02-18 PROCEDURE — 97542 WHEELCHAIR MNGMENT TRAINING: CPT

## 2024-02-18 PROCEDURE — 1180000000 HC REHAB R&B

## 2024-02-18 PROCEDURE — 99232 SBSQ HOSP IP/OBS MODERATE 35: CPT | Performed by: PHYSICAL MEDICINE & REHABILITATION

## 2024-02-18 PROCEDURE — 97112 NEUROMUSCULAR REEDUCATION: CPT

## 2024-02-18 PROCEDURE — 6370000000 HC RX 637 (ALT 250 FOR IP): Performed by: PHYSICAL MEDICINE & REHABILITATION

## 2024-02-18 PROCEDURE — 70450 CT HEAD/BRAIN W/O DYE: CPT

## 2024-02-18 PROCEDURE — 6360000002 HC RX W HCPCS: Performed by: INTERNAL MEDICINE

## 2024-02-18 PROCEDURE — 36415 COLL VENOUS BLD VENIPUNCTURE: CPT

## 2024-02-18 PROCEDURE — 99232 SBSQ HOSP IP/OBS MODERATE 35: CPT | Performed by: INTERNAL MEDICINE

## 2024-02-18 PROCEDURE — 99233 SBSQ HOSP IP/OBS HIGH 50: CPT | Performed by: INTERNAL MEDICINE

## 2024-02-18 PROCEDURE — 80048 BASIC METABOLIC PNL TOTAL CA: CPT

## 2024-02-18 PROCEDURE — 6370000000 HC RX 637 (ALT 250 FOR IP): Performed by: PSYCHIATRY & NEUROLOGY

## 2024-02-18 PROCEDURE — 94640 AIRWAY INHALATION TREATMENT: CPT

## 2024-02-18 PROCEDURE — 6370000000 HC RX 637 (ALT 250 FOR IP): Performed by: STUDENT IN AN ORGANIZED HEALTH CARE EDUCATION/TRAINING PROGRAM

## 2024-02-18 RX ORDER — MODAFINIL 200 MG/1
200 TABLET ORAL DAILY
Qty: 30 TABLET | Refills: 0 | Status: SHIPPED | OUTPATIENT
Start: 2024-02-19 | End: 2024-03-20

## 2024-02-18 RX ORDER — FERROUS SULFATE 325(65) MG
325 TABLET ORAL EVERY OTHER DAY
Qty: 30 TABLET | Refills: 3 | Status: SHIPPED | OUTPATIENT
Start: 2024-02-19

## 2024-02-18 RX ORDER — GABAPENTIN 100 MG/1
100 CAPSULE ORAL 3 TIMES DAILY
Qty: 90 CAPSULE | Refills: 2 | Status: SHIPPED | OUTPATIENT
Start: 2024-02-19 | End: 2024-05-19

## 2024-02-18 RX ORDER — ALBUTEROL SULFATE 2.5 MG/3ML
2.5 SOLUTION RESPIRATORY (INHALATION) EVERY 6 HOURS PRN
Qty: 120 EACH | Refills: 3 | Status: SHIPPED | OUTPATIENT
Start: 2024-02-18 | End: 2024-02-25 | Stop reason: HOSPADM

## 2024-02-18 RX ORDER — TOPIRAMATE 25 MG/1
25 TABLET ORAL 2 TIMES DAILY
Qty: 60 TABLET | Refills: 3 | Status: SHIPPED | OUTPATIENT
Start: 2024-02-19 | End: 2024-02-25

## 2024-02-18 RX ORDER — ASPIRIN 81 MG/1
81 TABLET, CHEWABLE ORAL DAILY
Qty: 30 TABLET | Refills: 3 | Status: SHIPPED | OUTPATIENT
Start: 2024-02-19 | End: 2024-02-25 | Stop reason: HOSPADM

## 2024-02-18 RX ORDER — LANOLIN ALCOHOL/MO/W.PET/CERES
6 CREAM (GRAM) TOPICAL NIGHTLY
Refills: 3 | COMMUNITY
Start: 2024-02-19

## 2024-02-18 RX ORDER — M-VIT,TX,IRON,MINS/CALC/FOLIC 27MG-0.4MG
1 TABLET ORAL DAILY
COMMUNITY
Start: 2024-02-19

## 2024-02-18 RX ORDER — BACLOFEN 5 MG/1
5 TABLET ORAL 2 TIMES DAILY
Qty: 60 TABLET | Refills: 2 | Status: SHIPPED | OUTPATIENT
Start: 2024-02-19

## 2024-02-18 RX ORDER — POLYETHYLENE GLYCOL 3350 17 G/17G
17 POWDER, FOR SOLUTION ORAL DAILY PRN
Qty: 527 G | Refills: 1 | COMMUNITY
Start: 2024-02-18

## 2024-02-18 RX ORDER — LACOSAMIDE 100 MG/1
100 TABLET ORAL 2 TIMES DAILY
Qty: 60 TABLET | Refills: 2 | Status: SHIPPED | OUTPATIENT
Start: 2024-02-19 | End: 2024-05-19

## 2024-02-18 RX ORDER — SENNOSIDES A AND B 8.6 MG/1
2 TABLET, FILM COATED ORAL NIGHTLY
Qty: 60 TABLET | Refills: 0 | COMMUNITY
Start: 2024-02-19 | End: 2024-03-20

## 2024-02-18 RX ORDER — LIDOCAINE 4 G/G
1 PATCH TOPICAL DAILY PRN
COMMUNITY
Start: 2024-02-18 | End: 2024-02-25 | Stop reason: HOSPADM

## 2024-02-18 RX ORDER — IPRATROPIUM BROMIDE AND ALBUTEROL SULFATE 2.5; .5 MG/3ML; MG/3ML
3 SOLUTION RESPIRATORY (INHALATION)
Qty: 360 ML | Refills: 1 | Status: SHIPPED | OUTPATIENT
Start: 2024-02-19 | End: 2024-02-25

## 2024-02-18 RX ORDER — ATORVASTATIN CALCIUM 80 MG/1
80 TABLET, FILM COATED ORAL NIGHTLY
Qty: 30 TABLET | Refills: 3 | Status: SHIPPED | OUTPATIENT
Start: 2024-02-19

## 2024-02-18 RX ORDER — AMITRIPTYLINE HYDROCHLORIDE 25 MG/1
25 TABLET, FILM COATED ORAL NIGHTLY
Qty: 30 TABLET | Refills: 3 | Status: SHIPPED | OUTPATIENT
Start: 2024-02-19 | End: 2024-02-25

## 2024-02-18 RX ORDER — BUTALBITAL, ACETAMINOPHEN AND CAFFEINE 300; 40; 50 MG/1; MG/1; MG/1
1 CAPSULE ORAL DAILY PRN
Qty: 84 CAPSULE | Refills: 0 | Status: SHIPPED | OUTPATIENT
Start: 2024-02-18 | End: 2024-02-25

## 2024-02-18 RX ORDER — CITALOPRAM HYDROBROMIDE 10 MG/1
10 TABLET ORAL DAILY
Qty: 30 TABLET | Refills: 3 | Status: SHIPPED | OUTPATIENT
Start: 2024-02-19

## 2024-02-18 RX ORDER — ALBUTEROL SULFATE 2.5 MG/3ML
SOLUTION RESPIRATORY (INHALATION)
Status: DISPENSED
Start: 2024-02-18 | End: 2024-02-18

## 2024-02-18 RX ORDER — DEXTROMETHORPHAN POLISTIREX 30 MG/5ML
30 SUSPENSION ORAL DAILY
Qty: 50 ML | Refills: 0 | Status: SHIPPED | OUTPATIENT
Start: 2024-02-19 | End: 2024-02-25

## 2024-02-18 RX ADMIN — DOCUSATE SODIUM 283 MG: 283 LIQUID RECTAL at 22:07

## 2024-02-18 RX ADMIN — CITALOPRAM HYDROBROMIDE 10 MG: 10 TABLET ORAL at 08:04

## 2024-02-18 RX ADMIN — SENNOSIDES 17.2 MG: 8.6 TABLET, FILM COATED ORAL at 22:05

## 2024-02-18 RX ADMIN — BACLOFEN 5 MG: 10 TABLET ORAL at 08:01

## 2024-02-18 RX ADMIN — IPRATROPIUM BROMIDE AND ALBUTEROL SULFATE 1 DOSE: 2.5; .5 SOLUTION RESPIRATORY (INHALATION) at 15:32

## 2024-02-18 RX ADMIN — APIXABAN 2.5 MG: 2.5 TABLET, FILM COATED ORAL at 08:01

## 2024-02-18 RX ADMIN — GABAPENTIN 100 MG: 100 CAPSULE ORAL at 13:30

## 2024-02-18 RX ADMIN — BUTALBITA,ACETAMINOPHEN AND CAFFEINE 1 CAPSULE: 50; 300; 40 CAPSULE ORAL at 14:00

## 2024-02-18 RX ADMIN — GABAPENTIN 100 MG: 100 CAPSULE ORAL at 22:05

## 2024-02-18 RX ADMIN — TOPIRAMATE 25 MG: 25 TABLET, FILM COATED ORAL at 08:09

## 2024-02-18 RX ADMIN — BACLOFEN 5 MG: 10 TABLET ORAL at 22:04

## 2024-02-18 RX ADMIN — ATORVASTATIN CALCIUM 80 MG: 80 TABLET, FILM COATED ORAL at 22:04

## 2024-02-18 RX ADMIN — LACOSAMIDE 100 MG: 100 TABLET, FILM COATED ORAL at 08:01

## 2024-02-18 RX ADMIN — DICLOFENAC SODIUM 2 G: 10 GEL TOPICAL at 08:04

## 2024-02-18 RX ADMIN — TOPIRAMATE 25 MG: 25 TABLET, FILM COATED ORAL at 22:06

## 2024-02-18 RX ADMIN — GABAPENTIN 100 MG: 100 CAPSULE ORAL at 08:06

## 2024-02-18 RX ADMIN — Medication 1 TABLET: at 08:01

## 2024-02-18 RX ADMIN — ASPIRIN 81 MG 81 MG: 81 TABLET ORAL at 08:01

## 2024-02-18 RX ADMIN — IPRATROPIUM BROMIDE AND ALBUTEROL SULFATE 1 DOSE: 2.5; .5 SOLUTION RESPIRATORY (INHALATION) at 20:11

## 2024-02-18 RX ADMIN — Medication 6 MG: at 22:05

## 2024-02-18 RX ADMIN — MODAFINIL 200 MG: 100 TABLET ORAL at 08:00

## 2024-02-18 RX ADMIN — Medication 30 MG: at 08:00

## 2024-02-18 RX ADMIN — AMITRIPTYLINE HYDROCHLORIDE 25 MG: 25 TABLET, FILM COATED ORAL at 22:06

## 2024-02-18 RX ADMIN — LACOSAMIDE 100 MG: 100 TABLET, FILM COATED ORAL at 22:05

## 2024-02-18 RX ADMIN — DEXAMETHASONE 40 MG: 6 TABLET ORAL at 23:24

## 2024-02-18 RX ADMIN — DICLOFENAC SODIUM 2 G: 10 GEL TOPICAL at 22:05

## 2024-02-18 RX ADMIN — IPRATROPIUM BROMIDE AND ALBUTEROL SULFATE 1 DOSE: 2.5; .5 SOLUTION RESPIRATORY (INHALATION) at 06:57

## 2024-02-18 RX ADMIN — ACETAMINOPHEN 650 MG: 325 TABLET, FILM COATED ORAL at 18:17

## 2024-02-18 ASSESSMENT — PAIN DESCRIPTION - ORIENTATION
ORIENTATION: RIGHT
ORIENTATION: RIGHT

## 2024-02-18 ASSESSMENT — PAIN DESCRIPTION - DESCRIPTORS
DESCRIPTORS: ACHING
DESCRIPTORS: THROBBING

## 2024-02-18 ASSESSMENT — PAIN SCALES - GENERAL
PAINLEVEL_OUTOF10: 5
PAINLEVEL_OUTOF10: 8
PAINLEVEL_OUTOF10: 8

## 2024-02-18 ASSESSMENT — PAIN DESCRIPTION - LOCATION
LOCATION: HEAD
LOCATION: HEAD

## 2024-02-18 NOTE — PROGRESS NOTES
[facility]   IN-PATIENT SERVICE   [facility]    Consult note            Date:   2/18/2024  Patient name:  Brennen Mcclure  Date of admission:  1/27/2024  3:01 PM  MRN:   [id number]  Account:  [id number]  YOB: 1965  PCP:    No primary care provider on file.  Room:   [address] [place] [address]  Code Status:    Full Code    Chief Complaint:     No chief complaint on file.      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, started on heparin drip also had patchy IPH, complicated by expansion of intraparenchymal hemorrhage with mass effect, s/p craniectomy, and evacuation of IPH x 2 with dural repair last CT head on on 1/24 showed evolution of the hemorrhage with reduction in the mass effect, Hospital course further complicated by ITP, was seen by hematology oncology, given IVIG and platelet transfusion,, started on Vimpat for seizure prevention, had cellulitis on Celexa  Patient was seen by hematology oncology over there and was started on Eliquis 2.5 twice daily  Patient was drowsy but arousable on my encounter,  Family was present at the bedside    Denies any chest pain shortness of breath        Past Medical History:     Past Medical History:   Diagnosis Date    CAD (coronary artery disease)     Chronic deep vein thrombosis (DVT) of both lower extremities (HCC)     Chronic idiopathic thrombocytopenia (HCC)     CVA (cerebral vascular accident) (HCC)     HLD (hyperlipidemia)         Past Surgical History:     Past Surgical History:   Procedure Laterality Date    CORONARY ARTERY BYPASS GRAFT      CORONARY STENT PLACEMENT          Medications Prior to Admission:     Prior to

## 2024-02-18 NOTE — PROGRESS NOTES
Physical Therapy  Facility/Department: [facility] ACUTE REHAB  Treatment note    NAME: Brennen Mcclure  : 1965 (59 y.o.)  MRN: [id number]  CODE STATUS: Full Code  Date of Service: 24    Past Medical History:   Diagnosis Date    CAD (coronary artery disease)     Chronic deep vein thrombosis (DVT) of both lower extremities (HCC)     Chronic idiopathic thrombocytopenia (HCC)     CVA (cerebral vascular accident) (HCC)     HLD (hyperlipidemia)      Past Surgical History:   Procedure Laterality Date    CORONARY ARTERY BYPASS GRAFT      CORONARY STENT PLACEMENT         Family / Caregiver Present: Yes (SO Mara, sister Radha OLIVEIRA)  General Comment  Comments: Slightly improved attention to tasks today.    Restrictions:  Restrictions/Precautions: General Precautions;Fall Risk;Up as Tolerated  Required Braces or Orthoses  Other:  (Helmet on when OOB)  Left Upper Extremity Brace/Splint: Resting hand  Position Activity Restriction  Other position/activity restrictions: Helmet on when OOB; Sling for transfers/mobility only     SUBJECTIVE  Subjective: Pt expressing pleasure at having sister present.    OBJECTIVE  Functional Mobility  Bed mobility  Sit to Supine: Moderate assistance (Nurse providing Mod A for bilateral LEs.)  Scooting: Minimal assistance (Shifting to L. Pt able to shift to R c SBA.)  Bed Mobility Comments: Bed flat, 2 pillows.  Transfers  Sit to Stand: Minimal Assistance;2 Person Assistance (Standing facing tall mat, delayed initiation. Min L UE + Min L LE)  Stand to Sit: 2 Person Assistance;Minimal Assistance (G eccentric control demonstrated today.)  Bed to Chair: Moderate assistance;Contact guard assistance;2 Person Assistance;Minimal assistance (Mod A + CGA (Pt attempting to stand) squat pivot to L/R; Min A + CGA slide board to L/R, with family providing most assist, writer CGA for safety.)  Stand Pivot Transfers: Maximum Assistance;2 Person Assistance (Pt starts squat pivot, but stands totally, necessitating more  progressing to transfers/Amb with hemiwalker.  Current Treatment Recommendations: Strengthening;ROM;Balance training;Functional mobility training;Transfer training;Gait training;Stair training;Neuromuscular re-education;Pain management;Home exercise program;Safety education & training;Patient/Caregiver education & training;Equipment evaluation, education, & procurement;Positioning;Therapeutic activities  Safety Devices  Type of Devices: Gait belt;All fall risk precautions in place;Call light within reach;Patient at risk for falls;Left in chair;Chair alarm in place;Heels elevated for pressure relief;Nurse notified (In bed AM for CT transfer, chair PM.)    Therapy Time   Individual Concurrent Group Co-treatment   Time In 1112      (1406)   Time Out 1215      (1506)   Minutes 63           Timed Code Treatment Minutes: 93 Minutes (Including 30 min. from co-tx raya Jewell)    Keira Garcia PTA, 02/18/24 at 4:00 PM

## 2024-02-18 NOTE — PROGRESS NOTES
Discussed with Dr. Armstrong of hematology and Dr. Hassan of internal medicine-in regards to low platelet.  Hematology plans to start steroid taper.  He is okay with discharge tomorrow however with discussion with internal medicine feel patient should wait at least 1 to 2 days to make sure he is tolerating okay and platelets are stable prior to discharge    CT HEAD WO CONTRAST    Result Date: 2/18/2024  There is now appropriate convexity of the right craniectomy site.  This may reflect interval change in differential atmospheric versus intracranial pressure.     CT HEAD WO CONTRAST    Result Date: 2/17/2024  Stable CT without acute abnormality.       Update CT as above.  PerfectServe Dr. Corbett-regards to the CTs.  Feel as long as he is clinically okay no further recommendations however, Dr. Almanza is covering and he will have Dr. Almanza look at CT and notify if anything else needs to be done.  CT reviewed by Dr. Ramirez-no further recommendations      Attempted to call wife 3 times over the last half hour-went to voicemail-between 3 and 430    discussed with wife at 5 PM reviewed CT results, hematology evaluation,-hematology plan to start steroids etc.  Would recommend to hold discharge for next 1 to 2 days to make sure platelets okay is tolerating meds well.

## 2024-02-18 NOTE — PROGRESS NOTES
Person Assistance (Mod A + SBA slide board to L, Min A + SBA to R.)  Car Transfer: 2 Person Assistance (x4)  Comment: Rest breaks between transfers to avoid fatigue.  Ambulation  Surface: Level tile  Device: Hemiwalker  Other Apparatus: Wheelchair follow (+ UE sling)  Assistance: Dependent/Total (hands-on assist x2 + follow)  Quality of Gait: Lacks L knee & hip extension, P weight shift; facilitation, cues & assist for same (largely dependent for L LE advancement). No return to postural cues or assist for trunk. Narrow base of support, minimal return to cues.  Gait Deviations: Slow Sabrina, Decreased step length, Decreased step height, Deviated path  Distance: 7'  Comments: No Ambulation performed with home environment; pt expected to be mobilizing at WC level for his safety as well as care giver's.      OT:  ADL  Feeding: Minimal assistance  Feeding Skilled Clinical Factors: Per pt report  Grooming: Minimal assistance  Grooming Skilled Clinical Factors: This writer opened mouthwash for pt, pt able to use R hand to pour mouthwash in mouth and spit into basin held by this writer  UE Bathing: Moderate assistance  UE Bathing Skilled Clinical Factors: Pt able to cleanse face and L arm/abdomen with SBA while seated in w/c. Assist to wash R arm and abdomen as well as back seated in w/c. Pt perseverating on cleansing L arm requiring VC to cease and let this writer assist with cleansing R arm  LE Bathing: Dependent/Total  LE Bathing Skilled Clinical Factors: TA to cleanse thighs, lower legs and rhiannon/buttocks area  UE Dressing: Maximum assistance  UE Dressing Skilled Clinical Factors: Seated in w/c, pt req assist to thread LUE into sleeve then assist to pull down OH  LE Dressing: Dependent/Total  LE Dressing Skilled Clinical Factors: TA to thread brief and pants over BLE, TA to manage up over hip with Max A x2 to maintain balance d/t L lateral lean  Toileting: Dependent/Total  Toileting Skilled Clinical Factors: Dependent  Celexa. Psychiatry following and managing.   Bowel Management: Miralax prn, Senokot prn, Dulcolax prn.  Dr. De La Paz discussed with patient and family today. Will plan on daily Enemeez to regulate bowels.   Internal medicine for medical management  Chronic BLE DVT:  on Eliquis  Follow up: 1.PCP 1-2 weeks, 2.Neurosurgery Dr. Corbett 2/26/24 to establish plan for cranioplasty, PM&R, 3.Hematology, 4.Vascular, 5.Pulmonology, 6. psychiatry regarding pseudobulbar affect  Home evaluation went well 2/16. Family planning for discharge 2/19/24 with home health - aware that only provider who accepts their insurance does not provide speech therapy. They would like Meds to Beds, may need to extend stay considering decreased platelets for monitoring/treatment- per hematology as above, discussed with wife, awaiting hematology input, patient has been approved through 2/24       Beck Shi MD       This note is created with the assistance of a speech recognition program.  While intending to generate a document that actually reflects the content of the visit, the document can still have some errors including those of syntax and sound a like substitutions which may escape proof reading.  In such instances, actual meaning can be extrapolated by contextual diversion

## 2024-02-18 NOTE — PROGRESS NOTES
[facility]   Acute Rehabilitation Occupational Therapy Daily Treatment Note    Date: 24  Patient Name: Brennen Mcclure       Room: 2633/2633-01  MRN: [id number]  Account: [id number]   : 1965  (59 y.o.) Gender: male       Referring Practitioner: Danica De La Paz MD  Diagnosis: Hemiplegia following ntrm intcrbl hemor aff left dominant side  Additional Pertinent Hx: 59 year old male who admitted 23 with 4 days of new L arm weakness and headache. He had known history of MI and CABG - on dabigatran but with inconsistent use due to prescription running out. He had previous treatment for L ICA thrombus and R sided symptoms treated with “clot busting medication” in . 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 He was transferred from [place], [place] to [facility] for medical management and was admitted to neuro critical care. His LUE numbness/weakness worsened to near complete hemiparesis. Initial GCS 15. He was initially treated with heparin gtt for DVST. On 23 Dr. Jameson Parsons (neurosurgery) performed R sided decompressive hemicraniectomy with evacuation of IPH x2 sites with dural repair. He was stable post op on heparin drip and extubated 23 after stable head CT 23. He was then bridged to warfarin. He is currently being treated with warfarin with therapeutic INR since 23. Hb stable at 13.6 on 24. Thrombocytopenia noted with platelet count 30 - hematology being reconsulted - previously recommended count >40. Currently requiring timed voids for incontinence. PM&R anticipating likely wheelchair mobility with Naveen for transfers.    Treatment Diagnosis: Impaired self-care status    Past Medical History:  has a past medical history of CAD (coronary artery disease), Chronic deep vein thrombosis (DVT) of both lower extremities (HCC), Chronic idiopathic  facilitating reaching out of base of support and weight bearing through L extremity to increase input through extremity and improved core strength to increase ease with LB self-care tasks  Short Term Goal 7: Pt will participate in neuromuscular re-education activities (weight bearing, e-stim) as tolerated, to facilitate movement in LUE  Short Term Goal 8: Patient will perform self-care routine with Fair sustained attention with 1-2 VCs for attention to task  Short Term Goal 9: Pt will tolerate standing for 2+ minutes, Max A, with 0-1 UE support and no LOB during self-care/functional activity for improved balance/tolerance  Short Term Goal 10: Pt will identify 2 non-pharmacological pain relief techniques during self-care tasks    Long Term Goals  Time Frame for Long Term Goals : By discharge  Long Term Goal 1: Pt will complete UB ADLs with CGA, good safety, and use of AE/DME/modified techniques as needed  Long Term Goal 2: Pt will complete LB ADLs with Mod A, good safety, and use of AE/DME/Modified techniques as needed  Long Term Goal 3: Pt will complete stand pivot transfer from bed <> wheelchair/chair/toilet with Min A, good safety, and use of least restrictive device  Long Term Goal 4: Pt will tolerate standing for 5+ minutes, min A, with 0-1 UE support and no LOB during self-care/functional activity for improved balance/tolerance  Long Term Goal 5: Pt will demonstrate improved awareness of LUE AEB ability to maintain safe positioning of L hand and wrist during ADL routine without assist  Long Term Goal 6: Pt will verbalize/demonstrate good understanding of home safety/fall prevention strategies to promote safety and independence with self-care and mobility  Long Term Goal 7: Pt will complete self-care with improved FMC and  strength AEB 10 second and 10# improvemenet on 9HPT and dynamometer  Long Term Goal 8: Pt will complete self-feeding and oral hygiene with Mod I using AE/DME/Modified techniques as

## 2024-02-18 NOTE — PLAN OF CARE
Problem: Discharge Planning  Goal: Discharge to home or other facility with appropriate resources  Outcome: Completed  Flowsheets (Taken 2/17/2024 0743 by Fátima Callaway LPN)  Discharge to home or other facility with appropriate resources:   Identify barriers to discharge with patient and caregiver   Arrange for needed discharge resources and transportation as appropriate   Identify discharge learning needs (meds, wound care, etc)     Problem: Skin/Tissue Integrity  Goal: Absence of new skin breakdown  Description: 1.  Monitor for areas of redness and/or skin breakdown  2.  Assess vascular access sites hourly  3.  Every 4-6 hours minimum:  Change oxygen saturation probe site  4.  Every 4-6 hours:  If on nasal continuous positive airway pressure, respiratory therapy assess nares and determine need for appliance change or resting period.  Outcome: Completed     Problem: Safety - Adult  Goal: Free from fall injury  Outcome: Completed  Flowsheets (Taken 2/18/2024 1245)  Free From Fall Injury:   Instruct family/caregiver on patient safety   Based on caregiver fall risk screen, instruct family/caregiver to ask for assistance with transferring infant if caregiver noted to have fall risk factors     Problem: ABCDS Injury Assessment  Goal: Absence of physical injury  Outcome: Completed  Flowsheets (Taken 2/17/2024 1125 by Fátima Callaway LPN)  Absence of Physical Injury: Implement safety measures based on patient assessment     Problem: Pain  Goal: Verbalizes/displays adequate comfort level or baseline comfort level  Outcome: Completed  Flowsheets (Taken 2/8/2024 0124 by Agata Gramajo, RN)  Verbalizes/displays adequate comfort level or baseline comfort level:   Encourage patient to monitor pain and request assistance   Assess pain using appropriate pain scale   Implement non-pharmacological measures as appropriate and evaluate response   Administer analgesics based on type and severity of pain and evaluate

## 2024-02-19 LAB
ERYTHROCYTE [DISTWIDTH] IN BLOOD BY AUTOMATED COUNT: 23.5 % (ref 11.5–14.9)
HCT VFR BLD AUTO: 41.1 % (ref 41–53)
HGB BLD-MCNC: 13.5 G/DL (ref 13.5–17.5)
MCH RBC QN AUTO: 28.3 PG (ref 26–34)
MCHC RBC AUTO-ENTMCNC: 32.9 G/DL (ref 31–37)
MCV RBC AUTO: 86 FL (ref 80–100)
PLATELET # BLD AUTO: 56 K/UL (ref 150–450)
PMV BLD AUTO: 10.1 FL (ref 6–12)
RBC # BLD AUTO: 4.78 M/UL (ref 4.5–5.9)
WBC OTHER # BLD: 5.3 K/UL (ref 3.5–11)

## 2024-02-19 PROCEDURE — 94640 AIRWAY INHALATION TREATMENT: CPT

## 2024-02-19 PROCEDURE — 6370000000 HC RX 637 (ALT 250 FOR IP): Performed by: PSYCHIATRY & NEUROLOGY

## 2024-02-19 PROCEDURE — 85027 COMPLETE CBC AUTOMATED: CPT

## 2024-02-19 PROCEDURE — 36415 COLL VENOUS BLD VENIPUNCTURE: CPT

## 2024-02-19 PROCEDURE — 94760 N-INVAS EAR/PLS OXIMETRY 1: CPT

## 2024-02-19 PROCEDURE — 92507 TX SP LANG VOICE COMM INDIV: CPT

## 2024-02-19 PROCEDURE — 6370000000 HC RX 637 (ALT 250 FOR IP): Performed by: STUDENT IN AN ORGANIZED HEALTH CARE EDUCATION/TRAINING PROGRAM

## 2024-02-19 PROCEDURE — 6370000000 HC RX 637 (ALT 250 FOR IP): Performed by: INTERNAL MEDICINE

## 2024-02-19 PROCEDURE — 99233 SBSQ HOSP IP/OBS HIGH 50: CPT | Performed by: INTERNAL MEDICINE

## 2024-02-19 PROCEDURE — 97530 THERAPEUTIC ACTIVITIES: CPT

## 2024-02-19 PROCEDURE — 99232 SBSQ HOSP IP/OBS MODERATE 35: CPT | Performed by: STUDENT IN AN ORGANIZED HEALTH CARE EDUCATION/TRAINING PROGRAM

## 2024-02-19 PROCEDURE — 97129 THER IVNTJ 1ST 15 MIN: CPT

## 2024-02-19 PROCEDURE — 99232 SBSQ HOSP IP/OBS MODERATE 35: CPT | Performed by: INTERNAL MEDICINE

## 2024-02-19 PROCEDURE — 6370000000 HC RX 637 (ALT 250 FOR IP): Performed by: PHYSICAL MEDICINE & REHABILITATION

## 2024-02-19 PROCEDURE — 97542 WHEELCHAIR MNGMENT TRAINING: CPT

## 2024-02-19 PROCEDURE — 97535 SELF CARE MNGMENT TRAINING: CPT

## 2024-02-19 PROCEDURE — 6360000002 HC RX W HCPCS: Performed by: INTERNAL MEDICINE

## 2024-02-19 PROCEDURE — 1180000000 HC REHAB R&B

## 2024-02-19 RX ORDER — IPRATROPIUM BROMIDE AND ALBUTEROL SULFATE 2.5; .5 MG/3ML; MG/3ML
1 SOLUTION RESPIRATORY (INHALATION)
Status: DISCONTINUED | OUTPATIENT
Start: 2024-02-19 | End: 2024-02-26

## 2024-02-19 RX ADMIN — BACLOFEN 5 MG: 10 TABLET ORAL at 10:11

## 2024-02-19 RX ADMIN — ATORVASTATIN CALCIUM 80 MG: 80 TABLET, FILM COATED ORAL at 21:17

## 2024-02-19 RX ADMIN — MODAFINIL 200 MG: 100 TABLET ORAL at 10:10

## 2024-02-19 RX ADMIN — TOPIRAMATE 25 MG: 25 TABLET, FILM COATED ORAL at 10:15

## 2024-02-19 RX ADMIN — LACOSAMIDE 100 MG: 100 TABLET, FILM COATED ORAL at 21:18

## 2024-02-19 RX ADMIN — BACLOFEN 5 MG: 10 TABLET ORAL at 21:18

## 2024-02-19 RX ADMIN — Medication 1 TABLET: at 10:11

## 2024-02-19 RX ADMIN — Medication 30 MG: at 10:12

## 2024-02-19 RX ADMIN — SENNOSIDES 17.2 MG: 8.6 TABLET, FILM COATED ORAL at 21:17

## 2024-02-19 RX ADMIN — GABAPENTIN 100 MG: 100 CAPSULE ORAL at 21:18

## 2024-02-19 RX ADMIN — AMITRIPTYLINE HYDROCHLORIDE 25 MG: 25 TABLET, FILM COATED ORAL at 22:25

## 2024-02-19 RX ADMIN — GABAPENTIN 100 MG: 100 CAPSULE ORAL at 14:07

## 2024-02-19 RX ADMIN — LACOSAMIDE 100 MG: 100 TABLET, FILM COATED ORAL at 10:11

## 2024-02-19 RX ADMIN — DICLOFENAC SODIUM 2 G: 10 GEL TOPICAL at 21:19

## 2024-02-19 RX ADMIN — IPRATROPIUM BROMIDE AND ALBUTEROL SULFATE 1 DOSE: 2.5; .5 SOLUTION RESPIRATORY (INHALATION) at 21:24

## 2024-02-19 RX ADMIN — Medication 6 MG: at 21:18

## 2024-02-19 RX ADMIN — APIXABAN 2.5 MG: 2.5 TABLET, FILM COATED ORAL at 21:18

## 2024-02-19 RX ADMIN — DICLOFENAC SODIUM 2 G: 10 GEL TOPICAL at 10:13

## 2024-02-19 RX ADMIN — CITALOPRAM HYDROBROMIDE 10 MG: 10 TABLET ORAL at 10:11

## 2024-02-19 RX ADMIN — GABAPENTIN 100 MG: 100 CAPSULE ORAL at 10:11

## 2024-02-19 RX ADMIN — TOPIRAMATE 25 MG: 25 TABLET, FILM COATED ORAL at 22:25

## 2024-02-19 RX ADMIN — FERROUS SULFATE TAB 325 MG (65 MG ELEMENTAL FE) 325 MG: 325 (65 FE) TAB at 10:11

## 2024-02-19 RX ADMIN — DEXAMETHASONE 40 MG: 6 TABLET ORAL at 10:13

## 2024-02-19 RX ADMIN — ASPIRIN 81 MG 81 MG: 81 TABLET ORAL at 10:11

## 2024-02-19 RX ADMIN — IPRATROPIUM BROMIDE AND ALBUTEROL SULFATE 1 DOSE: 2.5; .5 SOLUTION RESPIRATORY (INHALATION) at 06:51

## 2024-02-19 NOTE — INTERDISCIPLINARY ROUNDS
[facility] Acute Inpatient Rehabilitation  INTERDISCIPLINARY MEETING  Date: 24  Patient Name: Brennen Mcclure       Room: 2633/2633-01  MRN: [id number]       : 1965  (59 y.o.)     Gender: male     Patient's care team, including nursing, speech language pathologist, occupational therapist, and/or physical therapist, met to discuss patient's care needs. Any patient concerns, change in medical status, and current transfer/mobility status were identified at this time.     Any Additional Pertinent Information:   Requiring 2-3 A for scotty lazaro transfers    Participating Team Members:  Nurse: Devora Sam RN    Occupational Therapist:  Erica Hernandez OT   Physical Therapist: Fany Kelly PT  Speech Therapist:  Zofia Martinez M.A., CCC-SLP

## 2024-02-19 NOTE — PROGRESS NOTES
[facility]   IN-PATIENT SERVICE   [facility]    Consult note            Date:   2/19/2024  Patient name:  Brennen Mcclure  Date of admission:  1/27/2024  3:01 PM  MRN:   [id number]  Account:  [id number]  YOB: 1965  PCP:    No primary care provider on file.  Room:   [address] [place] [address]  Code Status:    Full Code    Chief Complaint:     No chief complaint on file.      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, started on heparin drip also had patchy IPH, complicated by expansion of intraparenchymal hemorrhage with mass effect, s/p craniectomy, and evacuation of IPH x 2 with dural repair last CT head on on 1/24 showed evolution of the hemorrhage with reduction in the mass effect, Hospital course further complicated by ITP, was seen by hematology oncology, given IVIG and platelet transfusion,, started on Vimpat for seizure prevention, had cellulitis on Celexa  Patient was seen by hematology oncology over there and was started on Eliquis 2.5 twice daily  Patient was drowsy but arousable on my encounter,  Family was present at the bedside    Denies any chest pain shortness of breath        Past Medical History:     Past Medical History:   Diagnosis Date    CAD (coronary artery disease)     Chronic deep vein thrombosis (DVT) of both lower extremities (HCC)     Chronic idiopathic thrombocytopenia (HCC)     CVA (cerebral vascular accident) (HCC)     HLD (hyperlipidemia)         Past Surgical History:     Past Surgical History:   Procedure Laterality Date    CORONARY ARTERY BYPASS GRAFT      CORONARY STENT PLACEMENT          Medications Prior to Admission:     Prior to  venous sinus thrombosis on Eliquis, recent ITP however platelet counts have normalized will continue to monitor next labs due tomorrow  Patient complaining of new cough which has been there for last couple of days; reports recent diagnosis of emphysema/COPD.  Will get chest x-ray, ordering DuoNebs as needed.  No wheezing on lung exam.  Patient also complaining of irritation in throat ordering Cepacol lozenges.    2/3  Cough better.  Continue with DuoNebs as needed  Chest x-ray unremarkable  Hemoglobin 13.3, platelets 123, will continue to monitor continuing with Eliquis  Blood pressure running low-Coreg discontinued no history of A-fib/CHF    2/4  Blood pressure control is good  Patient appears comfortable cough is improving patient worked with therapy  Labs reviewed from today and satisfactory  Continue current management    2/17  Seen in face-to-face,  Labs, medications, vitals reviewed   blood pressure running good now   Patient reports no new complaints.   Engaging with physical therapy.  Platelets 67, has chronically low platelets  Meds reviewed , adjusted , amytriptaline can cause low platelets , dose reduced   2/18   Patient, had drop in platelet to 44  Eliquis kept on hold  Discussed case with Dr. Mason, hematologist at bedside  Patient has likely ITP, will be given steroids  Discussed with patient, likely keep patient in in hospital for a day or more  Underwent CT head, Dr. Mason , discussed with Dr. Almanza     2/19   Patient, platelets are more than 50,000  Hematology following, may need to resume back Eliquis, once okay with hematology  Patient anxious to leave  On full doses of dexamethasone 40 mg p.o.  Consultations:   IP CONSULT TO DIETITIAN  IP CONSULT TO SOCIAL WORK  IP CONSULT TO INTERNAL MEDICINE  IP CONSULT TO PSYCHIATRY  IP CONSULT TO ONCOLOGY  IP CONSULT TO ONCOLOGY     Patient is admitted as inpatient status because of co-morbidities listed above, severity of signs and symptoms as outlined,

## 2024-02-19 NOTE — PROGRESS NOTES
SPEECH LANGUAGE PATHOLOGY  Speech Language Pathology  [facility] ACUTE REHAB    Cognitive Treatment Note    Date: 2/19/2024  Patient’s Name: Brennen Mcclure  MRN: [id number]  Diagnosis:   Patient Active Problem List   Diagnosis Code    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 (HCC) I69.152    Pseudobulbar affect F48.2    Intractable headache R51.9    Intracranial hemorrhage (HCC) I62.9    Thrombocytopenia (HCC) D69.6    Anemia D64.9    Chronic idiopathic thrombocytopenic purpura (HCC) D69.3    Seizure (HCC) R56.9    Dural venous sinus thrombosis G08       Pain: 8/10 (head)     Cognitive Treatment    Treatment time: 9160-8434 & 8291-6401  *late start in AM d/t Pt still working with OT upon arrival & late start in PM d/t nursing staff cleaning Pt up upon arrival        Subjective: [x] Alert [x] Cooperative     [] Confused     [] Agitated    [] Lethargic      Objective/Assessment:  Attention: Occasional repetition and redirection required throughout.  Long response latency demo. T/o.      Recall: Immediate recall, 3 units- 100% accuracy (I).  Delayed recall, 3 units (3 min delay)- 100% accuracy (I).      Pt education provided re: internal memory aide (i.e., association strategy) to assist in recall.  Pt verbalized understanding.  Following task completed with Pt utilizing trained strategy:   5 unit image retention (person/object association, immediate recall)- 80% accuracy (I), increasing to 100% c cues.  5 unit image retention (person/object association, 15 min delay)- 100% accuracy (I), using associations appropriately to assist in recall.      Organization: Category members (fill in missing letter, words in concrete category)- 50% accuracy (I), increasing to 100% c min-mod cues.      Problem Solving/Reasoning: n/a    Speech: Pt presents with mild dysarthria characterized by imprecise articulation, blended word boundaries, rapid rate and reduced vocal intensity.  Pt required min (verbal cues) to facilitate

## 2024-02-19 NOTE — PROGRESS NOTES
Today's Date: 2/19/2024  Patient Name: Brennen Mcclure  Date of admission: 1/27/2024  3:01 PM  Patient's age: 59 y.o., 1965  Admission Dx: 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 (HCC) [I69.152]    Reason for Consult:  Low platelets   Requesting Physician: Dipika Angulo MD    CHIEF COMPLAINT:  No chief complaint on file.      History Obtained From:  patient and chart  INTERVAL HISTORY:    Patient seen and examined  No bleeding  Platelet down to 42 and was started on dexamethasone and platelet above 50,000    HISTORY OF PRESENT ILLNESS:    Brennen Mcclure is a 59 y.o. male who is admitted to the hospital on 1/27/2024  for acute rehab after recent hospitalization for acute stroke.    Patient had recent hospitalization for left hemiparesis secondary to hemorrhagic CVA and subsequently discharged to acute rehab at [facility].  Patient has history of dural venous sinus thrombosis and also history of DVT and was chronically maintained on Eliquis.    Patient currently is on Eliquis and now noted to have thrombocytopenia which is progressively getting worse therefore hematology was consulted.  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  Patient had a evaluation at [facility] back in 2020 as well and as per the chart.    Past Medical History:   has a past medical history of CAD (coronary artery disease), Chronic deep vein thrombosis (DVT) of both lower extremities (HCC), Chronic idiopathic thrombocytopenia (HCC), CVA (cerebral vascular accident) (HCC), and HLD (hyperlipidemia).    Past Surgical History:   has a past surgical history that includes Coronary artery bypass graft and Coronary stent placement.     Medications:    Prior to Admission medications    Medication Sig Start Date End Date Taking? Authorizing Provider   aspirin 81 MG chewable tablet Take 1  studies, prior records, outside records, discussed diagnosis and treatment recommendations  He has a mild thrombocytopenia at baseline and subsequently appears to be getting worse over the past few days  Normal B12 folic acid level and iron studies  As platelet down below 50 will hold anticoagulation and will start patient on dexamethasone 40 mg daily for 4 days(patient has history of chronic ITP/autoimmune according to records)> platelet up to 56/resume Eliquis  Monitor response to dexamethasone> platelet up  Discussed with the wife      Discussed with patient and Nurse.    Thank you for asking us to see this patient.                                  Les Armstrong MD                          [facility]                            This note is created with the assistance of a speech recognition program.  While intending to generate a document that actually reflects the content of the visit, the document can still have some errors including those of syntax and sound a like substitutions which may escape proof reading.  It such instances, actual meaning can be extrapolated by contextual diversion.

## 2024-02-19 NOTE — PATIENT CARE CONFERENCE
support, L knee blocked for support)  Stand to Sit: Minimal Assistance  Bed to Chair: Moderate assistance (squat pivot to both L and R sides. From hospital in room and mat table in gym)  Squat Pivot Transfers: Moderate Assistance (to both L and R sides. completed 10x. 4x to pts R side, 6x to pts L side)    Propulsion: Manual  Level: Level Tile  Method: RUE;RLE  Level of Assistance: Minimal assistance  Description/ Details: slow paced, requires cues to navigate obstacles, wide turns- requires assistance, assistance to maintain straight path. Diffcult for pt to manage in tight spaces  Distance: 276'    Continued Therapy After Discharge: Home Therapy Recommended    DME Recommendations: DME ordered and being processed    OCCUPATIONAL THERAPY    SELF CARE        Feeding  Assistance Level: Set-up  Skilled Clinical Factors: per pt report          Grooming/Oral Hygiene  Assistance Level: Set-up  Skilled Clinical Factors: setup assist for oral care, cues to locate items on L side of sink.        Upper Extremity Bathing  Assistance Level: Minimal assistance  Skilled Clinical Factors: cueing for one handed technique, assist to support LUE to address.       Lower Extremity Bathing  Assistance Level: Moderate assistance  Skilled Clinical Factors: pt able to address rhiannon area, RLE with setup assist. Assist for all other areas.        Upper Extremity Dressing  Assistance Level: Moderate assistance  Skilled Clinical Factors: pt able to doff shirt OH and off UEs with increased time and cueing. pt requires increased cueing and mod assist with orientation and doning BUEs, min A for balance to don OH while seated EOB. Pt requires redirection with task throughout, poor carryover with cueing and natacha technique this date.         Lower Extremity Dressing  Assistance Level: Maximum assistance  Skilled Clinical Factors: Mod A to thread LLE and manage over hip on L side. pt able to thread RLE and manage over hips supine in bed without  CCC-SLP   Nurse: Arti Morales RN   Dietary/Nutrition: Milena Pineda RD, LD  Pastoral Care: Chaplain Macy Mims  CMG: Keyla Russell RN    I attest to leading the interdisciplinary team meeting, required attendees are present as listed above, I approve the established interdisciplinary plan of care as documented within the medical record of Brennen Mcclure.    Dipika Angulo MD

## 2024-02-19 NOTE — PROGRESS NOTES
Mobility  Additional Factors: Head of bed raised;Increased time to complete;Verbal cues;With handrails              Supine to Sit  Assistance Level: Moderate assistance  Skilled Clinical Factors: Cues and increased time for hooking method to advance BLEs over EOB, A for trunk upright progression from R side.  Scooting  Assistance Level: Stand by assist  Skilled Clinical Factors: hips towards EOB/foot of bed using bed rail for assist.     Sit to Stand  Assistance Level: Moderate assistance  Skilled Clinical Factors: stood from w/c to standing at tabletop.           Sit Pivot  Assistance Level: Moderate assistance (squat pivot EOB>w/c; spouse completes transfer in PM in good safety/technique)  Skilled Clinical Factors: arm rail removed for transfer           Neuromuscular Education  Trunk Control: addressed while seated on EOB. AM SBA-CGA, PM SBA-mod with cues to correct core.  Weight Bearing  Weight Bearing Technique: Yes  LUE Weight Bearing: Extended arm standing;Forearm standing  Response To Weight Bearing Technique: weight shifting while standing at tabletop for retrieval/transfer of weighted therapy balls. WB mostly    OT Exercises  PROM Exercises: LUE utilized in reaching/standing task to adress functional use/prevent non use.  Dynamic Standing Balance Exercises: functional reaching activity standing at raised table during;  melendrez assisting in completion of ROM with LUE to reach bilaterally with RUE for ball across tabletop. Mod-max A standing balance with dynamic activity without UE support, min A with RUE support on tabletop; cues to remove due to anxiety with falling.     Assessment  Assessment  Activity Tolerance: Patient tolerated treatment well  Discharge Recommendations: Continue to assess pending progress    Patient Education  Education  Education Given To: Patient;Family;Caregiver  Education Provided: Role of Therapy;Plan of Care;Safety;ADL Function;Transfer Training;Precautions;Mobility Training;Energy  minutes of OT/PT/SLP  Current Treatment Recommendations: Self-Care / ADL, Strengthening, ROM, Balance training, Functional mobility training, Endurance training, Wheelchair mobility training, Neuromuscular re-education, Cognitive reorientation, Pain management, Safety education & training, Patient/Caregiver education & training, Equipment evaluation, education, & procurement, Positioning, Home management training, Coordination training, Co-Treatment       02/19/24 1217 02/19/24 1529   OT Individual Minutes   Time In 0808 1404   Time Out 0906 1435   Minutes 58 31         Electronically signed by FORTINO Ortiz on 2/19/24 at 3:50 PM EST

## 2024-02-19 NOTE — PROGRESS NOTES
Comprehensive Nutrition Assessment    Type and Reason for Visit:  Reassess    Nutrition Recommendations/Plan:   Will continue to provide Easy to Chew diet  Supplements were discontinued per family's request.     Malnutrition Assessment:  Malnutrition Status:  Moderate malnutrition (01/29/24 1604)    Context:  Acute Illness     Findings of the 6 clinical characteristics of malnutrition:  Energy Intake:  75% or less of estimated energy requirements for 7 or more days  Weight Loss:  Greater than 5% over 1 month     Body Fat Loss:  Moderate body fat loss Triceps   Muscle Mass Loss:  Unable to assess (Wife reports notable loss; some loss likely attributed to deconditioning.)    Fluid Accumulation:  No significant fluid accumulation     Strength:  Not Performed    Nutrition Assessment:    Observed pt at lunch time eating Sloppy Porfirio brought in by family. Pt/Family request no more supplements on trays.    Nutrition Related Findings:    no edema, Labs: Reviewed, Meds: Decadron, BM 2/19 Wound Type: None       Current Nutrition Intake & Therapies:    Average Meal Intake: %, 51-75%  Average Supplements Intake: 51-75% (Estimated)  ADULT DIET; Easy to Chew    Anthropometric Measures:  Height: 177.8 cm (5' 10\")  Ideal Body Weight (IBW): 166 lbs (75 kg)    Admission Body Weight: 77.1 kg (169 lb 15.6 oz)  Current Body Weight: 77.1 kg (169 lb 15.6 oz), 102.4 % IBW. Weight Source: Bed Scale  Current BMI (kg/m2): 24.4  Usual Body Weight: 96.9 kg (213 lb 10 oz) (11/22/23)  % Weight Change (Calculated): -20.4                    BMI Categories: Normal Weight (BMI 18.5-24.9)    Estimated Daily Nutrient Needs:  Energy Requirements Based On: Formula  Weight Used for Energy Requirements: Admission  Energy (kcal/day): 4807-5109 based on [place]-St. Jeor with 1.2-1.3 factor  Weight Used for Protein Requirements: Admission  Protein (g/day): 100-108 based on 1.3-1.4 gm per kg     Fluid (ml/day): per physician    Nutrition Diagnosis:

## 2024-02-19 NOTE — PROGRESS NOTES
PANEL:No results found for: \"CHOL\", \"HDL\", \"TRIG\"  LIVER PROFILE: No results for input(s): \"AST\", \"ALT\", \"ALB\", \"BILIDIR\", \"BILITOT\", \"ALKPHOS\" in the last 72 hours.       Reviewed notes from internal medicine, hematology, PT, OT, ST.      Impression/Plan:   Impaired ADLs, gait, and mobility due to:    Left nondominant hemiparesis secondary to hemorrhagic CVA: S/p right decompressive hemicraniectomy with evacuation of intraparenchymal hemorrhage at 2 sites with dural repair on 12/1/23. PT/OT for gait, mobility, strengthening, endurance, ADLs, and self care. For cranioplasty with Dr. Corbett - has new patient appointment 2/26. For [facility] peer visit.  Insurance approval until 2/24 - patient would like discharge before then; however, discharge held today due to thrombocytopenia - discussed with patient and wife.  Dural venous sinus thrombosis/abdominal venous thrombosis: On Eliquis - lower dose due to bleeding/anemia but currently on hold due to decreased platelets per internal medicine - discussed with hematology, okay to restart eliquis today.  Dysphagia: SLP treating. Mild oral stage.  On easy to chew solids, thin liquids.  Spasticity: on baclofen - low dose BID  Headache: Neurology adjusted medications for headache. Weaned from Depakote. Increased Elavil and Topamax. Has Fioricet as needed. Neuro signed off.  Seizure: on Vimpat  Pseudobulbar affect: psychiatry treating with dextromethorphan.   Impaired focus/attention: Improved on modafinil   Thrombocytopenia: Worsened on 2/18 to 44, but improved to 56 on 2/19.  Started on dexamethasone on 2/18/24 by hematology.  Monitoring.  Eliquis restarted per discussion with hematology, as above.  Anemia: Hemoglobin 13.5 on 2/19, stable.  Monitoring.  Left arm neurologic pain: On gabapentin.  Has lidoderm patch as needed  OA bilateral knees: Has Voltaren gel BID  Cellulitis: Related to peripheral IV. Completed Keflex 1/29/24.  Severe emphysema: Stable on  room air  Infrarenal AAA: Measured 4.5 cm. For outpatient follow up  Mild hyponatremia: Resolved.  Insomnia: On melatonin nightly  CAD with history of MI, CVA:  S/p CABG, stents. On aspirin, atorvastatin.   HTN: Lisinopril on hold (not requiring)  HLD: On atorvastatin.  Seborrheic dermatitis: OK for wife to apply baby oil topically but with no pressure over craniectomy site  Depression/anxiety: On Celexa. Psychiatry following.   Moderate malnutrition:  Dietician following.  Patient declining oral nutrition supplements.  Bowel Management: Miralax prn, Senokot nightly, Dulcolax prn. Daily Enemeez to regulate bowels.   Internal medicine for medical management  Chronic BLE DVT:  On Eliquis  Follow up: 1.PCP 1-2 weeks, 2.Neurosurgery Dr. Corbett 2/26/24 to establish plan for cranioplasty, 3.Hematology, 4.Vascular, 5.Pulmonology, 6. Psychiatry regarding pseudobulbar affect, 7. PM&R, 8. Neurology  Home evaluation went well 2/16. Family planning for discharge 2/19/24 with home health - aware that only provider who accepts their insurance does not provide speech therapy. They would like Meds to Beds.  Discharge held today due to thrombocytopenia, possible discharge tomorrow.  Patient has been approved through 2/24/24.      Electronically signed by Dipika Angulo MD on 2/19/2024 at 11:57 PM

## 2024-02-19 NOTE — PROGRESS NOTES
Today's Date: 2/18/2024  Patient Name: Brennen Mcclure  Date of admission: 1/27/2024  3:01 PM  Patient's age: 59 y.o., 1965  Admission Dx: 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 (HCC) [I69.152]    Reason for Consult:  Low platelets   Requesting Physician: Dipkia Angulo MD    CHIEF COMPLAINT:  No chief complaint on file.      History Obtained From:  patient and chart  INTERVAL HISTORY:    Patient seen and examined  No bleeding  Platelet down to 42    HISTORY OF PRESENT ILLNESS:    Brennen Mcclure is a 59 y.o. male who is admitted to the hospital on 1/27/2024  for acute rehab after recent hospitalization for acute stroke.    Patient had recent hospitalization for left hemiparesis secondary to hemorrhagic CVA and subsequently discharged to acute rehab at [facility].  Patient has history of dural venous sinus thrombosis and also history of DVT and was chronically maintained on Eliquis.    Patient currently is on Eliquis and now noted to have thrombocytopenia which is progressively getting worse therefore hematology was consulted.  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  Patient had a evaluation at [facility] back in 2020 as well and as per the chart.    Past Medical History:   has a past medical history of CAD (coronary artery disease), Chronic deep vein thrombosis (DVT) of both lower extremities (HCC), Chronic idiopathic thrombocytopenia (HCC), CVA (cerebral vascular accident) (HCC), and HLD (hyperlipidemia).    Past Surgical History:   has a past surgical history that includes Coronary artery bypass graft and Coronary stent placement.     Medications:    Prior to Admission medications    Not on File     Current Facility-Administered Medications   Medication Dose Route Frequency Provider Last Rate Last Admin    albuterol (PROVENTIL) (2.5 MG/3ML)  actual meaning can be extrapolated by contextual diversion.

## 2024-02-19 NOTE — PROGRESS NOTES
Physical Therapy  Facility/Department: [facility] ACUTE REHAB  Rehabilitation Physical Therapy Daily Treatment Note    NAME: Brennen Mcclure  : 1965 (59 y.o.)  MRN: [id number]  CODE STATUS: Full Code    Date of Service: 24      Past Medical History:   Diagnosis Date    CAD (coronary artery disease)     Chronic deep vein thrombosis (DVT) of both lower extremities (HCC)     Chronic idiopathic thrombocytopenia (HCC)     CVA (cerebral vascular accident) (HCC)     HLD (hyperlipidemia)      Past Surgical History:   Procedure Laterality Date    CORONARY ARTERY BYPASS GRAFT      CORONARY STENT PLACEMENT         General Comment  Comments: Pt mentioning needing a brief change at start of PM session, completed.    Restrictions:  Restrictions/Precautions: General Precautions;Fall Risk;Up as Tolerated  Required Braces or Orthoses  Other:  (Helmet on when OOB)  Left Upper Extremity Brace/Splint: Resting hand  Position Activity Restriction  Other position/activity restrictions: Helmet on when OOB; Sling for transfers/mobility only     SUBJECTIVE  Subjective: Pt is pleasant and cooperative  Pain: reports headache this date    OBJECTIVE       Cognition  Overall Cognitive Status: Exceptions  Arousal/Alertness: Delayed responses to stimuli  Following Commands: Follows one step commands consistently;Follows multistep commands with increased time;Follows multistep commands with repitition  Attention Span: Difficulty dividing attention;Attends with cues to redirect  Memory: Appears intact  Safety Judgement: Decreased awareness of need for safety;Decreased awareness of need for assistance  Problem Solving: Assistance required to generate solutions;Assistance required to implement solutions;Assistance required to identify errors made;Assistance required to correct errors made;Decreased awareness of errors  Insights: Decreased awareness of deficits  Initiation: Requires cues for some  Sequencing: Requires cues for some      Functional

## 2024-02-19 NOTE — RT PROTOCOL NOTE
RT Inhaler-Nebulizer Bronchodilator Protocol Note    There is a bronchodilator order in the chart from a provider indicating to follow the RT Bronchodilator Protocol and there is an “Initiate RT Inhaler-Nebulizer Bronchodilator Protocol” order as well (see protocol at bottom of note).    CXR Findings:  No results found.    The findings from the last RT Protocol Assessment were as follows:   History Pulmonary Disease: Chronic pulmonary disease  Respiratory Pattern: Regular pattern and RR 12-20 bpm  Breath Sounds: Slightly diminished and/or crackles  Cough: Strong, spontaneous, non-productive  Indication for Bronchodilator Therapy: None  Bronchodilator Assessment Score: 4    Aerosolized bronchodilator medication orders have been revised according to the RT Inhaler-Nebulizer Bronchodilator Protocol below.    Respiratory Therapist to perform RT Therapy Protocol Assessment initially then follow the protocol.  Repeat RT Therapy Protocol Assessment PRN for score 0-3 or on second treatment, BID, and PRN for scores above 3.    No Indications - adjust the frequency to every 6 hours PRN wheezing or bronchospasm, if no treatments needed after 48 hours then discontinue using Per Protocol order mode.     If indication present, adjust the RT bronchodilator orders based on the Bronchodilator Assessment Score as indicated below.  Use Inhaler orders unless patient has one or more of the following: on home nebulizer, not able to hold breath for 10 seconds, is not alert and oriented, cannot activate and use MDI correctly, or respiratory rate 25 breaths per minute or more, then use the equivalent nebulizer order(s) with same Frequency and PRN reasons based on the score.  If a patient is on this medication at home then do not decrease Frequency below that used at home.    0-3 - enter or revise RT bronchodilator order(s) to equivalent RT Bronchodilator order with Frequency of every 4 hours PRN for wheezing or increased work of breathing  using Per Protocol order mode.        4-6 - enter or revise RT Bronchodilator order(s) to two equivalent RT bronchodilator orders with one order with BID Frequency and one order with Frequency of every 4 hours PRN wheezing or increased work of breathing using Per Protocol order mode.        7-10 - enter or revise RT Bronchodilator order(s) to two equivalent RT bronchodilator orders with one order with TID Frequency and one order with Frequency of every 4 hours PRN wheezing or increased work of breathing using Per Protocol order mode.       11-13 - enter or revise RT Bronchodilator order(s) to one equivalent RT bronchodilator order with QID Frequency and an Albuterol order with Frequency of every 4 hours PRN wheezing or increased work of breathing using Per Protocol order mode.      Greater than 13 - enter or revise RT Bronchodilator order(s) to one equivalent RT bronchodilator order with every 4 hours Frequency and an Albuterol order with Frequency of every 2 hours PRN wheezing or increased work of breathing using Per Protocol order mode.     RT to enter RT Home Evaluation for COPD & MDI Assessment order using Per Protocol order mode.    Electronically signed by Janay Underwood RCP on 2/19/2024 at 6:54 AM

## 2024-02-20 LAB
ERYTHROCYTE [DISTWIDTH] IN BLOOD BY AUTOMATED COUNT: 23.4 % (ref 11.5–14.9)
HCT VFR BLD AUTO: 38 % (ref 41–53)
HGB BLD-MCNC: 12.5 G/DL (ref 13.5–17.5)
MCH RBC QN AUTO: 28.2 PG (ref 26–34)
MCHC RBC AUTO-ENTMCNC: 33 G/DL (ref 31–37)
MCV RBC AUTO: 85.4 FL (ref 80–100)
PLATELET # BLD AUTO: 39 K/UL (ref 150–450)
PMV BLD AUTO: 10.1 FL (ref 6–12)
RBC # BLD AUTO: 4.45 M/UL (ref 4.5–5.9)
WBC OTHER # BLD: 5.3 K/UL (ref 3.5–11)

## 2024-02-20 PROCEDURE — 97530 THERAPEUTIC ACTIVITIES: CPT

## 2024-02-20 PROCEDURE — 97112 NEUROMUSCULAR REEDUCATION: CPT

## 2024-02-20 PROCEDURE — 1180000000 HC REHAB R&B

## 2024-02-20 PROCEDURE — 99232 SBSQ HOSP IP/OBS MODERATE 35: CPT | Performed by: INTERNAL MEDICINE

## 2024-02-20 PROCEDURE — 6370000000 HC RX 637 (ALT 250 FOR IP): Performed by: INTERNAL MEDICINE

## 2024-02-20 PROCEDURE — 94761 N-INVAS EAR/PLS OXIMETRY MLT: CPT

## 2024-02-20 PROCEDURE — 6370000000 HC RX 637 (ALT 250 FOR IP): Performed by: PSYCHIATRY & NEUROLOGY

## 2024-02-20 PROCEDURE — 6370000000 HC RX 637 (ALT 250 FOR IP): Performed by: STUDENT IN AN ORGANIZED HEALTH CARE EDUCATION/TRAINING PROGRAM

## 2024-02-20 PROCEDURE — 97535 SELF CARE MNGMENT TRAINING: CPT

## 2024-02-20 PROCEDURE — 97110 THERAPEUTIC EXERCISES: CPT

## 2024-02-20 PROCEDURE — 36415 COLL VENOUS BLD VENIPUNCTURE: CPT

## 2024-02-20 PROCEDURE — 6360000002 HC RX W HCPCS: Performed by: INTERNAL MEDICINE

## 2024-02-20 PROCEDURE — 85027 COMPLETE CBC AUTOMATED: CPT

## 2024-02-20 PROCEDURE — 97129 THER IVNTJ 1ST 15 MIN: CPT

## 2024-02-20 PROCEDURE — 92507 TX SP LANG VOICE COMM INDIV: CPT

## 2024-02-20 PROCEDURE — 94640 AIRWAY INHALATION TREATMENT: CPT

## 2024-02-20 PROCEDURE — 97542 WHEELCHAIR MNGMENT TRAINING: CPT

## 2024-02-20 PROCEDURE — 6370000000 HC RX 637 (ALT 250 FOR IP): Performed by: PHYSICAL MEDICINE & REHABILITATION

## 2024-02-20 PROCEDURE — 99232 SBSQ HOSP IP/OBS MODERATE 35: CPT | Performed by: STUDENT IN AN ORGANIZED HEALTH CARE EDUCATION/TRAINING PROGRAM

## 2024-02-20 RX ORDER — IPRATROPIUM BROMIDE AND ALBUTEROL SULFATE 2.5; .5 MG/3ML; MG/3ML
3 SOLUTION RESPIRATORY (INHALATION) 2 TIMES DAILY
Qty: 180 ML | Refills: 0 | Status: CANCELLED | OUTPATIENT
Start: 2024-02-20

## 2024-02-20 RX ADMIN — Medication 1 TABLET: at 08:17

## 2024-02-20 RX ADMIN — DICLOFENAC SODIUM 2 G: 10 GEL TOPICAL at 21:46

## 2024-02-20 RX ADMIN — MODAFINIL 200 MG: 100 TABLET ORAL at 08:15

## 2024-02-20 RX ADMIN — AMITRIPTYLINE HYDROCHLORIDE 25 MG: 25 TABLET, FILM COATED ORAL at 21:46

## 2024-02-20 RX ADMIN — IPRATROPIUM BROMIDE AND ALBUTEROL SULFATE 1 DOSE: 2.5; .5 SOLUTION RESPIRATORY (INHALATION) at 06:58

## 2024-02-20 RX ADMIN — GABAPENTIN 100 MG: 100 CAPSULE ORAL at 21:44

## 2024-02-20 RX ADMIN — BUTALBITA,ACETAMINOPHEN AND CAFFEINE 1 CAPSULE: 50; 300; 40 CAPSULE ORAL at 12:08

## 2024-02-20 RX ADMIN — CITALOPRAM HYDROBROMIDE 10 MG: 10 TABLET ORAL at 08:15

## 2024-02-20 RX ADMIN — BUTALBITA,ACETAMINOPHEN AND CAFFEINE 1 CAPSULE: 50; 300; 40 CAPSULE ORAL at 18:47

## 2024-02-20 RX ADMIN — GABAPENTIN 100 MG: 100 CAPSULE ORAL at 08:17

## 2024-02-20 RX ADMIN — DICLOFENAC SODIUM 2 G: 10 GEL TOPICAL at 12:00

## 2024-02-20 RX ADMIN — BACLOFEN 5 MG: 10 TABLET ORAL at 08:17

## 2024-02-20 RX ADMIN — LACOSAMIDE 100 MG: 100 TABLET, FILM COATED ORAL at 08:16

## 2024-02-20 RX ADMIN — GABAPENTIN 100 MG: 100 CAPSULE ORAL at 15:55

## 2024-02-20 RX ADMIN — ASPIRIN 81 MG 81 MG: 81 TABLET ORAL at 08:15

## 2024-02-20 RX ADMIN — SENNOSIDES 17.2 MG: 8.6 TABLET, FILM COATED ORAL at 21:44

## 2024-02-20 RX ADMIN — BACLOFEN 5 MG: 10 TABLET ORAL at 21:44

## 2024-02-20 RX ADMIN — LACOSAMIDE 100 MG: 100 TABLET, FILM COATED ORAL at 21:43

## 2024-02-20 RX ADMIN — TOPIRAMATE 25 MG: 25 TABLET, FILM COATED ORAL at 21:47

## 2024-02-20 RX ADMIN — TOPIRAMATE 25 MG: 25 TABLET, FILM COATED ORAL at 08:22

## 2024-02-20 RX ADMIN — IPRATROPIUM BROMIDE AND ALBUTEROL SULFATE 1 DOSE: 2.5; .5 SOLUTION RESPIRATORY (INHALATION) at 21:22

## 2024-02-20 RX ADMIN — DEXAMETHASONE 40 MG: 6 TABLET ORAL at 08:21

## 2024-02-20 RX ADMIN — Medication 30 MG: at 08:17

## 2024-02-20 RX ADMIN — ATORVASTATIN CALCIUM 80 MG: 80 TABLET, FILM COATED ORAL at 21:44

## 2024-02-20 RX ADMIN — Medication 6 MG: at 21:43

## 2024-02-20 RX ADMIN — APIXABAN 2.5 MG: 2.5 TABLET, FILM COATED ORAL at 07:15

## 2024-02-20 ASSESSMENT — PAIN DESCRIPTION - LOCATION
LOCATION: HEAD
LOCATION: LEG;HEAD
LOCATION: HEAD

## 2024-02-20 ASSESSMENT — PAIN SCALES - GENERAL
PAINLEVEL_OUTOF10: 8
PAINLEVEL_OUTOF10: 6
PAINLEVEL_OUTOF10: 8
PAINLEVEL_OUTOF10: 8
PAINLEVEL_OUTOF10: 7

## 2024-02-20 ASSESSMENT — PAIN DESCRIPTION - DESCRIPTORS
DESCRIPTORS: ACHING;THROBBING
DESCRIPTORS: PRESSURE
DESCRIPTORS: THROBBING

## 2024-02-20 ASSESSMENT — PAIN DESCRIPTION - ORIENTATION
ORIENTATION: POSTERIOR
ORIENTATION: RIGHT;LEFT

## 2024-02-20 NOTE — PLAN OF CARE
Problem: Respiratory - Adult  Goal: Achieves optimal ventilation and oxygenation  2/20/2024 1502 by Rand Dhaliwal RN  Flowsheets (Taken 2/20/2024 1502)  Achieves optimal ventilation and oxygenation:   Assess for changes in respiratory status   Assess for changes in mentation and behavior     Problem: Hematologic - Adult  Goal: Maintains hematologic stability  Flowsheets (Taken 2/20/2024 1502)  Maintains hematologic stability:   Assess for signs and symptoms of bleeding or hemorrhage   Monitor labs for bleeding or clotting disorders     Problem: Skin/Tissue Integrity  Goal: Absence of new skin breakdown  Description: 1.  Monitor for areas of redness and/or skin breakdown  2.  Assess vascular access sites hourly  3.  Every 4-6 hours minimum:  Change oxygen saturation probe site  4.  Every 4-6 hours:  If on nasal continuous positive airway pressure, respiratory therapy assess nares and determine need for appliance change or resting period.  Outcome: Progressing

## 2024-02-20 NOTE — PROGRESS NOTES
Physical Therapy  Facility/Department: [facility] ACUTE REHAB  Treatment note    NAME: Brennen Mcclure  : 1965 (59 y.o.)  MRN: [id number]  CODE STATUS: Full Code  Date of Service: 24    Past Medical History:   Diagnosis Date    CAD (coronary artery disease)     Chronic deep vein thrombosis (DVT) of both lower extremities (HCC)     Chronic idiopathic thrombocytopenia (HCC)     CVA (cerebral vascular accident) (HCC)     HLD (hyperlipidemia)      Past Surgical History:   Procedure Laterality Date    CORONARY ARTERY BYPASS GRAFT      CORONARY STENT PLACEMENT         Family / Caregiver Present: Yes (sister Radha & SO Mara)  General Comment  Comments: Pt loses focus easily in high stimulus environment.    Restrictions:  Restrictions/Precautions: General Precautions;Fall Risk;Up as Tolerated  Required Braces or Orthoses  Other:  (Helmet on when OOB)  Left Upper Extremity Brace/Splint: Resting hand  Position Activity Restriction  Other position/activity restrictions: Helmet on when OOB; Sling for transfers/mobility only     SUBJECTIVE  Subjective: States sneezing causes sharp head pain.  Pain: Pt c/o occasional pain in L UE c movement, c/o pain in L knee in PM during ex.    OBJECTIVE  Functional Mobility  Bed mobility  Rolling to Left: Minimal assistance (Using family's rolling cushion for LEs.)  Rolling to Right: Moderate assistance;2 Person assistance (Using family's rolling cushion for LEs.)  Sit to Supine: Moderate assistance (Cues to scoop L LE c R, but Pt states he's unable to fully lift LEs to bed height s assist.)  Bed Mobility Comments: Flat bed, rails used.  Transfers  Sit to Stand: Dependent/Total (No device, edge of chair; writer blocking L hip/knee + support of SO for trunk. Cues for posture, hip/knee extension.)  Stand to Sit: Minimal Assistance;2 Person Assistance  Bed to Chair: 2 Person Assistance;Moderate assistance;Minimal assistance;Maximum assistance (Mod to Max depending on attention + CGA/Min)  Stand

## 2024-02-20 NOTE — PROGRESS NOTES
Brennen Mcclure was evaluated and a DME order was entered for a nebulizer compressor in order to administer  duo-neb  due to the diagnosis of COPD.  The need for this equipment and treatment was discussed with the patient and he understands and is in agreement.       Dipika Angulo MD

## 2024-02-20 NOTE — PROGRESS NOTES
SPEECH LANGUAGE PATHOLOGY  Speech Language Pathology  ST ACUTE REHAB    Cognitive Treatment Note    Date: 2/20/2024  Patient’s Name: Brennen Mcclure  MRN: [id number]  Diagnosis:   Patient Active Problem List   Diagnosis Code    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 (HCC) I69.152    Pseudobulbar affect F48.2    Intractable headache R51.9    Intracranial hemorrhage (HCC) I62.9    Thrombocytopenia (HCC) D69.6    Anemia D64.9    Chronic idiopathic thrombocytopenic purpura (HCC) D69.3    Seizure (HCC) R56.9    Dural venous sinus thrombosis G08       Pain: 8/10 (head, legs)     Cognitive Treatment    Treatment time: 0904-0930 & [id number]  *late start in AM and PM d/t Pt still working with OT upon arrival      Subjective: [x] Alert [x] Cooperative     [] Confused     [] Agitated    [] Lethargic      Objective/Assessment:  Attention: Occasional repetition and redirection required throughout.  Long response latency demo. T/o.      Cog-Log attention tasks --    Counting backwards 20-1: 100% accuracy (I)   Naming months reversed: 75% accuracy (I), increasing to 100% cued.     30 second count task: 50% accuracy (I)   Fist-palm-edge task: 100% accuracy (I)   Go-no-go task: 100% accuracy (I)      Recall: Cog-Log memory task --    Immediate memory, repeat address: 100% accuracy (I)    Delayed recall (~8 min delay): 100% accuracy (I)     Organization: Orientation log administered, O-Log score- 29/30 (disoriented to DENEEN only). Improvement noted from previous administration at [facility] on 01/09 with score of 28/30.     Problem Solving/Reasoning: The Cognitive Log (Cog-Log) administered: Cog-Log score 24/30 (improvement from previous administration at [facility] on 01/09 with score of 17/30).     Speech: Pt presents with mild dysarthria characterized by imprecise articulation, blended word boundaries, rapid rate and reduced vocal intensity.  Pt required min (verbal  cues) to facilitate strategies in conversation.  Speech judged to be ~95% intelligible in conversation.  Pt able to complete OMEs x5 sets in reps of 20 c mod cues.  Improved labial and lingual strength, coordination and ROM with exercise attempts from previous sessions.     Other: No family present in AM.  Pt's wife and sister present for PM session.  Per chart and wife, Pt will not have ST home health services following d/c.  Extensive education provided re: speech tasks (e.g., reading aloud, alliterations, conversation) and cognitive tasks (e.g., handouts, card games, reading tasks, word puzzles, games on tablet) to practice at home.  Handouts of oral motor exercises, alliterations, dysarthria strategies, cognition handouts, memory strategies, attention strategies, etc. reviewed and given to Pt/wife.  Discussed [facility] (ST services at discounted rate and [facility] group) and Stroke [facility].  Pt and wife verbalized understanding of all information provided and agreeable.        Plan:  [x] Continue ST services    [] Discharge from ST:      Discharge recommendations: [] Inpatient Rehab   [] Skilled Nursing Facility   [] Outpatient Therapy  [] Follow up at trauma clinic   [] Other:       Treatment completed by: Zofia Martinez M.A., CCC-SLP

## 2024-02-20 NOTE — PROGRESS NOTES
[facility]   IN-PATIENT SERVICE   [facility]    Consult note            Date:   2/20/2024  Patient name:  Brennen Mcclure  Date of admission:  1/27/2024  3:01 PM  MRN:   [id number]  Account:  [id number]  YOB: 1965  PCP:    No primary care provider on file.  Room:   [address] [place] [address]  Code Status:    Full Code    Chief Complaint:     No chief complaint on file.      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, started on heparin drip also had patchy IPH, complicated by expansion of intraparenchymal hemorrhage with mass effect, s/p craniectomy, and evacuation of IPH x 2 with dural repair last CT head on on 1/24 showed evolution of the hemorrhage with reduction in the mass effect, Hospital course further complicated by ITP, was seen by hematology oncology, given IVIG and platelet transfusion,, started on Vimpat for seizure prevention, had cellulitis on Celexa  Patient was seen by hematology oncology over there and was started on Eliquis 2.5 twice daily  Patient was drowsy but arousable on my encounter,  Family was present at the bedside    Denies any chest pain shortness of breath        Past Medical History:     Past Medical History:   Diagnosis Date    CAD (coronary artery disease)     Chronic deep vein thrombosis (DVT) of both lower extremities (HCC)     Chronic idiopathic thrombocytopenia (HCC)     CVA (cerebral vascular accident) (HCC)     HLD (hyperlipidemia)         Past Surgical History:     Past Surgical History:   Procedure Laterality Date    CORONARY ARTERY BYPASS GRAFT      CORONARY STENT PLACEMENT          Medications Prior to Admission:     Prior to  co-morbidities listed above, severity of signs and symptoms as outlined, requirement for current medical therapies and most importantly because of direct risk to patient if care not provided in a hospital setting.    Jerome Hassan MD  2/20/2024  5:53 PM    Copy sent to Dr. Stack primary care provider on file.    Please note that this chart was generated using voice recognition Dragon dictation software.  Although every effort was made to ensure the accuracy of this automated transcription, some errors in transcription may have occurred.

## 2024-02-20 NOTE — PROGRESS NOTES
Occupational Therapy  [facility]   Acute Rehabilitation Occupational Therapy Daily Treatment Note    Date: 24  Patient Name: Brennen Mcclure       Room: 2633/2633-01  MRN: [id number]  Account: [id number]   : 1965  (59 y.o.) Gender: male       Referring Practitioner: Danica De La Paz MD  Diagnosis: Hemiplegia following ntrm intcrbl hemor aff left dominant side  Additional Pertinent Hx: 59 year old male who admitted 23 with 4 days of new L arm weakness and headache. He had known history of MI and CABG - on dabigatran but with inconsistent use due to prescription running out. He had previous treatment for L ICA thrombus and R sided symptoms treated with “clot busting medication” in . 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 He was transferred from [place], [place] to [facility] for medical management and was admitted to neuro critical care. His LUE numbness/weakness worsened to near complete hemiparesis. Initial GCS 15. He was initially treated with heparin gtt for DVST. On 23 Dr. Jameson Parsons (neurosurgery) performed R sided decompressive hemicraniectomy with evacuation of IPH x2 sites with dural repair. He was stable post op on heparin drip and extubated 23 after stable head CT 23. He was then bridged to warfarin. He is currently being treated with warfarin with therapeutic INR since 23. Hb stable at 13.6 on 24. Thrombocytopenia noted with platelet count 30 - hematology being reconsulted - previously recommended count >40. Currently requiring timed voids for incontinence. PM&R anticipating likely wheelchair mobility with Naveen for transfers.    Treatment Diagnosis: Impaired self-care status    Past Medical History:  has a past medical history of CAD (coronary artery disease), Chronic deep vein thrombosis (DVT) of both lower extremities (HCC),  improvemenet on 9HPT and dynamometer  Long Term Goal 8: Pt will complete self-feeding and oral hygiene with Mod I using AE/DME/Modified techniques as needed  Long Term Goal 9: Pt will identify 3 positive leisure activities to increased overall quality of life  Long Term Goal 10: Pt will verbalize/demonstrate good understanding of 3 positive coping strategies to manage emotions/anxiety to promote maximal participation in self-care and functional tasks    Plan  Occupational Therapy Plan  Times Per Week: 900 minutes of OT/PT/SLP  Current Treatment Recommendations: Self-Care / ADL, Strengthening, ROM, Balance training, Functional mobility training, Endurance training, Wheelchair mobility training, Neuromuscular re-education, Cognitive reorientation, Pain management, Safety education & training, Patient/Caregiver education & training, Equipment evaluation, education, & procurement, Positioning, Home management training, Coordination training, Co-Treatment       02/20/24 0736 02/20/24 0914 02/20/24 1339   OT Individual Minutes   Time In  --  0830 1300   Time Out  --  0904 1335   Minutes  --  34 35   Minute Variance   Variance 26  --   --    Reason Patient care needs  (vitals, med pass, breakfast at scheduled therapy time.)  --   --          Electronically signed by FORTINO Ortiz on 2/20/24 at 3:22 PM EST

## 2024-02-20 NOTE — PROGRESS NOTES
Physical Medicine & Rehabilitation  Progress Note      Subjective:      Brennen Mcclure is a 59 y.o. male with hemorrhagic CVA and left nondominant hemiparesis.    He reports doing okay today.  Discussed continuing to hold discharge due to drop in platelets again today to 39.  Wife present at bedside.  He wants to go home but is agreeable to staying.  He reports ongoing left shoulder pain.  Wife also notes bruising on the palm of the left hand of unclear etiology.  He denies any other acute concerns.    ROS:  Denies fevers, chills, sweats.  No chest pain, palpitations, lightheadedness.  Denies coughing, wheezing or shortness of breath.  Denies abdominal pain, nausea, diarrhea or constipation.  No new areas of joint pain.  Denies new areas of numbness or weakness.  Denies new anxiety or depression issues.  No new skin problems.    Rehabilitation:     Physical Therapy    Restrictions/Precautions: General Precautions, Fall Risk, Up as Tolerated  Other position/activity restrictions: Helmet on when OOB; Sling for transfers/mobility only  Required Braces or Orthoses  Other:  (Helmet on when OOB)  Left Upper Extremity Brace/Splint: Resting hand    Bed mobility  Bridging: Stand by assistance (partial bridge with RLE)  Rolling to Left: Minimal assistance (Using family's rolling cushion for LEs.)  Rolling to Right: Moderate assistance, 2 Person assistance (Using family's rolling cushion for LEs.)  Supine to Sit: Minimal assistance (requires assist to advance LLE out of bed, assist with trunk progression)  Sit to Supine: Moderate assistance (Cues to scoop L LE c R, but Pt states he's unable to fully lift LEs to bed height s assist.)  Scooting: Stand by assistance (scooting along EOB both L and R.)  Bed Mobility Comments: Flat bed, rails used.    Transfers  Sit to Stand: Dependent/Total (No device, edge of chair; writer blocking L hip/knee + support of SO for trunk. Cues for posture, hip/knee extension.)  Stand to Sit: Minimal  arm neurologic pain: On gabapentin.  Has lidoderm patch as needed  OA bilateral knees: Has Voltaren gel BID  Cellulitis: Related to peripheral IV. Completed Keflex 1/29/24.  Severe emphysema: Stable on room air  Infrarenal AAA: Measured 4.5 cm. For outpatient follow up  Mild hyponatremia: Resolved.  Insomnia: On melatonin nightly  CAD with history of MI, CVA:  S/p CABG, stents. On aspirin, atorvastatin.   HTN: Lisinopril on hold (not requiring)  HLD: On atorvastatin.  Seborrheic dermatitis: OK for wife to apply baby oil topically but with no pressure over craniectomy site  Depression/anxiety: On Celexa. Psychiatry following.   Moderate malnutrition:  Dietician following.  Patient declining oral nutrition supplements.  Bowel Management: Miralax prn, Senokot nightly, Dulcolax prn. Daily Enemeez to regulate bowels.   Internal medicine for medical management  Chronic BLE DVT:  On Eliquis  Follow up: 1.PCP 1-2 weeks, 2.Neurosurgery Dr. Corbett 2/26/24 to establish plan for cranioplasty, 3.Hematology, 4.Vascular, 5.Pulmonology, 6. Psychiatry regarding pseudobulbar affect, 7. PM&R, 8. Neurology  Home evaluation went well 2/16. Family planning for discharge 2/19/24 with home health - aware that only provider who accepts their insurance does not provide speech therapy. They would like Meds to Beds.  Discharge held today due to thrombocytopenia.  Patient has been approved through 2/24/24.      Electronically signed by Dipika Angulo MD on 2/20/2024 at 11:53 PM

## 2024-02-21 ENCOUNTER — APPOINTMENT (OUTPATIENT)
Dept: ULTRASOUND IMAGING | Age: 59
DRG: 056 | End: 2024-02-21
Attending: STUDENT IN AN ORGANIZED HEALTH CARE EDUCATION/TRAINING PROGRAM
Payer: COMMERCIAL

## 2024-02-21 LAB
BILIRUB DIRECT SERPL-MCNC: 0.1 MG/DL
BILIRUB SERPL-MCNC: 0.2 MG/DL (ref 0.3–1.2)
D DIMER PPP FEU-MCNC: 5.17 UG/ML FEU (ref 0–0.59)
DAT POLY-SP REAG RBC QL: NEGATIVE
ERYTHROCYTE [DISTWIDTH] IN BLOOD BY AUTOMATED COUNT: 22.9 % (ref 11.5–14.9)
ERYTHROCYTE [DISTWIDTH] IN BLOOD BY AUTOMATED COUNT: 22.9 % (ref 11.5–14.9)
FIBRINOGEN PPP-MCNC: 319 MG/DL (ref 210–530)
HCT VFR BLD AUTO: 37.9 % (ref 41–53)
HCT VFR BLD AUTO: 39 % (ref 41–53)
HGB BLD-MCNC: 12.4 G/DL (ref 13.5–17.5)
HGB BLD-MCNC: 12.6 G/DL (ref 13.5–17.5)
INR PPP: 1
LDH SERPL-CCNC: 169 U/L (ref 135–225)
MCH RBC QN AUTO: 27.9 PG (ref 26–34)
MCH RBC QN AUTO: 28.3 PG (ref 26–34)
MCHC RBC AUTO-ENTMCNC: 32.2 G/DL (ref 31–37)
MCHC RBC AUTO-ENTMCNC: 32.6 G/DL (ref 31–37)
MCV RBC AUTO: 86.5 FL (ref 80–100)
MCV RBC AUTO: 86.8 FL (ref 80–100)
PARTIAL THROMBOPLASTIN TIME: 24.5 SEC (ref 24–36)
PLATELET # BLD AUTO: 36 K/UL (ref 150–450)
PLATELET # BLD AUTO: 53 K/UL (ref 150–450)
PMV BLD AUTO: 10.1 FL (ref 6–12)
PMV BLD AUTO: 10.7 FL (ref 6–12)
PROTHROMBIN TIME: 13.8 SEC (ref 11.8–14.6)
RBC # BLD AUTO: 4.37 M/UL (ref 4.5–5.9)
RBC # BLD AUTO: 4.51 M/UL (ref 4.5–5.9)
WBC OTHER # BLD: 5.4 K/UL (ref 3.5–11)
WBC OTHER # BLD: 6.8 K/UL (ref 3.5–11)

## 2024-02-21 PROCEDURE — 6370000000 HC RX 637 (ALT 250 FOR IP): Performed by: INTERNAL MEDICINE

## 2024-02-21 PROCEDURE — 6370000000 HC RX 637 (ALT 250 FOR IP): Performed by: PSYCHIATRY & NEUROLOGY

## 2024-02-21 PROCEDURE — 97112 NEUROMUSCULAR REEDUCATION: CPT

## 2024-02-21 PROCEDURE — 97110 THERAPEUTIC EXERCISES: CPT

## 2024-02-21 PROCEDURE — 82247 BILIRUBIN TOTAL: CPT

## 2024-02-21 PROCEDURE — 97129 THER IVNTJ 1ST 15 MIN: CPT

## 2024-02-21 PROCEDURE — 85730 THROMBOPLASTIN TIME PARTIAL: CPT

## 2024-02-21 PROCEDURE — 6370000000 HC RX 637 (ALT 250 FOR IP): Performed by: PHYSICAL MEDICINE & REHABILITATION

## 2024-02-21 PROCEDURE — 94640 AIRWAY INHALATION TREATMENT: CPT

## 2024-02-21 PROCEDURE — 99232 SBSQ HOSP IP/OBS MODERATE 35: CPT | Performed by: STUDENT IN AN ORGANIZED HEALTH CARE EDUCATION/TRAINING PROGRAM

## 2024-02-21 PROCEDURE — 85384 FIBRINOGEN ACTIVITY: CPT

## 2024-02-21 PROCEDURE — 97530 THERAPEUTIC ACTIVITIES: CPT

## 2024-02-21 PROCEDURE — 85610 PROTHROMBIN TIME: CPT

## 2024-02-21 PROCEDURE — 6370000000 HC RX 637 (ALT 250 FOR IP): Performed by: STUDENT IN AN ORGANIZED HEALTH CARE EDUCATION/TRAINING PROGRAM

## 2024-02-21 PROCEDURE — 76705 ECHO EXAM OF ABDOMEN: CPT

## 2024-02-21 PROCEDURE — 85027 COMPLETE CBC AUTOMATED: CPT

## 2024-02-21 PROCEDURE — 99233 SBSQ HOSP IP/OBS HIGH 50: CPT | Performed by: INTERNAL MEDICINE

## 2024-02-21 PROCEDURE — 1180000000 HC REHAB R&B

## 2024-02-21 PROCEDURE — 92526 ORAL FUNCTION THERAPY: CPT

## 2024-02-21 PROCEDURE — 6360000002 HC RX W HCPCS: Performed by: INTERNAL MEDICINE

## 2024-02-21 PROCEDURE — 83010 ASSAY OF HAPTOGLOBIN QUANT: CPT

## 2024-02-21 PROCEDURE — 99232 SBSQ HOSP IP/OBS MODERATE 35: CPT | Performed by: PSYCHIATRY & NEUROLOGY

## 2024-02-21 PROCEDURE — 94761 N-INVAS EAR/PLS OXIMETRY MLT: CPT

## 2024-02-21 PROCEDURE — 86880 COOMBS TEST DIRECT: CPT

## 2024-02-21 PROCEDURE — 92507 TX SP LANG VOICE COMM INDIV: CPT

## 2024-02-21 PROCEDURE — 99232 SBSQ HOSP IP/OBS MODERATE 35: CPT | Performed by: INTERNAL MEDICINE

## 2024-02-21 PROCEDURE — 97535 SELF CARE MNGMENT TRAINING: CPT

## 2024-02-21 PROCEDURE — 83615 LACTATE (LD) (LDH) ENZYME: CPT

## 2024-02-21 PROCEDURE — 82248 BILIRUBIN DIRECT: CPT

## 2024-02-21 PROCEDURE — 36415 COLL VENOUS BLD VENIPUNCTURE: CPT

## 2024-02-21 PROCEDURE — 85379 FIBRIN DEGRADATION QUANT: CPT

## 2024-02-21 RX ADMIN — DEXAMETHASONE 40 MG: 6 TABLET ORAL at 10:11

## 2024-02-21 RX ADMIN — LACOSAMIDE 100 MG: 100 TABLET, FILM COATED ORAL at 21:19

## 2024-02-21 RX ADMIN — GABAPENTIN 100 MG: 100 CAPSULE ORAL at 21:19

## 2024-02-21 RX ADMIN — IPRATROPIUM BROMIDE AND ALBUTEROL SULFATE 1 DOSE: 2.5; .5 SOLUTION RESPIRATORY (INHALATION) at 20:19

## 2024-02-21 RX ADMIN — BUTALBITA,ACETAMINOPHEN AND CAFFEINE 1 CAPSULE: 50; 300; 40 CAPSULE ORAL at 15:57

## 2024-02-21 RX ADMIN — FERROUS SULFATE TAB 325 MG (65 MG ELEMENTAL FE) 325 MG: 325 (65 FE) TAB at 10:03

## 2024-02-21 RX ADMIN — BACLOFEN 5 MG: 10 TABLET ORAL at 10:05

## 2024-02-21 RX ADMIN — ACETAMINOPHEN 650 MG: 325 TABLET, FILM COATED ORAL at 10:06

## 2024-02-21 RX ADMIN — GABAPENTIN 100 MG: 100 CAPSULE ORAL at 14:25

## 2024-02-21 RX ADMIN — Medication 6 MG: at 21:19

## 2024-02-21 RX ADMIN — ATORVASTATIN CALCIUM 80 MG: 80 TABLET, FILM COATED ORAL at 21:19

## 2024-02-21 RX ADMIN — BACLOFEN 5 MG: 10 TABLET ORAL at 21:19

## 2024-02-21 RX ADMIN — GABAPENTIN 100 MG: 100 CAPSULE ORAL at 10:04

## 2024-02-21 RX ADMIN — CITALOPRAM HYDROBROMIDE 10 MG: 10 TABLET ORAL at 10:05

## 2024-02-21 RX ADMIN — AMITRIPTYLINE HYDROCHLORIDE 25 MG: 25 TABLET, FILM COATED ORAL at 21:21

## 2024-02-21 RX ADMIN — IPRATROPIUM BROMIDE AND ALBUTEROL SULFATE 1 DOSE: 2.5; .5 SOLUTION RESPIRATORY (INHALATION) at 06:58

## 2024-02-21 RX ADMIN — MODAFINIL 200 MG: 100 TABLET ORAL at 10:07

## 2024-02-21 RX ADMIN — TOPIRAMATE 25 MG: 25 TABLET, FILM COATED ORAL at 11:38

## 2024-02-21 RX ADMIN — LACOSAMIDE 100 MG: 100 TABLET, FILM COATED ORAL at 10:04

## 2024-02-21 RX ADMIN — Medication 1 TABLET: at 10:05

## 2024-02-21 RX ADMIN — SENNOSIDES 17.2 MG: 8.6 TABLET, FILM COATED ORAL at 21:19

## 2024-02-21 RX ADMIN — Medication 30 MG: at 10:07

## 2024-02-21 RX ADMIN — DICLOFENAC SODIUM 2 G: 10 GEL TOPICAL at 10:09

## 2024-02-21 RX ADMIN — DICLOFENAC SODIUM 2 G: 10 GEL TOPICAL at 21:21

## 2024-02-21 ASSESSMENT — PAIN SCALES - GENERAL
PAINLEVEL_OUTOF10: 9
PAINLEVEL_OUTOF10: 8

## 2024-02-21 ASSESSMENT — PAIN DESCRIPTION - ORIENTATION
ORIENTATION: POSTERIOR
ORIENTATION: LEFT;POSTERIOR

## 2024-02-21 ASSESSMENT — PAIN DESCRIPTION - LOCATION
LOCATION: HEAD
LOCATION: HEAD;HIP

## 2024-02-21 ASSESSMENT — PAIN DESCRIPTION - DESCRIPTORS
DESCRIPTORS: POUNDING
DESCRIPTORS: THROBBING

## 2024-02-21 NOTE — CARE COORDINATION
ARU CASE MANAGEMENT NOTE:    Patient is alert and oriented x3.    Spoke with patient and spouse Mara regarding discharge plan: Return home with spouse and will have [facility].    Outside appointments while in ARU: None    Will continue to follow for additional discharge needs.      Electronically signed by Claude Barajas RN on 2/21/2024 at 3:50 PM

## 2024-02-21 NOTE — PROGRESS NOTES
SPEECH LANGUAGE PATHOLOGY  Speech Language Pathology  [facility] ACUTE REHAB    Cognitive/Dysphagia Treatment Note    Date: 2/21/2024  Patient’s Name: Brennen Mcclure  MRN: [id number]  Diagnosis:   Patient Active Problem List   Diagnosis Code    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 (HCC) I69.152    Pseudobulbar affect F48.2    Intractable headache R51.9    Intracranial hemorrhage (HCC) I62.9    Thrombocytopenia (HCC) D69.6    Anemia D64.9    Chronic idiopathic thrombocytopenic purpura (HCC) D69.3    Seizure (HCC) R56.9    Dural venous sinus thrombosis G08       Pain: 7/10 -- head, patient stated \"it's throbbing\"   RN came in to administer pain medication.    Cognitive Treatment    Treatment time: 5104-6051      Subjective: [x] Alert [x] Cooperative     [] Confused     [] Agitated    [] Lethargic      Objective/Assessment:  Attention: Frequent redirection and repetition required. Patient easily distracted when nursing staff in the room.    Orientation: Oriented x4 with visual support.    Recall: Three Words (3 minutes) 3/3 (I), (5 minutes) 3/3 with min cues (extra time).    Organization: Word Deduction 80% (I), increased to 100% with min cues (extra time).     Problem Solving/Reasoning: Multiple Uses for Objects 70% (I), increasing to 100% given mod-max A (extra time, phonemic cues, semantic cues).     Dysphagia: Patient completed OMEs x3 sets x10 reps with mod A. ST observed reduced labial and lingual strength.     Other: No family present. Patient left with RN to administer pain medication.    Plan:  [x] Continue ST services    [] Discharge from ST:      Discharge recommendations:  [x] Further therapy recommended at discharge.   [] No therapy recommended at discharge.      Treatment completed by: KORTNEY Ahmadi,  Clinician

## 2024-02-21 NOTE — PROGRESS NOTES
Today's Date: 2/20/2024  Patient Name: Brennen Mcclure  Date of admission: 1/27/2024  3:01 PM  Patient's age: 59 y.o., 1965  Admission Dx: 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 (HCC) [I69.152]    Reason for Consult:  Low platelets   Requesting Physician: Dipika Angulo MD    CHIEF COMPLAINT:  No chief complaint on file.      History Obtained From:  patient and chart  INTERVAL HISTORY:    Patient seen and examined  No bleeding  Platelet down to 42 and was started on dexamethasone and platelet above 50,000    HISTORY OF PRESENT ILLNESS:    Brennen Mcclure is a 59 y.o. male who is admitted to the hospital on 1/27/2024  for acute rehab after recent hospitalization for acute stroke.    Patient had recent hospitalization for left hemiparesis secondary to hemorrhagic CVA and subsequently discharged to acute rehab at [facility].  Patient has history of dural venous sinus thrombosis and also history of DVT and was chronically maintained on Eliquis.    Patient currently is on Eliquis and now noted to have thrombocytopenia which is progressively getting worse therefore hematology was consulted.  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  Patient had a evaluation at [facility] back in 2020 as well and as per the chart.    Past Medical History:   has a past medical history of CAD (coronary artery disease), Chronic deep vein thrombosis (DVT) of both lower extremities (HCC), Chronic idiopathic thrombocytopenia (HCC), CVA (cerebral vascular accident) (HCC), and HLD (hyperlipidemia).    Past Surgical History:   has a past surgical history that includes Coronary artery bypass graft and Coronary stent placement.     Medications:    Prior to Admission medications    Medication Sig Start Date End Date Taking? Authorizing Provider   aspirin 81 MG chewable tablet Take 1  baclofen (LIORESAL) tablet 5 mg  5 mg Oral BID Beck Mason MD   5 mg at 02/20/24 2144    Benzocaine-Menthol (CEPACOL) 1 lozenge  1 lozenge Oral Q2H PRN Shannan Drake MD        diclofenac sodium (VOLTAREN) 1 % gel 2 g  2 g Topical BID Beck Mason MD   2 g at 02/20/24 2146    acetaminophen (TYLENOL) tablet 650 mg  650 mg Oral Q4H PRN Danica De LaP az MD   650 mg at 02/18/24 1817    bisacodyl (DULCOLAX) suppository 10 mg  10 mg Rectal Daily PRN Danica De La Paz MD        citalopram (CELEXA) tablet 10 mg  10 mg Oral Daily Dipika Angulo MD   10 mg at 02/20/24 0815    ferrous sulfate (IRON 325) tablet 325 mg  325 mg Oral Every Other Day Dipika Angulo MD   325 mg at 02/19/24 1011    gabapentin (NEURONTIN) capsule 100 mg  100 mg Oral TID Dipika Angulo MD   100 mg at 02/20/24 2144    lacosamide (VIMPAT) tablet 100 mg  100 mg Oral BID Dipika Angulo MD   100 mg at 02/20/24 2143    lidocaine 4 % external patch 1 patch  1 patch TransDERmal Daily PRN Dipika Angulo MD        aspirin chewable tablet 81 mg  81 mg Oral Daily Dipika Angulo MD   81 mg at 02/20/24 0815    atorvastatin (LIPITOR) tablet 80 mg  80 mg Oral Nightly Dipika Angulo MD   80 mg at 02/20/24 2144    melatonin tablet 6 mg  6 mg Oral Nightly Dipika Angulo MD   6 mg at 02/20/24 2143    modafinil (PROVIGIL) tablet 200 mg  200 mg Oral Daily Dipika Angulo MD   200 mg at 02/20/24 0815    therapeutic multivitamin-minerals 1 tablet  1 tablet Oral Daily Dipika Angulo MD   1 tablet at 02/20/24 0817    senna (SENOKOT) tablet 17.2 mg  2 tablet Oral Nightly Dipika Angulo MD   17.2 mg at 02/20/24 2144       Allergies:  Patient has no known allergies.    Social History:   reports that he quit smoking about 3 months ago. His smoking use included cigarettes. He has never used smokeless tobacco.     Family History: family history includes Coronary Art Dis in his mother; Stroke in an other family member.    REVIEW OF SYSTEMS:

## 2024-02-21 NOTE — PROGRESS NOTES
Perfect serve note sent to Dr. Armstrong regarding if Aspirin 81 mg daily should be continued. Waiting response.

## 2024-02-21 NOTE — PROGRESS NOTES
side ([facility]) [I69.152] 01/27/2024       Plan:     Patient is 59-year-old male complicated medical history was admitted in hospital for over 2 months at [place] now admitted at [facility] acute rehab for further care.    Intraparenchymal hemorrhage s/p craniectomy with evacuation x 2 and dural repair  Left sided hemiparesis secondary to above  On seizure prophylaxis with lacosamide,  Neurostimulation on modafinil    Cerebral venous sinus thrombosis, on Eliquis 2.5 twice daily, was seen by 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 was on Pradaxa earlier  Should consider hematology oncology consult due to his complicated history    ITP platelet count yesterday was 103, has been stable patient received IVIG at outlying facility, consider hematology oncology consult    On Flex for cellulitis last dose 1/29    CAD s/p CABG in 2009 status post PCI 2023, on aspirin Lipitor    Hypertension blood pressures currently stable on lisinopril, continue to monitor    DVT prophylaxis        1/29  Patient seen and examined  Working with PT OT  Blood pressure has been borderline lower side decrease lisinopril to 10  Patient denies any dizziness lightheadedness  Thrombocytopenia has resolved  Patient also had 4.5 cm abdominal aortic aneurysm  Advised to follow-up with vascular hematology oncology and PCP as outpatient  Voiced understanding  Wife is present at the bedside updated  1/30  Patient seen and examined, his vitals have been stable, labs reviewed, patient advised to follow-up with oncology as outpatient  Thrombocytopenia has resolved  Patient on Eliquis reduced dose as per oncology recommendations in [place]  Finish course of Keflex    2/1  Patient seen and examined  Currently alert oriented  On Eliquis at 2.5 twice daily as recommended by oncologist at [place]  Patient advised to follow-up with oncology as outpatient due to recurrent DVTs next  Vital stable thrombocytopenia  TO SOCIAL WORK  IP CONSULT TO INTERNAL MEDICINE  IP CONSULT TO PSYCHIATRY  IP CONSULT TO ONCOLOGY  IP CONSULT TO ONCOLOGY     Patient is admitted as inpatient status because of co-morbidities listed above, severity of signs and symptoms as outlined, requirement for current medical therapies and most importantly because of direct risk to patient if care not provided in a hospital setting.    Jerome Hassan MD  2/21/2024  2:40 PM    Copy sent to Dr. Stack primary care provider on file.    Please note that this chart was generated using voice recognition Dragon dictation software.  Although every effort was made to ensure the accuracy of this automated transcription, some errors in transcription may have occurred.

## 2024-02-21 NOTE — PROGRESS NOTES
77.1 kg (170 lb)   SpO2 92%   BMI 24.39 kg/m²       GEN: Well developed, well nourished, no acute distress  HEENT: Right craniectomy defect present.  EOM grossly intact.  Hearing grossly intact.  Mucous membranes pink and moist.  RESP: Normal breath sounds with no wheezing, rales, or rhonchi. Respirations WNL and unlabored.  CV: Regular rate and rhythm. No murmurs, rubs, or gallops.  ABD: Soft, non-distended, bowel sounds present and equal.  NEURO: Alert.  Speech fluent.  Sensation to light touch intact.  MSK: Muscle bulk is normal bilaterally. No movement noted in the left upper and lower limbs.  Moving right upper limb with at least antigravity strength.  LIMBS: No edema in bilateral lower limbs.  SKIN: Warm and dry with good turgor.  PSYCH: Mood WNL. Affect WNL. Appropriately interactive.    Diagnostics:     CBC:   Recent Labs     02/20/24  0730 02/21/24  0726 02/21/24 2232   WBC 5.3 5.4 6.8   RBC 4.45* 4.37* 4.51   HGB 12.5* 12.4* 12.6*   HCT 38.0* 37.9* 39.0*   MCV 85.4 86.8 86.5   RDW 23.4* 22.9* 22.9*   PLT 39* 36* 53*     BMP:   No results for input(s): \"NA\", \"K\", \"CL\", \"CO2\", \"PHOS\", \"BUN\", \"CREATININE\", \"CA\", \"GLUCOSE\" in the last 72 hours.    BNP: No results for input(s): \"BNP\" in the last 72 hours.  PT/INR:   Recent Labs     02/21/24 2232   PROTIME 13.8   INR 1.0     APTT:   Recent Labs     02/21/24 2232   APTT 24.5     CARDIAC ENZYMES: No results for input(s): \"CKMB\", \"CKMBINDEX\", \"TROPONINT\" in the last 72 hours.    Invalid input(s): \"CKTOTAL;3\"  FASTING LIPID PANEL:No results found for: \"CHOL\", \"HDL\", \"TRIG\"  LIVER PROFILE:   Recent Labs     02/21/24  2232   BILIDIR 0.1   BILITOT 0.2*        US abdomen limited, 2/21/24:  IMPRESSION:  Cholelithiasis.  No cholecystitis.     Heterogeneous echotexture throughout the liver, compatible with diffuse  hepatocellular disease such as fatty infiltration      Reviewed notes from internal medicine, hematology, PT, OT, ST.      Impression/Plan:   Impaired  starting 2/21, atorvastatin.   HTN: Lisinopril on hold (not requiring)  HLD: On atorvastatin.  Seborrheic dermatitis: OK for wife to apply baby oil topically but with no pressure over craniectomy site  Depression/anxiety: On Celexa. Psychiatry following.   Moderate malnutrition:  Dietician following.  Patient declining oral nutrition supplements.  Bowel Management: Miralax prn, Senokot nightly, Dulcolax prn. Daily Enemeez to regulate bowels.   Internal medicine for medical management  Chronic BLE DVT:  On Eliquis - discontinued on 2/20 by hematology  Follow up: 1.PCP 1-2 weeks, 2.Neurosurgery Dr. Corbett 2/26/24 to establish plan for cranioplasty, 3.Hematology, 4.Vascular, 5.Pulmonology, 6. Psychiatry regarding pseudobulbar affect, 7. PM&R, 8. Neurology  Home evaluation completed 2/16/24  Family planning for discharge with home health - aware that only provider who accepts their insurance does not provide speech therapy.  Discharge held today due to thrombocytopenia.  Patient has been approved through 2/24/24.      Electronically signed by Dipika Angulo MD on 2/21/2024 at 11:16 PM

## 2024-02-21 NOTE — PLAN OF CARE
Problem: Respiratory - Adult  Goal: Achieves optimal ventilation and oxygenation  Outcome: Progressing     Problem: Hematologic - Adult  Goal: Maintains hematologic stability  Outcome: Progressing     Problem: Skin/Tissue Integrity  Goal: Absence of new skin breakdown  Description: 1.  Monitor for areas of redness and/or skin breakdown  2.  Assess vascular access sites hourly  3.  Every 4-6 hours minimum:  Change oxygen saturation probe site  4.  Every 4-6 hours:  If on nasal continuous positive airway pressure, respiratory therapy assess nares and determine need for appliance change or resting period.  Outcome: Progressing

## 2024-02-21 NOTE — PROGRESS NOTES
thrombocytopenic purpura (HCC)    Seizure (HCC)    Dural venous sinus thrombosis  Resolved Problems:    * No resolved hospital problems. *      LABS:    Recent Labs     02/19/24  0703 02/20/24  0730 02/21/24  0726   WBC 5.3 5.3 5.4   HGB 13.5 12.5* 12.4*   PLT 56* 39* 36*       No results for input(s): \"NA\", \"K\", \"CL\", \"CO2\", \"BUN\", \"CREATININE\", \"GLUCOSE\" in the last 72 hours.    No results for input(s): \"BILITOT\", \"ALKPHOS\", \"AST\", \"ALT\" in the last 72 hours.    No results found for: \"LABAMPH\", \"BARBSCNU\", \"LABBENZ\", \"CANNAB\", \"COCAINESCRN\", \"LABMETH\", \"OPIATESCREENURINE\", \"PHENCYCLIDINESCREENURINE\", \"PPXUR\", \"ETOH\"  No results found for: \"TSH\", \"FREET4\"  No results found for: \"LITHIUM\"  No results found for: \"VALPROATE\", \"CBMZ\"    RISK ASSESSMENT: low risk for suicide or harm to others    Treatment Plan:  Reviewed current Medications with the patient.   No Medication Changes Today    Risks, benefits, side effects, drug-to-drug interactions and alternatives to treatment were discussed. The patient consented to treatment.     Encourage patient to attend group and other milieu activities.  Discharge planning discussed with the patient and treatment team.    PSYCHOTHERAPY/COUNSELING:  [] Therapeutic interview  [x] Supportive  [] CBT  [] Ongoing  [] Other    Brennen Mcclure is a 59 y.o. male being evaluated face to face.     --Thomas Carroll MS3 on 2/21/2024 at 10:42 AM  Please wait for Dr. Seo's cosign     An electronic signature was used to authenticate this note.     **This report has been created using voice recognition software. It may contain minor errors which are inherent in voice recognition technology.**  I independently saw and evaluated the patient.  I reviewed the  documentation above    .  Any additional comments or changes to the   documentation are stated below otherwise agree with assessment.      No results found for: \"QTC\"    The patient was seen face-to-face.  He was also seeing the .  His  wife was present.  The patient has been filling out POA paperwork and this has made him feel a little sad at this time.  Overall he has been doing much better.  His crying spells are much less frequent compared to the time of his admission.  He has been taking his antidepressant and his mood remains stable.  He has no suicidal thoughts  Also taking Dextromethrophan in addition to above medications  No Medication Changes Today  PLAN  Medications as noted above  Attempt to develop insight  Psycho-education conducted.  Supportive Therapy conducted.  Follow-up daily while on inpatient unit    Electronically signed by LEAH ESCALANTE MD on 2/21/24 at 6:37 PM EST

## 2024-02-21 NOTE — PROGRESS NOTES
thrombocytopenia (HCC), CVA (cerebral vascular accident) (HCC), and HLD (hyperlipidemia).    Past Surgical History:   has a past surgical history that includes Coronary artery bypass graft and Coronary stent placement.    Restrictions  Restrictions/Precautions  Restrictions/Precautions: General Precautions, Fall Risk, Up as Tolerated  Required Braces or Orthoses?: Yes  Required Braces or Orthoses  Other:  (Helmet on when OOB)  Left Upper Extremity Brace/Splint: Resting hand  Position Activity Restriction  Other position/activity restrictions: Helmet on when OOB; Sling for transfers/mobility only     Subjective  Subjective  Subjective: pt plesant and agreeable for therapy this date, agreeable to was up in bed this date.  Pain: c/o pain in R UE this date and R hip this date but no formal rating given.       Objective  Cognition  Overall Orientation Status: Within Functional Limits  Orientation Level: Oriented X4    Cognition  Overall Cognitive Status: Exceptions  Arousal/Alertness: Delayed responses to stimuli  Following Commands: Follows one step commands with repetition  Attention Span:  (improved attention span this date)  Memory: Appears intact  Problem Solving: Assistance required to generate solutions;Assistance required to implement solutions;Assistance required to identify errors made;Assistance required to correct errors made;Decreased awareness of errors  Sequencing: Requires cues for some    Activities of Daily Living  Feeding  Assistance Level: Set-up  Skilled Clinical Factors: per pt report    Grooming/Oral Hygiene  Assistance Level: Set-up  Skilled Clinical Factors: setup assist for oral care, cues to locate items on L side of sink.    Upper Extremity Bathing  Assistance Level: Set-up  Skilled Clinical Factors: pt arnold CLEMENTS use of natacha technique this date propping LUE up in order to address L axilla. Assist required to was back this date    Lower Extremity Bathing  Assistance Level: Set-up  Skilled  tasks    Plan  Occupational Therapy Plan  Times Per Week: 900 minutes of OT/PT/SLP  Current Treatment Recommendations: Self-Care / ADL, Strengthening, ROM, Balance training, Functional mobility training, Endurance training, Wheelchair mobility training, Neuromuscular re-education, Cognitive reorientation, Pain management, Safety education & training, Patient/Caregiver education & training, Equipment evaluation, education, & procurement, Positioning, Home management training, Coordination training, Co-Treatment      OT Individual Minutes  OT Individual Minutes  Time In: 0800  Time Out: 0930  Minutes: 90     Electronically signed by LAZARO Dave on 2/21/24 at 3:51 PM EST

## 2024-02-21 NOTE — PROGRESS NOTES
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  Long response latency demo. T/o.       Recall: n/a    Organization: Fill in missing letters (given concrete category)- 48% accuracy (I), increasing to 100% c mod cues.  Frequent cueing required for Pt to attend to L side of handout for task.       Problem Solving/Reasoning: n/a    Speech: Pt presents with mild dysarthria characterized by imprecise articulation, blended word boundaries, rapid rate and reduced vocal intensity.  Pt required min (verbal cues) to facilitate strategies in conversation.  Speech judged to be ~95% intelligible in conversation.  Pt able to complete OMEs x6 sets in reps of 20 c mod cues.  Pt continues to demonstrate improved labial and lingual strength, coordination and ROM with exercises.  Reinforced this.     Other: Pt's wife present for session.  Shortened overall tx time d/t Pt toileting during session.       Plan:  [x] Continue ST services    [] Discharge from ST:      Discharge recommendations: [] Inpatient Rehab   [] Skilled Nursing Facility   [] Outpatient Therapy  [] Follow up at trauma clinic   [] Other:       Treatment completed by: Zofia Martinez M.A.,  CCC-SLP

## 2024-02-22 ENCOUNTER — APPOINTMENT (OUTPATIENT)
Dept: VASCULAR LAB | Age: 59
DRG: 056 | End: 2024-02-22
Attending: INTERNAL MEDICINE
Payer: COMMERCIAL

## 2024-02-22 PROBLEM — I82.5Y1 CHRONIC DEEP VEIN THROMBOSIS (DVT) OF PROXIMAL VEIN OF RIGHT LOWER EXTREMITY (HCC): Status: ACTIVE | Noted: 2024-02-22

## 2024-02-22 LAB
ECHO BSA: 1.95 M2
HAPTOGLOB SERPL-MCNC: 192 MG/DL (ref 30–200)
PLATELET # BLD AUTO: 53 K/UL (ref 150–450)

## 2024-02-22 PROCEDURE — 6370000000 HC RX 637 (ALT 250 FOR IP): Performed by: INTERNAL MEDICINE

## 2024-02-22 PROCEDURE — 97112 NEUROMUSCULAR REEDUCATION: CPT

## 2024-02-22 PROCEDURE — 94761 N-INVAS EAR/PLS OXIMETRY MLT: CPT

## 2024-02-22 PROCEDURE — 6370000000 HC RX 637 (ALT 250 FOR IP): Performed by: PSYCHIATRY & NEUROLOGY

## 2024-02-22 PROCEDURE — 6370000000 HC RX 637 (ALT 250 FOR IP): Performed by: PHYSICAL MEDICINE & REHABILITATION

## 2024-02-22 PROCEDURE — 36415 COLL VENOUS BLD VENIPUNCTURE: CPT

## 2024-02-22 PROCEDURE — 85049 AUTOMATED PLATELET COUNT: CPT

## 2024-02-22 PROCEDURE — 97535 SELF CARE MNGMENT TRAINING: CPT

## 2024-02-22 PROCEDURE — 97530 THERAPEUTIC ACTIVITIES: CPT

## 2024-02-22 PROCEDURE — 93971 EXTREMITY STUDY: CPT

## 2024-02-22 PROCEDURE — 97542 WHEELCHAIR MNGMENT TRAINING: CPT

## 2024-02-22 PROCEDURE — 6370000000 HC RX 637 (ALT 250 FOR IP): Performed by: STUDENT IN AN ORGANIZED HEALTH CARE EDUCATION/TRAINING PROGRAM

## 2024-02-22 PROCEDURE — 99232 SBSQ HOSP IP/OBS MODERATE 35: CPT | Performed by: INTERNAL MEDICINE

## 2024-02-22 PROCEDURE — 97110 THERAPEUTIC EXERCISES: CPT

## 2024-02-22 PROCEDURE — 93971 EXTREMITY STUDY: CPT | Performed by: SURGERY

## 2024-02-22 PROCEDURE — 92507 TX SP LANG VOICE COMM INDIV: CPT

## 2024-02-22 PROCEDURE — 94640 AIRWAY INHALATION TREATMENT: CPT

## 2024-02-22 PROCEDURE — 97129 THER IVNTJ 1ST 15 MIN: CPT

## 2024-02-22 PROCEDURE — 99233 SBSQ HOSP IP/OBS HIGH 50: CPT | Performed by: INTERNAL MEDICINE

## 2024-02-22 PROCEDURE — 1180000000 HC REHAB R&B

## 2024-02-22 PROCEDURE — 99233 SBSQ HOSP IP/OBS HIGH 50: CPT | Performed by: STUDENT IN AN ORGANIZED HEALTH CARE EDUCATION/TRAINING PROGRAM

## 2024-02-22 RX ORDER — IPRATROPIUM BROMIDE AND ALBUTEROL SULFATE 2.5; .5 MG/3ML; MG/3ML
3 SOLUTION RESPIRATORY (INHALATION) 2 TIMES DAILY
Qty: 180 ML | Refills: 0 | Status: CANCELLED | OUTPATIENT
Start: 2024-02-22

## 2024-02-22 RX ADMIN — ATORVASTATIN CALCIUM 80 MG: 80 TABLET, FILM COATED ORAL at 21:01

## 2024-02-22 RX ADMIN — IPRATROPIUM BROMIDE AND ALBUTEROL SULFATE 1 DOSE: 2.5; .5 SOLUTION RESPIRATORY (INHALATION) at 10:06

## 2024-02-22 RX ADMIN — TOPIRAMATE 25 MG: 25 TABLET, FILM COATED ORAL at 07:38

## 2024-02-22 RX ADMIN — GABAPENTIN 100 MG: 100 CAPSULE ORAL at 21:00

## 2024-02-22 RX ADMIN — LACOSAMIDE 100 MG: 100 TABLET, FILM COATED ORAL at 07:37

## 2024-02-22 RX ADMIN — CITALOPRAM HYDROBROMIDE 10 MG: 10 TABLET ORAL at 07:37

## 2024-02-22 RX ADMIN — Medication 30 MG: at 07:37

## 2024-02-22 RX ADMIN — SENNOSIDES 17.2 MG: 8.6 TABLET, FILM COATED ORAL at 21:01

## 2024-02-22 RX ADMIN — TOPIRAMATE 25 MG: 25 TABLET, FILM COATED ORAL at 21:01

## 2024-02-22 RX ADMIN — Medication 1 TABLET: at 07:37

## 2024-02-22 RX ADMIN — BACLOFEN 5 MG: 10 TABLET ORAL at 21:00

## 2024-02-22 RX ADMIN — ACETAMINOPHEN 650 MG: 325 TABLET, FILM COATED ORAL at 12:03

## 2024-02-22 RX ADMIN — AMITRIPTYLINE HYDROCHLORIDE 25 MG: 25 TABLET, FILM COATED ORAL at 21:01

## 2024-02-22 RX ADMIN — BACLOFEN 5 MG: 10 TABLET ORAL at 07:36

## 2024-02-22 RX ADMIN — DICLOFENAC SODIUM 2 G: 10 GEL TOPICAL at 07:37

## 2024-02-22 RX ADMIN — LACOSAMIDE 100 MG: 100 TABLET, FILM COATED ORAL at 21:01

## 2024-02-22 RX ADMIN — MODAFINIL 200 MG: 100 TABLET ORAL at 07:37

## 2024-02-22 RX ADMIN — GABAPENTIN 100 MG: 100 CAPSULE ORAL at 14:14

## 2024-02-22 RX ADMIN — GABAPENTIN 100 MG: 100 CAPSULE ORAL at 07:36

## 2024-02-22 RX ADMIN — DICLOFENAC SODIUM 2 G: 10 GEL TOPICAL at 21:06

## 2024-02-22 RX ADMIN — Medication 6 MG: at 21:01

## 2024-02-22 RX ADMIN — DOCUSATE SODIUM 283 MG: 283 LIQUID RECTAL at 17:25

## 2024-02-22 ASSESSMENT — PAIN DESCRIPTION - LOCATION: LOCATION: HEAD

## 2024-02-22 ASSESSMENT — PAIN SCALES - GENERAL: PAINLEVEL_OUTOF10: 8

## 2024-02-22 NOTE — PROGRESS NOTES
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  Long response latency demo. T/o.     Visual scanning task, claudio letters/numbers- Initially (x2 attempts) 50% accuracy (I), increasing to 100% c mod-max cues (visual and verbal -- cues to attend to task and attend to items on L side).  Pt easily distracted with other prompts on handout and items around room.      Following extensive education re: sustained attention strategies and L neglect strategies (including use pencil to guide vision, self-cue), Pt able to complete visual scanning task with 98% accuracy (I), increasing to 100% c min cues.       Recall: n/a    Organization: n/a    Problem Solving/Reasoning: n/a    Speech: Pt presents with mild dysarthria characterized by imprecise articulation, blended word boundaries, rapid rate and reduced vocal intensity.  Pt required min (verbal cues) to facilitate strategies in conversation and mod cues with articulation drills task (paragraph reading -- 7-9 sentences and story telling task).  Speech judged to be ~95% intelligible in conversation; 70% intelligible during paragraph reading task; 80% intelligible during story telling task.

## 2024-02-22 NOTE — PROGRESS NOTES
Writer received referral from patient's nurse regarding ACP; SC visit with patient's wife, Mara in Acute Rehab Day Room; wife tearful, overwhelmed, anxious, frustrated and worried as she provided medical update and waited on medical testing; wife also shared patient's medical history and chronic medical issues; walked with wife to patient's room; SC visit with patient and wife regarding ACP; explained and executed HCPOA and Living Will documents; patient tearful at times but engaging and coping; patient clearly stated he \"wanted a \"DNR\"; patient realistic and hopeful as he talked about his medical issues; patient worried about outcome of medical testing; listening presence and support; patient declined prayer, stating he \"doesn't pray\";  see writer's ACP note thi date;       02/21/24 1550   Encounter Summary   Encounter Overview/Reason  Advance Care Planning   Service Provided For: Patient and family together   Referral/Consult From: Family   Support System Spouse;Children;Family members   Last Encounter  02/21/24   Complexity of Encounter Moderate   Begin Time 1410   End Time  1550   Total Time Calculated 100 min   Grief, Loss, and Adjustments   Type Adjustment to illness   Assessment/Intervention/Outcome   Assessment Anxious;Coping;Fearful;Powerlessness;Sad;Tearful   Intervention Active listening;Discussed illness injury and it’s impact;Explored/Affirmed feelings, thoughts, concerns;Sustaining Presence/Ministry of presence   Outcome Comfort;Coping;Engaged in conversation;Expressed feelings, needs, and concerns;Expressed Gratitude;Receptive

## 2024-02-22 NOTE — PLAN OF CARE
Problem: Skin/Tissue Integrity  Goal: Absence of new skin breakdown  Description: 1.  Monitor for areas of redness and/or skin breakdown  2.  Assess vascular access sites hourly  3.  Every 4-6 hours minimum:  Change oxygen saturation probe site  4.  Every 4-6 hours:  If on nasal continuous positive airway pressure, respiratory therapy assess nares and determine need for appliance change or resting period.  2/22/2024 1120 by Sophie Shell, RN  Outcome: Progressing     Problem: Hematologic - Adult  Goal: Maintains hematologic stability  2/22/2024 1120 by Sophie Shell, RN  Outcome: Progressing     Problem: Respiratory - Adult  Goal: Achieves optimal ventilation and oxygenation  2/22/2024 1120 by Sophie Shell, RN  Outcome: Progressing

## 2024-02-22 NOTE — PROGRESS NOTES
Physical Therapy  Facility/Department: [facility] ACUTE REHAB  Treatment note    NAME: Brennen Mcclure  : 1965 (59 y.o.)  MRN: [id number]  CODE STATUS: DNR-CCA  Date of Service: 24    Past Medical History:   Diagnosis Date    CAD (coronary artery disease)     Chronic deep vein thrombosis (DVT) of both lower extremities (HCC)     Chronic idiopathic thrombocytopenia (HCC)     CVA (cerebral vascular accident) (HCC)     HLD (hyperlipidemia)      Past Surgical History:   Procedure Laterality Date    CORONARY ARTERY BYPASS GRAFT      CORONARY STENT PLACEMENT       Family / Caregiver Present: Yes (SO Mara PM)  General Comment  Comments: Pt appears fatigued on AM entry. Left in bed AM, recliner PM.    Restrictions:  Restrictions/Precautions: General Precautions;Fall Risk;Up as Tolerated  Required Braces or Orthoses  Other:  (Helmet on when OOB)  Left Upper Extremity Brace/Splint: Resting hand  Position Activity Restriction  Other position/activity restrictions: Helmet on when OOB; Sling for transfers/mobility only     SUBJECTIVE  Subjective: States he did not get shower this AM, performed a sink bath c OT instead. Spouse states on writer's PM entry that Pt was \"soaked\" in bed when she arrived (had been dry on writer's AM exit 2 hours before), necessitating brief, clothes, linen change c nursing, just completed. Pt subsequently states he needs to urinate. Assisted c doffing clothes to use urinal; upon Pt's stated completion, he continues to urinate as urinal is pulled away, spilling urine onto brief, absorbent pad. Pericare and clean brief provided; upon preparation for exit, Pt states he needs to have a BM. Nursing assistance sought to complete prior to exit.  Pain: Denies most pain today during exercises.    OBJECTIVE  Functional Mobility  Bed mobility  Bridging: Minimal assistance (L LE anchoring.)  Rolling to Left: Minimal assistance (Using family's rolling cushion for LEs; positioning L LE.)  Rolling to Right: Maximum  maintain focus on tasks.)    GOALS  Short Term Goals  Time Frame for Short Term Goals: 10 days  Short Term Goal 1: Pt to demo bed mobility with ModA  Short Term Goal 2: Pt to Demo functional transfers with unilateral device and ModA  Short Term Goal 3: Pt to ambulate at least 10-15' with unilateral device using supportive equipment as needed and ModA  Short Term Goal 4: Pt to improve static/dynamic sitting & standing balance to Fair - or better to dec risk for falls during ADL participation  Long Term Goals  Time Frame for Long Term Goals : Until DC  Long Term Goal 1: Pt to demo bed mobility with adjustable HOB and Norm/CGA to dec burden of care  Long Term Goal 2: Pt to Demo functional transfers with unilateral device and Norm/CGA  Long Term Goal 3: Pt to ambulate household distances with unilateral device and  Min-ModAx1  Long Term Goal 4: pt to progress to step negotiation x3-4 steps using UE support, supportive lift equipment and 2A  Long Term Goal 5: Pt to improve static/dynamic sitting & standing balance to Fair or better to dec risk for falls during ADL participation  Additional Goals?: Yes  Long term goal 6: Pt to demo actual or simulated car transfer using device and Norm  Long term goal 7: Pt to demo WC propulsion of household distances with R/L turns and SBA.  Long term goal 8: Pt and family to demonstrate satisfactory performance/assistance for desired transfer and mobility tasks for a safe DC home.    PLAN OF CARE  Frequency: 1-2 treatment sessions per day, 5-7 days per week  Physical Therapy Plan  General Plan: Other (See Comment) (900 minutes of combined PT/OT/ST d/t pt's need for 2 skilled therapists safely and therapeutically perform desired functional tasks)  Specific Instructions for Next Treatment: FES; Sitting/standing balance and midline orienting, pre-gait activity progressing to transfers/Amb with hemiwalker.  Current Treatment Recommendations: Strengthening;ROM;Balance training;Functional

## 2024-02-22 NOTE — PROGRESS NOTES
[facility]   Acute Rehabilitation Occupational Therapy Daily Treatment Note    Date: 24  Patient Name: Brennen Mcclure       Room: 2633/2633-01  MRN: [id number]  Account: [id number]   : 1965  (59 y.o.) Gender: male       Referring Practitioner: Danica De La Paz MD  Diagnosis: Hemiplegia following ntrm intcrbl hemor aff left dominant side  Additional Pertinent Hx: 59 year old male who admitted 23 with 4 days of new L arm weakness and headache. He had known history of MI and CABG - on dabigatran but with inconsistent use due to prescription running out. He had previous treatment for L ICA thrombus and R sided symptoms treated with “clot busting medication” in . 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 He was transferred from [place], [place] to [facility] for medical management and was admitted to neuro critical care. His LUE numbness/weakness worsened to near complete hemiparesis. Initial GCS 15. He was initially treated with heparin gtt for DVST. On 23 Dr. Jameson Parsons (neurosurgery) performed R sided decompressive hemicraniectomy with evacuation of IPH x2 sites with dural repair. He was stable post op on heparin drip and extubated 23 after stable head CT 23. He was then bridged to warfarin. He is currently being treated with warfarin with therapeutic INR since 23. Hb stable at 13.6 on 24. Thrombocytopenia noted with platelet count 30 - hematology being reconsulted - previously recommended count >40. Currently requiring timed voids for incontinence. PM&R anticipating likely wheelchair mobility with Naveen for transfers.    Treatment Diagnosis: Impaired self-care status    Past Medical History:  has a past medical history of CAD (coronary artery disease), Chronic deep vein thrombosis (DVT) of both lower extremities (HCC), Chronic idiopathic  and mobility  Long Term Goal 7: Pt will complete self-care with improved FMC and  strength AEB 10 second and 10# improvemenet on 9HPT and dynamometer  Long Term Goal 8: Pt will complete self-feeding and oral hygiene with Mod I using AE/DME/Modified techniques as needed  Long Term Goal 9: Pt will identify 3 positive leisure activities to increased overall quality of life  Long Term Goal 10: Pt will verbalize/demonstrate good understanding of 3 positive coping strategies to manage emotions/anxiety to promote maximal participation in self-care and functional tasks    Plan  Occupational Therapy Plan  Times Per Week: 900 minutes of OT/PT/SLP  Current Treatment Recommendations: Self-Care / ADL, Strengthening, ROM, Balance training, Functional mobility training, Endurance training, Wheelchair mobility training, Neuromuscular re-education, Cognitive reorientation, Pain management, Safety education & training, Patient/Caregiver education & training, Equipment evaluation, education, & procurement, Positioning, Home management training, Coordination training, Co-Treatment       02/22/24 0800   OT Individual Minutes   Time In 0800   Time Out 0932   Minutes 92         Electronically signed by FORTINO Dave on 2/22/24 at 2:20 PM EST

## 2024-02-22 NOTE — PROGRESS NOTES
performed    Restrictions:  Restrictions/Precautions: General Precautions;Fall Risk;Up as Tolerated  Required Braces or Orthoses  Other:  (Helmet on when OOB)  Left Upper Extremity Brace/Splint: Resting hand  Position Activity Restriction  Other position/activity restrictions: Helmet on when OOB; Sling for transfers/mobility only     SUBJECTIVE  Subjective: Pt states he did  not get his morning nap and was very fatigued in AM.  OBJECTIVE   Functional Mobility  Bed mobility  Sit to Supine: Minimal assistance  Bed Mobility Comments: Flat bed except where noted, rails used PRN  Transfers  Sit to Stand: Maximum Assistance  Stand to Sit: Maximum Assistance  Squat Pivot Transfers: Moderate Assistance  Comment: No device used today. Pt completing transfers to L side better than to R side this date    Environmental Mobility  Stairs/Curb  Stairs?: No  Propulsion 1  Propulsion: Manual  Level: Level Tile  Method: RUE;RLE  Level of Assistance: Minimal assistance  Description/ Details: slow paced, requires cues to navigate obstacles, wide turns- requires assistance, assistance to maintain straight path.  Distance: 276' x2 in AM; x1 in PM  Propulsion 2  Propulsion 2: Manual  Level 2: Ramp  Method 2: RUE;RLE  Level of Assistance 2: Minimal assistance  Description/ Details 2: pt ascended ramp going backwards  Distance 2: 30ft x2    PT Exercises  Resistive Exercises: Seated R LE ex's x20 with #2 and LGTB  Functional Mobility Circuit Training: Bed mobility, 2x (getting back to bed post AM and PM sessions),  Squat pivot transfers, 3x to Pt's R and L side in AM, x 5 in PM to both sides  Static Sitting Balance Exercises: dangling edge of mat ~8 minutes total between transfers. cues to maintain midline orientation  Dynamic Sitting Balance Exercises: Dangling edge of mat ~20 minutes- working on midline orientation with good return. Dynamic steated balance reaching outside RJ with cones and balloon tap. Pt very fatigued in  AM    ASSESSMENT       Activity Tolerance  Activity Tolerance: Patient tolerated treatment well;Patient limited by fatigue    Assessment  Treatment Diagnosis: Impaired balance, strength, and mobility  Discharge Recommendations: Therapy recommended at discharge  PT D/C Equipment  Equipment Needed:  (CTA)    GOALS  Patient Goals   Patient Goals : Return home with family support  Short Term Goals  Time Frame for Short Term Goals: 10 days  Short Term Goal 1: Pt to demo bed mobility with ModA  Short Term Goal 2: Pt to Demo functional transfers with unilateral device and ModA  Short Term Goal 3: Pt to ambulate at least 10-15' with unilateral device using supportive equipment as needed and ModA  Short Term Goal 4: Pt to improve static/dynamic sitting & standing balance to Fair - or better to dec risk for falls during ADL participation  Long Term Goals  Time Frame for Long Term Goals : Until DC  Long Term Goal 1: Pt to demo bed mobility with adjustable HOB and Norm/CGA to dec burden of care  Long Term Goal 2: Pt to Demo functional transfers with unilateral device and Norm/CGA  Long Term Goal 3: Pt to ambulate household distances with unilateral device and  Min-ModAx1  Long Term Goal 4: pt to progress to step negotiation x3-4 steps using UE support, supportive lift equipment and 2A  Long Term Goal 5: Pt to improve static/dynamic sitting & standing balance to Fair or better to dec risk for falls during ADL participation  Additional Goals?: Yes  Long term goal 6: Pt to demo actual or simulated car transfer using device and Norm  Long term goal 7: Pt to demo WC propulsion of household distances with R/L turns and SBA.  Long term goal 8: Pt and family to demonstrate satisfactory performance/assistance for desired transfer and mobility tasks for a safe DC home.    PLAN OF CARE  Physical Therapy Plan  General Plan: Other (See Comment) (900 minutes of combined PT/OT/ST d/t pt's need for 2 skilled therapists to safely and

## 2024-02-22 NOTE — PROGRESS NOTES
[facility]   IN-PATIENT SERVICE   [facility]    Consult note            Date:   2/22/2024  Patient name:  Brennen Mcclure  Date of admission:  1/27/2024  3:01 PM  MRN:   [id number]  Account:  [id number]  YOB: 1965  PCP:    No primary care provider on file.  Room:   [address] [place] [address]  Code Status:    DNR-CCA    Chief Complaint:     No chief complaint on file.      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, started on heparin drip also had patchy IPH, complicated by expansion of intraparenchymal hemorrhage with mass effect, s/p craniectomy, and evacuation of IPH x 2 with dural repair last CT head on on 1/24 showed evolution of the hemorrhage with reduction in the mass effect, Hospital course further complicated by ITP, was seen by hematology oncology, given IVIG and platelet transfusion,, started on Vimpat for seizure prevention, had cellulitis on Celexa  Patient was seen by hematology oncology over there and was started on Eliquis 2.5 twice daily  Patient was drowsy but arousable on my encounter,  Family was present at the bedside    Denies any chest pain shortness of breath        Past Medical History:     Past Medical History:   Diagnosis Date    CAD (coronary artery disease)     Chronic deep vein thrombosis (DVT) of both lower extremities (HCC)     Chronic idiopathic thrombocytopenia (HCC)     CVA (cerebral vascular accident) (HCC)     HLD (hyperlipidemia)         Past Surgical History:     Past Surgical History:   Procedure Laterality Date    CORONARY ARTERY BYPASS GRAFT      CORONARY STENT PLACEMENT          Medications Prior to Admission:     Prior to Admission  bedtime 2/19/24  Yes Danica De La Paz MD   Benzocaine-Menthol (CEPACOL) 6-10 MG LOZG lozenge Take 1 lozenge by mouth every 2 hours as needed for Sore Throat 2/18/24  Yes Danica De aL Paz MD   Multiple Vitamins-Minerals (THERAPEUTIC MULTIVITAMIN-MINERALS) tablet Take 1 tablet by mouth daily 2/19/24  Yes Danica De La Paz MD   baclofen (LIORESAL) 5 MG tablet Take 1 tablet by mouth 2 times daily 2/19/24  Yes Danica De La Paz MD   albuterol (PROVENTIL) (2.5 MG/3ML) 0.083% nebulizer solution Take 3 mLs by nebulization every 6 hours as needed for Wheezing 2/18/24  Yes Danica De La Paz MD   ipratropium 0.5 mg-albuterol 2.5 mg (DUONEB) 0.5-2.5 (3) MG/3ML SOLN nebulizer solution Inhale 3 mLs into the lungs three times daily 2/19/24  Yes Danica De La Paz MD   modafinil (PROVIGIL) 200 MG tablet Take 1 tablet by mouth daily for 30 days. Max Daily Amount: 200 mg 2/19/24 3/20/24 Yes Danica De La Paz MD        Allergies:     Patient has no known allergies.    Social History:     Tobacco:    reports that he quit smoking about 3 months ago. His smoking use included cigarettes. He has never used smokeless tobacco.  Alcohol:      has no history on file for alcohol use.  Drug Use:  has no history on file for drug use.    Family History:     Family History   Problem Relation Age of Onset    Coronary Art Dis Mother     Stroke Other        Review of Systems:     Positive and Negative as described in HPI.    CONSTITUTIONAL:  negative for fevers, chills, sweats, fatigue, weight loss  HEENT:  negative for vision, hearing changes, runny nose, throat pain  RESPIRATORY:  negative for shortness of breath, cough, congestion, wheezing.  CARDIOVASCULAR:  negative for chest pain, palpitations.  GASTROINTESTINAL:  negative for nausea, vomiting, diarrhea, constipation, change in bowel habits, abdominal pain   GENITOURINARY:  negative for difficulty of urination, burning with urination, frequency   INTEGUMENT:  negative for rash, skin

## 2024-02-22 NOTE — PROGRESS NOTES
Today's Date: 2/21/2024  Patient Name: Brennen Mcclure  Date of admission: 1/27/2024  3:01 PM  Patient's age: 59 y.o., 1965  Admission Dx: 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 (HCC) [I69.152]    Reason for Consult:  Low platelets   Requesting Physician: Dipika Angulo MD    CHIEF COMPLAINT:  No chief complaint on file.      History Obtained From:  patient and chart  INTERVAL HISTORY:    Patient seen and examined  No bleeding  Platelet trending down and no response on dexamethasone    HISTORY OF PRESENT ILLNESS:    Brennen Mcclure is a 59 y.o. male who is admitted to the hospital on 1/27/2024  for acute rehab after recent hospitalization for acute stroke.    Patient had recent hospitalization for left hemiparesis secondary to hemorrhagic CVA and subsequently discharged to acute rehab at [facility].  Patient has history of dural venous sinus thrombosis and also history of DVT and was chronically maintained on Eliquis.    Patient currently is on Eliquis and now noted to have thrombocytopenia which is progressively getting worse therefore hematology was consulted.  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  Patient had a evaluation at [facility] back in 2020 as well and as per the chart.    Past Medical History:   has a past medical history of CAD (coronary artery disease), Chronic deep vein thrombosis (DVT) of both lower extremities (HCC), Chronic idiopathic thrombocytopenia (HCC), CVA (cerebral vascular accident) (HCC), and HLD (hyperlipidemia).    Past Surgical History:   has a past surgical history that includes Coronary artery bypass graft and Coronary stent placement.     Medications:    Prior to Admission medications    Medication Sig Start Date End Date Taking? Authorizing Provider   aspirin 81 MG chewable tablet Take 1 tablet by mouth daily  clubbing or cyanosis   Skin - normal coloration and turgor, no rashes, no suspicious skin lesions noted ,    DATA:    Labs:   CBC:   Recent Labs     02/20/24  0730 02/21/24  0726   WBC 5.3 5.4   HGB 12.5* 12.4*   HCT 38.0* 37.9*   PLT 39* 36*       BMP:   No results for input(s): \"NA\", \"K\", \"CO2\", \"BUN\", \"CREATININE\", \"LABGLOM\", \"GLUCOSE\" in the last 72 hours.    PT/INR: No results for input(s): \"PROTIME\", \"INR\" in the last 72 hours.    IMAGING DATA:  US ABDOMEN LIMITED Specify organ? LIVER, SPLEEN   Final Result   Cholelithiasis.  No cholecystitis.      Heterogeneous echotexture throughout the liver, compatible with diffuse   hepatocellular disease such as fatty infiltration         CT HEAD WO CONTRAST   Final Result   There is now appropriate convexity of the right craniectomy site.  This may   reflect interval change in differential atmospheric versus intracranial   pressure.         CT HEAD WO CONTRAST   Final Result   Stable CT without acute abnormality.         XR CHEST PORTABLE   Final Result   No acute cardiopulmonary process.         CT HEAD WO CONTRAST   Final Result   No acute intracranial abnormality. Right cerebral convexity encephalomalacia.             Primary Problem  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 (HCC)    Active Hospital Problems    Diagnosis Date Noted    Chronic idiopathic thrombocytopenic purpura (HCC) [D69.3] 02/18/2024    Seizure (HCC) [R56.9] 02/18/2024    Dural venous sinus thrombosis [G08] 02/18/2024    Thrombocytopenia (HCC) [D69.6] 02/12/2024    Anemia [D64.9] 02/12/2024    Intractable headache [R51.9] 01/31/2024    Intracranial hemorrhage (HCC) [I62.9] 01/31/2024    Pseudobulbar affect [F48.2] 01/30/2024    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 (HCC) [I69.152] 01/27/2024         IMPRESSION:   Recent acute hemorrhagic stroke  History of dural venous sinus thrombosis  History of DVT  Seizure   Thrombocytopenia  New onset

## 2024-02-22 NOTE — ACP (ADVANCE CARE PLANNING)
Advance Care Planning     Advance Care Planning Inpatient Note  [facility]    Today's Date: 2/21/2024  Unit: [facility]    Received request from family and Other: nurse .  Upon review of chart and communication with care team, patient's decision making abilities are not in question.. Patient and Spouse was/were present in the room during visit.    Goals of ACP Conversation:  Discuss advance care planning documents  Facilitate a discussion related to patient's goals of care as they align with the patient's values and beliefs.    Health Care Decision Makers:       Primary Decision Maker: Mara Mcclure - Spouse - [phone]    Secondary Decision Maker: Keturah Grayson - Brother/Sister - [phone]  Summary:  Completed New Documents  Verified Healthcare Decision Maker  Updated Healthcare Decision Maker    Advance Care Planning Documents (Patient Wishes):  Healthcare Power of /Advance Directive Appointment of Health Care Agent  Living Will/Advance Directive     Assessment:  PATIENT CLEARLY EXPLAINED THAT:  1) HE DOES NOT WANT A FEEDING TUBE; 2) HE DOES NOT WANT TO BE ON A VENTILATOR UNDER ANY CIRCUMSTANCES; AND 3) HE DOES NOT WANT CPR.  PATIENT ASKED FOR A \"DNR\".  Facilitated communication between patient and his nurse in this regard.      Interventions:  Provided education on documents for clarity and greater understanding  Discussed and provided education on state decision maker hierarchy  Assisted in the completion of documents according to patient's wishes at this time  Encouraged ongoing ACP conversation with future decision makers and loved ones    Care Preferences Communicated:     Hospitalization:  If the patient's health worsens and it becomes clear that the chance of recovery is unlikely,     the patient wants hospitalization.    Ventilation:   If the patient, in their present state of health, suddenly became very ill and unable to breathe on their own,     the patient would  NOT desire the use of a ventilator (breathing machine).    If their health worsens and it becomes clear that the change of recovery is unlikely,     the patient would NOT desire the use of a ventilator (breathing machine).    Resuscitation:  In the event the patient's heart stopped as a result of an underlying serious health condition, the patient communicates a preference for      a natural death (no CPR).    Outcomes/Plan:  ACP Discussion: Completed  New advance directive completed.  Returned original document(s) to patient, as well as copies for distribution to appointed agents  Copy of advance directive given to staff to scan into medical record.    Electronically signed by Chaplain Reilly on 2/21/2024 at 7:09 PM

## 2024-02-22 NOTE — PROGRESS NOTES
Per Yair EITENNE the patients preliminary results show right leg scan is worse than patients previous scan on  11/23/23 from [place]. Has clot in Common femoral, superficial femoral, popliteal and Deep calf muscle none are patent compared to previous study.     Leg leg is negative for clot      Writer called  regarding patients Dopplers of leg. No response. Message left to call back RN.    Dr. Mart notified and will talk to Dr. Armstrong personally and get back to writer.    Dr. Angulo called but no response. Perfect serve sent to call back writer asap.      Copy of preliminary results are place in patients chart.      HOLD THERAPY PER DR. MART

## 2024-02-22 NOTE — CONSULTS
Division of Vascular Surgery          Consult received and case discussed with Dr. Angulo.  Patient had DVT in [place] last November, reports available in care everywhere.  Patient had follow up scan done today which I reviewed myself and reveals chronic changes and non-occlusive thrombus in the right femoral, popliteal and gastrocnemius veins.  Patient has been having issues with intraparenchymal hemorrhage, hematology is on board for thrombocytopenia.  At this time I would not recommend anticoagulation given issues with bleeding and thrombocytopenia.  Thrombus is chronic and non-occlusive correlates with thrombus location from before and resolving.  I do not recommend IVC filter, no reduction in mortality.  I would be concerned about caval thrombosis given clinical state.  D-dimer is elevated to 5, but do not suspect it is related to her chronic DVT in right leg.  Ok to work with therapy, sequential compression devices will all help reduce risk of DVT.  If patient develops new onset unilateral swelling then can re-evaluate with repeat duplex.  No acute vascular surgical intervention, please call if there are any questions or concerns.    Electronically signed by Tavia De Jesus MD on 2/22/24 at 6:01 PM [facility]  O: [phone]  C: [phone]  Email: [email or web address]

## 2024-02-22 NOTE — PROGRESS NOTES
Physical Medicine & Rehabilitation  Progress Note      Subjective:      Brennen Mcclure is a 59 y.o. male with hemorrhagic CVA and left nondominant hemiparesis.    He reports doing okay today.  He notes ongoing headache, which seems to be more persistent and not improving with fioricet.  Discussed re-consulting neurology.  His wife also notes that he seems to be more fatigued the last couple of days.  Discussed continuing to hold discharge at this time.  He denies any other acute concerns.    Later in the day, bilateral lower limb venous duplex was completed with findings of age-indeterminate extensive thrombus in the right lower limb on preliminary report.  Discussed with Dr. Hassan who had spoken with Dr. Armstrong - vascular surgery consult placed.  Discussed case with Dr. De Jesus, who reviewed venous duplex and found it to be consistent with previous bilateral lower limb venous duplex in 11/2023 in [place].  Final report now reads chronic non-occlusive thrombus in right gastrocnemius, popliteal, femoral, and common femoral veins.  Discussed with patient and family.    ROS:  Denies fevers, chills, sweats.  No chest pain, palpitations, lightheadedness.  Denies coughing, wheezing or shortness of breath.  Denies abdominal pain, nausea, diarrhea or constipation.  No new areas of joint pain.  Denies new areas of numbness or weakness.  Denies new anxiety or depression issues.  No new skin problems.    Rehabilitation:     Physical Therapy    Restrictions/Precautions: General Precautions, Fall Risk, Up as Tolerated  Other position/activity restrictions: Helmet on when OOB; Sling for transfers/mobility only  Required Braces or Orthoses  Other:  (Helmet on when OOB)  Left Upper Extremity Brace/Splint: Resting hand    Bed mobility  Bridging: Minimal assistance (L LE anchoring.)  Rolling to Left: Minimal assistance (Using family's rolling cushion for LEs; positioning L LE.)  Rolling to Right: Maximum assistance (Using family's  liquid 30 mg, 30 mg, Oral, Daily  topiramate (TOPAMAX) tablet 25 mg, 25 mg, Oral, BID  amitriptyline (ELAVIL) tablet 25 mg, 25 mg, Oral, Nightly  butalbital-APAP-caffeine -40 MG per capsule 1 capsule, 1 capsule, Oral, Daily PRN  polyethylene glycol (GLYCOLAX) packet 17 g, 17 g, Oral, Daily PRN  baclofen (LIORESAL) tablet 5 mg, 5 mg, Oral, BID  Benzocaine-Menthol (CEPACOL) 1 lozenge, 1 lozenge, Oral, Q2H PRN  diclofenac sodium (VOLTAREN) 1 % gel 2 g, 2 g, Topical, BID  acetaminophen (TYLENOL) tablet 650 mg, 650 mg, Oral, Q4H PRN  bisacodyl (DULCOLAX) suppository 10 mg, 10 mg, Rectal, Daily PRN  citalopram (CELEXA) tablet 10 mg, 10 mg, Oral, Daily  ferrous sulfate (IRON 325) tablet 325 mg, 325 mg, Oral, Every Other Day  gabapentin (NEURONTIN) capsule 100 mg, 100 mg, Oral, TID  lacosamide (VIMPAT) tablet 100 mg, 100 mg, Oral, BID  lidocaine 4 % external patch 1 patch, 1 patch, TransDERmal, Daily PRN  [Held by provider] aspirin chewable tablet 81 mg, 81 mg, Oral, Daily  atorvastatin (LIPITOR) tablet 80 mg, 80 mg, Oral, Nightly  melatonin tablet 6 mg, 6 mg, Oral, Nightly  modafinil (PROVIGIL) tablet 200 mg, 200 mg, Oral, Daily  therapeutic multivitamin-minerals 1 tablet, 1 tablet, Oral, Daily  senna (SENOKOT) tablet 17.2 mg, 2 tablet, Oral, Nightly    Objective:  /71   Pulse 71   Temp 97.9 °F (36.6 °C)   Resp 16   Ht 1.778 m (5' 10\")   Wt 77.1 kg (170 lb)   SpO2 95%   BMI 24.39 kg/m²       GEN: Well developed, well nourished, no acute distress  HEENT: Right craniectomy defect present.  EOM grossly intact.  Hearing grossly intact.  Mucous membranes pink and moist.  RESP: Normal breath sounds with no wheezing, rales, or rhonchi. Respirations WNL and unlabored.  CV: Regular rate and rhythm. No murmurs, rubs, or gallops.  ABD: Soft, non-distended, bowel sounds present and equal.  NEURO: Alert.  Speech fluent.  Sensation to light touch intact.  MSK: Muscle bulk is normal bilaterally. No movement noted in

## 2024-02-23 LAB
ANION GAP SERPL CALCULATED.3IONS-SCNC: 13 MMOL/L (ref 9–17)
BASOPHILS # BLD: 0 K/UL (ref 0–0.2)
BASOPHILS NFR BLD: 1 % (ref 0–2)
BUN SERPL-MCNC: 24 MG/DL (ref 8–23)
CALCIUM SERPL-MCNC: 9.5 MG/DL (ref 8.6–10.4)
CHLORIDE SERPL-SCNC: 105 MMOL/L (ref 98–107)
CO2 SERPL-SCNC: 24 MMOL/L (ref 20–31)
CREAT SERPL-MCNC: 0.8 MG/DL (ref 0.7–1.2)
EOSINOPHIL # BLD: 0.3 K/UL (ref 0–0.4)
EOSINOPHILS RELATIVE PERCENT: 3 % (ref 0–4)
ERYTHROCYTE [DISTWIDTH] IN BLOOD BY AUTOMATED COUNT: 23 % (ref 11.5–14.9)
GFR SERPL CREATININE-BSD FRML MDRD: >60 ML/MIN/1.73M2
GLUCOSE SERPL-MCNC: 116 MG/DL (ref 70–99)
HCT VFR BLD AUTO: 43.4 % (ref 41–53)
HGB BLD-MCNC: 14.1 G/DL (ref 13.5–17.5)
LYMPHOCYTES NFR BLD: 1.3 K/UL (ref 1–4.8)
LYMPHOCYTES RELATIVE PERCENT: 17 % (ref 24–44)
MCH RBC QN AUTO: 28.6 PG (ref 26–34)
MCHC RBC AUTO-ENTMCNC: 32.6 G/DL (ref 31–37)
MCV RBC AUTO: 87.7 FL (ref 80–100)
MONOCYTES NFR BLD: 0.6 K/UL (ref 0.1–1.3)
MONOCYTES NFR BLD: 7 % (ref 1–7)
NEUTROPHILS NFR BLD: 72 % (ref 36–66)
NEUTS SEG NFR BLD: 5.8 K/UL (ref 1.3–9.1)
PLATELET # BLD AUTO: 63 K/UL (ref 150–450)
PMV BLD AUTO: 9.9 FL (ref 6–12)
POTASSIUM SERPL-SCNC: 3.8 MMOL/L (ref 3.7–5.3)
RBC # BLD AUTO: 4.95 M/UL (ref 4.5–5.9)
SODIUM SERPL-SCNC: 142 MMOL/L (ref 135–144)
WBC OTHER # BLD: 8 K/UL (ref 3.5–11)

## 2024-02-23 PROCEDURE — 97112 NEUROMUSCULAR REEDUCATION: CPT

## 2024-02-23 PROCEDURE — 99232 SBSQ HOSP IP/OBS MODERATE 35: CPT | Performed by: INTERNAL MEDICINE

## 2024-02-23 PROCEDURE — 99232 SBSQ HOSP IP/OBS MODERATE 35: CPT | Performed by: STUDENT IN AN ORGANIZED HEALTH CARE EDUCATION/TRAINING PROGRAM

## 2024-02-23 PROCEDURE — 97129 THER IVNTJ 1ST 15 MIN: CPT

## 2024-02-23 PROCEDURE — 97535 SELF CARE MNGMENT TRAINING: CPT

## 2024-02-23 PROCEDURE — 99254 IP/OBS CNSLTJ NEW/EST MOD 60: CPT | Performed by: PSYCHIATRY & NEUROLOGY

## 2024-02-23 PROCEDURE — 94760 N-INVAS EAR/PLS OXIMETRY 1: CPT

## 2024-02-23 PROCEDURE — 36415 COLL VENOUS BLD VENIPUNCTURE: CPT

## 2024-02-23 PROCEDURE — 6370000000 HC RX 637 (ALT 250 FOR IP): Performed by: PHYSICAL MEDICINE & REHABILITATION

## 2024-02-23 PROCEDURE — 1180000000 HC REHAB R&B

## 2024-02-23 PROCEDURE — 94640 AIRWAY INHALATION TREATMENT: CPT

## 2024-02-23 PROCEDURE — 97530 THERAPEUTIC ACTIVITIES: CPT

## 2024-02-23 PROCEDURE — 85025 COMPLETE CBC W/AUTO DIFF WBC: CPT

## 2024-02-23 PROCEDURE — 6370000000 HC RX 637 (ALT 250 FOR IP): Performed by: PSYCHIATRY & NEUROLOGY

## 2024-02-23 PROCEDURE — 97542 WHEELCHAIR MNGMENT TRAINING: CPT

## 2024-02-23 PROCEDURE — 92526 ORAL FUNCTION THERAPY: CPT

## 2024-02-23 PROCEDURE — 80048 BASIC METABOLIC PNL TOTAL CA: CPT

## 2024-02-23 PROCEDURE — 6370000000 HC RX 637 (ALT 250 FOR IP): Performed by: STUDENT IN AN ORGANIZED HEALTH CARE EDUCATION/TRAINING PROGRAM

## 2024-02-23 PROCEDURE — 6370000000 HC RX 637 (ALT 250 FOR IP): Performed by: INTERNAL MEDICINE

## 2024-02-23 RX ORDER — AMITRIPTYLINE HYDROCHLORIDE 50 MG/1
50 TABLET, FILM COATED ORAL NIGHTLY
Status: DISCONTINUED | OUTPATIENT
Start: 2024-02-23 | End: 2024-02-26 | Stop reason: HOSPADM

## 2024-02-23 RX ORDER — TOPIRAMATE 50 MG/1
50 TABLET, FILM COATED ORAL 2 TIMES DAILY
Status: DISCONTINUED | OUTPATIENT
Start: 2024-02-23 | End: 2024-02-26 | Stop reason: HOSPADM

## 2024-02-23 RX ORDER — IBUPROFEN 400 MG/1
400 TABLET ORAL ONCE
Status: COMPLETED | OUTPATIENT
Start: 2024-02-23 | End: 2024-02-23

## 2024-02-23 RX ORDER — KETOROLAC TROMETHAMINE 10 MG/1
15 TABLET, FILM COATED ORAL ONCE
Status: DISCONTINUED | OUTPATIENT
Start: 2024-02-23 | End: 2024-02-23 | Stop reason: CLARIF

## 2024-02-23 RX ADMIN — IBUPROFEN 400 MG: 400 TABLET, FILM COATED ORAL at 23:18

## 2024-02-23 RX ADMIN — AMITRIPTYLINE HYDROCHLORIDE 50 MG: 50 TABLET, FILM COATED ORAL at 21:59

## 2024-02-23 RX ADMIN — BACLOFEN 5 MG: 10 TABLET ORAL at 21:47

## 2024-02-23 RX ADMIN — FERROUS SULFATE TAB 325 MG (65 MG ELEMENTAL FE) 325 MG: 325 (65 FE) TAB at 07:58

## 2024-02-23 RX ADMIN — TOPIRAMATE 25 MG: 25 TABLET, FILM COATED ORAL at 07:59

## 2024-02-23 RX ADMIN — DICLOFENAC SODIUM 2 G: 10 GEL TOPICAL at 08:03

## 2024-02-23 RX ADMIN — LACOSAMIDE 100 MG: 100 TABLET, FILM COATED ORAL at 21:46

## 2024-02-23 RX ADMIN — GABAPENTIN 100 MG: 100 CAPSULE ORAL at 07:57

## 2024-02-23 RX ADMIN — Medication 6 MG: at 21:47

## 2024-02-23 RX ADMIN — MODAFINIL 200 MG: 100 TABLET ORAL at 07:58

## 2024-02-23 RX ADMIN — Medication 30 MG: at 07:57

## 2024-02-23 RX ADMIN — CITALOPRAM HYDROBROMIDE 10 MG: 10 TABLET ORAL at 07:57

## 2024-02-23 RX ADMIN — DICLOFENAC SODIUM 2 G: 10 GEL TOPICAL at 22:01

## 2024-02-23 RX ADMIN — ATORVASTATIN CALCIUM 80 MG: 80 TABLET, FILM COATED ORAL at 21:47

## 2024-02-23 RX ADMIN — TOPIRAMATE 50 MG: 50 TABLET, FILM COATED ORAL at 22:00

## 2024-02-23 RX ADMIN — GABAPENTIN 100 MG: 100 CAPSULE ORAL at 13:43

## 2024-02-23 RX ADMIN — BACLOFEN 5 MG: 10 TABLET ORAL at 07:57

## 2024-02-23 RX ADMIN — GABAPENTIN 100 MG: 100 CAPSULE ORAL at 21:47

## 2024-02-23 RX ADMIN — SENNOSIDES 17.2 MG: 8.6 TABLET, FILM COATED ORAL at 21:47

## 2024-02-23 RX ADMIN — BUTALBITA,ACETAMINOPHEN AND CAFFEINE 1 CAPSULE: 50; 300; 40 CAPSULE ORAL at 16:57

## 2024-02-23 RX ADMIN — IPRATROPIUM BROMIDE AND ALBUTEROL SULFATE 1 DOSE: 2.5; .5 SOLUTION RESPIRATORY (INHALATION) at 08:24

## 2024-02-23 RX ADMIN — LACOSAMIDE 100 MG: 100 TABLET, FILM COATED ORAL at 07:58

## 2024-02-23 ASSESSMENT — PAIN SCALES - GENERAL
PAINLEVEL_OUTOF10: 8
PAINLEVEL_OUTOF10: 8

## 2024-02-23 NOTE — PROGRESS NOTES
Physical Medicine & Rehabilitation  Progress Note      Subjective:      Brennen Mcclure is a 59 y.o. male with hemorrhagic CVA and left nondominant hemiparesis.    He reports doing okay today.  He continues to report headache - neurology adjusted medications.  He also notes ongoing fatigue.  He denies any other acute concerns.    ROS:  Denies fevers, chills, sweats.  No chest pain, palpitations, lightheadedness.  Denies coughing, wheezing or shortness of breath.  Denies abdominal pain, nausea, diarrhea or constipation.  No new areas of joint pain.  Denies new areas of numbness or weakness.  Denies new anxiety or depression issues.  No new skin problems.    Rehabilitation:     Physical Therapy    Restrictions/Precautions: General Precautions, Fall Risk, Up as Tolerated  Other position/activity restrictions: Helmet on when OOB; Sling for transfers/mobility only  Required Braces or Orthoses  Other:  (Helmet on when OOB)  Left Upper Extremity Brace/Splint: Resting hand (Pt's wife purchased Giv Jovon sling.)    Bed mobility  Bridging: Minimal assistance (L LE anchoring.)  Rolling to Left: Minimal assistance (Using family's rolling cushion for LEs; positioning L LE.)  Rolling to Right: Maximum assistance (Using family's rolling cushion for LEs, cues for L UE safety, to maintain position.)  Supine to Sit: Contact guard assistance (Pt scooting L LE c R; CGA for slow trunk progression. HOB <30º)  Sit to Supine: Minimal assistance (L LE assist from SO; cues to position toward HOB (G lateral scoot & EOB balance CGA), reach R hand back for rail to brace trunk.)  Scooting: Contact guard assistance  Bed Mobility Comments: Flat bed except where noted, rails used PRN    Transfers  Sit to Stand: 2 Person Assistance, Contact guard assistance (x1 // bars; x2 in bariatric scotty stedy for toilet transfer, during which Pt demonstrates increased L lateral lean, F return to cues for posture.)  Stand to Sit: Minimal Assistance, 2 Person  tablet 50 mg, 50 mg, Oral, BID  magic (miracle) mouthwash, 5 mL, Swish & Spit, 4x Daily PRN  ipratropium 0.5 mg-albuterol 2.5 mg (DUONEB) nebulizer solution 1 Dose, 1 Dose, Inhalation, BID RT  docusate sodium (ENEMEEZ) enema 283 mg, 1 enema, Rectal, Q24H  [Held by provider] lisinopril (PRINIVIL;ZESTRIL) tablet 5 mg, 5 mg, Oral, Daily  dextromethorphan (DELSYM) 30 MG/5ML extended release liquid 30 mg, 30 mg, Oral, Daily  butalbital-APAP-caffeine -40 MG per capsule 1 capsule, 1 capsule, Oral, Daily PRN  polyethylene glycol (GLYCOLAX) packet 17 g, 17 g, Oral, Daily PRN  baclofen (LIORESAL) tablet 5 mg, 5 mg, Oral, BID  Benzocaine-Menthol (CEPACOL) 1 lozenge, 1 lozenge, Oral, Q2H PRN  diclofenac sodium (VOLTAREN) 1 % gel 2 g, 2 g, Topical, BID  acetaminophen (TYLENOL) tablet 650 mg, 650 mg, Oral, Q4H PRN  bisacodyl (DULCOLAX) suppository 10 mg, 10 mg, Rectal, Daily PRN  citalopram (CELEXA) tablet 10 mg, 10 mg, Oral, Daily  ferrous sulfate (IRON 325) tablet 325 mg, 325 mg, Oral, Every Other Day  gabapentin (NEURONTIN) capsule 100 mg, 100 mg, Oral, TID  lacosamide (VIMPAT) tablet 100 mg, 100 mg, Oral, BID  lidocaine 4 % external patch 1 patch, 1 patch, TransDERmal, Daily PRN  [Held by provider] aspirin chewable tablet 81 mg, 81 mg, Oral, Daily  atorvastatin (LIPITOR) tablet 80 mg, 80 mg, Oral, Nightly  melatonin tablet 6 mg, 6 mg, Oral, Nightly  modafinil (PROVIGIL) tablet 200 mg, 200 mg, Oral, Daily  therapeutic multivitamin-minerals 1 tablet, 1 tablet, Oral, Daily  senna (SENOKOT) tablet 17.2 mg, 2 tablet, Oral, Nightly    Objective:  /72   Pulse 70   Temp 98.2 °F (36.8 °C)   Resp 16   Ht 1.778 m (5' 10\")   Wt 77.1 kg (170 lb)   SpO2 91%   BMI 24.39 kg/m²       GEN: Well developed, well nourished, no acute distress  HEENT: Right craniectomy defect present.  EOM grossly intact.  Hearing grossly intact.  Mucous membranes pink and moist.  RESP: Normal breath sounds with no wheezing, rales, or rhonchi.

## 2024-02-23 NOTE — PROGRESS NOTES
Today's Date: 2/22/2024  Patient Name: Brennen Mcclure  Date of admission: 1/27/2024  3:01 PM  Patient's age: 59 y.o., 1965  Admission Dx: 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 (HCC) [I69.152]    Reason for Consult:  Low platelets   Requesting Physician: Dipika Angulo MD    CHIEF COMPLAINT:  No chief complaint on file.      History Obtained From:  patient and chart  INTERVAL HISTORY:    Patient seen and examined  No bleeding  Platelet trending down and no response on dexamethasone  Did have leg pain and bilateral lower extremity venous Doppler was done and is a chronic DVT    HISTORY OF PRESENT ILLNESS:    Brennen Mcclure is a 59 y.o. male who is admitted to the hospital on 1/27/2024  for acute rehab after recent hospitalization for acute stroke.    Patient had recent hospitalization for left hemiparesis secondary to hemorrhagic CVA and subsequently discharged to acute rehab at [facility].  Patient has history of dural venous sinus thrombosis and also history of DVT and was chronically maintained on Eliquis.    Patient currently is on Eliquis and now noted to have thrombocytopenia which is progressively getting worse therefore hematology was consulted.  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  Patient had a evaluation at [facility] back in 2020 as well and as per the chart.    Past Medical History:   has a past medical history of CAD (coronary artery disease), Chronic deep vein thrombosis (DVT) of both lower extremities (HCC), Chronic idiopathic thrombocytopenia (HCC), CVA (cerebral vascular accident) (HCC), and HLD (hyperlipidemia).    Past Surgical History:   has a past surgical history that includes Coronary artery bypass graft and Coronary stent placement.     Medications:    Prior to Admission medications    Medication Sig Start Date End Date

## 2024-02-23 NOTE — PROGRESS NOTES
[facility]   IN-PATIENT SERVICE   [facility]    Consult note            Date:   2/23/2024  Patient name:  Brennen Mcclure  Date of admission:  1/27/2024  3:01 PM  MRN:   [id number]  Account:  [id number]  YOB: 1965  PCP:    No primary care provider on file.  Room:   [address] [place] [address]  Code Status:    DNR-CCA    Chief Complaint:     No chief complaint on file.      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, started on heparin drip also had patchy IPH, complicated by expansion of intraparenchymal hemorrhage with mass effect, s/p craniectomy, and evacuation of IPH x 2 with dural repair last CT head on on 1/24 showed evolution of the hemorrhage with reduction in the mass effect, Hospital course further complicated by ITP, was seen by hematology oncology, given IVIG and platelet transfusion,, started on Vimpat for seizure prevention, had cellulitis on Celexa  Patient was seen by hematology oncology over there and was started on Eliquis 2.5 twice daily  Patient was drowsy but arousable on my encounter,  Family was present at the bedside    Denies any chest pain shortness of breath        Past Medical History:     Past Medical History:   Diagnosis Date    CAD (coronary artery disease)     Chronic deep vein thrombosis (DVT) of both lower extremities (HCC)     Chronic idiopathic thrombocytopenia (HCC)     CVA (cerebral vascular accident) (HCC)     HLD (hyperlipidemia)         Past Surgical History:     Past Surgical History:   Procedure Laterality Date    CORONARY ARTERY BYPASS GRAFT      CORONARY STENT PLACEMENT          Medications Prior to Admission:     Prior to Admission  with vascular hematology oncology and PCP as outpatient  Voiced understanding  Wife is present at the bedside updated  1/30  Patient seen and examined, his vitals have been stable, labs reviewed, patient advised to follow-up with oncology as outpatient  Thrombocytopenia has resolved  Patient on Eliquis reduced dose as per oncology recommendations in [place]  Finish course of Keflex    2/1  Patient seen and examined  Currently alert oriented  On Eliquis at 2.5 twice daily as recommended by oncologist at [place]  Patient advised to follow-up with oncology as outpatient due to recurrent DVTs next  Vital stable thrombocytopenia resolved    2/2  Intraparenchymal hemorrhage status post craniectomy, left-sided hemiparesis seizure prophylaxis, cerebral venous sinus thrombosis on Eliquis, recent ITP however platelet counts have normalized will continue to monitor next labs due tomorrow  Patient complaining of new cough which has been there for last couple of days; reports recent diagnosis of emphysema/COPD.  Will get chest x-ray, ordering DuoNebs as needed.  No wheezing on lung exam.  Patient also complaining of irritation in throat ordering Cepacol lozenges.    2/3  Cough better.  Continue with DuoNebs as needed  Chest x-ray unremarkable  Hemoglobin 13.3, platelets 123, will continue to monitor continuing with Eliquis  Blood pressure running low-Coreg discontinued no history of A-fib/CHF    2/4  Blood pressure control is good  Patient appears comfortable cough is improving patient worked with therapy  Labs reviewed from today and satisfactory  Continue current management    2/17  Seen in face-to-face,  Labs, medications, vitals reviewed   blood pressure running good now   Patient reports no new complaints.   Engaging with physical therapy.  Platelets 67, has chronically low platelets  Meds reviewed , adjusted , amytriptaline can cause low platelets , dose reduced   2/18   Patient, had drop in platelet to 44  Eliquis kept on

## 2024-02-23 NOTE — CONSULTS
[facility] Neurology   IN-PATIENT SERVICE      NEUROLOGY CONSULT  NOTE            Date:   2/23/2024  Patient name:  Brennen Mcclure  Date of admission:  1/27/2024  YOB: 1965      Chief Complaint:     No chief complaint on file.      Reason for Consult:      Headache     History of Present Illness:     The patient is a 59 y.o. male who presents with No chief complaint on file.  . The patient was seen and examined and the chart was reviewed.     The patient is a 59 y.o. male who presents with right hemispheric stroke to ARU.  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  He was hospitalized in [place] requiring right hemicraniectomy.  Patient had suffered from left-sided hemiplegia. Eventually he was transferred to [facility] acute rehab unit for post stroke care.  He has been seen by our neurology team for headache management.    Past Medical History:     Past Medical History:   Diagnosis Date    CAD (coronary artery disease)     Chronic deep vein thrombosis (DVT) of both lower extremities (HCC)     Chronic idiopathic thrombocytopenia (HCC)     CVA (cerebral vascular accident) (HCC)     HLD (hyperlipidemia)         Past Surgical History:     Past Surgical History:   Procedure Laterality Date    CORONARY ARTERY BYPASS GRAFT      CORONARY STENT PLACEMENT          Medications Prior to Admission:     Prior to Admission medications    Medication Sig Start Date End Date Taking? Authorizing Provider   aspirin 81 MG chewable tablet Take 1 tablet by mouth daily 2/19/24  Yes Danica De La Paz MD   butalbital-APAP-caffeine -40 MG CAPS per capsule Take 1 capsule by mouth daily as needed for Headaches 2/18/24  Yes Danica De La Paz MD   gabapentin (NEURONTIN) 100 MG capsule Take 1 capsule by mouth 3 times daily for 90 days. 2/19/24 5/19/24 Yes Danica De La Paz MD   lacosamide (VIMPAT) 100 MG  100 MG TABS tablet Take 1 tablet by mouth 2 times daily for 90 days. Max Daily Amount: 200 mg 2/19/24 5/19/24 Yes Danica De La Paz MD   topiramate (TOPAMAX) 25 MG tablet Take 1 tablet by mouth 2 times daily 2/19/24  Yes Danica De La Paz MD   amitriptyline (ELAVIL) 25 MG tablet Take 1 tablet by mouth nightly 2/19/24  Yes Danica De La Paz MD   citalopram (CELEXA) 10 MG tablet Take 1 tablet by mouth daily 2/19/24  Yes Danica De La Paz MD   atorvastatin (LIPITOR) 80 MG tablet Take 1 tablet by mouth nightly 2/19/24  Yes Danica De La Paz MD   dextromethorphan (DELSYM) 30 MG/5ML extended release liquid Take 5 mLs by mouth daily for 10 days 2/19/24 2/29/24 Yes Danica De La Paz MD   diclofenac sodium (VOLTAREN) 1 % GEL Apply 2 g topically 2 times daily 2/19/24  Yes Danica De La Paz MD   lidocaine 4 % external patch Place 1 patch onto the skin daily as needed (apply for 12 hours) 2/18/24  Yes Danica De La Paz MD   ferrous sulfate (IRON 325) 325 (65 Fe) MG tablet Take 1 tablet by mouth every other day 2/19/24  Yes Danica De La Paz MD   docusate sodium (ENEMEEZ) 283 MG enema Place 5 mLs rectally every 24 hours 2/19/24  Yes Danica De La Paz MD   polyethylene glycol (GLYCOLAX) 17 g packet Take 1 packet by mouth daily as needed for Constipation 2/18/24  Yes Danica De La Paz MD   senna (SENOKOT) 8.6 MG tablet Take 2 tablets by mouth nightly 2/19/24 3/20/24 Yes Danica De La Paz MD   melatonin 3 MG TABS tablet Take 2 tablets by mouth at bedtime 2/19/24  Yes Danica De La Paz MD   Benzocaine-Menthol (CEPACOL) 6-10 MG LOZG lozenge Take 1 lozenge by mouth every 2 hours as needed for Sore Throat 2/18/24  Yes Danica De La Paz MD   Multiple Vitamins-Minerals (THERAPEUTIC MULTIVITAMIN-MINERALS) tablet Take 1 tablet by mouth daily 2/19/24  Yes Danica De La Paz MD   baclofen (LIORESAL) 5 MG tablet Take 1 tablet by mouth 2 times daily 2/19/24  Yes Danica De La Paz MD   albuterol (PROVENTIL) (2.5 MG/3ML) 0.083%

## 2024-02-23 NOTE — PROGRESS NOTES
SPEECH LANGUAGE PATHOLOGY  Speech Language Pathology  [facility] ACUTE REHAB    Dysphagia Treatment Note    Date: 2/23/2024  Patient’s Name: Brennen Mcclure  MRN: [id number]  Diagnosis:   Patient Active Problem List   Diagnosis Code    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 (HCC) I69.152    Pseudobulbar affect F48.2    Intractable headache R51.9    Intracranial hemorrhage (HCC) I62.9    Thrombocytopenia (HCC) D69.6    Anemia D64.9    Chronic idiopathic thrombocytopenic purpura (HCC) D69.3    Seizure (HCC) R56.9    Dural venous sinus thrombosis G08    Chronic deep vein thrombosis (DVT) of proximal vein of right lower extremity (HCC) I82.5Y1       Pain: Patient did not complain of any pain.    Dysphagia Treatment  Treatment time:[id number]    Subjective: [x] Alert [x] Cooperative     [] Confused     [] Agitated    [] Lethargic    Objective/Assessment:    Patient seen for diet tolerance monitoring while eating lunch meal of easy to chew texture. No s/s of aspiration and observed effective oral clearance. ST instructed patient in compensatory swallow strategies including bolus size/rate moderation, upright posture, and oral care following meal.    Plan:  [x] Continue ST services    [] Discharge from ST:      Discharge recommendations:  [x] Further therapy recommended at discharge.   [] No therapy recommended at discharge.     Treatment completed by: KORTNEY Ahmadi,  Clinician

## 2024-02-23 NOTE — PLAN OF CARE
Problem: Respiratory - Adult  Goal: Achieves optimal ventilation and oxygenation  2/23/2024 1331 by Tamy Celestin LPN  Outcome: Progressing     Problem: Hematologic - Adult  Goal: Maintains hematologic stability  2/23/2024 1331 by Tamy Celestin LPN  Outcome: Progressing     Problem: Skin/Tissue Integrity  Goal: Absence of new skin breakdown  Description: 1.  Monitor for areas of redness and/or skin breakdown  2.  Assess vascular access sites hourly  3.  Every 4-6 hours minimum:  Change oxygen saturation probe site  4.  Every 4-6 hours:  If on nasal continuous positive airway pressure, respiratory therapy assess nares and determine need for appliance change or resting period.  2/23/2024 1331 by Tamy Celestin LPN  Outcome: Progressing

## 2024-02-23 NOTE — PROGRESS NOTES
SPEECH LANGUAGE PATHOLOGY  Speech Language Pathology  [facility] ACUTE REHAB    Cognitive/Dysphagia Treatment Note    Date: 2/23/2024  Patient’s Name: Brennen Mcclure  MRN: [id number]  Diagnosis:   Patient Active Problem List   Diagnosis Code    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 (HCC) I69.152    Pseudobulbar affect F48.2    Intractable headache R51.9    Intracranial hemorrhage (HCC) I62.9    Thrombocytopenia (HCC) D69.6    Anemia D64.9    Chronic idiopathic thrombocytopenic purpura (HCC) D69.3    Seizure (HCC) R56.9    Dural venous sinus thrombosis G08    Chronic deep vein thrombosis (DVT) of proximal vein of right lower extremity ([facility]) I82.5Y1       Pain: 0/10    Cognitive Treatment    Treatment time: 0047-7614      Subjective: [x] Alert [x] Cooperative     [] Confused     [] Agitated    [x] Lethargic       Objective/Assessment:  Attention: Frequent redirection and repetition required.     Orientation: Oriented x4 with visual support (month and year).    Recall: Paragraph Recall ([place]) 60% (I), increasing to 100% when multiple choices reduced from four to two.    Organization: NT    Problem Solving/Reasoning: Sentence Inconsistencies 100% (I). Sentence Formulation (given two words) 50% (I), increasing to 100% given min-mod cues. ST observed patient to only use one word in sentences. Encouraged to use both words.    Dysphagia: Patient completed OMEs x2 sets x10 reps with mod A. ST observed patient to become lethargic during exercises. Reduced labial and lingual strength observed.    Other: No family present. Patient left with call light with him in wheelchair.    Plan:  [x] Continue ST services    [] Discharge from ST:      Discharge recommendations:  [x] Further therapy recommended at discharge.   [] No therapy recommended at discharge.      Treatment completed by: KORTNEY Ahmadi,  Clinician

## 2024-02-23 NOTE — PROGRESS NOTES
give-morh sling    Lower Extremity Dressing  Assistance Level: Moderate assistance  Skilled Clinical Factors: Mod A to prudencio L side of pants, education provided over use of reacher to prudencio pants    Putting On/Taking Off Footwear  Assistance Level: Maximum assistance  Skilled Clinical Factors: pt providing some assist with shoes and L AFO      Mobility     Sit to Supine  Assistance Level: Minimal assistance  Skilled Clinical Factors: pt requires some assist to manage trunk but requires vc to hook LLE to assist into bed  Supine to Sit  Assistance Level: Minimal assistance  Skilled Clinical Factors: Cues and increased time for hooking method to advance BLEs over EOB, A for trunk upright progression from R side.  Scooting  Assistance Level: Stand by assist  Skilled Clinical Factors: hips towards EOB/foot of bed using bed rail for assist in prep for SS transfer       Sit to Stand  Assistance Level: Contact guard assist  Skilled Clinical Factors: CGA this date from bed to SS  Stand to Sit  Assistance Level: Minimal assistance  Skilled Clinical Factors: Min A for controlled sit and propper positioning of hips        Sit Pivot  Assistance Level: Minimal assistance  Skilled Clinical Factors: arm rail removed for transfer. Pt with good followthrough of hand placement/technique towards strong R side             Functional Mobility  Device: Wheelchair  Activity: To/From therapy gym  Assistance Level: Modified independent  Skilled Clinical Factors: noted G ability to utilize loar chair and G positioning in chair despite lack of arm tray    Neuromuscular Education  Joint Compression: Joint compressions completed L shoulder to increase joint stability, strength and provide proprioceptive input. pt with G tolerance no reported pain  Functional Movement Patterns: pt instructed to slide the washclothin into therapists hang with LUE, completing an elbow flexion movement with gravity eliminated, poor return noted this date possibly d/t  increased distractions this date.       OT Exercises  PROM Exercises: LUE PROM to prevent contractures and continued non use. Pt with noted bruise on palm of hand extending into digits; limted tolerance with digit/wrist ROM. noted tightness in elbow however able to tolerate full mvmt.      Assessment  Assessment  Activity Tolerance: Patient tolerated treatment well;Patient limited by fatigue    Safety Devices  Safety Devices in place: Yes  Type of devices: Left in chair;Chair alarm in place;Call light within reach;All fall risk precautions in place;Gait belt;Nurse notified       Goals  Patient Goals   Patient goals : \"To get home\"  Short Term Goals  Time Frame for Short Term Goals: By 1 week  Short Term Goal 1: Pt will complete UB ADLs with Mod A, good safety, and use of AE/DME/modified techniques as needed  Short Term Goal 2: Pt will complete LB ADLs with Max A, good safety, and use of AE/DME/Modified techniques as needed  Short Term Goal 3: Pt will complete sit to stand transfers in [facility] with Mod A x2 and good safety in preparation for functional transfers  Short Term Goal 4: Pt will actively participate in 30+ minutes of therapeutic exercise/functional activity/social participation for increased safety and independence with self-care for improved quality of life  Short Term Goal 5: Pt will be educated on and explore use of AE/DME/Modified techniques as needed to complete self-care and mobility  Additional Goals?: Yes  Short Term Goal 6: Pt will participate in dynamic unsupported sitting tasks with Max A  facilitating reaching out of base of support and weight bearing through L extremity to increase input through extremity and improved core strength to increase ease with LB self-care tasks  Short Term Goal 7: Pt will participate in neuromuscular re-education activities (weight bearing, e-stim) as tolerated, to facilitate movement in LUE  Short Term Goal 8: Patient will perform self-care routine with [place]

## 2024-02-23 NOTE — CARE COORDINATION
Writer faxed ppw to [employer]. They will pick the pt up 02/26/24 at 1245. Ppw is on cm desk. Ppw has to be faxed 02/26 in the am

## 2024-02-23 NOTE — PROGRESS NOTES
training;Neuromuscular re-education;Pain management;Home exercise program;Safety education & training;Patient/Caregiver education & training;Equipment evaluation, education, & procurement;Positioning;Therapeutic activities  Safety Devices  Type of Devices: Gait belt;All fall risk precautions in place;Call light within reach;Patient at risk for falls;Bed alarm in place;Left in bed;Nurse notified    EDUCATION  Education  Education Given To: Family;Patient  Education Provided: Equipment  Education Provided Comments: Demonstrated to sister, SO how to fold wheelchair for transport & return to open position. Also demo'd legrests, armrests.  Education Method: Demonstration;Verbal  Barriers to Learning: Cognition  Education Outcome: Verbalized understanding (Mara states chair feels heavy.)    Therapy Time     02/23/24 0846 02/23/24 0847   PT Individual Minutes   Time In 1015 1410   Time Out 1105 1500   Minutes 50 50     Keira Garcia PTA, 02/23/24 at 4:56 PM

## 2024-02-23 NOTE — PROGRESS NOTES
Facts: Writer and  Jovani visited with patient, his wife and his sister; medical update provided; patient/family waiting on discharge plans and date; patient receptive and in good spirits; writer spoke with patient's wife in hallway outside of patient's room; present during meeting between Dr. Angulo and wife;    Feelings: Wife is overwhelmed, exhausted, fearful and crying as she vented emotion; wife talked about \"losing 9 people\" in 2023, including her son; wife is worried patient will not get better and that she will be unable to continue to care for patient because of her physical limitations;     Family: Patient has a good [place] of support, including step children, in-laws, and cousins;    Kira: wife venting emotion about her anger at God; listening presence, support and words of affirmation offered as wife vocalized out her pain and feelings;     Follow-up; SC remains available     02/23/24 1407   Encounter Summary   Encounter Overview/Reason  Spiritual/Emotional Needs   Service Provided For: Patient and family together   Referral/Consult From: [facility]   Support System Spouse;Family members   Last Encounter  02/23/24   Complexity of Encounter Moderate   Begin Time 1300   End Time  1407   Total Time Calculated 67 min   Spiritual/Emotional needs   Type Spiritual Support   Grief, Loss, and Adjustments   Type Adjustment to illness;Grief and loss   Assessment/Intervention/Outcome   Assessment Anxious;Angry;Complicated grieving;Fearful;Powerlessness;Questioning kira;Sad;Stress overload;Tearful   Intervention Active listening;Discussed belief system/Advent practices/kira;Discussed death, afterlife;Discussed illness injury and it’s impact;Discussed relationship with God;Explored/Affirmed feelings, thoughts, concerns;Grief Care;Prayer (assurance of)/[place];Nurtured Hope;Sustaining Presence/Ministry of presence   Outcome Comfort;Deescalated;Engaged in conversation;Expressed feelings, needs, and

## 2024-02-23 NOTE — INTERDISCIPLINARY ROUNDS
[facility] Acute Inpatient Rehabilitation  INTERDISCIPLINARY MEETING  Date: 24  Patient Name: Brennen Mcclure       Room: 2633/2633-01  MRN: [id number]       : 1965  (59 y.o.)     Gender: male     Patient's care team, including nursing, speech language pathologist, occupational therapist, and/or physical therapist, met to discuss patient's care needs. Any patient concerns, change in medical status, and current transfer/mobility status were identified at this time.     Any Additional Pertinent Information:   Hold compression socks until further clarifications.     Participating Team Members:  Nurse: Tamy Celestin LPN    Occupational Therapist:  Bhupinder Gutierrez OT   Physical Therapist: Dev Ho PT  Speech Therapist:  Radha Yarbrough M.A., CCC-SLP

## 2024-02-24 LAB
ERYTHROCYTE [DISTWIDTH] IN BLOOD BY AUTOMATED COUNT: 22.9 % (ref 11.5–14.9)
HCT VFR BLD AUTO: 40.8 % (ref 41–53)
HGB BLD-MCNC: 13.2 G/DL (ref 13.5–17.5)
MCH RBC QN AUTO: 28.5 PG (ref 26–34)
MCHC RBC AUTO-ENTMCNC: 32.3 G/DL (ref 31–37)
MCV RBC AUTO: 88.1 FL (ref 80–100)
PLATELET # BLD AUTO: 45 K/UL (ref 150–450)
PMV BLD AUTO: 10 FL (ref 6–12)
RBC # BLD AUTO: 4.63 M/UL (ref 4.5–5.9)
WBC OTHER # BLD: 5.4 K/UL (ref 3.5–11)

## 2024-02-24 PROCEDURE — 97110 THERAPEUTIC EXERCISES: CPT

## 2024-02-24 PROCEDURE — 36415 COLL VENOUS BLD VENIPUNCTURE: CPT

## 2024-02-24 PROCEDURE — 99232 SBSQ HOSP IP/OBS MODERATE 35: CPT | Performed by: INTERNAL MEDICINE

## 2024-02-24 PROCEDURE — 6370000000 HC RX 637 (ALT 250 FOR IP): Performed by: PHYSICAL MEDICINE & REHABILITATION

## 2024-02-24 PROCEDURE — 97542 WHEELCHAIR MNGMENT TRAINING: CPT

## 2024-02-24 PROCEDURE — 6370000000 HC RX 637 (ALT 250 FOR IP): Performed by: PSYCHIATRY & NEUROLOGY

## 2024-02-24 PROCEDURE — 97535 SELF CARE MNGMENT TRAINING: CPT

## 2024-02-24 PROCEDURE — 97530 THERAPEUTIC ACTIVITIES: CPT

## 2024-02-24 PROCEDURE — 6370000000 HC RX 637 (ALT 250 FOR IP): Performed by: STUDENT IN AN ORGANIZED HEALTH CARE EDUCATION/TRAINING PROGRAM

## 2024-02-24 PROCEDURE — 85027 COMPLETE CBC AUTOMATED: CPT

## 2024-02-24 PROCEDURE — 1180000000 HC REHAB R&B

## 2024-02-24 RX ORDER — SODIUM CHLORIDE 9 MG/ML
INJECTION, SOLUTION INTRAVENOUS PRN
Status: DISCONTINUED | OUTPATIENT
Start: 2024-02-24 | End: 2024-02-24

## 2024-02-24 RX ORDER — BUTALBITAL, ACETAMINOPHEN AND CAFFEINE 300; 40; 50 MG/1; MG/1; MG/1
1 CAPSULE ORAL 2 TIMES DAILY PRN
Status: DISCONTINUED | OUTPATIENT
Start: 2024-02-24 | End: 2024-02-26 | Stop reason: HOSPADM

## 2024-02-24 RX ADMIN — BUTALBITA,ACETAMINOPHEN AND CAFFEINE 1 CAPSULE: 50; 300; 40 CAPSULE ORAL at 19:22

## 2024-02-24 RX ADMIN — BACLOFEN 5 MG: 10 TABLET ORAL at 21:01

## 2024-02-24 RX ADMIN — AMITRIPTYLINE HYDROCHLORIDE 50 MG: 50 TABLET, FILM COATED ORAL at 21:01

## 2024-02-24 RX ADMIN — BACLOFEN 5 MG: 10 TABLET ORAL at 07:45

## 2024-02-24 RX ADMIN — LACOSAMIDE 100 MG: 100 TABLET, FILM COATED ORAL at 21:01

## 2024-02-24 RX ADMIN — TOPIRAMATE 50 MG: 50 TABLET, FILM COATED ORAL at 21:02

## 2024-02-24 RX ADMIN — Medication 1 TABLET: at 07:45

## 2024-02-24 RX ADMIN — BUTALBITA,ACETAMINOPHEN AND CAFFEINE 1 CAPSULE: 50; 300; 40 CAPSULE ORAL at 12:22

## 2024-02-24 RX ADMIN — Medication 30 MG: at 07:46

## 2024-02-24 RX ADMIN — SENNOSIDES 17.2 MG: 8.6 TABLET, FILM COATED ORAL at 21:01

## 2024-02-24 RX ADMIN — MODAFINIL 200 MG: 100 TABLET ORAL at 07:45

## 2024-02-24 RX ADMIN — CITALOPRAM HYDROBROMIDE 10 MG: 10 TABLET ORAL at 07:46

## 2024-02-24 RX ADMIN — LACOSAMIDE 100 MG: 100 TABLET, FILM COATED ORAL at 07:46

## 2024-02-24 RX ADMIN — Medication 6 MG: at 21:01

## 2024-02-24 RX ADMIN — GABAPENTIN 100 MG: 100 CAPSULE ORAL at 21:01

## 2024-02-24 RX ADMIN — GABAPENTIN 100 MG: 100 CAPSULE ORAL at 13:45

## 2024-02-24 RX ADMIN — DOCUSATE SODIUM 283 MG: 283 LIQUID RECTAL at 16:24

## 2024-02-24 RX ADMIN — ACETAMINOPHEN 650 MG: 325 TABLET, FILM COATED ORAL at 07:48

## 2024-02-24 RX ADMIN — DICLOFENAC SODIUM 2 G: 10 GEL TOPICAL at 07:50

## 2024-02-24 RX ADMIN — TOPIRAMATE 50 MG: 50 TABLET, FILM COATED ORAL at 12:40

## 2024-02-24 RX ADMIN — DICLOFENAC SODIUM 2 G: 10 GEL TOPICAL at 21:03

## 2024-02-24 RX ADMIN — GABAPENTIN 100 MG: 100 CAPSULE ORAL at 07:46

## 2024-02-24 RX ADMIN — ATORVASTATIN CALCIUM 80 MG: 80 TABLET, FILM COATED ORAL at 21:01

## 2024-02-24 ASSESSMENT — PAIN - FUNCTIONAL ASSESSMENT
PAIN_FUNCTIONAL_ASSESSMENT: ACTIVITIES ARE NOT PREVENTED
PAIN_FUNCTIONAL_ASSESSMENT: ACTIVITIES ARE NOT PREVENTED

## 2024-02-24 ASSESSMENT — PAIN DESCRIPTION - LOCATION
LOCATION: BACK;HIP
LOCATION: HEAD
LOCATION: BACK

## 2024-02-24 ASSESSMENT — PAIN SCALES - GENERAL
PAINLEVEL_OUTOF10: 7
PAINLEVEL_OUTOF10: 7
PAINLEVEL_OUTOF10: 8

## 2024-02-24 ASSESSMENT — PAIN DESCRIPTION - ORIENTATION: ORIENTATION: LOWER

## 2024-02-24 ASSESSMENT — PAIN DESCRIPTION - DESCRIPTORS: DESCRIPTORS: SHARP

## 2024-02-24 NOTE — PROGRESS NOTES
bedtime 2/19/24  Yes Danica De La Paz MD   Benzocaine-Menthol (CEPACOL) 6-10 MG LOZG lozenge Take 1 lozenge by mouth every 2 hours as needed for Sore Throat 2/18/24  Yes Danica De La Paz MD   Multiple Vitamins-Minerals (THERAPEUTIC MULTIVITAMIN-MINERALS) tablet Take 1 tablet by mouth daily 2/19/24  Yes Danica De La Paz MD   baclofen (LIORESAL) 5 MG tablet Take 1 tablet by mouth 2 times daily 2/19/24  Yes Danica De La Paz MD   albuterol (PROVENTIL) (2.5 MG/3ML) 0.083% nebulizer solution Take 3 mLs by nebulization every 6 hours as needed for Wheezing 2/18/24  Yes Danica De La Paz MD   ipratropium 0.5 mg-albuterol 2.5 mg (DUONEB) 0.5-2.5 (3) MG/3ML SOLN nebulizer solution Inhale 3 mLs into the lungs three times daily 2/19/24  Yes Danica De La Paz MD   modafinil (PROVIGIL) 200 MG tablet Take 1 tablet by mouth daily for 30 days. Max Daily Amount: 200 mg 2/19/24 3/20/24 Yes Danica De La Paz MD        Allergies:     Patient has no known allergies.    Social History:     Tobacco:    reports that he quit smoking about 3 months ago. His smoking use included cigarettes. He has never used smokeless tobacco.  Alcohol:      has no history on file for alcohol use.  Drug Use:  has no history on file for drug use.    Family History:     Family History   Problem Relation Age of Onset    Coronary Art Dis Mother     Stroke Other        Review of Systems:     Positive and Negative as described in HPI.    CONSTITUTIONAL:  negative for fevers, chills, sweats, fatigue, weight loss  HEENT:  negative for vision, hearing changes, runny nose, throat pain  RESPIRATORY:  negative for shortness of breath, cough, congestion, wheezing.  CARDIOVASCULAR:  negative for chest pain, palpitations.  GASTROINTESTINAL:  negative for nausea, vomiting, diarrhea, constipation, change in bowel habits, abdominal pain   GENITOURINARY:  negative for difficulty of urination, burning with urination, frequency   INTEGUMENT:  negative for rash, skin  normal compressibility, color   filling, and phasic and spontaneous flow.    Chronic non-occlusive thrombus in right gastrocnemius, popliteal, femoral   and common femoral veins.       Assessment :      Primary Problem  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 (HCC)    Active Hospital Problems    Diagnosis Date Noted    Chronic deep vein thrombosis (DVT) of proximal vein of right lower extremity (HCC) [I82.5Y1] 02/22/2024    Chronic idiopathic thrombocytopenic purpura (HCC) [D69.3] 02/18/2024    Seizure (HCC) [R56.9] 02/18/2024    Dural venous sinus thrombosis [G08] 02/18/2024    Thrombocytopenia (HCC) [D69.6] 02/12/2024    Anemia [D64.9] 02/12/2024    Intractable headache [R51.9] 01/31/2024    Intracranial hemorrhage (HCC) [I62.9] 01/31/2024    Pseudobulbar affect [F48.2] 01/30/2024    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 (HCC) [I69.152] 01/27/2024       Plan:     Patient is 59-year-old male complicated medical history was admitted in hospital for over 2 months at [place] now admitted at [facility] acute rehab for further care.    Intraparenchymal hemorrhage s/p craniectomy with evacuation x 2 and dural repair  Left sided hemiparesis secondary to above  On seizure prophylaxis with lacosamide,  Neurostimulation on modafinil    Cerebral venous sinus thrombosis, on Eliquis 2.5 twice daily, was seen by 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 was on Pradaxa earlier  Should consider hematology oncology consult due to his complicated history    ITP platelet count yesterday was 103, has been stable patient received IVIG at outlying facility, consider hematology oncology consult    On Flex for cellulitis last dose 1/29    CAD s/p CABG in 2009 status post PCI 2023, on aspirin Lipitor    Hypertension blood pressures currently stable on lisinopril, continue to monitor    DVT prophylaxis        1/29  Patient seen and examined  Working with

## 2024-02-24 NOTE — PROGRESS NOTES
Occupational Therapy  [facility]   Acute Rehabilitation Occupational Therapy Daily Treatment Note    Date: 24  Patient Name: Brennen Mcclure       Room: 2633/2633-01  MRN: [id number]  Account: [id number]   : 1965  (59 y.o.) Gender: male       Referring Practitioner: Danica De La Paz MD  Diagnosis: Hemiplegia following ntrm intcrbl hemor aff left dominant side  Additional Pertinent Hx: 59 year old male who admitted 23 with 4 days of new L arm weakness and headache. He had known history of MI and CABG - on dabigatran but with inconsistent use due to prescription running out. He had previous treatment for L ICA thrombus and R sided symptoms treated with “clot busting medication” in . 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 He was transferred from [place], [place] to [facility] for medical management and was admitted to neuro critical care. His LUE numbness/weakness worsened to near complete hemiparesis. Initial GCS 15. He was initially treated with heparin gtt for DVST. On 23 Dr. Jameson Parsons (neurosurgery) performed R sided decompressive hemicraniectomy with evacuation of IPH x2 sites with dural repair. He was stable post op on heparin drip and extubated 23 after stable head CT 23. He was then bridged to warfarin. He is currently being treated with warfarin with therapeutic INR since 23. Hb stable at 13.6 on 24. Thrombocytopenia noted with platelet count 30 - hematology being reconsulted - previously recommended count >40. Currently requiring timed voids for incontinence. PM&R anticipating likely wheelchair mobility with [place] for transfers.    Treatment Diagnosis: Impaired self-care status    Past Medical History:  has a past medical history of CAD (coronary artery disease), Chronic deep vein thrombosis (DVT) of both lower extremities (HCC),  Dressing  Assistance Level: Minimal assistance  Skilled Clinical Factors: Min A required to pull shirt over R shoulder. pt requires extended time for all parts.    Lower Extremity Dressing  Assistance Level: Moderate assistance  Skilled Clinical Factors: Mod A to prudencio L side of pants, education provided over use of reacher to doff/don pants. pt rolls to L to don pants over hip    Putting On/Taking Off Footwear  Assistance Level: Maximum assistance  Skilled Clinical Factors: pt providing some assist c L AFO    Mobility              Sit to Supine  Assistance Level: Minimal assistance  Skilled Clinical Factors: pt requires some assist to manage trunk but requires vc to hook LLE to assist into bed  Supine to Sit  Assistance Level: Minimal assistance  Skilled Clinical Factors: Cues and increased time for hooking method to advance BLEs over EOB, A for trunk upright progression from R side.  Scooting  Assistance Level: Stand by assist  Skilled Clinical Factors: hips towards EOB/foot of bed using bed rail for assist    Assessment  Assessment  Activity Tolerance: Patient tolerated treatment well;Patient limited by fatigue    Patient Education  Education  Education Given To: Patient  Education Provided: Plan of Care;Transfer Training (AE)    OT Equipment Recommendations  Equipment Needed: Yes  Mobility Devices: ADL Assistive Devices  Hospital Bed : Electric - Full (Head of Bed);Electric - Full  (Height of Bed)  ADL Assistive Devices: Long-handled Sponge;Leg ;Sock-Aid Hard;Reacher;Grab Bars - toilet;Grab Bars - shower;Grab Bars - tub;Hand-held Shower;Transfer Tub Bench;Long-handled Shoe Horn;Gait Belt;Toileting - 3-in-1 Commode;Toileting - Raised Toilet Seat without arms    Safety Devices  Safety Devices in place: Yes  Type of devices: Call light within reach;All fall risk precautions in place;Gait belt;Nurse notified;Left in bed;Bed alarm in place       Goals  Patient Goals   Patient goals : \"To get home\"  Short Term

## 2024-02-24 NOTE — PROGRESS NOTES
Today's Date: 2/23/2024  Patient Name: Brennen Mcclure  Date of admission: 1/27/2024  3:01 PM  Patient's age: 59 y.o., 1965  Admission Dx: 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 (HCC) [I69.152]    Reason for Consult:  Low platelets   Requesting Physician: Dipika Angulo MD    CHIEF COMPLAINT:  No chief complaint on file.      History Obtained From:  patient and chart  INTERVAL HISTORY:    Patient seen and examined  No bleeding  Platelet trending up   Did have leg pain and bilateral lower extremity venous Doppler was done and is a chronic DVT    HISTORY OF PRESENT ILLNESS:    Brennen Mcclure is a 59 y.o. male who is admitted to the hospital on 1/27/2024  for acute rehab after recent hospitalization for acute stroke.    Patient had recent hospitalization for left hemiparesis secondary to hemorrhagic CVA and subsequently discharged to acute rehab at [facility].  Patient has history of dural venous sinus thrombosis and also history of DVT and was chronically maintained on Eliquis.    Patient currently is on Eliquis and now noted to have thrombocytopenia which is progressively getting worse therefore hematology was consulted.  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  Patient had a evaluation at [facility] back in 2020 as well and as per the chart.    Past Medical History:   has a past medical history of CAD (coronary artery disease), Chronic deep vein thrombosis (DVT) of both lower extremities (HCC), Chronic idiopathic thrombocytopenia (HCC), CVA (cerebral vascular accident) (HCC), and HLD (hyperlipidemia).    Past Surgical History:   has a past surgical history that includes Coronary artery bypass graft and Coronary stent placement.     Medications:    Prior to Admission medications    Medication Sig Start Date End Date Taking? Authorizing Provider    aspirin 81 MG chewable tablet Take 1 tablet by mouth daily 2/19/24  Yes Danica De La Paz MD   butalbital-APAP-caffeine -40 MG CAPS per capsule Take 1 capsule by mouth daily as needed for Headaches 2/18/24  Yes Danica De La Paz MD   gabapentin (NEURONTIN) 100 MG capsule Take 1 capsule by mouth 3 times daily for 90 days. 2/19/24 5/19/24 Yes Danica De La Paz MD   lacosamide (VIMPAT) 100 MG TABS tablet Take 1 tablet by mouth 2 times daily for 90 days. Max Daily Amount: 200 mg 2/19/24 5/19/24 Yes Danica De La Paz MD   topiramate (TOPAMAX) 25 MG tablet Take 1 tablet by mouth 2 times daily 2/19/24  Yes Danica De La Paz MD   amitriptyline (ELAVIL) 25 MG tablet Take 1 tablet by mouth nightly 2/19/24  Yes Danica De La Paz MD   citalopram (CELEXA) 10 MG tablet Take 1 tablet by mouth daily 2/19/24  Yes Danica De La Paz MD   atorvastatin (LIPITOR) 80 MG tablet Take 1 tablet by mouth nightly 2/19/24  Yes Danica De La Paz MD   dextromethorphan (DELSYM) 30 MG/5ML extended release liquid Take 5 mLs by mouth daily for 10 days 2/19/24 2/29/24 Yes Danica De La Paz MD   diclofenac sodium (VOLTAREN) 1 % GEL Apply 2 g topically 2 times daily 2/19/24  Yes Danica De La Paz MD   lidocaine 4 % external patch Place 1 patch onto the skin daily as needed (apply for 12 hours) 2/18/24  Yes Danica De La Paz MD   ferrous sulfate (IRON 325) 325 (65 Fe) MG tablet Take 1 tablet by mouth every other day 2/19/24  Yes Danica De La Paz MD   docusate sodium (ENEMEEZ) 283 MG enema Place 5 mLs rectally every 24 hours 2/19/24  Yes Danica De La Paz MD   polyethylene glycol (GLYCOLAX) 17 g packet Take 1 packet by mouth daily as needed for Constipation 2/18/24  Yes Danica De La Paz MD   senna (SENOKOT) 8.6 MG tablet Take 2 tablets by mouth nightly 2/19/24 3/20/24 Yes Danica De La Paz MD   melatonin 3 MG TABS tablet Take 2 tablets by mouth at bedtime 2/19/24  Yes Danica De La Paz MD   Benzocaine-Menthol (CEPACOL) 6-10 MG

## 2024-02-24 NOTE — PROGRESS NOTES
1657    polyethylene glycol (GLYCOLAX) packet 17 g  17 g Oral Daily PRN Danica De La Paz MD   17 g at 02/11/24 1234    baclofen (LIORESAL) tablet 5 mg  5 mg Oral BID Beck Mason MD   5 mg at 02/24/24 0745    Benzocaine-Menthol (CEPACOL) 1 lozenge  1 lozenge Oral Q2H PRN Shannan Drake MD        diclofenac sodium (VOLTAREN) 1 % gel 2 g  2 g Topical BID Beck Mason MD   2 g at 02/24/24 0750    acetaminophen (TYLENOL) tablet 650 mg  650 mg Oral Q4H PRN Danica De La Paz MD   650 mg at 02/24/24 0748    bisacodyl (DULCOLAX) suppository 10 mg  10 mg Rectal Daily PRN Danica De La Paz MD        citalopram (CELEXA) tablet 10 mg  10 mg Oral Daily Dipika Angulo MD   10 mg at 02/24/24 0746    ferrous sulfate (IRON 325) tablet 325 mg  325 mg Oral Every Other Day Dipika Angulo MD   325 mg at 02/23/24 0758    gabapentin (NEURONTIN) capsule 100 mg  100 mg Oral TID Dipika Angulo MD   100 mg at 02/24/24 0746    lacosamide (VIMPAT) tablet 100 mg  100 mg Oral BID Dipika Angulo MD   100 mg at 02/24/24 0746    lidocaine 4 % external patch 1 patch  1 patch TransDERmal Daily PRN Dipika Angulo MD        [Held by provider] aspirin chewable tablet 81 mg  81 mg Oral Daily Dipika Angulo MD   81 mg at 02/20/24 0815    atorvastatin (LIPITOR) tablet 80 mg  80 mg Oral Nightly Dipika Angulo MD   80 mg at 02/23/24 2147    melatonin tablet 6 mg  6 mg Oral Nightly Dipika Angulo MD   6 mg at 02/23/24 2147    modafinil (PROVIGIL) tablet 200 mg  200 mg Oral Daily Dipika Angulo MD   200 mg at 02/24/24 0745    therapeutic multivitamin-minerals 1 tablet  1 tablet Oral Daily Dipika Angulo MD   1 tablet at 02/24/24 0745    senna (SENOKOT) tablet 17.2 mg  2 tablet Oral Nightly Dipika Angulo MD   17.2 mg at 02/23/24 1011       Allergies:  Patient has no known allergies.    Social History:   reports that he quit smoking about 3 months ago. His smoking use included cigarettes. He has never used smokeless     Anemia [D64.9] 02/12/2024    Intractable headache [R51.9] 01/31/2024    Intracranial hemorrhage (HCC) [I62.9] 01/31/2024    Pseudobulbar affect [F48.2] 01/30/2024    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 (HCC) [I69.152] 01/27/2024         IMPRESSION:   Recent acute hemorrhagic stroke  History of dural venous sinus thrombosis  History of DVT  Seizure   Thrombocytopenia  New onset anemia  Chronic DVT of the right leg  Ultrasound of the liver did show fatty liver    RECOMMENDATIONS:  I reviewed the laboratory data, imaging studies, prior records, outside records, discussed diagnosis and treatment recommendations  He has a mild thrombocytopenia at baseline and subsequently appears to be getting worse over the past few days  Normal B12 folic acid level and iron studies  As platelet down below 50 will hold anticoagulation and will start patient on dexamethasone 40 mg daily for 4 days(patient has history of chronic ITP/autoimmune according to records)> no response and I question the diagnosis of ITP  Done with high-dose dexamethasone and no response> this is unlikely ITP> other etiology for thrombocytopenia likely liver disease> ultrasound did show fatty liver and this is likely the reason/contributing for thrombocytopenia  Hold anticoagulation for platelet below 50  May need outpatient bone marrow biopsy and aspirate.  Discussed with Dr. Angulo.  Discussed with patient wife.  CBC daily  Right leg DVT which is a chronic, recommend vascular surgery consult for possible IVC filter as patient had thrombocytopenia and high risk for anticoagulation  Consider serial ultrasound of the leg to assure no thrombus propagation(if no IVC filter)as patient not on anticoagulation  Discussed with the wife  Okay to discharge once medically stable and patient completed rehab with outpatient follow-up  Will follow as an outpatient      Discussed with patient and Nurse.    Thank you for asking us to see this

## 2024-02-24 NOTE — PROGRESS NOTES
Physical Therapy  Facility/Department: [facility] ACUTE REHAB  Rehabilitation Physical Therapy    NAME: Brennen Mcclure  : 1965 (59 y.o.)  MRN: [id number]  CODE STATUS: DNR-CCA    Date of Service: 24      Past Medical History:   Diagnosis Date    CAD (coronary artery disease)     Chronic deep vein thrombosis (DVT) of both lower extremities (HCC)     Chronic idiopathic thrombocytopenia (HCC)     CVA (cerebral vascular accident) (HCC)     HLD (hyperlipidemia)      Past Surgical History:   Procedure Laterality Date    CORONARY ARTERY BYPASS GRAFT      CORONARY STENT PLACEMENT         Family / Caregiver Present: Yes (sister Radha, SOHAN Mara PM; daughter Tiffany AM)  General Comment  Comments: Pt appears very fatigued this date which was confirmed when pt's wife arrived.    Restrictions:  Restrictions/Precautions: General Precautions;Fall Risk;Up as Tolerated  Required Braces or Orthoses  Other:  (Helmet on when OOB)  Left Upper Extremity Brace/Splint: Resting hand (Pt's wife purchased Giv Jovon sling.)  Position Activity Restriction  Other position/activity restrictions: Helmet on when OOB; Sling for transfers/mobility only     SUBJECTIVE  Subjective: Pt is agreeable to try and work hard with PT.    Functional Mobility  Bed mobility  Rolling to Left: Stand by assistance  Supine to Sit: Contact guard assistance  Scooting: Contact guard assistance  Bed Mobility Comments: Flat bed however bedrail needed    Transfers  Sit to Stand: Contact guard assistance (CGA x1 in //bars, 2nd person SBA for safety)  Stand to Sit: Moderate Assistance (pt is very distracted and needed extra assist to move hips back as pt was pushing chair backwards.)  Bed to Chair: Moderate assistance (Squat pivot transfers to right)  Squat Pivot Transfers: Moderate Assistance (to R. Mod)  Comment: No device used today.    Balance  Posture: Poor  Sitting - Static: Fair;- (CGA EOB)  Sitting - Dynamic: Poor;- (easily distracted, extra time and cues for  care  Long Term Goal 2: Pt to Demo functional transfers with unilateral device and Norm/CGA  Long Term Goal 3: Pt to ambulate household distances with unilateral device and  Min-ModAx1  Long Term Goal 4: pt to progress to step negotiation x3-4 steps using UE support, supportive lift equipment and 2A  Long Term Goal 5: Pt to improve static/dynamic sitting & standing balance to Fair or better to dec risk for falls during ADL participation  Additional Goals?: Yes  Long term goal 6: Pt to demo actual or simulated car transfer using device and Norm  Long term goal 7: Pt to demo WC propulsion of household distances with R/L turns and SBA.  Long term goal 8: Pt and family to demonstrate satisfactory performance/assistance for desired transfer and mobility tasks for a safe DC home.    PLAN OF CARE  Frequency: 1-2 treatment sessions per day, 5-7 days per week  Physical Therapy Plan  General Plan: Other (See Comment) (900 minutes of combined PT/OT/ST d/t pt's need for 2 skilled therapists to safely and therapeutically perform desired functional tasks)  Specific Instructions for Next Treatment: FES; Sitting/standing balance and midline orienting, pre-gait activity progressing to transfers/Amb with hemiwalker.  Current Treatment Recommendations: Strengthening;ROM;Balance training;Functional mobility training;Transfer training;Gait training;Stair training;Neuromuscular re-education;Pain management;Home exercise program;Safety education & training;Patient/Caregiver education & training;Equipment evaluation, education, & procurement;Positioning;Therapeutic activities  Safety Devices  Type of Devices: Gait belt;All fall risk precautions in place;Call light within reach;Patient at risk for falls;Bed alarm in place;Left in bed;Nurse notified    EDUCATION  Education  Education Given To: Family;Patient  Education Provided: Equipment  Education Provided Comments: Demonstrated to sister, SO how to fold wheelchair for transport & return

## 2024-02-24 NOTE — PROGRESS NOTES
hemicraniectomy with evacuation of intraparenchymal hemorrhage at 2 sites with dural repair on 12/1/23. PT/OT for gait, mobility, strengthening, endurance, ADLs, and self care. For cranioplasty with Dr. Corbett - has new patient appointment 2/26. For [facility] peer visit.  Dural venous sinus thrombosis/abdominal venous thrombosis: On Eliquis - lower dose due to bleeding/anemia - discontinued by hematology on 2/20 due to thrombocytopenia.  Dysphagia: SLP treating. Mild oral stage.  On easy to chew solids, thin liquids.  Spasticity: on baclofen - low dose BID  Headache: Neurology adjusted medications for headache. Weaned from Depakote. Increased Elavil and Topamax. Has Fioricet as needed - increased to BID as needed on 2/24. Neuro signed off - re-consulted on 2/22/24 due to more persistent headache without improvement after taking fioricet - increased amitriptyline and topamax on 2/23.  Seizure: on Vimpat  Pseudobulbar affect: psychiatry treating with dextromethorphan.   Impaired focus/attention: Improved on modafinil   Thrombocytopenia: Platelets 45 on 2/24.  Started on dexamethasone for 4 days on 2/18/24 by hematology without response.  Monitoring.  Abdominal US showed heterogeneous echotexture throughout the liver, possible fatty infiltration.  D-dimer elevated to 5.17 on 2/21.  Other additional labs ordered by hematology WNL.  May need bone marrow biopsy per their note - could be done outpatient per Dr. Thomas.  No other treatment recommended at this time per Dr. Thomas.  Follow up with hematology 1 week after discharge (Dr. Chang).  Anemia: Hemoglobin 13.2 on 2/24, stable.  Monitoring.  Left arm neurologic pain: On gabapentin.  Has lidoderm patch as needed  OA bilateral knees: Has Voltaren gel BID  Cellulitis: Related to peripheral IV. Completed Keflex 1/29/24.  Severe emphysema: Stable on room air  Infrarenal AAA: Measured 4.5 cm. For outpatient follow up  Mild hyponatremia:

## 2024-02-25 VITALS
HEART RATE: 69 BPM | OXYGEN SATURATION: 95 % | DIASTOLIC BLOOD PRESSURE: 79 MMHG | RESPIRATION RATE: 16 BRPM | SYSTOLIC BLOOD PRESSURE: 122 MMHG | TEMPERATURE: 98 F | BODY MASS INDEX: 25.91 KG/M2 | WEIGHT: 181 LBS | HEIGHT: 70 IN

## 2024-02-25 LAB
ERYTHROCYTE [DISTWIDTH] IN BLOOD BY AUTOMATED COUNT: 23.2 % (ref 11.5–14.9)
HCT VFR BLD AUTO: 42.9 % (ref 41–53)
HGB BLD-MCNC: 14.1 G/DL (ref 13.5–17.5)
MCH RBC QN AUTO: 28.6 PG (ref 26–34)
MCHC RBC AUTO-ENTMCNC: 32.8 G/DL (ref 31–37)
MCV RBC AUTO: 87.1 FL (ref 80–100)
PLATELET # BLD AUTO: 43 K/UL (ref 150–450)
PMV BLD AUTO: 9.7 FL (ref 6–12)
RBC # BLD AUTO: 4.92 M/UL (ref 4.5–5.9)
WBC OTHER # BLD: 6.3 K/UL (ref 3.5–11)

## 2024-02-25 PROCEDURE — 92507 TX SP LANG VOICE COMM INDIV: CPT

## 2024-02-25 PROCEDURE — 85027 COMPLETE CBC AUTOMATED: CPT

## 2024-02-25 PROCEDURE — 99232 SBSQ HOSP IP/OBS MODERATE 35: CPT | Performed by: INTERNAL MEDICINE

## 2024-02-25 PROCEDURE — 99232 SBSQ HOSP IP/OBS MODERATE 35: CPT | Performed by: STUDENT IN AN ORGANIZED HEALTH CARE EDUCATION/TRAINING PROGRAM

## 2024-02-25 PROCEDURE — 97129 THER IVNTJ 1ST 15 MIN: CPT

## 2024-02-25 PROCEDURE — 6370000000 HC RX 637 (ALT 250 FOR IP): Performed by: STUDENT IN AN ORGANIZED HEALTH CARE EDUCATION/TRAINING PROGRAM

## 2024-02-25 PROCEDURE — 6370000000 HC RX 637 (ALT 250 FOR IP): Performed by: PHYSICAL MEDICINE & REHABILITATION

## 2024-02-25 PROCEDURE — 97542 WHEELCHAIR MNGMENT TRAINING: CPT

## 2024-02-25 PROCEDURE — 97110 THERAPEUTIC EXERCISES: CPT

## 2024-02-25 PROCEDURE — 1180000000 HC REHAB R&B

## 2024-02-25 PROCEDURE — 97535 SELF CARE MNGMENT TRAINING: CPT

## 2024-02-25 PROCEDURE — 97530 THERAPEUTIC ACTIVITIES: CPT

## 2024-02-25 PROCEDURE — 86038 ANTINUCLEAR ANTIBODIES: CPT

## 2024-02-25 PROCEDURE — 36415 COLL VENOUS BLD VENIPUNCTURE: CPT

## 2024-02-25 PROCEDURE — 6370000000 HC RX 637 (ALT 250 FOR IP): Performed by: PSYCHIATRY & NEUROLOGY

## 2024-02-25 PROCEDURE — 86225 DNA ANTIBODY NATIVE: CPT

## 2024-02-25 RX ORDER — AMITRIPTYLINE HYDROCHLORIDE 50 MG/1
50 TABLET, FILM COATED ORAL NIGHTLY
Qty: 30 TABLET | Refills: 0 | Status: SHIPPED | OUTPATIENT
Start: 2024-02-25

## 2024-02-25 RX ORDER — ACETAMINOPHEN 325 MG/1
650 TABLET ORAL EVERY 6 HOURS PRN
COMMUNITY
Start: 2024-02-25

## 2024-02-25 RX ORDER — DEXTROMETHORPHAN POLISTIREX 30 MG/5ML
30 SUSPENSION ORAL DAILY
Qty: 150 ML | Refills: 0 | Status: SHIPPED | OUTPATIENT
Start: 2024-02-25 | End: 2024-03-26

## 2024-02-25 RX ORDER — TOPIRAMATE 50 MG/1
50 TABLET, FILM COATED ORAL 2 TIMES DAILY
Qty: 60 TABLET | Refills: 0 | Status: SHIPPED | OUTPATIENT
Start: 2024-02-25

## 2024-02-25 RX ORDER — IPRATROPIUM BROMIDE AND ALBUTEROL SULFATE 2.5; .5 MG/3ML; MG/3ML
3 SOLUTION RESPIRATORY (INHALATION) EVERY 6 HOURS PRN
Qty: 360 ML | Refills: 0 | Status: SHIPPED | OUTPATIENT
Start: 2024-02-25

## 2024-02-25 RX ORDER — BUTALBITAL, ACETAMINOPHEN AND CAFFEINE 300; 40; 50 MG/1; MG/1; MG/1
1 CAPSULE ORAL 2 TIMES DAILY PRN
Qty: 60 CAPSULE | Refills: 0 | Status: SHIPPED | OUTPATIENT
Start: 2024-02-25

## 2024-02-25 RX ADMIN — BACLOFEN 5 MG: 10 TABLET ORAL at 07:43

## 2024-02-25 RX ADMIN — DOCUSATE SODIUM 283 MG: 283 LIQUID RECTAL at 17:23

## 2024-02-25 RX ADMIN — AMITRIPTYLINE HYDROCHLORIDE 50 MG: 50 TABLET, FILM COATED ORAL at 20:33

## 2024-02-25 RX ADMIN — TOPIRAMATE 50 MG: 50 TABLET, FILM COATED ORAL at 07:42

## 2024-02-25 RX ADMIN — MODAFINIL 200 MG: 100 TABLET ORAL at 07:42

## 2024-02-25 RX ADMIN — DICLOFENAC SODIUM 2 G: 10 GEL TOPICAL at 23:23

## 2024-02-25 RX ADMIN — TOPIRAMATE 50 MG: 50 TABLET, FILM COATED ORAL at 20:33

## 2024-02-25 RX ADMIN — Medication 30 MG: at 07:42

## 2024-02-25 RX ADMIN — SENNOSIDES 17.2 MG: 8.6 TABLET, FILM COATED ORAL at 20:32

## 2024-02-25 RX ADMIN — BACLOFEN 5 MG: 10 TABLET ORAL at 20:32

## 2024-02-25 RX ADMIN — DICLOFENAC SODIUM 2 G: 10 GEL TOPICAL at 07:47

## 2024-02-25 RX ADMIN — Medication 1 TABLET: at 07:42

## 2024-02-25 RX ADMIN — Medication 6 MG: at 20:32

## 2024-02-25 RX ADMIN — GABAPENTIN 100 MG: 100 CAPSULE ORAL at 07:42

## 2024-02-25 RX ADMIN — GABAPENTIN 100 MG: 100 CAPSULE ORAL at 14:25

## 2024-02-25 RX ADMIN — ATORVASTATIN CALCIUM 80 MG: 80 TABLET, FILM COATED ORAL at 20:33

## 2024-02-25 RX ADMIN — CITALOPRAM HYDROBROMIDE 10 MG: 10 TABLET ORAL at 07:42

## 2024-02-25 RX ADMIN — GABAPENTIN 100 MG: 100 CAPSULE ORAL at 20:33

## 2024-02-25 RX ADMIN — LACOSAMIDE 100 MG: 100 TABLET, FILM COATED ORAL at 20:33

## 2024-02-25 RX ADMIN — BUTALBITA,ACETAMINOPHEN AND CAFFEINE 1 CAPSULE: 50; 300; 40 CAPSULE ORAL at 07:41

## 2024-02-25 RX ADMIN — LACOSAMIDE 100 MG: 100 TABLET, FILM COATED ORAL at 07:42

## 2024-02-25 RX ADMIN — FERROUS SULFATE TAB 325 MG (65 MG ELEMENTAL FE) 325 MG: 325 (65 FE) TAB at 07:42

## 2024-02-25 ASSESSMENT — PAIN DESCRIPTION - LOCATION
LOCATION: HEAD
LOCATION: HEAD

## 2024-02-25 ASSESSMENT — PAIN SCALES - GENERAL
PAINLEVEL_OUTOF10: 8

## 2024-02-25 ASSESSMENT — PAIN DESCRIPTION - ORIENTATION: ORIENTATION: OTHER (COMMENT)

## 2024-02-25 ASSESSMENT — PAIN DESCRIPTION - DESCRIPTORS
DESCRIPTORS: SHARP
DESCRIPTORS: THROBBING

## 2024-02-25 NOTE — PROGRESS NOTES
Physical Therapy  Facility/Department: [facility] ACUTE REHAB  Rehabilitation Physical Therapy    NAME: Brennen Mcclure  : 1965 (59 y.o.)  MRN: [id number]  CODE STATUS: DNR-CCA    Date of Service: 24      Past Medical History:   Diagnosis Date    CAD (coronary artery disease)     Chronic deep vein thrombosis (DVT) of both lower extremities (HCC)     Chronic idiopathic thrombocytopenia (HCC)     CVA (cerebral vascular accident) (HCC)     HLD (hyperlipidemia)      Past Surgical History:   Procedure Laterality Date    CORONARY ARTERY BYPASS GRAFT      CORONARY STENT PLACEMENT         Chart Reviewed: Yes  Family / Caregiver Present: No  General Comment  Comments: Pt's wife called and cancelled car transfer on  due to cold temps, but reports will do a car transfer later. Pt's wife did not come in on  and no car transfer completed. Pt's wife said car simulator in the gym is too hard to use and is not the same as when they did the car transfer at the home evaluation last week.    Restrictions:  Restrictions/Precautions: General Precautions;Fall Risk;Up as Tolerated  Required Braces or Orthoses  Other:  (Helmet on when OOB)  Left Upper Extremity Brace/Splint: Resting hand (Pt's wife purchased Giv Jovon sling.)  Position Activity Restriction  Other position/activity restrictions: Helmet on when OOB; Sling for transfers/mobility only     SUBJECTIVE  Subjective: Pt is agreeable to try and work hard with PT.  Pain: Denies headache or any pain this date.      Functional Mobility  Bed mobility  Rolling to Left: Stand by assistance  Rolling to Right: Maximum assistance  Supine to Sit: Contact guard assistance (stabilizing torso)  Sit to Supine: Minimum assistance (assisting left LE)  Scooting: Contact guard assistance  Bed Mobility Comments: Flat bed however bedrail needed    Transfers  Sit to Stand: Contact guard assistance (CGA x1 in //bars, 2nd person SBA for safety)  Stand to Sit: Moderate Assistance  (pt is very distracted and needed extra assist to move hips back as pt was pushing chair backwards.)  Bed to Chair: Moderate assistance (Squat pivot transfers to right)  Squat Pivot Transfers: Moderate Assistance;Minimal Assistance (to R & L. Varies from Minx2 to Mod x1 (to L, Pt has increased difficulty managing pivot c L LE/foot))  Lateral Transfers: Contact guard assistance (CGA while EOB scooting toward HOB)    Balance  Posture: Poor  Sitting - Static: Fair;- (CGA EOB)  Sitting - Dynamic: Poor;- (easily distracted, extra time and cues for following hand and objects)  Standing - Static: Poor;+ (Bracing L knee, hip)  Standing - Dynamic: Poor;+ (Reaching outside base of support c R UE, writer blocking L knee/facilitating L hip extension. Occasional LOB & diminishing posture, but able to correct c extra time.)    Environmental Mobility  Stairs/Curb  Stairs?: No      Propulsion 1  Propulsion: Manual  Level: Level Tile  Method: RUE;RLE  Level of Assistance: Stand by assistance (frequent cues to look on the left)  Description/ Details: slow paced, requires cues to navigate obstacles, wide turns- requires assistance, assistance to maintain straight path. Easily distracted  Distance: 276' x2 in AM    Propulsion 2  Propulsion 2: Manual  Level 2: Ramp  Method 2: RUE;RLE  Level of Assistance 2: Minimal assistance  Description/ Details 2: pt ascended ramp going backwards, cues for direction.  Distance 2: 50ft (short ramp)    PT Exercises  Exercise Treatment: Toilet transfer x1 c bariatric scotty Stedy. CGA x2 + dependent for donning/doffing/pericare.  Pressure Relief Exercises: Practice squat pivot transfers from w/c to mat x8reps varying surface height on mat  Functional Mobility Circuit Training: Bed mobility, x2  Static Sitting Balance Exercises: Dangle unsupported EOB 15 min., seated unsupported Edge of mat 15 min.  Dynamic Sitting Balance Exercises: seated reaching out of base of support 2 LOB to the right due to pushing,

## 2024-02-25 NOTE — PROGRESS NOTES
Today's Date: 2/25/2024  Patient Name: Brennen Mcclure  Date of admission: 1/27/2024  3:01 PM  Patient's age: 59 y.o., 1965  Admission Dx: 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 (HCC) [I69.152]    Reason for Consult:  Low platelets   Requesting Physician: Dipika Angulo MD    CHIEF COMPLAINT:  No chief complaint on file.      History Obtained From:  patient and chart  INTERVAL HISTORY:              Interval history:    Patient seen and examined  Labs vital reviewed  Hemoglobin stable at 14.1.  Platelet count down to 43,000.  Patient working with rehab.  No new complaint or interval event overnight.  Aspirin remains on hold  Peripheral smear from 12/12 showed some circulating giant forms.  Negative for schistocytes.    HISTORY OF PRESENT ILLNESS:    Brennen Mcclure is a 59 y.o. male who is admitted to the hospital on 1/27/2024  for acute rehab after recent hospitalization for acute stroke.    Patient had recent hospitalization for left hemiparesis secondary to hemorrhagic CVA and subsequently discharged to acute rehab at [facility].  Patient has history of dural venous sinus thrombosis and also history of DVT and was chronically maintained on Eliquis.    Patient currently is on Eliquis and now noted to have thrombocytopenia which is progressively getting worse therefore hematology was consulted.  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  Patient had a evaluation at [facility] back in 2020 as well and as per the chart.    Past Medical History:   has a past medical history of CAD (coronary artery disease), Chronic deep vein thrombosis (DVT) of both lower extremities (HCC), Chronic idiopathic thrombocytopenia (HCC), CVA (cerebral vascular accident) (HCC), and HLD (hyperlipidemia).    Past Surgical History:   has a past surgical history that includes  with outpatient follow-up  Will follow as an outpatient    Discussed with patient and Nurse.    Thank you for asking us to see this patient.        CHANA KHAN MD                        This note is created with the assistance of a speech recognition program.  While intending to generate a document that actually reflects the content of the visit, the document can still have some errors including those of syntax and sound a like substitutions which may escape proof reading.  It such instances, actual meaning can be extrapolated by contextual diversion.

## 2024-02-25 NOTE — PROGRESS NOTES
way, Assist in rolling to R    Assessment  Assessment  Activity Tolerance: Patient tolerated treatment well;Patient limited by fatigue  Discharge Recommendations: Continue to assess pending progress    Patient Education  Education  Education Given To: Patient  Education Provided: Plan of Care;Transfer Training  Education Provided Comments: Self care tasks.  Education Method: Verbal;Demonstration  Barriers to Learning: Cognition  Education Outcome: Verbalized understanding;Demonstrated understanding;Continued education needed    OT Equipment Recommendations  ADL Assistive Devices: Long-handled Sponge;Leg ;Sock-Aid Hard;Reacher;Grab Bars - toilet;Grab Bars - shower;Grab Bars - tub;Hand-held Shower;Transfer Tub Bench;Long-handled Shoe Horn;Gait Belt;Toileting - 3-in-1 Commode;Toileting - Raised Toilet Seat without arms  Other: TBD    Safety Devices  Safety Devices in place: Yes  Type of devices: Call light within reach;All fall risk precautions in place;Gait belt;Nurse notified;Left in bed;Bed alarm in place       Goals  Patient Goals   Patient goals : \"To get home\"  Short Term Goals  Time Frame for Short Term Goals: By 1 week  Short Term Goal 1: Pt will complete UB ADLs with Mod A, good safety, and use of AE/DME/modified techniques as needed  Short Term Goal 2: Pt will complete LB ADLs with Max A, good safety, and use of AE/DME/Modified techniques as needed  Short Term Goal 3: Pt will complete sit to stand transfers in [facility] with Mod A x2 and good safety in preparation for functional transfers  Short Term Goal 4: Pt will actively participate in 30+ minutes of therapeutic exercise/functional activity/social participation for increased safety and independence with self-care for improved quality of life  Short Term Goal 5: Pt will be educated on and explore use of AE/DME/Modified techniques as needed to complete self-care and mobility  Additional Goals?: Yes  Short Term Goal 6: Pt will participate in dynamic  AE/DME/Modified techniques as needed  Long Term Goal 9: Pt will identify 3 positive leisure activities to increased overall quality of life  Long Term Goal 10: Pt will verbalize/demonstrate good understanding of 3 positive coping strategies to manage emotions/anxiety to promote maximal participation in self-care and functional tasks    Plan  Occupational Therapy Plan  Times Per Week: 900 minutes of OT/PT/SLP  Current Treatment Recommendations: Self-Care / ADL, Strengthening, ROM, Balance training, Functional mobility training, Endurance training, Wheelchair mobility training, Neuromuscular re-education, Cognitive reorientation, Pain management, Safety education & training, Patient/Caregiver education & training, Equipment evaluation, education, & procurement, Positioning, Home management training, Coordination training, Co-Treatment         02/25/24 0802 02/25/24 1415   OT Individual Minutes   Time In 0802 1409   Time Out 0902 1440   Minutes 60 31         Electronically signed by FORTINO Ashford on 2/25/24 at 4:20 PM EST

## 2024-02-25 NOTE — PROGRESS NOTES
SPEECH LANGUAGE PATHOLOGY  Speech Language Pathology  [facility] ACUTE REHAB    Cognitive Treatment Note    Date: 2/25/2024  Patient’s Name: Brennen Mcclure  MRN: [id number]  Diagnosis:   Patient Active Problem List   Diagnosis Code    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 (HCC) I69.152    Pseudobulbar affect F48.2    Intractable headache R51.9    Intracranial hemorrhage (HCC) I62.9    Thrombocytopenia (HCC) D69.6    Anemia D64.9    Chronic idiopathic thrombocytopenic purpura (HCC) D69.3    Seizure (HCC) R56.9    Dural venous sinus thrombosis G08    Chronic deep vein thrombosis (DVT) of proximal vein of right lower extremity (HCC) I82.5Y1       Pain: 8/10 (head, L hip)     Cognitive Treatment    Treatment time: 1352-2234      Subjective: [x] Alert [x] Cooperative     [] Confused     [] Agitated    [] Lethargic      Objective/Assessment:  Attention: Occasional repetition and redirection required throughout.  Long response latency demo. T/o.     Continued education provided re: sustained attention strategies and L visual neglect strategies (including use of pencil to guide vision, self-cue).  Pt verbalized understanding, independently turning off TV to minimize distractions.  Visual scanning task, claudio letters/numbers x2- 50% accuracy (I), increasing to 100% c mod cues (visual and verbal -- cues to attend to task and attend to items on L side).  Improved sustained attention with task.  Pt continues to demonstrate significant L side visual neglect and requiring cueing to attend to L side.      Recall: Immediate recall, 3 units- 100% accuracy (I).  Delayed recall, 3 units (5 min delay)- 100% accuracy (I).      Organization: n/a    Problem Solving/Reasoning: n/a    Speech: Pt presents with mild dysarthria characterized by imprecise articulation, blended word boundaries, rapid rate and reduced vocal intensity.  Pt required min (verbal cues) to facilitate strategies in conversation and mod cues with

## 2024-02-25 NOTE — PROGRESS NOTES
Physical Medicine & Rehabilitation  Progress Note      Subjective:      Brennen Mcclure is a 59 y.o. male with hemorrhagic CVA and left nondominant hemiparesis.    He reports doing fine today.  He notes ongoing headache but states that fioricet helps some.  He denies any other acute concerns.  Discussed plan for discharge tomorrow with patient and his wife  - they are agreeable.  He is okay for discharge, per hematologist, with outpatient follow up - no further inpatient testing recommended.    ROS:  Denies fevers, chills, sweats.  No chest pain, palpitations, lightheadedness.  Denies coughing, wheezing or shortness of breath.  Denies abdominal pain, nausea, diarrhea or constipation.  No new areas of joint pain.  Denies new areas of numbness or weakness.  Denies new anxiety or depression issues.  No new skin problems.    Rehabilitation:     Physical Therapy    Restrictions/Precautions: General Precautions, Fall Risk, Up as Tolerated  Other position/activity restrictions: Helmet on when OOB; Sling for transfers/mobility only  Required Braces or Orthoses  Other:  (Helmet on when OOB)  Left Upper Extremity Brace/Splint: Resting hand (Pt's wife purchased Giv Jovon sling.)    Bed mobility  Bridging: Minimal assistance (Stabilizing Left LE pt able to bridge.)  Rolling to Left: Stand by assistance  Rolling to Right: Maximum assistance  Supine to Sit: Contact guard assistance  Sit to Supine: Minimal assistance (assisting left LE into bed, pt able to assist left UE onto chest)  Scooting: Contact guard assistance  Bed Mobility Comments: Flat bed however bedrail needed    Transfers  Sit to Stand: Contact guard assistance (CGA x1 in //bars, 2nd person SBA for safety)  Stand to Sit: Moderate Assistance (pt is very distracted and needed extra assist to move hips back as pt was pushing chair backwards.)  Bed to Chair: Moderate assistance (Squat pivot transfers to right)  Stand Pivot Transfers: Dependent/Total (Bariatric scotty  tejas)  Squat Pivot Transfers: Moderate Assistance, Minimal Assistance (to R & L. Varies from Minx2 to Mod x1 (to L, Pt has increased difficulty managing pivot c L LE/foot))  Lateral Transfers: Contact guard assistance (CGA while EOB scooting toward HOB)  Car Transfer: 2 Person Assistance (x4)  Comment: No device used today.    Ambulation  Surface: Level tile  Device: Hemiwalker  Other Apparatus: Wheelchair follow (+ UE sling)  Assistance: Dependent/Total (hands-on assist x2 + follow)  Quality of Gait: Lacks L knee & hip extension, P weight shift; facilitation, cues & assist for same (largely dependent for L LE advancement). No return to postural cues or assist for trunk. Narrow base of support, minimal return to cues.  Gait Deviations: Slow Sabrina, Decreased step length, Decreased step height, Deviated path  Distance: 7'  Comments: No Ambulation performed with home environment; pt expected to be mobilizing at WC level for his safety as well as care giver's.      Occupational Therapy  ADL  Feeding  Assistance Level: Minimal assistance  Skilled Clinical Factors: PM: Per spouse report, Pt req assist in opening containers and cutting up food.     Grooming/Oral Hygiene  Assistance Level: Supervision  Skilled Clinical Factors: AM: pt washes face/neck with HOB elevated PM: Pt shaves face and brushes gums/tongue HOB is elevated, SUP for safety and cues for thoroughness. uses mouth/LUE to stablize tube to twist open tooth paste, RUE to prudencio toothpaste and brush gums and tongue.     Upper Extremity Bathing  Assistance Level: Supervision  Skilled Clinical Factors: AM: pt demo's G use of natacha technique supine in bed with HOB elevated,  propping LUE up in order to reach L axilla. SUP for  cues to initiate and attention to task.     Lower Extremity Bathing  Assistance Level: Minimal assistance  Skilled Clinical Factors: AM: pt rolling in bed to address rhiannon area and BLE, min A required to wash/dry L foot however pt utilizing

## 2024-02-25 NOTE — PROGRESS NOTES
bedtime 2/19/24  Yes Danica De La Paz MD   Benzocaine-Menthol (CEPACOL) 6-10 MG LOZG lozenge Take 1 lozenge by mouth every 2 hours as needed for Sore Throat 2/18/24  Yes Danica De La Paz MD   Multiple Vitamins-Minerals (THERAPEUTIC MULTIVITAMIN-MINERALS) tablet Take 1 tablet by mouth daily 2/19/24  Yes Danica De La Paz MD   baclofen (LIORESAL) 5 MG tablet Take 1 tablet by mouth 2 times daily 2/19/24  Yes Danica De La Paz MD   albuterol (PROVENTIL) (2.5 MG/3ML) 0.083% nebulizer solution Take 3 mLs by nebulization every 6 hours as needed for Wheezing 2/18/24  Yes Danica De La Paz MD   ipratropium 0.5 mg-albuterol 2.5 mg (DUONEB) 0.5-2.5 (3) MG/3ML SOLN nebulizer solution Inhale 3 mLs into the lungs three times daily 2/19/24  Yes Danica De La Paz MD   modafinil (PROVIGIL) 200 MG tablet Take 1 tablet by mouth daily for 30 days. Max Daily Amount: 200 mg 2/19/24 3/20/24 Yes Danica De La Paz MD        Allergies:     Patient has no known allergies.    Social History:     Tobacco:    reports that he quit smoking about 3 months ago. His smoking use included cigarettes. He has never used smokeless tobacco.  Alcohol:      has no history on file for alcohol use.  Drug Use:  has no history on file for drug use.    Family History:     Family History   Problem Relation Age of Onset    Coronary Art Dis Mother     Stroke Other        Review of Systems:     Positive and Negative as described in HPI.    CONSTITUTIONAL:  negative for fevers, chills, sweats, fatigue, weight loss  HEENT:  negative for vision, hearing changes, runny nose, throat pain  RESPIRATORY:  negative for shortness of breath, cough, congestion, wheezing.  CARDIOVASCULAR:  negative for chest pain, palpitations.  GASTROINTESTINAL:  negative for nausea, vomiting, diarrhea, constipation, change in bowel habits, abdominal pain   GENITOURINARY:  negative for difficulty of urination, burning with urination, frequency   INTEGUMENT:  negative for rash, skin  setting.    Blanca Ren MD  2/25/2024  2:38 PM    Copy sent to Dr. Stack primary care provider on file.    Please note that this chart was generated using voice recognition Dragon dictation software.  Although every effort was made to ensure the accuracy of this automated transcription, some errors in transcription may have occurred.

## 2024-02-26 ENCOUNTER — TELEPHONE (OUTPATIENT)
Dept: ONCOLOGY | Age: 59
End: 2024-02-26

## 2024-02-26 ENCOUNTER — OFFICE VISIT (OUTPATIENT)
Dept: NEUROSURGERY | Age: 59
End: 2024-02-26
Payer: COMMERCIAL

## 2024-02-26 VITALS — SYSTOLIC BLOOD PRESSURE: 123 MMHG | DIASTOLIC BLOOD PRESSURE: 80 MMHG | HEART RATE: 73 BPM

## 2024-02-26 DIAGNOSIS — M95.2 ACQUIRED SKULL DEFECT: ICD-10-CM

## 2024-02-26 DIAGNOSIS — G81.14 LEFT SPASTIC HEMIPLEGIA (HCC): Primary | ICD-10-CM

## 2024-02-26 LAB
ANA SER QL IA: NEGATIVE
DSDNA IGG SER QL IA: 2.2 IU/ML
ERYTHROCYTE [DISTWIDTH] IN BLOOD BY AUTOMATED COUNT: 22.9 % (ref 11.5–14.9)
HCT VFR BLD AUTO: 41.1 % (ref 41–53)
HGB BLD-MCNC: 13.6 G/DL (ref 13.5–17.5)
MCH RBC QN AUTO: 28.8 PG (ref 26–34)
MCHC RBC AUTO-ENTMCNC: 33 G/DL (ref 31–37)
MCV RBC AUTO: 87.4 FL (ref 80–100)
NUCLEAR IGG SER IA-RTO: 0.1 U/ML
PLATELET # BLD AUTO: 44 K/UL (ref 150–450)
PMV BLD AUTO: 10.2 FL (ref 6–12)
RBC # BLD AUTO: 4.7 M/UL (ref 4.5–5.9)
WBC OTHER # BLD: 5.9 K/UL (ref 3.5–11)

## 2024-02-26 PROCEDURE — 36415 COLL VENOUS BLD VENIPUNCTURE: CPT

## 2024-02-26 PROCEDURE — 97530 THERAPEUTIC ACTIVITIES: CPT

## 2024-02-26 PROCEDURE — 6370000000 HC RX 637 (ALT 250 FOR IP): Performed by: PSYCHIATRY & NEUROLOGY

## 2024-02-26 PROCEDURE — 85027 COMPLETE CBC AUTOMATED: CPT

## 2024-02-26 PROCEDURE — 6370000000 HC RX 637 (ALT 250 FOR IP): Performed by: PHYSICAL MEDICINE & REHABILITATION

## 2024-02-26 PROCEDURE — 92507 TX SP LANG VOICE COMM INDIV: CPT

## 2024-02-26 PROCEDURE — 3017F COLORECTAL CA SCREEN DOC REV: CPT | Performed by: NEUROLOGICAL SURGERY

## 2024-02-26 PROCEDURE — 99203 OFFICE O/P NEW LOW 30 MIN: CPT | Performed by: NEUROLOGICAL SURGERY

## 2024-02-26 PROCEDURE — G8427 DOCREV CUR MEDS BY ELIG CLIN: HCPCS | Performed by: NEUROLOGICAL SURGERY

## 2024-02-26 PROCEDURE — G8419 CALC BMI OUT NRM PARAM NOF/U: HCPCS | Performed by: NEUROLOGICAL SURGERY

## 2024-02-26 PROCEDURE — 99239 HOSP IP/OBS DSCHRG MGMT >30: CPT | Performed by: PHYSICAL MEDICINE & REHABILITATION

## 2024-02-26 PROCEDURE — 97542 WHEELCHAIR MNGMENT TRAINING: CPT

## 2024-02-26 PROCEDURE — 6370000000 HC RX 637 (ALT 250 FOR IP): Performed by: STUDENT IN AN ORGANIZED HEALTH CARE EDUCATION/TRAINING PROGRAM

## 2024-02-26 PROCEDURE — 97112 NEUROMUSCULAR REEDUCATION: CPT

## 2024-02-26 PROCEDURE — 97129 THER IVNTJ 1ST 15 MIN: CPT

## 2024-02-26 PROCEDURE — G8484 FLU IMMUNIZE NO ADMIN: HCPCS | Performed by: NEUROLOGICAL SURGERY

## 2024-02-26 PROCEDURE — 4004F PT TOBACCO SCREEN RCVD TLK: CPT | Performed by: NEUROLOGICAL SURGERY

## 2024-02-26 PROCEDURE — 97535 SELF CARE MNGMENT TRAINING: CPT

## 2024-02-26 RX ORDER — IPRATROPIUM BROMIDE AND ALBUTEROL SULFATE 2.5; .5 MG/3ML; MG/3ML
1 SOLUTION RESPIRATORY (INHALATION) EVERY 4 HOURS PRN
Status: DISCONTINUED | OUTPATIENT
Start: 2024-02-26 | End: 2024-02-26 | Stop reason: HOSPADM

## 2024-02-26 RX ORDER — THIAMINE HYDROCHLORIDE 100 MG/ML
100 INJECTION, SOLUTION INTRAMUSCULAR; INTRAVENOUS DAILY
Status: ON HOLD | COMMUNITY
End: 2024-02-26 | Stop reason: HOSPADM

## 2024-02-26 RX ADMIN — CITALOPRAM HYDROBROMIDE 10 MG: 10 TABLET ORAL at 07:55

## 2024-02-26 RX ADMIN — DICLOFENAC SODIUM 2 G: 10 GEL TOPICAL at 07:57

## 2024-02-26 RX ADMIN — BUTALBITA,ACETAMINOPHEN AND CAFFEINE 1 CAPSULE: 50; 300; 40 CAPSULE ORAL at 15:06

## 2024-02-26 RX ADMIN — GABAPENTIN 100 MG: 100 CAPSULE ORAL at 15:06

## 2024-02-26 RX ADMIN — Medication 1 TABLET: at 07:55

## 2024-02-26 RX ADMIN — TOPIRAMATE 50 MG: 50 TABLET, FILM COATED ORAL at 07:59

## 2024-02-26 RX ADMIN — Medication 30 MG: at 07:57

## 2024-02-26 RX ADMIN — BACLOFEN 5 MG: 10 TABLET ORAL at 07:55

## 2024-02-26 RX ADMIN — GABAPENTIN 100 MG: 100 CAPSULE ORAL at 07:55

## 2024-02-26 RX ADMIN — MODAFINIL 200 MG: 100 TABLET ORAL at 07:55

## 2024-02-26 RX ADMIN — LACOSAMIDE 100 MG: 100 TABLET, FILM COATED ORAL at 07:55

## 2024-02-26 ASSESSMENT — PAIN SCALES - GENERAL: PAINLEVEL_OUTOF10: 6

## 2024-02-26 ASSESSMENT — PAIN DESCRIPTION - LOCATION: LOCATION: HEAD

## 2024-02-26 NOTE — PROGRESS NOTES
SPEECH LANGUAGE PATHOLOGY  Speech Language Pathology  [facility] ACUTE REHAB    Speech/Cognitive Treatment Note    Date: 2/26/2024  Patient’s Name: Brennen Mcclure  MRN: [id number]  Diagnosis:   Patient Active Problem List   Diagnosis Code    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 (HCC) I69.152    Pseudobulbar affect F48.2    Intractable headache R51.9    Intracranial hemorrhage (HCC) I62.9    Thrombocytopenia (HCC) D69.6    Anemia D64.9    Chronic idiopathic thrombocytopenic purpura (HCC) D69.3    Seizure (HCC) R56.9    Dural venous sinus thrombosis G08    Chronic deep vein thrombosis (DVT) of proximal vein of right lower extremity (HCC) I82.5Y1       Pain: 8/10 (head, L hip)     Speech/Cognitive Treatment    Treatment time: 5445-8416      Subjective: [x] Alert [x] Cooperative     [] Confused     [] Agitated    [] Lethargic      Objective/Assessment:  Attention: Occasional repetition and redirection required throughout.  Occasional long response latency demo. T/o.     Continued education provided re: sustained attention strategies and L visual neglect strategies.  Pt verbalized understanding.  Visual scanning task, paragraph reading- 60% accuracy (I), increasing to 100% c mod cues (visual and verbal -- cues to attend to items on L side).  Pt continues to demonstrate significant L side visual neglect and requiring cueing to attend to L side.  Discussed self cuing and attending to L side at home following d/c.      Recall: Education provided re: use of internal and external memory aides (e.g., calendar, notepad, cell-phone) to facilitate recall of important information.  Pt/wife verbalized understanding and agreeable.      Organization: n/a    Problem Solving/Reasoning: Reasoning, ID similar category items/odd one out (field of 3)- 90% accuracy (I), increasing to 100% cued.      Speech: Pt presents with mild dysarthria characterized by imprecise articulation, blended word boundaries, rapid rate and reduced  vocal intensity.  Pt required min (verbal cues) to facilitate strategies in conversation and mod cues with articulation drills task (paragraph reading -- 5-7 sentences).  Speech judged to be ~95% intelligible in conversation; 75% intelligible during paragraph reading task.  Pt able to complete OMEs x4 sets in reps of 20 c mod cues.  Pt continues to demonstrate impaired labial and lingual strength, coordination and ROM with exercises.  Discussed practice of speech tasks and OMEs at home following d/c.     Other: Pt's wife present for session.  Pt reports eager for potential d/c home today.      Pt and wife education provided re: cognitive stimulation throughout day and at home following d/c (e.g., worksheets provided by writer, word puzzles, card games, reading tasks, etc).  Pt/wife verbalized understanding and agreeable.      Continued ST recommended following d/c (per wife will not initially have ST services therefore grateful for all education and ST tasks to practice provided by writer for at home).       Plan:  [x] Continue ST services    [] Discharge from ST:      Discharge recommendations: [] Inpatient Rehab   [] Skilled Nursing Facility   [] Outpatient Therapy  [] Follow up at trauma clinic   [] Other:       Treatment completed by: Zofia Martinez M.A., CCC-SLP

## 2024-02-26 NOTE — PLAN OF CARE
Problem: Respiratory - Adult  Goal: Achieves optimal ventilation and oxygenation  2/26/2024 1459 by Tamy Celestin LPN  Outcome: Completed     Problem: Hematologic - Adult  Goal: Maintains hematologic stability  2/26/2024 1459 by Tamy Celestin LPN  Outcome: Completed     Problem: Skin/Tissue Integrity  Goal: Absence of new skin breakdown  Description: 1.  Monitor for areas of redness and/or skin breakdown  2.  Assess vascular access sites hourly  3.  Every 4-6 hours minimum:  Change oxygen saturation probe site  4.  Every 4-6 hours:  If on nasal continuous positive airway pressure, respiratory therapy assess nares and determine need for appliance change or resting period.  2/26/2024 1459 by Tamy Celestin LPN  Outcome: Completed

## 2024-02-26 NOTE — DISCHARGE INSTR - DIET

## 2024-02-26 NOTE — CARE COORDINATION
[facility] Acute Inpatient Rehabilitation  Case Management Discharge Note    Discharge Disposition: Home with spouse    Discharge Transportation Method: car ,  Time: afternoon    IMM Letter Status: Not Applicable: Patient does not have Medicare as a payor.    Home Healthcare Services:  [facility]    Outpatient Therapy Services: Not Applicable    DME: No DME Needs Identified    Current reconciled medication list sent to subsequent provider via: Paper Based (e.g. fax, copies, printouts)      Patient Health Questionnaire-9 (PHQ-2 to 9)   Over the last 2 weeks, how often have you been bothered by any of the following problems?    1. Little Interest or pleasure in doing things?   Never or 1 Day - Score 0    2. Feeling down, depressed or hopeless?   Never or 1 Day - Score 0    3. Trouble falling or staying asleep, or sleeping too much?   Never or 1 Day - Score 0    4. Feeling tired or having little energy?   Never or 1 Day - Score 0    5. Poor appetite or overeating?   Never or 1 Day - Score 0    6. Feeling bad about yourself-or that you are a failure or have let yourself or your family down?   Never or 1 Day - Score 0    7. Trouble concentrating on things, such as reading the newspaper or watching television?    Never or 1 Day - Score 0    8. Moving or speaking so slowly that other people could have noticed? Or the opposite-being so fidgety or restless that you have been moving around a lot more than usual?  Never or 1 Day - Score 0    9. Thoughts that you would be better off dead or of hurting yourself in some way?   Never or 1 Day - Score 0    Total Score: 0  If score is above 15, Notify PM&R Physician    If you checked off any problems, how difficult have these problems made it for you to do your work, take care of things at home, or get along with other people?   [x] Not difficult at all     [] Somewhat Difficult     [] Very Difficult     [] Extremely Difficult         Social Isolation     How

## 2024-02-26 NOTE — PROGRESS NOTES
CLINICAL PHARMACY NOTE: MEDS TO BEDS    Total # of Prescriptions Filled:  12   The following medications were delivered to the patient:  Topiramate 50mg  Amitritpyline 50mg  Butalbital/APAP/Caffeine 50/325/40 tabs  Cough DM 30mg/5ml(Generic Delsym)  Ipratropium/Albuterol 0.5/2.5 vials  Modafinil 200mg  Lacosamide 100mg  Gabapentin 100mg  Citalopram 10mg  Atorvastatin 80mg  Ferosul 325(Iron)  Baclofen 5mg     Additional Documentation: delivered to pt and wife in room 2/26/24 4:08pm Nurse Tamy pappas [id number] complete kbg

## 2024-02-26 NOTE — INTERDISCIPLINARY ROUNDS
[facility] Acute Inpatient Rehabilitation  INTERDISCIPLINARY MEETING  Date: 24  Patient Name: Brennen Mcclure       Room: 2633/2633-01  MRN: [id number]       : 1965  (59 y.o.)     Gender: male     Patient's care team, including nursing, speech language pathologist, occupational therapist, and/or physical therapist, met to discuss patient's care needs. Any patient concerns, change in medical status, and current transfer/mobility status were identified at this time.     Any Additional Pertinent Information:   Pt has Neuro Appt at 130 PM,  at 1245. Plans are for pt to DC today.     Participating Team Members:  Nurse: Tamy Celestin LPN    Occupational Therapist:  Bhupinder Gutierrez OT   Physical Therapist: Fany Kelly  PT  Speech Therapist:  Zofia Martinez M.A., CCC-SLP

## 2024-02-26 NOTE — RT PROTOCOL NOTE
RT Inhaler-Nebulizer Bronchodilator Protocol Note    There is a bronchodilator order in the chart from a provider indicating to follow the RT Bronchodilator Protocol and there is an “Initiate RT Inhaler-Nebulizer Bronchodilator Protocol” order as well (see protocol at bottom of note).    CXR Findings:  No results found.    The findings from the last RT Protocol Assessment were as follows:   History Pulmonary Disease: Chronic pulmonary disease  Respiratory Pattern: Regular pattern and RR 12-20 bpm  Breath Sounds: Clear breath sounds  Cough: Strong, spontaneous, non-productive  Indication for Bronchodilator Therapy: None  Bronchodilator Assessment Score: 2    Aerosolized bronchodilator medication orders have been revised according to the RT Inhaler-Nebulizer Bronchodilator Protocol below.    Respiratory Therapist to perform RT Therapy Protocol Assessment initially then follow the protocol.  Repeat RT Therapy Protocol Assessment PRN for score 0-3 or on second treatment, BID, and PRN for scores above 3.    No Indications - adjust the frequency to every 6 hours PRN wheezing or bronchospasm, if no treatments needed after 48 hours then discontinue using Per Protocol order mode.     If indication present, adjust the RT bronchodilator orders based on the Bronchodilator Assessment Score as indicated below.  Use Inhaler orders unless patient has one or more of the following: on home nebulizer, not able to hold breath for 10 seconds, is not alert and oriented, cannot activate and use MDI correctly, or respiratory rate 25 breaths per minute or more, then use the equivalent nebulizer order(s) with same Frequency and PRN reasons based on the score.  If a patient is on this medication at home then do not decrease Frequency below that used at home.    0-3 - enter or revise RT bronchodilator order(s) to equivalent RT Bronchodilator order with Frequency of every 4 hours PRN for wheezing or increased work of breathing using Per  Protocol order mode.        4-6 - enter or revise RT Bronchodilator order(s) to two equivalent RT bronchodilator orders with one order with BID Frequency and one order with Frequency of every 4 hours PRN wheezing or increased work of breathing using Per Protocol order mode.        7-10 - enter or revise RT Bronchodilator order(s) to two equivalent RT bronchodilator orders with one order with TID Frequency and one order with Frequency of every 4 hours PRN wheezing or increased work of breathing using Per Protocol order mode.       11-13 - enter or revise RT Bronchodilator order(s) to one equivalent RT bronchodilator order with QID Frequency and an Albuterol order with Frequency of every 4 hours PRN wheezing or increased work of breathing using Per Protocol order mode.      Greater than 13 - enter or revise RT Bronchodilator order(s) to one equivalent RT bronchodilator order with every 4 hours Frequency and an Albuterol order with Frequency of every 2 hours PRN wheezing or increased work of breathing using Per Protocol order mode.     RT to enter RT Home Evaluation for COPD & MDI Assessment order using Per Protocol order mode.    Electronically signed by Janay Underwood RCP on 2/26/2024 at 6:50 AM

## 2024-02-26 NOTE — PROGRESS NOTES
[facility] NEUROSURGERY CENTER [place]  [address]  [id number]  [address]  [facility] [address]  Dept: [phone]    Patient:  Brennen Mcclure  YOB: 1965  Date: 2/26/24    The patient is a 59 y.o. male who presents today for consult of the following problems:     Chief Complaint   Patient presents with    New Patient    Consultation             HPI:     Brennen Mcclure is a 59 y.o. male on whom neurosurgical consultation was requested by No primary care provider on file. for management of right-sided cranial defect.  The patient is a  and while in [place] started having a severe headache and the next morning woke up with complete plegia of his left upper extremity.  He did go to the hospital and workup revealed significant thrombosis along with a right-sided infarct.  Patient underwent a right-sided decompressive hemicraniectomy and was subsequently placed on Eliquis as well as aspirin.  After this timeframe the patient had multiple episodes of significant thrombocytopenia with extensive workup with hematology.  They have been unable to elicit the actual etiology of the thrombocytopenia thus far but he is currently in inpatient rehabilitation and being followed by hematology.  There has been Decadron that has been placed and it appears that he has been off of the Eliquis and the aspirin for period of approximately 6 to 7 days at this point.      History:     Past Medical History:   Diagnosis Date    CAD (coronary artery disease)     Chronic deep vein thrombosis (DVT) of both lower extremities (HCC)     Chronic idiopathic thrombocytopenia (HCC)     CVA (cerebral vascular accident) (HCC)     HLD (hyperlipidemia)      Past Surgical History:   Procedure Laterality Date    CORONARY ARTERY BYPASS GRAFT      CORONARY STENT PLACEMENT       Family History   Problem Relation Age of Onset    Coronary Art Dis Mother     Stroke

## 2024-02-26 NOTE — TELEPHONE ENCOUNTER
Received a call from MARCELA Nelson at [facility] that Bebo at [phone] was reaching out to schedule a f/u appt with Dr Chang for pt.  Consult was completed in house on 01/27/2024. PT is still admitted.  Called Bebo to schedule post hospital f/u.  Left detailed message for Bebo at [phone] to call writer at the [facility] to get appt scheduled for pt.    Electronically signed by Sushma Real on 2/26/2024 at 11:36 AM

## 2024-02-26 NOTE — PROGRESS NOTES
[facility]   Acute Rehabilitation Occupational Therapy Daily Treatment Note    Date: 24  Patient Name: Brennen Mcclure       Room: 2633/2633-01  MRN: [id number]  Account: [id number]   : 1965  (59 y.o.) Gender: male       Referring Practitioner: Danica De La Paz MD  Diagnosis: Hemiplegia following ntrm intcrbl hemor aff left dominant side  Additional Pertinent Hx: 59 year old male who admitted 23 with 4 days of new L arm weakness and headache. He had known history of MI and CABG - on dabigatran but with inconsistent use due to prescription running out. He had previous treatment for L ICA thrombus and R sided symptoms treated with “clot busting medication” in . 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 He was transferred from [place], [place] to [facility] for medical management and was admitted to neuro critical care. His LUE numbness/weakness worsened to near complete hemiparesis. Initial GCS 15. He was initially treated with heparin gtt for DVST. On 23 Dr. Jameson Parsons (neurosurgery) performed R sided decompressive hemicraniectomy with evacuation of IPH x2 sites with dural repair. He was stable post op on heparin drip and extubated 23 after stable head CT 23. He was then bridged to warfarin. He is currently being treated with warfarin with therapeutic INR since 23. Hb stable at 13.6 on 24. Thrombocytopenia noted with platelet count 30 - hematology being reconsulted - previously recommended count >40. Currently requiring timed voids for incontinence. PM&R anticipating likely wheelchair mobility with [facility] for transfers.    Treatment Diagnosis: Impaired self-care status    Past Medical History:  has a past medical history of CAD (coronary artery disease), Chronic deep vein thrombosis (DVT) of both lower extremities (HCC), Chronic idiopathic  able to feed self.    Putting On/Taking Off Footwear  Assistance Level: Moderate assistance  Skilled Clinical Factors: Max A pt providing some assist by placing LLE into shoe and it able to prudencio R shoe mod I seated in wc this date      Pet Care  Pet Care: Education on safety with pets within the home provided during home eval         Mobility     Sit to Stand  Assistance Level: Moderate assistance  Skilled Clinical Factors: Mod A to stand from wc this date pt requires vc to sequence tf  Stand to Sit  Assistance Level: Minimal assistance  Skilled Clinical Factors: Min A for controlled sit and propper positioning of hips       Functional Mobility  Device: Wheelchair  Activity: To/From therapy gym  Assistance Level: Modified independent  Skilled Clinical Factors: noted G ability to utilize loaner chair and G positioning in chair despite lack of arm tray    Neuromuscular Education  Neuromuscular education: Yes  Joint Compression: Joint compressions completed L shoulder to increase joint stability, strength and provide proprioceptive input. pt with G tolerance no reported pain  Vibration: applied to L elbow for improved facilitation to promote functional mvmt, increased elbow flexion noted this date with fair sensory tolerance. Pt demo'd increased elbow flexion with application of vibration this date.  Functional Movement Patterns: pt instructed to slide the washcloth in into therapists hang with LUE, completing an elbow flexion movement with gravity eliminated, fair return noted this date possibly d/t increased distractions this date.       OT Exercises  A/AROM Exercises: AAROM to complete elbow flexion during functional task      Assessment  Assessment  Activity Tolerance: Patient tolerated treatment well  Discharge Recommendations: Continue to assess pending progress    Patient Education  Education  Education Given To: Family;Patient  Education Provided: Equipment (Give more sling)  Education Provided Comments:

## 2024-02-26 NOTE — PROGRESS NOTES
Assessment   Over the past 5 days, how much of the time has pain made it hard for you to sleep at night?   Rarely or not at all   Over the past 5 days, how often have you limited your participation in rehabilitation therapy sessions due to pain?   Rarely or not at all   Over the past 5 days, how often have you limited your day-to-day activities (excluding rehabilitation therapy session)?   Rarely or not at all     Special Treatments, Procedures, and Programs   Check all of the following treatments, procedures, and programs that apply on admission.    Cancer Treatments   Chemotherapy No   If Yes, Check All That Apply   []IV Chemotherapy    []Oral Chemotherapy    [] Chemotherapy    Radiation No   Respiratory Therapies   Oxygen Therapy No  If Yes, Check All That Apply   []Continuous   []Intermittent    []High-Concentration    Suctioning No  If Yes, Check All That Apply   []Scheduled   []As Needed   Tracheostomy Care No   Invasive Mechanical Ventilator   (Ventilator or Respirator) No  If Yes, Check All That Apply   [] Non-invasive Mechanical Ventilator   []BiPAP   []CPAP   Other   IV Medications No  If Yes, Check All That Apply   [] IV Vasoactive Medications   []IV Antibiotics   []IV Anticoagulation   [] IV Medications   Transfusions No   Dialysis No  If Yes, Check All That Apply   []Hemodialysis   [] Dialysis   IV Access No  If Yes, Check All That Apply   []Peripheral   []Midline   [] (PICC, tunneled, port)

## 2024-02-26 NOTE — PROGRESS NOTES
Physical Therapy  Facility/Department: [facility] ACUTE REHAB   Physical Therapy     NAME: Brennen Mcclure  : 1965 (59 y.o.)  MRN: [id number]  CODE STATUS: DNR-CCA    Date of Service: 24      Past Medical History:   Diagnosis Date    CAD (coronary artery disease)     Chronic deep vein thrombosis (DVT) of both lower extremities (HCC)     Chronic idiopathic thrombocytopenia (HCC)     CVA (cerebral vascular accident) (HCC)     HLD (hyperlipidemia)      Past Surgical History:   Procedure Laterality Date    CORONARY ARTERY BYPASS GRAFT      CORONARY STENT PLACEMENT         Chart Reviewed: Yes  Patient assessed for rehabilitation services?: Yes  Additional Pertinent Hx: 59 year old male who admitted 23 with 4 days of new L arm weakness and headache. He had known history of MI and CABG - on dabigatran but with inconsistent use due to prescription running out. He had previous treatment for L ICA thrombus and R sided symptoms treated with “clot busting medication” in . 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 He was transferred from [place], [place] to [facility] for medical management and was admitted to neuro critical care. His LUE numbness/weakness worsened to near complete hemiparesis. Initial GCS 15. He was initially treated with heparin gtt for DVST. On 23 Dr. Jameson Parsons (neurosurgery) performed R sided decompressive hemicraniectomy with evacuation of IPH x2 sites with dural repair. He was stable post op on heparin drip and extubated 23 after stable head CT 23. He was then bridged to warfarin. He is currently being treated with warfarin with therapeutic INR since 23  Family / Caregiver Present: No  Referring Practitioner: Danica De La Paz MD  Referral Date : 24  Diagnosis: Hemiplegia following ntrm intcrbl hemor effecting  left dominant

## 2024-02-26 NOTE — PROGRESS NOTES
Today's Date: 2/26/2024  Patient Name: Brennen Mcclure  Date of admission: 1/27/2024  3:01 PM  Patient's age: 59 y.o., 1965  Admission Dx: 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 (HCC) [I69.152]    Reason for Consult:  Low platelets   Requesting Physician: Dipika Angulo MD    CHIEF COMPLAINT:  No chief complaint on file.      History Obtained From:  patient and chart  INTERVAL HISTORY:              Interval history:    Patient seen and examined  Labs vital reviewed  Hemoglobin stable at 14.1.  Platelet count down to 43,000.  Patient working with rehab.  No new complaint or interval event overnight.  Aspirin remains on hold  Peripheral smear from 12/12 showed some circulating giant forms.  Negative for schistocytes.      Platelet count 44,000.  SILVA screen negative      HISTORY OF PRESENT ILLNESS:    Brennen Mcclure is a 59 y.o. male who is admitted to the hospital on 1/27/2024  for acute rehab after recent hospitalization for acute stroke.    Patient had recent hospitalization for left hemiparesis secondary to hemorrhagic CVA and subsequently discharged to acute rehab at [facility].  Patient has history of dural venous sinus thrombosis and also history of DVT and was chronically maintained on Eliquis.    Patient currently is on Eliquis and now noted to have thrombocytopenia which is progressively getting worse therefore hematology was consulted.  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  Patient had a evaluation at [facility] back in 2020 as well and as per the chart.    Past Medical History:   has a past medical history of CAD (coronary artery disease), Chronic deep vein thrombosis (DVT) of both lower extremities (HCC), Chronic idiopathic thrombocytopenia (HCC), CVA (cerebral vascular accident) (HCC), and HLD (hyperlipidemia).    Past Surgical

## 2024-02-27 ENCOUNTER — TELEPHONE (OUTPATIENT)
Dept: INTERNAL MEDICINE CLINIC | Age: 59
End: 2024-02-27

## 2024-02-27 DIAGNOSIS — D69.3 CHRONIC IDIOPATHIC THROMBOCYTOPENIC PURPURA (HCC): Primary | ICD-10-CM

## 2024-02-27 NOTE — TELEPHONE ENCOUNTER
Kristal called stating patient was evaluated for home care after discharge from hospital and needs an order for an INR/PT to be drawn twice a week. Patient is Homebound and will need an order for someone to come to the house to do his blood draws.    Patient see's Oncology and they ordered the blood draws. Informed Kristal that she will need to call Dr Chang's office for the order. Phone number for that physician given to Kristal.    Patient has a NTP appt with Dr Ren 3/5/24.

## 2024-02-28 ENCOUNTER — HOSPITAL ENCOUNTER (OUTPATIENT)
Age: 59
Discharge: HOME | End: 2024-02-28
Payer: COMMERCIAL

## 2024-02-28 DIAGNOSIS — D69.3 CHRONIC IDIOPATHIC THROMBOCYTOPENIC PURPURA (HCC): ICD-10-CM

## 2024-02-28 LAB
ERYTHROCYTE [DISTWIDTH] IN BLOOD BY AUTOMATED COUNT: 22.6 % (ref 12.5–15.4)
HCT VFR BLD AUTO: 43.1 % (ref 41–53)
HGB BLD-MCNC: 14.2 G/DL (ref 13.5–17.5)
MCH RBC QN AUTO: 28.7 PG (ref 26–34)
MCHC RBC AUTO-ENTMCNC: 32.9 G/DL (ref 31–37)
MCV RBC AUTO: 87.3 FL (ref 80–100)
PLATELET # BLD AUTO: 49 K/UL (ref 140–450)
PMV BLD AUTO: 9.6 FL (ref 6–12)
RBC # BLD AUTO: 4.95 M/UL (ref 4.5–5.9)
WBC OTHER # BLD: 5.2 K/UL (ref 3.5–11)

## 2024-02-28 PROCEDURE — 85027 COMPLETE CBC AUTOMATED: CPT

## 2024-02-28 PROCEDURE — 36415 COLL VENOUS BLD VENIPUNCTURE: CPT

## 2024-02-28 PROCEDURE — 86038 ANTINUCLEAR ANTIBODIES: CPT

## 2024-02-28 PROCEDURE — 86225 DNA ANTIBODY NATIVE: CPT

## 2024-02-29 ENCOUNTER — TELEPHONE (OUTPATIENT)
Dept: ONCOLOGY | Age: 59
End: 2024-02-29

## 2024-02-29 ENCOUNTER — OFFICE VISIT (OUTPATIENT)
Dept: ONCOLOGY | Age: 59
End: 2024-02-29
Payer: COMMERCIAL

## 2024-02-29 VITALS
BODY MASS INDEX: 25.97 KG/M2 | SYSTOLIC BLOOD PRESSURE: 120 MMHG | OXYGEN SATURATION: 97 % | HEART RATE: 73 BPM | WEIGHT: 181 LBS | TEMPERATURE: 98.3 F | DIASTOLIC BLOOD PRESSURE: 82 MMHG

## 2024-02-29 DIAGNOSIS — D69.6 THROMBOCYTOPENIA (HCC): Primary | ICD-10-CM

## 2024-02-29 DIAGNOSIS — Z09 HOSPITAL DISCHARGE FOLLOW-UP: ICD-10-CM

## 2024-02-29 PROCEDURE — G8419 CALC BMI OUT NRM PARAM NOF/U: HCPCS | Performed by: INTERNAL MEDICINE

## 2024-02-29 PROCEDURE — 1111F DSCHRG MED/CURRENT MED MERGE: CPT | Performed by: INTERNAL MEDICINE

## 2024-02-29 PROCEDURE — 1036F TOBACCO NON-USER: CPT | Performed by: INTERNAL MEDICINE

## 2024-02-29 PROCEDURE — 99211 OFF/OP EST MAY X REQ PHY/QHP: CPT | Performed by: INTERNAL MEDICINE

## 2024-02-29 PROCEDURE — G8427 DOCREV CUR MEDS BY ELIG CLIN: HCPCS | Performed by: INTERNAL MEDICINE

## 2024-02-29 PROCEDURE — 3017F COLORECTAL CA SCREEN DOC REV: CPT | Performed by: INTERNAL MEDICINE

## 2024-02-29 PROCEDURE — 99204 OFFICE O/P NEW MOD 45 MIN: CPT | Performed by: INTERNAL MEDICINE

## 2024-02-29 PROCEDURE — G8484 FLU IMMUNIZE NO ADMIN: HCPCS | Performed by: INTERNAL MEDICINE

## 2024-02-29 NOTE — PROGRESS NOTES
Today's Date: 2/29/2024  Patient Name: Brennen Mcclure  Patient's age: 59 y.o., 1965    DIAGNOSIS:   Chronic thrombocytopenia, patient had a evaluation in [place] and also in the past and thought to be immune mediated  History of dural sinus thrombosis and history of DVT in 2019  Chronic anticoagulation with Eliquis  Had a hemorrhagic stroke in December 2023  Patient received platelet transfusion during the hospitalization and also was given IVIG on 1/9/2024 and 1/10/2024  Patient was treated with Decadron 40 mg on 2/18 - 2/20 1-24, with no response    CHIEF COMPLAINT:    Chief Complaint   Patient presents with    Follow-Up from Hospital     Patient was in [place] with anemia        INTERVAL HISTORY:    Patient is return for follow visit and to discuss lab results and further recommendations.  During the hospitalization patient had no response to IVIG or Decadron.  His platelet continue to be low and currently he is off Eliquis because of the risk of bleeding.  He denies any chest pain shortness of breath, no nosebleeds.  During this visit patient's allergy, social, medical, surgical history and medications were reviewed and updated.       HISTORY OF PRESENT ILLNESS:    Brennen Mcclure is a 59 y.o. male who is admitted to the hospital on (Not on file)  for acute rehab after recent hospitalization for acute stroke.    Patient had recent hospitalization for left hemiparesis secondary to hemorrhagic CVA and subsequently discharged to acute rehab at [facility].  Patient has history of dural venous sinus thrombosis and also history of DVT 2019, and June 2023, and was chronically maintained on Eliquis.    Patient currently is on Eliquis and now noted to have thrombocytopenia which is progressively getting worse therefore hematology was consulted.  During his previous admission at outside hospital he was seen by hematology for chronic thrombocytopenia\" thought to be chronic ITP related

## 2024-02-29 NOTE — TELEPHONE ENCOUNTER
IR guide BM biopsy at [facility]  RT one week after Biopsy     Biopsy scheduled for 3/6/24 @ 9:00am @ [facility]      RV scheduled for 3/15/24 @ 10:30am @ [facility]    PT was given AVS and appt schedule    Electronically signed by Cherise Barajas on 2/29/2024 at 12:01 PM

## 2024-03-01 LAB
ANA SER QL IA: NEGATIVE
DSDNA IGG SER QL IA: 2.2 IU/ML
NUCLEAR IGG SER IA-RTO: 0.2 U/ML

## 2024-03-07 ENCOUNTER — HOSPITAL ENCOUNTER (OUTPATIENT)
Dept: VASCULAR LAB | Age: 59
Discharge: HOME | End: 2024-03-09
Attending: PHYSICAL MEDICINE & REHABILITATION

## 2024-03-07 ENCOUNTER — HOSPITAL ENCOUNTER (OUTPATIENT)
Dept: CT IMAGING | Age: 59
Discharge: HOME | End: 2024-03-09
Attending: NEUROLOGICAL SURGERY

## 2024-03-07 ENCOUNTER — HOSPITAL ENCOUNTER (OUTPATIENT)
Age: 59
Discharge: HOME | End: 2024-03-07
Attending: NEUROLOGICAL SURGERY

## 2024-03-07 ENCOUNTER — OFFICE VISIT (OUTPATIENT)
Dept: VASCULAR SURGERY | Age: 59
End: 2024-03-07
Payer: COMMERCIAL

## 2024-03-07 VITALS
SYSTOLIC BLOOD PRESSURE: 124 MMHG | HEART RATE: 78 BPM | OXYGEN SATURATION: 97 % | RESPIRATION RATE: 18 BRPM | HEIGHT: 70 IN | DIASTOLIC BLOOD PRESSURE: 80 MMHG | WEIGHT: 181 LBS | BODY MASS INDEX: 25.91 KG/M2

## 2024-03-07 DIAGNOSIS — I82.511 CHRONIC DEEP VEIN THROMBOSIS (DVT) OF FEMORAL VEIN OF RIGHT LOWER EXTREMITY (HCC): ICD-10-CM

## 2024-03-07 DIAGNOSIS — D69.6 THROMBOCYTOPENIA (HCC): ICD-10-CM

## 2024-03-07 DIAGNOSIS — L81.8 HEMOSIDERIN PIGMENTATION OF SKIN: ICD-10-CM

## 2024-03-07 DIAGNOSIS — I82.5Y1 CHRONIC DEEP VEIN THROMBOSIS (DVT) OF PROXIMAL VEIN OF RIGHT LOWER EXTREMITY (HCC): ICD-10-CM

## 2024-03-07 DIAGNOSIS — M79.89 RIGHT LEG SWELLING: ICD-10-CM

## 2024-03-07 DIAGNOSIS — I87.2 CHRONIC VENOUS INSUFFICIENCY OF LOWER EXTREMITY: ICD-10-CM

## 2024-03-07 DIAGNOSIS — M95.2 ACQUIRED SKULL DEFECT: ICD-10-CM

## 2024-03-07 DIAGNOSIS — I82.521 CHRONIC DEEP VEIN THROMBOSIS (DVT) OF RIGHT ILIAC VEIN (HCC): Primary | ICD-10-CM

## 2024-03-07 LAB
BASOPHILS # BLD: 0.04 K/UL (ref 0–0.2)
BASOPHILS NFR BLD: 1 % (ref 0–2)
EOSINOPHIL # BLD: 0.11 K/UL (ref 0–0.44)
EOSINOPHILS RELATIVE PERCENT: 2 % (ref 1–4)
ERYTHROCYTE [DISTWIDTH] IN BLOOD BY AUTOMATED COUNT: 18.7 % (ref 11.8–14.4)
HCT VFR BLD AUTO: 45.9 % (ref 40.7–50.3)
HGB BLD-MCNC: 14.8 G/DL (ref 13–17)
IMM GRANULOCYTES # BLD AUTO: 0.03 K/UL (ref 0–0.3)
IMM GRANULOCYTES NFR BLD: 1 %
LYMPHOCYTES NFR BLD: 0.73 K/UL (ref 1.1–3.7)
LYMPHOCYTES RELATIVE PERCENT: 13 % (ref 24–43)
MCH RBC QN AUTO: 28.6 PG (ref 25.2–33.5)
MCHC RBC AUTO-ENTMCNC: 32.2 G/DL (ref 28.4–34.8)
MCV RBC AUTO: 88.6 FL (ref 82.6–102.9)
MONOCYTES NFR BLD: 0.71 K/UL (ref 0.1–1.2)
MONOCYTES NFR BLD: 13 % (ref 3–12)
NEUTROPHILS NFR BLD: 72 % (ref 36–65)
NEUTS SEG NFR BLD: 4.04 K/UL (ref 1.5–8.1)
NRBC BLD-RTO: 0 PER 100 WBC
PLATELET # BLD AUTO: ABNORMAL K/UL (ref 138–453)
PLATELET, FLUORESCENCE: 34 K/UL (ref 138–453)
PLATELETS.RETICULATED NFR BLD AUTO: 13.2 % (ref 1.1–10.3)
RBC # BLD AUTO: 5.18 M/UL (ref 4.21–5.77)
RBC # BLD: ABNORMAL 10*6/UL
WBC OTHER # BLD: 5.7 K/UL (ref 3.5–11.3)

## 2024-03-07 PROCEDURE — 85025 COMPLETE CBC W/AUTO DIFF WBC: CPT

## 2024-03-07 PROCEDURE — 93970 EXTREMITY STUDY: CPT

## 2024-03-07 PROCEDURE — 70450 CT HEAD/BRAIN W/O DYE: CPT

## 2024-03-07 PROCEDURE — 85055 RETICULATED PLATELET ASSAY: CPT

## 2024-03-07 PROCEDURE — 99204 OFFICE O/P NEW MOD 45 MIN: CPT | Performed by: SURGERY

## 2024-03-07 PROCEDURE — 36415 COLL VENOUS BLD VENIPUNCTURE: CPT

## 2024-03-07 ASSESSMENT — ENCOUNTER SYMPTOMS
ALLERGIC/IMMUNOLOGIC NEGATIVE: 1
SHORTNESS OF BREATH: 0
COLOR CHANGE: 1
ABDOMINAL PAIN: 0
CHEST TIGHTNESS: 0

## 2024-03-07 NOTE — PROGRESS NOTES
Division of Vascular Surgery        New Consult      Physician Requesting Consult:  Dr. Angulo    Reason for Consult:   DVT    Chief Complaint:      DVT    History of Present Illness:      Brennen Mcclure is a 59 y.o. gentleman who presents for evaluation of chronic right lower extremity DVT.  Initial DVT was several years ago and he developed significant swelling in the right leg.   He had been treated with anticoagulation for some time, but kept dealing with bleeding and thrombocytopenia issues in [place].  Family recalls doctor's in [place] mentioninghis blood clots when into his abdomen.  He had repeat duplex done at rehab center and upon review it had similar chronic findings of his right common femoral, femoral and popliteal veins.  Given his thrombocytopenia and high risk of bleeding with intraparenchymal bleeds he was recommended to stay off anticoagulation and given chronic appearance of DVT he was not recommended to have IVC filter placed.    He comes in with his family today to discuss clinical findings.  He has chronic swelling  in his legs with signs of chronic venous insufficiency in his right leg with hemosiderin deposits and lipodermatosclerotic skin changes.  He has bruising of different timelines along his arms and hands.  He has been following with hematology and plans for one marrow biopsy in the near future.    Medical History:     Past Medical History:   Diagnosis Date    CAD (coronary artery disease)     Chronic deep vein thrombosis (DVT) of both lower extremities (HCC)     Chronic idiopathic thrombocytopenia (HCC)     CVA (cerebral vascular accident) (HCC)     HLD (hyperlipidemia)        Surgical History:     Past Surgical History:   Procedure Laterality Date    CORONARY ARTERY BYPASS GRAFT      CORONARY STENT PLACEMENT         Family History:     Family History   Problem Relation Age of Onset    Coronary Art Dis Mother     Stroke Other        Allergies:       Patient has no known

## 2024-03-07 NOTE — RESULT ENCOUNTER NOTE
Venous dopplar from today 3/7/24- reviewed with patient - chronic and age indeterminate dvt RLE and Acute DVT Rt external iliac vein. She notes they saw Dr De Jesus today - vasc - is aware of the results and currently not rec anticoag due to high risk - plt still low. She is also following with Dr Chang of hematology.  Advised to notify or fo to the ER if CP, SOB, change in status, etc.     She also notes therapy has not been coming to the home, she talked to the insurance company and they are addressing. Advised to notify if still a problem.

## 2024-03-13 ENCOUNTER — HOSPITAL ENCOUNTER (OUTPATIENT)
Age: 59
Setting detail: SPECIMEN
Discharge: HOME | End: 2024-03-13

## 2024-03-13 LAB
ERYTHROCYTE [DISTWIDTH] IN BLOOD BY AUTOMATED COUNT: 18.3 % (ref 11.8–14.4)
HCT VFR BLD AUTO: 48.3 % (ref 40.7–50.3)
HGB BLD-MCNC: 15.4 G/DL (ref 13–17)
MCH RBC QN AUTO: 28.4 PG (ref 25.2–33.5)
MCHC RBC AUTO-ENTMCNC: 31.9 G/DL (ref 28.4–34.8)
MCV RBC AUTO: 89 FL (ref 82.6–102.9)
NRBC BLD-RTO: 0 PER 100 WBC
PLATELET # BLD AUTO: ABNORMAL K/UL (ref 138–453)
PLATELET, FLUORESCENCE: 108 K/UL (ref 138–453)
PLATELETS.RETICULATED NFR BLD AUTO: 8.2 % (ref 1.1–10.3)
RBC # BLD AUTO: 5.43 M/UL (ref 4.21–5.77)
WBC OTHER # BLD: 10.2 K/UL (ref 3.5–11.3)

## 2024-03-18 ENCOUNTER — HOSPITAL ENCOUNTER (OUTPATIENT)
Dept: CT IMAGING | Age: 59
Discharge: HOME | End: 2024-03-20
Payer: COMMERCIAL

## 2024-03-18 VITALS
WEIGHT: 181 LBS | OXYGEN SATURATION: 94 % | BODY MASS INDEX: 24.52 KG/M2 | HEIGHT: 72 IN | TEMPERATURE: 97.7 F | RESPIRATION RATE: 18 BRPM | DIASTOLIC BLOOD PRESSURE: 85 MMHG | SYSTOLIC BLOOD PRESSURE: 134 MMHG | HEART RATE: 73 BPM

## 2024-03-18 DIAGNOSIS — D69.6 THROMBOCYTOPENIA (HCC): ICD-10-CM

## 2024-03-18 LAB
BASOPHILS # BLD: 0.04 K/UL (ref 0–0.2)
BASOPHILS NFR BLD: 1 % (ref 0–2)
EOSINOPHIL # BLD: 0.24 K/UL (ref 0–0.44)
EOSINOPHILS RELATIVE PERCENT: 3 % (ref 1–4)
ERYTHROCYTE [DISTWIDTH] IN BLOOD BY AUTOMATED COUNT: 17.5 % (ref 11.8–14.4)
HCT VFR BLD AUTO: 45.2 % (ref 40.7–50.3)
HGB BLD-MCNC: 14.4 G/DL (ref 13–17)
IMM GRANULOCYTES # BLD AUTO: 0.07 K/UL (ref 0–0.3)
IMM GRANULOCYTES NFR BLD: 1 %
INR PPP: 1
LYMPHOCYTES NFR BLD: 0.94 K/UL (ref 1.1–3.7)
LYMPHOCYTES RELATIVE PERCENT: 12 % (ref 24–43)
MCH RBC QN AUTO: 28.3 PG (ref 25.2–33.5)
MCHC RBC AUTO-ENTMCNC: 31.9 G/DL (ref 28.4–34.8)
MCV RBC AUTO: 89 FL (ref 82.6–102.9)
MONOCYTES NFR BLD: 0.57 K/UL (ref 0.1–1.2)
MONOCYTES NFR BLD: 7 % (ref 3–12)
NEUTROPHILS NFR BLD: 76 % (ref 36–65)
NEUTS SEG NFR BLD: 5.8 K/UL (ref 1.5–8.1)
NRBC BLD-RTO: 0 PER 100 WBC
PARTIAL THROMBOPLASTIN TIME: 25.1 SEC (ref 23–36.5)
PLATELET # BLD AUTO: ABNORMAL K/UL (ref 138–453)
PLATELET, FLUORESCENCE: 63 K/UL (ref 138–453)
PLATELETS.RETICULATED NFR BLD AUTO: 7.4 % (ref 1.1–10.3)
PROTHROMBIN TIME: 13.2 SEC (ref 11.7–14.9)
RBC # BLD AUTO: 5.08 M/UL (ref 4.21–5.77)
RBC # BLD: ABNORMAL 10*6/UL
WBC OTHER # BLD: 7.7 K/UL (ref 3.5–11.3)

## 2024-03-18 PROCEDURE — C1830 POWER BONE MARROW BX NEEDLE: HCPCS

## 2024-03-18 PROCEDURE — 7100000041 HC SPAR PHASE II RECOVERY - ADDTL 15 MIN: Performed by: RADIOLOGY

## 2024-03-18 PROCEDURE — 36415 COLL VENOUS BLD VENIPUNCTURE: CPT

## 2024-03-18 PROCEDURE — 88305 TISSUE EXAM BY PATHOLOGIST: CPT

## 2024-03-18 PROCEDURE — 7100000040 HC SPAR PHASE II RECOVERY - FIRST 15 MIN: Performed by: RADIOLOGY

## 2024-03-18 PROCEDURE — 85610 PROTHROMBIN TIME: CPT

## 2024-03-18 PROCEDURE — 2580000003 HC RX 258: Performed by: PHYSICIAN ASSISTANT

## 2024-03-18 PROCEDURE — 85025 COMPLETE CBC W/AUTO DIFF WBC: CPT

## 2024-03-18 PROCEDURE — 88313 SPECIAL STAINS GROUP 2: CPT

## 2024-03-18 PROCEDURE — 6360000002 HC RX W HCPCS: Performed by: RADIOLOGY

## 2024-03-18 PROCEDURE — 88280 CHROMOSOME KARYOTYPE STUDY: CPT

## 2024-03-18 PROCEDURE — 88264 CHROMOSOME ANALYSIS 20-25: CPT

## 2024-03-18 PROCEDURE — 88311 DECALCIFY TISSUE: CPT

## 2024-03-18 PROCEDURE — 85055 RETICULATED PLATELET ASSAY: CPT

## 2024-03-18 PROCEDURE — 88342 IMHCHEM/IMCYTCHM 1ST ANTB: CPT

## 2024-03-18 PROCEDURE — 88237 TISSUE CULTURE BONE MARROW: CPT

## 2024-03-18 PROCEDURE — 88341 IMHCHEM/IMCYTCHM EA ADD ANTB: CPT

## 2024-03-18 PROCEDURE — 85730 THROMBOPLASTIN TIME PARTIAL: CPT

## 2024-03-18 RX ORDER — ACETAMINOPHEN 325 MG/1
650 TABLET ORAL EVERY 4 HOURS PRN
Status: DISCONTINUED | OUTPATIENT
Start: 2024-03-18 | End: 2024-03-21 | Stop reason: HOSPADM

## 2024-03-18 RX ORDER — FENTANYL CITRATE 50 UG/ML
INJECTION, SOLUTION INTRAMUSCULAR; INTRAVENOUS PRN
Status: COMPLETED | OUTPATIENT
Start: 2024-03-18 | End: 2024-03-18

## 2024-03-18 RX ORDER — SODIUM CHLORIDE 9 MG/ML
INJECTION, SOLUTION INTRAVENOUS CONTINUOUS
Status: DISCONTINUED | OUTPATIENT
Start: 2024-03-18 | End: 2024-03-21 | Stop reason: HOSPADM

## 2024-03-18 RX ADMIN — SODIUM CHLORIDE: 9 INJECTION, SOLUTION INTRAVENOUS at 12:00

## 2024-03-18 RX ADMIN — SODIUM CHLORIDE: 9 INJECTION, SOLUTION INTRAVENOUS at 13:25

## 2024-03-18 RX ADMIN — FENTANYL CITRATE 50 MCG: 50 INJECTION, SOLUTION INTRAMUSCULAR; INTRAVENOUS at 13:05

## 2024-03-18 RX ADMIN — FENTANYL CITRATE 25 MCG: 50 INJECTION, SOLUTION INTRAMUSCULAR; INTRAVENOUS at 13:15

## 2024-03-18 RX ADMIN — FENTANYL CITRATE 25 MCG: 50 INJECTION, SOLUTION INTRAMUSCULAR; INTRAVENOUS at 13:07

## 2024-03-18 ASSESSMENT — PAIN DESCRIPTION - DESCRIPTORS
DESCRIPTORS: DISCOMFORT;ACHING
DESCRIPTORS: ACHING;DISCOMFORT

## 2024-03-18 ASSESSMENT — PAIN - FUNCTIONAL ASSESSMENT
PAIN_FUNCTIONAL_ASSESSMENT: PREVENTS OR INTERFERES SOME ACTIVE ACTIVITIES AND ADLS
PAIN_FUNCTIONAL_ASSESSMENT: 0-10
PAIN_FUNCTIONAL_ASSESSMENT: 0-10

## 2024-03-18 ASSESSMENT — PAIN DESCRIPTION - LOCATION
LOCATION: HEAD
LOCATION: HEAD

## 2024-03-18 ASSESSMENT — PAIN SCALES - GENERAL: PAINLEVEL_OUTOF10: 6

## 2024-03-18 ASSESSMENT — PAIN DESCRIPTION - PAIN TYPE: TYPE: CHRONIC PAIN

## 2024-03-18 NOTE — PROGRESS NOTES
Pt assisted with getting dressed with pt's wife's assistance and then helped to get up via pt's own Radha Steady from home and accompanied by pt's wife, pt's son and pt's daughter-in-law to surgery entrance door and helped into his wife's car and then discharged to home without issues.

## 2024-03-18 NOTE — BRIEF OP NOTE
Brief Postoperative Note for Bone Marrow Biopsy    Brennen Mcclure  YOB: 1965  [id number]    Pre-operative Diagnosis: Thrombocytopenia     Post-operative Diagnosis: Same     Procedure: Left posterior iliac bone marrow aspiration with and without heparin & biopsy     Anesthesia: 1% lidocaine and fentanyl     Surgeons/Assistants: MD Gomez; MIGUEL ANGEL Ramirez     Estimated Blood Loss: less than 50      Complications: None     Specimens Were Obtained: from the Left posterior iliac crest using an Arrow Oncontrol Device 11 g x 4 in. 6 cc was collected with heparin and 5 cc was collected without heparin. A bone biopsy was also obtained. Specimens were received by hem/onc at the time of collection. Vital signs were reviewed and were stable after the procedure.     Electronically signed by MIGUEL ANGEL Ramirez on 3/18/2024 at 1:17 PM

## 2024-03-18 NOTE — H&P
Dipika Angulo MD   gabapentin (NEURONTIN) 100 MG capsule Take 1 capsule by mouth 3 times daily for 90 days. 2/19/24 5/19/24  Danica De La Paz MD   lacosamide (VIMPAT) 100 MG TABS tablet Take 1 tablet by mouth 2 times daily for 90 days. Max Daily Amount: 200 mg 2/19/24 5/19/24  Danica De La Paz MD   citalopram (CELEXA) 10 MG tablet Take 1 tablet by mouth daily 2/19/24   Danica De La Paz MD   atorvastatin (LIPITOR) 80 MG tablet Take 1 tablet by mouth nightly 2/19/24   Danica De La Paz MD   ferrous sulfate (IRON 325) 325 (65 Fe) MG tablet Take 1 tablet by mouth every other day  Patient not taking: Reported on 3/18/2024 2/19/24   Danica De La Pza MD   docusate sodium (ENEMEEZ) 283 MG enema Place 5 mLs rectally every 24 hours 2/19/24   Danica De La Paz MD   polyethylene glycol (GLYCOLAX) 17 g packet Take 1 packet by mouth daily as needed for Constipation 2/18/24   Danica De La Paz MD   senna (SENOKOT) 8.6 MG tablet Take 2 tablets by mouth nightly 2/19/24 3/20/24  Danica De La Paz MD   melatonin 3 MG TABS tablet Take 2 tablets by mouth at bedtime 2/19/24   Danica De La Paz MD   Multiple Vitamins-Minerals (THERAPEUTIC MULTIVITAMIN-MINERALS) tablet Take 1 tablet by mouth daily 2/19/24   Danica De La Paz MD   baclofen (LIORESAL) 5 MG tablet Take 1 tablet by mouth 2 times daily 2/19/24   Danica De La Paz MD   modafinil (PROVIGIL) 200 MG tablet Take 1 tablet by mouth daily for 30 days. Max Daily Amount: 200 mg 2/19/24 3/20/24  Danica De La Paz MD     Past Medical History    has a past medical history of Anemia, CAD (coronary artery disease), Chronic deep vein thrombosis (DVT) of both lower extremities (HCC), Chronic deep vein thrombosis (DVT) of femoral vein of right lower extremity (HCC), Chronic deep vein thrombosis (DVT) of proximal vein of right lower extremity (HCC), Chronic deep vein thrombosis (DVT) of right iliac vein (HCC), Chronic idiopathic thrombocytopenia (HCC), Chronic idiopathic

## 2024-03-20 ENCOUNTER — HOSPITAL ENCOUNTER (OUTPATIENT)
Age: 59
Setting detail: SPECIMEN
Discharge: HOME | End: 2024-03-20

## 2024-03-20 ENCOUNTER — TELEMEDICINE (OUTPATIENT)
Dept: NEUROSURGERY | Age: 59
End: 2024-03-20
Payer: COMMERCIAL

## 2024-03-20 DIAGNOSIS — M95.2 ACQUIRED SKULL DEFECT: Primary | ICD-10-CM

## 2024-03-20 DIAGNOSIS — G81.14 LEFT SPASTIC HEMIPLEGIA (HCC): ICD-10-CM

## 2024-03-20 LAB
ERYTHROCYTE [DISTWIDTH] IN BLOOD BY AUTOMATED COUNT: 17.2 % (ref 11.8–14.4)
HCT VFR BLD AUTO: 44.4 % (ref 40.7–50.3)
HGB BLD-MCNC: 14.3 G/DL (ref 13–17)
MCH RBC QN AUTO: 28.7 PG (ref 25.2–33.5)
MCHC RBC AUTO-ENTMCNC: 32.2 G/DL (ref 28.4–34.8)
MCV RBC AUTO: 89.2 FL (ref 82.6–102.9)
NRBC BLD-RTO: 0 PER 100 WBC
PLATELET # BLD AUTO: ABNORMAL K/UL (ref 138–453)
PLATELET, FLUORESCENCE: 62 K/UL (ref 138–453)
PLATELETS.RETICULATED NFR BLD AUTO: 9.7 % (ref 1.1–10.3)
RBC # BLD AUTO: 4.98 M/UL (ref 4.21–5.77)
WBC OTHER # BLD: 5.4 K/UL (ref 3.5–11.3)

## 2024-03-20 PROCEDURE — G8428 CUR MEDS NOT DOCUMENT: HCPCS | Performed by: NEUROLOGICAL SURGERY

## 2024-03-20 PROCEDURE — 99212 OFFICE O/P EST SF 10 MIN: CPT | Performed by: NEUROLOGICAL SURGERY

## 2024-03-20 PROCEDURE — 3017F COLORECTAL CA SCREEN DOC REV: CPT | Performed by: NEUROLOGICAL SURGERY

## 2024-03-20 PROCEDURE — 1111F DSCHRG MED/CURRENT MED MERGE: CPT | Performed by: NEUROLOGICAL SURGERY

## 2024-03-20 NOTE — PROGRESS NOTES
Abnormal-  Sclera  [x]  Normal  [] Abnormal -         Discharge [x]  None visible  [] Abnormal -    HENT:   [x] Normocephalic, atraumatic.  [] Abnormal   [x] Mouth/Throat: Mucous membranes are moist.     External Ears [x] Normal  [] Abnormal-     Neck: [x] No visualized mass     Pulmonary/Chest: [x] Respiratory effort normal.  [x] No visualized signs of difficulty breathing or respiratory distress        [] Abnormal-      Musculoskeletal:   [x] Normal gait with no signs of ataxia         [x] Normal range of motion of neck        [] Abnormal-       Neurological:        [x] No Facial Asymmetry (Cranial nerve 7 motor function) (limited exam to video visit)          [x] No gaze palsy        [x] Abnormal- L hemiplegia        Skin:        [x] No significant exanthematous lesions or discoloration noted on facial skin         [] Abnormal-            Psychiatric:       [x] Normal Affect [x] No Hallucinations        [] Abnormal-     Other pertinent observable physical exam findings-     ASSESSMENT/PLAN:  Acquired Skull Defect  Thrombocytopenia    Patient is having worsening headaches and there are concerns regarding his symptoms per the wife.  I did reach out to Dr. Thomas of oncology and sent a staff message regarding concerns with platelet transfusion perioperatively and the risk of thrombotic event exacerbation as well as the concern regarding the effectiveness.    Once I have a plan in place from a hematology standpoint I can proceed with fabrication of the synthetic flap and subsequent scheduling of surgery        Brennen Mcclure, was evaluated through a synchronous (real-time) audio-video encounter. The patient (or guardian if applicable) is aware that this is a billable service, which includes applicable co-pays. This Virtual Visit was conducted with patient's (and/or legal guardian's) consent. Patient identification was verified, and a caregiver was present when appropriate.   The patient was located at Home: [address]

## 2024-03-21 LAB
BONE MARROW REPORT: NORMAL
CHROMOSOME STUDY: NORMAL

## 2024-03-27 ENCOUNTER — HOSPITAL ENCOUNTER (OUTPATIENT)
Age: 59
Setting detail: SPECIMEN
Discharge: HOME | End: 2024-03-27

## 2024-03-27 LAB
ERYTHROCYTE [DISTWIDTH] IN BLOOD BY AUTOMATED COUNT: 16.8 % (ref 11.8–14.4)
FLOW CYTOMETRY, BM: NORMAL
HCT VFR BLD AUTO: 48.1 % (ref 40.7–50.3)
HGB BLD-MCNC: 15.3 G/DL (ref 13–17)
MCH RBC QN AUTO: 28.4 PG (ref 25.2–33.5)
MCHC RBC AUTO-ENTMCNC: 31.8 G/DL (ref 28.4–34.8)
MCV RBC AUTO: 89.4 FL (ref 82.6–102.9)
NRBC BLD-RTO: 0 PER 100 WBC
PLATELET # BLD AUTO: ABNORMAL K/UL (ref 138–453)
PLATELET, FLUORESCENCE: 48 K/UL (ref 138–453)
PLATELETS.RETICULATED NFR BLD AUTO: 13.5 % (ref 1.1–10.3)
RBC # BLD AUTO: 5.38 M/UL (ref 4.21–5.77)
WBC OTHER # BLD: 6.4 K/UL (ref 3.5–11.3)

## 2024-04-01 ENCOUNTER — OFFICE VISIT (OUTPATIENT)
Dept: INTERNAL MEDICINE CLINIC | Age: 59
End: 2024-04-01
Payer: MEDICAID

## 2024-04-01 VITALS
DIASTOLIC BLOOD PRESSURE: 80 MMHG | SYSTOLIC BLOOD PRESSURE: 124 MMHG | HEART RATE: 76 BPM | BODY MASS INDEX: 24.55 KG/M2 | OXYGEN SATURATION: 96 % | WEIGHT: 181 LBS

## 2024-04-01 DIAGNOSIS — F48.2 PSEUDOBULBAR AFFECT: ICD-10-CM

## 2024-04-01 DIAGNOSIS — R51.9 INTRACTABLE HEADACHE, UNSPECIFIED CHRONICITY PATTERN, UNSPECIFIED HEADACHE TYPE: ICD-10-CM

## 2024-04-01 DIAGNOSIS — F41.9 ANXIETY AND DEPRESSION: ICD-10-CM

## 2024-04-01 DIAGNOSIS — F32.A ANXIETY AND DEPRESSION: ICD-10-CM

## 2024-04-01 DIAGNOSIS — R35.1 NOCTURIA: ICD-10-CM

## 2024-04-01 DIAGNOSIS — I82.511 CHRONIC DEEP VEIN THROMBOSIS (DVT) OF FEMORAL VEIN OF RIGHT LOWER EXTREMITY (HCC): Primary | ICD-10-CM

## 2024-04-01 DIAGNOSIS — I69.152: ICD-10-CM

## 2024-04-01 DIAGNOSIS — Z13.220 SCREENING FOR HYPERLIPIDEMIA: ICD-10-CM

## 2024-04-01 DIAGNOSIS — R56.9 SEIZURE (HCC): ICD-10-CM

## 2024-04-01 DIAGNOSIS — N40.1 BENIGN PROSTATIC HYPERPLASIA WITH LOWER URINARY TRACT SYMPTOMS, SYMPTOM DETAILS UNSPECIFIED: ICD-10-CM

## 2024-04-01 PROCEDURE — 99204 OFFICE O/P NEW MOD 45 MIN: CPT | Performed by: INTERNAL MEDICINE

## 2024-04-01 PROCEDURE — 81000 URINALYSIS NONAUTO W/SCOPE: CPT | Performed by: INTERNAL MEDICINE

## 2024-04-01 RX ORDER — M-VIT,TX,IRON,MINS/CALC/FOLIC 27MG-0.4MG
1 TABLET ORAL DAILY
Qty: 30 TABLET | Refills: 0 | Status: SHIPPED | OUTPATIENT
Start: 2024-04-01

## 2024-04-01 RX ORDER — ATORVASTATIN CALCIUM 80 MG/1
80 TABLET, FILM COATED ORAL NIGHTLY
Qty: 30 TABLET | Refills: 3 | Status: SHIPPED | OUTPATIENT
Start: 2024-04-01

## 2024-04-01 RX ORDER — TOPIRAMATE 50 MG/1
50 TABLET, FILM COATED ORAL 2 TIMES DAILY
Qty: 60 TABLET | Refills: 0 | Status: SHIPPED | OUTPATIENT
Start: 2024-04-01

## 2024-04-01 RX ORDER — CITALOPRAM HYDROBROMIDE 10 MG/1
10 TABLET ORAL DAILY
Qty: 30 TABLET | Refills: 3 | Status: SHIPPED | OUTPATIENT
Start: 2024-04-01

## 2024-04-01 RX ORDER — TAMSULOSIN HYDROCHLORIDE 0.4 MG/1
0.4 CAPSULE ORAL DAILY
Qty: 30 CAPSULE | Refills: 0 | Status: SHIPPED | OUTPATIENT
Start: 2024-04-01

## 2024-04-01 RX ORDER — LACOSAMIDE 100 MG/1
100 TABLET ORAL 2 TIMES DAILY
Qty: 60 TABLET | Refills: 2 | Status: SHIPPED | OUTPATIENT
Start: 2024-04-01 | End: 2024-06-30

## 2024-04-01 RX ORDER — AMITRIPTYLINE HYDROCHLORIDE 50 MG/1
50 TABLET, FILM COATED ORAL NIGHTLY
Qty: 30 TABLET | Refills: 0 | Status: SHIPPED | OUTPATIENT
Start: 2024-04-01

## 2024-04-01 RX ORDER — BUTALBITAL, ACETAMINOPHEN AND CAFFEINE 300; 40; 50 MG/1; MG/1; MG/1
1 CAPSULE ORAL 2 TIMES DAILY PRN
Qty: 60 CAPSULE | Refills: 0 | Status: SHIPPED | OUTPATIENT
Start: 2024-04-01

## 2024-04-01 RX ORDER — IPRATROPIUM BROMIDE AND ALBUTEROL SULFATE 2.5; .5 MG/3ML; MG/3ML
3 SOLUTION RESPIRATORY (INHALATION) EVERY 6 HOURS PRN
Qty: 360 ML | Refills: 0 | Status: SHIPPED | OUTPATIENT
Start: 2024-04-01

## 2024-04-01 RX ORDER — GABAPENTIN 100 MG/1
100 CAPSULE ORAL 3 TIMES DAILY
Qty: 90 CAPSULE | Refills: 2 | Status: SHIPPED | OUTPATIENT
Start: 2024-04-01 | End: 2024-06-30

## 2024-04-01 RX ORDER — BACLOFEN 5 MG/1
5 TABLET ORAL 2 TIMES DAILY
Qty: 60 TABLET | Refills: 2 | Status: SHIPPED | OUTPATIENT
Start: 2024-04-01

## 2024-04-01 RX ORDER — ACETAMINOPHEN 325 MG/1
650 TABLET ORAL EVERY 6 HOURS PRN
Qty: 120 TABLET | Refills: 0 | Status: SHIPPED | OUTPATIENT
Start: 2024-04-01

## 2024-04-01 RX ORDER — LANOLIN ALCOHOL/MO/W.PET/CERES
6 CREAM (GRAM) TOPICAL NIGHTLY
Qty: 30 TABLET | Refills: 3 | Status: SHIPPED | OUTPATIENT
Start: 2024-04-01

## 2024-04-01 SDOH — ECONOMIC STABILITY: INCOME INSECURITY: HOW HARD IS IT FOR YOU TO PAY FOR THE VERY BASICS LIKE FOOD, HOUSING, MEDICAL CARE, AND HEATING?: NOT HARD AT ALL

## 2024-04-01 SDOH — ECONOMIC STABILITY: HOUSING INSECURITY
IN THE LAST 12 MONTHS, WAS THERE A TIME WHEN YOU DID NOT HAVE A STEADY PLACE TO SLEEP OR SLEPT IN A SHELTER (INCLUDING NOW)?: NO

## 2024-04-01 SDOH — ECONOMIC STABILITY: FOOD INSECURITY: WITHIN THE PAST 12 MONTHS, YOU WORRIED THAT YOUR FOOD WOULD RUN OUT BEFORE YOU GOT MONEY TO BUY MORE.: NEVER TRUE

## 2024-04-01 SDOH — ECONOMIC STABILITY: FOOD INSECURITY: WITHIN THE PAST 12 MONTHS, THE FOOD YOU BOUGHT JUST DIDN'T LAST AND YOU DIDN'T HAVE MONEY TO GET MORE.: NEVER TRUE

## 2024-04-01 ASSESSMENT — PATIENT HEALTH QUESTIONNAIRE - PHQ9
1. LITTLE INTEREST OR PLEASURE IN DOING THINGS: NOT AT ALL
SUM OF ALL RESPONSES TO PHQ QUESTIONS 1-9: 0
2. FEELING DOWN, DEPRESSED OR HOPELESS: NOT AT ALL
SUM OF ALL RESPONSES TO PHQ QUESTIONS 1-9: 0
SUM OF ALL RESPONSES TO PHQ9 QUESTIONS 1 & 2: 0

## 2024-04-01 NOTE — PROGRESS NOTES
Visit Information    Have you changed or started any medications since your last visit including any over-the-counter medicines, vitamins, or herbal medicines? no   Are you having any side effects from any of your medications? -  no  Have you stopped taking any of your medications? Is so, why? -  no    Have you seen any other physician or provider since your last visit? No  Have you had any other diagnostic tests since your last visit? No  Have you been seen in the emergency room and/or had an admission to a hospital since we last saw you? No  Have you had your routine dental cleaning in the past 6 months? no    Have you activated your [employer] account? If not, what are your barriers? Yes     Patient Care Team:  Blanca Ren MD as PCP - General (Internal Medicine)    Medical History Review  Past Medical, Family, and Social History reviewed and does not contribute to the patient presenting condition    Health Maintenance   Topic Date Due    COVID-19 Vaccine (1) Never done    Pneumococcal 0-64 years Vaccine (1 of 2 - PCV) Never done    Lipids  Never done    Depression Screen  Never done    HIV screen  Never done    Hepatitis C screen  Never done    DTaP/Tdap/Td vaccine (1 - Tdap) Never done    Colorectal Cancer Screen  Never done    Shingles vaccine (1 of 2) Never done    Respiratory Syncytial Virus (RSV) Pregnant or age 60 yrs+ (1 - 1-dose 60+ series) Never done    Flu vaccine (Season Ended) 08/01/2024    Hepatitis A vaccine  Aged Out    Hepatitis B vaccine  Aged Out    Hib vaccine  Aged Out    Polio vaccine  Aged Out    Meningococcal (ACWY) vaccine  Aged Out      
Amount: 200 mg, Disp-60 tablet, R-2Normal  4. Anxiety and depression  5. 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 Pseudobulbar affect  -     [facility]  -     Ambulatory referral to Occupational Therapy  7. Intractable headache, unspecified chronicity pattern, unspecified headache type  -     [facility]  -     Ambulatory referral to Occupational Therapy  8. Nocturia  -     POCT Urine with Microscopic  9. Benign prostatic hyperplasia with lower urinary tract symptoms, symptom details unspecified     Outpatient PT OT ordered, wife was told if patient has worsening depression or suicidal homicidal ideation then she will take him to ER,  Is been having urinary symptoms we will start Flomax  To follow-up with neurosurgery and neurology oncology as outpatient.          Completed Refills   Requested Prescriptions     Pending Prescriptions Disp Refills    topiramate (TOPAMAX) 50 MG tablet 60 tablet 0     Sig: Take 1 tablet by mouth 2 times daily    Multiple Vitamins-Minerals (THERAPEUTIC MULTIVITAMIN-MINERALS) tablet 30 tablet 0     Sig: Take 1 tablet by mouth daily    melatonin 3 MG TABS tablet 30 tablet 3     Sig: Take 2 tablets by mouth at bedtime    lacosamide (VIMPAT) 100 MG TABS tablet 60 tablet 2     Sig: Take 1 tablet by mouth 2 times daily for 90 days. Max Daily Amount: 200 mg    ipratropium 0.5 mg-albuterol 2.5 mg (DUONEB) 0.5-2.5 (3) MG/3ML SOLN nebulizer solution 360 mL 0     Sig: Inhale 3 mLs into the lungs every 6 hours as needed for Shortness of Breath    gabapentin (NEURONTIN) 100 MG capsule 90 capsule 2     Sig: Take 1 capsule by mouth 3 times daily for 90 days.    citalopram (CELEXA) 10 MG tablet 30 tablet 3     Sig: Take 1 tablet by mouth daily    butalbital-APAP-caffeine -40 MG CAPS per capsule 60 capsule 0     Sig: Take 1

## 2024-04-02 ENCOUNTER — TELEPHONE (OUTPATIENT)
Dept: INTERNAL MEDICINE CLINIC | Age: 59
End: 2024-04-02

## 2024-04-02 NOTE — TELEPHONE ENCOUNTER
Kristal RN with [facility] calling for an update on butalbital-APAP-caffeine. Informed Kristal Rx was sent to the pharmacy yesterday with the other prescriptions. She agreed to update patient and will return call with any further questions.

## 2024-04-03 ENCOUNTER — TELEPHONE (OUTPATIENT)
Dept: ONCOLOGY | Age: 59
End: 2024-04-03

## 2024-04-03 ENCOUNTER — HOSPITAL ENCOUNTER (OUTPATIENT)
Age: 59
Setting detail: SPECIMEN
Discharge: HOME | End: 2024-04-03
Payer: MEDICAID

## 2024-04-03 ENCOUNTER — PATIENT MESSAGE (OUTPATIENT)
Dept: INTERNAL MEDICINE CLINIC | Age: 59
End: 2024-04-03

## 2024-04-03 ENCOUNTER — OFFICE VISIT (OUTPATIENT)
Dept: ONCOLOGY | Age: 59
End: 2024-04-03
Payer: MEDICAID

## 2024-04-03 VITALS — HEART RATE: 79 BPM | TEMPERATURE: 98.5 F | SYSTOLIC BLOOD PRESSURE: 123 MMHG | DIASTOLIC BLOOD PRESSURE: 89 MMHG

## 2024-04-03 DIAGNOSIS — D69.6 THROMBOCYTOPENIA (HCC): ICD-10-CM

## 2024-04-03 DIAGNOSIS — D69.6 THROMBOCYTOPENIA (HCC): Primary | ICD-10-CM

## 2024-04-03 DIAGNOSIS — I69.152: Primary | ICD-10-CM

## 2024-04-03 DIAGNOSIS — Z13.220 SCREENING FOR HYPERLIPIDEMIA: ICD-10-CM

## 2024-04-03 DIAGNOSIS — I82.511 CHRONIC DEEP VEIN THROMBOSIS (DVT) OF FEMORAL VEIN OF RIGHT LOWER EXTREMITY (HCC): ICD-10-CM

## 2024-04-03 DIAGNOSIS — D69.3 CHRONIC IDIOPATHIC THROMBOCYTOPENIC PURPURA (HCC): ICD-10-CM

## 2024-04-03 LAB
ANION GAP SERPL CALCULATED.3IONS-SCNC: 13 MMOL/L (ref 9–16)
BASOPHILS # BLD: 0.06 K/UL (ref 0–0.2)
BASOPHILS NFR BLD: 1 %
BUN SERPL-MCNC: 21 MG/DL (ref 8–23)
CALCIUM SERPL-MCNC: 9.9 MG/DL (ref 8.6–10.4)
CHLORIDE SERPL-SCNC: 105 MMOL/L (ref 98–107)
CHOLEST SERPL-MCNC: 153 MG/DL (ref 0–199)
CHOLESTEROL/HDL RATIO: 3
CO2 SERPL-SCNC: 22 MMOL/L (ref 20–31)
CREAT SERPL-MCNC: 0.9 MG/DL (ref 0.7–1.2)
EOSINOPHIL # BLD: 0.24 K/UL (ref 0–0.4)
EOSINOPHILS RELATIVE PERCENT: 4 % (ref 1–4)
ERYTHROCYTE [DISTWIDTH] IN BLOOD BY AUTOMATED COUNT: 15.9 % (ref 11.8–14.4)
GFR SERPL CREATININE-BSD FRML MDRD: >90 ML/MIN/1.73M2
GLUCOSE SERPL-MCNC: 107 MG/DL (ref 74–99)
HCT VFR BLD AUTO: 46.8 % (ref 40.7–50.3)
HDLC SERPL-MCNC: 44 MG/DL
HGB BLD-MCNC: 15.2 G/DL (ref 13–17)
IMM GRANULOCYTES # BLD AUTO: 0 K/UL (ref 0–0.3)
IMM GRANULOCYTES NFR BLD: 0 %
LDLC SERPL CALC-MCNC: 76 MG/DL (ref 0–100)
LYMPHOCYTES NFR BLD: 1.12 K/UL (ref 1–4.8)
LYMPHOCYTES RELATIVE PERCENT: 19 % (ref 24–44)
MCH RBC QN AUTO: 28.7 PG (ref 25.2–33.5)
MCHC RBC AUTO-ENTMCNC: 32.5 G/DL (ref 28.4–34.8)
MCV RBC AUTO: 88.5 FL (ref 82.6–102.9)
MONOCYTES NFR BLD: 0.47 K/UL (ref 0.2–0.8)
MONOCYTES NFR BLD: 8 % (ref 1–7)
NEUTROPHILS NFR BLD: 68 % (ref 36–66)
NEUTS SEG NFR BLD: 4.01 K/UL (ref 1.8–7.7)
NRBC BLD-RTO: 0 PER 100 WBC
PLATELET # BLD AUTO: ABNORMAL K/UL (ref 138–453)
PLATELET, FLUORESCENCE: 42 K/UL (ref 138–453)
PLATELETS.RETICULATED NFR BLD AUTO: 10.3 % (ref 1.1–10.3)
POTASSIUM SERPL-SCNC: 4.2 MMOL/L (ref 3.7–5.3)
RBC # BLD AUTO: 5.29 M/UL (ref 4.21–5.77)
SODIUM SERPL-SCNC: 140 MMOL/L (ref 136–145)
TRIGL SERPL-MCNC: 165 MG/DL
VLDLC SERPL CALC-MCNC: 33 MG/DL
WBC OTHER # BLD: 5.9 K/UL (ref 3.5–11.3)

## 2024-04-03 PROCEDURE — 85055 RETICULATED PLATELET ASSAY: CPT

## 2024-04-03 PROCEDURE — 80061 LIPID PANEL: CPT

## 2024-04-03 PROCEDURE — 99211 OFF/OP EST MAY X REQ PHY/QHP: CPT | Performed by: INTERNAL MEDICINE

## 2024-04-03 PROCEDURE — 99215 OFFICE O/P EST HI 40 MIN: CPT | Performed by: INTERNAL MEDICINE

## 2024-04-03 PROCEDURE — 36415 COLL VENOUS BLD VENIPUNCTURE: CPT

## 2024-04-03 PROCEDURE — 80048 BASIC METABOLIC PNL TOTAL CA: CPT

## 2024-04-03 PROCEDURE — 85025 COMPLETE CBC W/AUTO DIFF WBC: CPT

## 2024-04-03 RX ORDER — DIPHENHYDRAMINE HYDROCHLORIDE 50 MG/ML
50 INJECTION INTRAMUSCULAR; INTRAVENOUS
OUTPATIENT
Start: 2024-04-03

## 2024-04-03 RX ORDER — ACETAMINOPHEN 325 MG/1
650 TABLET ORAL
OUTPATIENT
Start: 2024-04-03

## 2024-04-03 RX ORDER — EPINEPHRINE 1 MG/ML
0.3 INJECTION, SOLUTION, CONCENTRATE INTRAVENOUS PRN
OUTPATIENT
Start: 2024-04-03

## 2024-04-03 RX ORDER — SODIUM CHLORIDE 9 MG/ML
5-250 INJECTION, SOLUTION INTRAVENOUS PRN
OUTPATIENT
Start: 2024-04-03

## 2024-04-03 RX ORDER — ALBUTEROL SULFATE 90 UG/1
4 AEROSOL, METERED RESPIRATORY (INHALATION) PRN
OUTPATIENT
Start: 2024-04-03

## 2024-04-03 RX ORDER — SODIUM CHLORIDE 0.9 % (FLUSH) 0.9 %
5-40 SYRINGE (ML) INJECTION PRN
OUTPATIENT
Start: 2024-04-03

## 2024-04-03 RX ORDER — FAMOTIDINE 10 MG/ML
20 INJECTION, SOLUTION INTRAVENOUS
OUTPATIENT
Start: 2024-04-03

## 2024-04-03 RX ORDER — DIPHENHYDRAMINE HCL 25 MG
25 TABLET ORAL ONCE
OUTPATIENT
Start: 2024-04-03 | End: 2024-04-03

## 2024-04-03 RX ORDER — MEPERIDINE HYDROCHLORIDE 50 MG/ML
12.5 INJECTION INTRAMUSCULAR; INTRAVENOUS; SUBCUTANEOUS PRN
OUTPATIENT
Start: 2024-04-03

## 2024-04-03 RX ORDER — ACETAMINOPHEN 325 MG/1
650 TABLET ORAL ONCE
OUTPATIENT
Start: 2024-04-03 | End: 2024-04-03

## 2024-04-03 RX ORDER — ONDANSETRON 2 MG/ML
8 INJECTION INTRAMUSCULAR; INTRAVENOUS
OUTPATIENT
Start: 2024-04-03

## 2024-04-03 RX ORDER — HEPARIN SODIUM (PORCINE) LOCK FLUSH IV SOLN 100 UNIT/ML 100 UNIT/ML
500 SOLUTION INTRAVENOUS PRN
OUTPATIENT
Start: 2024-04-03

## 2024-04-03 RX ORDER — ELTROMBOPAG OLAMINE 50 MG/1
50 TABLET, FILM COATED ORAL DAILY
Qty: 30 TABLET | Refills: 3 | Status: ACTIVE | OUTPATIENT
Start: 2024-04-03 | End: 2024-05-03

## 2024-04-03 RX ORDER — SODIUM CHLORIDE 9 MG/ML
INJECTION, SOLUTION INTRAVENOUS CONTINUOUS
OUTPATIENT
Start: 2024-04-03

## 2024-04-03 NOTE — PROGRESS NOTES
Today's Date: 4/3/2024  Patient Name: Brennen Mcclure  Patient's age: 59 y.o., 1965    DIAGNOSIS:   Chronic thrombocytopenia, patient had a evaluation in [place] and also in the past and thought to be immune mediated  History of dural sinus thrombosis and history of DVT in 2019  Chronic anticoagulation with Eliquis  Had a hemorrhagic stroke in December 2023  Patient received platelet transfusion during the hospitalization and also was given IVIG on 1/9/2024 and 1/10/2024  Patient was treated with Decadron 40 mg on 2/18 - 2/20 1-24, with no response  Patient planning surgical procedure with neurosurgery  Bone marrow biopsy negative for acute bone marrow pathology  I will plan to get IVIG weekly for 5 doses and also prescription for Promacta was sent  CHIEF COMPLAINT:    Chief Complaint   Patient presents with    Follow-up    Results     BMB        INTERVAL HISTORY:    Patient is return for follow visit and to discuss lab results, bone marrow biopsy results and further recommendations.  He tolerated bone marrow biopsy procedure well.  His bone marrow biopsy did not show any acute bone marrow pathology.    Patient did not have much response to steroid in the past.  I discussed option of thrombopoietin agonist as he is planning to have surgical procedure with neurosurgeon.      In the meantime I discussed option of trying IVIG.    He denies any bleeding symptoms.  He recently was in the hospital and was noted to have right lower extremity chronic DVT.    He is not on any anticoagulation because of his history of hematoma and bleeding.      During this visit patient's allergy, social, medical, surgical history and medications were reviewed and updated.     HISTORY OF PRESENT ILLNESS:    Brennen Mcclure is a 59 y.o. male who is admitted to the hospital on (Not on file)  for acute rehab after recent hospitalization for acute stroke.    Patient had recent hospitalization for left hemiparesis secondary to hemorrhagic

## 2024-04-03 NOTE — TELEPHONE ENCOUNTER
GINO HERE FOR FOLLOW UP   IVIG weekly starting next week  New script for promacta  Cbc weekly (today)  RTC 2-3 weeks  NEW ORDER PENDING PRECERT   MD VISIT: 4/17/24 @ 9:45AM   LAB DONE ON EXIT   AVS PRINTED AND GIVEN ON EXIT

## 2024-04-04 ENCOUNTER — TELEPHONE (OUTPATIENT)
Dept: INFUSION THERAPY | Facility: MEDICAL CENTER | Age: 59
End: 2024-04-04

## 2024-04-04 NOTE — TELEPHONE ENCOUNTER
New orders 4/3/24  Dr. Lam Chang  Gamunex - C 40 Gms IV weekly at least 7 days apart. 5/5.  LABS: CBC, CMP  Order noted and chart to front office for processing.

## 2024-04-04 NOTE — TELEPHONE ENCOUNTER
From: Brennen Mcclure  To: Dr. Blanca Ren  Sent: 4/3/2024 5:06 PM EDT  Subject: Speech Therapy     Can you please order speech therapy along with the ordered p.t and o.t? Evaluation is on the 10th they need A referral. Thank you.   Can someone please call me and let me know? [phone]   Brennen did not get his urine test, he couldn't go at the office and I ask for it to be ordered so we could do it at the lab today with his blood work, lab said it was not ordered.   Thank you Brennen and Mara Crook

## 2024-04-05 ENCOUNTER — CLINICAL DOCUMENTATION (OUTPATIENT)
Facility: HOSPITAL | Age: 59
End: 2024-04-05

## 2024-04-10 ENCOUNTER — HOSPITAL ENCOUNTER (OUTPATIENT)
Dept: PHYSICAL THERAPY | Age: 59
Setting detail: THERAPIES SERIES
Discharge: HOME | End: 2024-04-10
Attending: INTERNAL MEDICINE
Payer: MEDICAID

## 2024-04-10 ENCOUNTER — HOSPITAL ENCOUNTER (OUTPATIENT)
Age: 59
Setting detail: SPECIMEN
Discharge: HOME | End: 2024-04-10

## 2024-04-10 ENCOUNTER — HOSPITAL ENCOUNTER (OUTPATIENT)
Dept: SPEECH THERAPY | Age: 59
Setting detail: THERAPIES SERIES
Discharge: HOME | End: 2024-04-10
Payer: MEDICAID

## 2024-04-10 ENCOUNTER — HOSPITAL ENCOUNTER (OUTPATIENT)
Dept: OCCUPATIONAL THERAPY | Age: 59
Setting detail: THERAPIES SERIES
Discharge: HOME | End: 2024-04-10
Attending: INTERNAL MEDICINE
Payer: MEDICAID

## 2024-04-10 DIAGNOSIS — D69.6 THROMBOCYTOPENIA (HCC): ICD-10-CM

## 2024-04-10 LAB
BASOPHILS # BLD: 0.07 K/UL (ref 0–0.2)
BASOPHILS NFR BLD: 1 % (ref 0–2)
EOSINOPHIL # BLD: 0.37 K/UL (ref 0–0.44)
EOSINOPHILS RELATIVE PERCENT: 7 % (ref 1–4)
ERYTHROCYTE [DISTWIDTH] IN BLOOD BY AUTOMATED COUNT: 15.4 % (ref 11.8–14.4)
HCT VFR BLD AUTO: 47.2 % (ref 40.7–50.3)
HGB BLD-MCNC: 15.3 G/DL (ref 13–17)
IMM GRANULOCYTES # BLD AUTO: <0.03 K/UL (ref 0–0.3)
IMM GRANULOCYTES NFR BLD: 0 %
LYMPHOCYTES NFR BLD: 1.22 K/UL (ref 1.1–3.7)
LYMPHOCYTES RELATIVE PERCENT: 22 % (ref 24–43)
MCH RBC QN AUTO: 29.1 PG (ref 25.2–33.5)
MCHC RBC AUTO-ENTMCNC: 32.4 G/DL (ref 28.4–34.8)
MCV RBC AUTO: 89.7 FL (ref 82.6–102.9)
MONOCYTES NFR BLD: 0.44 K/UL (ref 0.1–1.2)
MONOCYTES NFR BLD: 8 % (ref 3–12)
NEUTROPHILS NFR BLD: 62 % (ref 36–65)
NEUTS SEG NFR BLD: 3.43 K/UL (ref 1.5–8.1)
NRBC BLD-RTO: 0 PER 100 WBC
PLATELET # BLD AUTO: ABNORMAL K/UL (ref 138–453)
PLATELET, FLUORESCENCE: 39 K/UL (ref 138–453)
PLATELETS.RETICULATED NFR BLD AUTO: 10.9 % (ref 1.1–10.3)
RBC # BLD AUTO: 5.26 M/UL (ref 4.21–5.77)
RBC # BLD: ABNORMAL 10*6/UL
WBC OTHER # BLD: 5.6 K/UL (ref 3.5–11.3)

## 2024-04-10 PROCEDURE — 92610 EVALUATE SWALLOWING FUNCTION: CPT

## 2024-04-10 PROCEDURE — 92523 SPEECH SOUND LANG COMPREHEN: CPT

## 2024-04-10 PROCEDURE — 97530 THERAPEUTIC ACTIVITIES: CPT

## 2024-04-10 PROCEDURE — 97166 OT EVAL MOD COMPLEX 45 MIN: CPT

## 2024-04-10 PROCEDURE — 97162 PT EVAL MOD COMPLEX 30 MIN: CPT

## 2024-04-10 NOTE — THERAPY EVALUATION
Unstable   Decision Making [] Low [x] Moderate [] High    [] Low Complexity [x] Moderate Complexity [] High Complexity       Treatment Charges: Mins Units   [x] Evaluation       []  Low       [x]  Moderate       []  High 40 1   []  Modalities     []  Ther Exercise     []  Manual Therapy     []  Ther Activities     []  ADLs     []  Neuro Re-ed     []  Other     Total Treatment Time 40        Time in: 220      Time out: 300      Electronically signed by: GARRETT Cornelius/L      Physician Signature:________________________________Date:__________________  By signing above or cosigning this note, I have reviewed this plan of care and certify a need for medically necessary rehabilitation services.     *PLEASE SIGN ABOVE AND FAX BACK ALL PAGES*

## 2024-04-10 NOTE — THERAPY EVALUATION
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 Helmet to be on at all times when up!     Subjective:   Pt arrives to clinic in manual tilt in space WC with helmet on. Pt is accompanied by his wife Anila. Pt is agreeable to PT evaluation.    Pt has significant hx of MI CABG. On 23 he was admitted due to L arm weakness & Headache when he was in [place]. Found to have R frontoparietal IPH and dural venous sinus thrombosis in sagittal sinus extending into R jugular bulb. During his hospitalization was intubated  & had R decompressive craniotomy (23). On 23, he was noted to have increased drowsiness and flap appeared more full.  CT head showed new right basal ganglia and right anterior temporal lobe intraparenchymal hemorrhages.This resulted in L hemiparesis, sensory changes of the L hemibody, emotional liability, and L neglect. Pt was then transferred to ARU completing therapies 24-24. He was then discharged home with home health until getting to outpatient.       His wife notes Brennen has been seeming to regress since being home. She

## 2024-04-10 NOTE — THERAPY EVALUATION
[] [facility]  Outpatient Rehabilitation &  Therapy  [address]  [phone]  F: [phone] [] [facility]  Outpatient Rehabilitation &  Therapy  [address]  P: [phone]  F: [phone] [] [facility]  Outpatient Rehabilitation &  Therapy  [address]   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 De La Paz    Subjective/Patient Report: Patient arrives to speech therapy evaluation in  and helmet with wife present.  Patient is a 59 year old male post hemorrhagic CVA s/p right decompressive hemicraniectomy.    PMH:  Patient was originally admitted to [facility] on 2023 as he is a long distance .  He presented with a headache for 3 days and acute onset left upper limb weakness.  Per medical record:     CT head showed patchy right frontoparietal intraparenchymal hemorrhage, and he was also found to have dural venous sinus thrombosis in the sagittal sinus extending into the right jugular bulb on imaging.  He was transferred from [place], [place] to [facility] for management.  Left upper limb weakness worsened to near-complete paralysis.  Initial GCS 15.  He was initially treated with heparin drip for the dural venous sinus thrombosis.

## 2024-04-12 ENCOUNTER — APPOINTMENT (OUTPATIENT)
Dept: CT IMAGING | Age: 59
End: 2024-04-12
Payer: COMMERCIAL

## 2024-04-12 ENCOUNTER — TELEPHONE (OUTPATIENT)
Dept: PHYSICAL MEDICINE AND REHAB | Age: 59
End: 2024-04-12

## 2024-04-12 ENCOUNTER — APPOINTMENT (OUTPATIENT)
Dept: MRI IMAGING | Age: 59
End: 2024-04-12
Payer: COMMERCIAL

## 2024-04-12 ENCOUNTER — TELEPHONE (OUTPATIENT)
Dept: INTERNAL MEDICINE CLINIC | Age: 59
End: 2024-04-12

## 2024-04-12 ENCOUNTER — APPOINTMENT (OUTPATIENT)
Dept: GENERAL RADIOLOGY | Age: 59
End: 2024-04-12
Payer: COMMERCIAL

## 2024-04-12 ENCOUNTER — HOSPITAL ENCOUNTER (INPATIENT)
Age: 59
LOS: 2 days | Discharge: HOME | End: 2024-04-14
Attending: EMERGENCY MEDICINE | Admitting: PSYCHIATRY & NEUROLOGY
Payer: COMMERCIAL

## 2024-04-12 ENCOUNTER — TELEPHONE (OUTPATIENT)
Dept: INFUSION THERAPY | Facility: MEDICAL CENTER | Age: 59
End: 2024-04-12

## 2024-04-12 DIAGNOSIS — M25.552 LEFT HIP PAIN: ICD-10-CM

## 2024-04-12 DIAGNOSIS — R51.9 HEADACHE WITH NEUROLOGIC DEFICIT: Primary | ICD-10-CM

## 2024-04-12 DIAGNOSIS — G89.29 CHRONIC LEFT SHOULDER PAIN: ICD-10-CM

## 2024-04-12 DIAGNOSIS — M25.512 CHRONIC LEFT SHOULDER PAIN: ICD-10-CM

## 2024-04-12 DIAGNOSIS — R29.818 HEADACHE WITH NEUROLOGIC DEFICIT: Primary | ICD-10-CM

## 2024-04-12 DIAGNOSIS — I69.152: Primary | ICD-10-CM

## 2024-04-12 PROBLEM — R13.10 DYSPHAGIA: Status: ACTIVE | Noted: 2024-04-12

## 2024-04-12 LAB
ANION GAP SERPL CALCULATED.3IONS-SCNC: 12 MMOL/L (ref 9–16)
BASOPHILS # BLD: 0.07 K/UL (ref 0–0.2)
BASOPHILS NFR BLD: 1 % (ref 0–2)
BUN BLD-MCNC: 22 MG/DL (ref 8–26)
BUN SERPL-MCNC: 21 MG/DL (ref 8–23)
CA-I BLD-SCNC: 1.28 MMOL/L (ref 1.15–1.33)
CALCIUM SERPL-MCNC: 9.9 MG/DL (ref 8.6–10.4)
CHLORIDE BLD-SCNC: 106 MMOL/L (ref 98–107)
CHLORIDE SERPL-SCNC: 104 MMOL/L (ref 98–107)
CK SERPL-CCNC: 55 U/L (ref 39–308)
CO2 BLD CALC-SCNC: 25 MMOL/L (ref 22–30)
CO2 SERPL-SCNC: 23 MMOL/L (ref 20–31)
CREAT SERPL-MCNC: 1 MG/DL (ref 0.7–1.2)
EGFR, POC: >90 ML/MIN/1.73M2
EOSINOPHIL # BLD: 0.33 K/UL (ref 0–0.44)
EOSINOPHILS RELATIVE PERCENT: 6 % (ref 1–4)
ERYTHROCYTE [DISTWIDTH] IN BLOOD BY AUTOMATED COUNT: 15.3 % (ref 11.8–14.4)
GFR SERPL CREATININE-BSD FRML MDRD: >90 ML/MIN/1.73M2
GLUCOSE BLD-MCNC: 73 MG/DL (ref 74–100)
GLUCOSE SERPL-MCNC: 79 MG/DL (ref 74–99)
HCO3 VENOUS: 25.7 MMOL/L (ref 22–29)
HCT VFR BLD AUTO: 50.8 % (ref 40.7–50.3)
HCT VFR BLD AUTO: 52 % (ref 41–53)
HGB BLD-MCNC: 16.4 G/DL (ref 13–17)
IMM GRANULOCYTES # BLD AUTO: 0.03 K/UL (ref 0–0.3)
IMM GRANULOCYTES NFR BLD: 1 %
INR PPP: 1
LYMPHOCYTES NFR BLD: 1.27 K/UL (ref 1.1–3.7)
LYMPHOCYTES RELATIVE PERCENT: 23 % (ref 24–43)
MCH RBC QN AUTO: 29.1 PG (ref 25.2–33.5)
MCHC RBC AUTO-ENTMCNC: 32.3 G/DL (ref 28.4–34.8)
MCV RBC AUTO: 90.1 FL (ref 82.6–102.9)
MONOCYTES NFR BLD: 0.5 K/UL (ref 0.1–1.2)
MONOCYTES NFR BLD: 9 % (ref 3–12)
MYOGLOBIN SERPL-MCNC: 26 NG/ML (ref 28–72)
NEGATIVE BASE EXCESS, VEN: 1.8 MMOL/L (ref 0–2)
NEUTROPHILS NFR BLD: 61 % (ref 36–65)
NEUTS SEG NFR BLD: 3.43 K/UL (ref 1.5–8.1)
NRBC BLD-RTO: 0 PER 100 WBC
O2 SAT, VEN: 30.4 % (ref 60–85)
PARTIAL THROMBOPLASTIN TIME: 29.6 SEC (ref 23–36.5)
PCO2, VEN: 52.1 MM HG (ref 41–51)
PH VENOUS: 7.3 (ref 7.32–7.43)
PLATELET # BLD AUTO: ABNORMAL K/UL (ref 138–453)
PLATELET, FLUORESCENCE: 44 K/UL (ref 138–453)
PLATELETS.RETICULATED NFR BLD AUTO: 12 % (ref 1.1–10.3)
PO2, VEN: 21.7 MM HG (ref 30–50)
POC ANION GAP: 12 MMOL/L (ref 7–16)
POC CREATININE: 0.5 MG/DL (ref 0.51–1.19)
POC HEMOGLOBIN (CALC): 17.8 G/DL (ref 13.5–17.5)
POC LACTIC ACID: 2.1 MMOL/L (ref 0.56–1.39)
POTASSIUM BLD-SCNC: 3.9 MMOL/L (ref 3.5–4.5)
POTASSIUM SERPL-SCNC: 4 MMOL/L (ref 3.7–5.3)
PROTHROMBIN TIME: 13.3 SEC (ref 11.7–14.9)
RBC # BLD AUTO: 5.64 M/UL (ref 4.21–5.77)
RBC # BLD: ABNORMAL 10*6/UL
SODIUM BLD-SCNC: 142 MMOL/L (ref 138–146)
SODIUM SERPL-SCNC: 139 MMOL/L (ref 136–145)
TROPONIN I SERPL HS-MCNC: 8 NG/L (ref 0–22)
WBC OTHER # BLD: 5.6 K/UL (ref 3.5–11.3)

## 2024-04-12 PROCEDURE — 83874 ASSAY OF MYOGLOBIN: CPT

## 2024-04-12 PROCEDURE — 99285 EMERGENCY DEPT VISIT HI MDM: CPT

## 2024-04-12 PROCEDURE — 82550 ASSAY OF CK (CPK): CPT

## 2024-04-12 PROCEDURE — 99223 1ST HOSP IP/OBS HIGH 75: CPT | Performed by: PSYCHIATRY & NEUROLOGY

## 2024-04-12 PROCEDURE — 85025 COMPLETE CBC W/AUTO DIFF WBC: CPT

## 2024-04-12 PROCEDURE — 84484 ASSAY OF TROPONIN QUANT: CPT

## 2024-04-12 PROCEDURE — 80048 BASIC METABOLIC PNL TOTAL CA: CPT

## 2024-04-12 PROCEDURE — 85730 THROMBOPLASTIN TIME PARTIAL: CPT

## 2024-04-12 PROCEDURE — 83605 ASSAY OF LACTIC ACID: CPT

## 2024-04-12 PROCEDURE — 70450 CT HEAD/BRAIN W/O DYE: CPT

## 2024-04-12 PROCEDURE — 2580000003 HC RX 258

## 2024-04-12 PROCEDURE — 6360000002 HC RX W HCPCS

## 2024-04-12 PROCEDURE — 85014 HEMATOCRIT: CPT

## 2024-04-12 PROCEDURE — 84520 ASSAY OF UREA NITROGEN: CPT

## 2024-04-12 PROCEDURE — 80051 ELECTROLYTE PANEL: CPT

## 2024-04-12 PROCEDURE — 82553 CREATINE MB FRACTION: CPT

## 2024-04-12 PROCEDURE — 70551 MRI BRAIN STEM W/O DYE: CPT

## 2024-04-12 PROCEDURE — 82947 ASSAY GLUCOSE BLOOD QUANT: CPT

## 2024-04-12 PROCEDURE — 6370000000 HC RX 637 (ALT 250 FOR IP)

## 2024-04-12 PROCEDURE — 70498 CT ANGIOGRAPHY NECK: CPT

## 2024-04-12 PROCEDURE — 82803 BLOOD GASES ANY COMBINATION: CPT

## 2024-04-12 PROCEDURE — 2060000000 HC ICU INTERMEDIATE R&B

## 2024-04-12 PROCEDURE — 85610 PROTHROMBIN TIME: CPT

## 2024-04-12 PROCEDURE — 82330 ASSAY OF CALCIUM: CPT

## 2024-04-12 PROCEDURE — 71045 X-RAY EXAM CHEST 1 VIEW: CPT

## 2024-04-12 PROCEDURE — 6360000004 HC RX CONTRAST MEDICATION

## 2024-04-12 PROCEDURE — C9254 INJECTION, LACOSAMIDE: HCPCS

## 2024-04-12 PROCEDURE — 85055 RETICULATED PLATELET ASSAY: CPT

## 2024-04-12 PROCEDURE — 82565 ASSAY OF CREATININE: CPT

## 2024-04-12 RX ORDER — POLYETHYLENE GLYCOL 3350 17 G/17G
17 POWDER, FOR SOLUTION ORAL DAILY PRN
Status: DISCONTINUED | OUTPATIENT
Start: 2024-04-12 | End: 2024-04-14 | Stop reason: HOSPADM

## 2024-04-12 RX ORDER — MAGNESIUM SULFATE IN WATER 40 MG/ML
2000 INJECTION, SOLUTION INTRAVENOUS PRN
Status: DISCONTINUED | OUTPATIENT
Start: 2024-04-12 | End: 2024-04-14 | Stop reason: HOSPADM

## 2024-04-12 RX ORDER — TOPIRAMATE 50 MG/1
50 TABLET, FILM COATED ORAL 2 TIMES DAILY
Status: DISCONTINUED | OUTPATIENT
Start: 2024-04-12 | End: 2024-04-14 | Stop reason: HOSPADM

## 2024-04-12 RX ORDER — ACETAMINOPHEN 325 MG/1
650 TABLET ORAL EVERY 6 HOURS PRN
Status: DISCONTINUED | OUTPATIENT
Start: 2024-04-12 | End: 2024-04-14 | Stop reason: HOSPADM

## 2024-04-12 RX ORDER — ATORVASTATIN CALCIUM 80 MG/1
80 TABLET, FILM COATED ORAL NIGHTLY
Status: DISCONTINUED | OUTPATIENT
Start: 2024-04-12 | End: 2024-04-14 | Stop reason: HOSPADM

## 2024-04-12 RX ORDER — CITALOPRAM HYDROBROMIDE 10 MG/1
10 TABLET ORAL DAILY
Status: DISCONTINUED | OUTPATIENT
Start: 2024-04-12 | End: 2024-04-14 | Stop reason: HOSPADM

## 2024-04-12 RX ORDER — SODIUM CHLORIDE 9 MG/ML
INJECTION, SOLUTION INTRAVENOUS CONTINUOUS
Status: DISCONTINUED | OUTPATIENT
Start: 2024-04-12 | End: 2024-04-13

## 2024-04-12 RX ORDER — POTASSIUM CHLORIDE 7.45 MG/ML
10 INJECTION INTRAVENOUS PRN
Status: DISCONTINUED | OUTPATIENT
Start: 2024-04-12 | End: 2024-04-14 | Stop reason: HOSPADM

## 2024-04-12 RX ORDER — LACOSAMIDE 100 MG/1
100 TABLET ORAL 2 TIMES DAILY
Status: DISCONTINUED | OUTPATIENT
Start: 2024-04-12 | End: 2024-04-14 | Stop reason: HOSPADM

## 2024-04-12 RX ORDER — ACETAMINOPHEN 650 MG/1
650 SUPPOSITORY RECTAL EVERY 6 HOURS PRN
Status: DISCONTINUED | OUTPATIENT
Start: 2024-04-12 | End: 2024-04-14 | Stop reason: HOSPADM

## 2024-04-12 RX ORDER — BACLOFEN 5 MG/1
5 TABLET ORAL 2 TIMES DAILY
Status: DISCONTINUED | OUTPATIENT
Start: 2024-04-12 | End: 2024-04-14 | Stop reason: HOSPADM

## 2024-04-12 RX ORDER — GABAPENTIN 100 MG/1
100 CAPSULE ORAL 3 TIMES DAILY
Status: DISCONTINUED | OUTPATIENT
Start: 2024-04-12 | End: 2024-04-14 | Stop reason: HOSPADM

## 2024-04-12 RX ORDER — LORAZEPAM 2 MG/ML
1 INJECTION INTRAMUSCULAR ONCE
Status: COMPLETED | OUTPATIENT
Start: 2024-04-12 | End: 2024-04-12

## 2024-04-12 RX ORDER — LANOLIN ALCOHOL/MO/W.PET/CERES
6 CREAM (GRAM) TOPICAL NIGHTLY
Status: DISCONTINUED | OUTPATIENT
Start: 2024-04-12 | End: 2024-04-14 | Stop reason: HOSPADM

## 2024-04-12 RX ORDER — ONDANSETRON 2 MG/ML
4 INJECTION INTRAMUSCULAR; INTRAVENOUS EVERY 6 HOURS PRN
Status: DISCONTINUED | OUTPATIENT
Start: 2024-04-12 | End: 2024-04-14 | Stop reason: HOSPADM

## 2024-04-12 RX ORDER — POTASSIUM CHLORIDE 20 MEQ/1
40 TABLET, EXTENDED RELEASE ORAL PRN
Status: DISCONTINUED | OUTPATIENT
Start: 2024-04-12 | End: 2024-04-14 | Stop reason: HOSPADM

## 2024-04-12 RX ORDER — SODIUM CHLORIDE 0.9 % (FLUSH) 0.9 %
5-40 SYRINGE (ML) INJECTION PRN
Status: DISCONTINUED | OUTPATIENT
Start: 2024-04-12 | End: 2024-04-14 | Stop reason: HOSPADM

## 2024-04-12 RX ORDER — AMITRIPTYLINE HYDROCHLORIDE 50 MG/1
50 TABLET, FILM COATED ORAL NIGHTLY
Status: DISCONTINUED | OUTPATIENT
Start: 2024-04-12 | End: 2024-04-14 | Stop reason: HOSPADM

## 2024-04-12 RX ORDER — SODIUM CHLORIDE 9 MG/ML
INJECTION, SOLUTION INTRAVENOUS PRN
Status: DISCONTINUED | OUTPATIENT
Start: 2024-04-12 | End: 2024-04-14 | Stop reason: HOSPADM

## 2024-04-12 RX ORDER — ACETAMINOPHEN 325 MG/1
650 TABLET ORAL ONCE
Status: COMPLETED | OUTPATIENT
Start: 2024-04-12 | End: 2024-04-12

## 2024-04-12 RX ORDER — ONDANSETRON 4 MG/1
4 TABLET, ORALLY DISINTEGRATING ORAL EVERY 8 HOURS PRN
Status: DISCONTINUED | OUTPATIENT
Start: 2024-04-12 | End: 2024-04-14 | Stop reason: HOSPADM

## 2024-04-12 RX ORDER — TAMSULOSIN HYDROCHLORIDE 0.4 MG/1
0.4 CAPSULE ORAL DAILY
Status: DISCONTINUED | OUTPATIENT
Start: 2024-04-12 | End: 2024-04-14 | Stop reason: HOSPADM

## 2024-04-12 RX ORDER — SODIUM CHLORIDE 0.9 % (FLUSH) 0.9 %
5-40 SYRINGE (ML) INJECTION EVERY 12 HOURS SCHEDULED
Status: DISCONTINUED | OUTPATIENT
Start: 2024-04-12 | End: 2024-04-14 | Stop reason: HOSPADM

## 2024-04-12 RX ORDER — KETOROLAC TROMETHAMINE 15 MG/ML
15 INJECTION, SOLUTION INTRAMUSCULAR; INTRAVENOUS ONCE
Status: COMPLETED | OUTPATIENT
Start: 2024-04-12 | End: 2024-04-12

## 2024-04-12 RX ADMIN — ACETAMINOPHEN 650 MG: 325 TABLET ORAL at 16:09

## 2024-04-12 RX ADMIN — SODIUM CHLORIDE, PRESERVATIVE FREE 10 ML: 5 INJECTION INTRAVENOUS at 22:02

## 2024-04-12 RX ADMIN — KETOROLAC TROMETHAMINE 15 MG: 15 INJECTION, SOLUTION INTRAMUSCULAR; INTRAVENOUS at 20:20

## 2024-04-12 RX ADMIN — LORAZEPAM 1 MG: 2 INJECTION INTRAMUSCULAR; INTRAVENOUS at 21:06

## 2024-04-12 RX ADMIN — LACOSAMIDE 100 MG: 10 INJECTION INTRAVENOUS at 21:56

## 2024-04-12 RX ADMIN — SODIUM CHLORIDE: 9 INJECTION, SOLUTION INTRAVENOUS at 21:55

## 2024-04-12 RX ADMIN — IOPAMIDOL 90 ML: 755 INJECTION, SOLUTION INTRAVENOUS at 15:27

## 2024-04-12 ASSESSMENT — PAIN - FUNCTIONAL ASSESSMENT: PAIN_FUNCTIONAL_ASSESSMENT: 0-10

## 2024-04-12 ASSESSMENT — PAIN DESCRIPTION - ORIENTATION: ORIENTATION: LEFT

## 2024-04-12 ASSESSMENT — PAIN DESCRIPTION - LOCATION: LOCATION: HIP;SHOULDER

## 2024-04-12 ASSESSMENT — PAIN SCALES - GENERAL: PAINLEVEL_OUTOF10: 8

## 2024-04-12 NOTE — PROGRESS NOTES
[facility]     Emergency/Trauma Note    PATIENT NAME: Brennen Mcclure    Shift date: 04/12/2024  Shift day: Friday   Shift # 1    Room # 28/28     Name: Brennen Mcclure            Age: 59 y.o.  Gender: male          Gnosticist: Other   Place of Buddhism:     Trauma/Incident type: Stroke Alert  Admit Date & Time: 4/12/2024  2:58 PM  TRAUMA NAME: None    PATIENT/EVENT DESCRIPTION:  Brennen Mcclure is a 59 y.o. male who arrived ER as stroke alert. Patient to be admitted to 28/28.       SPIRITUAL ASSESSMENT-INTERVENTION-OUTCOME:  No spiritual assessment was carried out because  did not talk to patient. Patient's wife, Mara and another family member were present in the room. Patient was undergoing CT Scan at the time. Mara was tearful and worried.  maintained supportive presence, prayed with family and reassured them that patient was in good hands. Family expressed gratitude for the spiritual and emotional support they received.      PATIENT BELONGINGS:  No belongings noted    ANY BELONGINGS OF SIGNIFICANT VALUE NOTED:  Unknown    REGISTRATION STAFF NOTIFIED?  Yes    WHAT IS YOUR SPIRITUAL CARE PLAN FOR THIS PATIENT?:  Follow up visits recommended for ongoing assessment of patient's condition and for more prayers and support.     Electronically signed by Chaplain Helio, on 4/12/2024 at 3:29 PM.  [facility]  [phone]

## 2024-04-12 NOTE — TELEPHONE ENCOUNTER
Dr Chang, states did peer to peer today and IVIG should be approved, and notified front office to check for approval for IVIG infusion.

## 2024-04-12 NOTE — TELEPHONE ENCOUNTER
Message above sent to Dr Chang and md responded for writer to call number and set up date and time of peer to peer, when writer called had to leave message with pt and drug in question information for [employer] peer to peer to call back, also gave Dr Chang's cell number and [facility]'s contact number.    Message below sent to md.    I had to leave your cell phone number and told them you are available today til 5pm at your cell number and 4/15/24, 4/16/24, or 4/17/24 from 8 am to 4 pm

## 2024-04-12 NOTE — TELEPHONE ENCOUNTER
Wife called in office and states patient got medication and has taken 2 hours ago.  No relief, current symptoms are headache, trouble swallowing and speaking. Patient is also drooling.       tried to speak on the phone about his symptoms however it was really hard to understand him.     Instructed them to go to emergency room to evaluate.   Wife voiced understanding and will call [phone]

## 2024-04-12 NOTE — TELEPHONE ENCOUNTER
I reached out to Mara, spouse, to move appt that was scheduled for Friday to Tuesday.   Mara said that patient has regressed due to only a few home therapy visits and the home therapists did very little with him.   He started outpatient therapies this week at [facility].  Mara said that the therapists were going to reach out to Dr. De La Paz because of his left hip and left shoulder pain. She said the therapists were going to ask about having xrays done. Their notes are not complete yet.   So Mara wondered if you would order both of those.   She also noted that she cannot get him in and out of the car anymore. She has to use transportation.      _____________  Noted from OT florence on 4/10  DIGITS:  Right WNL AROM, LEFT WFL PROM        (+ is used for hyperextension, - is used for extensor lag per ASHT)  Limitations of AROM can be (but not limited to) edema, denervation, adhesion, joint contractures,   subluxation and dislocation of a joint.  Limitations of PROM may be associated with capsular tightness, contractures, joint incongruity, malunited fractures and fixed deformities.   If PROM is greater than AROM, it is usually indicative of tendon insufficiency, muscle weakness or adhesions.        STRENGTH                   Right   (pounds) Left   (pounds)    55 unable   Lateral pinch 13     2 point pinch 9     3 jaw pinch 10        Bilateral  strength is normally symmetrical or up to 10% stronger on the dominant extremity, depending on the individual's physically activity level.   COORDINATION  - Speed/dexterity       Right   (seconds) Left   (seconds)   9 Hole Peg Test 42 unable         Problems:      Pain, ROM, Strength, Function, Coordination, Sensation, and Falls: History or Risk of          Assessment:  Brennen is a 60 yo male arriving to clinic in  with helmet. Wife present for evaluation and expressing patient had lost some of his gains from rehab. Pt presents with left neglect, decreased ROM and strength

## 2024-04-12 NOTE — H&P
[facility] Neu[facility]   IN-PATIENT SERVICE   [facility]    HISTORY AND PHYSICAL EXAMINATION            Date:   4/12/2024  Patient name:  Brennen Mcclure  Date of admission:  4/12/2024  2:58 PM  MRN:   [id number]  Account:  [id number]  YOB: 1965  PCP:    Blanca Ren MD  Room:   28/28  Code Status:    Prior    Chief Complaint:     Chief Complaint   Patient presents with    Facial Droop     History Obtained From:     patient, electronic medical record    History of Present Illness:     The patient is a 59 y.o.  Non- / non  male who presents with Facial Droop   and he is admitted to the hospital for the management of  dysphagia and intermittent confusion.   The patient is a 59 y.o. male with history of dural venous sinus thrombosis as well as right craniectomy due to intraparenchymal hemorrhage in November of 2023 with residual complete left upper and lower extremity paralysis who presents with difficulty swallowing liquids and solids that started yesterday at noon. Patient reports that he ate cornbread and soup yesterday without difficulty. However, shortly after that he tried drinking water and felt that it was more difficult to swallow than normal. Patient states it felt like the water got stuck in his throat. Denies choking. No throat pain. Never experienced this before. Patient states that since this occurred he has been experiencing difficulty swallowing with everything he tries to swallow. Patient is able to swallow saliva without difficulty. No fever/chills or recent illnesses. No chest pain or shortness of breath. Patient denies any new numbness or weakness.       Past Medical History:     Past Medical History:   Diagnosis Date    Anemia 02/12/2024    CAD (coronary artery disease)     Chronic deep vein thrombosis (DVT) of both lower extremities (HCC)     Chronic deep vein thrombosis (DVT) of femoral vein of right lower extremity (HCC) 03/07/2024    Chronic deep

## 2024-04-12 NOTE — TELEPHONE ENCOUNTER
MRN [id number] pt with ITP and thrombocytopenia as diagnosis , is being denied by [employer] for his ordered IVIG--based per letter received, due to consistently low platelet count----there is a peer to peer phone number  you can call for scheduling a peer to peer (P) [phone] extension [phone].  Or I can do this for you if you want me to set up a day and time for them to call your cell

## 2024-04-12 NOTE — CONSULTS
extremities  Downgoing plantar response in RLE       NIH STROKE SCALE:  1a.  Level of consciousness:  0 - alert; keenly responsive  1b.  Level of consciousness questions:  0 - answers both questions correctly  1c.  Level of consciousness questions:  0 - performs both tasks correctly  2.    Best Gaze:  0 - normal  3.    Visual:  0 - no visual loss  4.    Facial Palsy:  0 - normal symmetric movement  5a.  Motor left arm:  4 - no movement  5b.  Motor right arm:  0 - no drift, limb holds 90 (or 45) degrees for full 10 seconds  6a.  Motor left le - no movement  6b.  Motor right le - no drift; leg holds 30 degree position for full 5 seconds  7.    Limb Ataxia:  1 - present in one limb  8.    Sensory:  0 - normal; no sensory loss  9.    Best Language:  0 - no aphasia, normal  10.  Dysarthria:  0 - normal  11.  Extinction and Inattention:  0 - no abnormality    TOTAL:  9    Diagnostics:      Laboratory Testing:    CBC:   Recent Labs     04/10/24  0935   WBC 5.6   HGB 15.3   PLT See Reflexed IPF Result     BMP:    Recent Labs     24  1508   CREATININE 0.5*     Lab Results   Component Value Date    CHOL 153 2024    LDLCHOLESTEROL 76 2024    HDL 44 2024    TRIG 165 (H) 2024    ALT 29 2024    AST 27 2024    INR 1.0 2024    [id number] 915 2024     Imaging/Diagnostics:    CTA HEAD NECK W CONTRAST    Result Date: 2024  EXAMINATION: CTA OF THE HEAD AND NECK WITH CONTRAST 2024 3:14 pm: TECHNIQUE: CTA of the head and neck was performed with the administration of intravenous contrast. Multiplanar reformatted images are provided for review.  MIP images are provided for review. Stenosis of the internal carotid arteries measured using NASCET criteria. Automated exposure control, iterative reconstruction, and/or weight based adjustment of the mA/kV was utilized to reduce the radiation dose to as low as reasonably achievable. COMPARISON: CT head from earlier today  attending Dr. Petty    Electronically signed by Justen Gonzalez DO on 4/12/2024 at 3:24 PM    Justen Gonzalez DO  PGY 3 Resident  4/12/2024 at 3:24 PM

## 2024-04-12 NOTE — ED TRIAGE NOTES
Pt presents to the ED via EMS with c/o worsening facial droop, dysphagia, and slurred speech since yesterday at noon.  Pt states he has a hx of CVAs. Pt reports having a craniotomy in December. Pt states left sided paralysis at baseline from previous stroke. Pt denies taking blood thinners. Pt denies any injury. Pt denies chest pain or shortness of breath. VSS, NAD, AOx4. Patient resting in bed, respirations even and unlabored. Call light in reach. Pt connected to telemetry, all alarms on and audible.

## 2024-04-12 NOTE — ED PROVIDER NOTES
[facility] ED     Emergency Department     Faculty Attestation    I performed a history and physical examination of the patient and discussed management with the resident. I reviewed the resident’s note and agree with the documented findings and plan of care. Any areas of disagreement are noted on the chart. I was personally present for the key portions of any procedures. I have documented in the chart those procedures where I was not present during the key portions. I have reviewed the emergency nurses triage note. I agree with the chief complaint, past medical history, past surgical history, allergies, medications, social and family history as documented unless otherwise noted below. For Physician Assistant/ Nurse Practitioner cases/documentation I have personally evaluated this patient and have completed at least one if not all key elements of the E/M (history, physical exam, and MDM). Additional findings are as noted.    3:18 PM EDT    Patient with history of intracranial hemorrhage with craniotomy in the past presents with new right-sided facial droop, difficulty speaking and swallowing.  He says it started last night and then he continued to have it today so decided to come in to be seen.  Patient says he does have a mild headache as well.  He denies any recent fever, cough, congestion, chest pain, shortness of breath, abdominal pain, nausea or vomiting.  Patient does have residual weakness to his left side from the intracranial hemorrhage.  A stroke consult has been paged out.  Will get CT scan of the head as well as CT of the head and neck as well as EKG and labs and reassess.      Shakira Jean MD  Attending Emergency  Physician

## 2024-04-12 NOTE — TELEPHONE ENCOUNTER
Patients wife called and stated that they did not get the butalbital. Patients headaches are so bad he wants to go to the ER. Writer called [facility] and the medication is not covered and needing a PA. Requested PA get sent again. Called patient back and spoke with wife letting her know we need to get a PA for the medication and will update her as soon as we get a answer from the insurance.

## 2024-04-12 NOTE — ED NOTES
ED to inpatient nurses report      Chief Complaint:  Chief Complaint   Patient presents with    Facial Droop     Present to ED from: home ED via EMS with c/o worsening facial droop, dysphagia, and slurred speech since yesterday at noon.  Pt states he has a hx of CVAs. Pt reports having a craniotomy in December. Pt states left sided paralysis at baseline from previous stroke.    MOA:     LOC: alert and orientated to name, place, date  Mobility: Fully dependent  Oxygen Baseline: 97% RA    Current needs required: None   Pending ED orders: None  Present condition: Stable    Why did the patient come to the ED? Facial droop/dysphagia  What is the plan? Admit  Any procedures or intervention occur? CT, xray, labs  Any safety concerns?? Fall risk     Mental Status:  Level of Consciousness: Alert (0)    Psych Assessment:   Psychosocial  Psychosocial (WDL): Within Defined Limits  Vital signs   Vitals:    04/12/24 1503 04/12/24 1506 04/12/24 1631   BP:  (!) 148/90    Pulse: 72  62   Resp: 22  15   Temp: 97.8 °F (36.6 °C)     TempSrc: Oral     SpO2: 98%  97%        Vitals:  Patient Vitals for the past 24 hrs:   BP Temp Temp src Pulse Resp SpO2   04/12/24 1631 -- -- -- 62 15 97 %   04/12/24 1506 (!) 148/90 -- -- -- -- --   04/12/24 1503 -- 97.8 °F (36.6 °C) Oral 72 22 98 %      Visit Vitals  BP (!) 148/90   Pulse 62   Temp 97.8 °F (36.6 °C) (Oral)   Resp 15   SpO2 97%        LDAs:      Ambulatory Status:  No data recorded    Diagnosis:  DISPOSITION Admitted 04/12/2024 04:46:17 PM   Final diagnoses:   Headache with neurologic deficit        Consults:  IP CONSULT TO STROKE TEAM     Treatment Team:   Treatment Team: Attending Provider: Chaka Miller DO; Registered Nurse: Christine Sequeira RN; Resident: Bonnie Wilson DO; Consulting Physician: Chaka Miller DO    Treatment:  ED Course as of 04/12/24 1701   Fri Apr 12, 2024   1558 WBC: 5.6 [TD]   1558 Hemoglobin Quant: 16.4 [TD]   1603 CT head  IMPRESSION:  No acute  daily     Orders Placed This Encounter   Medications    iopamidol (ISOVUE-370) 76 % injection 90 mL    acetaminophen (TYLENOL) tablet 650 mg       SURGICAL HISTORY       Past Surgical History:   Procedure Laterality Date    CORONARY ARTERY BYPASS GRAFT      x4    CORONARY STENT PLACEMENT      2 stents    CRANIOTOMY Right     RIGHT CRANIOTOMY, FOR HEMATOMA EVACUATION  of frontal and temporal IPH (Right)    CT BONE MARROW BIOPSY  3/18/2024    CT BONE MARROW BIOPSY 3/18/2024 [facility] CT SCAN       PAST MEDICAL HISTORY       Past Medical History:   Diagnosis Date    Anemia 02/12/2024    CAD (coronary artery disease)     Chronic deep vein thrombosis (DVT) of both lower extremities ([facility])     Chronic deep vein thrombosis (DVT) of femoral vein of right lower extremity ([facility]) 03/07/2024    Chronic deep vein thrombosis (DVT) of proximal vein of right lower extremity ([facility]) 02/22/2024    Chronic deep vein thrombosis (DVT) of right iliac vein ([facility]) 03/07/2024    Chronic idiopathic thrombocytopenia ([facility])     Chronic idiopathic thrombocytopenic purpura ([facility]) 02/18/2024    CVA (cerebral vascular accident) ([facility])     Emphysema lung ([facility])     Hemosiderin pigmentation of skin 03/07/2024    HLD (hyperlipidemia)     Intracranial hemorrhage ([facility]) 01/31/2024    Left hemiplegia ([facility])     Right leg swelling 03/07/2024    Thrombocytopenia ([facility]) 02/12/2024       Labs:  Labs Reviewed   STROKE PANEL - Abnormal; Notable for the following components:       Result Value    Hematocrit 50.8 (*)     RDW 15.3 (*)     Platelet, Fluorescence 44 (*)     Platelet, Immature Fraction 12.0 (*)     Lymphocytes % 23 (*)     Eosinophils % 6 (*)     Immature Granulocytes % 1 (*)     Myoglobin 26 (*)     All other components within normal limits   HGB/HCT - Abnormal; Notable for the following components:    POC Hemoglobin (calc) 17.8 (*)     All other components within normal limits   VENOUS BLOOD GAS, POINT OF CARE - Abnormal; Notable for the following components:

## 2024-04-12 NOTE — ED PROVIDER NOTES
[facility] ED  Emergency Department Encounter  Emergency Medicine Resident     Pt Name:Brennen Mcclure  MRN: [id number]  Birthdate 1965  Date of evaluation: 4/12/24  PCP:  Blanca Ren MD  Note Started: 3:05 PM EDT      CHIEF COMPLAINT       Chief Complaint   Patient presents with    Facial Droop       HISTORY OF PRESENT ILLNESS  (Location/Symptom, Timing/Onset, Context/Setting, Quality, Duration, Modifying Factors, Severity.)      Brennen Mcclure is a 59 y.o. male who presents with concern for new neurologic deficits that began yesterday evening.  Patient states the last time known well was yesterday around noon.  States he has had slurred speech, drooling and difficulty swallowing that is a new issue for him.  Also feels he has a new facial droop.  He does have a history of stroke in November 2023 with subsequent craniotomy and December 2023.  At baseline patient has hemiparesis on the left, does not feel he has any sensory or strength changes.  Notes mild headache.  Denies chest pain, shortness of breath. Patient is not currently anticoagulated       PAST MEDICAL / SURGICAL / SOCIAL / FAMILY HISTORY      has a past medical history of Anemia, CAD (coronary artery disease), Chronic deep vein thrombosis (DVT) of both lower extremities (HCC), Chronic deep vein thrombosis (DVT) of femoral vein of right lower extremity (HCC), Chronic deep vein thrombosis (DVT) of proximal vein of right lower extremity (HCC), Chronic deep vein thrombosis (DVT) of right iliac vein (HCC), Chronic idiopathic thrombocytopenia (HCC), Chronic idiopathic thrombocytopenic purpura (HCC), CVA (cerebral vascular accident) (HCC), Emphysema lung (HCC), Hemosiderin pigmentation of skin, HLD (hyperlipidemia), Intracranial hemorrhage (HCC), Left hemiplegia (HCC), Right leg swelling, and Thrombocytopenia (HCC).       has a past surgical history that includes Coronary artery bypass graft; Coronary stent placement; craniotomy (Right);  2/19/24   Danica De La Paz MD   polyethylene glycol (GLYCOLAX) 17 g packet Take 1 packet by mouth daily as needed for Constipation 2/18/24   Danica De La Paz MD         REVIEW OF SYSTEMS       Review of Systems   Neurological:  Positive for facial asymmetry and speech difficulty.       PHYSICAL EXAM      INITIAL VITALS:   /88   Pulse 62   Temp 97.8 °F (36.6 °C) (Oral)   Resp 16   SpO2 99%     Physical Exam  Vitals reviewed.   Constitutional:       General: He is not in acute distress.  HENT:      Head:      Comments: S/p craniotomy with visible post surgical indentation over right side of head  Eyes:      Extraocular Movements: Extraocular movements intact.      Pupils: Pupils are equal, round, and reactive to light.   Cardiovascular:      Rate and Rhythm: Normal rate and regular rhythm.   Pulmonary:      Effort: Pulmonary effort is normal.      Breath sounds: Normal breath sounds.   Abdominal:      Palpations: Abdomen is soft.      Tenderness: There is no abdominal tenderness.   Skin:     General: Skin is warm and dry.   Neurological:      Mental Status: He is alert and oriented to person, place, and time.      Comments: Left hemiparesis with sensation intact in all extremities, no facial droop noted on exam, coordination intact in right upper extremity           DDX/DIAGNOSTIC RESULTS / EMERGENCY DEPARTMENT COURSE / MDM     Medical Decision Making  59-year-old male with history of stroke, status postcraniotomy presents due to concerns for new difficulty swallowing, changes in speech, drooling.  Notes headache as well.  Last time known well at noon yesterday.  Patient's vitals are within normal upon arrival.  On exam he is alert, awake, has baseline left hemiparesis.  No obvious new neurologic deficits on initial exam.  Heart is regular rate and rhythm, lungs are clear to auscultation bilaterally.  DDx: Ischemic stroke, hemorrhagic stroke, dysphagia, tension headache, migraine headache. Will plan for

## 2024-04-13 PROBLEM — I61.9 INTRAPARENCHYMAL HEMORRHAGE OF BRAIN (HCC): Status: ACTIVE | Noted: 2024-04-13

## 2024-04-13 PROBLEM — R29.818 TRANSIENT NEUROLOGICAL SYMPTOMS: Status: ACTIVE | Noted: 2024-04-13

## 2024-04-13 LAB
ANION GAP SERPL CALCULATED.3IONS-SCNC: 9 MMOL/L (ref 9–16)
BASOPHILS # BLD: 0.04 K/UL (ref 0–0.2)
BASOPHILS NFR BLD: 1 % (ref 0–2)
BILIRUB UR QL STRIP: NEGATIVE
BUN SERPL-MCNC: 16 MG/DL (ref 8–23)
CALCIUM SERPL-MCNC: 9.6 MG/DL (ref 8.6–10.4)
CHLORIDE SERPL-SCNC: 106 MMOL/L (ref 98–107)
CLARITY UR: CLEAR
CO2 SERPL-SCNC: 24 MMOL/L (ref 20–31)
COLOR UR: YELLOW
COMMENT: NORMAL
CREAT SERPL-MCNC: 0.9 MG/DL (ref 0.7–1.2)
EKG ATRIAL RATE: 59 BPM
EKG ATRIAL RATE: 59 BPM
EKG ATRIAL RATE: 68 BPM
EKG P AXIS: 53 DEGREES
EKG P AXIS: 60 DEGREES
EKG P AXIS: 74 DEGREES
EKG P-R INTERVAL: 188 MS
EKG P-R INTERVAL: 194 MS
EKG P-R INTERVAL: 200 MS
EKG Q-T INTERVAL: 454 MS
EKG Q-T INTERVAL: 472 MS
EKG Q-T INTERVAL: 486 MS
EKG QRS DURATION: 138 MS
EKG QTC CALCULATION (BAZETT): 449 MS
EKG QTC CALCULATION (BAZETT): 481 MS
EKG QTC CALCULATION (BAZETT): 501 MS
EKG R AXIS: -57 DEGREES
EKG R AXIS: -57 DEGREES
EKG R AXIS: -72 DEGREES
EKG T AXIS: 21 DEGREES
EKG T AXIS: 31 DEGREES
EKG T AXIS: 64 DEGREES
EKG VENTRICULAR RATE: 59 BPM
EKG VENTRICULAR RATE: 59 BPM
EKG VENTRICULAR RATE: 68 BPM
EOSINOPHIL # BLD: 0.31 K/UL (ref 0–0.44)
EOSINOPHILS RELATIVE PERCENT: 6 % (ref 1–4)
ERYTHROCYTE [DISTWIDTH] IN BLOOD BY AUTOMATED COUNT: 15 % (ref 11.8–14.4)
GFR SERPL CREATININE-BSD FRML MDRD: >90 ML/MIN/1.73M2
GLUCOSE SERPL-MCNC: 86 MG/DL (ref 74–99)
GLUCOSE UR STRIP-MCNC: NEGATIVE MG/DL
HCT VFR BLD AUTO: 45.4 % (ref 40.7–50.3)
HGB BLD-MCNC: 14.8 G/DL (ref 13–17)
HGB UR QL STRIP.AUTO: NEGATIVE
IMM GRANULOCYTES # BLD AUTO: <0.03 K/UL (ref 0–0.3)
IMM GRANULOCYTES NFR BLD: 0 %
KETONES UR STRIP-MCNC: NEGATIVE MG/DL
LEUKOCYTE ESTERASE UR QL STRIP: NEGATIVE
LYMPHOCYTES NFR BLD: 0.89 K/UL (ref 1.1–3.7)
LYMPHOCYTES RELATIVE PERCENT: 17 % (ref 24–43)
MCH RBC QN AUTO: 29 PG (ref 25.2–33.5)
MCHC RBC AUTO-ENTMCNC: 32.6 G/DL (ref 28.4–34.8)
MCV RBC AUTO: 89 FL (ref 82.6–102.9)
MONOCYTES NFR BLD: 0.37 K/UL (ref 0.1–1.2)
MONOCYTES NFR BLD: 7 % (ref 3–12)
NEUTROPHILS NFR BLD: 69 % (ref 36–65)
NEUTS SEG NFR BLD: 3.7 K/UL (ref 1.5–8.1)
NITRITE UR QL STRIP: NEGATIVE
NRBC BLD-RTO: 0 PER 100 WBC
PH UR STRIP: 6 [PH] (ref 5–8)
PLATELET # BLD AUTO: ABNORMAL K/UL (ref 138–453)
PLATELET, FLUORESCENCE: 32 K/UL (ref 138–453)
PLATELETS.RETICULATED NFR BLD AUTO: 9.9 % (ref 1.1–10.3)
POTASSIUM SERPL-SCNC: 4.4 MMOL/L (ref 3.7–5.3)
PROCALCITONIN SERPL-MCNC: 0.07 NG/ML (ref 0–0.09)
PROT UR STRIP-MCNC: NEGATIVE MG/DL
RBC # BLD AUTO: 5.1 M/UL (ref 4.21–5.77)
RBC # BLD: ABNORMAL 10*6/UL
SODIUM SERPL-SCNC: 139 MMOL/L (ref 136–145)
SP GR UR STRIP: 1.02 (ref 1–1.03)
TROPONIN I SERPL HS-MCNC: 11 NG/L (ref 0–22)
TSH SERPL DL<=0.05 MIU/L-ACNC: 3.45 UIU/ML (ref 0.27–4.2)
UROBILINOGEN UR STRIP-ACNC: NORMAL EU/DL (ref 0–1)
WBC OTHER # BLD: 5.3 K/UL (ref 3.5–11.3)

## 2024-04-13 PROCEDURE — 80048 BASIC METABOLIC PNL TOTAL CA: CPT

## 2024-04-13 PROCEDURE — 97530 THERAPEUTIC ACTIVITIES: CPT

## 2024-04-13 PROCEDURE — 93005 ELECTROCARDIOGRAM TRACING: CPT | Performed by: PSYCHIATRY & NEUROLOGY

## 2024-04-13 PROCEDURE — 97162 PT EVAL MOD COMPLEX 30 MIN: CPT

## 2024-04-13 PROCEDURE — 93005 ELECTROCARDIOGRAM TRACING: CPT

## 2024-04-13 PROCEDURE — 6370000000 HC RX 637 (ALT 250 FOR IP)

## 2024-04-13 PROCEDURE — 84443 ASSAY THYROID STIM HORMONE: CPT

## 2024-04-13 PROCEDURE — 6360000002 HC RX W HCPCS

## 2024-04-13 PROCEDURE — 84145 PROCALCITONIN (PCT): CPT

## 2024-04-13 PROCEDURE — 92610 EVALUATE SWALLOWING FUNCTION: CPT

## 2024-04-13 PROCEDURE — 97110 THERAPEUTIC EXERCISES: CPT

## 2024-04-13 PROCEDURE — C9254 INJECTION, LACOSAMIDE: HCPCS

## 2024-04-13 PROCEDURE — 81003 URINALYSIS AUTO W/O SCOPE: CPT

## 2024-04-13 PROCEDURE — 95711 VEEG 2-12 HR UNMONITORED: CPT

## 2024-04-13 PROCEDURE — 2580000003 HC RX 258

## 2024-04-13 PROCEDURE — 93010 ELECTROCARDIOGRAM REPORT: CPT | Performed by: INTERNAL MEDICINE

## 2024-04-13 PROCEDURE — 85055 RETICULATED PLATELET ASSAY: CPT

## 2024-04-13 PROCEDURE — 6360000002 HC RX W HCPCS: Performed by: PSYCHIATRY & NEUROLOGY

## 2024-04-13 PROCEDURE — 80235 DRUG ASSAY LACOSAMIDE: CPT

## 2024-04-13 PROCEDURE — 80201 ASSAY OF TOPIRAMATE: CPT

## 2024-04-13 PROCEDURE — 99232 SBSQ HOSP IP/OBS MODERATE 35: CPT | Performed by: PSYCHIATRY & NEUROLOGY

## 2024-04-13 PROCEDURE — 85025 COMPLETE CBC W/AUTO DIFF WBC: CPT

## 2024-04-13 PROCEDURE — 2060000000 HC ICU INTERMEDIATE R&B

## 2024-04-13 PROCEDURE — 84484 ASSAY OF TROPONIN QUANT: CPT

## 2024-04-13 PROCEDURE — 36415 COLL VENOUS BLD VENIPUNCTURE: CPT

## 2024-04-13 RX ORDER — CHOLECALCIFEROL (VITAMIN D3) 125 MCG
10 CAPSULE ORAL NIGHTLY PRN
Status: DISCONTINUED | OUTPATIENT
Start: 2024-04-13 | End: 2024-04-14 | Stop reason: HOSPADM

## 2024-04-13 RX ORDER — LIDOCAINE 4 G/G
1 PATCH TOPICAL DAILY
Status: DISCONTINUED | OUTPATIENT
Start: 2024-04-13 | End: 2024-04-13

## 2024-04-13 RX ORDER — LIDOCAINE 4 G/G
1 PATCH TOPICAL DAILY
Status: DISCONTINUED | OUTPATIENT
Start: 2024-04-13 | End: 2024-04-14 | Stop reason: HOSPADM

## 2024-04-13 RX ORDER — LANOLIN ALCOHOL/MO/W.PET/CERES
6 CREAM (GRAM) TOPICAL NIGHTLY PRN
Status: DISCONTINUED | OUTPATIENT
Start: 2024-04-13 | End: 2024-04-13

## 2024-04-13 RX ORDER — MAGNESIUM SULFATE IN WATER 40 MG/ML
2000 INJECTION, SOLUTION INTRAVENOUS ONCE
Status: COMPLETED | OUTPATIENT
Start: 2024-04-13 | End: 2024-04-14

## 2024-04-13 RX ADMIN — GABAPENTIN 100 MG: 100 CAPSULE ORAL at 15:25

## 2024-04-13 RX ADMIN — LACOSAMIDE 100 MG: 10 INJECTION INTRAVENOUS at 09:05

## 2024-04-13 RX ADMIN — LACOSAMIDE 100 MG: 100 TABLET, FILM COATED ORAL at 22:08

## 2024-04-13 RX ADMIN — ACETAMINOPHEN 650 MG: 325 TABLET ORAL at 11:29

## 2024-04-13 RX ADMIN — TOPIRAMATE 50 MG: 50 TABLET, FILM COATED ORAL at 22:26

## 2024-04-13 RX ADMIN — Medication 10 MG: at 03:04

## 2024-04-13 RX ADMIN — BACLOFEN 5 MG: 5 TABLET ORAL at 22:09

## 2024-04-13 RX ADMIN — GABAPENTIN 100 MG: 100 CAPSULE ORAL at 22:08

## 2024-04-13 RX ADMIN — Medication 6 MG: at 22:08

## 2024-04-13 RX ADMIN — MAGNESIUM SULFATE HEPTAHYDRATE 2000 MG: 40 INJECTION, SOLUTION INTRAVENOUS at 23:24

## 2024-04-13 RX ADMIN — ACETAMINOPHEN 650 MG: 325 TABLET ORAL at 17:49

## 2024-04-13 RX ADMIN — AMITRIPTYLINE HYDROCHLORIDE 50 MG: 50 TABLET, FILM COATED ORAL at 22:09

## 2024-04-13 RX ADMIN — ATORVASTATIN CALCIUM 80 MG: 80 TABLET, FILM COATED ORAL at 22:09

## 2024-04-13 RX ADMIN — SODIUM CHLORIDE: 9 INJECTION, SOLUTION INTRAVENOUS at 03:03

## 2024-04-13 RX ADMIN — SODIUM CHLORIDE, PRESERVATIVE FREE 10 ML: 5 INJECTION INTRAVENOUS at 22:09

## 2024-04-13 ASSESSMENT — PAIN DESCRIPTION - ORIENTATION
ORIENTATION: RIGHT
ORIENTATION: LEFT

## 2024-04-13 ASSESSMENT — PAIN DESCRIPTION - LOCATION
LOCATION: HEAD
LOCATION: HEAD
LOCATION: SHOULDER
LOCATION: HEAD

## 2024-04-13 ASSESSMENT — PAIN SCALES - GENERAL
PAINLEVEL_OUTOF10: 0
PAINLEVEL_OUTOF10: 4
PAINLEVEL_OUTOF10: 5
PAINLEVEL_OUTOF10: 0
PAINLEVEL_OUTOF10: 4

## 2024-04-13 ASSESSMENT — PAIN DESCRIPTION - DESCRIPTORS
DESCRIPTORS: ACHING
DESCRIPTORS: ACHING

## 2024-04-13 ASSESSMENT — PAIN - FUNCTIONAL ASSESSMENT: PAIN_FUNCTIONAL_ASSESSMENT: ACTIVITIES ARE NOT PREVENTED

## 2024-04-13 NOTE — PROGRESS NOTES
Physical Therapy  Facility/Department: [address] NEURO  Physical Therapy Initial Assessment    Name: Brennen Mcclure  : 1965  MRN: [id number]  Date of Service: 2024    The patient is a 59 y.o.  Non- / non  male who presents with Facial Droop   and he is admitted to the hospital for the management of  dysphagia and intermittent confusion.   The patient is a 59 y.o. male with history of dural venous sinus thrombosis as well as right craniectomy due to intraparenchymal hemorrhage in 2023 with residual complete left upper and lower extremity paralysis who presents with difficulty swallowing liquids and solids that started yesterday at noon. Patient reports that he ate cornbread and soup yesterday without difficulty. However, shortly after that he tried drinking water and felt that it was more difficult to swallow than normal. Patient states it felt like the water got stuck in his throat. Denies choking. No throat pain. Never experienced this before. Patient states that since this occurred he has been experiencing difficulty swallowing with everything he tries to swallow. Patient is able to swallow saliva without difficulty. No fever/chills or recent illnesses. No chest pain or shortness of breath. Patient denies any new numbness or weakness.     Discharge Recommendations:  Further therapy recommended at discharge.        PT Equipment Recommendations  Equipment Needed: No      Patient Diagnosis(es): The encounter diagnosis was Headache with neurologic deficit.  Past Medical History:  has a past medical history of Anemia, CAD (coronary artery disease), Chronic deep vein thrombosis (DVT) of both lower extremities (HCC), Chronic deep vein thrombosis (DVT) of femoral vein of right lower extremity (HCC), Chronic deep vein thrombosis (DVT) of proximal vein of right lower extremity (HCC), Chronic deep vein thrombosis (DVT) of right iliac vein (HCC), Chronic idiopathic thrombocytopenia (HCC),  to wt shift L, 5 reps; worked on partial squats in [employer] x 5 reps, mod A to wt shift L  Postural Correction Exercises: seated and standing with pt lining up with PT in front of him    AM-PAC - Mobility         AM-PAC Mobility without Stair Climbing Inpatient   How much difficulty turning over in bed?: A Lot  How much difficulty sitting down on / standing up from a chair with arms?: A Lot  How much difficulty moving from lying on back to sitting on side of bed?: A Lot  How much help from another person moving to and from a bed to a chair?: Total  How much help from another person needed to walk in hospital room?: Total  AM-PAC Inpatient Mobility without Stair Climbing Raw Score : 8  AM-PAC Inpatient without Stair Climbing T-Scale Score : 30.65  Mobility Inpatient CMS 0-100% Score: 80.91  Mobility Inpatient without Stair CMS G-Code Modifier : CM    Goals  Short Term Goals  Time Frame for Short Term Goals: 12 visits  Short Term Goal 1: bed mobility with min A+1  Short Term Goal 2: transfers with mod A+1 w/o use of [employer]  Short Term Goal 3: WC mobility x 25' with SBA+1  Short Term Goal 4: progress to gait with appropriate device (L platform rwalker with bracing L ankle/knee) x 15' with mod A+2  Patient Goals   Patient Goals : work with PT as OP, move better       Education  Patient Education  Education Given To: Patient;Family  Education Provided: Role of Therapy;Plan of Care;Home Exercise Program;Precautions;Transfer Training;Energy Conservation;Orientation;Family Education;Equipment;Fall Prevention Strategies  Education Method: Demonstration;Verbal  Barriers to Learning: Cognition  Education Outcome: Continued education needed      Therapy Time   Individual Concurrent Group Co-treatment   Time In 1305         Time Out 1400         Minutes 55         Timed Code Treatment Minutes: 38 Minutes       Theron Carter, PT

## 2024-04-13 NOTE — PLAN OF CARE
Problem: Discharge Planning  Goal: Discharge to home or other facility with appropriate resources  Outcome: Progressing  Flowsheets (Taken 4/13/2024 0130)  Discharge to home or other facility with appropriate resources: Identify barriers to discharge with patient and caregiver     Problem: Pain  Goal: Verbalizes/displays adequate comfort level or baseline comfort level  Outcome: Progressing  Flowsheets  Taken 4/13/2024 0330  Verbalizes/displays adequate comfort level or baseline comfort level: Encourage patient to monitor pain and request assistance  Taken 4/13/2024 0103  Verbalizes/displays adequate comfort level or baseline comfort level: Encourage patient to monitor pain and request assistance     Problem: Safety - Adult  Goal: Free from fall injury  Outcome: Progressing     Problem: Skin/Tissue Integrity  Goal: Absence of new skin breakdown  Description: 1.  Monitor for areas of redness and/or skin breakdown  2.  Assess vascular access sites hourly  3.  Every 4-6 hours minimum:  Change oxygen saturation probe site  4.  Every 4-6 hours:  If on nasal continuous positive airway pressure, respiratory therapy assess nares and determine need for appliance change or resting period.  Outcome: Progressing

## 2024-04-13 NOTE — ED NOTES
ED to inpatient nurses report      Chief Complaint:  Chief Complaint   Patient presents with    Facial Droop     Present to ED from: home. Pt has increased facial droop and slurred speech   Pt stated he has a hx of CVAs  Pt stated he had a craniotomy in december    MOA:     LOC: alert and orientated to name, place, date  Mobility: Fully dependent  Oxygen Baseline: RA    Current needs required: RA   Pending ED orders: none  Present condition: stable, alert    Why did the patient come to the ED? Increased facial droop, slurred speech   What is the plan? Admission   Any procedures or intervention occur? CT, x-ray, labs, meds, MRI  Any safety concerns?? Fall risk    Mental Status:  Level of Consciousness: Alert (0)    Psych Assessment:   Psychosocial  Psychosocial (WDL): Within Defined Limits  Vital signs   Vitals:    04/12/24 1506 04/12/24 1631 04/12/24 1706 04/12/24 2102   BP: (!) 148/90  139/88 131/87   Pulse:  62 62 57   Resp:  15 16 15   Temp:       TempSrc:       SpO2:  97% 99%         Vitals:  Patient Vitals for the past 24 hrs:   BP Temp Temp src Pulse Resp SpO2   04/12/24 2102 131/87 -- -- 57 15 --   04/12/24 1706 139/88 -- -- 62 16 99 %   04/12/24 1631 -- -- -- 62 15 97 %   04/12/24 1506 (!) 148/90 -- -- -- -- --   04/12/24 1503 -- 97.8 °F (36.6 °C) Oral 72 22 98 %      Visit Vitals  /87   Pulse 57   Temp 97.8 °F (36.6 °C) (Oral)   Resp 15   SpO2 99%        LDAs:   Peripheral IV 04/12/24 Right Antecubital (Active)       Ambulatory Status:  No data recorded    Diagnosis:  DISPOSITION Admitted 04/12/2024 04:46:17 PM   Final diagnoses:   Headache with neurologic deficit        Consults:  IP CONSULT TO STROKE TEAM     Treatment Team:   Treatment Team: Attending Provider: Chaka Miller DO; Consulting Physician: Chaka Miller DO; Registered Nurse: Duane, Savannah, RN    Treatment:  ED Course as of 04/12/24 2315 Fri Apr 12, 2024   1558 WBC: 5.6 [TD]   1558 Hemoglobin Quant: 16.4 [TD]   1603 CT

## 2024-04-13 NOTE — PROGRESS NOTES
[facility] ALL NOTES  Facility/Department: [address] NEURO   CLINICAL BEDSIDE SWALLOW EVALUATION    NAME: Brennen Mcclure  : 1965  MRN: [id number]    ADMISSION DATE: 2024    ADMITTING DIAGNOSIS: has 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 (HCC); Pseudobulbar affect; Intractable headache; Intracranial hemorrhage (HCC); Thrombocytopenia (HCC); Anemia; Chronic idiopathic thrombocytopenic purpura (HCC); Seizure (HCC); Dural venous sinus thrombosis; Chronic deep vein thrombosis (DVT) of proximal vein of right lower extremity (HCC); Chronic deep vein thrombosis (DVT) of right iliac vein (HCC); Chronic deep vein thrombosis (DVT) of femoral vein of right lower extremity (HCC); Chronic venous insufficiency of lower extremity; Hemosiderin pigmentation of skin; Right leg swelling; Dysphagia; Transient neurological symptoms; and Intraparenchymal hemorrhage of brain (HCC) on their problem list.    ONSET DATE: 2024    Recent Chest Xray/CT of Chest:  2024  No radiographic evidence of acute pulmonary disease.         Date of Eval: 2024  Evaluating Therapist: Anila Matute SLP    Current Diet level:   NPO        Primary Complaint   Brennen Crook is a 60 yo man with h/o right hemorrhagic CVA in 2023 and residual left hemiparesis who was admitted with difficulty swallowing liquids and solids. It felt like the water \"got stuck\" in his throat    CT & MRI of brain were negative for acute intracranial pathology. The MRI did show extensive edema & encephalomalacia in right frontoparietal lobes and portion of right temporal lobe with a subacute/resolving right frontal parenchymal hematoma        Clinical Dysphagia Assessment    ORAL PHASE SYMPTOMS Comments:   Lip Closure Anterior loss on left Pureed, dry solids, thin liquids via straw   Chewing/Deglutition WFL Pureed, dry solids, thin liquids via straw   Bolus Formation/Control WFL Pureed, dry solids, thin liquids via straw   Oral Stasis Observed

## 2024-04-13 NOTE — PROGRESS NOTES
[facility] Neurology   IN-PATIENT SERVICE   [facility]    Progress Note             Date:   4/13/2024  Patient name:  Brennen Mcclure  Date of admission:  4/12/2024  2:58 PM  MRN:   [id number]  Account:  [id number]  YOB: 1965  PCP:    Blanca Ren MD  Room:   [address] [place] [address]  Code Status:    Full Code    Chief Complaint:     Chief Complaint   Patient presents with    Facial Droop     Interval hx:     The patient was seen and examined at bedside. Is vitally stable, alert and oriented x 3. No acute events overnight.  The patient was evaluated by speech therapy, no concern of dysphagia, patient was started on regular diet and tolerated it well.   Lab work is negative for any underlying cause for encephalopathy  MRI brain revealed  Extensive edema and encephalomalacia throughout much of the right frontal and parietal lobes and portions of the right temporal lobe. Subacute/resolving right frontal parenchymal hematoma, but negative for acute abnormalities.   2-hour EEG was obtained today, results are pending     Brief History of Present Illness:     The patient is a 59 y.o.  Non- / non  male who presents with Facial Droop   and he is admitted to the hospital for the management of  dysphagia and intermittent confusion.   The patient is a 59 y.o. male with history of dural venous sinus thrombosis as well as right craniectomy due to intraparenchymal hemorrhage in November of 2023 with residual complete left upper and lower extremity paralysis who presents with difficulty swallowing liquids and solids that started yesterday at noon. Patient reports that he ate cornbread and soup yesterday without difficulty. However, shortly after that he tried drinking water and felt that it was more difficult to swallow than normal. Patient states it felt like the water got stuck in his throat. Denies choking. No throat pain. Never experienced this before. Patient states that since this occurred    K 4.0      CO2 23   BUN 21   CREATININE 1.0   GLUCOSE 79   CALCIUM 9.9     No results found for: \"LABA1C\"    Assessment :      Primary Problem  Dysphagia    Active Hospital Problems    Diagnosis Date Noted    Dysphagia [R13.10] 04/12/2024       Case of 59 y.o. M with PMH of CAD, s/p CABG, chronic thrombocytopenia, seizures, CVT and IPH in November 2023 with residual L-sided hemiplegia who was brought to ED due to facial drooling for the last several days, new onset aphasia and intermittent confusion     Plan:       New onset dysphagia - resolved   - CT head: no acute abnormalities   - CTA: moderate R vertebral artery stenosis   - MRI brain limited: MRI brain revealed  Extensive edema and encephalomalacia throughout much of the right frontal and parietal lobes and portions of the right temporal lobe. Subacute/resolving right frontal parenchymal hematoma, but negative for acute abnormalities.  - SLP evaluation:  recommended regular diet     Intermittent confusion   - work up to r/u underlying metabolic encephalopathy ordered   - 2-hour EEG: report is pending      History of seizures  - continue Lamictal 100 mg BID     Chronic thrombocytopenia   - platelet count today 32 (baseline 40s-60s)     DVT prophylaxis: intermittent compression devices           Follow-up further recommendations after discussing case with the attending.  The plan was discussed with the patient, patient's family and the medical staff.   Consultations:   IP CONSULT TO STROKE TEAM    Patient is admitted as inpatient status because of co-morbidities listed above, severity of signs and symptoms as outlined, requirement for current medical therapies and most importantly because of direct risk to patient if care not provided in a hospital setting.    Magalis Humphrey MD  Neurology Resident PGY-2   4/13/2024  8:30 AM    Copy sent to Blanca Tony MD

## 2024-04-14 VITALS
DIASTOLIC BLOOD PRESSURE: 65 MMHG | WEIGHT: 209.22 LBS | TEMPERATURE: 98.5 F | BODY MASS INDEX: 29.95 KG/M2 | SYSTOLIC BLOOD PRESSURE: 112 MMHG | HEIGHT: 70 IN | HEART RATE: 69 BPM | RESPIRATION RATE: 22 BRPM | OXYGEN SATURATION: 95 %

## 2024-04-14 PROBLEM — R29.818 HEADACHE WITH NEUROLOGIC DEFICIT: Status: ACTIVE | Noted: 2024-01-31

## 2024-04-14 LAB
ANION GAP SERPL CALCULATED.3IONS-SCNC: 11 MMOL/L (ref 9–16)
BASOPHILS # BLD: 0.05 K/UL (ref 0–0.2)
BASOPHILS NFR BLD: 1 % (ref 0–2)
BUN SERPL-MCNC: 18 MG/DL (ref 8–23)
CALCIUM SERPL-MCNC: 9.6 MG/DL (ref 8.6–10.4)
CHLORIDE SERPL-SCNC: 107 MMOL/L (ref 98–107)
CO2 SERPL-SCNC: 21 MMOL/L (ref 20–31)
CREAT SERPL-MCNC: 0.8 MG/DL (ref 0.7–1.2)
EOSINOPHIL # BLD: 0.34 K/UL (ref 0–0.44)
EOSINOPHILS RELATIVE PERCENT: 7 % (ref 1–4)
ERYTHROCYTE [DISTWIDTH] IN BLOOD BY AUTOMATED COUNT: 15 % (ref 11.8–14.4)
GFR SERPL CREATININE-BSD FRML MDRD: >90 ML/MIN/1.73M2
GLUCOSE SERPL-MCNC: 89 MG/DL (ref 74–99)
HCT VFR BLD AUTO: 46.5 % (ref 40.7–50.3)
HGB BLD-MCNC: 14.9 G/DL (ref 13–17)
IMM GRANULOCYTES # BLD AUTO: <0.03 K/UL (ref 0–0.3)
IMM GRANULOCYTES NFR BLD: 0 %
LYMPHOCYTES NFR BLD: 1.17 K/UL (ref 1.1–3.7)
LYMPHOCYTES RELATIVE PERCENT: 24 % (ref 24–43)
MCH RBC QN AUTO: 28.5 PG (ref 25.2–33.5)
MCHC RBC AUTO-ENTMCNC: 32 G/DL (ref 28.4–34.8)
MCV RBC AUTO: 89.1 FL (ref 82.6–102.9)
MONOCYTES NFR BLD: 0.46 K/UL (ref 0.1–1.2)
MONOCYTES NFR BLD: 9 % (ref 3–12)
NEUTROPHILS NFR BLD: 59 % (ref 36–65)
NEUTS SEG NFR BLD: 2.93 K/UL (ref 1.5–8.1)
NRBC BLD-RTO: 0 PER 100 WBC
PLATELET # BLD AUTO: ABNORMAL K/UL (ref 138–453)
PLATELET, FLUORESCENCE: 30 K/UL (ref 138–453)
PLATELETS.RETICULATED NFR BLD AUTO: 8.8 % (ref 1.1–10.3)
POTASSIUM SERPL-SCNC: 4 MMOL/L (ref 3.7–5.3)
RBC # BLD AUTO: 5.22 M/UL (ref 4.21–5.77)
RBC # BLD: ABNORMAL 10*6/UL
SODIUM SERPL-SCNC: 139 MMOL/L (ref 136–145)
WBC OTHER # BLD: 5 K/UL (ref 3.5–11.3)

## 2024-04-14 PROCEDURE — 85025 COMPLETE CBC W/AUTO DIFF WBC: CPT

## 2024-04-14 PROCEDURE — 95718 EEG PHYS/QHP 2-12 HR W/VEEG: CPT | Performed by: PSYCHIATRY & NEUROLOGY

## 2024-04-14 PROCEDURE — 85055 RETICULATED PLATELET ASSAY: CPT

## 2024-04-14 PROCEDURE — 6370000000 HC RX 637 (ALT 250 FOR IP)

## 2024-04-14 PROCEDURE — 99255 IP/OBS CONSLTJ NEW/EST HI 80: CPT | Performed by: INTERNAL MEDICINE

## 2024-04-14 PROCEDURE — 36415 COLL VENOUS BLD VENIPUNCTURE: CPT

## 2024-04-14 PROCEDURE — 80048 BASIC METABOLIC PNL TOTAL CA: CPT

## 2024-04-14 PROCEDURE — 2580000003 HC RX 258

## 2024-04-14 RX ORDER — BUTALBITAL, ACETAMINOPHEN AND CAFFEINE 300; 40; 50 MG/1; MG/1; MG/1
1 CAPSULE ORAL 2 TIMES DAILY PRN
Qty: 60 CAPSULE | Refills: 5 | Status: SHIPPED | OUTPATIENT
Start: 2024-04-14 | End: 2024-04-14

## 2024-04-14 RX ORDER — BUTALBITAL, ACETAMINOPHEN AND CAFFEINE 300; 40; 50 MG/1; MG/1; MG/1
1 CAPSULE ORAL 2 TIMES DAILY PRN
Qty: 60 CAPSULE | Refills: 5 | Status: SHIPPED | OUTPATIENT
Start: 2024-04-14

## 2024-04-14 RX ORDER — BUTALBITAL, ACETAMINOPHEN AND CAFFEINE 50; 325; 40 MG/1; MG/1; MG/1
1 TABLET ORAL EVERY 4 HOURS PRN
Status: DISCONTINUED | OUTPATIENT
Start: 2024-04-14 | End: 2024-04-14 | Stop reason: HOSPADM

## 2024-04-14 RX ADMIN — BACLOFEN 5 MG: 5 TABLET ORAL at 08:53

## 2024-04-14 RX ADMIN — GABAPENTIN 100 MG: 100 CAPSULE ORAL at 14:45

## 2024-04-14 RX ADMIN — TAMSULOSIN HYDROCHLORIDE 0.4 MG: 0.4 CAPSULE ORAL at 08:53

## 2024-04-14 RX ADMIN — GABAPENTIN 100 MG: 100 CAPSULE ORAL at 08:53

## 2024-04-14 RX ADMIN — BUTALBITAL, ACETAMINOPHEN, AND CAFFEINE 1 TABLET: 50; 325; 40 TABLET ORAL at 14:45

## 2024-04-14 RX ADMIN — CITALOPRAM 10 MG: 10 TABLET, FILM COATED ORAL at 08:55

## 2024-04-14 RX ADMIN — LACOSAMIDE 100 MG: 100 TABLET, FILM COATED ORAL at 08:54

## 2024-04-14 RX ADMIN — BUTALBITAL, ACETAMINOPHEN, AND CAFFEINE 1 TABLET: 50; 325; 40 TABLET ORAL at 08:53

## 2024-04-14 RX ADMIN — TOPIRAMATE 50 MG: 50 TABLET, FILM COATED ORAL at 08:56

## 2024-04-14 RX ADMIN — SODIUM CHLORIDE, PRESERVATIVE FREE 10 ML: 5 INJECTION INTRAVENOUS at 08:56

## 2024-04-14 ASSESSMENT — PAIN DESCRIPTION - LOCATION: LOCATION: HEAD

## 2024-04-14 ASSESSMENT — PAIN - FUNCTIONAL ASSESSMENT: PAIN_FUNCTIONAL_ASSESSMENT: ACTIVITIES ARE NOT PREVENTED

## 2024-04-14 ASSESSMENT — PAIN SCALES - GENERAL
PAINLEVEL_OUTOF10: 7
PAINLEVEL_OUTOF10: 7

## 2024-04-14 ASSESSMENT — PAIN DESCRIPTION - DESCRIPTORS: DESCRIPTORS: ACHING;THROBBING

## 2024-04-14 ASSESSMENT — PAIN DESCRIPTION - ORIENTATION: ORIENTATION: RIGHT;MID

## 2024-04-14 NOTE — PROCEDURES
EEG REPORT       Patient: Brennen Mcclure Age: 59 y.o.  MRN: [id number]      Referring Provider: No ref. provider found    History: This routine 2 hours 15 minute scalp EEG was recorded with video- monitoring for a 59 y.o.. male  who presented with encephalopathy. This EEG was performed to evaluate for focal and epileptiform abnormalities.     Brennen Mcclure   Current Facility-Administered Medications   Medication Dose Route Frequency Provider Last Rate Last Admin    melatonin tablet 10 mg  10 mg Oral Nightly PRN Torin Melvin MD   10 mg at 04/13/24 0304    lidocaine 4 % external patch 1 patch  1 patch TransDERmal Daily Torin Melvin MD   1 patch at 04/13/24 0303    magnesium sulfate 2000 mg in 50 mL IVPB premix  2,000 mg IntraVENous Once DavidaElliott S, DO 25 mL/hr at 04/13/24 2324 2,000 mg at 04/13/24 2324    sodium chloride flush 0.9 % injection 5-40 mL  5-40 mL IntraVENous 2 times per day Magalis Humphrey MD   10 mL at 04/13/24 2209    sodium chloride flush 0.9 % injection 5-40 mL  5-40 mL IntraVENous PRN Magalis Humphrey MD        0.9 % sodium chloride infusion   IntraVENous PRN Magalis Humphrey MD        potassium chloride (KLOR-CON M) extended release tablet 40 mEq  40 mEq Oral PRN Magalis Humphrey MD        Or    potassium bicarb-citric acid (EFFER-K) effervescent tablet 40 mEq  40 mEq Oral PRN Magalis Humphrey MD        Or    potassium chloride 10 mEq/100 mL IVPB (Peripheral Line)  10 mEq IntraVENous PRN Magalis Humphrey MD        magnesium sulfate 2000 mg in 50 mL IVPB premix  2,000 mg IntraVENous PRN Magalis Humphrey MD        ondansetron (ZOFRAN-ODT) disintegrating tablet 4 mg  4 mg Oral Q8H PRN Magalis Humphrey MD        Or    ondansetron (ZOFRAN) injection 4 mg  4 mg IntraVENous Q6H PRN Magalis Humphrey MD        polyethylene glycol (GLYCOLAX) packet 17 g  17 g Oral Daily PRN Magalis Humphrey MD        acetaminophen (TYLENOL) tablet 650 mg  650 mg Oral Q6H PRN Magalis Humphrey MD   650 mg

## 2024-04-14 NOTE — PLAN OF CARE
Problem: Discharge Planning  Goal: Discharge to home or other facility with appropriate resources  4/14/2024 0603 by Ty Abraham RN  Outcome: Progressing  Flowsheets (Taken 4/13/2024 2000)  Discharge to home or other facility with appropriate resources: Identify barriers to discharge with patient and caregiver     Problem: Pain  Goal: Verbalizes/displays adequate comfort level or baseline comfort level  Recent Flowsheet Documentation  Taken 4/14/2024 0739 by Anila Mena RN  Verbalizes/displays adequate comfort level or baseline comfort level: Encourage patient to monitor pain and request assistance  4/14/2024 0603 by Ty Abraham RN  Outcome: Progressing  Flowsheets  Taken 4/14/2024 0400  Verbalizes/displays adequate comfort level or baseline comfort level: Encourage patient to monitor pain and request assistance  Taken 4/13/2024 2330  Verbalizes/displays adequate comfort level or baseline comfort level: Encourage patient to monitor pain and request assistance  Taken 4/13/2024 1931  Verbalizes/displays adequate comfort level or baseline comfort level: Encourage patient to monitor pain and request assistance     Problem: Safety - Adult  Goal: Free from fall injury  4/14/2024 1028 by Anila Mena RN  Outcome: Progressing  4/14/2024 0603 by Ty Abraham RN  Outcome: Progressing  Flowsheets (Taken 4/13/2024 2000)  Free From Fall Injury: Instruct family/caregiver on patient safety     Problem: Skin/Tissue Integrity  Goal: Absence of new skin breakdown  Description: 1.  Monitor for areas of redness and/or skin breakdown  2.  Assess vascular access sites hourly  3.  Every 4-6 hours minimum:  Change oxygen saturation probe site  4.  Every 4-6 hours:  If on nasal continuous positive airway pressure, respiratory therapy assess nares and determine need for appliance change or resting period.  4/14/2024 1028 by Anila Mena RN  Outcome: Progressing  4/14/2024 0603 by Ty Abraham RN  Outcome:

## 2024-04-14 NOTE — PLAN OF CARE
Problem: Discharge Planning  Goal: Discharge to home or other facility with appropriate resources  Outcome: Progressing  Flowsheets (Taken 4/13/2024 2000)  Discharge to home or other facility with appropriate resources: Identify barriers to discharge with patient and caregiver     Problem: Pain  Goal: Verbalizes/displays adequate comfort level or baseline comfort level  Outcome: Progressing  Flowsheets  Taken 4/14/2024 0400  Verbalizes/displays adequate comfort level or baseline comfort level: Encourage patient to monitor pain and request assistance  Taken 4/13/2024 2330  Verbalizes/displays adequate comfort level or baseline comfort level: Encourage patient to monitor pain and request assistance  Taken 4/13/2024 1931  Verbalizes/displays adequate comfort level or baseline comfort level: Encourage patient to monitor pain and request assistance     Problem: Safety - Adult  Goal: Free from fall injury  Outcome: Progressing  Flowsheets (Taken 4/13/2024 2000)  Free From Fall Injury: Instruct family/caregiver on patient safety     Problem: Skin/Tissue Integrity  Goal: Absence of new skin breakdown  Description: 1.  Monitor for areas of redness and/or skin breakdown  2.  Assess vascular access sites hourly  3.  Every 4-6 hours minimum:  Change oxygen saturation probe site  4.  Every 4-6 hours:  If on nasal continuous positive airway pressure, respiratory therapy assess nares and determine need for appliance change or resting period.  Outcome: Progressing     Problem: Chronic Conditions and Co-morbidities  Goal: Patient's chronic conditions and co-morbidity symptoms are monitored and maintained or improved  Outcome: Progressing  Flowsheets (Taken 4/13/2024 2000)  Care Plan - Patient's Chronic Conditions and Co-Morbidity Symptoms are Monitored and Maintained or Improved: Monitor and assess patient's chronic conditions and comorbid symptoms for stability, deterioration, or improvement

## 2024-04-14 NOTE — CARE COORDINATION
Case Management Assessment  Initial Evaluation    Date/Time of Evaluation: 4/14/2024 2:38 PM  Assessment Completed by: Janay Flores RN    If patient is discharged prior to next notation, then this note serves as note for discharge by case management.    Patient Name: Brennen Mcclure                   YOB: 1965  Diagnosis: Dysphagia [R13.10]  Headache with neurologic deficit [R51.9, R29.818]                   Date / Time: 4/12/2024  2:58 PM    Patient Admission Status: Inpatient   Readmission Risk (Low < 19, Mod (19-27), High > 27): Readmission Risk Score: 16.7    Current PCP: Blanca Ren MD  PCP verified by CM? Yes    Chart Reviewed: Yes      History Provided by: Spouse  Patient Orientation: Alert and Oriented, Person, Place    Patient Cognition:      Hospitalization in the last 30 days (Readmission):  No    If yes, Readmission Assessment in  Navigator will be completed.    Advance Directives:      Code Status: DNR-CCA   Patient's Primary Decision Maker is: Legal Next of Kin    Primary Decision Maker: Mara Mcclure - Spouse - [phone]    Secondary Decision Maker: Keturah Grayson - Brother/Sister - [phone]    Discharge Planning:    Patient lives with: Spouse/Significant Other Type of Home: House  Primary Care Giver: Spouse  Patient Support Systems include: Spouse/Significant Other   Current Financial resources: Medicaid  Current community resources:    Current services prior to admission: Durable Medical Equipment            Current DME: Hospital Bed, Other (Comment), Wheelchair (Radha Steady, Lift chair, Gait belt, hospital bed)            Type of Home Care services:  None    ADLS  Prior functional level: Assistance with the following:, Bathing, Dressing, Toileting, Mobility  Current functional level: Bathing, Dressing, Toileting, Mobility, Assistance with the following:    PT AM-PAC:   /24  OT AM-PAC:   /24    Family can provide assistance at DC: Yes  Would you like Case Management to  discuss the discharge plan with any other family members/significant others, and if so, who?    Plans to Return to Present Housing: Yes  Other Identified Issues/Barriers to RETURNING to current housing:  n/a  Potential Assistance needed at discharge:             Potential DME:    Patient expects to discharge to: House  Plan for transportation at discharge: Family    Financial    Payor: [employer] [place] / Plan: [place] / Product Type: *No Product type* /     Does insurance require precert for SNF:  yes    Potential assistance Purchasing Medications: No  Meds-to-Beds request: Yes      [place], [place] - [address] [facility] [phone] - F [phone]  [address] [place] [address]  Phone: [phone] Fax: [phone]    [id number] - [place], [place] - [address] [facility] -  [phone] - F [phone]  [facility] [address]  Phone: [phone] Fax: [phone]      Notes:    Factors facilitating achievement of predicted outcomes: Family support, Caregiver support, Cooperative, and Pleasant    Barriers to discharge:  none    Additional Case Management Notes:  Plan is for patient to return home with wife, who is patient's caregiver. Has needed DME.    The Plan for Transition of Care is related to the following treatment goals of Dysphagia [R13.10]  Headache with neurologic deficit [R51.9, R29.818]    IF APPLICABLE: The Patient and/or patient representative Brennen and his family were provided with a choice of provider and agrees with the discharge plan. Freedom of choice list with basic dialogue that supports the patient's individualized plan of care/goals and shares the quality data associated with the providers was provided to: Patient, Patient Representative   Patient Representative Name: wife     The Patient and/or Patient Representative Agree with the Discharge Plan? Yes    Janay Flores RN  Case Management Department

## 2024-04-14 NOTE — CONSULTS
_                         Today's Date: 4/14/2024  Patient Name: Brennen Mcclure  Date of admission: 4/12/2024  2:58 PM  Patient's age: 59 y.o., 1965  Admission Dx: Dysphagia [R13.10]  Headache with neurologic deficit [R51.9, R29.818]      Requesting Physician: Chaka Miller DO    CHIEF COMPLAINT: Dysphagia.  Consult for thrombocytopenia.  Chronic ITP.    History Obtained From:  patient, electronic medical record    HISTORY OF PRESENT ILLNESS:      The patient is a 59 y.o.  male who is admitted to the hospital for further management of dysphagia believed to be related to acute stroke.  Patient had multiple hospitalizations due to CVAs.  He presented with increasing problems with dysphagia and change in mental status.  Further workup by neurology.  Currently stabilized.  Continues to have left hemiplegia.  He had facial droop.  His confusion is improving.  Patient is back to baseline.  Patient had history of chronic ITP.  He was originally managed and had full workup in the outside hospital in [place].  He had multiple lines of treatment for ITP but he failed.  Was recently prescribed Promacta and he will start treatment today.  Currently no active bleeding.  No fever.  No enlarged lymph nodes.  No other complaints.      Past Medical History:   has a past medical history of Anemia, CAD (coronary artery disease), Chronic deep vein thrombosis (DVT) of both lower extremities (HCC), Chronic deep vein thrombosis (DVT) of femoral vein of right lower extremity (HCC), Chronic deep vein thrombosis (DVT) of proximal vein of right lower extremity (HCC), Chronic deep vein thrombosis (DVT) of right iliac vein (HCC), Chronic idiopathic thrombocytopenia (HCC), Chronic idiopathic thrombocytopenic purpura (HCC), CVA (cerebral vascular accident) (HCC), Emphysema lung (HCC), Hemosiderin pigmentation of skin, HLD (hyperlipidemia), Intracranial hemorrhage (HCC), Left

## 2024-04-14 NOTE — PROGRESS NOTES
[facility] Neurology   IN-PATIENT SERVICE   [facility]    Progress Note             Date:   4/14/2024  Patient name:  Brennen Mcclure  Date of admission:  4/12/2024  2:58 PM  MRN:   [id number]  Account:  [id number]  YOB: 1965  PCP:    Blanca Ren MD  Room:   [address] [place] [address]  Code Status:    DNR-CCA    Chief Complaint:     Chief Complaint   Patient presents with    Facial Droop     Interval hx:     The patient was seen and examined at bedside.     Brief History of Present Illness:     The patient is a 59 y.o.  Non- / non  male who presents with Facial Droop   and he is admitted to the hospital for the management of  dysphagia and intermittent confusion.   The patient is a 59 y.o. male with history of dural venous sinus thrombosis as well as right craniectomy due to intraparenchymal hemorrhage in November of 2023 with residual complete left upper and lower extremity paralysis who presents with difficulty swallowing liquids and solids that started yesterday at noon. Patient reports that he ate cornbread and soup yesterday without difficulty. However, shortly after that he tried drinking water and felt that it was more difficult to swallow than normal. Patient states it felt like the water got stuck in his throat. Denies choking. No throat pain. Never experienced this before. Patient states that since this occurred he has been experiencing difficulty swallowing with everything he tries to swallow. Patient is able to swallow saliva without difficulty. No fever/chills or recent illnesses. No chest pain or shortness of breath. Patient denies any new numbness or weakness.            Past Medical History:     Past Medical History:   Diagnosis Date    Anemia 02/12/2024    CAD (coronary artery disease)     Chronic deep vein thrombosis (DVT) of both lower extremities (HCC)     Chronic deep vein thrombosis (DVT) of femoral vein of right lower extremity (HCC) 03/07/2024    Chronic

## 2024-04-15 ENCOUNTER — TELEPHONE (OUTPATIENT)
Dept: INTERNAL MEDICINE CLINIC | Age: 59
End: 2024-04-15

## 2024-04-15 LAB — TOPIRAMATE SERPL-MCNC: 2.2 UG/ML (ref 5–20)

## 2024-04-15 NOTE — TELEPHONE ENCOUNTER
Care Transitions Initial Follow Up Call    Outreach made within 2 business days of discharge: Yes    Patient: Brennen Mcclure Patient : 1965   MRN: [id number]  Reason for Admission: There are no discharge diagnoses documented for the most recent discharge.  Discharge Date: 24       Spoke with: kyaw mcdowell    Discharge department/facility: home    TCM Interactive Patient Contact:  Was patient able to fill all prescriptions: Yes  Was patient instructed to bring all medications to the follow-up visit: Yes  Is patient taking all medications as directed in the discharge summary? Yes  Does patient understand their discharge instructions: Yes  Does patient have questions or concerns that need addressed prior to 7-14 day follow up office visit: no  DENIED APPOINTMENT AT THIS TIME, WILL CALL BACK IF NEEDED.     Follow Up  Future Appointments   Date Time Provider Department Center   2024  1:00 PM Danica De La Paz MD [facility]   2024  9:45 AM Lam Chang MD [facility]   2024  1:45 PM Phoebe Caldwell SLP [facility]   2024  2:30 PM Denise Varela, OT [facility]   2024  2:15 PM Anastasia Orellana, OT [facility]   2024  3:15 PM Phoebe Caldwell SLP [facility]   2024 12:45 PM Hu Kaye, PTA [facility]   2024  1:45 PM Denise Varela OT [facility]   2024  2:30 PM Phoebe Caldwell SLP [facility]   2024  3:15 PM Anastasia Orellana, OT [facility]   2024  4:15 PM Yadira Wells PTA [facility]   2024 12:45 PM Sterling Garcia, PT [facility]   2024  1:45 PM Denise Varela, OT [facility]   2024  2:30 PM Yadira Wells PTA [facility]   2024  3:30 PM Denise Varela, OT [facility]   2024  4:30 PM Phoebe Caldwell, SLP [facility]   2024 11:30 AM Mikal,

## 2024-04-16 LAB — LACOSAMIDE: 4.7 UG/ML (ref 1–10)

## 2024-04-17 ENCOUNTER — HOSPITAL ENCOUNTER (OUTPATIENT)
Facility: MEDICAL CENTER | Age: 59
Discharge: HOME | End: 2024-04-17
Payer: COMMERCIAL

## 2024-04-17 ENCOUNTER — HOSPITAL ENCOUNTER (OUTPATIENT)
Dept: INFUSION THERAPY | Facility: MEDICAL CENTER | Age: 59
Discharge: HOME | End: 2024-04-17
Payer: COMMERCIAL

## 2024-04-17 ENCOUNTER — OFFICE VISIT (OUTPATIENT)
Dept: ONCOLOGY | Age: 59
End: 2024-04-17
Payer: COMMERCIAL

## 2024-04-17 ENCOUNTER — TELEPHONE (OUTPATIENT)
Dept: ONCOLOGY | Age: 59
End: 2024-04-17

## 2024-04-17 VITALS — HEART RATE: 65 BPM | DIASTOLIC BLOOD PRESSURE: 70 MMHG | SYSTOLIC BLOOD PRESSURE: 112 MMHG

## 2024-04-17 VITALS
SYSTOLIC BLOOD PRESSURE: 128 MMHG | DIASTOLIC BLOOD PRESSURE: 87 MMHG | HEART RATE: 77 BPM | TEMPERATURE: 97.8 F | RESPIRATION RATE: 18 BRPM

## 2024-04-17 DIAGNOSIS — D69.3 CHRONIC ITP (IDIOPATHIC THROMBOCYTOPENIA) (HCC): ICD-10-CM

## 2024-04-17 DIAGNOSIS — D69.6 THROMBOCYTOPENIA (HCC): Primary | ICD-10-CM

## 2024-04-17 DIAGNOSIS — D69.3 CHRONIC ITP (IDIOPATHIC THROMBOCYTOPENIA) (HCC): Primary | ICD-10-CM

## 2024-04-17 LAB
ALBUMIN SERPL-MCNC: 4.6 G/DL (ref 3.5–5.2)
ALP SERPL-CCNC: 121 U/L (ref 40–129)
ALT SERPL-CCNC: 31 U/L (ref 5–41)
ANION GAP SERPL CALCULATED.3IONS-SCNC: 13 MMOL/L (ref 9–17)
AST SERPL-CCNC: 21 U/L
BASOPHILS # BLD: 0.05 K/UL (ref 0–0.2)
BASOPHILS NFR BLD: 1 % (ref 0–2)
BILIRUB SERPL-MCNC: 0.2 MG/DL (ref 0.3–1.2)
BUN SERPL-MCNC: 22 MG/DL (ref 8–23)
BUN/CREAT SERPL: 24 (ref 9–20)
CALCIUM SERPL-MCNC: 9.9 MG/DL (ref 8.6–10.4)
CHLORIDE SERPL-SCNC: 104 MMOL/L (ref 98–107)
CO2 SERPL-SCNC: 24 MMOL/L (ref 20–31)
CREAT SERPL-MCNC: 0.9 MG/DL (ref 0.7–1.2)
EOSINOPHIL # BLD: 0.31 K/UL (ref 0–0.44)
EOSINOPHILS RELATIVE PERCENT: 6 % (ref 1–4)
ERYTHROCYTE [DISTWIDTH] IN BLOOD BY AUTOMATED COUNT: 14.9 % (ref 11.8–14.4)
GFR SERPL CREATININE-BSD FRML MDRD: >90 ML/MIN/1.73M2
GLUCOSE SERPL-MCNC: 126 MG/DL (ref 70–99)
HCT VFR BLD AUTO: 47.7 % (ref 40.7–50.3)
HGB BLD-MCNC: 15.3 G/DL (ref 13–17)
IMM GRANULOCYTES # BLD AUTO: 0 K/UL (ref 0–0.3)
IMM GRANULOCYTES NFR BLD: 0 %
LYMPHOCYTES NFR BLD: 1.09 K/UL (ref 1.1–3.7)
LYMPHOCYTES RELATIVE PERCENT: 21 % (ref 24–43)
MCH RBC QN AUTO: 28.8 PG (ref 25.2–33.5)
MCHC RBC AUTO-ENTMCNC: 32.1 G/DL (ref 28.4–34.8)
MCV RBC AUTO: 89.8 FL (ref 82.6–102.9)
MONOCYTES NFR BLD: 0.31 K/UL (ref 0.1–1.2)
MONOCYTES NFR BLD: 6 % (ref 3–12)
NEUTROPHILS NFR BLD: 66 % (ref 36–65)
NEUTS SEG NFR BLD: 3.44 K/UL (ref 1.5–8.1)
NRBC BLD-RTO: 0 PER 100 WBC
PLATELET # BLD AUTO: ABNORMAL K/UL (ref 138–453)
PLATELET, FLUORESCENCE: 49 K/UL (ref 138–453)
PLATELETS.RETICULATED NFR BLD AUTO: 13 % (ref 1.1–10.3)
POTASSIUM SERPL-SCNC: 3.8 MMOL/L (ref 3.7–5.3)
PROT SERPL-MCNC: 7.8 G/DL (ref 6.4–8.3)
RBC # BLD AUTO: 5.31 M/UL (ref 4.21–5.77)
RBC # BLD: ABNORMAL 10*6/UL
SODIUM SERPL-SCNC: 141 MMOL/L (ref 135–144)
WBC OTHER # BLD: 5.2 K/UL (ref 3.5–11.3)

## 2024-04-17 PROCEDURE — 6360000002 HC RX W HCPCS: Performed by: INTERNAL MEDICINE

## 2024-04-17 PROCEDURE — 2580000003 HC RX 258: Performed by: INTERNAL MEDICINE

## 2024-04-17 PROCEDURE — 36415 COLL VENOUS BLD VENIPUNCTURE: CPT

## 2024-04-17 PROCEDURE — 85055 RETICULATED PLATELET ASSAY: CPT

## 2024-04-17 PROCEDURE — 85025 COMPLETE CBC W/AUTO DIFF WBC: CPT

## 2024-04-17 PROCEDURE — 96365 THER/PROPH/DIAG IV INF INIT: CPT

## 2024-04-17 PROCEDURE — 96366 THER/PROPH/DIAG IV INF ADDON: CPT

## 2024-04-17 PROCEDURE — 6370000000 HC RX 637 (ALT 250 FOR IP): Performed by: INTERNAL MEDICINE

## 2024-04-17 PROCEDURE — 99214 OFFICE O/P EST MOD 30 MIN: CPT | Performed by: INTERNAL MEDICINE

## 2024-04-17 PROCEDURE — 99211 OFF/OP EST MAY X REQ PHY/QHP: CPT | Performed by: INTERNAL MEDICINE

## 2024-04-17 PROCEDURE — 80053 COMPREHEN METABOLIC PANEL: CPT

## 2024-04-17 RX ORDER — MEPERIDINE HYDROCHLORIDE 50 MG/ML
12.5 INJECTION INTRAMUSCULAR; INTRAVENOUS; SUBCUTANEOUS PRN
Status: CANCELLED | OUTPATIENT
Start: 2024-04-24

## 2024-04-17 RX ORDER — FAMOTIDINE 10 MG/ML
20 INJECTION, SOLUTION INTRAVENOUS
Status: CANCELLED | OUTPATIENT
Start: 2024-04-24

## 2024-04-17 RX ORDER — EPINEPHRINE 1 MG/ML
0.3 INJECTION, SOLUTION INTRAMUSCULAR; SUBCUTANEOUS PRN
Status: CANCELLED | OUTPATIENT
Start: 2024-04-24

## 2024-04-17 RX ORDER — ACETAMINOPHEN 325 MG/1
650 TABLET ORAL ONCE
Status: CANCELLED | OUTPATIENT
Start: 2024-04-24 | End: 2024-04-24

## 2024-04-17 RX ORDER — SODIUM CHLORIDE 9 MG/ML
5-250 INJECTION, SOLUTION INTRAVENOUS PRN
Status: CANCELLED | OUTPATIENT
Start: 2024-04-24

## 2024-04-17 RX ORDER — ONDANSETRON 2 MG/ML
8 INJECTION INTRAMUSCULAR; INTRAVENOUS
Status: CANCELLED | OUTPATIENT
Start: 2024-04-24

## 2024-04-17 RX ORDER — DIPHENHYDRAMINE HCL 25 MG
25 TABLET ORAL ONCE
Status: COMPLETED | OUTPATIENT
Start: 2024-04-17 | End: 2024-04-17

## 2024-04-17 RX ORDER — SODIUM CHLORIDE 9 MG/ML
5-250 INJECTION, SOLUTION INTRAVENOUS PRN
Status: DISCONTINUED | OUTPATIENT
Start: 2024-04-17 | End: 2024-04-18 | Stop reason: HOSPADM

## 2024-04-17 RX ORDER — SODIUM CHLORIDE 9 MG/ML
INJECTION, SOLUTION INTRAVENOUS CONTINUOUS
Status: CANCELLED | OUTPATIENT
Start: 2024-04-24

## 2024-04-17 RX ORDER — ALBUTEROL SULFATE 90 UG/1
4 AEROSOL, METERED RESPIRATORY (INHALATION) PRN
Status: CANCELLED | OUTPATIENT
Start: 2024-04-24

## 2024-04-17 RX ORDER — DIPHENHYDRAMINE HYDROCHLORIDE 50 MG/ML
50 INJECTION INTRAMUSCULAR; INTRAVENOUS
Status: CANCELLED | OUTPATIENT
Start: 2024-04-24

## 2024-04-17 RX ORDER — HEPARIN 100 UNIT/ML
500 SYRINGE INTRAVENOUS PRN
Status: CANCELLED | OUTPATIENT
Start: 2024-04-24

## 2024-04-17 RX ORDER — SODIUM CHLORIDE 0.9 % (FLUSH) 0.9 %
5-40 SYRINGE (ML) INJECTION PRN
Status: CANCELLED | OUTPATIENT
Start: 2024-04-24

## 2024-04-17 RX ORDER — DIPHENHYDRAMINE HCL 25 MG
25 TABLET ORAL ONCE
Status: CANCELLED | OUTPATIENT
Start: 2024-04-24 | End: 2024-04-24

## 2024-04-17 RX ORDER — ACETAMINOPHEN 325 MG/1
650 TABLET ORAL
Status: CANCELLED | OUTPATIENT
Start: 2024-04-24

## 2024-04-17 RX ORDER — ACETAMINOPHEN 325 MG/1
650 TABLET ORAL ONCE
Status: COMPLETED | OUTPATIENT
Start: 2024-04-17 | End: 2024-04-17

## 2024-04-17 RX ADMIN — IMMUNE GLOBULIN (HUMAN) 40 G: 10 INJECTION INTRAVENOUS; SUBCUTANEOUS at 11:16

## 2024-04-17 RX ADMIN — ACETAMINOPHEN 650 MG: 325 TABLET ORAL at 10:39

## 2024-04-17 RX ADMIN — SODIUM CHLORIDE 100 ML/HR: 9 INJECTION, SOLUTION INTRAVENOUS at 10:30

## 2024-04-17 RX ADMIN — DIPHENHYDRAMINE HYDROCHLORIDE 25 MG: 25 TABLET ORAL at 10:39

## 2024-04-17 ASSESSMENT — PAIN SCALES - GENERAL: PAINLEVEL_OUTOF10: 0

## 2024-04-17 NOTE — PROGRESS NOTES
IVIG completed and pt tolerated well and pt observed for 30 min post with NS infusing and then pt discharged per scotty almazan from pt's home and pt discharged with wife and writer and with wheelchair from pt's home also. Pt given calendar with next appt per Alice VEGA.  No reactions or complaints and blood return present throughout infusion.

## 2024-04-17 NOTE — PROGRESS NOTES
Today's Date: 4/17/2024  Patient Name: Brennen Mcclure  Patient's age: 59 y.o., 1965    DIAGNOSIS:   Chronic thrombocytopenia, patient had a evaluation in [place] and also in the past and thought to be immune mediated  History of dural sinus thrombosis and history of DVT in 2019  Chronic anticoagulation with Eliquis  Had a hemorrhagic stroke in December 2023  Patient received platelet transfusion during the hospitalization and also was given IVIG on 1/9/2024 and 1/10/2024  Patient was treated with Decadron 40 mg on 2/18 - 2/20 1-24, with no response  Patient planning surgical procedure with neurosurgery  Bone marrow biopsy negative for acute bone marrow pathology  I will plan to get IVIG weekly for 5 doses and also prescription for Promacta was sent  Patient started taking Promacta on 4/14/2024  First IVIG will be given on 4/17/2024  CHIEF COMPLAINT:    Chief Complaint   Patient presents with    Follow-up    Discuss Labs     INTERVAL HISTORY:    Patient is returning for follow visit and to discuss lab results and further recommendations after his recent hospitalization.  He started Promacta 4 days ago.  His IVIG is now approved by insurance and will receive first dose today.  His platelets slightly improved at 49 K  He denies any symptoms of bleeding.  Patient wants to get his neurosurgery done as soon as possible.    Denies any bleeding symptoms       During this visit patient's allergy, social, medical, surgical history and medications were reviewed and updated.     HISTORY OF PRESENT ILLNESS:    Brennen Mcclure is a 59 y.o. male who is admitted to the hospital on (Not on file)  for acute rehab after recent hospitalization for acute stroke.    Patient had recent hospitalization for left hemiparesis secondary to hemorrhagic CVA and subsequently discharged to acute rehab at [facility].  Patient has history of dural venous sinus thrombosis and also history of DVT 2019, and June 2023, and was

## 2024-04-17 NOTE — PLAN OF CARE
Problem: Safety - Adult  Goal: Free from fall injury  Outcome: Completed      Patient still requires mod A x 1-2 people for safety with all mobility r/t impulsiveness/dec cognition/vision; amb x 60ft RW mod A for balance/rw management; rec hhpt/ot and 24hr care from family so patient can be in familiar surroundings; dtr agrees

## 2024-04-17 NOTE — TELEPHONE ENCOUNTER
GINO HERE FOR MD VISIT & TX  IVIG as planned  Cbc weekly  If next week platelet more than 100K will hold off IVIG  RTC 2 weeks  MD VISIT 5/1/24 @ 9:45AM TX @ 10AM  AVS PRINTED W/ INSTRUCTIONS AND GIVEN  TO PT ON EXIT

## 2024-04-17 NOTE — PATIENT INSTRUCTIONS
IVIG as planned  Cbc weekly  If next week platelet more than 100K will hold off IVIG  RTC 2 weeks

## 2024-04-17 NOTE — PROGRESS NOTES
Patient arrived per anton with wife for IVIG 1/5 IVIG infusions after physician visit at front office.  Patient denies complaints or concerns.  Labs reviewed.  IV inserted per unit protocol.  Patient premedicated.  IVIG initiated at 57 ml/hr for 30 minutes with no sign adverse reaction.  IVIG increased to 114 ml/hr for 30 minutes with no sign adverse reaction.  IVIG increased to 228 ml/hr for remainder on infusion.  See epic for stable vitals.  Report given to Valeria VEGA.

## 2024-04-23 ENCOUNTER — HOSPITAL ENCOUNTER (OUTPATIENT)
Dept: GENERAL RADIOLOGY | Facility: CLINIC | Age: 59
Discharge: HOME | End: 2024-04-25
Payer: MEDICAID

## 2024-04-23 ENCOUNTER — HOSPITAL ENCOUNTER (OUTPATIENT)
Dept: OCCUPATIONAL THERAPY | Age: 59
Setting detail: THERAPIES SERIES
Discharge: HOME | End: 2024-04-23
Attending: INTERNAL MEDICINE
Payer: MEDICAID

## 2024-04-23 ENCOUNTER — HOSPITAL ENCOUNTER (OUTPATIENT)
Facility: CLINIC | Age: 59
Discharge: HOME | End: 2024-04-25
Payer: MEDICAID

## 2024-04-23 ENCOUNTER — HOSPITAL ENCOUNTER (OUTPATIENT)
Dept: SPEECH THERAPY | Age: 59
Setting detail: THERAPIES SERIES
Discharge: HOME | End: 2024-04-23
Attending: INTERNAL MEDICINE
Payer: MEDICAID

## 2024-04-23 DIAGNOSIS — I69.152: ICD-10-CM

## 2024-04-23 DIAGNOSIS — M25.552 LEFT HIP PAIN: ICD-10-CM

## 2024-04-23 DIAGNOSIS — G89.29 CHRONIC LEFT SHOULDER PAIN: ICD-10-CM

## 2024-04-23 DIAGNOSIS — M25.512 CHRONIC LEFT SHOULDER PAIN: ICD-10-CM

## 2024-04-23 PROCEDURE — 92526 ORAL FUNCTION THERAPY: CPT

## 2024-04-23 PROCEDURE — 73502 X-RAY EXAM HIP UNI 2-3 VIEWS: CPT

## 2024-04-23 PROCEDURE — 92507 TX SP LANG VOICE COMM INDIV: CPT

## 2024-04-23 PROCEDURE — 73030 X-RAY EXAM OF SHOULDER: CPT

## 2024-04-23 PROCEDURE — 97110 THERAPEUTIC EXERCISES: CPT

## 2024-04-23 NOTE — FLOWSHEET NOTE
[] [facility]  Outpatient Rehabilitation &  Therapy  [address]  [phone]  F: [phone] [] [facility]  Outpatient Rehabilitation &  Therapy  [address]  P: [phone]  F: [phone] [] [facility]  Outpatient Rehabilitation &  Therapy  [address]   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 Patient went to [facility] on  for c/o dysphagia.  While there, he was seen by speech therapy for a bedside swallow evaluation. No change was made to his diet, and recommended regular/thin.  No overt s/s of difficulty or aspiration noted during evaluation.     Pain:  [x] Yes  [] No Location: back, hip, head Pain Rating: (0-10 scale) 7/10  Pain altered Tx:  [x] No  [] Yes  Action:  Comments:    Precautions: Pseudobulbar Effect, L neglect, impulsivity, decreased insight into deficits. Helmet to be on at all times when up    Objective:    LTG: to be met within 20 visits  Goal 1:  Patient will improve his overall cognitive communication skills to improve problem solving and

## 2024-04-23 NOTE — FLOWSHEET NOTE
[facility]  Outpatient Rehabilitation &  Therapy  [address]     [phone]  F: [phone]   [] [facility]  Outpatient Rehabilitation &  Therapy  [address]   [address]  P: [phone]  F: [phone] [] [facility]  Outpatient Rehabilitation &  Therapy  [address]  P: [phone]  F: [phone]  [x] [facility]   Outpatient Rehabilitation & Therapy  [address]  P: [phone]   F: [phone]     Occupational Therapy Daily Treatment Note    Date:  2024  Patient Name:  Brennen Mcclure    :  1965  MRN: [id number]  Physician: Blanca Ren MD                                     Insurance: [employer]  30 visits  Medical Diagnosis: I69.152 (ICD-10-CM) - 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 (HCC)F48.2 (ICD-10-CM) - Pseudobulbar vanssvL24.9 (ICD-10-CM) - Intractable headache, unspecified chronicity pattern, unspecified headache type              Rehab Codes: pain in shoulder M25.51,, lack of coordination R27.8,, or muscle weakness generalized M62.81,  Next 's appt.: unknown  Date of symptom onset: 23     Visit# / total visits: ; Progress note for Medicare patient due at visit 10    Cancels/No Shows: 0/0      Subjective:    Pain:  [x] Yes  [] No Location: head, shoulder, hip  Pain Rating: (0-10 scale) 7/10  Pain altered Tx:  [] No  [x] Yes  Action: repositioning of arm  Pt Comments: States arm is painful    Objective:  Modalities:  Precautions:  Exercises:  EXERCISE    REPS/     TIME  WEIGHT/    LEVEL COMMENTS    HEP   Reviewed positioning. Pt brought PROM glove he uses at home. Reviewed prudencio/doff    ROM   Left shoulder, elbow, wrist, digits PROM/AAROM Pt very painful in shoulder and hand   Passive ROM glove   reviewed    Tape shoulder   Subluxation left shoulder. Taped to assist with decreasing                          Other:      Treatment Charges: Mins Units

## 2024-04-24 ENCOUNTER — HOSPITAL ENCOUNTER (OUTPATIENT)
Dept: INFUSION THERAPY | Facility: MEDICAL CENTER | Age: 59
Discharge: HOME | End: 2024-04-24
Payer: MEDICAID

## 2024-04-24 ENCOUNTER — HOSPITAL ENCOUNTER (OUTPATIENT)
Age: 59
Setting detail: SPECIMEN
Discharge: HOME | End: 2024-04-24
Payer: MEDICAID

## 2024-04-24 VITALS
RESPIRATION RATE: 16 BRPM | DIASTOLIC BLOOD PRESSURE: 79 MMHG | HEART RATE: 63 BPM | TEMPERATURE: 97.5 F | SYSTOLIC BLOOD PRESSURE: 132 MMHG

## 2024-04-24 DIAGNOSIS — D69.3 CHRONIC ITP (IDIOPATHIC THROMBOCYTOPENIA) (HCC): ICD-10-CM

## 2024-04-24 DIAGNOSIS — D69.3 CHRONIC ITP (IDIOPATHIC THROMBOCYTOPENIA) (HCC): Primary | ICD-10-CM

## 2024-04-24 LAB
ALBUMIN SERPL-MCNC: 4.6 G/DL (ref 3.5–5.2)
ALP SERPL-CCNC: 128 U/L (ref 40–129)
ALT SERPL-CCNC: 42 U/L (ref 5–41)
ANION GAP SERPL CALCULATED.3IONS-SCNC: 13 MMOL/L (ref 9–17)
AST SERPL-CCNC: 27 U/L
BASOPHILS # BLD: 0.05 K/UL (ref 0–0.2)
BASOPHILS NFR BLD: 1 %
BILIRUB SERPL-MCNC: 0.3 MG/DL (ref 0.3–1.2)
BUN SERPL-MCNC: 21 MG/DL (ref 8–23)
BUN/CREAT SERPL: 21 (ref 9–20)
CALCIUM SERPL-MCNC: 10.2 MG/DL (ref 8.6–10.4)
CHLORIDE SERPL-SCNC: 101 MMOL/L (ref 98–107)
CO2 SERPL-SCNC: 24 MMOL/L (ref 20–31)
CREAT SERPL-MCNC: 1 MG/DL (ref 0.7–1.2)
EOSINOPHIL # BLD: 0.3 K/UL (ref 0–0.4)
EOSINOPHILS RELATIVE PERCENT: 6 % (ref 1–4)
ERYTHROCYTE [DISTWIDTH] IN BLOOD BY AUTOMATED COUNT: 15 % (ref 11.8–14.4)
GFR SERPL CREATININE-BSD FRML MDRD: 86 ML/MIN/1.73M2
GLUCOSE SERPL-MCNC: 140 MG/DL (ref 70–99)
HCT VFR BLD AUTO: 52 % (ref 40.7–50.3)
HGB BLD-MCNC: 16.3 G/DL (ref 13–17)
IMM GRANULOCYTES # BLD AUTO: 0 K/UL (ref 0–0.3)
IMM GRANULOCYTES NFR BLD: 0 %
LYMPHOCYTES NFR BLD: 0.95 K/UL (ref 1–4.8)
LYMPHOCYTES RELATIVE PERCENT: 19 % (ref 24–44)
MCH RBC QN AUTO: 28.2 PG (ref 25.2–33.5)
MCHC RBC AUTO-ENTMCNC: 31.3 G/DL (ref 28.4–34.8)
MCV RBC AUTO: 89.8 FL (ref 82.6–102.9)
MONOCYTES NFR BLD: 0.4 K/UL (ref 0.2–0.8)
MONOCYTES NFR BLD: 8 % (ref 1–7)
NEUTROPHILS NFR BLD: 66 % (ref 36–66)
NEUTS SEG NFR BLD: 3.3 K/UL (ref 1.8–7.7)
NRBC BLD-RTO: 0 PER 100 WBC
PLATELET # BLD AUTO: ABNORMAL K/UL (ref 138–453)
PLATELET, FLUORESCENCE: 41 K/UL (ref 138–453)
PLATELETS.RETICULATED NFR BLD AUTO: 14.8 % (ref 1.1–10.3)
PMV BLD AUTO: ABNORMAL FL (ref 8.1–13.5)
POTASSIUM SERPL-SCNC: 4 MMOL/L (ref 3.7–5.3)
PROT SERPL-MCNC: 8.6 G/DL (ref 6.4–8.3)
RBC # BLD AUTO: 5.79 M/UL (ref 4.21–5.77)
SODIUM SERPL-SCNC: 138 MMOL/L (ref 135–144)
WBC OTHER # BLD: 5 K/UL (ref 3.5–11.3)

## 2024-04-24 PROCEDURE — 96366 THER/PROPH/DIAG IV INF ADDON: CPT

## 2024-04-24 PROCEDURE — 6360000002 HC RX W HCPCS: Performed by: INTERNAL MEDICINE

## 2024-04-24 PROCEDURE — 80053 COMPREHEN METABOLIC PANEL: CPT

## 2024-04-24 PROCEDURE — 6370000000 HC RX 637 (ALT 250 FOR IP): Performed by: INTERNAL MEDICINE

## 2024-04-24 PROCEDURE — 85055 RETICULATED PLATELET ASSAY: CPT

## 2024-04-24 PROCEDURE — 85025 COMPLETE CBC W/AUTO DIFF WBC: CPT

## 2024-04-24 PROCEDURE — 96365 THER/PROPH/DIAG IV INF INIT: CPT

## 2024-04-24 PROCEDURE — 2580000003 HC RX 258: Performed by: INTERNAL MEDICINE

## 2024-04-24 PROCEDURE — 36415 COLL VENOUS BLD VENIPUNCTURE: CPT

## 2024-04-24 RX ORDER — SODIUM CHLORIDE 0.9 % (FLUSH) 0.9 %
5-40 SYRINGE (ML) INJECTION PRN
Status: CANCELLED | OUTPATIENT
Start: 2024-05-01

## 2024-04-24 RX ORDER — SODIUM CHLORIDE 9 MG/ML
5-250 INJECTION, SOLUTION INTRAVENOUS PRN
Status: CANCELLED | OUTPATIENT
Start: 2024-05-01

## 2024-04-24 RX ORDER — ONDANSETRON 2 MG/ML
8 INJECTION INTRAMUSCULAR; INTRAVENOUS
Status: CANCELLED | OUTPATIENT
Start: 2024-05-01

## 2024-04-24 RX ORDER — DIPHENHYDRAMINE HCL 25 MG
25 TABLET ORAL ONCE
Status: COMPLETED | OUTPATIENT
Start: 2024-04-24 | End: 2024-04-24

## 2024-04-24 RX ORDER — EPINEPHRINE 1 MG/ML
0.3 INJECTION, SOLUTION INTRAMUSCULAR; SUBCUTANEOUS PRN
Status: CANCELLED | OUTPATIENT
Start: 2024-05-01

## 2024-04-24 RX ORDER — DIPHENHYDRAMINE HCL 25 MG
25 TABLET ORAL ONCE
Status: CANCELLED | OUTPATIENT
Start: 2024-05-01 | End: 2024-05-01

## 2024-04-24 RX ORDER — HEPARIN 100 UNIT/ML
500 SYRINGE INTRAVENOUS PRN
Status: CANCELLED | OUTPATIENT
Start: 2024-05-01

## 2024-04-24 RX ORDER — FAMOTIDINE 10 MG/ML
20 INJECTION, SOLUTION INTRAVENOUS
Status: CANCELLED | OUTPATIENT
Start: 2024-05-01

## 2024-04-24 RX ORDER — MEPERIDINE HYDROCHLORIDE 50 MG/ML
12.5 INJECTION INTRAMUSCULAR; INTRAVENOUS; SUBCUTANEOUS PRN
Status: CANCELLED | OUTPATIENT
Start: 2024-05-01

## 2024-04-24 RX ORDER — ALBUTEROL SULFATE 90 UG/1
4 AEROSOL, METERED RESPIRATORY (INHALATION) PRN
Status: CANCELLED | OUTPATIENT
Start: 2024-05-01

## 2024-04-24 RX ORDER — ACETAMINOPHEN 325 MG/1
650 TABLET ORAL ONCE
Status: COMPLETED | OUTPATIENT
Start: 2024-04-24 | End: 2024-04-24

## 2024-04-24 RX ORDER — ACETAMINOPHEN 325 MG/1
650 TABLET ORAL ONCE
Status: CANCELLED | OUTPATIENT
Start: 2024-05-01 | End: 2024-05-01

## 2024-04-24 RX ORDER — SODIUM CHLORIDE 9 MG/ML
5-250 INJECTION, SOLUTION INTRAVENOUS PRN
Status: DISCONTINUED | OUTPATIENT
Start: 2024-04-24 | End: 2024-04-25 | Stop reason: HOSPADM

## 2024-04-24 RX ORDER — ACETAMINOPHEN 325 MG/1
650 TABLET ORAL
Status: CANCELLED | OUTPATIENT
Start: 2024-05-01

## 2024-04-24 RX ORDER — SODIUM CHLORIDE 9 MG/ML
INJECTION, SOLUTION INTRAVENOUS CONTINUOUS
Status: CANCELLED | OUTPATIENT
Start: 2024-05-01

## 2024-04-24 RX ORDER — DIPHENHYDRAMINE HYDROCHLORIDE 50 MG/ML
50 INJECTION INTRAMUSCULAR; INTRAVENOUS
Status: CANCELLED | OUTPATIENT
Start: 2024-05-01

## 2024-04-24 RX ADMIN — DIPHENHYDRAMINE HYDROCHLORIDE 25 MG: 25 TABLET ORAL at 10:50

## 2024-04-24 RX ADMIN — ACETAMINOPHEN 650 MG: 325 TABLET ORAL at 10:50

## 2024-04-24 RX ADMIN — SODIUM CHLORIDE 100 ML/HR: 9 INJECTION, SOLUTION INTRAVENOUS at 10:49

## 2024-04-24 RX ADMIN — IMMUNE GLOBULIN (HUMAN) 40 G: 10 INJECTION INTRAVENOUS; SUBCUTANEOUS at 11:27

## 2024-04-24 ASSESSMENT — PAIN SCALES - GENERAL: PAINLEVEL_OUTOF10: 0

## 2024-04-24 NOTE — PROGRESS NOTES
Patient arrived per serasteady with wife for 2nd IVIG treatment.  Patient tired but no other complaints or concerns.  Labs reviewed.  IV inserted per unit protocol.  Patient premedicated.  IVIG initiated at 57 ml/hr for 30 minutes with no sign adverse reaction.  IVIG increased to 114 ml/hr for 30 minutes with no sign adverse reaction.  IVIG increased to 228 ml/hr for 30 minutes with no sign adverse reaction.  IVIG increased to 300 ml/hr for 30 minutes with no sign adverse reaction.  IVIG increased to 400 ml for remainder of infusion with no sign adverse reaction;line flushed.  Patient observed for 30 minutes post transfusion with running IV fluids with no sign adverse reaction.  IV removed with pressure dressing applied.  Patient left unit per serasteady with wife at discharge.

## 2024-04-25 ENCOUNTER — HOSPITAL ENCOUNTER (OUTPATIENT)
Dept: SPEECH THERAPY | Age: 59
Setting detail: THERAPIES SERIES
Discharge: HOME | End: 2024-04-25
Attending: INTERNAL MEDICINE
Payer: MEDICAID

## 2024-04-25 ENCOUNTER — HOSPITAL ENCOUNTER (OUTPATIENT)
Dept: OCCUPATIONAL THERAPY | Age: 59
Setting detail: THERAPIES SERIES
Discharge: HOME | End: 2024-04-25
Attending: INTERNAL MEDICINE
Payer: MEDICAID

## 2024-04-25 PROCEDURE — 92526 ORAL FUNCTION THERAPY: CPT

## 2024-04-25 PROCEDURE — 92507 TX SP LANG VOICE COMM INDIV: CPT

## 2024-04-25 PROCEDURE — 97140 MANUAL THERAPY 1/> REGIONS: CPT

## 2024-04-25 PROCEDURE — 97110 THERAPEUTIC EXERCISES: CPT

## 2024-04-25 PROCEDURE — 97112 NEUROMUSCULAR REEDUCATION: CPT

## 2024-04-25 NOTE — FLOWSHEET NOTE
[] [facility]  Outpatient Rehabilitation &  Therapy  [address]  [phone]  F: [phone] [] [facility]  Outpatient Rehabilitation &  Therapy  [address]  P: [phone]  F: [phone] [] [facility]  Outpatient Rehabilitation &  Therapy  [address]   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  Pt motivated     Pain:  [x] Yes  [] No Location: L leg Pain Rating: (0-10 scale) 8/10  Pain altered Tx:  [x] No  [] Yes  Action:  Comments:    Precautions: Pseudobulbar Effect, L neglect, impulsivity, decreased insight into deficits. Helmet to be on at all times when up    Objective:    LTG: to be met within 20 visits  Goal 1:  Patient will improve his overall cognitive communication skills to improve problem solving and safety in his home environment.  Ongoing    Goal 2:  Patient will tolerate a regular texture diet with thin liquids with no overt s/s of difficulty or aspiration.   Ongoing.       STG: to be met within 10 visits  Goal 1: Pt will complete recall tasks of 3-5 units with and without delay with

## 2024-04-25 NOTE — FLOWSHEET NOTE
PROM.Pt assist x1 with scotty steady transfers to mat, to chair, & chair to scotty steady. Needed 2nd person assist to get pt from supine on mat to sitting edge of mat. Pt wears lt arm support.Pt took off his helmet when supine on mat, & his helmet was on for the rest of tx.Pt participates in lt forearm wt bearing with OT stabilizing lt arm..Begun rt UE strengthening exercises with demo & verbal cues.See OT exercises for details.     [] No change.  [] Other     [x] Patient would continue to benefit from skilled occupational therapy services in order to: Improve  functional /grasp, I with ADLS, Safety awareness, Motor control/Tone in UE, ROM, Strength, Postural Alignment, Activity tolerance, Coordination deficit, and Complaint of pain in order to perform ADLs with increased safety, ensure good follow through, improve functional use of UE in ADL performance, decrease risk of falls, decrease pain in UE for safe completion of ADLs, and return to PLOF     STG/LTG     Short Term Goals: (  15    Treatments)  Decrease Pain: 3/10 with shoulder ROM  Increase PROM 20 (degrees) left shoulder flex and abduction  Increase right  strength (pounds) by 10 lbs to improve ADL function  Increase right pinch strength by 2lbs to improve ADL function  Increase function:improve stroke impact score by 6  Demo knowledge of fall prevention in 15 sessions.  Patient to be independent with home exercise program as demonstrated by performance with correct form without cues.  Assess left UE AROM and set goals when appropriate     Long Term Goals: (  30  Treatments)  Decrease Pain: 0-1/10 with shoulder ROM  Increase PROM 40 (degrees) left shoulder flex and abduction  Increase function:improve stroke impact score by 15  Assess left UE AROM and set goals when appropriate  Pt min assist with UB dressing  Pt independent with grooming and feeding tasks        Patient Goals: To have use of left arm and to walk again       Pt. Education:  [x] Yes  []

## 2024-04-27 NOTE — DISCHARGE SUMMARY
are similar areas of outward bulging and concavity of the brain parenchyma at the craniectomy site.  Extensive encephalomalacia throughout the right cerebral hemisphere compatible with prior insult.  There is associated wallerian degeneration.  No significant mass effect or midline shift.  No acute intracranial hemorrhage.  No extra-axial fluid collections. No hydrocephalus. ORBITS: The visualized portion of the orbits demonstrate no acute abnormality. SINUSES: The visualized paranasal sinuses and mastoid air cells demonstrate no acute abnormality. SOFT TISSUES/SKULL:  No acute abnormality of the visualized skull or soft tissues.     No acute intracranial abnormality within constraints of CT acquisition. Stable chronic and postsurgical changes. The findings were sent to the [facility] at 3:35 pm on 4/12/2024 to be communicated to a licensed caregiver, received by Dr. Petty at 3:38 pm.         Consultations:    Consults:     Final Specialist Recommendations/Findings:   IP CONSULT TO STROKE TEAM  IP CONSULT TO ONCOLOGY      The patient was seen and examined on day of discharge and this discharge summary is in conjunction with any daily progress note from day of discharge.    Discharge plan:       Disposition: Home    Physician Follow Up:     Magalis Humphrey MD  [address] [address] [address]  [place] [address]  [phone]    Schedule an appointment as soon as possible for a visit in 3 month(s)         Requiring Further Evaluation/Follow Up POST HOSPITALIZATION/Incidental Findings: n/a    Diet: regular diet    Activity: As tolerated    Instructions to Patient:  - Take all medications as prescribed  - Follow up with PCP   - In case of any worsening condition please visit Emergency Room.    Restrictions: Driving Yes, Swimming Yes, Operating heavy machinery Yes, Compromising heights Yes      Discharge Medications:      Medication List        CONTINUE taking these medications

## 2024-04-28 PROBLEM — Z86.69 HISTORY OF EPILEPSY: Status: ACTIVE | Noted: 2024-04-28

## 2024-04-28 PROBLEM — R41.0 DISORIENTATION: Status: ACTIVE | Noted: 2024-04-28

## 2024-04-30 ENCOUNTER — OFFICE VISIT (OUTPATIENT)
Dept: PHYSICAL MEDICINE AND REHAB | Age: 59
End: 2024-04-30
Payer: MEDICAID

## 2024-04-30 ENCOUNTER — HOSPITAL ENCOUNTER (OUTPATIENT)
Dept: OCCUPATIONAL THERAPY | Age: 59
Setting detail: THERAPIES SERIES
Discharge: HOME | End: 2024-04-30
Attending: INTERNAL MEDICINE
Payer: MEDICAID

## 2024-04-30 ENCOUNTER — HOSPITAL ENCOUNTER (OUTPATIENT)
Age: 59
Setting detail: SPECIMEN
Discharge: HOME | End: 2024-04-30

## 2024-04-30 ENCOUNTER — HOSPITAL ENCOUNTER (OUTPATIENT)
Dept: PHYSICAL THERAPY | Age: 59
Setting detail: THERAPIES SERIES
Discharge: HOME | End: 2024-04-30
Attending: INTERNAL MEDICINE
Payer: MEDICAID

## 2024-04-30 VITALS — SYSTOLIC BLOOD PRESSURE: 132 MMHG | DIASTOLIC BLOOD PRESSURE: 70 MMHG | TEMPERATURE: 97.5 F | HEART RATE: 68 BPM

## 2024-04-30 DIAGNOSIS — D69.6 THROMBOCYTOPENIA (HCC): ICD-10-CM

## 2024-04-30 DIAGNOSIS — M75.02 ADHESIVE CAPSULITIS OF LEFT SHOULDER: ICD-10-CM

## 2024-04-30 DIAGNOSIS — I69.152: Primary | ICD-10-CM

## 2024-04-30 DIAGNOSIS — M16.12 PRIMARY OSTEOARTHRITIS OF LEFT HIP: ICD-10-CM

## 2024-04-30 LAB
BASOPHILS # BLD: 0.03 K/UL (ref 0–0.2)
BASOPHILS NFR BLD: 1 % (ref 0–2)
EOSINOPHIL # BLD: 0.25 K/UL (ref 0–0.44)
EOSINOPHILS RELATIVE PERCENT: 6 % (ref 1–4)
ERYTHROCYTE [DISTWIDTH] IN BLOOD BY AUTOMATED COUNT: 14.9 % (ref 11.8–14.4)
HCT VFR BLD AUTO: 45.4 % (ref 40.7–50.3)
HGB BLD-MCNC: 14.7 G/DL (ref 13–17)
IMM GRANULOCYTES # BLD AUTO: <0.03 K/UL (ref 0–0.3)
IMM GRANULOCYTES NFR BLD: 1 %
LYMPHOCYTES NFR BLD: 0.92 K/UL (ref 1.1–3.7)
LYMPHOCYTES RELATIVE PERCENT: 21 % (ref 24–43)
MCH RBC QN AUTO: 28.7 PG (ref 25.2–33.5)
MCHC RBC AUTO-ENTMCNC: 32.4 G/DL (ref 28.4–34.8)
MCV RBC AUTO: 88.5 FL (ref 82.6–102.9)
MONOCYTES NFR BLD: 0.43 K/UL (ref 0.1–1.2)
MONOCYTES NFR BLD: 10 % (ref 3–12)
NEUTROPHILS NFR BLD: 62 % (ref 36–65)
NEUTS SEG NFR BLD: 2.65 K/UL (ref 1.5–8.1)
NRBC BLD-RTO: 0 PER 100 WBC
PLATELET # BLD AUTO: ABNORMAL K/UL (ref 138–453)
PLATELET, FLUORESCENCE: 43 K/UL (ref 138–453)
PLATELETS.RETICULATED NFR BLD AUTO: 12.8 % (ref 1.1–10.3)
RBC # BLD AUTO: 5.13 M/UL (ref 4.21–5.77)
RBC # BLD: ABNORMAL 10*6/UL
WBC OTHER # BLD: 4.3 K/UL (ref 3.5–11.3)

## 2024-04-30 PROCEDURE — 97110 THERAPEUTIC EXERCISES: CPT

## 2024-04-30 PROCEDURE — 99214 OFFICE O/P EST MOD 30 MIN: CPT | Performed by: PHYSICAL MEDICINE & REHABILITATION

## 2024-04-30 PROCEDURE — 97112 NEUROMUSCULAR REEDUCATION: CPT

## 2024-04-30 PROCEDURE — 97530 THERAPEUTIC ACTIVITIES: CPT

## 2024-04-30 PROCEDURE — 97116 GAIT TRAINING THERAPY: CPT

## 2024-04-30 RX ORDER — LIDOCAINE 4 G/G
1-2 PATCH TOPICAL
COMMUNITY
Start: 2024-01-25 | End: 2024-04-30 | Stop reason: SDUPTHER

## 2024-04-30 RX ORDER — LIDOCAINE 4 G/G
1-2 PATCH TOPICAL DAILY
Qty: 60 PATCH | Refills: 2 | Status: SHIPPED | OUTPATIENT
Start: 2024-04-30 | End: 2024-05-02 | Stop reason: DRUGHIGH

## 2024-04-30 RX ORDER — AMITRIPTYLINE HYDROCHLORIDE 75 MG/1
75 TABLET ORAL NIGHTLY
Qty: 30 TABLET | Refills: 2 | Status: SHIPPED | OUTPATIENT
Start: 2024-04-30

## 2024-04-30 RX ORDER — FERROUS SULFATE 325(65) MG
325 TABLET ORAL EVERY OTHER DAY
COMMUNITY
Start: 2024-01-26

## 2024-04-30 RX ORDER — ACETAMINOPHEN 500 MG
1000 TABLET ORAL 3 TIMES DAILY
Qty: 180 TABLET | Refills: 2 | Status: SHIPPED | OUTPATIENT
Start: 2024-04-30

## 2024-04-30 RX ORDER — GABAPENTIN 300 MG/1
300 CAPSULE ORAL 3 TIMES DAILY
Qty: 90 CAPSULE | Refills: 2 | Status: SHIPPED | OUTPATIENT
Start: 2024-04-30 | End: 2024-07-29

## 2024-04-30 NOTE — FLOWSHEET NOTE
Constant cueing for upright posture and to advance LLE.  Mod-max A throughout ambulation to advance LLE.  Very impulsive throughout treatment.  CGA-mod A with sit to stand depending on what patient is using to stand- arm rest min-mod, railing or scotty-steady CGA-min A.  Patients wife assisted with chair follow and sitting with patient when therapist had to get equipment due to patient being very impulsive.  Mod A with sit/supine and max A from supine to sit with right side near table.  Ended treatment by assisting OT back to supine.      [] No change.     [x] Other: goals added to POC to progress CANTU & STS     [x] Patient would continue to benefit from skilled physical therapy services in order to: improve L hamstring flexibility, improve L sided attention with mobility, increase strength, educate family to best assist pt, and progress his ability for standing and walking to reduce caregiver burden.     STG: (to be met in 15 treatments)  Pt will be able to achieve full L knee extension with no pain to manage hamstring contracture that will allow for more balance in standing.   Pt will be able to stand with Naveen with R UE reaching showing better trunk control and balance to progress function.   Pt will complete sit <> sup with Naveen to decrease caregiver burden of care.    Pt will have no increase of L hip pain with sitting and walking to better tolerate mobility.   Pt will consistently attend to L side with min cues during transfers to increase safety when completing with wife.   Pt will be able to complete stand pivot transfer with Naveen to decrease caregiver burden of care.   New goal 4/30/24:  Pt will improve score on CANTU balance scale by >/=6 (9 points) points to reach minimal detectable change for CVA population to improve overall balance stability and decrease fall risk with mobility.    New goal 4/30/24: pt will be able to sit to stand with Naveen to decrease caregiver burden.  LTG: (to be met in 30

## 2024-04-30 NOTE — FLOWSHEET NOTE
[facility]  Outpatient Rehabilitation &  Therapy  [address]     [phone]  F: [phone]   [] [facility]  Outpatient Rehabilitation &  Therapy  [address]   [address]  P: [phone]  F: [phone] [] [facility]  Outpatient Rehabilitation &  Therapy  [address]  P: [phone]  F: [phone]  [x] [facility]   Outpatient Rehabilitation & Therapy  [address]  P: [phone]   F: [phone]     Occupational Therapy Daily Treatment Note    Date:  2024  Patient Name:  Brennen Mcclure    :  1965  MRN: [id number]  Physician: Blanca Ren MD                                     Insurance: [employer]  30 visits  Medical Diagnosis: I69.152 (ICD-10-CM) - 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 (HCC)F48.2 (ICD-10-CM) - Pseudobulbar tpjazoK44.9 (ICD-10-CM) - Intractable headache, unspecified chronicity pattern, unspecified headache type              Rehab Codes: pain in shoulder M25.51,, lack of coordination R27.8,, or muscle weakness generalized M62.81,  Next 's appt.: unknown  Date of symptom onset: 23     Visit# / total visits: ; Progress note for Medicare patient due at visit 10    Cancels/No Shows: 0/0      Subjective:  Pain:  [x] Yes  [] No Location: lt arm(shoulder) Pain Rating: (0-10 scale) 7/10  Pain altered Tx:  [] No  [x] Yes  Action:PROM lt UE done with pt supine  Pt Comments: Pt expresses pain throughout all LUE. Encouraged to work through the \"stretch\" pain as much as he can in digits, wrist, forearm. Wife present for treatment    Objective:  Modalities:  Precautions:Pt wears helmet.Per pt/spouse helmet can be removed when pt is laying down.   Exercises:  EXERCISE    REPS/     TIME  WEIGHT/    LEVEL COMMENTS    HEP   Reviewed positioning. Pt brought PROM glove he uses at home. Reviewed prudencio/doff    ROM   Left shoulder, elbow, wrist, digits PROM/AAROM -

## 2024-04-30 NOTE — PROGRESS NOTES
[facility] PHYSICAL MEDICINE & REHABILITATION  [address] [address]  Dept: [phone]  Dept Fax: [phone]    Outpatient Followup Note    Brennen Mcclure, 59 y.o., male, presents for follow up c/o of Neurologic Problem (CVA and left nondominant hemiparesis)  .     HPI:     HPI  Patient with L nondominant hemiparesis secondary to hemorrhagic CVA from November 2023. He required surgical decompression with craniectomy and had prolonged hospitalization. He was then admitted to [place] inpatient rehabilitation from 1/27/24 - 2/26/24. He is being seen in follow up today after being discharged home with home health care.     Stroke    Affected Side: left non dominant  Handedness: right handed  Therapy Status: Outpatient PT/OT/SLP  Dysphagia: Absent  - resolved  Aphasia: Present and being treated  Sleep:Insomnia - some impairment with staying asleep  Daytime Focus/Attention:Intact  Bowel: Spontaneous  : [place]  Mood: Depressed and Pseudobulbar Affect  Support: children / family / friends to help, spouse and daughter  Spasticity: Present and being treated  Edema: left arm  DME: Manual Wheelchair and hospital bed, sctoty steady, helmet  Shoulder Pain: Present and no treatment left    He is following with hematology for ITP. He is receiving Promacta infusions weekly in an effort to increase his platelet count so that he can undergo cranioplasty with neurosurgery. He has lost a significant amount of mobility and function since his discharge from acute rehab. He has recently transitioned to outpatient therapies from home health. He has new needs now which requires a change in the custom wheelchair that was ordered for him. His wife is his primary caregiver and she is transferring him via a scotty steady to get him to appointments. They have medical transport available but at times have experienced delays in transportation.     He is having

## 2024-05-01 ENCOUNTER — HOSPITAL ENCOUNTER (OUTPATIENT)
Dept: INFUSION THERAPY | Facility: MEDICAL CENTER | Age: 59
Discharge: HOME | End: 2024-05-01
Payer: MEDICAID

## 2024-05-01 ENCOUNTER — TELEPHONE (OUTPATIENT)
Dept: ONCOLOGY | Age: 59
End: 2024-05-01

## 2024-05-01 ENCOUNTER — TELEPHONE (OUTPATIENT)
Dept: PHYSICAL MEDICINE AND REHAB | Age: 59
End: 2024-05-01

## 2024-05-01 ENCOUNTER — OFFICE VISIT (OUTPATIENT)
Dept: ONCOLOGY | Age: 59
End: 2024-05-01
Payer: MEDICAID

## 2024-05-01 VITALS — SYSTOLIC BLOOD PRESSURE: 120 MMHG | HEART RATE: 63 BPM | DIASTOLIC BLOOD PRESSURE: 70 MMHG

## 2024-05-01 VITALS
TEMPERATURE: 99.2 F | RESPIRATION RATE: 16 BRPM | DIASTOLIC BLOOD PRESSURE: 84 MMHG | SYSTOLIC BLOOD PRESSURE: 125 MMHG | HEART RATE: 77 BPM

## 2024-05-01 DIAGNOSIS — Z09 HOSPITAL DISCHARGE FOLLOW-UP: ICD-10-CM

## 2024-05-01 DIAGNOSIS — D69.6 THROMBOCYTOPENIA (HCC): Primary | ICD-10-CM

## 2024-05-01 DIAGNOSIS — D69.3 CHRONIC ITP (IDIOPATHIC THROMBOCYTOPENIA) (HCC): Primary | ICD-10-CM

## 2024-05-01 PROCEDURE — 6360000002 HC RX W HCPCS: Performed by: INTERNAL MEDICINE

## 2024-05-01 PROCEDURE — 99211 OFF/OP EST MAY X REQ PHY/QHP: CPT | Performed by: INTERNAL MEDICINE

## 2024-05-01 PROCEDURE — 6370000000 HC RX 637 (ALT 250 FOR IP): Performed by: INTERNAL MEDICINE

## 2024-05-01 PROCEDURE — 1111F DSCHRG MED/CURRENT MED MERGE: CPT | Performed by: INTERNAL MEDICINE

## 2024-05-01 PROCEDURE — 96366 THER/PROPH/DIAG IV INF ADDON: CPT

## 2024-05-01 PROCEDURE — 99214 OFFICE O/P EST MOD 30 MIN: CPT | Performed by: INTERNAL MEDICINE

## 2024-05-01 PROCEDURE — 2580000003 HC RX 258: Performed by: INTERNAL MEDICINE

## 2024-05-01 PROCEDURE — 96365 THER/PROPH/DIAG IV INF INIT: CPT

## 2024-05-01 RX ORDER — DIPHENHYDRAMINE HCL 25 MG
25 TABLET ORAL ONCE
Status: CANCELLED | OUTPATIENT
Start: 2024-05-08 | End: 2024-05-08

## 2024-05-01 RX ORDER — HEPARIN 100 UNIT/ML
500 SYRINGE INTRAVENOUS PRN
Status: CANCELLED | OUTPATIENT
Start: 2024-05-08

## 2024-05-01 RX ORDER — SODIUM CHLORIDE 0.9 % (FLUSH) 0.9 %
5-40 SYRINGE (ML) INJECTION PRN
Status: CANCELLED | OUTPATIENT
Start: 2024-05-08

## 2024-05-01 RX ORDER — MEPERIDINE HYDROCHLORIDE 50 MG/ML
12.5 INJECTION INTRAMUSCULAR; INTRAVENOUS; SUBCUTANEOUS PRN
Status: CANCELLED | OUTPATIENT
Start: 2024-05-08

## 2024-05-01 RX ORDER — ONDANSETRON 2 MG/ML
8 INJECTION INTRAMUSCULAR; INTRAVENOUS
Status: CANCELLED | OUTPATIENT
Start: 2024-05-08

## 2024-05-01 RX ORDER — SODIUM CHLORIDE 9 MG/ML
5-250 INJECTION, SOLUTION INTRAVENOUS PRN
Status: CANCELLED | OUTPATIENT
Start: 2024-05-08

## 2024-05-01 RX ORDER — ACETAMINOPHEN 325 MG/1
650 TABLET ORAL ONCE
Status: CANCELLED | OUTPATIENT
Start: 2024-05-08 | End: 2024-05-08

## 2024-05-01 RX ORDER — SODIUM CHLORIDE 9 MG/ML
INJECTION, SOLUTION INTRAVENOUS CONTINUOUS
Status: CANCELLED | OUTPATIENT
Start: 2024-05-08

## 2024-05-01 RX ORDER — EPINEPHRINE 1 MG/ML
0.3 INJECTION, SOLUTION INTRAMUSCULAR; SUBCUTANEOUS PRN
Status: CANCELLED | OUTPATIENT
Start: 2024-05-08

## 2024-05-01 RX ORDER — SODIUM CHLORIDE 9 MG/ML
5-250 INJECTION, SOLUTION INTRAVENOUS PRN
Status: DISCONTINUED | OUTPATIENT
Start: 2024-05-01 | End: 2024-05-02 | Stop reason: HOSPADM

## 2024-05-01 RX ORDER — ACETAMINOPHEN 325 MG/1
650 TABLET ORAL ONCE
Status: COMPLETED | OUTPATIENT
Start: 2024-05-01 | End: 2024-05-01

## 2024-05-01 RX ORDER — DIPHENHYDRAMINE HYDROCHLORIDE 50 MG/ML
50 INJECTION INTRAMUSCULAR; INTRAVENOUS
Status: CANCELLED | OUTPATIENT
Start: 2024-05-08

## 2024-05-01 RX ORDER — ACETAMINOPHEN 325 MG/1
650 TABLET ORAL
Status: CANCELLED | OUTPATIENT
Start: 2024-05-08

## 2024-05-01 RX ORDER — FAMOTIDINE 10 MG/ML
20 INJECTION, SOLUTION INTRAVENOUS
Status: CANCELLED | OUTPATIENT
Start: 2024-05-08

## 2024-05-01 RX ORDER — DIPHENHYDRAMINE HCL 25 MG
25 TABLET ORAL ONCE
Status: COMPLETED | OUTPATIENT
Start: 2024-05-01 | End: 2024-05-01

## 2024-05-01 RX ORDER — ALBUTEROL SULFATE 90 UG/1
4 AEROSOL, METERED RESPIRATORY (INHALATION) PRN
Status: CANCELLED | OUTPATIENT
Start: 2024-05-08

## 2024-05-01 RX ADMIN — IMMUNE GLOBULIN (HUMAN) 40 G: 10 INJECTION INTRAVENOUS; SUBCUTANEOUS at 13:43

## 2024-05-01 RX ADMIN — IMMUNE GLOBULIN (HUMAN) 40 G: 10 INJECTION INTRAVENOUS; SUBCUTANEOUS at 11:25

## 2024-05-01 RX ADMIN — SODIUM CHLORIDE 100 ML/HR: 9 INJECTION, SOLUTION INTRAVENOUS at 10:43

## 2024-05-01 RX ADMIN — ACETAMINOPHEN 650 MG: 325 TABLET ORAL at 10:47

## 2024-05-01 RX ADMIN — DIPHENHYDRAMINE HYDROCHLORIDE 25 MG: 25 TABLET ORAL at 10:47

## 2024-05-01 NOTE — PATIENT INSTRUCTIONS
Increase the dose of IVIG to 1g.kg  CBC next week Tuesday (and platelet transfusion if less than 50K)  If plt transfusion given on Tuesday then check cbc on Friday  RTC 3 weeks

## 2024-05-01 NOTE — PROGRESS NOTES
Today's Date: 5/1/2024  Patient Name: Brennen Mcclure  Patient's age: 59 y.o., 1965    DIAGNOSIS:   Chronic thrombocytopenia, patient had a evaluation in [place] and also in the past and thought to be immune mediated  History of dural sinus thrombosis and history of DVT in 2019  Chronic anticoagulation with Eliquis  Had a hemorrhagic stroke in December 2023  Patient received platelet transfusion during the hospitalization and also was given IVIG on 1/9/2024 and 1/10/2024  Patient was treated with Decadron 40 mg on 2/18 - 2/20 1-24, with no response  Patient planning surgical procedure with neurosurgery  Bone marrow biopsy negative for acute bone marrow pathology  I will plan to get IVIG weekly for 5 doses and also prescription for Promacta was sent  Patient started taking Promacta on 4/14/2024  First IVIG will be given on 4/17/2024  CHIEF COMPLAINT:    Chief Complaint   Patient presents with    Discuss Labs    Follow-Up from Hospital     INTERVAL HISTORY:    Patient is return for follow visit and to discuss lab results and further questions.  He denies any bleeding symptoms.  His platelets are stable around 43 K.  So far I do not see any much improvement in his platelets   He has received 3 doses of IVIG so far   He is on Promacta and tolerating well.      During this visit patient's allergy, social, medical, surgical history and medications were reviewed and updated.     HISTORY OF PRESENT ILLNESS:    Brennen Mcclure is a 59 y.o. male who is admitted to the hospital on (Not on file)  for acute rehab after recent hospitalization for acute stroke.    Patient had recent hospitalization for left hemiparesis secondary to hemorrhagic CVA and subsequently discharged to acute rehab at [facility].  Patient has history of dural venous sinus thrombosis and also history of DVT 2019, and June 2023, and was chronically maintained on Eliquis.    Patient currently is on Eliquis and now noted to have

## 2024-05-01 NOTE — TELEPHONE ENCOUNTER
Re: Lidocaine 4% patches  Mara called and said that she was at the pharmacy and the pharmacist said 4% patches are OTC. If you ordered 5% patches \"he has all kinds of those\".     Mara is asking if you would change the prescription to be for Lidocaine 5% instead?    Thank you.

## 2024-05-01 NOTE — PROGRESS NOTES
Patient arrived per scotty almazan with wife for 3rd IVIG treatment after MD visit in front office.  Patient denies complaints or concerns.  Labs & MD note reviewed. Will increase IVIG dose today, CBC next Tuesday with possible platelets & IVIG on Wednesday per Dr. Chang.  IV inserted per unit protocol.  Patient premedicated.  IVIG initiated at 57 ml/hr for 30 minutes with no sign adverse reaction.  IVIG increased to 114 ml/hr for 30 minutes with no sign adverse reaction.  IVIG increased to 228 ml/hr for 30 minutes with no sign adverse reaction.  IVIG increased to 300 ml/hr for 30 minutes with no sign adverse reaction.  IVIG increased to 400 ml for remainder of infusion with no sign adverse reaction;line flushed.  Patient observed for 30 minutes post transfusion with running IV fluids with no sign adverse reaction.  Intact IV removed with pressure dressing applied.  Patient left unit per scotty almazan with wife at discharge with AVS in hand.

## 2024-05-01 NOTE — TELEPHONE ENCOUNTER
GINO HERE FOR MD VISIT & TX  Increase the dose of IVIG to 1g.kg  CBC next week Tuesday (and platelet transfusion if less than 50K)  If plt transfusion given on Tuesday then check cbc on Friday  RTC 3 weeks  MD VISIT 5/22/24 @ 11AM  AVS PRINTED W/ INSTRUCTIONS AND GIVEN  TO PT ON EXIT

## 2024-05-02 ENCOUNTER — HOSPITAL ENCOUNTER (OUTPATIENT)
Dept: SPEECH THERAPY | Age: 59
Setting detail: THERAPIES SERIES
Discharge: HOME | End: 2024-05-02
Attending: INTERNAL MEDICINE
Payer: MEDICAID

## 2024-05-02 ENCOUNTER — HOSPITAL ENCOUNTER (OUTPATIENT)
Dept: OCCUPATIONAL THERAPY | Age: 59
Setting detail: THERAPIES SERIES
Discharge: HOME | End: 2024-05-02
Attending: INTERNAL MEDICINE
Payer: MEDICAID

## 2024-05-02 ENCOUNTER — APPOINTMENT (OUTPATIENT)
Dept: GENERAL RADIOLOGY | Age: 59
End: 2024-05-02
Payer: MEDICAID

## 2024-05-02 ENCOUNTER — APPOINTMENT (OUTPATIENT)
Dept: CT IMAGING | Age: 59
End: 2024-05-02
Payer: MEDICAID

## 2024-05-02 ENCOUNTER — HOSPITAL ENCOUNTER (EMERGENCY)
Age: 59
Discharge: HOME | End: 2024-05-02
Attending: EMERGENCY MEDICINE
Payer: MEDICAID

## 2024-05-02 ENCOUNTER — HOSPITAL ENCOUNTER (OUTPATIENT)
Dept: PHYSICAL THERAPY | Age: 59
Setting detail: THERAPIES SERIES
Discharge: HOME | End: 2024-05-02
Attending: INTERNAL MEDICINE
Payer: MEDICAID

## 2024-05-02 VITALS
TEMPERATURE: 97.8 F | OXYGEN SATURATION: 91 % | WEIGHT: 209 LBS | DIASTOLIC BLOOD PRESSURE: 75 MMHG | BODY MASS INDEX: 29.92 KG/M2 | HEIGHT: 70 IN | HEART RATE: 62 BPM | RESPIRATION RATE: 16 BRPM | SYSTOLIC BLOOD PRESSURE: 130 MMHG
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
## 2024-05-02 DIAGNOSIS — S09.90XA CLOSED HEAD INJURY, INITIAL ENCOUNTER: Primary | ICD-10-CM

## 2024-05-02 PROCEDURE — 6360000002 HC RX W HCPCS: Performed by: EMERGENCY MEDICINE

## 2024-05-02 PROCEDURE — 96372 THER/PROPH/DIAG INJ SC/IM: CPT

## 2024-05-02 PROCEDURE — 72170 X-RAY EXAM OF PELVIS: CPT

## 2024-05-02 PROCEDURE — 92526 ORAL FUNCTION THERAPY: CPT

## 2024-05-02 PROCEDURE — 99284 EMERGENCY DEPT VISIT MOD MDM: CPT

## 2024-05-02 PROCEDURE — 70450 CT HEAD/BRAIN W/O DYE: CPT

## 2024-05-02 PROCEDURE — 72125 CT NECK SPINE W/O DYE: CPT

## 2024-05-02 PROCEDURE — 71045 X-RAY EXAM CHEST 1 VIEW: CPT

## 2024-05-02 PROCEDURE — 6370000000 HC RX 637 (ALT 250 FOR IP): Performed by: EMERGENCY MEDICINE

## 2024-05-02 RX ORDER — ONDANSETRON 4 MG/1
4 TABLET, ORALLY DISINTEGRATING ORAL ONCE
Status: COMPLETED | OUTPATIENT
Start: 2024-05-02 | End: 2024-05-02

## 2024-05-02 RX ORDER — MORPHINE SULFATE 2 MG/ML
2 INJECTION, SOLUTION INTRAMUSCULAR; INTRAVENOUS ONCE
Status: COMPLETED | OUTPATIENT
Start: 2024-05-02 | End: 2024-05-02

## 2024-05-02 RX ORDER — ONDANSETRON 2 MG/ML
4 INJECTION INTRAMUSCULAR; INTRAVENOUS ONCE
Status: DISCONTINUED | OUTPATIENT
Start: 2024-05-02 | End: 2024-05-02

## 2024-05-02 RX ORDER — LIDOCAINE 50 MG/G
2 PATCH TOPICAL DAILY
Qty: 60 PATCH | Refills: 3 | Status: ON HOLD | OUTPATIENT
Start: 2024-05-02

## 2024-05-02 RX ADMIN — ONDANSETRON 4 MG: 4 TABLET, ORALLY DISINTEGRATING ORAL at 17:53

## 2024-05-02 RX ADMIN — MORPHINE SULFATE 2 MG: 2 INJECTION, SOLUTION INTRAMUSCULAR; INTRAVENOUS at 17:27

## 2024-05-02 ASSESSMENT — ENCOUNTER SYMPTOMS
SHORTNESS OF BREATH: 0
ABDOMINAL PAIN: 0
COLOR CHANGE: 0
VOMITING: 0
TROUBLE SWALLOWING: 0
DIARRHEA: 0
COUGH: 0
PHOTOPHOBIA: 0
NAUSEA: 0

## 2024-05-02 ASSESSMENT — PAIN - FUNCTIONAL ASSESSMENT: PAIN_FUNCTIONAL_ASSESSMENT: 0-10

## 2024-05-02 ASSESSMENT — PAIN SCALES - GENERAL
PAINLEVEL_OUTOF10: 7
PAINLEVEL_OUTOF10: 7

## 2024-05-02 NOTE — FLOWSHEET NOTE
[x] [facility]   Outpatient Rehabilitation & Therapy  [address]  P: [phone]   F: [phone]     Physical Therapy Cancel/No Show note    Date: 2024  Patient: Brennen Mcclure  : 1965  MRN: [id number]    Visit Count:   Cancels/No Shows to date:     For today's appointment patient:    [x]  Cancelled    [] Rescheduled appointment    [] No-show     Reason given by patient:    []  Patient ill    []  Conflicting appointment    [] No transportation      [] Conflict with work    [] No reason given    [] Weather related    [] COVID-19    [x] Other:      Comments:  Patient had to cx OT session prior to PT session due to BP issues   179/105mmHg   162/93mmHg      [] Next appointment was confirmed    Electronically signed by: Yadira Wells PTA

## 2024-05-02 NOTE — FLOWSHEET NOTE
[] [facility]  Outpatient Rehabilitation &  Therapy  [address]  [phone]  F: [phone] [] [facility]  Outpatient Rehabilitation &  Therapy  [address]   [address]  P: [phone]  F: [phone] [] [facility]  Outpatient Rehabilitation &  Therapy  [address]  P: [phone]  F: [phone]  [x] [facility]  Rehabilitation Services  [address]  [place], [place] [address]  Phone [phone]  Fax [phone]      Occupational Therapy Cancel/No Show note    Date: 2024  Patient: Brennen Mcclure  : 1965  MRN: [id number]    Cancels/No Shows to date:     For today's appointment patient:    [x]  Cancelled    [] Rescheduled appointment    [] No-show     Reason given by patient:    []  Patient ill    []  Conflicting appointment    [] No transportation      [] Conflict with work    [] No reason given    [] Weather related    [] COVID-19    [x] Other:      Comments:  Pt a cancel for OT after speech therapy d/t BP issues. OT checked pt's BP twice 3:28 p.m 179/105 HR 71 and less than 1/2 hr  later 162/93 HR 65.Pt's wife was called & message left.       [] Next appointment was confirmed    Electronically signed by: Anastasia Orellana OT

## 2024-05-02 NOTE — ED PROVIDER NOTES
EMERGENCY DEPARTMENT ENCOUNTER    Pt Name: Brennen Mcclure  MRN: [id number]  Birthdate 1965  Date of evaluation: 5/2/24  CHIEF COMPLAINT       Chief Complaint   Patient presents with    Headache    Fall     HISTORY OF PRESENT ILLNESS   59-year-old male presenting to the ER complaining of a fall.  Patient was in his wheelchair at therapy when the wheelchair tipped backwards and fell and he hit his head.  Patient was still in the chair when it happened.  The patient does have a recent history of a hemorrhagic stroke that required his skull to be opened.  The patient does wear a protective helmet and was wearing at the time of the fall.  The patient denies any loss of consciousness and denies any new symptoms currently.  The patient does have baseline deficits on his left side secondary to the hemorrhagic stroke.    The history is provided by the patient.   Fall  The accident occurred Less than 1 hour ago. Fall occurred: wheelchair. He fell from a height of 1 to 2 ft. Pertinent negatives include no fever, no abdominal pain, no nausea and no vomiting.           REVIEW OF SYSTEMS     Review of Systems   Constitutional:  Negative for activity change, fatigue and fever.   HENT:  Negative for congestion, ear pain and trouble swallowing.    Eyes:  Negative for photophobia and visual disturbance.   Respiratory:  Negative for cough and shortness of breath.    Cardiovascular:  Negative for chest pain and palpitations.   Gastrointestinal:  Negative for abdominal pain, diarrhea, nausea and vomiting.   Genitourinary:  Negative for dysuria, flank pain and urgency.   Musculoskeletal:  Negative for arthralgias and myalgias.   Skin:  Negative for color change and rash.   Neurological:  Negative for dizziness and facial asymmetry.   Psychiatric/Behavioral:  Negative for agitation and behavioral problems.      PASTMEDICAL HISTORY     Past Medical History:   Diagnosis Date    Anemia 02/12/2024    CAD (coronary artery disease)     Chronic

## 2024-05-02 NOTE — FLOWSHEET NOTE
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  States both L UE/LE are hurting upon arrival.  States right shoulder hurting the most at this time.  Patient's wife assisted with level of function at home   Pain:  [x] Yes  [] No Location: L shoulder, left Le     Pain Rating: (0-10 scale) 9/10  Pain altered Tx:  [x] No  [] Yes  Action:  Comments:    Objective:  Modalities:   Precautions: Pseudobulbar Effect, L neglect, impulsivity, decreased insight into deficits. Helmet to be on at all times when up!   Exercises:  INTERVENTIONS  Reps/ Time Weight/ Level Completed  Today Comments               Transfers             sit-stand  5x    x  Min-Mod A for balance with natacha-walker for balance once standing                                           Standing            Static stand   3' x2    x  Min A, most weight on RLE.   weight shifting w/ hemiwalker  3'    x  lateral weight shifting, therapist blocking L knee just in case                  Bed mobility/mat level       Supine hamstring stretch 30\"x2 LLE x    Sitting unsupported 3'  x   Assessed per sgna guidelines

## 2024-05-02 NOTE — ED NOTES
Pt is brought to this ER by [place] [id number] after he fell backwards out of his wheelchair at the [facility] while he was participating in physical therapy.  Pt states his therapist raised his legs which caused his wheelchair to kick backwards from the shifted weight.  Pt was wearing his helmet, and he denies LOC.  Pt arrives A+O x 4, GCS = 15, eupneic, and PWD.  Pt is at the [facility] while recovering from a spontaneous ICH, and he is missing part of his skull on the rt side of his head.

## 2024-05-02 NOTE — FLOWSHEET NOTE
[] [facility]  Outpatient Rehabilitation &  Therapy  [address]  [phone]  F: [phone] [] [facility]  Outpatient Rehabilitation &  Therapy  [address]  P: [phone]  F: [phone] [] [facility]  Outpatient Rehabilitation &  Therapy  [address]   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  Wife left to go to doctors appt.  Session ended early secondary to fall:  see below.     Situation:  Patient in outpatient speech therapy, sitting in his transport wheelchair.  Patient requested to put his feet up on a chair.  Speech therapist locked the wheelchair, and as his leg was being elevated to the chair his wheelchair tipped backwards.  He fell backwards in the chair, and his back/head hit the floor.  Patient was wearing his helmet.   assisted getting patient upright again in his wheelchair.  Background: Patient with history of R frontoparietal IPH in 2023, R decompressive craniotomy (23), must wear helmet when sitting upright  Assessment:  Patient alert and

## 2024-05-06 ENCOUNTER — TELEPHONE (OUTPATIENT)
Dept: PHYSICAL MEDICINE AND REHAB | Age: 59
End: 2024-05-06

## 2024-05-06 RX ORDER — METHYLPREDNISOLONE 4 MG/1
TABLET ORAL
Qty: 1 KIT | Refills: 0 | Status: ON HOLD | OUTPATIENT
Start: 2024-05-06 | End: 2024-05-12

## 2024-05-06 NOTE — TELEPHONE ENCOUNTER
Mara, pt wife, called reporting that this is the 2nd time pt has fallen during therapy.  He was taken to ER and found to have no new injuries or bleeds but today is very lathargic, extreme headache, states that head is pulsating where the skull is removed, he sore, scared and does not want to go to therapy.  Feels that no one cares about him.    I spoke with wife to tell her that I will speak with Dr. De La Paz and let her know.   Was not in the office this morning and I would have to try and reach her.  I also advised that patient should be taken to the ER again since the headache is worse and he is lathargic but she wants to speak with supervisor of rehab and Dr. De La Paz.    Wife is very upset and crying, wanting to speak with Dr. De La Paz  ASA.      I contacted Dr. De La Paz and she will contact Mara, pt wife, and speak with her about care.

## 2024-05-06 NOTE — TELEPHONE ENCOUNTER
Telephone discussion with wife and Brennen in follow up after patient fell backward in his wheelchair while at therapy on 5/2/24. He is having some lethargy since then and worsened pain in his L shoulder and low back. Reviewed ED notes from 5/2/24 with x-rays and CT scans showing no acute changes. He is receiving platelet infusion again this week. Will continue Tylenol, baclofen, gabapentin, amitriptyline at current doses for now. Medrol DosePak ordered today to address acute MSK pain. She will hold on outpatient therapy for this week then determine if they are continuing at [facility] outpatient location or would like to transfer care to [facility] outpatient location. Encouraged wife Mara to call EMS for any concerning neurologic change in condition to be evaluated by neurosurgery at [facility].

## 2024-05-07 ENCOUNTER — HOSPITAL ENCOUNTER (OUTPATIENT)
Age: 59
Setting detail: SPECIMEN
Discharge: HOME | End: 2024-05-07

## 2024-05-07 ENCOUNTER — APPOINTMENT (OUTPATIENT)
Dept: PHYSICAL THERAPY | Age: 59
End: 2024-05-07
Attending: INTERNAL MEDICINE
Payer: MEDICAID

## 2024-05-07 ENCOUNTER — APPOINTMENT (OUTPATIENT)
Dept: OCCUPATIONAL THERAPY | Age: 59
End: 2024-05-07
Attending: INTERNAL MEDICINE
Payer: MEDICAID

## 2024-05-07 DIAGNOSIS — D69.6 THROMBOCYTOPENIA (HCC): ICD-10-CM

## 2024-05-07 LAB
BASOPHILS # BLD: 0.03 K/UL (ref 0–0.2)
BASOPHILS NFR BLD: 0 % (ref 0–2)
EOSINOPHIL # BLD: 0.07 K/UL (ref 0–0.44)
EOSINOPHILS RELATIVE PERCENT: 1 % (ref 1–4)
ERYTHROCYTE [DISTWIDTH] IN BLOOD BY AUTOMATED COUNT: 14.8 % (ref 11.8–14.4)
HCT VFR BLD AUTO: 45.1 % (ref 40.7–50.3)
HGB BLD-MCNC: 14.6 G/DL (ref 13–17)
IMM GRANULOCYTES # BLD AUTO: <0.03 K/UL (ref 0–0.3)
IMM GRANULOCYTES NFR BLD: 0 %
LYMPHOCYTES NFR BLD: 0.94 K/UL (ref 1.1–3.7)
LYMPHOCYTES RELATIVE PERCENT: 14 % (ref 24–43)
MCH RBC QN AUTO: 28.6 PG (ref 25.2–33.5)
MCHC RBC AUTO-ENTMCNC: 32.4 G/DL (ref 28.4–34.8)
MCV RBC AUTO: 88.3 FL (ref 82.6–102.9)
MONOCYTES NFR BLD: 0.55 K/UL (ref 0.1–1.2)
MONOCYTES NFR BLD: 8 % (ref 3–12)
NEUTROPHILS NFR BLD: 77 % (ref 36–65)
NEUTS SEG NFR BLD: 5.26 K/UL (ref 1.5–8.1)
NRBC BLD-RTO: 0 PER 100 WBC
PLATELET # BLD AUTO: ABNORMAL K/UL (ref 138–453)
PLATELET, FLUORESCENCE: 64 K/UL (ref 138–453)
PLATELETS.RETICULATED NFR BLD AUTO: 14.7 % (ref 1.1–10.3)
RBC # BLD AUTO: 5.11 M/UL (ref 4.21–5.77)
RBC # BLD: ABNORMAL 10*6/UL
WBC OTHER # BLD: 6.9 K/UL (ref 3.5–11.3)

## 2024-05-08 ENCOUNTER — APPOINTMENT (OUTPATIENT)
Dept: CT IMAGING | Age: 59
DRG: 115 | End: 2024-05-08
Payer: MEDICAID

## 2024-05-08 ENCOUNTER — HOSPITAL ENCOUNTER (OUTPATIENT)
Dept: INFUSION THERAPY | Facility: MEDICAL CENTER | Age: 59
Discharge: HOME | End: 2024-05-08
Payer: MEDICAID

## 2024-05-08 ENCOUNTER — APPOINTMENT (OUTPATIENT)
Dept: GENERAL RADIOLOGY | Age: 59
DRG: 115 | End: 2024-05-08
Payer: MEDICAID

## 2024-05-08 ENCOUNTER — HOSPITAL ENCOUNTER (INPATIENT)
Age: 59
LOS: 5 days | Discharge: PSYCHIATRIC OR SUBSTANCE USE FACILITY | DRG: 115 | End: 2024-05-14
Attending: EMERGENCY MEDICINE | Admitting: PSYCHIATRY & NEUROLOGY
Payer: MEDICAID

## 2024-05-08 VITALS
HEART RATE: 80 BPM | SYSTOLIC BLOOD PRESSURE: 134 MMHG | TEMPERATURE: 99.6 F | DIASTOLIC BLOOD PRESSURE: 74 MMHG | RESPIRATION RATE: 16 BRPM

## 2024-05-08 DIAGNOSIS — N30.01 ACUTE CYSTITIS WITH HEMATURIA: Primary | ICD-10-CM

## 2024-05-08 DIAGNOSIS — D69.3 CHRONIC ITP (IDIOPATHIC THROMBOCYTOPENIA) (HCC): Primary | ICD-10-CM

## 2024-05-08 LAB
ALBUMIN SERPL-MCNC: 3.6 G/DL (ref 3.5–5.2)
ALBUMIN/GLOB SERPL: 1 {RATIO} (ref 1–2.5)
ALP SERPL-CCNC: 98 U/L (ref 40–129)
ALT SERPL-CCNC: 28 U/L (ref 10–50)
ANION GAP SERPL CALCULATED.3IONS-SCNC: 13 MMOL/L (ref 9–16)
AST SERPL-CCNC: 29 U/L (ref 10–50)
BASOPHILS # BLD: 0 K/UL (ref 0–0.2)
BASOPHILS NFR BLD: 0 % (ref 0–2)
BILIRUB SERPL-MCNC: 0.6 MG/DL (ref 0–1.2)
BUN SERPL-MCNC: 21 MG/DL (ref 8–23)
CALCIUM SERPL-MCNC: 8.9 MG/DL (ref 8.6–10.4)
CHLORIDE SERPL-SCNC: 98 MMOL/L (ref 98–107)
CO2 SERPL-SCNC: 20 MMOL/L (ref 20–31)
CREAT SERPL-MCNC: 0.9 MG/DL (ref 0.7–1.2)
EOSINOPHIL # BLD: 0 K/UL (ref 0–0.4)
EOSINOPHILS RELATIVE PERCENT: 0 % (ref 1–4)
ERYTHROCYTE [DISTWIDTH] IN BLOOD BY AUTOMATED COUNT: 15 % (ref 11.8–14.4)
GFR, ESTIMATED: >90 ML/MIN/1.73M2
GLUCOSE SERPL-MCNC: 106 MG/DL (ref 74–99)
HCT VFR BLD AUTO: 37.3 % (ref 40.7–50.3)
HGB BLD-MCNC: 12 G/DL (ref 13–17)
IMM GRANULOCYTES # BLD AUTO: 0 K/UL (ref 0–0.3)
IMM GRANULOCYTES NFR BLD: 0 %
LACTIC ACID, SEPSIS WHOLE BLOOD: 1 MMOL/L (ref 0.5–1.9)
LACTIC ACID, SEPSIS WHOLE BLOOD: 1.6 MMOL/L (ref 0.5–1.9)
LYMPHOCYTES NFR BLD: 0.32 K/UL (ref 1–4.8)
LYMPHOCYTES RELATIVE PERCENT: 5 % (ref 24–44)
MCH RBC QN AUTO: 28.8 PG (ref 25.2–33.5)
MCHC RBC AUTO-ENTMCNC: 32.2 G/DL (ref 28.4–34.8)
MCV RBC AUTO: 89.4 FL (ref 82.6–102.9)
MONOCYTES NFR BLD: 0.38 K/UL (ref 0.1–0.8)
MONOCYTES NFR BLD: 6 % (ref 1–7)
MORPHOLOGY: ABNORMAL
NEUTROPHILS NFR BLD: 89 % (ref 36–66)
NEUTS SEG NFR BLD: 5.7 K/UL (ref 1.8–7.7)
NRBC BLD-RTO: 0 PER 100 WBC
NUCLEATED RED BLOOD CELLS: 1 PER 100 WBC
PLATELET # BLD AUTO: 65 K/UL (ref 138–453)
PMV BLD AUTO: 12.6 FL (ref 8.1–13.5)
POTASSIUM SERPL-SCNC: 4 MMOL/L (ref 3.7–5.3)
PROT SERPL-MCNC: 9.6 G/DL (ref 6.6–8.7)
RBC # BLD AUTO: 4.17 M/UL (ref 4.21–5.77)
SODIUM SERPL-SCNC: 131 MMOL/L (ref 136–145)
TROPONIN I SERPL HS-MCNC: 11 NG/L (ref 0–22)
TROPONIN I SERPL HS-MCNC: 12 NG/L (ref 0–22)
WBC OTHER # BLD: 6.4 K/UL (ref 3.5–11.3)

## 2024-05-08 PROCEDURE — 70450 CT HEAD/BRAIN W/O DYE: CPT

## 2024-05-08 PROCEDURE — 2580000003 HC RX 258: Performed by: STUDENT IN AN ORGANIZED HEALTH CARE EDUCATION/TRAINING PROGRAM

## 2024-05-08 PROCEDURE — 83874 ASSAY OF MYOGLOBIN: CPT

## 2024-05-08 PROCEDURE — 99285 EMERGENCY DEPT VISIT HI MDM: CPT

## 2024-05-08 PROCEDURE — 80053 COMPREHEN METABOLIC PANEL: CPT

## 2024-05-08 PROCEDURE — 71045 X-RAY EXAM CHEST 1 VIEW: CPT

## 2024-05-08 PROCEDURE — 6360000002 HC RX W HCPCS: Performed by: INTERNAL MEDICINE

## 2024-05-08 PROCEDURE — 96366 THER/PROPH/DIAG IV INF ADDON: CPT

## 2024-05-08 PROCEDURE — 82550 ASSAY OF CK (CPK): CPT

## 2024-05-08 PROCEDURE — 6370000000 HC RX 637 (ALT 250 FOR IP): Performed by: INTERNAL MEDICINE

## 2024-05-08 PROCEDURE — 84484 ASSAY OF TROPONIN QUANT: CPT

## 2024-05-08 PROCEDURE — 96365 THER/PROPH/DIAG IV INF INIT: CPT

## 2024-05-08 PROCEDURE — 85025 COMPLETE CBC W/AUTO DIFF WBC: CPT

## 2024-05-08 PROCEDURE — 83605 ASSAY OF LACTIC ACID: CPT

## 2024-05-08 PROCEDURE — 81001 URINALYSIS AUTO W/SCOPE: CPT

## 2024-05-08 PROCEDURE — 2580000003 HC RX 258: Performed by: INTERNAL MEDICINE

## 2024-05-08 PROCEDURE — 87040 BLOOD CULTURE FOR BACTERIA: CPT

## 2024-05-08 PROCEDURE — 87086 URINE CULTURE/COLONY COUNT: CPT

## 2024-05-08 RX ORDER — DIPHENHYDRAMINE HCL 25 MG
25 TABLET ORAL ONCE
Status: COMPLETED | OUTPATIENT
Start: 2024-05-08 | End: 2024-05-08

## 2024-05-08 RX ORDER — DIPHENHYDRAMINE HYDROCHLORIDE 50 MG/ML
50 INJECTION INTRAMUSCULAR; INTRAVENOUS
OUTPATIENT
Start: 2024-05-15

## 2024-05-08 RX ORDER — ACETAMINOPHEN 325 MG/1
650 TABLET ORAL ONCE
OUTPATIENT
Start: 2024-05-15 | End: 2024-05-15

## 2024-05-08 RX ORDER — SODIUM CHLORIDE 9 MG/ML
5-250 INJECTION, SOLUTION INTRAVENOUS PRN
OUTPATIENT
Start: 2024-05-15

## 2024-05-08 RX ORDER — FAMOTIDINE 10 MG/ML
20 INJECTION, SOLUTION INTRAVENOUS
OUTPATIENT
Start: 2024-05-15

## 2024-05-08 RX ORDER — ONDANSETRON 2 MG/ML
8 INJECTION INTRAMUSCULAR; INTRAVENOUS
OUTPATIENT
Start: 2024-05-15

## 2024-05-08 RX ORDER — HEPARIN 100 UNIT/ML
500 SYRINGE INTRAVENOUS PRN
OUTPATIENT
Start: 2024-05-15

## 2024-05-08 RX ORDER — DIPHENHYDRAMINE HCL 25 MG
25 TABLET ORAL ONCE
OUTPATIENT
Start: 2024-05-15 | End: 2024-05-15

## 2024-05-08 RX ORDER — ALBUTEROL SULFATE 90 UG/1
4 AEROSOL, METERED RESPIRATORY (INHALATION) PRN
OUTPATIENT
Start: 2024-05-15

## 2024-05-08 RX ORDER — SODIUM CHLORIDE 9 MG/ML
INJECTION, SOLUTION INTRAVENOUS CONTINUOUS
OUTPATIENT
Start: 2024-05-15

## 2024-05-08 RX ORDER — SODIUM CHLORIDE 9 MG/ML
5-250 INJECTION, SOLUTION INTRAVENOUS PRN
Status: DISCONTINUED | OUTPATIENT
Start: 2024-05-08 | End: 2024-05-09 | Stop reason: HOSPADM

## 2024-05-08 RX ORDER — SODIUM CHLORIDE 0.9 % (FLUSH) 0.9 %
5-40 SYRINGE (ML) INJECTION PRN
OUTPATIENT
Start: 2024-05-15

## 2024-05-08 RX ORDER — CIPROFLOXACIN 500 MG/1
500 TABLET, FILM COATED ORAL 2 TIMES DAILY
Qty: 14 TABLET | Refills: 0 | Status: SHIPPED | OUTPATIENT
Start: 2024-05-08 | End: 2024-05-15

## 2024-05-08 RX ORDER — MEPERIDINE HYDROCHLORIDE 50 MG/ML
12.5 INJECTION INTRAMUSCULAR; INTRAVENOUS; SUBCUTANEOUS PRN
OUTPATIENT
Start: 2024-05-15

## 2024-05-08 RX ORDER — ACETAMINOPHEN 325 MG/1
650 TABLET ORAL
OUTPATIENT
Start: 2024-05-15

## 2024-05-08 RX ORDER — ACETAMINOPHEN 325 MG/1
650 TABLET ORAL ONCE
Status: COMPLETED | OUTPATIENT
Start: 2024-05-08 | End: 2024-05-08

## 2024-05-08 RX ORDER — 0.9 % SODIUM CHLORIDE 0.9 %
1000 INTRAVENOUS SOLUTION INTRAVENOUS ONCE
Status: COMPLETED | OUTPATIENT
Start: 2024-05-08 | End: 2024-05-08

## 2024-05-08 RX ORDER — EPINEPHRINE 1 MG/ML
0.3 INJECTION, SOLUTION INTRAMUSCULAR; SUBCUTANEOUS PRN
OUTPATIENT
Start: 2024-05-15

## 2024-05-08 RX ADMIN — ACETAMINOPHEN 650 MG: 325 TABLET ORAL at 10:38

## 2024-05-08 RX ADMIN — DIPHENHYDRAMINE HYDROCHLORIDE 25 MG: 25 TABLET ORAL at 10:38

## 2024-05-08 RX ADMIN — IMMUNE GLOBULIN (HUMAN) 40 G: 10 INJECTION INTRAVENOUS; SUBCUTANEOUS at 11:27

## 2024-05-08 RX ADMIN — SODIUM CHLORIDE 100 ML/HR: 9 INJECTION, SOLUTION INTRAVENOUS at 10:25

## 2024-05-08 RX ADMIN — SODIUM CHLORIDE 1000 ML: 9 INJECTION, SOLUTION INTRAVENOUS at 20:43

## 2024-05-08 ASSESSMENT — PAIN SCALES - GENERAL: PAINLEVEL_OUTOF10: 0

## 2024-05-08 ASSESSMENT — LIFESTYLE VARIABLES
HOW MANY STANDARD DRINKS CONTAINING ALCOHOL DO YOU HAVE ON A TYPICAL DAY: PATIENT DOES NOT DRINK
HOW OFTEN DO YOU HAVE A DRINK CONTAINING ALCOHOL: NEVER

## 2024-05-08 ASSESSMENT — PAIN - FUNCTIONAL ASSESSMENT: PAIN_FUNCTIONAL_ASSESSMENT: NONE - DENIES PAIN

## 2024-05-08 NOTE — ED NOTES
Pt presents to ED from home transported by EMS with c/o of fever and AMS started yesterday. As per EMS, Pt has been started with ATB due to UTI, also given Tylenol for fever around 5PM. Pt has history of blood disorder and had his infusion treatment today. Pt has hx of stroke with left sided body weakness deficit. Pt states he also has history of CAD. Pt denies n/v/d, LOC, CP, SOB, chills, change in bowel/bladder function, loss of appetite, pain, or any other sx.      Pt is alert and oriented x4. NAD. VSS. Pt placed on continuous cardiac monitoring, BP, Pulse ox. EKG obtained. IV established and labs drawn. Provided warm blanket. Call light within reach. Will monitor plan of care.

## 2024-05-08 NOTE — ED PROVIDER NOTES
[facility] Emergency Department ED  Emergency Department Encounter  Emergency Medicine Resident     Pt Name:Brennen Mcclure  MRN: [id number]  Birthdate 1965  Date of evaluation: 5/8/24  PCP:  Blanca Ren MD  Note Started: 7:59 PM EDT      CHIEF COMPLAINT       Chief Complaint   Patient presents with    Fever    Altered Mental Status    Fall       HISTORY OF PRESENT ILLNESS  (Location/Symptom, Timing/Onset, Context/Setting, Quality, Duration, Modifying Factors, Severity.)      Brennen Mcclure is a 59 y.o. male with history of ITP, prior CVT/ICH with left side hemiplegia who presents with fever, excessive weakness, visual hallucinations.  Per wife who is at bedside states that he has been slurring his speech for the past couple days and has been growing increasingly weak.  He brought him to the hospital for outpatient evaluation this morning and they wanted to obtain urinalysis sample.  They subsequently just put him on ciprofloxacin in the event that his UTI however the sample was never collected.  Patient was brought in by EMS today as he typically is able to stand and bear weight and help himself around the house however today the wife is not able to help him get around.  Patient is fully alert and oriented, he is in good spirits.  He is complaining of generalized weakness.  He states he did have some chest pain earlier today.  He was noted to have a fever at 5 PM tonight and was given 2 Tylenol.  He is denying any cough or congestion.    Per chart review, pt had right hemispheric venous infarcts/venous sinus thrombosis complicated by ICH after AC was started, end of 2023 in [place]. He is hemiplegic on left.     He was admitted to neuro on 4/12 for intermittent periods of confusion, dysphagia. Has waxing and waning mentation.     PAST MEDICAL / SURGICAL / SOCIAL / FAMILY HISTORY      has a past medical history of Anemia, CAD (coronary artery disease), Chronic deep vein thrombosis (DVT) of both lower extremities  497  ST Segments: normal, no acute infarction evident  T Waves: Inverted V1-V3      EKG Interpretation:  RBBB similar to prior EKG 4/13, however T waves more deeply inverted in V2 on todays imaging    All EKG's are interpreted by the Emergency Department Physician who either signs or Co-signs this chart in the absence of a cardiologist.    EMERGENCY DEPARTMENT COURSE:      ED Course as of 05/09/24 0040   Wed May 08, 2024   2051 SpO2: 93 %  Pt has emphysema   [QC]   2302 IMPRESSION:  Right craniectomy.  External herniation of the right cerebral hemisphere with  extensive deep white matter edema unchanged allowing for difference in  technique.      [QC]   2307 Platelet Count(!): 65  At baseline [QC]   Thu May 09, 2024   0028 Neuro will admit for altered mental status, will monitor for fevers, urinary tract infection. [QC]      ED Course User Index  [QC] Suresh Burns MD         PROCEDURES:  None    CONSULTS:  IP CONSULT TO NEUROLOGY    CRITICAL CARE:  There was significant risk of life threatening deterioration of patient's condition requiring my direct management. Critical care time 0 minutes, excluding any documented procedures.    FINAL IMPRESSION      1. Acute cystitis with hematuria          DISPOSITION / PLAN     DISPOSITION Decision To Admit 05/09/2024 12:15:45 AM      PATIENT REFERRED TO:  No follow-up provider specified.    DISCHARGE MEDICATIONS:  New Prescriptions    No medications on file       Suresh Burns MD  Emergency Medicine Resident    (Please note that portions of thisnote were completed with a voice recognition program.  Efforts were made to edit the dictations but occasionally words are mis-transcribed.)

## 2024-05-08 NOTE — PROGRESS NOTES
Patient arrived per wheelchair with wife for IVIG infusion.  Patient fell out of wheelchair last week. He was wearing helmet. He was seen in ER. No injuries found.Wife states she thinks he is running low grade fever and seems slightly confused at times last night and this morning. Urine slightly cloudy. Patient states he has burning with urination.  Labs reviewed.  Platelets 64. Patient will not require unit of platelets.  IV inserted per unit protocol.  Symptoms discussed with Dr. Chang. RX for Cipro sent to patient's pharmacy. Wife states she will obtain urine sample at home and  take to outpatient lab later today.  Patient premedicated.  IVIG initiated at 114 ml/hr for 30 minutes with no sign adverse reaction.  IVIG increased to 228 ml/hr for 30 minutes with no sign adverse reaction.  IVIG increased to 300 ml/hr for 30 minutes with no sign adverse reaction.  IVIG increased to 400 ml/hr for 20 minutes. Patient complains of discomfort distal to insertion site. IV has brisk blood return. Rate decreased to 350 ml/hr for remainder of infusion with no sign adverse reaction.  Noted that patient had several episodes of confusion and anxiety during infusion but reoriented and calmed down easily.  Patient observed for 30 minutes post infusion with running IV fluids. No sign adverse reaction.  IV removed with pressure dressing applied.  Wife to  antibiotic on way home. Advised her if confusion does not improve after several dosed antibiotic she should call physician. She voices understanding.  Patient taken down to car per Jackie by writer to wife's car at discharge.

## 2024-05-09 ENCOUNTER — APPOINTMENT (OUTPATIENT)
Dept: SPEECH THERAPY | Age: 59
End: 2024-05-09
Attending: INTERNAL MEDICINE
Payer: MEDICAID

## 2024-05-09 ENCOUNTER — APPOINTMENT (OUTPATIENT)
Dept: PHYSICAL THERAPY | Age: 59
End: 2024-05-09
Attending: INTERNAL MEDICINE
Payer: MEDICAID

## 2024-05-09 ENCOUNTER — APPOINTMENT (OUTPATIENT)
Dept: MRI IMAGING | Age: 59
DRG: 115 | End: 2024-05-09
Payer: MEDICAID

## 2024-05-09 ENCOUNTER — APPOINTMENT (OUTPATIENT)
Dept: OCCUPATIONAL THERAPY | Age: 59
End: 2024-05-09
Attending: INTERNAL MEDICINE
Payer: MEDICAID

## 2024-05-09 PROBLEM — R41.82 ALTERED MENTAL STATUS: Status: ACTIVE | Noted: 2024-05-09

## 2024-05-09 PROBLEM — G43.909 MIGRAINE: Status: ACTIVE | Noted: 2024-05-09

## 2024-05-09 PROBLEM — N30.01 ACUTE CYSTITIS WITH HEMATURIA: Status: ACTIVE | Noted: 2024-05-09

## 2024-05-09 LAB
AMORPH SED URNS QL MICRO: ABNORMAL
BACTERIA URNS QL MICRO: ABNORMAL
BACTERIA URNS QL MICRO: NORMAL
BILIRUB UR QL STRIP: ABNORMAL
BILIRUB UR QL STRIP: NEGATIVE
CASTS #/AREA URNS LPF: ABNORMAL /LPF (ref 0–8)
CASTS #/AREA URNS LPF: ABNORMAL /LPF (ref 0–8)
CASTS #/AREA URNS LPF: NORMAL /LPF (ref 0–2)
CASTS #/AREA URNS LPF: NORMAL /LPF (ref 0–2)
CHARACTER UR: ABNORMAL
CK SERPL-CCNC: 31 U/L (ref 39–308)
CLARITY UR: ABNORMAL
CLARITY UR: ABNORMAL
COLOR UR: ABNORMAL
COLOR UR: YELLOW
COMMENT: ABNORMAL
CRYSTALS URNS MICRO: ABNORMAL /HPF
EPI CELLS #/AREA URNS HPF: ABNORMAL /HPF (ref 0–5)
EPI CELLS #/AREA URNS HPF: NORMAL /HPF (ref 0–5)
GLUCOSE UR STRIP-MCNC: ABNORMAL MG/DL
GLUCOSE UR STRIP-MCNC: NEGATIVE MG/DL
HGB UR QL STRIP.AUTO: ABNORMAL
HGB UR QL STRIP.AUTO: ABNORMAL
KETONES UR STRIP-MCNC: ABNORMAL MG/DL
KETONES UR STRIP-MCNC: NEGATIVE MG/DL
LEUKOCYTE ESTERASE UR QL STRIP: ABNORMAL
LEUKOCYTE ESTERASE UR QL STRIP: ABNORMAL
MICROORGANISM SPEC CULT: NO GROWTH
MUCOUS THREADS URNS QL MICRO: ABNORMAL
MYOGLOBIN SERPL-MCNC: 22 NG/ML (ref 28–72)
NITRITE UR QL STRIP: ABNORMAL
NITRITE UR QL STRIP: NEGATIVE
PH UR STRIP: 5 [PH] (ref 5–8)
PH UR STRIP: ABNORMAL [PH] (ref 5–8)
PROT UR STRIP-MCNC: ABNORMAL MG/DL
PROT UR STRIP-MCNC: ABNORMAL MG/DL
RBC #/AREA URNS HPF: ABNORMAL /HPF (ref 0–4)
RBC #/AREA URNS HPF: NORMAL /HPF (ref 0–2)
RENAL EPITHELIAL, UA: ABNORMAL /HPF
SP GR UR STRIP: 1.02 (ref 1–1.03)
SP GR UR STRIP: ABNORMAL (ref 1–1.03)
SPECIMEN DESCRIPTION: NORMAL
TRICHOMONAS #/AREA URNS HPF: ABNORMAL /[HPF]
UROBILINOGEN UR STRIP-ACNC: ABNORMAL EU/DL (ref 0–1)
UROBILINOGEN UR STRIP-ACNC: NORMAL EU/DL (ref 0–1)
WBC #/AREA URNS HPF: ABNORMAL /HPF (ref 0–5)
WBC #/AREA URNS HPF: NORMAL /HPF (ref 0–5)
YEAST URNS QL MICRO: ABNORMAL

## 2024-05-09 PROCEDURE — 6370000000 HC RX 637 (ALT 250 FOR IP)

## 2024-05-09 PROCEDURE — 2060000000 HC ICU INTERMEDIATE R&B

## 2024-05-09 PROCEDURE — 6360000002 HC RX W HCPCS: Performed by: STUDENT IN AN ORGANIZED HEALTH CARE EDUCATION/TRAINING PROGRAM

## 2024-05-09 PROCEDURE — 2580000003 HC RX 258: Performed by: STUDENT IN AN ORGANIZED HEALTH CARE EDUCATION/TRAINING PROGRAM

## 2024-05-09 PROCEDURE — 2580000003 HC RX 258

## 2024-05-09 PROCEDURE — 6360000002 HC RX W HCPCS

## 2024-05-09 PROCEDURE — 99222 1ST HOSP IP/OBS MODERATE 55: CPT | Performed by: PSYCHIATRY & NEUROLOGY

## 2024-05-09 PROCEDURE — 70551 MRI BRAIN STEM W/O DYE: CPT

## 2024-05-09 PROCEDURE — 99255 IP/OBS CONSLTJ NEW/EST HI 80: CPT | Performed by: INTERNAL MEDICINE

## 2024-05-09 RX ORDER — LORAZEPAM 2 MG/ML
1 INJECTION INTRAMUSCULAR ONCE
Status: COMPLETED | OUTPATIENT
Start: 2024-05-09 | End: 2024-05-09

## 2024-05-09 RX ORDER — POTASSIUM CHLORIDE 7.45 MG/ML
10 INJECTION INTRAVENOUS PRN
Status: DISCONTINUED | OUTPATIENT
Start: 2024-05-09 | End: 2024-05-14 | Stop reason: HOSPADM

## 2024-05-09 RX ORDER — ATORVASTATIN CALCIUM 80 MG/1
80 TABLET, FILM COATED ORAL NIGHTLY
Status: DISCONTINUED | OUTPATIENT
Start: 2024-05-09 | End: 2024-05-14 | Stop reason: HOSPADM

## 2024-05-09 RX ORDER — ACETAMINOPHEN 325 MG/1
650 TABLET ORAL EVERY 6 HOURS PRN
Status: DISCONTINUED | OUTPATIENT
Start: 2024-05-09 | End: 2024-05-13

## 2024-05-09 RX ORDER — POLYETHYLENE GLYCOL 3350 17 G/17G
17 POWDER, FOR SOLUTION ORAL DAILY PRN
Status: DISCONTINUED | OUTPATIENT
Start: 2024-05-09 | End: 2024-05-14 | Stop reason: HOSPADM

## 2024-05-09 RX ORDER — SODIUM CHLORIDE 9 MG/ML
INJECTION, SOLUTION INTRAVENOUS PRN
Status: DISCONTINUED | OUTPATIENT
Start: 2024-05-09 | End: 2024-05-14 | Stop reason: HOSPADM

## 2024-05-09 RX ORDER — BUTALBITAL, ACETAMINOPHEN AND CAFFEINE 50; 325; 40 MG/1; MG/1; MG/1
1 TABLET ORAL 2 TIMES DAILY PRN
Status: DISCONTINUED | OUTPATIENT
Start: 2024-05-09 | End: 2024-05-14 | Stop reason: HOSPADM

## 2024-05-09 RX ORDER — IPRATROPIUM BROMIDE AND ALBUTEROL SULFATE 2.5; .5 MG/3ML; MG/3ML
1 SOLUTION RESPIRATORY (INHALATION) EVERY 6 HOURS PRN
Status: DISCONTINUED | OUTPATIENT
Start: 2024-05-09 | End: 2024-05-14 | Stop reason: HOSPADM

## 2024-05-09 RX ORDER — ACETAMINOPHEN 500 MG
1000 TABLET ORAL 3 TIMES DAILY
Status: DISCONTINUED | OUTPATIENT
Start: 2024-05-09 | End: 2024-05-14 | Stop reason: HOSPADM

## 2024-05-09 RX ORDER — TAMSULOSIN HYDROCHLORIDE 0.4 MG/1
0.4 CAPSULE ORAL DAILY
Status: DISCONTINUED | OUTPATIENT
Start: 2024-05-09 | End: 2024-05-14 | Stop reason: HOSPADM

## 2024-05-09 RX ORDER — UREA 10 %
10 LOTION (ML) TOPICAL NIGHTLY
Status: DISCONTINUED | OUTPATIENT
Start: 2024-05-09 | End: 2024-05-14 | Stop reason: HOSPADM

## 2024-05-09 RX ORDER — CITALOPRAM HYDROBROMIDE 10 MG/1
10 TABLET ORAL DAILY
Status: DISCONTINUED | OUTPATIENT
Start: 2024-05-09 | End: 2024-05-14 | Stop reason: HOSPADM

## 2024-05-09 RX ORDER — ACETAMINOPHEN 650 MG/1
650 SUPPOSITORY RECTAL EVERY 6 HOURS PRN
Status: DISCONTINUED | OUTPATIENT
Start: 2024-05-09 | End: 2024-05-13

## 2024-05-09 RX ORDER — ENOXAPARIN SODIUM 100 MG/ML
40 INJECTION SUBCUTANEOUS DAILY
Status: DISCONTINUED | OUTPATIENT
Start: 2024-05-09 | End: 2024-05-14 | Stop reason: HOSPADM

## 2024-05-09 RX ORDER — LORAZEPAM 2 MG/ML
4 INJECTION INTRAMUSCULAR EVERY 5 MIN PRN
Status: DISCONTINUED | OUTPATIENT
Start: 2024-05-09 | End: 2024-05-14 | Stop reason: HOSPADM

## 2024-05-09 RX ORDER — LACOSAMIDE 100 MG/1
100 TABLET ORAL 2 TIMES DAILY
Status: DISCONTINUED | OUTPATIENT
Start: 2024-05-09 | End: 2024-05-14 | Stop reason: HOSPADM

## 2024-05-09 RX ORDER — SODIUM CHLORIDE 0.9 % (FLUSH) 0.9 %
5-40 SYRINGE (ML) INJECTION PRN
Status: DISCONTINUED | OUTPATIENT
Start: 2024-05-09 | End: 2024-05-14 | Stop reason: HOSPADM

## 2024-05-09 RX ORDER — ONDANSETRON 2 MG/ML
4 INJECTION INTRAMUSCULAR; INTRAVENOUS EVERY 6 HOURS PRN
Status: DISCONTINUED | OUTPATIENT
Start: 2024-05-09 | End: 2024-05-14 | Stop reason: HOSPADM

## 2024-05-09 RX ORDER — ONDANSETRON 4 MG/1
4 TABLET, ORALLY DISINTEGRATING ORAL EVERY 8 HOURS PRN
Status: DISCONTINUED | OUTPATIENT
Start: 2024-05-09 | End: 2024-05-14 | Stop reason: HOSPADM

## 2024-05-09 RX ORDER — GABAPENTIN 300 MG/1
300 CAPSULE ORAL 3 TIMES DAILY
Status: DISCONTINUED | OUTPATIENT
Start: 2024-05-09 | End: 2024-05-14 | Stop reason: HOSPADM

## 2024-05-09 RX ORDER — SODIUM CHLORIDE 0.9 % (FLUSH) 0.9 %
5-40 SYRINGE (ML) INJECTION EVERY 12 HOURS SCHEDULED
Status: DISCONTINUED | OUTPATIENT
Start: 2024-05-09 | End: 2024-05-14 | Stop reason: HOSPADM

## 2024-05-09 RX ORDER — BACLOFEN 5 MG/1
5 TABLET ORAL 2 TIMES DAILY
Status: DISCONTINUED | OUTPATIENT
Start: 2024-05-09 | End: 2024-05-14 | Stop reason: HOSPADM

## 2024-05-09 RX ORDER — POTASSIUM CHLORIDE 20 MEQ/1
40 TABLET, EXTENDED RELEASE ORAL PRN
Status: DISCONTINUED | OUTPATIENT
Start: 2024-05-09 | End: 2024-05-14 | Stop reason: HOSPADM

## 2024-05-09 RX ORDER — TOPIRAMATE 50 MG/1
50 TABLET, FILM COATED ORAL 2 TIMES DAILY
Status: DISCONTINUED | OUTPATIENT
Start: 2024-05-09 | End: 2024-05-13

## 2024-05-09 RX ORDER — MAGNESIUM SULFATE IN WATER 40 MG/ML
2000 INJECTION, SOLUTION INTRAVENOUS PRN
Status: DISCONTINUED | OUTPATIENT
Start: 2024-05-09 | End: 2024-05-14 | Stop reason: HOSPADM

## 2024-05-09 RX ORDER — ELTROMBOPAG OLAMINE 50 MG/1
50 TABLET, FILM COATED ORAL DAILY
Status: ON HOLD | COMMUNITY

## 2024-05-09 RX ADMIN — TOPIRAMATE 50 MG: 50 TABLET, FILM COATED ORAL at 10:11

## 2024-05-09 RX ADMIN — AMITRIPTYLINE HYDROCHLORIDE 75 MG: 50 TABLET, FILM COATED ORAL at 04:08

## 2024-05-09 RX ADMIN — GABAPENTIN 300 MG: 300 CAPSULE ORAL at 21:42

## 2024-05-09 RX ADMIN — ATORVASTATIN CALCIUM 80 MG: 80 TABLET, FILM COATED ORAL at 04:12

## 2024-05-09 RX ADMIN — ACETAMINOPHEN 1000 MG: 500 TABLET ORAL at 10:11

## 2024-05-09 RX ADMIN — TOPIRAMATE 50 MG: 50 TABLET, FILM COATED ORAL at 21:42

## 2024-05-09 RX ADMIN — ATORVASTATIN CALCIUM 80 MG: 80 TABLET, FILM COATED ORAL at 21:42

## 2024-05-09 RX ADMIN — GABAPENTIN 300 MG: 300 CAPSULE ORAL at 10:12

## 2024-05-09 RX ADMIN — LACOSAMIDE 100 MG: 100 TABLET, FILM COATED ORAL at 10:17

## 2024-05-09 RX ADMIN — CEFTRIAXONE SODIUM 1000 MG: 1 INJECTION, POWDER, FOR SOLUTION INTRAMUSCULAR; INTRAVENOUS at 01:08

## 2024-05-09 RX ADMIN — SODIUM CHLORIDE, PRESERVATIVE FREE 10 ML: 5 INJECTION INTRAVENOUS at 10:12

## 2024-05-09 RX ADMIN — GABAPENTIN 300 MG: 300 CAPSULE ORAL at 17:08

## 2024-05-09 RX ADMIN — BUTALBITAL, ACETAMINOPHEN, AND CAFFEINE 1 TABLET: 50; 325; 40 TABLET ORAL at 04:09

## 2024-05-09 RX ADMIN — ACETAMINOPHEN 650 MG: 325 TABLET ORAL at 01:46

## 2024-05-09 RX ADMIN — CITALOPRAM HYDROBROMIDE 10 MG: 10 TABLET ORAL at 10:10

## 2024-05-09 RX ADMIN — TOPIRAMATE 50 MG: 50 TABLET, FILM COATED ORAL at 04:09

## 2024-05-09 RX ADMIN — TAMSULOSIN HYDROCHLORIDE 0.4 MG: 0.4 CAPSULE ORAL at 10:12

## 2024-05-09 RX ADMIN — LACOSAMIDE 100 MG: 100 TABLET, FILM COATED ORAL at 04:08

## 2024-05-09 RX ADMIN — LACOSAMIDE 100 MG: 100 TABLET, FILM COATED ORAL at 21:42

## 2024-05-09 RX ADMIN — BACLOFEN 5 MG: 5 TABLET ORAL at 04:09

## 2024-05-09 RX ADMIN — ACETAMINOPHEN 1000 MG: 500 TABLET ORAL at 21:42

## 2024-05-09 RX ADMIN — Medication 1 MG: at 19:05

## 2024-05-09 RX ADMIN — AMITRIPTYLINE HYDROCHLORIDE 75 MG: 50 TABLET, FILM COATED ORAL at 21:42

## 2024-05-09 RX ADMIN — ACETAMINOPHEN 1000 MG: 500 TABLET ORAL at 17:07

## 2024-05-09 RX ADMIN — DICLOFENAC SODIUM 2 G: 10 GEL TOPICAL at 21:50

## 2024-05-09 RX ADMIN — Medication 10 MG: at 21:42

## 2024-05-09 RX ADMIN — SODIUM CHLORIDE, PRESERVATIVE FREE 10 ML: 5 INJECTION INTRAVENOUS at 21:42

## 2024-05-09 RX ADMIN — BACLOFEN 5 MG: 5 TABLET ORAL at 10:11

## 2024-05-09 RX ADMIN — BACLOFEN 5 MG: 5 TABLET ORAL at 21:42

## 2024-05-09 ASSESSMENT — PAIN SCALES - GENERAL
PAINLEVEL_OUTOF10: 8
PAINLEVEL_OUTOF10: 8

## 2024-05-09 ASSESSMENT — PAIN DESCRIPTION - DESCRIPTORS
DESCRIPTORS: ACHING;THROBBING
DESCRIPTORS: ACHING;THROBBING

## 2024-05-09 ASSESSMENT — PAIN DESCRIPTION - LOCATION
LOCATION: SHOULDER
LOCATION: SHOULDER

## 2024-05-09 ASSESSMENT — PAIN DESCRIPTION - ORIENTATION
ORIENTATION: LEFT
ORIENTATION: LEFT

## 2024-05-09 ASSESSMENT — PAIN - FUNCTIONAL ASSESSMENT
PAIN_FUNCTIONAL_ASSESSMENT: PREVENTS OR INTERFERES SOME ACTIVE ACTIVITIES AND ADLS
PAIN_FUNCTIONAL_ASSESSMENT: PREVENTS OR INTERFERES SOME ACTIVE ACTIVITIES AND ADLS

## 2024-05-09 NOTE — ED NOTES
Dr. Villalba at bedside assessing the pt. Pt's wife states pt had incidence of fall last Thursday and Sunday at Rehab, hit his head but did not lost consciousness and no nausea or vomiting noticed. Pt's wife also states that pt's right head is swollen. Pt sats are ranging to 90-91%, placed O2 support via NC at 3 LPM.

## 2024-05-09 NOTE — ED NOTES
The following labs were labeled with appropriate pt sticker and tubed to lab:     [x] Blue     [x] Lavender   [] on ice  [x] Green/yellow  [x] Green/black [x] on ice  [] Grey  [] on ice  [] Yellow  [x] Red  [] Pink  [] Type/ Screen  [] ABG  [] VBG    [] COVID-19 swab    [] Rapid  [] PCR  [] Flu swab  [] Peds Viral Panel     [] Urine Sample  [] Fecal Sample  [] Pelvic Cultures  [x] Blood Cultures  [x] X 2  [] STREP Cultures  [] Wound Cultures

## 2024-05-09 NOTE — ED NOTES
Pt's both feet were jerking after the xray at bedside. Pt's vitals were stable. Pt is awake. Informed Dr Burns about it.

## 2024-05-09 NOTE — CONSULTS
[facility] Neurology   IN-PATIENT SERVICE   [facility]    Neurology Consult Note            Date:   5/8/2024  Patient name:  Brennen Mcclure  Date of admission:  5/8/2024  7:42 PM  MRN:   [id number]  Account:  [id number]  YOB: 1965  PCP:    Blanca Ren MD  Room:   [facility]  Code Status:    Prior    Chief Complaint:     Chief Complaint   Patient presents with    Fever    Altered Mental Status    Fall       History Obtained From:     patient, electronic medical record    History of Present Illness:     The patient is a 59 y.o. male with significant past medical history of CVST complicated with ICH after starting anticoagulation s/p right hemicraniectomy in November 2023 in [place] with residual dense left-sided hemiplegia, chronic idiopathic thrombocytopenic purpura, migraines, seizures presenting to [place] ER for fever, generalized weakness and altered mentation.     Patient was recently discharged on 4/14/2024 after being admitted for dysphagia.  He also was having intermittent episodes of confusion as per the wife at the time, and has a history of seizures for which he takes Vimpat.  He has a history of migraine headaches for which he takes Elavil and Topamax.  Patient was noted to have intermittent confusion during that admission, however improved with no evidence of clear seizure-like activity.  Patient was previously on Eliquis for DVT, however is not on anticoagulation or antiplatelets at this time.  He follows with hematology oncology for management of ITP.  He was recently started on IVIG to improve platelet counts to eventually undergo surgical repair of right hemicraniectomy.    Patient went for infusion today, on 5/8/2024, for treatment of ITP.  Postinfusion, patient was noted to have fever of 103 °F, and was noted to be diaphoretic by the patient's wife.  He was also noted to have altered mental status, and was talking about nonsensical events that happened in [place] back      Laboratory Testing:  Recent Results (from the past 24 hour(s))   Culture, Blood 1    Collection Time: 05/08/24  8:00 PM    Specimen: Blood   Result Value Ref Range    Specimen Description .BLOOD     Special Requests R FA 10ML     Culture NO GROWTH <24 HRS    CBC with Auto Differential    Collection Time: 05/08/24  8:23 PM   Result Value Ref Range    WBC 6.4 3.5 - 11.3 k/uL    RBC 4.17 (L) 4.21 - 5.77 m/uL    Hemoglobin 12.0 (L) 13.0 - 17.0 g/dL    Hematocrit 37.3 (L) 40.7 - 50.3 %    MCV 89.4 82.6 - 102.9 fL    MCH 28.8 25.2 - 33.5 pg    MCHC 32.2 28.4 - 34.8 g/dL    RDW 15.0 (H) 11.8 - 14.4 %    Platelets 65 (L) 138 - 453 k/uL    MPV 12.6 8.1 - 13.5 fL    NRBC Automated 0.0 0.0 per 100 WBC    Immature Granulocytes % 0 0 %    Neutrophils % 89 (H) 36 - 66 %    Lymphocytes % 5 (L) 24 - 44 %    Monocytes % 6 1 - 7 %    Eosinophils % 0 (L) 1 - 4 %    Basophils % 0 0 - 2 %    nRBC 1 (H) 0 per 100 WBC    Immature Granulocytes Absolute 0.00 0.00 - 0.30 k/uL    Neutrophils Absolute 5.70 1.8 - 7.7 k/uL    Lymphocytes Absolute 0.32 (L) 1.0 - 4.8 k/uL    Monocytes Absolute 0.38 0.1 - 0.8 k/uL    Eosinophils Absolute 0.00 0.0 - 0.4 k/uL    Basophils Absolute 0.00 0.0 - 0.2 k/uL    Morphology ANISOCYTOSIS PRESENT    Comprehensive Metabolic Panel w/ Reflex to MG    Collection Time: 05/08/24  8:23 PM   Result Value Ref Range    Sodium 131 (L) 136 - 145 mmol/L    Potassium 4.0 3.7 - 5.3 mmol/L    Chloride 98 98 - 107 mmol/L    CO2 20 20 - 31 mmol/L    Anion Gap 13 9 - 16 mmol/L    Glucose 106 (H) 74 - 99 mg/dL    BUN 21 8 - 23 mg/dL    Creatinine 0.9 0.70 - 1.20 mg/dL    Est, Glom Filt Rate >90 >60 mL/min/1.73m2    Calcium 8.9 8.6 - 10.4 mg/dL    Total Protein 9.6 (H) 6.6 - 8.7 g/dL    Albumin 3.6 3.5 - 5.2 g/dL    Albumin/Globulin Ratio 1.0 1.0 - 2.5    Total Bilirubin 0.6 0.00 - 1.20 mg/dL    Alkaline Phosphatase 98 40 - 129 U/L    ALT 28 10 - 50 U/L    AST 29 10 - 50 U/L   Lactate, Sepsis    Collection Time: 05/08/24  8:23

## 2024-05-09 NOTE — H&P
[facility] Neurology   IN-PATIENT SERVICE   [facility]    HISTORY AND PHYSICAL EXAMINATION                      Date:                            5/8/2024  Patient name:              Brennen Mcclure  Date of admission:      5/8/2024  7:42 PM  MRN:                           [id number]  Account:                      [id number]  YOB: 1965  PCP:                            Blanca Ren MD  Room:                          [facility]  Code Status:               Prior     Chief Complaint:          Chief Complaint   Patient presents with    Fever    Altered Mental Status    Fall         History Obtained From:      patient, electronic medical record     History of Present Illness:      The patient is a 59 y.o. male with significant past medical history of CVST complicated with ICH after starting anticoagulation s/p right hemicraniectomy in November 2023 in [place] with residual dense left-sided hemiplegia, chronic idiopathic thrombocytopenic purpura, migraines, seizures presenting to [facility] for fever, generalized weakness and altered mentation.      Patient was recently discharged on 4/14/2024 after being admitted for dysphagia.  He also was having intermittent episodes of confusion as per the wife at the time, and has a history of seizures for which he takes Vimpat.  He has a history of migraine headaches for which he takes Elavil and Topamax.  Patient was noted to have intermittent confusion during that admission, however improved with no evidence of clear seizure-like activity.  Patient was previously on Eliquis for DVT, however is not on anticoagulation or antiplatelets at this time.  He follows with hematology oncology for management of ITP.  He was recently started on IVIG to improve platelet counts to eventually undergo surgical repair of right hemicraniectomy.     Patient went for infusion today, on 5/8/2024, for treatment of ITP.  Postinfusion, patient was noted to have  fever of 103 °F, and was noted to be diaphoretic by the patient's wife.  He was also noted to have altered mental status, and was talking about nonsensical events that happened in [place] back when he experienced the CVST.  Due to these symptoms, patient's wife brought him to [place] ER.  His temperature normalized to readings of 98.6 °F - 99.2 °F while in the ER.  She remained hemodynamically stable.  He was able to answer all questions appropriately, and is alert and oriented to person, place, time, and situation.  Patient was slightly dysarthric.  As per the patient's wife, his mentation did improve after coming to the ER.  She said the patient was started on ciprofloxacin for concern of UTI, however urine sample was unable to be collected at the time.  Urinalysis done while in the ER showed small amount of leukocyte esterase, negative nitrite, cloudy appearance, large amount of hemoglobin.  Microscopic urinalysis showed  WBC, 20-50 RBC, 5-10 casts, no bacteria.  Due to concern of intermittent severe fever, decision to admit to inpatient neurology was made.     CT head without IV contrast done on 5/8/2024 shows deep white matter edema in right cerebral hemisphere with external herniation, 3 mm left to right midline shift, no hemorrhage or extra-axial fluid collection.  Right dural reaction.     Urinalysis positive for small quantity leukocyte esterase, negative nitrite, cloudy appearance.          Non-contrast CT axial view showing prior right hemicraniectomy with midline shift (5/8/24)     Past Medical History:      Past Medical History        Past Medical History:   Diagnosis Date    Anemia 02/12/2024    CAD (coronary artery disease)      Chronic deep vein thrombosis (DVT) of both lower extremities (HCC)      Chronic deep vein thrombosis (DVT) of femoral vein of right lower extremity (HCC) 03/07/2024    Chronic deep vein thrombosis (DVT) of proximal vein of right lower extremity ([facility]) 02/22/2024     mmol/L     Glucose 106 (H) 74 - 99 mg/dL     BUN 21 8 - 23 mg/dL     Creatinine 0.9 0.70 - 1.20 mg/dL     Est, Glom Filt Rate >90 >60 mL/min/1.73m2     Calcium 8.9 8.6 - 10.4 mg/dL     Total Protein 9.6 (H) 6.6 - 8.7 g/dL     Albumin 3.6 3.5 - 5.2 g/dL     Albumin/Globulin Ratio 1.0 1.0 - 2.5     Total Bilirubin 0.6 0.00 - 1.20 mg/dL     Alkaline Phosphatase 98 40 - 129 U/L     ALT 28 10 - 50 U/L     AST 29 10 - 50 U/L   Lactate, Sepsis     Collection Time: 05/08/24  8:23 PM   Result Value Ref Range     Lactic Acid, Sepsis, Whole Blood 1.6 0.5 - 1.9 mmol/L   Troponin     Collection Time: 05/08/24  8:23 PM   Result Value Ref Range     Troponin, High Sensitivity 12 0 - 22 ng/L   Culture, Blood 1     Collection Time: 05/08/24  8:35 PM     Specimen: Blood   Result Value Ref Range     Specimen Description .BLOOD       Special Requests RFA 10ML       Culture NO GROWTH <24 HRS     Urinalysis with Reflex to Culture     Collection Time: 05/08/24  9:16 PM     Specimen: Urine   Result Value Ref Range     Color, UA Yellow Yellow     Turbidity UA Cloudy (A) Clear     Glucose, Ur NEGATIVE NEGATIVE mg/dL     Bilirubin, Urine NEGATIVE NEGATIVE     Ketones, Urine NEGATIVE NEGATIVE mg/dL     Specific Gravity, UA 1.021 1.005 - 1.030     Urine Hgb LARGE (A) NEGATIVE     pH, Urine 5.0 5.0 - 8.0     Protein, UA 1+ (A) NEGATIVE mg/dL     Urobilinogen, Urine Normal 0.0 - 1.0 EU/dL     Nitrite, Urine NEGATIVE NEGATIVE     Leukocyte Esterase, Urine SMALL (A) NEGATIVE   Microscopic Urinalysis     Collection Time: 05/08/24  9:16 PM   Result Value Ref Range     WBC, UA 50  0 - 5 /HPF     RBC, UA 20 TO 50 0 - 4 /HPF     Casts UA   0 - 8 /LPF       5 TO 10 HYALINE Reference range defined for non-centrifuged specimen.     Epithelial Cells UA 5 TO 10 0 - 5 /HPF     Bacteria, UA None None   Troponin     Collection Time: 05/08/24  9:33 PM   Result Value Ref Range     Troponin, High Sensitivity 11 0 - 22 ng/L   Lactate, Sepsis     Collection  Discharged

## 2024-05-09 NOTE — CONSULTS
right iliac vein (HCC), Chronic idiopathic thrombocytopenia (HCC), Chronic idiopathic thrombocytopenic purpura (HCC), CVA (cerebral vascular accident) (HCC), Emphysema lung (HCC), Hemosiderin pigmentation of skin, HLD (hyperlipidemia), Intracranial hemorrhage (HCC), Left hemiplegia (HCC), Right leg swelling, and Thrombocytopenia (HCC).    Past Surgical History:   has a past surgical history that includes Coronary artery bypass graft; Coronary stent placement; craniotomy (Right); and CT BIOPSY BONE MARROW (3/18/2024).     Medications:    Prior to Admission medications    Medication Sig Start Date End Date Taking? Authorizing Provider   ciprofloxacin (CIPRO) 500 MG tablet Take 1 tablet by mouth 2 times daily for 7 days 5/8/24 5/15/24  Lam Chang MD   methylPREDNISolone (MEDROL DOSEPACK) 4 MG tablet Take by mouth. 5/6/24 5/12/24  Danica De La Paz MD   lidocaine (LIDODERM) 5 % Place 2 patches onto the skin daily Change daily remove after 12 hours 5/2/24   Danica De La Paz MD   ferrous sulfate (IRON 325) 325 (65 Fe) MG tablet Take 1 tablet by mouth every other day 1/26/24   Provider, MD Zeenat   amitriptyline (ELAVIL) 75 MG tablet Take 1 tablet by mouth nightly 4/30/24   Danica De La Paz MD   acetaminophen (TYLENOL) 500 MG tablet Take 2 tablets by mouth in the morning, at noon, and at bedtime 4/30/24   Danica De La Paz MD   gabapentin (NEURONTIN) 300 MG capsule Take 1 capsule by mouth 3 times daily for 90 days. 4/30/24 7/29/24  Danica De La Paz MD   butalbital-APAP-caffeine -40 MG CAPS per capsule Take 1 capsule by mouth 2 times daily as needed for Headaches 4/14/24   Magalis Humphrey MD   topiramate (TOPAMAX) 50 MG tablet Take 1 tablet by mouth 2 times daily 4/1/24   Blanca Ren MD   Multiple Vitamins-Minerals (THERAPEUTIC MULTIVITAMIN-MINERALS) tablet Take 1 tablet by mouth daily 4/1/24   Blanca Ren MD   melatonin 3 MG TABS tablet Take 2 tablets by mouth at bedtime  Patient taking  right vertebral artery. Otherwise, no acute abnormality or flow-limiting stenosis in the remainder of the major arteries of the head and neck.     CT HEAD WO CONTRAST    Result Date: 4/12/2024  EXAMINATION: CT OF THE HEAD WITHOUT CONTRAST  4/12/2024 3:14 pm TECHNIQUE: CT of the head was performed without the administration of intravenous contrast. Automated exposure control, iterative reconstruction, and/or weight based adjustment of the mA/kV was utilized to reduce the radiation dose to as low as reasonably achievable. COMPARISON: 03/07/2024 HISTORY: ORDERING SYSTEM PROVIDED HISTORY: facial droop, difficulty swallowing, slurred speech FINDINGS: BRAIN/VENTRICLES: Again seen are postsurgical changes from right hemispheric craniectomy.  There are similar areas of outward bulging and concavity of the brain parenchyma at the craniectomy site.  Extensive encephalomalacia throughout the right cerebral hemisphere compatible with prior insult.  There is associated wallerian degeneration.  No significant mass effect or midline shift.  No acute intracranial hemorrhage.  No extra-axial fluid collections. No hydrocephalus. ORBITS: The visualized portion of the orbits demonstrate no acute abnormality. SINUSES: The visualized paranasal sinuses and mastoid air cells demonstrate no acute abnormality. SOFT TISSUES/SKULL:  No acute abnormality of the visualized skull or soft tissues.     No acute intracranial abnormality within constraints of CT acquisition. Stable chronic and postsurgical changes. The findings were sent to the Radiology Results Communication Center at 3:35 pm on 4/12/2024 to be communicated to a licensed caregiver, received by Dr. Petty at 3:38 pm.        Impression:   Primary Problem  Altered mental status  Active Hospital Problems    Diagnosis Date Noted    Altered mental status [R41.82] 05/09/2024         Assessment:  -Thrombocytopenia on IVIG (started 4/17/24) and Promacta (started 4/14/2024)  -Chronic DVT of  document can still have some errors including those of syntax and sound a like substitutions which may escape proof reading.  It such instances, actual meaning can be extrapolated by contextual diversion.

## 2024-05-09 NOTE — ED PROVIDER NOTES
[facility]     Emergency Department     Faculty Attestation    I performed a history and physical examination of the patient and discussed management with the resident. I reviewed the resident´s note and agree with the documented findings and plan of care. Any areas of disagreement are noted on the chart. I was personally present for the key portions of any procedures. I have documented in the chart those procedures where I was not present during the key portions. I have reviewed the emergency nurses triage note. I agree with the chief complaint, past medical history, past surgical history, allergies, medications, social and family history as documented unless otherwise noted below. For Physician Assistant/ Nurse Practitioner cases/documentation I have personally evaluated this patient and have completed at least one if not all key elements of the E/M (history, physical exam, and MDM). Additional findings are as noted.    Chest clear, heart exam normal, abdomen nontender, previous defect in the right skull is now fluctuant which his wife states is new.  Septic workup.     Matthew Villalba MD  05/08/24 [id number]

## 2024-05-09 NOTE — ED NOTES
The following labs were labeled with appropriate pt sticker and tubed to lab:     [] Blue     [] Lavender   [] on ice  [] Green/yellow  [x] Green/black [x] on ice  [] Grey  [] on ice  [] Yellow  [] Red  [] Pink  [] Type/ Screen  [] ABG  [] VBG

## 2024-05-09 NOTE — ED NOTES
ED to inpatient nurses report      Chief Complaint:  Chief Complaint   Patient presents with    Fever    Altered Mental Status    Fall     Present to ED from: home transported by EMS    MOA:     LOC: alert and orientated to name, place, date  Mobility: Requires assistance * 1  Oxygen Baseline: 90%    Current needs required: 3 LPM   Pending ED orders: none  Present condition: stable, left sided body weakness deficit from stroke    Why did the patient come to the ED? Pt presents to ED from home transported by EMS with c/o of fever and AMS started yesterday. As per EMS, Pt has been started with ATB due to UTI, also given Tylenol for fever around 5PM. Pt has history of blood disorder and had his infusion treatment today. Pt has hx of stroke with left sided body weakness deficit. Pt states he also has history of CAD. Pt denies n/v/d, LOC, CP, SOB, chills, change in bowel/bladder function, loss of appetite, pain, or any other sx. Pt's wife states pt has history of fall last Thursday and Sunday, hit his head but did not lost consciousness.     Pt is alert and oriented x4. NAD. VSS. Pt placed on continuous cardiac monitoring, BP, Pulse ox. EKG obtained. IV established and labs drawn. Provided warm blanket. Call light within reach. Will monitor plan of care.   What is the plan? Admission  Any procedures or intervention occur? CT Scan, Labs, Imaging, BC  Any safety concerns?? Fall    Mental Status:  Level of Consciousness: Alert (0)    Psych Assessment:   Psychosocial  Psychosocial (WDL): Within Defined Limits  Vital signs   Vitals:    05/08/24 2221 05/08/24 2231 05/08/24 2241 05/08/24 2251   BP: 126/81 (!) 140/80 122/74    Pulse: 77 70 70 69   Resp: 21 15 15 17   Temp:       TempSrc:       SpO2: 93% 97% 94% 95%   Weight:       Height:            Vitals:  Patient Vitals for the past 24 hrs:   BP Temp Temp src Pulse Resp SpO2 Height Weight   05/08/24 2251 -- -- -- 69 17 95 % -- --   05/08/24 2241 122/74 -- -- 70 15 94 % -- --  Hemosiderin pigmentation of skin 03/07/2024    HLD (hyperlipidemia)     Intracranial hemorrhage (HCC) 01/31/2024    Left hemiplegia (HCC)     Right leg swelling 03/07/2024    Thrombocytopenia (HCC) 02/12/2024       Labs:  Labs Reviewed   CBC WITH AUTO DIFFERENTIAL - Abnormal; Notable for the following components:       Result Value    RBC 4.17 (*)     Hemoglobin 12.0 (*)     Hematocrit 37.3 (*)     RDW 15.0 (*)     Platelets 65 (*)     Neutrophils % 89 (*)     Lymphocytes % 5 (*)     Eosinophils % 0 (*)     nRBC 1 (*)     Lymphocytes Absolute 0.32 (*)     All other components within normal limits   COMPREHENSIVE METABOLIC PANEL W/ REFLEX TO MG FOR LOW K - Abnormal; Notable for the following components:    Sodium 131 (*)     Glucose 106 (*)     Total Protein 9.6 (*)     All other components within normal limits   URINALYSIS WITH REFLEX TO CULTURE - Abnormal; Notable for the following components:    Turbidity UA Cloudy (*)     Urine Hgb LARGE (*)     Protein, UA 1+ (*)     Leukocyte Esterase, Urine SMALL (*)     All other components within normal limits   CULTURE, BLOOD 1   CULTURE, BLOOD 1   CULTURE, URINE   LACTATE, SEPSIS   LACTATE, SEPSIS   TROPONIN   TROPONIN   MICROSCOPIC URINALYSIS   CK   MYOGLOBIN, BLOOD       Electronically signed by Dimas Felipe RN on 5/9/2024 at 12:55 AM

## 2024-05-09 NOTE — PROGRESS NOTES
Writer spoke with wife, pt is not to receive any heparin or lovenox, wife reports blood thinners lower his platelets and cause him to bleed. Lovenox was not given to pt today. Wife also request that pt's lidocaine patch be ordered along with his promacta. Reji Huddleston informed via Perfect [employer]. Electronically signed by Caron Barreto RN on 5/9/2024 at 7:31 PM

## 2024-05-09 NOTE — CARE COORDINATION
Case Management Assessment  Initial Evaluation    Date/Time of Evaluation: 5/9/2024 6:03 PM  Assessment Completed by: Ladonna Morton RN    If patient is discharged prior to next notation, then this note serves as note for discharge by case management.    Patient Name: Brennen Mcclure                   YOB: 1965  Diagnosis: Altered mental status [R41.82]  Acute cystitis with hematuria [N30.01]                   Date / Time: 5/8/2024  7:42 PM    Patient Admission Status: Inpatient   Readmission Risk (Low < 19, Mod (19-27), High > 27): Readmission Risk Score: 20.6    Current PCP: Blanca Ren MD  PCP verified by CM? Yes    Chart Reviewed: Yes      History Provided by: Patient  Patient Orientation: Alert and Oriented, Person, Place, Situation    Patient Cognition: Alert    Hospitalization in the last 30 days (Readmission):  No    If yes, Readmission Assessment in  Navigator will be completed.    Advance Directives:      Code Status: DNR-CCA   Patient's Primary Decision Maker is: Legal Next of Kin    Primary Decision Maker: Mara Mcclure - Spouse - [phone]    Secondary Decision Maker: Keturah Grayson - Brother/Sister - [phone]    Discharge Planning:    Patient lives with: Spouse/Significant Other Type of Home: House  Primary Care Giver: Spouse  Patient Support Systems include: Spouse/Significant Other, Family Members   Current Financial resources: Medicaid  Current community resources:    Current services prior to admission: Durable Medical Equipment            Current DME: Wheelchair, Hospital Bed, Home Aerosol, Other (Comment) (hospital bed, w/c, w/c ramp, nebulizer, and TAVARES stedy)            Type of Home Care services:  None    ADLS  Prior functional level: Assistance with the following:  Current functional level: Assistance with the following:    PT AM-PAC:   /24  OT AM-PAC:   /24    Family can provide assistance at DC: Yes  Would you like Case Management to discuss the discharge

## 2024-05-10 ENCOUNTER — APPOINTMENT (OUTPATIENT)
Dept: CT IMAGING | Age: 59
DRG: 115 | End: 2024-05-10
Payer: MEDICAID

## 2024-05-10 ENCOUNTER — PATIENT MESSAGE (OUTPATIENT)
Dept: INTERNAL MEDICINE CLINIC | Age: 59
End: 2024-05-10

## 2024-05-10 LAB — GLUCOSE BLD-MCNC: 105 MG/DL (ref 75–110)

## 2024-05-10 PROCEDURE — 99232 SBSQ HOSP IP/OBS MODERATE 35: CPT | Performed by: INTERNAL MEDICINE

## 2024-05-10 PROCEDURE — 99232 SBSQ HOSP IP/OBS MODERATE 35: CPT | Performed by: PSYCHIATRY & NEUROLOGY

## 2024-05-10 PROCEDURE — 6370000000 HC RX 637 (ALT 250 FOR IP)

## 2024-05-10 PROCEDURE — 2060000000 HC ICU INTERMEDIATE R&B

## 2024-05-10 PROCEDURE — 6360000004 HC RX CONTRAST MEDICATION: Performed by: PSYCHIATRY & NEUROLOGY

## 2024-05-10 PROCEDURE — 95813 EEG EXTND MNTR 61-119 MIN: CPT

## 2024-05-10 PROCEDURE — 6360000002 HC RX W HCPCS

## 2024-05-10 PROCEDURE — 2580000003 HC RX 258

## 2024-05-10 PROCEDURE — 82947 ASSAY GLUCOSE BLOOD QUANT: CPT

## 2024-05-10 PROCEDURE — 70498 CT ANGIOGRAPHY NECK: CPT

## 2024-05-10 RX ADMIN — Medication 1000 MG: at 00:50

## 2024-05-10 RX ADMIN — LACOSAMIDE 100 MG: 100 TABLET, FILM COATED ORAL at 20:05

## 2024-05-10 RX ADMIN — ACETAMINOPHEN 1000 MG: 500 TABLET ORAL at 20:05

## 2024-05-10 RX ADMIN — GABAPENTIN 300 MG: 300 CAPSULE ORAL at 08:39

## 2024-05-10 RX ADMIN — LACOSAMIDE 100 MG: 100 TABLET, FILM COATED ORAL at 08:38

## 2024-05-10 RX ADMIN — TAMSULOSIN HYDROCHLORIDE 0.4 MG: 0.4 CAPSULE ORAL at 08:39

## 2024-05-10 RX ADMIN — ACETAMINOPHEN 1000 MG: 500 TABLET ORAL at 08:38

## 2024-05-10 RX ADMIN — SODIUM CHLORIDE, PRESERVATIVE FREE 10 ML: 5 INJECTION INTRAVENOUS at 20:06

## 2024-05-10 RX ADMIN — TOPIRAMATE 50 MG: 50 TABLET, FILM COATED ORAL at 20:04

## 2024-05-10 RX ADMIN — ATORVASTATIN CALCIUM 80 MG: 80 TABLET, FILM COATED ORAL at 20:04

## 2024-05-10 RX ADMIN — DICLOFENAC SODIUM 2 G: 10 GEL TOPICAL at 08:41

## 2024-05-10 RX ADMIN — DICLOFENAC SODIUM 2 G: 10 GEL TOPICAL at 20:04

## 2024-05-10 RX ADMIN — GABAPENTIN 300 MG: 300 CAPSULE ORAL at 15:03

## 2024-05-10 RX ADMIN — CITALOPRAM HYDROBROMIDE 10 MG: 10 TABLET ORAL at 08:38

## 2024-05-10 RX ADMIN — Medication 10 MG: at 20:05

## 2024-05-10 RX ADMIN — BUTALBITAL, ACETAMINOPHEN, AND CAFFEINE 1 TABLET: 50; 325; 40 TABLET ORAL at 11:04

## 2024-05-10 RX ADMIN — IOPAMIDOL 90 ML: 755 INJECTION, SOLUTION INTRAVENOUS at 16:27

## 2024-05-10 RX ADMIN — AMITRIPTYLINE HYDROCHLORIDE 75 MG: 50 TABLET, FILM COATED ORAL at 20:04

## 2024-05-10 RX ADMIN — TOPIRAMATE 50 MG: 50 TABLET, FILM COATED ORAL at 08:38

## 2024-05-10 RX ADMIN — BACLOFEN 5 MG: 5 TABLET ORAL at 08:38

## 2024-05-10 RX ADMIN — BACLOFEN 5 MG: 5 TABLET ORAL at 20:04

## 2024-05-10 RX ADMIN — GABAPENTIN 300 MG: 300 CAPSULE ORAL at 20:05

## 2024-05-10 ASSESSMENT — PAIN SCALES - GENERAL
PAINLEVEL_OUTOF10: 7
PAINLEVEL_OUTOF10: 2

## 2024-05-10 ASSESSMENT — PAIN DESCRIPTION - LOCATION: LOCATION: HEAD

## 2024-05-10 ASSESSMENT — PAIN - FUNCTIONAL ASSESSMENT: PAIN_FUNCTIONAL_ASSESSMENT: ACTIVITIES ARE NOT PREVENTED

## 2024-05-10 ASSESSMENT — PAIN DESCRIPTION - DESCRIPTORS: DESCRIPTORS: ACHING

## 2024-05-10 NOTE — TELEPHONE ENCOUNTER
From: Brennen Mcclure  To: Dr. Blanca Ren  Sent: 5/10/2024 1:12 PM EDT  Subject: Brennen     Any way possible to get A sleep test done on Brennen forbes, he's in [facility].  Than you Mara Crook   
Please advise.   
no diarrhea/no nausea/no vomiting

## 2024-05-11 PROBLEM — R50.9 FEVER AND CHILLS: Status: ACTIVE | Noted: 2024-05-11

## 2024-05-11 PROBLEM — K11.20 PAROTITIS: Status: ACTIVE | Noted: 2024-05-11

## 2024-05-11 PROBLEM — G93.40 ACUTE ENCEPHALOPATHY: Status: ACTIVE | Noted: 2024-05-11

## 2024-05-11 LAB
BACTERIA URNS QL MICRO: NORMAL
BASOPHILS # BLD: 0.05 K/UL (ref 0–0.2)
BASOPHILS NFR BLD: 1 % (ref 0–2)
BILIRUB UR QL STRIP: NEGATIVE
CASTS #/AREA URNS LPF: NORMAL /LPF (ref 0–8)
CLARITY UR: CLEAR
COLOR UR: ABNORMAL
CRP SERPL HS-MCNC: 48.9 MG/L (ref 0–5)
EOSINOPHIL # BLD: 0.19 K/UL (ref 0–0.44)
EOSINOPHILS RELATIVE PERCENT: 4 % (ref 1–4)
EPI CELLS #/AREA URNS HPF: NORMAL /HPF (ref 0–5)
ERYTHROCYTE [DISTWIDTH] IN BLOOD BY AUTOMATED COUNT: 14.9 % (ref 11.8–14.4)
GLUCOSE UR STRIP-MCNC: NEGATIVE MG/DL
HCT VFR BLD AUTO: 43.8 % (ref 40.7–50.3)
HGB BLD-MCNC: 14.2 G/DL (ref 13–17)
HGB UR QL STRIP.AUTO: ABNORMAL
IMM GRANULOCYTES # BLD AUTO: 0.04 K/UL (ref 0–0.3)
IMM GRANULOCYTES NFR BLD: 1 %
INR PPP: 1
KETONES UR STRIP-MCNC: ABNORMAL MG/DL
LEUKOCYTE ESTERASE UR QL STRIP: ABNORMAL
LYMPHOCYTES NFR BLD: 0.87 K/UL (ref 1.1–3.7)
LYMPHOCYTES RELATIVE PERCENT: 18 % (ref 24–43)
MCH RBC QN AUTO: 28.7 PG (ref 25.2–33.5)
MCHC RBC AUTO-ENTMCNC: 32.4 G/DL (ref 28.4–34.8)
MCV RBC AUTO: 88.7 FL (ref 82.6–102.9)
MONOCYTES NFR BLD: 0.72 K/UL (ref 0.1–1.2)
MONOCYTES NFR BLD: 15 % (ref 3–12)
NEUTROPHILS NFR BLD: 61 % (ref 36–65)
NEUTS SEG NFR BLD: 2.86 K/UL (ref 1.5–8.1)
NITRITE UR QL STRIP: NEGATIVE
NRBC BLD-RTO: 0 PER 100 WBC
PH UR STRIP: 6.5 [PH] (ref 5–8)
PLATELET # BLD AUTO: 104 K/UL (ref 138–453)
PMV BLD AUTO: 12.1 FL (ref 8.1–13.5)
PROT UR STRIP-MCNC: ABNORMAL MG/DL
PROTHROMBIN TIME: 13 SEC (ref 11.7–14.9)
RBC # BLD AUTO: 4.94 M/UL (ref 4.21–5.77)
RBC # BLD: ABNORMAL 10*6/UL
RBC #/AREA URNS HPF: NORMAL /HPF (ref 0–4)
SP GR UR STRIP: 1.04 (ref 1–1.03)
UROBILINOGEN UR STRIP-ACNC: NORMAL EU/DL (ref 0–1)
WBC #/AREA URNS HPF: NORMAL /HPF (ref 0–5)
WBC OTHER # BLD: 4.7 K/UL (ref 3.5–11.3)

## 2024-05-11 PROCEDURE — 99255 IP/OBS CONSLTJ NEW/EST HI 80: CPT | Performed by: INTERNAL MEDICINE

## 2024-05-11 PROCEDURE — 6360000002 HC RX W HCPCS: Performed by: INTERNAL MEDICINE

## 2024-05-11 PROCEDURE — 2580000003 HC RX 258

## 2024-05-11 PROCEDURE — 2060000000 HC ICU INTERMEDIATE R&B

## 2024-05-11 PROCEDURE — 95816 EEG AWAKE AND DROWSY: CPT | Performed by: PSYCHIATRY & NEUROLOGY

## 2024-05-11 PROCEDURE — 85610 PROTHROMBIN TIME: CPT

## 2024-05-11 PROCEDURE — 6370000000 HC RX 637 (ALT 250 FOR IP): Performed by: PSYCHIATRY & NEUROLOGY

## 2024-05-11 PROCEDURE — 87086 URINE CULTURE/COLONY COUNT: CPT

## 2024-05-11 PROCEDURE — 6360000002 HC RX W HCPCS

## 2024-05-11 PROCEDURE — 99232 SBSQ HOSP IP/OBS MODERATE 35: CPT | Performed by: INTERNAL MEDICINE

## 2024-05-11 PROCEDURE — 86140 C-REACTIVE PROTEIN: CPT

## 2024-05-11 PROCEDURE — 36415 COLL VENOUS BLD VENIPUNCTURE: CPT

## 2024-05-11 PROCEDURE — 85025 COMPLETE CBC W/AUTO DIFF WBC: CPT

## 2024-05-11 PROCEDURE — 99232 SBSQ HOSP IP/OBS MODERATE 35: CPT | Performed by: PSYCHIATRY & NEUROLOGY

## 2024-05-11 PROCEDURE — 6370000000 HC RX 637 (ALT 250 FOR IP)

## 2024-05-11 PROCEDURE — 86738 MYCOPLASMA ANTIBODY: CPT

## 2024-05-11 PROCEDURE — 2580000003 HC RX 258: Performed by: INTERNAL MEDICINE

## 2024-05-11 PROCEDURE — 81001 URINALYSIS AUTO W/SCOPE: CPT

## 2024-05-11 RX ORDER — LIDOCAINE 4 G/G
2 PATCH TOPICAL DAILY
Status: DISCONTINUED | OUTPATIENT
Start: 2024-05-11 | End: 2024-05-14 | Stop reason: HOSPADM

## 2024-05-11 RX ADMIN — TAMSULOSIN HYDROCHLORIDE 0.4 MG: 0.4 CAPSULE ORAL at 09:22

## 2024-05-11 RX ADMIN — BACLOFEN 5 MG: 5 TABLET ORAL at 21:58

## 2024-05-11 RX ADMIN — GABAPENTIN 300 MG: 300 CAPSULE ORAL at 09:22

## 2024-05-11 RX ADMIN — AMITRIPTYLINE HYDROCHLORIDE 75 MG: 50 TABLET, FILM COATED ORAL at 21:58

## 2024-05-11 RX ADMIN — Medication 2000 MG: at 18:45

## 2024-05-11 RX ADMIN — ACETAMINOPHEN 1000 MG: 500 TABLET ORAL at 14:26

## 2024-05-11 RX ADMIN — ACYCLOVIR SODIUM 750 MG: 1000 INJECTION, SOLUTION INTRAVENOUS at 18:44

## 2024-05-11 RX ADMIN — ACETAMINOPHEN 1000 MG: 500 TABLET ORAL at 09:22

## 2024-05-11 RX ADMIN — BACLOFEN 5 MG: 5 TABLET ORAL at 09:21

## 2024-05-11 RX ADMIN — LACOSAMIDE 100 MG: 100 TABLET, FILM COATED ORAL at 09:22

## 2024-05-11 RX ADMIN — GABAPENTIN 300 MG: 300 CAPSULE ORAL at 21:10

## 2024-05-11 RX ADMIN — TOPIRAMATE 50 MG: 50 TABLET, FILM COATED ORAL at 09:22

## 2024-05-11 RX ADMIN — ATORVASTATIN CALCIUM 80 MG: 80 TABLET, FILM COATED ORAL at 21:10

## 2024-05-11 RX ADMIN — CITALOPRAM HYDROBROMIDE 10 MG: 10 TABLET ORAL at 09:21

## 2024-05-11 RX ADMIN — ACETAMINOPHEN 1000 MG: 500 TABLET ORAL at 21:10

## 2024-05-11 RX ADMIN — LACOSAMIDE 100 MG: 100 TABLET, FILM COATED ORAL at 21:10

## 2024-05-11 RX ADMIN — DICLOFENAC SODIUM 2 G: 10 GEL TOPICAL at 21:11

## 2024-05-11 RX ADMIN — AMPICILLIN SODIUM 2000 MG: 2 INJECTION, POWDER, FOR SOLUTION INTRAMUSCULAR; INTRAVENOUS at 22:00

## 2024-05-11 RX ADMIN — DICLOFENAC SODIUM 2 G: 10 GEL TOPICAL at 09:21

## 2024-05-11 RX ADMIN — SODIUM CHLORIDE, PRESERVATIVE FREE 10 ML: 5 INJECTION INTRAVENOUS at 21:12

## 2024-05-11 RX ADMIN — Medication 10 MG: at 21:10

## 2024-05-11 RX ADMIN — TOPIRAMATE 50 MG: 50 TABLET, FILM COATED ORAL at 21:59

## 2024-05-11 RX ADMIN — GABAPENTIN 300 MG: 300 CAPSULE ORAL at 14:27

## 2024-05-11 RX ADMIN — Medication 1000 MG: at 00:49

## 2024-05-11 NOTE — PROCEDURES
Date: 5/10/2024  Referring physician: Alexis Serna     Indication  Patient aged 59 y with encephalopathy. EEG done to assess for epileptiform activity.    Introduction  This routine 25-minute EEG was recorded using the International 10-20 System on a [employer] workstation at 256 samples/s. Automated spike and seizure detection algorithms were applied.    Description  There was asymmetry on background with left hemisphere showing 7-8 posterior dominant rhythm and right hemisphere sowing high amplitude disorganized delta activity consistent with breach rythm. There is continuous right hemispheric focal slowing or interhemispheric asymmetry was noted. Stage I and stage II sleep were not observed. There were no interictal epileptiform discharges or electrographic seizures.    Activations  Hyperventilation was not performed. Intermittent photic stimulation was performed and demonstrated no posterior driving response.    Impression  Abnormal awake EEG. The slowing mentioned above suggests mild-moderate non specific encephalopathy.     Interhemispheric asymmetry suggest focal lesion on the right and consistent with breach rhythm.        EKG lead did not show clear arrhythmia, if still in concern consider formal EKG or correlation with telemetry.       No epileptiform discharges were identified. Please note the absence of such activity on this record cannot conclusively rule out an epileptic disorder. If such is still clinically suspected, a repeat study with sleep deprivation and/or prolonged sampling may be helpful.    Madison Barton MD  Epilepsy Board Certified.  Neurology Board Certified.    Electronically Signed

## 2024-05-11 NOTE — PROGRESS NOTES
[facility] Neurology   IN-PATIENT SERVICE   [facility]    Progress Note             Date:   5/11/2024  Patient name:  Brennen Mcclure  Date of admission:  5/8/2024  7:42 PM  MRN:   [id number]  Account:  [id number]  YOB: 1965  PCP:    Blanca Ren MD  Room:   [address] [place] [address]  Code Status:    DNR-CCA    Chief Complaint:     Chief Complaint   Patient presents with    Fever    Altered Mental Status    Fall       Interval hx:     The patient was seen and examined at bedside. Is vitally stable, alert and oriented x 3. No acute events overnight.    CTV   2. Stable moderate focal stenosis of the right vertebral artery origin.   3. New mild enlargement and hyperenhancement of the left parotid gland   consistent with parotitis.     EEG - Impression  Abnormal awake EEG. The slowing mentioned above suggests mild-moderate non specific encephalopathy.      Interhemispheric asymmetry suggest focal lesion on the right and consistent with breach rhythm.       Brief History of Present Illness:     The patient is a 59 y.o. male with significant past medical history of CVST complicated with ICH after starting anticoagulation s/p right hemicraniectomy in November 2023 in [place] with residual dense left-sided hemiplegia, chronic idiopathic thrombocytopenic purpura, migraines, seizures presenting to [facility] for fever, generalized weakness and altered mentation.      Patient was recently discharged on 4/14/2024 after being admitted for dysphagia.  He also was having intermittent episodes of confusion as per the wife at the time, and has a history of seizures for which he takes Vimpat.  He has a history of migraine headaches for which he takes Elavil and Topamax.  Patient was noted to have intermittent confusion during that admission, however improved with no evidence of clear seizure-like activity.  Patient was previously on Eliquis for DVT, however is not on anticoagulation or antiplatelets at this  Temp 98 °F (36.7 °C) (Oral)   Resp 20   Ht 1.778 m (5' 10\")   Wt 81.6 kg (180 lb)   SpO2 96%   BMI 25.83 kg/m²   Temp (24hrs), Av.1 °F (36.7 °C), Min:97.9 °F (36.6 °C), Max:98.4 °F (36.9 °C)    Recent Labs     05/10/24  0716   POCGLU 105         Intake/Output Summary (Last 24 hours) at 2024 1200  Last data filed at 2024 0540  Gross per 24 hour   Intake --   Output 1550 ml   Net -1550 ml           Neurologic Exam     GENERAL  Appears comfortable and in no distress   HEENT  NC/ AT   HEART  S1 and S2 heard; palpation of pulses: radial pulse    NECK  Supple and no bruits heard   MENTAL STATUS:  Alert, oriented, intact memory, no confusion, mild dysarthria, normal language, no hallucination or delusion   CRANIAL NERVES: II     -      Visual fields intact to confrontation  III,IV,VI -  PERR, EOMs full, no ptosis  V     -     Normal facial sensation   VII    -     normal facial symmetry.  VIII   -     Intact hearing   IX,X -     Symmetrical palate  XI    -     Symmetrical shoulder shrug  XII   -     Midline tongue, no atrophy    MOTOR FUNCTION: RUE: Significant for good strength of grade 5/5 in proximal and distal muscle groups   LUE: Significant for good strength of grade 0/5 in proximal and distal muscle groups   RLE: Significant for good strength of grade 5/5 in proximal and distal muscle groups   LLE: Significant for good strength of grade 0/5 in proximal and distal muscle groups       Normal bulk, normal tone and no involuntary movements, no tremor   SENSORY FUNCTION:  Normal touch, normal pinprick, normal vibration, normal proprioception   CEREBELLAR FUNCTION:  Intact fine motor control over upper limbs and lower limbs   REFLEX FUNCTION:  Symmetric in upper and lower extremities, no Babinski sign   STATION and GAIT Deferred       Investigations:      Laboratory Testing:  No results found for this or any previous visit (from the past 24 hour(s)).    Recent Labs     24   WBC 6.4   RBC  NEUROLOGY  IP CONSULT TO HEM/ONC    Patient is admitted as inpatient status because of co-morbidities listed above, severity of signs and symptoms as outlined, requirement for current medical therapies and most importantly because of direct risk to patient if care not provided in a hospital setting.    Leonora Brown MD  Neurology Resident PGY-3   5/11/2024  12:00 PM    Copy sent to Blanca Tony MD

## 2024-05-11 NOTE — CONSULTS
[facility] -   Infectious diseases evaluation  admission date 5/8/2024    reason for consultation:   L parotitis    Impression :   Current:  Acute encephalopathy and fever  while getting IVIG at the infusion center w fever 103  No clear etiology  New mild left parotitis on CT scan 5/11  History of idiopathic thrombocytopenia failed prior treatments  On weekly IVIG infusion and promacta since 4/14  Past ICH w R craniotomy/ L hemiplegia    Other:  Dural sinus thrombosis with history of DVT 2019 on Eliquis   Hemorrhagic stroke December 2023  Discussion / summary of stay / plan of care/ Recommendations:   Fever and altered mentation since  5/7 - worse at the infusion center 5/8 - no central line - uses a new iv each week -  No + cx -  Fever resolved since admission  Still confused and emotional - according to wife, much worse than his baseline  headache + and no skin rash   L parotid gland enlargement  vs parotitis - on exam not palpable - not convinced about this being the case of the confusion  5/11 ceftriaxone  CNS dose, acyclovir iv, ampicillin iv -  Get mycoplasma IGM  Agree w LP   Disc w wife and Dr Maldonado    Infection Control Recommendations   [place] Precautions      Antimicrobial Stewardship Recommendations   Simplification of therapy  Targeted therapy    History of Present Illness:   Initial history:  Brennen Mcclure is a 59 y.o.-year-old male presents with altered mentation and fever of 103 on 5/8 while he was at the infusion center getting his IVIG    Admitted to the hospital with a concern of sepsis, blood cultures 5/8 negative and urine culture 5/8 negative.  During the workup, CT scan of the brain 5/8 shows a prior craniotomy on the right, with external herniation of the right cerebral hemisphere.  But a repeat CT scan from 5/8 is not suggestive of a mild enlargement and hyperintense enhancement of the left parotid concerning for parotitis  Hence infectious  consulted    Patient has a history of chronic thrombocytopenia felt to be immune mediated-received IVIG January 2024 and treated with Decadron but did not respond, bone marrow biopsy was negative for any acute abnormality, so he was planned on IVIG weekly for 5 doses at the infusion center starting 4/17/2024  Was also started on Promacta since 4/14/2024    Dural sinus thrombosis with history of DVT 2019 on Eliquis   Hemorrhagic stroke December 2023        Interval changes  5/11/2024   Patient Vitals for the past 8 hrs:   BP Temp Temp src Pulse Resp SpO2   05/11/24 1242 111/84 98.4 °F (36.9 °C) Oral 83 20 99 %   05/11/24 0752 118/77 98 °F (36.7 °C) Oral 69 20 96 %       Summary of relevant labs:  Labs:  WBC 5-6 0.4  Platelets 41-65  Creat 0.9    Micro:  Blood cultures -5/8 negative  Urine culture 5/8 negative    Procedures      Cardiology      Imaging:      I have personally reviewed the past medical history, past surgical history, medications, social history, and family history, and I haveupdated the database accordingly.      Allergies:   Patient has no known allergies.     Review of Systems:     Review of Systems   Constitutional:  Positive for activity change.   HENT:  Negative for congestion.    Eyes:  Negative for discharge.   Respiratory:  Negative for apnea and choking.    Cardiovascular:  Negative for chest pain.   Gastrointestinal:  Negative for abdominal distention.   Endocrine: Negative for cold intolerance.   Genitourinary:  Negative for dysuria.   Musculoskeletal:  Negative for arthralgias.   Skin:  Negative for color change.   Allergic/Immunologic: Negative for immunocompromised state.   Neurological:  Positive for weakness (left side). Negative for dizziness.   Hematological:  Negative for adenopathy.   Psychiatric/Behavioral:  Positive for confusion. Negative for agitation.        Physical Examination :       Physical Exam  Constitutional:       General: He is not in acute distress.     Appearance:  Transportation (Non-Medical): No   Physical Activity: Not on file   Stress: Not on file   Social Connections: Not on file   Intimate Partner Violence: Not on file   Housing Stability: Low Risk  (5/9/2024)    Housing Stability Vital Sign     Unable to Pay for Housing in the Last Year: No     Number of Places Lived in the Last Year: 1     Unstable Housing in the Last Year: No       Family History:     Family History   Problem Relation Age of Onset    Coronary Art Dis Mother     Stroke Other       Medical Decision Making:   I have independently reviewed/ordered the following labs:    CBC with Differential:   Recent Labs     05/08/24 2023   WBC 6.4   HGB 12.0*   HCT 37.3*   PLT 65*   LYMPHOPCT 5*   MONOPCT 6   EOSPCT 0*     BMP:  Recent Labs     05/08/24 2023   *   K 4.0   CL 98   CO2 20   BUN 21   CREATININE 0.9     Hepatic Function Panel:   Recent Labs     05/08/24 2023   BILITOT 0.6   ALKPHOS 98   ALT 28   AST 29     No results for input(s): \"RPR\" in the last 72 hours.  No results for input(s): \"HIV\" in the last 72 hours.  No results for input(s): \"BC\" in the last 72 hours.  Lab Results   Component Value Date/Time    CREATININE 0.9 05/08/2024 08:23 PM    GLUCOSE 106 05/08/2024 08:23 PM       Detailed results:        Thank you for allowing us to participate in the care of this patient.Please call with questions.    This note is created with the assistance of a speech recognition program.  While intending to generate adocument that actually reflects the content of the visit, the document can still have some errors including those of syntax and sound a like substitutions which may escape proof reading.  It such instances, actual meaningcan be extrapolated by contextual diversion.    Xenia Blackman MD  Office: [phone]  Perfect serve / office [phone]

## 2024-05-11 NOTE — PROGRESS NOTES
[facility] Neurology   IN-PATIENT SERVICE   [facility]    Progress Note             Date:   5/10/2024  Patient name:  Brennen Mcclure  Date of admission:  5/8/2024  7:42 PM  MRN:   [id number]  Account:  [id number]  YOB: 1965  PCP:    Blanca Ren MD  Room:   [address] [place] [address]  Code Status:    DNR-CCA    Chief Complaint:     Chief Complaint   Patient presents with    Fever    Altered Mental Status    Fall       Interval hx:     The patient was seen and examined at bedside. Is vitally stable, alert and oriented x 3. No acute events overnight.    Obtain MRI brain without IV contrast.    Status post right frontal parietal temporal craniectomy.    Residual vasogenic edema, scattered encephalomalacia and scattered chronic blood products in the subjacent right frontal parietal temporal lobes.  Mass effect in the right cerebral hemisphere. No midline shift.    2.2 x 2.7 cm fluid collection with associated restricted diffusion and  peripheral susceptibility artifacts in the right frontal lobe, likely related  to surgical cavity with subacute blood products.    Additional 5 x 11 mm fluid collection with associated restricted diffusion in  the anterior right frontal lobe along the anterior margin of the craniectomy,  likely related to subacute parenchymal hematoma    Will plan for CTV and EEG given spouse notice continues altered mentation and brain fogginess       Brief History of Present Illness:     The patient is a 59 y.o. male with significant past medical history of CVST complicated with ICH after starting anticoagulation s/p right hemicraniectomy in November 2023 in [place] with residual dense left-sided hemiplegia, chronic idiopathic thrombocytopenic purpura, migraines, seizures presenting to [facility] for fever, generalized weakness and altered mentation.      Patient was recently discharged on 4/14/2024 after being admitted for dysphagia.  He also was having intermittent episodes of  for ITP and thrombocytopenia.  Provider needs to be contacted in the morning.  Continue Elavil 75 mg nightly for migraines.  Continue Lipitor 80 mg.  Continue Celexa 10 mg.  Continue gabapentin 300 mg 3 times daily.  Continue Vimpat 100 mg twice daily.  Continue topiramate 50 mg twice daily.  Continue Flomax daily.  Continue melatonin 10 mg nightly for sleep.  Lovenox 40 mg subcutaneous daily for DVT prophylaxis.  Patient is no longer on anticoagulation.  (Previously on Eliquis)  SBP goal 120-140  Monitor for fevers.    Follow-up further recommendations after discussing case with the attending.  The plan was discussed with the patient, patient's family and the medical staff.   Consultations:   IP CONSULT TO NEUROLOGY  IP CONSULT TO HEM/ONC    Patient is admitted as inpatient status because of co-morbidities listed above, severity of signs and symptoms as outlined, requirement for current medical therapies and most importantly because of direct risk to patient if care not provided in a hospital setting.    Leonora Brown MD  Neurology Resident PGY-3   5/10/2024  9:04 PM    Copy sent to Blanca Tony MD

## 2024-05-11 NOTE — CARE COORDINATION
Transitional planning      5/11 Plan is home with spouse who is caregiver, patient has hospital bed, w/c, w/c ramp, nebulizer, and TAVARES stedy at home.

## 2024-05-11 NOTE — PROGRESS NOTES
Today's Date: 5/10/2024  Patient Name: Brennen Mcclure  Date of admission: 5/8/2024  7:42 PM  Patient's age: 59 y.o., 1965  Admission Dx: Altered mental status [R41.82]  Acute cystitis with hematuria [N30.01]    Reason for Consult: Thrombocytopenia, ITP  Requesting Physician: Anthony Maldonado MD    Chief Complaint: Fever, altered mental status, fall  SUBJECTIVE:  .  Seen and examined.  No new events.  Clinically stable.  No fever.  Mental status improving.    BRIEF CASE HISTORY:    The patient is a 59 y.o. male who is admitted to the hospital for management of altered mental status.    He is a known patient of Dr. Chang and his hematology history is as follows:    Chronic thrombocytopenia, patient had a evaluation in [place] and also in the past and thought to be immune mediated  History of dural sinus thrombosis and history of DVT in 2019  Chronic anticoagulation with Eliquis  Had a hemorrhagic stroke in December 2023  Patient received platelet transfusion during the hospitalization and also was given IVIG on 1/9/2024 and 1/10/2024  Patient was treated with Decadron 40 mg on 2/18 - 2/20 1-24, with no response  Bone marrow biopsy negative for acute bone marrow pathology  Plan for IVIG weekly for 5 doses and also prescription for Promacta   Patient started taking Promacta on 4/14/2024  First IVIG given on 4/17/2024    He presented to the office yesterday for planned IVIG infusion.  Per wife, he had been having low-grade fever with slight confusion and cloudy urine.  Prescription given for Cipro.  Post-infusion he had a fever of 103 F, was diaphoretic, and had altered mental status.    Past Medical History:   has a past medical history of Anemia, CAD (coronary artery disease), Chronic deep vein thrombosis (DVT) of both lower extremities (HCC), Chronic deep vein thrombosis (DVT) of femoral vein of right lower extremity (HCC), Chronic deep vein thrombosis (DVT) of proximal vein of right lower extremity (HCC),

## 2024-05-12 ENCOUNTER — APPOINTMENT (OUTPATIENT)
Dept: MRI IMAGING | Age: 59
DRG: 115 | End: 2024-05-12
Payer: MEDICAID

## 2024-05-12 ENCOUNTER — APPOINTMENT (OUTPATIENT)
Dept: INTERVENTIONAL RADIOLOGY/VASCULAR | Age: 59
DRG: 115 | End: 2024-05-12
Payer: MEDICAID

## 2024-05-12 LAB
APPEARANCE CSF: CLEAR
C GATTII+NEOFOR DNA CSF QL NAA+NON-PROBE: NOT DETECTED
CMV DNA CSF QL NAA+NON-PROBE: NOT DETECTED
CRP SERPL HS-MCNC: 33.1 MG/L (ref 0–5)
E COLI K1 DNA CSF QL NAA+NON-PROBE: NOT DETECTED
EKG ATRIAL RATE: 93 BPM
EKG P AXIS: 65 DEGREES
EKG P-R INTERVAL: 172 MS
EKG Q-T INTERVAL: 400 MS
EKG QRS DURATION: 136 MS
EKG QTC CALCULATION (BAZETT): 497 MS
EKG R AXIS: -47 DEGREES
EKG T AXIS: 59 DEGREES
EKG VENTRICULAR RATE: 93 BPM
EV RNA CSF QL NAA+NON-PROBE: NOT DETECTED
GLUCOSE CSF-MCNC: 52 MG/DL (ref 40–70)
GP B STREP DNA CSF QL NAA+NON-PROBE: NOT DETECTED
HAEM INFLU DNA CSF QL NAA+NON-PROBE: NOT DETECTED
HHV6 DNA CSF QL NAA+NON-PROBE: NOT DETECTED
HSV1 DNA CSF QL NAA+NON-PROBE: NOT DETECTED
HSV2 DNA CSF QL NAA+NON-PROBE: NOT DETECTED
L MONOCYTOG DNA CSF QL NAA+NON-PROBE: NOT DETECTED
MICROORGANISM SPEC CULT: NORMAL
N MEN DNA CSF QL NAA+NON-PROBE: NOT DETECTED
NUC CELL # FLD MANUAL: 3 CELLS/UL
PARECHOVIRUS A RNA CSF QL NAA+NON-PROBE: NOT DETECTED
PROT CSF-MCNC: 46.7 MG/DL (ref 15–45)
RBC # FLD MANUAL: 13 CELLS/UL
S PNEUM DNA CSF QL NAA+NON-PROBE: NOT DETECTED
SPECIMEN DESCRIPTION: NORMAL
SPECIMEN DESCRIPTION: NORMAL
SPECIMEN VOL CSF: ABNORMAL ML
TROPONIN I SERPL HS-MCNC: 9 NG/L (ref 0–22)
TUBE # CSF: 3
VZV DNA CSF QL NAA+NON-PROBE: NOT DETECTED
XANTHOCHROMIA CSF QL: ABNORMAL

## 2024-05-12 PROCEDURE — 86618 LYME DISEASE ANTIBODY: CPT

## 2024-05-12 PROCEDURE — 2060000000 HC ICU INTERMEDIATE R&B

## 2024-05-12 PROCEDURE — 62328 DX LMBR SPI PNXR W/FLUOR/CT: CPT

## 2024-05-12 PROCEDURE — 82784 ASSAY IGA/IGD/IGG/IGM EACH: CPT

## 2024-05-12 PROCEDURE — 86140 C-REACTIVE PROTEIN: CPT

## 2024-05-12 PROCEDURE — 93005 ELECTROCARDIOGRAM TRACING: CPT

## 2024-05-12 PROCEDURE — 99232 SBSQ HOSP IP/OBS MODERATE 35: CPT | Performed by: PSYCHIATRY & NEUROLOGY

## 2024-05-12 PROCEDURE — 70544 MR ANGIOGRAPHY HEAD W/O DYE: CPT

## 2024-05-12 PROCEDURE — 88108 CYTOPATH CONCENTRATE TECH: CPT

## 2024-05-12 PROCEDURE — 87327 CRYPTOCOCCUS NEOFORM AG IA: CPT

## 2024-05-12 PROCEDURE — 87483 CNS DNA AMP PROBE TYPE 12-25: CPT

## 2024-05-12 PROCEDURE — 82040 ASSAY OF SERUM ALBUMIN: CPT

## 2024-05-12 PROCEDURE — 82945 GLUCOSE OTHER FLUID: CPT

## 2024-05-12 PROCEDURE — 36415 COLL VENOUS BLD VENIPUNCTURE: CPT

## 2024-05-12 PROCEDURE — 84484 ASSAY OF TROPONIN QUANT: CPT

## 2024-05-12 PROCEDURE — 6370000000 HC RX 637 (ALT 250 FOR IP): Performed by: PSYCHIATRY & NEUROLOGY

## 2024-05-12 PROCEDURE — 87205 SMEAR GRAM STAIN: CPT

## 2024-05-12 PROCEDURE — 6360000002 HC RX W HCPCS: Performed by: INTERNAL MEDICINE

## 2024-05-12 PROCEDURE — 82042 OTHER SOURCE ALBUMIN QUAN EA: CPT

## 2024-05-12 PROCEDURE — 009U3ZX DRAINAGE OF SPINAL CANAL, PERCUTANEOUS APPROACH, DIAGNOSTIC: ICD-10-PCS | Performed by: STUDENT IN AN ORGANIZED HEALTH CARE EDUCATION/TRAINING PROGRAM

## 2024-05-12 PROCEDURE — 6360000002 HC RX W HCPCS

## 2024-05-12 PROCEDURE — 87070 CULTURE OTHR SPECIMN AEROBIC: CPT

## 2024-05-12 PROCEDURE — 6370000000 HC RX 637 (ALT 250 FOR IP)

## 2024-05-12 PROCEDURE — 84157 ASSAY OF PROTEIN OTHER: CPT

## 2024-05-12 PROCEDURE — 99232 SBSQ HOSP IP/OBS MODERATE 35: CPT | Performed by: INTERNAL MEDICINE

## 2024-05-12 PROCEDURE — 86788 WEST NILE VIRUS AB IGM: CPT

## 2024-05-12 PROCEDURE — 6360000002 HC RX W HCPCS: Performed by: STUDENT IN AN ORGANIZED HEALTH CARE EDUCATION/TRAINING PROGRAM

## 2024-05-12 PROCEDURE — 89050 BODY FLUID CELL COUNT: CPT

## 2024-05-12 PROCEDURE — 86592 SYPHILIS TEST NON-TREP QUAL: CPT

## 2024-05-12 PROCEDURE — 2580000003 HC RX 258: Performed by: INTERNAL MEDICINE

## 2024-05-12 PROCEDURE — 86789 WEST NILE VIRUS ANTIBODY: CPT

## 2024-05-12 PROCEDURE — 2580000003 HC RX 258

## 2024-05-12 PROCEDURE — 83916 OLIGOCLONAL BANDS: CPT

## 2024-05-12 RX ORDER — LORAZEPAM 2 MG/ML
1 INJECTION INTRAMUSCULAR ONCE
Status: COMPLETED | OUTPATIENT
Start: 2024-05-12 | End: 2024-05-12

## 2024-05-12 RX ORDER — FENTANYL CITRATE 50 UG/ML
25 INJECTION, SOLUTION INTRAMUSCULAR; INTRAVENOUS ONCE
Status: COMPLETED | OUTPATIENT
Start: 2024-05-12 | End: 2024-05-12

## 2024-05-12 RX ORDER — LORAZEPAM 1 MG/1
1 TABLET ORAL ONCE
Status: DISCONTINUED | OUTPATIENT
Start: 2024-05-12 | End: 2024-05-12

## 2024-05-12 RX ORDER — LORAZEPAM 0.5 MG/1
0.5 TABLET ORAL
Status: DISCONTINUED | OUTPATIENT
Start: 2024-05-12 | End: 2024-05-12

## 2024-05-12 RX ADMIN — SODIUM CHLORIDE, PRESERVATIVE FREE 10 ML: 5 INJECTION INTRAVENOUS at 21:33

## 2024-05-12 RX ADMIN — Medication 1 MG: at 16:03

## 2024-05-12 RX ADMIN — TOPIRAMATE 50 MG: 50 TABLET, FILM COATED ORAL at 09:33

## 2024-05-12 RX ADMIN — DICLOFENAC SODIUM 2 G: 10 GEL TOPICAL at 21:33

## 2024-05-12 RX ADMIN — GABAPENTIN 300 MG: 300 CAPSULE ORAL at 13:56

## 2024-05-12 RX ADMIN — ATORVASTATIN CALCIUM 80 MG: 80 TABLET, FILM COATED ORAL at 21:32

## 2024-05-12 RX ADMIN — AMPICILLIN SODIUM 2000 MG: 2 INJECTION, POWDER, FOR SOLUTION INTRAMUSCULAR; INTRAVENOUS at 10:27

## 2024-05-12 RX ADMIN — Medication 2000 MG: at 17:11

## 2024-05-12 RX ADMIN — AMPICILLIN SODIUM 2000 MG: 2 INJECTION, POWDER, FOR SOLUTION INTRAMUSCULAR; INTRAVENOUS at 00:16

## 2024-05-12 RX ADMIN — LACOSAMIDE 100 MG: 100 TABLET, FILM COATED ORAL at 21:32

## 2024-05-12 RX ADMIN — Medication 2000 MG: at 06:06

## 2024-05-12 RX ADMIN — GABAPENTIN 300 MG: 300 CAPSULE ORAL at 21:32

## 2024-05-12 RX ADMIN — ONDANSETRON 4 MG: 2 INJECTION INTRAMUSCULAR; INTRAVENOUS at 17:18

## 2024-05-12 RX ADMIN — ACYCLOVIR SODIUM 750 MG: 1000 INJECTION, SOLUTION INTRAVENOUS at 01:50

## 2024-05-12 RX ADMIN — FENTANYL CITRATE 25 MCG: 50 INJECTION, SOLUTION INTRAMUSCULAR; INTRAVENOUS at 18:24

## 2024-05-12 RX ADMIN — TAMSULOSIN HYDROCHLORIDE 0.4 MG: 0.4 CAPSULE ORAL at 09:33

## 2024-05-12 RX ADMIN — AMITRIPTYLINE HYDROCHLORIDE 75 MG: 50 TABLET, FILM COATED ORAL at 21:33

## 2024-05-12 RX ADMIN — BACLOFEN 5 MG: 5 TABLET ORAL at 09:33

## 2024-05-12 RX ADMIN — LACOSAMIDE 100 MG: 100 TABLET, FILM COATED ORAL at 09:33

## 2024-05-12 RX ADMIN — GABAPENTIN 300 MG: 300 CAPSULE ORAL at 09:33

## 2024-05-12 RX ADMIN — ACETAMINOPHEN 1000 MG: 500 TABLET ORAL at 13:56

## 2024-05-12 RX ADMIN — ACYCLOVIR SODIUM 750 MG: 1000 INJECTION, SOLUTION INTRAVENOUS at 18:14

## 2024-05-12 RX ADMIN — ACETAMINOPHEN 1000 MG: 500 TABLET ORAL at 09:33

## 2024-05-12 RX ADMIN — ACYCLOVIR SODIUM 750 MG: 1000 INJECTION, SOLUTION INTRAVENOUS at 11:26

## 2024-05-12 RX ADMIN — ACETAMINOPHEN 1000 MG: 500 TABLET ORAL at 21:32

## 2024-05-12 RX ADMIN — TOPIRAMATE 50 MG: 50 TABLET, FILM COATED ORAL at 21:32

## 2024-05-12 RX ADMIN — AMPICILLIN SODIUM 2000 MG: 2 INJECTION, POWDER, FOR SOLUTION INTRAMUSCULAR; INTRAVENOUS at 17:13

## 2024-05-12 RX ADMIN — AMPICILLIN SODIUM 2000 MG: 2 INJECTION, POWDER, FOR SOLUTION INTRAMUSCULAR; INTRAVENOUS at 03:40

## 2024-05-12 RX ADMIN — Medication 10 MG: at 21:32

## 2024-05-12 RX ADMIN — Medication 1 MG: at 11:56

## 2024-05-12 RX ADMIN — BACLOFEN 5 MG: 5 TABLET ORAL at 21:33

## 2024-05-12 RX ADMIN — AMPICILLIN SODIUM 2000 MG: 2 INJECTION, POWDER, FOR SOLUTION INTRAMUSCULAR; INTRAVENOUS at 13:21

## 2024-05-12 RX ADMIN — CITALOPRAM HYDROBROMIDE 10 MG: 10 TABLET ORAL at 09:33

## 2024-05-12 ASSESSMENT — PAIN - FUNCTIONAL ASSESSMENT: PAIN_FUNCTIONAL_ASSESSMENT: ACTIVITIES ARE NOT PREVENTED

## 2024-05-12 ASSESSMENT — PAIN SCALES - GENERAL
PAINLEVEL_OUTOF10: 7
PAINLEVEL_OUTOF10: 0
PAINLEVEL_OUTOF10: 10
PAINLEVEL_OUTOF10: 0

## 2024-05-12 ASSESSMENT — PAIN DESCRIPTION - LOCATION
LOCATION: SHOULDER
LOCATION: BACK

## 2024-05-12 ASSESSMENT — PAIN DESCRIPTION - ORIENTATION: ORIENTATION: LEFT

## 2024-05-12 NOTE — CARE COORDINATION
Transitional planning      Writer to room to discuss d/c plan with spouse, plan is home , no needs, has all needed DME.

## 2024-05-12 NOTE — PROGRESS NOTES
Today's Date: 5/11/2024  Patient Name: Brennen Mcclure  Date of admission: 5/8/2024  7:42 PM  Patient's age: 59 y.o., 1965  Admission Dx: Altered mental status [R41.82]  Acute cystitis with hematuria [N30.01]    Reason for Consult: Thrombocytopenia, ITP  Requesting Physician: Anthony Maldonado MD    Chief Complaint: Fever, altered mental status, fall  SUBJECTIVE:  Patient Seen and examined.   Mild headache  No NV  No new events    BRIEF CASE HISTORY:    The patient is a 59 y.o. male who is admitted to the hospital for management of altered mental status.    He is a known patient of Dr. Chang and his hematology history is as follows:    Chronic thrombocytopenia, patient had a evaluation in [place] and also in the past and thought to be immune mediated  History of dural sinus thrombosis and history of DVT in 2019  Chronic anticoagulation with Eliquis  Had a hemorrhagic stroke in December 2023  Patient received platelet transfusion during the hospitalization and also was given IVIG on 1/9/2024 and 1/10/2024  Patient was treated with Decadron 40 mg on 2/18 - 2/20 1-24, with no response  Bone marrow biopsy negative for acute bone marrow pathology  Plan for IVIG weekly for 5 doses and also prescription for Promacta   Patient started taking Promacta on 4/14/2024  First IVIG given on 4/17/2024    He presented to the office yesterday for planned IVIG infusion.  Per wife, he had been having low-grade fever with slight confusion and cloudy urine.  Prescription given for Cipro.  Post-infusion he had a fever of 103 F, was diaphoretic, and had altered mental status.    Past Medical History:   has a past medical history of Anemia, CAD (coronary artery disease), Chronic deep vein thrombosis (DVT) of both lower extremities (HCC), Chronic deep vein thrombosis (DVT) of femoral vein of right lower extremity (HCC), Chronic deep vein thrombosis (DVT) of proximal vein of right lower extremity (HCC), Chronic deep vein thrombosis  (DVT) of right iliac vein (HCC), Chronic idiopathic thrombocytopenia (HCC), Chronic idiopathic thrombocytopenic purpura (HCC), CVA (cerebral vascular accident) (HCC), Emphysema lung (HCC), Hemosiderin pigmentation of skin, HLD (hyperlipidemia), Intracranial hemorrhage (HCC), Left hemiplegia (HCC), Right leg swelling, and Thrombocytopenia (HCC).    Past Surgical History:   has a past surgical history that includes Coronary artery bypass graft; Coronary stent placement; craniotomy (Right); and CT BIOPSY BONE MARROW (3/18/2024).     Medications:    Prior to Admission medications    Medication Sig Start Date End Date Taking? Authorizing Provider   eltrombopag olamine (PROMACTA) 50 MG TABS tablet Take 1 tablet by mouth daily   Yes ProviderZeenat MD   ciprofloxacin (CIPRO) 500 MG tablet Take 1 tablet by mouth 2 times daily for 7 days 5/8/24 5/15/24  Lam Chang MD   methylPREDNISolone (MEDROL DOSEPACK) 4 MG tablet Take by mouth. 5/6/24 5/12/24  Danica De La Paz MD   lidocaine (LIDODERM) 5 % Place 2 patches onto the skin daily Change daily remove after 12 hours 5/2/24   Danica De La Paz MD   ferrous sulfate (IRON 325) 325 (65 Fe) MG tablet Take 1 tablet by mouth every other day 1/26/24   ProviderZeenat MD   amitriptyline (ELAVIL) 75 MG tablet Take 1 tablet by mouth nightly 4/30/24   Danica De La Paz MD   acetaminophen (TYLENOL) 500 MG tablet Take 2 tablets by mouth in the morning, at noon, and at bedtime 4/30/24   Danica De La Paz MD   gabapentin (NEURONTIN) 300 MG capsule Take 1 capsule by mouth 3 times daily for 90 days. 4/30/24 7/29/24  Danica De La Paz MD   butalbital-APAP-caffeine -40 MG CAPS per capsule Take 1 capsule by mouth 2 times daily as needed for Headaches 4/14/24   Magalis Humphrey MD   topiramate (TOPAMAX) 50 MG tablet Take 1 tablet by mouth 2 times daily 4/1/24   Blanca Ren MD   Multiple Vitamins-Minerals (THERAPEUTIC MULTIVITAMIN-MINERALS) tablet Take 1 tablet by  05/11/24 2110    amitriptyline (ELAVIL) tablet 75 mg  75 mg Oral Nightly Ady, Reji, DO   75 mg at 05/11/24 2158    atorvastatin (LIPITOR) tablet 80 mg  80 mg Oral Nightly Ady, Reji, DO   80 mg at 05/11/24 2110    Baclofen (LIORESAL) tablet 5 mg  5 mg Oral BID Ady, Reji, DO   5 mg at 05/11/24 2158    butalbital-acetaminophen-caffeine (FIORICET, ESGIC) per tablet 1 tablet  1 tablet Oral BID PRN Ady, Reji, DO   1 tablet at 05/10/24 1104    citalopram (CELEXA) tablet 10 mg  10 mg Oral Daily Ady, Reji, DO   10 mg at 05/11/24 0921    diclofenac sodium (VOLTAREN) 1 % gel 2 g  2 g Topical BID Ady, Reji, DO   2 g at 05/11/24 2111    gabapentin (NEURONTIN) capsule 300 mg  300 mg Oral TID Ady, Reji, DO   300 mg at 05/11/24 2110    ipratropium 0.5 mg-albuterol 2.5 mg (DUONEB) nebulizer solution 1 Dose  1 Dose Inhalation Q6H PRN Ady, Reji, DO        lacosamide (VIMPAT) tablet 100 mg  100 mg Oral BID Ady, Reji, DO   100 mg at 05/11/24 2110    tamsulosin (FLOMAX) capsule 0.4 mg  0.4 mg Oral Daily Ady, Reji, DO   0.4 mg at 05/11/24 0922    topiramate (TOPAMAX) tablet 50 mg  50 mg Oral BID Ady, Reji, DO   50 mg at 05/11/24 2159    sodium chloride flush 0.9 % injection 5-40 mL  5-40 mL IntraVENous 2 times per day Ady, Reji, DO   10 mL at 05/11/24 2112    sodium chloride flush 0.9 % injection 5-40 mL  5-40 mL IntraVENous PRN Ady, Reji, DO        0.9 % sodium chloride infusion   IntraVENous PRN Ady, Reji, DO        potassium chloride (KLOR-CON M) extended release tablet 40 mEq  40 mEq Oral PRN Ady, Reji, DO        Or    potassium bicarb-citric acid (EFFER-K) effervescent tablet 40 mEq  40 mEq Oral PRN Ady, Reji, DO        Or    potassium chloride 10 mEq/100 mL IVPB (Peripheral Line)  10 mEq IntraVENous PRN Ady, Reji, DO        magnesium sulfate 2000 mg in 50 mL IVPB premix  2,000 mg IntraVENous PRN Ady, Reji, DO        LORazepam (ATIVAN) injection 4 mg  4 mg IntraVENous Q5 Min PRN

## 2024-05-12 NOTE — PROGRESS NOTES
Today's Date: 5/12/2024  Patient Name: Brennen Mcclure  Date of admission: 5/8/2024  7:42 PM  Patient's age: 59 y.o., 1965  Admission Dx: Altered mental status [R41.82]  Acute cystitis with hematuria [N30.01]    Reason for Consult: Thrombocytopenia, ITP  Requesting Physician: Anthony Maldonado MD    Chief Complaint: Fever, altered mental status, fall    SUBJECTIVE:  Patient Seen and examined.   No new events  No labs today  Denies any nausea vomiting    BRIEF CASE HISTORY:    The patient is a 59 y.o. male who is admitted to the hospital for management of altered mental status.    He is a known patient of Dr. Chang and his hematology history is as follows:    Chronic thrombocytopenia, patient had a evaluation in [place] and also in the past and thought to be immune mediated  History of dural sinus thrombosis and history of DVT in 2019  Chronic anticoagulation with Eliquis  Had a hemorrhagic stroke in December 2023  Patient received platelet transfusion during the hospitalization and also was given IVIG on 1/9/2024 and 1/10/2024  Patient was treated with Decadron 40 mg on 2/18 - 2/20 1-24, with no response  Bone marrow biopsy negative for acute bone marrow pathology  Plan for IVIG weekly for 5 doses and also prescription for Promacta   Patient started taking Promacta on 4/14/2024  First IVIG given on 4/17/2024    He presented to the office yesterday for planned IVIG infusion.  Per wife, he had been having low-grade fever with slight confusion and cloudy urine.  Prescription given for Cipro.  Post-infusion he had a fever of 103 F, was diaphoretic, and had altered mental status.    Past Medical History:   has a past medical history of Anemia, CAD (coronary artery disease), Chronic deep vein thrombosis (DVT) of both lower extremities (HCC), Chronic deep vein thrombosis (DVT) of femoral vein of right lower extremity (HCC), Chronic deep vein thrombosis (DVT) of proximal vein of right lower extremity (HCC), Chronic  calcification of the vasculature.  Mild stool burden.  Surgical clip adjacent to the left greater trochanter.  Mild stool burden.     Left shoulder: 1. Diffuse osteopenia.  Mild degenerative change of the left AC joint. Inferior subluxation of the left humeral head in relation to the glenoid.  No dislocation. 2. No acute fracture. 3. Prior median sternotomy and CABG.  Coronary artery disease. Left hip: No acute fracture or dislocation.     XR HIP LEFT (2-3 VIEWS)    Result Date: 4/24/2024  EXAMINATION: 3 XRAY VIEWS OF THE LEFT SHOULDER; TWO XRAY VIEWS OF THE LEFT HIP 4/23/2024 3:42 pm COMPARISON: None. HISTORY: ORDERING SYSTEM PROVIDED HISTORY: 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 (HCC) TECHNOLOGIST PROVIDED HISTORY: Pain with shoulder extension, subluxation known secondary to CVA Reason for Exam: pt stated left shoulder pain , hemiplga stroke 59-year-old male with left shoulder pain and left hip pain FINDINGS: Left shoulder: Inferior subluxation of the left humeral head in relation to the glenoid. Visualized left-sided ribs appear intact.  Mild degenerative change of the left AC joint.  No acute fracture or dislocation.  Coronary artery disease. Prior median sternotomy and CABG.  Diffuse osteopenia. Left hip: Left femoral head projects over the left acetabulum without clear evidence for acute fracture, dislocation or fine flattening.  Atherosclerotic calcification of the vasculature.  Mild stool burden.  Surgical clip adjacent to the left greater trochanter.  Mild stool burden.     Left shoulder: 1. Diffuse osteopenia.  Mild degenerative change of the left AC joint. Inferior subluxation of the left humeral head in relation to the glenoid.  No dislocation. 2. No acute fracture. 3. Prior median sternotomy and CABG.  Coronary artery disease. Left hip: No acute fracture or dislocation.     MRI LIMITED BRAIN    Result Date: 4/13/2024  EXAMINATION: MRI OF THE BRAIN WITHOUT CONTRAST  4/12/2024 9:20

## 2024-05-12 NOTE — PROGRESS NOTES
Spoke with Mckenzie VEGA covering for pts bedside nurse Myron.  Informed pt needs a yellow top tube of blood drawn from lab, order is in for banding  She will inform Myron     13-Jul-2020 22:46

## 2024-05-12 NOTE — PROGRESS NOTES
Pt arrives to room for LP   Placed prone on table  Yelling, crying inaudible, garbled speech  Unable to express what is needed  Dr Rosas to bedside  Site prepped and draped  Access obtained and draining clear CSF  Did not tolerated   Access removed and site covered with band aid  Specimen to lab  7mls removed

## 2024-05-13 PROBLEM — F32.A DEPRESSION WITH SUICIDAL IDEATION: Status: ACTIVE | Noted: 2024-05-13

## 2024-05-13 PROBLEM — R45.851 DEPRESSION WITH SUICIDAL IDEATION: Status: ACTIVE | Noted: 2024-05-13

## 2024-05-13 LAB
ALBUMIN CSF-MCNC: 225 MG/L (ref 70–350)
ALBUMIN INDEX: 66.2
ALBUMIN SERPL-MCNC: 3.4 G/DL (ref 3.5–5.2)
CASE NUMBER:: NORMAL
CRYPTOC AG CSF QL: NEGATIVE
EKG ATRIAL RATE: 90 BPM
EKG P AXIS: 68 DEGREES
EKG P-R INTERVAL: 186 MS
EKG Q-T INTERVAL: 404 MS
EKG QRS DURATION: 140 MS
EKG QTC CALCULATION (BAZETT): 494 MS
EKG R AXIS: -50 DEGREES
EKG T AXIS: 70 DEGREES
EKG VENTRICULAR RATE: 90 BPM
IGG CSF-MCNC: 0.7 MG/DL (ref 0.5–7)
IGG INDEX CSF: 0.04
IGG SERPL-MCNC: 2777 MG/DL (ref 700–1600)
IGG SYNTHESIS RATE CSF: < 3.3 MG/24 H
M PNEUMO IGM SER QL IA: 0.62
MICROORGANISM SPEC CULT: NORMAL
MICROORGANISM SPEC CULT: NORMAL
OLIGOCLONAL BANDS: ABNORMAL
SERVICE CMNT-IMP: NORMAL
SERVICE CMNT-IMP: NORMAL
SPECIMEN DESCRIPTION: NORMAL
SURGICAL PATHOLOGY REPORT: NORMAL

## 2024-05-13 PROCEDURE — 6360000002 HC RX W HCPCS

## 2024-05-13 PROCEDURE — 2060000000 HC ICU INTERMEDIATE R&B

## 2024-05-13 PROCEDURE — 6370000000 HC RX 637 (ALT 250 FOR IP)

## 2024-05-13 PROCEDURE — 99253 IP/OBS CNSLTJ NEW/EST LOW 45: CPT | Performed by: PSYCHIATRY & NEUROLOGY

## 2024-05-13 PROCEDURE — 99232 SBSQ HOSP IP/OBS MODERATE 35: CPT | Performed by: INTERNAL MEDICINE

## 2024-05-13 PROCEDURE — 6370000000 HC RX 637 (ALT 250 FOR IP): Performed by: PSYCHIATRY & NEUROLOGY

## 2024-05-13 PROCEDURE — 99232 SBSQ HOSP IP/OBS MODERATE 35: CPT | Performed by: PSYCHIATRY & NEUROLOGY

## 2024-05-13 PROCEDURE — 93010 ELECTROCARDIOGRAM REPORT: CPT | Performed by: INTERNAL MEDICINE

## 2024-05-13 PROCEDURE — 6360000002 HC RX W HCPCS: Performed by: INTERNAL MEDICINE

## 2024-05-13 PROCEDURE — 2580000003 HC RX 258

## 2024-05-13 RX ORDER — MAGNESIUM SULFATE 1 G/100ML
1000 INJECTION INTRAVENOUS ONCE
Status: COMPLETED | OUTPATIENT
Start: 2024-05-13 | End: 2024-05-13

## 2024-05-13 RX ORDER — TOPIRAMATE 50 MG/1
75 TABLET, FILM COATED ORAL 2 TIMES DAILY
Status: DISCONTINUED | OUTPATIENT
Start: 2024-05-13 | End: 2024-05-14 | Stop reason: HOSPADM

## 2024-05-13 RX ADMIN — TAMSULOSIN HYDROCHLORIDE 0.4 MG: 0.4 CAPSULE ORAL at 08:42

## 2024-05-13 RX ADMIN — ATORVASTATIN CALCIUM 80 MG: 80 TABLET, FILM COATED ORAL at 19:48

## 2024-05-13 RX ADMIN — AMITRIPTYLINE HYDROCHLORIDE 75 MG: 50 TABLET, FILM COATED ORAL at 19:47

## 2024-05-13 RX ADMIN — Medication 10 MG: at 19:47

## 2024-05-13 RX ADMIN — DICLOFENAC SODIUM 2 G: 10 GEL TOPICAL at 19:49

## 2024-05-13 RX ADMIN — GABAPENTIN 300 MG: 300 CAPSULE ORAL at 08:44

## 2024-05-13 RX ADMIN — LACOSAMIDE 100 MG: 100 TABLET, FILM COATED ORAL at 10:42

## 2024-05-13 RX ADMIN — Medication 1000 MG: at 17:29

## 2024-05-13 RX ADMIN — LACOSAMIDE 100 MG: 100 TABLET, FILM COATED ORAL at 19:49

## 2024-05-13 RX ADMIN — TOPIRAMATE 75 MG: 50 TABLET, FILM COATED ORAL at 19:47

## 2024-05-13 RX ADMIN — ACETAMINOPHEN 1000 MG: 500 TABLET ORAL at 08:42

## 2024-05-13 RX ADMIN — GABAPENTIN 300 MG: 300 CAPSULE ORAL at 14:25

## 2024-05-13 RX ADMIN — CITALOPRAM HYDROBROMIDE 10 MG: 10 TABLET ORAL at 10:42

## 2024-05-13 RX ADMIN — BACLOFEN 5 MG: 5 TABLET ORAL at 19:48

## 2024-05-13 RX ADMIN — TOPIRAMATE 50 MG: 50 TABLET, FILM COATED ORAL at 08:42

## 2024-05-13 RX ADMIN — MAGNESIUM SULFATE HEPTAHYDRATE 1000 MG: 1 INJECTION, SOLUTION INTRAVENOUS at 16:29

## 2024-05-13 RX ADMIN — GABAPENTIN 300 MG: 300 CAPSULE ORAL at 19:47

## 2024-05-13 RX ADMIN — ACETAMINOPHEN 1000 MG: 500 TABLET ORAL at 21:41

## 2024-05-13 RX ADMIN — BUTALBITAL, ACETAMINOPHEN, AND CAFFEINE 1 TABLET: 50; 325; 40 TABLET ORAL at 10:42

## 2024-05-13 RX ADMIN — ACETAMINOPHEN 1000 MG: 500 TABLET ORAL at 14:25

## 2024-05-13 RX ADMIN — DICLOFENAC SODIUM 2 G: 10 GEL TOPICAL at 08:43

## 2024-05-13 RX ADMIN — SODIUM CHLORIDE, PRESERVATIVE FREE 10 ML: 5 INJECTION INTRAVENOUS at 08:41

## 2024-05-13 RX ADMIN — SODIUM CHLORIDE, PRESERVATIVE FREE 10 ML: 5 INJECTION INTRAVENOUS at 19:49

## 2024-05-13 RX ADMIN — BACLOFEN 5 MG: 5 TABLET ORAL at 08:42

## 2024-05-13 ASSESSMENT — PAIN SCALES - GENERAL
PAINLEVEL_OUTOF10: 9
PAINLEVEL_OUTOF10: 0
PAINLEVEL_OUTOF10: 10
PAINLEVEL_OUTOF10: 0
PAINLEVEL_OUTOF10: 3

## 2024-05-13 ASSESSMENT — ENCOUNTER SYMPTOMS
CONSTIPATION: 0
DIARRHEA: 0
BACK PAIN: 0
ABDOMINAL PAIN: 0
COUGH: 0
ABDOMINAL DISTENTION: 0
COLOR CHANGE: 0
NAUSEA: 0

## 2024-05-13 ASSESSMENT — PAIN DESCRIPTION - LOCATION: LOCATION: KNEE;HEAD

## 2024-05-13 NOTE — PROGRESS NOTES
[facility] Neurology   IN-PATIENT SERVICE   [facility]    Progress Note             Date:   5/13/2024  Patient name:  Brennen Mcclure  Date of admission:  5/8/2024  7:42 PM  MRN:   [id number]  Account:  [id number]  YOB: 1965  PCP:    Blanca Ren MD  Room:   [address] [place] [address]  Code Status:    DNR-CCA    Chief Complaint:     Chief Complaint   Patient presents with    Fever    Altered Mental Status    Fall       Interval hx:     The patient was seen and examined at bedside. Is vitally stable, alert and oriented x 3. No acute events overnight.    Patient was tearful and feeling low   Has been feeling low for some time   Will Consult Psych and add seizure precautions         Brief History of Present Illness:     The patient is a 59 y.o. male with significant past medical history of CVST complicated with ICH after starting anticoagulation s/p right hemicraniectomy in November 2023 in [place] with residual dense left-sided hemiplegia, chronic idiopathic thrombocytopenic purpura, migraines, seizures presenting to [place] [facility] for fever, generalized weakness and altered mentation.      Patient was recently discharged on 4/14/2024 after being admitted for dysphagia.  He also was having intermittent episodes of confusion as per the wife at the time, and has a history of seizures for which he takes Vimpat.  He has a history of migraine headaches for which he takes Elavil and Topamax.  Patient was noted to have intermittent confusion during that admission, however improved with no evidence of clear seizure-like activity.  Patient was previously on Eliquis for DVT, however is not on anticoagulation or antiplatelets at this time.  He follows with hematology oncology for management of ITP.  He was recently started on IVIG to improve platelet counts to eventually undergo surgical repair of right hemicraniectomy.     Patient went for infusion today, on 5/8/2024, for treatment of ITP.  Postinfusion,  and  peripheral susceptibility artifacts in the right frontal lobe, likely related  to surgical cavity with subacute blood products.    Additional 5 x 11 mm fluid collection with associated restricted diffusion in  the anterior right frontal lobe along the anterior margin of the craniectomy,  likely related to subacute parenchymal hematoma    Will plan for CTV and EEG given spouse notice continues altered mentation and brain fogginess     5/11:  CT venogram   2. Stable moderate focal stenosis of the right vertebral artery origin.   3. New mild enlargement and hyperenhancement of the left parotid gland   consistent with parotitis.     EEG - Impression  Abnormal awake EEG. The slowing mentioned above suggests mild-moderate non specific encephalopathy.      Interhemispheric asymmetry suggest focal lesion on the right and consistent with breach rhythm.               Past Medical History:     Past Medical History:   Diagnosis Date    Anemia 02/12/2024    CAD (coronary artery disease)     Chronic deep vein thrombosis (DVT) of both lower extremities ([facility])     Chronic deep vein thrombosis (DVT) of femoral vein of right lower extremity ([facility]) 03/07/2024    Chronic deep vein thrombosis (DVT) of proximal vein of right lower extremity ([facility]) 02/22/2024    Chronic deep vein thrombosis (DVT) of right iliac vein ([facility]) 03/07/2024    Chronic idiopathic thrombocytopenia ([facility])     Chronic idiopathic thrombocytopenic purpura ([facility]) 02/18/2024    CVA (cerebral vascular accident) ([facility])     Emphysema lung ([facility])     Hemosiderin pigmentation of skin 03/07/2024    HLD (hyperlipidemia)     Intracranial hemorrhage ([facility]) 01/31/2024    Left hemiplegia ([facility])     Right leg swelling 03/07/2024    Thrombocytopenia ([facility]) 02/12/2024        Past Surgical History:     Past Surgical History:   Procedure Laterality Date    CORONARY ARTERY BYPASS GRAFT      x4    CORONARY STENT PLACEMENT      2 stents    CRANIOTOMY Right     RIGHT CRANIOTOMY, FOR HEMATOMA

## 2024-05-13 NOTE — PROGRESS NOTES
Today's Date: 5/13/2024  Patient Name: Brennen Mcclure  Date of admission: 5/8/2024  7:42 PM  Patient's age: 59 y.o., 1965  Admission Dx: Altered mental status [R41.82]  Acute cystitis with hematuria [N30.01]    Reason for Consult: Thrombocytopenia, ITP  Requesting Physician: Anthony Maldonado MD    Chief Complaint: Fever, altered mental status, fall    SUBJECTIVE:  Patient seen and examined.   No new events  No labs to review today.  Denies any nausea vomiting  LP completed 5/12/2024, follow-up on results  Suicide sitter in place    BRIEF CASE HISTORY:    The patient is a 59 y.o. male who is admitted to the hospital for management of altered mental status.    He is a known patient of Dr. Chang and his hematology history is as follows:    Chronic thrombocytopenia, patient had a evaluation in [place] and also in the past and thought to be immune mediated  History of dural sinus thrombosis and history of DVT in 2019  Chronic anticoagulation with Eliquis  Had a hemorrhagic stroke in December 2023  Patient received platelet transfusion during the hospitalization and also was given IVIG on 1/9/2024 and 1/10/2024  Patient was treated with Decadron 40 mg on 2/18 - 2/20 1-24, with no response  Bone marrow biopsy negative for acute bone marrow pathology  Plan for IVIG weekly for 5 doses and also prescription for Promacta   Patient started taking Promacta on 4/14/2024  First IVIG given on 4/17/2024    He presented to the office yesterday for planned IVIG infusion.  Per wife, he had been having low-grade fever with slight confusion and cloudy urine.  Prescription given for Cipro.  Post-infusion he had a fever of 103 F, was diaphoretic, and had altered mental status.    Past Medical History:   has a past medical history of Anemia, CAD (coronary artery disease), Chronic deep vein thrombosis (DVT) of both lower extremities (HCC), Chronic deep vein thrombosis (DVT) of femoral vein of right lower extremity (HCC), Chronic deep  daily 4/1/24   Blanca Ren MD   melatonin 3 MG TABS tablet Take 2 tablets by mouth at bedtime  Patient taking differently: Take 10 mg by mouth at bedtime 4/1/24   Blanca Ren MD   lacosamide (VIMPAT) 100 MG TABS tablet Take 1 tablet by mouth 2 times daily for 90 days. Max Daily Amount: 200 mg 4/1/24 6/30/24  Blanca Ren MD   ipratropium 0.5 mg-albuterol 2.5 mg (DUONEB) 0.5-2.5 (3) MG/3ML SOLN nebulizer solution Inhale 3 mLs into the lungs every 6 hours as needed for Shortness of Breath 4/1/24   Blanca Ren MD   citalopram (CELEXA) 10 MG tablet Take 1 tablet by mouth daily 4/1/24   Blanca Ren MD   Baclofen (LIORESAL) 5 MG tablet Take 1 tablet by mouth 2 times daily 4/1/24   Blanca Ren MD   atorvastatin (LIPITOR) 80 MG tablet Take 1 tablet by mouth nightly 4/1/24   Blanca Ren MD   tamsulosin (FLOMAX) 0.4 MG capsule Take 1 capsule by mouth daily 4/1/24   Blanca Ren MD   Handicap Placard MISC by Does not apply route Duration: 1 year / Dx: Stroke 2/25/24   Dipika Angulo MD   diclofenac sodium (VOLTAREN) 1 % GEL Apply 2 g topically 2 times daily Apply to knees 2/25/24   Dipika Angulo MD     Current Facility-Administered Medications   Medication Dose Route Frequency Provider Last Rate Last Admin    cefTRIAXone (ROCEPHIN) 1000 mg in sterile water 10 mL IV syringe  1,000 mg IntraVENous Q24H Xenia Blackman MD        lidocaine 4 % external patch 2 patch  2 patch TransDERmal Daily Anthony Maldonado MD   2 patch at 05/13/24 0841    acetaminophen (TYLENOL) tablet 1,000 mg  1,000 mg Oral TID Reji Garsia DO   1,000 mg at 05/13/24 0842    amitriptyline (ELAVIL) tablet 75 mg  75 mg Oral Nightly Reji Garsia DO   75 mg at 05/12/24 2133    atorvastatin (LIPITOR) tablet 80 mg  80 mg Oral Nightly AdyGalos, DO   80 mg at 05/12/24 2132    Baclofen (LIORESAL) tablet 5 mg  5 mg Oral BID Reji Garsia, DO   5 mg at 05/13/24 0842    butalbital-acetaminophen-caffeine (FIORICET, ESGIC) per tablet 1  Problem  Altered mental status  Active Hospital Problems    Diagnosis Date Noted    Acute encephalopathy [G93.40] 05/11/2024    Fever and chills [R50.9] 05/11/2024    Parotitis [K11.20] 05/11/2024    Altered mental status [R41.82] 05/09/2024    Acute cystitis with hematuria [N30.01] 05/09/2024    Migraine [G43.909] 05/09/2024    Seizures (HCC) [R56.9] 02/18/2024         Assessment:  -Thrombocytopenia on IVIG (started 4/17/24) and Promacta (started 4/14/2024)  -Chronic DVT of the right leg   -Anemia  -History of dural venous sinus thrombosis   -History of acute hemorrhagic stroke   -Left parotid gland enlargement versus parotiditis      Plan:  -I reviewed the labs/imaging available to me, outside records and discussed with the patient. I explained the significance of these abnormalities and possible etiology and management options.  -No labs to review since 5/11/2024, will order CBC for tomorrow  -LP completed 5/12/2024, follow-up on results  -Will consider starting steroids if his platelet count drops below 50,000.  At this time we will continue to monitor closely  -Appreciate neurology recommendations  -We will continue to follow  -Remainder of care per primary  -Patient will need follow-up with Dr. Chang upon discharge.    EMILY Merino - CNP  [facility] Hematology/Oncology  5/13/2024, 11:58 AM     Attending Physician Statement  I have discussed the care of Brennen Mcclure and I have examined the patient myselft and taken ros and hpi , including pertinent history and exam findings,  with the author of this note . I have reviewed the key elements of all parts of the encounter with the nurse practitioner/resident.  I agree with the assessment, plan and orders as documented by the above health care provider.  I have made necessary changes as deemed appropriate directly in the note.     More than 50% of the time was spent taking care of this patient in addition to the nurse practitioner time.  That also included

## 2024-05-13 NOTE — PROGRESS NOTES
[facility] -   Infectious diseases evaluation  admission date 5/8/2024    reason for consultation:   L parotitis    Impression :   Current:  Acute persistent encephalopathy and fever since 5/7  while getting IVIG at the infusion center w fever 103  No clear etiology  LP neg 5/12  New mild left parotitis on CT scan 5/11  History of idiopathic thrombocytopenia failed prior treatments  On weekly IVIG infusion and promacta since 4/14  Past ICH w R craniotomy/ L hemiplegia    Other:  Dural sinus thrombosis with history of DVT 2019 on Eliquis   Hemorrhagic stroke December 2023  Discussion / summary of stay / plan of care/ Recommendations:   Fever and altered mentation since  5/7 - worse at the infusion center 5/8 - no central line - uses a new iv each week -  No + cx -  Fever resolved since admission  Still not himself and emotional - according to wife, not  his baseline  headache + and no skin rash  -  5/12 negative LP 3 WBC and pcr panel neg  Stop acyclovir iv, ampicillin iv -  L parotid gland enlargement  vs parotitis -   on exam not palpable, non tender  -   not convinced about this being the cause of the persistent confusion  Fever at admission - resolved afterwards  Pt has been on ceftriaxone since 5/8  Pan keep ceftriaxone till 5/14 then stop    Infection Control Recommendations   [place] Precautions      Antimicrobial Stewardship Recommendations   Simplification of therapy  Targeted therapy    History of Present Illness:   Initial history:  Brennen Mcclure is a 59 y.o.-year-old male presents with altered mentation and fever of 103 on 5/8 while he was at the infusion center getting his IVIG    Admitted to the hospital with a concern of sepsis, blood cultures 5/8 negative and urine culture 5/8 negative.  During the workup, CT scan of the brain 5/8 shows a prior craniotomy on the right, with external herniation of the right cerebral hemisphere.  But a repeat CT scan  actually reflects the content of the visit, the document can still have some errors including those of syntax and sound a like substitutions which may escape proof reading.  It such instances, actual meaningcan be extrapolated by contextual diversion.    Xenia Blackman MD  Office: [phone]  Perfect serve / office [phone]

## 2024-05-13 NOTE — PROGRESS NOTES
[facility] Neurology   IN-PATIENT SERVICE   [facility]    Progress Note             Date:   5/12/2024  Patient name:  Brennen Mcclure  Date of admission:  5/8/2024  7:42 PM  MRN:   [id number]  Account:  [id number]  YOB: 1965  PCP:    Blanca Ren MD  Room:   [address] [place] [address]  Code Status:    DNR-CCA    Chief Complaint:     Chief Complaint   Patient presents with    Fever    Altered Mental Status    Fall       Interval hx:     The patient was seen and examined at bedside. Is vitally stable, alert and oriented x 4. No acute events overnight.      Brief History of Present Illness:     The patient is a 59 y.o. male with significant past medical history of CVST complicated with ICH after starting anticoagulation s/p right hemicraniectomy in November 2023 in [place] with residual dense left-sided hemiplegia, chronic idiopathic thrombocytopenic purpura, migraines, seizures presenting to [place] [facility] for fever, generalized weakness and altered mentation.      Patient was recently discharged on 4/14/2024 after being admitted for dysphagia.  He also was having intermittent episodes of confusion as per the wife at the time, and has a history of seizures for which he takes Vimpat.  He has a history of migraine headaches for which he takes Elavil and Topamax.  Patient was noted to have intermittent confusion during that admission, however improved with no evidence of clear seizure-like activity.  Patient was previously on Eliquis for DVT, however is not on anticoagulation or antiplatelets at this time.  He follows with hematology oncology for management of ITP.  He was recently started on IVIG to improve platelet counts to eventually undergo surgical repair of right hemicraniectomy.     Patient went for infusion today, on 5/8/2024, for treatment of ITP.  Postinfusion, patient was noted to have fever of 103 °F, and was noted to be diaphoretic by the patient's wife.  He was also noted to have altered    Result Value Ref Range    Volume, CSF 2 CC mL    Appearance, CSF Clear     Xanthochromia ABSENT     WBC, CSF 3 <5 cells/uL    RBC, CSF 13 (H) 0 cells/uL    Tube Number, CSF 3    Glucose, CSF    Collection Time: 05/12/24  5:18 PM   Result Value Ref Range    Glucose, CSF 52 40 - 70 mg/dL   Protein, CSF    Collection Time: 05/12/24  5:18 PM   Result Value Ref Range    Protein, CSF 46.7 (H) 15.0 - 45.0 mg/dL   Meningitis Encephalitis Panel CSF, Molecular    Collection Time: 05/12/24  5:18 PM    Specimen: CSF   Result Value Ref Range    Specimen Description .CSF     ESCHERICHIA COLI K1 CSF FILM ARRAY Not Detected Not Detected    HAEMOPHILUS INFLUENZA CSF FILM ARRAY Not Detected Not Detected    LISTERIA MONOCYTOGENES CSF FILM ARRAY Not Detected Not Detected    NEISSERIA MENIGITIDIS CSF FILM ARRAY Not Detected Not Detected    STREPTOCOCCUS AGALACTIAE CSF FILM ARRAY Not Detected Not Detected    STREPTOCOCCUS PNEUMONIAE CSF FILM ARRAY Not Detected Not Detected    CYTOMEGALOVIRUS (CMV) CSF FILM ARRAY Not Detected Not Detected    ENTEROVIRUS CSF FILM ARRAY Not Detected Not Detected    HSV-1 CSF FILM ARRAY Not Detected Not Detected    HSV-2 CSF FILM ARRAY Not Detected Not Detected    HHV-6 (HERPESVIRUS 6) CSF FILM ARRAY Not Detected Not Detected    PARECHOVIRUS CSF FILM ARRAY Not Detected Not Detected    VARICELLA-ZOSTER CSF FILM ARRAY Not Detected Not Detected    CRYPTOCOCCUS NEOFORMANS/WILLIS CSF FILM ARR. Not Detected Not Detected   Oligoclonal Banding    Collection Time: 05/12/24  5:18 PM   Result Value Ref Range    IgG PENDING mg/dL    IgG, CSF 0.7 0.5 - 7.0 mg/dL    Albumin PENDING g/dL    Albumin,  70 - 350 mg/L    IgG Synthesis Rate, CSF PENDING <3.3 mg/24 h    IgG Index, CSF PENDING <0.70    Albumin Index PENDING <9.0    Oligo Bands       Oligoclonal bands are performed at [facility] [[email or web address]] [employer] using isoelectric focusing and immunofixation.   Culture, CSF    Collection Time: 05/12/24  5:19 PM    Specimen: CSF  recommend to continue Ceftriaxone, acyclovir, ampicillin, obtain mycoplasma IgM.   -Platelets improved to 104; Oncology/Hematology will consider steroids if platelets drop below 50, 000      Patient or family members expressed understanding on topics that were discussed and all their questions were answered.      Follow-up further recommendations after discussing case with the attending.  The plan was discussed with the patient, patient's family and the medical staff.   Consultations:   IP CONSULT TO NEUROLOGY  IP CONSULT TO HEM/ONC  IP CONSULT TO INFECTIOUS DISEASES  IP CONSULT TO IV TEAM    Patient is admitted as inpatient status because of co-morbidities listed above, severity of signs and symptoms as outlined, requirement for current medical therapies and most importantly because of direct risk to patient if care not provided in a hospital setting.    Arin Wallace MD  Neurology Resident PGY-2  5/12/2024  8:08 PM    Copy sent to Blanca Tony MD

## 2024-05-13 NOTE — PROGRESS NOTES
Writer messages neuro in regards to patient's c/o headache. Informed that patient just received his scheduled tylenol and lyrica and that he is not due for his fiorecet at this time. Awaiting further orders. He rates pain as a \"9\" on 0-10 scale. Patient reports that this is an ongoing pain and that he goes to bed with the pain and he wakes up with the same throbbing headache.

## 2024-05-13 NOTE — PROGRESS NOTES
Patient expressing suicidal ideation. Extremely tearful. Patients wife at bedside would like to speak with the neuro and psych.   1218: Suicide precautions initiated. 1 to 1 sitter initiated.  1222: Dr Hinkle notified  1231: Dr Hinkle and Dr Urban at bedside. Inpatient psych consult ordered.    Patient and His wife stated that he attempted to reach his wife's gun at home, he was interrupted by his 7 year old granddaughter. Wife states that he was seeing a psychiatrist around February but has since stopped seeing them. Patients wife Is unhappy with suicide precautions.

## 2024-05-13 NOTE — CONSULTS
Department of Psychiatry  Consult Service   Psychiatric Assessment        REASON FOR CONSULT: Suicidal ideation    CONSULTING PHYSICIAN: Arin Wallace    History obtained from: Nursing staff, EHR, patient, wife    HISTORY OF PRESENT ILLNESS:          The patient is a 59 y.o. male with psychiatric history of depression who is admitted medically for treatment of UTI, he has a past medical history of CVST and right cerebral ICH, ITP, seizures and migraines.  Staff reports that patient made suicidal statements about wanting to shoot himself with a gun.  He has been struggling with loss of independence as a result of a stroke, he was the primary caretaker and now requires assistance with almost everything he does.  He is wheelchair bound and no longer able to work.  He is a , when he was in [place] he had a stroke and was transferred to the [facility] [place] [facility], his wife reports that he was there for 3 months before transitioning home.  She reports that she opted to have surgery for him, he did receive a craniotomy and has a flap.  She reports that he has been extremely angry with her about allowing the surgery.  They both acknowledge that they have had discussions about not wanting to be on life support and his CODE STATUS is DNR CCA.  He verbalizes suicidal ideation, his wife reports that he attempts to enter her room to get the guns out of the safe.  She reports that he also intentionally bangs his head to try and injure himself.  He verbalized that life is not worth living because he is no longer able to have independence and feels like he is a burden to his family.  His wife reports that he has had significant crying spells as of late.  She reports that he is also having hallucinations.  Patient verbalizes that he was seeing and talking to dead people when he was in [place] and he reports that they live in his basement while they await \"crossing over\".  He reports that during this current hospitalization he has  been seeing his dead son as well as his dead mother.  He reports that he hears them talking to him.  He becomes tearful when talking about the interaction with his mother.  He is sleeping and eating well.    Based on today's evaluation he would benefit from inpatient psychiatric hospitalization once medically stabilized, we will recommend starting an antidepressant as well as an antipsychotic to try and improve symptoms.    The patient is not currently receiving care for the above psychiatric illness.    Past Psychiatric History:  Prior Diagnosis: No formal diagnosis  Outpatient psychiatric provider: None  Psychiatric Hospitalization: no  Hx of Suicidal Attempts: No  Hx of violence:  no    Past psychiatric medications includes:   Celexa, Elavil, melatonin    Adverse reactions from psychotropic medications:  None    Substance Abuse History:  ETOH: Denies  Marijuana: Denies  Opiates: Denies  Other Drugs: Denies    Social History:     RESIDENCE: Resides in [place], [place] with his wife  :  to his wife of 34 years    CHILDREN: 4 children, 1 , son recently  of an overdose  OCCUPATION: , now unable to work secondary to disability  EDUCATION: GED    Past Medical History:        Diagnosis Date    Anemia 2024    CAD (coronary artery disease)     Chronic deep vein thrombosis (DVT) of both lower extremities ([facility])     Chronic deep vein thrombosis (DVT) of femoral vein of right lower extremity ([facility]) 2024    Chronic deep vein thrombosis (DVT) of proximal vein of right lower extremity ([facility]) 2024    Chronic deep vein thrombosis (DVT) of right iliac vein ([facility]) 2024    Chronic idiopathic thrombocytopenia (HCC)     Chronic idiopathic thrombocytopenic purpura (HCC) 2024    CVA (cerebral vascular accident) (HCC)     Emphysema lung (HCC)     Hemosiderin pigmentation of skin 2024    HLD (hyperlipidemia)     Intracranial hemorrhage ([facility]) 2024    Left hemiplegia

## 2024-05-14 ENCOUNTER — HOSPITAL ENCOUNTER (INPATIENT)
Age: 59
LOS: 2 days | Discharge: ANOTHER HOSPITAL | End: 2024-05-16
Attending: PSYCHIATRY & NEUROLOGY | Admitting: INTERNAL MEDICINE
Payer: MEDICAID

## 2024-05-14 ENCOUNTER — APPOINTMENT (OUTPATIENT)
Dept: SPEECH THERAPY | Age: 59
End: 2024-05-14
Attending: INTERNAL MEDICINE
Payer: MEDICAID

## 2024-05-14 ENCOUNTER — HOSPITAL ENCOUNTER (OUTPATIENT)
Dept: PHYSICAL THERAPY | Age: 59
Setting detail: THERAPIES SERIES
End: 2024-05-14
Attending: INTERNAL MEDICINE
Payer: MEDICAID

## 2024-05-14 ENCOUNTER — HOSPITAL ENCOUNTER (OUTPATIENT)
Dept: OCCUPATIONAL THERAPY | Age: 59
Setting detail: THERAPIES SERIES
End: 2024-05-14
Attending: INTERNAL MEDICINE
Payer: MEDICAID

## 2024-05-14 VITALS
RESPIRATION RATE: 20 BRPM | HEART RATE: 79 BPM | BODY MASS INDEX: 25.77 KG/M2 | HEIGHT: 70 IN | SYSTOLIC BLOOD PRESSURE: 129 MMHG | OXYGEN SATURATION: 98 % | WEIGHT: 180 LBS | TEMPERATURE: 98.4 F | DIASTOLIC BLOOD PRESSURE: 84 MMHG

## 2024-05-14 LAB
ANION GAP SERPL CALCULATED.3IONS-SCNC: 11 MMOL/L (ref 9–16)
BASOPHILS # BLD: 0.09 K/UL (ref 0–0.2)
BASOPHILS NFR BLD: 1 % (ref 0–2)
BUN SERPL-MCNC: 20 MG/DL (ref 8–23)
CALCIUM SERPL-MCNC: 9.2 MG/DL (ref 8.6–10.4)
CHLORIDE SERPL-SCNC: 106 MMOL/L (ref 98–107)
CO2 SERPL-SCNC: 21 MMOL/L (ref 20–31)
CREAT SERPL-MCNC: 0.9 MG/DL (ref 0.7–1.2)
EOSINOPHIL # BLD: 0.25 K/UL (ref 0–0.44)
EOSINOPHILS RELATIVE PERCENT: 3 % (ref 1–4)
ERYTHROCYTE [DISTWIDTH] IN BLOOD BY AUTOMATED COUNT: 15.3 % (ref 11.8–14.4)
GFR, ESTIMATED: >90 ML/MIN/1.73M2
GLUCOSE SERPL-MCNC: 104 MG/DL (ref 74–99)
HCT VFR BLD AUTO: 42 % (ref 40.7–50.3)
HGB BLD-MCNC: 13 G/DL (ref 13–17)
IMM GRANULOCYTES # BLD AUTO: 0.1 K/UL (ref 0–0.3)
IMM GRANULOCYTES NFR BLD: 1 %
LYMPHOCYTES NFR BLD: 1.27 K/UL (ref 1.1–3.7)
LYMPHOCYTES RELATIVE PERCENT: 17 % (ref 24–43)
MCH RBC QN AUTO: 28.3 PG (ref 25.2–33.5)
MCHC RBC AUTO-ENTMCNC: 31 G/DL (ref 28.4–34.8)
MCV RBC AUTO: 91.3 FL (ref 82.6–102.9)
MONOCYTES NFR BLD: 0.85 K/UL (ref 0.1–1.2)
MONOCYTES NFR BLD: 11 % (ref 3–12)
NEUTROPHILS NFR BLD: 66 % (ref 36–65)
NEUTS SEG NFR BLD: 4.88 K/UL (ref 1.5–8.1)
NRBC BLD-RTO: 0 PER 100 WBC
PLATELET # BLD AUTO: ABNORMAL K/UL (ref 138–453)
PLATELET, FLUORESCENCE: 116 K/UL (ref 138–453)
PLATELETS.RETICULATED NFR BLD AUTO: 7.2 % (ref 1.1–10.3)
POTASSIUM SERPL-SCNC: 4.1 MMOL/L (ref 3.7–5.3)
RBC # BLD AUTO: 4.6 M/UL (ref 4.21–5.77)
RBC # BLD: ABNORMAL 10*6/UL
SODIUM SERPL-SCNC: 138 MMOL/L (ref 136–145)
VDRL CSF QL: NONREACTIVE
WBC OTHER # BLD: 7.4 K/UL (ref 3.5–11.3)

## 2024-05-14 PROCEDURE — 6360000002 HC RX W HCPCS: Performed by: INTERNAL MEDICINE

## 2024-05-14 PROCEDURE — 97535 SELF CARE MNGMENT TRAINING: CPT

## 2024-05-14 PROCEDURE — 6370000000 HC RX 637 (ALT 250 FOR IP): Performed by: PSYCHIATRY & NEUROLOGY

## 2024-05-14 PROCEDURE — 6370000000 HC RX 637 (ALT 250 FOR IP)

## 2024-05-14 PROCEDURE — 85055 RETICULATED PLATELET ASSAY: CPT

## 2024-05-14 PROCEDURE — 99232 SBSQ HOSP IP/OBS MODERATE 35: CPT | Performed by: INTERNAL MEDICINE

## 2024-05-14 PROCEDURE — 97530 THERAPEUTIC ACTIVITIES: CPT

## 2024-05-14 PROCEDURE — 2580000003 HC RX 258

## 2024-05-14 PROCEDURE — 1240000000 HC EMOTIONAL WELLNESS R&B

## 2024-05-14 PROCEDURE — 36415 COLL VENOUS BLD VENIPUNCTURE: CPT

## 2024-05-14 PROCEDURE — 85025 COMPLETE CBC W/AUTO DIFF WBC: CPT

## 2024-05-14 PROCEDURE — 80048 BASIC METABOLIC PNL TOTAL CA: CPT

## 2024-05-14 PROCEDURE — 99239 HOSP IP/OBS DSCHRG MGMT >30: CPT | Performed by: PSYCHIATRY & NEUROLOGY

## 2024-05-14 PROCEDURE — 97163 PT EVAL HIGH COMPLEX 45 MIN: CPT

## 2024-05-14 PROCEDURE — 97166 OT EVAL MOD COMPLEX 45 MIN: CPT

## 2024-05-14 RX ORDER — POLYETHYLENE GLYCOL 3350 17 G
2 POWDER IN PACKET (EA) ORAL
Status: DISCONTINUED | OUTPATIENT
Start: 2024-05-14 | End: 2024-05-16 | Stop reason: HOSPADM

## 2024-05-14 RX ORDER — POLYETHYLENE GLYCOL 3350 17 G/17G
17 POWDER, FOR SOLUTION ORAL DAILY PRN
Status: DISCONTINUED | OUTPATIENT
Start: 2024-05-14 | End: 2024-05-16 | Stop reason: HOSPADM

## 2024-05-14 RX ORDER — IBUPROFEN 400 MG/1
400 TABLET ORAL EVERY 6 HOURS PRN
Status: DISCONTINUED | OUTPATIENT
Start: 2024-05-14 | End: 2024-05-16 | Stop reason: HOSPADM

## 2024-05-14 RX ORDER — HALOPERIDOL 5 MG/1
5 TABLET ORAL EVERY 6 HOURS PRN
Status: DISCONTINUED | OUTPATIENT
Start: 2024-05-14 | End: 2024-05-16 | Stop reason: HOSPADM

## 2024-05-14 RX ORDER — LANOLIN ALCOHOL/MO/W.PET/CERES
6 CREAM (GRAM) TOPICAL NIGHTLY
Qty: 56 TABLET | Refills: 3 | Status: SHIPPED | OUTPATIENT
Start: 2024-05-14

## 2024-05-14 RX ORDER — HALOPERIDOL 5 MG/ML
5 INJECTION INTRAMUSCULAR EVERY 6 HOURS PRN
Status: DISCONTINUED | OUTPATIENT
Start: 2024-05-14 | End: 2024-05-16 | Stop reason: HOSPADM

## 2024-05-14 RX ORDER — TRAZODONE HYDROCHLORIDE 50 MG/1
50 TABLET ORAL NIGHTLY PRN
Status: DISCONTINUED | OUTPATIENT
Start: 2024-05-14 | End: 2024-05-16 | Stop reason: HOSPADM

## 2024-05-14 RX ORDER — LANOLIN ALCOHOL/MO/W.PET/CERES
400 CREAM (GRAM) TOPICAL DAILY
Status: DISCONTINUED | OUTPATIENT
Start: 2024-05-15 | End: 2024-05-14 | Stop reason: HOSPADM

## 2024-05-14 RX ORDER — HYDROXYZINE 50 MG/1
50 TABLET, FILM COATED ORAL 3 TIMES DAILY PRN
Status: DISCONTINUED | OUTPATIENT
Start: 2024-05-14 | End: 2024-05-16 | Stop reason: HOSPADM

## 2024-05-14 RX ORDER — LANOLIN ALCOHOL/MO/W.PET/CERES
400 CREAM (GRAM) TOPICAL 2 TIMES DAILY
Status: DISCONTINUED | OUTPATIENT
Start: 2024-05-14 | End: 2024-05-14

## 2024-05-14 RX ORDER — TOPIRAMATE 50 MG/1
75 TABLET, FILM COATED ORAL 2 TIMES DAILY
Qty: 60 TABLET | Refills: 3 | Status: ON HOLD | OUTPATIENT
Start: 2024-05-14

## 2024-05-14 RX ORDER — LANOLIN ALCOHOL/MO/W.PET/CERES
400 CREAM (GRAM) TOPICAL DAILY
Qty: 30 TABLET | Refills: 3 | Status: ON HOLD | OUTPATIENT
Start: 2024-05-15

## 2024-05-14 RX ORDER — MAGNESIUM HYDROXIDE/ALUMINUM HYDROXICE/SIMETHICONE 120; 1200; 1200 MG/30ML; MG/30ML; MG/30ML
30 SUSPENSION ORAL EVERY 6 HOURS PRN
Status: DISCONTINUED | OUTPATIENT
Start: 2024-05-14 | End: 2024-05-16 | Stop reason: HOSPADM

## 2024-05-14 RX ORDER — DIPHENHYDRAMINE HYDROCHLORIDE 50 MG/ML
50 INJECTION INTRAMUSCULAR; INTRAVENOUS EVERY 6 HOURS PRN
Status: DISCONTINUED | OUTPATIENT
Start: 2024-05-14 | End: 2024-05-16 | Stop reason: HOSPADM

## 2024-05-14 RX ORDER — ACETAMINOPHEN 325 MG/1
650 TABLET ORAL EVERY 6 HOURS PRN
Status: DISCONTINUED | OUTPATIENT
Start: 2024-05-14 | End: 2024-05-16 | Stop reason: HOSPADM

## 2024-05-14 RX ADMIN — ACETAMINOPHEN 1000 MG: 500 TABLET ORAL at 08:42

## 2024-05-14 RX ADMIN — LACOSAMIDE 100 MG: 100 TABLET, FILM COATED ORAL at 08:43

## 2024-05-14 RX ADMIN — AMITRIPTYLINE HYDROCHLORIDE 75 MG: 50 TABLET, FILM COATED ORAL at 20:04

## 2024-05-14 RX ADMIN — TOPIRAMATE 75 MG: 50 TABLET, FILM COATED ORAL at 20:04

## 2024-05-14 RX ADMIN — DICLOFENAC SODIUM 2 G: 10 GEL TOPICAL at 20:06

## 2024-05-14 RX ADMIN — CITALOPRAM HYDROBROMIDE 10 MG: 10 TABLET ORAL at 08:42

## 2024-05-14 RX ADMIN — BUTALBITAL, ACETAMINOPHEN, AND CAFFEINE 1 TABLET: 50; 325; 40 TABLET ORAL at 05:59

## 2024-05-14 RX ADMIN — GABAPENTIN 300 MG: 300 CAPSULE ORAL at 13:49

## 2024-05-14 RX ADMIN — TOPIRAMATE 75 MG: 50 TABLET, FILM COATED ORAL at 08:42

## 2024-05-14 RX ADMIN — TAMSULOSIN HYDROCHLORIDE 0.4 MG: 0.4 CAPSULE ORAL at 08:43

## 2024-05-14 RX ADMIN — SODIUM CHLORIDE, PRESERVATIVE FREE 10 ML: 5 INJECTION INTRAVENOUS at 20:07

## 2024-05-14 RX ADMIN — BUTALBITAL, ACETAMINOPHEN, AND CAFFEINE 1 TABLET: 50; 325; 40 TABLET ORAL at 18:41

## 2024-05-14 RX ADMIN — GABAPENTIN 300 MG: 300 CAPSULE ORAL at 20:04

## 2024-05-14 RX ADMIN — ACETAMINOPHEN 1000 MG: 500 TABLET ORAL at 13:48

## 2024-05-14 RX ADMIN — BACLOFEN 5 MG: 5 TABLET ORAL at 08:42

## 2024-05-14 RX ADMIN — LACOSAMIDE 100 MG: 100 TABLET, FILM COATED ORAL at 20:04

## 2024-05-14 RX ADMIN — ACETAMINOPHEN 650 MG: 325 TABLET ORAL at 22:30

## 2024-05-14 RX ADMIN — SODIUM CHLORIDE, PRESERVATIVE FREE 10 ML: 5 INJECTION INTRAVENOUS at 08:45

## 2024-05-14 RX ADMIN — MAGNESIUM OXIDE 400 MG (241.3 MG MAGNESIUM) TABLET 400 MG: TABLET at 12:17

## 2024-05-14 RX ADMIN — Medication 10 MG: at 20:04

## 2024-05-14 RX ADMIN — ATORVASTATIN CALCIUM 80 MG: 80 TABLET, FILM COATED ORAL at 20:04

## 2024-05-14 RX ADMIN — BACLOFEN 5 MG: 5 TABLET ORAL at 20:04

## 2024-05-14 RX ADMIN — GABAPENTIN 300 MG: 300 CAPSULE ORAL at 08:43

## 2024-05-14 RX ADMIN — DICLOFENAC SODIUM 2 G: 10 GEL TOPICAL at 08:43

## 2024-05-14 RX ADMIN — Medication 1000 MG: at 18:25

## 2024-05-14 ASSESSMENT — PAIN SCALES - GENERAL
PAINLEVEL_OUTOF10: 8
PAINLEVEL_OUTOF10: 7
PAINLEVEL_OUTOF10: 10
PAINLEVEL_OUTOF10: 8
PAINLEVEL_OUTOF10: 8
PAINLEVEL_OUTOF10: 0
PAINLEVEL_OUTOF10: 9

## 2024-05-14 ASSESSMENT — ENCOUNTER SYMPTOMS
NAUSEA: 0
ABDOMINAL DISTENTION: 0
COLOR CHANGE: 0
RHINORRHEA: 0
EYE ITCHING: 0
EYE DISCHARGE: 0
APNEA: 0
DIARRHEA: 0
CHOKING: 0
CONSTIPATION: 0
PHOTOPHOBIA: 0

## 2024-05-14 ASSESSMENT — PATIENT HEALTH QUESTIONNAIRE - PHQ9
SUM OF ALL RESPONSES TO PHQ QUESTIONS 1-9: 2
1. LITTLE INTEREST OR PLEASURE IN DOING THINGS: SEVERAL DAYS
SUM OF ALL RESPONSES TO PHQ QUESTIONS 1-9: 2
SUM OF ALL RESPONSES TO PHQ9 QUESTIONS 1 & 2: 2
SUM OF ALL RESPONSES TO PHQ QUESTIONS 1-9: 2
SUM OF ALL RESPONSES TO PHQ QUESTIONS 1-9: 2
2. FEELING DOWN, DEPRESSED OR HOPELESS: SEVERAL DAYS

## 2024-05-14 ASSESSMENT — PAIN DESCRIPTION - ORIENTATION: ORIENTATION: RIGHT;LEFT

## 2024-05-14 ASSESSMENT — LIFESTYLE VARIABLES
HOW MANY STANDARD DRINKS CONTAINING ALCOHOL DO YOU HAVE ON A TYPICAL DAY: PATIENT DOES NOT DRINK
HOW MANY STANDARD DRINKS CONTAINING ALCOHOL DO YOU HAVE ON A TYPICAL DAY: PATIENT DOES NOT DRINK
HOW OFTEN DO YOU HAVE A DRINK CONTAINING ALCOHOL: NEVER
HOW OFTEN DO YOU HAVE A DRINK CONTAINING ALCOHOL: NEVER

## 2024-05-14 ASSESSMENT — PAIN DESCRIPTION - PAIN TYPE: TYPE: CHRONIC PAIN

## 2024-05-14 ASSESSMENT — SLEEP AND FATIGUE QUESTIONNAIRES
DO YOU USE A SLEEP AID: NO
DO YOU HAVE DIFFICULTY SLEEPING: NO
AVERAGE NUMBER OF SLEEP HOURS: 6

## 2024-05-14 ASSESSMENT — PAIN DESCRIPTION - ONSET: ONSET: ON-GOING

## 2024-05-14 ASSESSMENT — PAIN DESCRIPTION - LOCATION: LOCATION: HEAD

## 2024-05-14 ASSESSMENT — PAIN DESCRIPTION - DESCRIPTORS: DESCRIPTORS: ACHING

## 2024-05-14 ASSESSMENT — PAIN DESCRIPTION - FREQUENCY: FREQUENCY: CONTINUOUS

## 2024-05-14 NOTE — PROGRESS NOTES
[facility] Neurology   IN-PATIENT SERVICE   [facility]    Progress Note             Date:   5/14/2024  Patient name:  Brennen Mcclure  Date of admission:  5/8/2024  7:42 PM  MRN:   [id number]  Account:  [id number]  YOB: 1965  PCP:    Blanca Ren MD  Room:   [address] [place] [address]  Code Status:    DNR-CCA    Chief Complaint:     Chief Complaint   Patient presents with    Fever    Altered Mental Status    Fall       Interval hx:     The patient was seen and examined at bedside. Is vitally stable, alert and oriented x 3. No acute events overnight.    For parotitis   Patient has completed a course of rocephin   No fever  so far     Psych eval  Patient is BHI candidate due to depression and SI   Headaches improved after mag IV   Topamax was increased to 75 mg bid   Will add mag PO daily   Patient is medically cleared for BHI      Brief History of Present Illness:     The patient is a 59 y.o. male with significant past medical history of CVST complicated with ICH after starting anticoagulation s/p right hemicraniectomy in November 2023 in [place] with residual dense left-sided hemiplegia, chronic idiopathic thrombocytopenic purpura, migraines, seizures presenting to [place] [facility] for fever, generalized weakness and altered mentation.      Patient was recently discharged on 4/14/2024 after being admitted for dysphagia.  He also was having intermittent episodes of confusion as per the wife at the time, and has a history of seizures for which he takes Vimpat.  He has a history of migraine headaches for which he takes Elavil and Topamax.  Patient was noted to have intermittent confusion during that admission, however improved with no evidence of clear seizure-like activity.  Patient was previously on Eliquis for DVT, however is not on anticoagulation or antiplatelets at this time.  He follows with hematology oncology for management of ITP.  He was recently started on IVIG to improve platelet counts to  hematuria [N30.01] 05/09/2024    Migraine [G43.909] 05/09/2024    Seizures (HCC) [R56.9] 02/18/2024       Patient is a 59 y.o. with past medical history of CVST and right cerebral ICH, ITP, seizures, migraines presenting for fever and generalized weakness. Patient has positive urinalysis, will proceed with treatment of UTI. He had Tmax of 103 °F noted at home as per the wife, however has normalized temperatures at this time.    Impression: AMS possible metabolic encephalopathy possibly due to left parotitis, headache, depression and suicidal ideation      Plan:     AMS r/o autoimmune vs paraneoplastic etiology   Aseptic meningitis due to IVIG use was r/o   MRV was negative   LP through IR completed   CSF studies Pt 46.7, glu 52, CSF 13, WBC 3   Cryptococcal Ag was negative, meningitis panel was negative   F/u on ENC2 panel outpatient     Encephalopathy due to parotitis   Stroke recrudescence   CTA showed left parotitis  Encephalopathy due to parotitis   ID following - last does rocephin on 5/14 for parotitis  Continue Lipitor 80 mg.    Depression and SI  Psych eval  Will transfer to [facility]   Consider olanzapine   Continue Celexa 10 mg  Patient is medically clear for [facility] transfer     Headaches/ migraine   Continue Elavil 75 mg nightly for migraines.  Increase topiramate to 75 mg twice daily.  Add magnesium Po   Continue melatonin 10 mg nightly for sleep.    Hx of ITP  Hematology oncology   Monitor platelets     Hx of seizure  Continue gabapentin 300 mg 3 times daily.  Continue Vimpat 100 mg twice daily.    Lovenox 40 mg subcutaneous daily for DVT prophylaxis.  Patient is no longer on anticoagulation.  (Previously on Eliquis)          Follow-up further recommendations after discussing case with the attending.  The plan was discussed with the patient, patient's family and the medical staff.   Consultations:   IP CONSULT TO NEUROLOGY  IP CONSULT TO HEM/ONC  IP CONSULT TO INFECTIOUS DISEASES  IP CONSULT TO IV TEAM  IP

## 2024-05-14 NOTE — PROGRESS NOTES
[facility] -   Infectious diseases evaluation  admission date 5/8/2024    reason for consultation:   L parotitis    Impression :   Current:  Acute persistent encephalopathy and fever since 5/7  while getting IVIG at the infusion center w fever 103  No clear etiology  LP neg 5/12  New mild left parotitis on CT scan 5/11  History of idiopathic thrombocytopenia failed prior treatments  On weekly IVIG infusion and promacta since 4/14  Past ICH w R craniotomy/ L hemiplegia    Other:  Dural sinus thrombosis with history of DVT 2019 on Eliquis   Hemorrhagic stroke December 2023  Discussion / summary of stay / plan of care/ Recommendations:   Fever and altered mentation since  5/7 - worse at the infusion center 5/8 - no central line - uses a new iv each week -  No + cx -  Fever resolved since admission  Still not himself and emotional - according to wife, not  his baseline - psych consulted for anxiety  5/12 negative -  L parotid gland enlargement  vs parotitis -   on exam not palpable, non tender  -   Fever at admission - resolved afterwards  Pt has been on ceftriaxone since 5/8  Pan keep ceftriaxone till 5/14 - completed     Infection Control Recommendations   [place] Precautions      Antimicrobial Stewardship Recommendations   Simplification of therapy  Targeted therapy    History of Present Illness:   Initial history:  Brennen Mcclure is a 59 y.o.-year-old male presents with altered mentation and fever of 103 on 5/8 while he was at the infusion center getting his IVIG    Admitted to the hospital with a concern of sepsis, blood cultures 5/8 negative and urine culture 5/8 negative.  During the workup, CT scan of the brain 5/8 shows a prior craniotomy on the right, with external herniation of the right cerebral hemisphere.  But a repeat CT scan from 5/8 is not suggestive of a mild enlargement and hyperintense enhancement of the left parotid concerning for parotitis  Hence  Negative for dizziness.   Hematological:  Negative for adenopathy.   Psychiatric/Behavioral:  Positive for confusion. Negative for agitation. The patient is nervous/anxious.        Physical Examination :       Physical Exam  Constitutional:       General: He is not in acute distress.     Appearance: Normal appearance. He is not ill-appearing or diaphoretic.   HENT:      Head: Normocephalic and atraumatic.      Nose: Nose normal.      Mouth/Throat:      Mouth: Mucous membranes are moist.   Eyes:      General: No scleral icterus.     Conjunctiva/sclera: Conjunctivae normal.   Cardiovascular:      Rate and Rhythm: Normal rate and regular rhythm.      Heart sounds: No murmur heard.     No gallop.   Pulmonary:      Effort: No respiratory distress.      Breath sounds: No wheezing.   Abdominal:      General: There is no distension.      Tenderness: There is no abdominal tenderness.   Musculoskeletal:         General: No swelling, tenderness, deformity or signs of injury.      Cervical back: Neck supple. No rigidity or tenderness.   Skin:     Coloration: Skin is not jaundiced.      Findings: No bruising.   Neurological:      Mental Status: He is alert.      Motor: Weakness (left side) present.   Psychiatric:         Mood and Affect: Mood normal.         Thought Content: Thought content normal.         Past Medical History:     Past Medical History:   Diagnosis Date    Anemia 02/12/2024    CAD (coronary artery disease)     Chronic deep vein thrombosis (DVT) of both lower extremities ([facility])     Chronic deep vein thrombosis (DVT) of femoral vein of right lower extremity ([facility]) 03/07/2024    Chronic deep vein thrombosis (DVT) of proximal vein of right lower extremity ([facility]) 02/22/2024    Chronic deep vein thrombosis (DVT) of right iliac vein ([facility]) 03/07/2024    Chronic idiopathic thrombocytopenia ([facility])     Chronic idiopathic thrombocytopenic purpura ([facility]) 02/18/2024    CVA (cerebral vascular accident) ([facility])     Emphysema lung

## 2024-05-14 NOTE — PROGRESS NOTES
Occupational Therapy  Facility/Department: [address] STEPDOWN  Occupational Therapy Initial Assessment    Name: Brennen Mcclure  : 1965  MRN: [id number]  Date of Service: 2024    Discharge Recommendations:  Patient would benefit from continued therapy after discharge  OT Equipment Recommendations  Equipment Needed: No       Patient Diagnosis(es): The encounter diagnosis was Acute cystitis with hematuria.  Past Medical History:  has a past medical history of Anemia, CAD (coronary artery disease), Chronic deep vein thrombosis (DVT) of both lower extremities (HCC), Chronic deep vein thrombosis (DVT) of femoral vein of right lower extremity (HCC), Chronic deep vein thrombosis (DVT) of proximal vein of right lower extremity (HCC), Chronic deep vein thrombosis (DVT) of right iliac vein (HCC), Chronic idiopathic thrombocytopenia (HCC), Chronic idiopathic thrombocytopenic purpura (HCC), CVA (cerebral vascular accident) (HCC), Emphysema lung (HCC), Hemosiderin pigmentation of skin, HLD (hyperlipidemia), Intracranial hemorrhage (HCC), Left hemiplegia (HCC), Right leg swelling, and Thrombocytopenia (HCC).  Past Surgical History:  has a past surgical history that includes Coronary artery bypass graft; Coronary stent placement; craniotomy (Right); and CT BIOPSY BONE MARROW (3/18/2024).       Chief Complaint   Patient presents with    Fever    Altered Mental Status    Fall         Assessment   Performance deficits / Impairments: Decreased functional mobility ;Decreased ADL status;Decreased endurance;Decreased high-level IADLs;Decreased cognition;Decreased strength;Decreased ROM;Decreased safe awareness;Decreased balance  Assessment: Pt agreeable to OT evaluation this AM. Pt completed bed mobiltiy with Mod A x 2, functional transfers with scotty steady CGA-Min A. Pt has been working on transfers with natacha walker when at outpatient PT and may benefit from trialing using this device during acute stay with skilled assistance. OT

## 2024-05-14 NOTE — PROGRESS NOTES
Physical Therapy  Facility/Department: [address] STE[address]OWN  Physical Therapy Initial Assessment    Name: Brennen Mcclure  : 1965  MRN: [id number]  Date of Service: 2024    Chief Complaint   Patient presents with    Fever    Altered Mental Status    Fall       Discharge Recommendations:    Further therapy recommended at discharge.           Patient Diagnosis(es): The encounter diagnosis was Acute cystitis with hematuria.  Past Medical History:  has a past medical history of Anemia, CAD (coronary artery disease), Chronic deep vein thrombosis (DVT) of both lower extremities (HCC), Chronic deep vein thrombosis (DVT) of femoral vein of right lower extremity (HCC), Chronic deep vein thrombosis (DVT) of proximal vein of right lower extremity (HCC), Chronic deep vein thrombosis (DVT) of right iliac vein (HCC), Chronic idiopathic thrombocytopenia (HCC), Chronic idiopathic thrombocytopenic purpura (HCC), CVA (cerebral vascular accident) (HCC), Emphysema lung (HCC), Hemosiderin pigmentation of skin, HLD (hyperlipidemia), Intracranial hemorrhage (HCC), Left hemiplegia (HCC), Right leg swelling, and Thrombocytopenia (HCC).  Past Surgical History:  has a past surgical history that includes Coronary artery bypass graft; Coronary stent placement; craniotomy (Right); and CT BIOPSY BONE MARROW (3/18/2024).    Assessment   Body Structures, Functions, Activity Limitations Requiring Skilled Therapeutic Intervention: Decreased functional mobility ;Decreased strength;Decreased balance;Decreased endurance;Increased pain  Assessment: Pt modAx2 bed mobility, Naveen to stand to scotty stedy. Pt would benefit from continued acute PT to address deficits.  Therapy Prognosis: Good  Decision Making: High Complexity  Requires PT Follow-Up: Yes  Activity Tolerance  Activity Tolerance: Patient tolerated treatment well;Patient limited by fatigue     Plan   Physical Therapy Plan  General Plan:  (5x/wk)  Current Treatment Recommendations:

## 2024-05-14 NOTE — PROGRESS NOTES
Writer attempts to call patient's wife, Mara to give update that patient medically cleared to go to [facility] unit. No answer, message left to call writer on unit.   1315- Writer is able to get into contact with patient's wife Mara, and patient's daughter is also at bedside. Writer reviews voluntary admission to [facility] form with all above. Patient is agreeable and signs form for admission.   [facility], Writer notifies GARY Sanders. Voluntary admission consent form signed and faxed to [facility] at [phone].  2644- [facility] and [facility] on Will call for transport.   6857- Writer receives a TC from Carlotta at [facility], Noreen at [facility], and Dr. Jackson. Report given. Dr. Jackson would like a repeat BMP prior to authorizing patient to come over to [facility]. Dr. Brown notified.   0711- Writer calls and speaks with Noreen and updates her with BMP labs, she will pass onto Dr. Jackson. Writer also faxes over signed consent for voluntary admission to [facility].

## 2024-05-14 NOTE — PROGRESS NOTES
Physician Progress Note      PATIENT:               GINO SKINNER  CSN #:                  [id number]  :                       1965  ADMIT DATE:       2024 7:42 PM  DISCH DATE:  RESPONDING  PROVIDER #:        MARY KATE FRANK          QUERY TEXT:    Patient admitted on   with metabolic encephalopathy due to Parotitis and   UTI , noted to have UA on admission of large blood and no bacteria with small   leukocyte Urine culture shows no growth .Please clarify one of the following:    The medical record reflects the following:  Risk Factors: Age  Clinical Indicators:admitted on   with metabolic encephalopathy due to   Parotitis and UTI , noted to have UA on admission of large blood and no   bacteria with small leukocyte Urine culture shows no growth  Treatment: UA, urine culture, IV Rocephin    Thank You Myron VEGA BSN CCDS  Email [email or web address]  Cell [phone]  office hours M-F 6am to 2:30p  Options provided:  -- UTI despite negative culture  -- UTI ruled out after study  -- Other - I will add my own diagnosis  -- Disagree - Not applicable / Not valid  -- Disagree - Clinically unable to determine / Unknown  -- Refer to Clinical Documentation Reviewer    PROVIDER RESPONSE TEXT:    this patients UTI is ruled out after study    Query created by: Laura Rivera on 2024 8:28 AM      Electronically signed by:  MARY KATE FRANK 2024 12:32 PM

## 2024-05-14 NOTE — PROGRESS NOTES
Today's Date: 5/14/2024  Patient Name: Brennen Mcclure  Date of admission: 5/8/2024  7:42 PM  Patient's age: 59 y.o., 1965  Admission Dx: Altered mental status [R41.82]  Acute cystitis with hematuria [N30.01]    Reason for Consult: Thrombocytopenia, ITP  Requesting Physician: Anthony Maldonado MD    Chief Complaint: Fever, altered mental status, fall    SUBJECTIVE:  Patient seen and examined.   No new events, continue to complain of headache  Platelet count 116  Denies any nausea vomiting  Suicide sitter continues  CSF unremarkable thus far  Plan to go to [facility] today    BRIEF CASE HISTORY:    The patient is a 59 y.o. male who is admitted to the hospital for management of altered mental status.    He is a known patient of Dr. hCang and his hematology history is as follows:    Chronic thrombocytopenia, patient had a evaluation in [place] and also in the past and thought to be immune mediated  History of dural sinus thrombosis and history of DVT in 2019  Chronic anticoagulation with Eliquis  Had a hemorrhagic stroke in December 2023  Patient received platelet transfusion during the hospitalization and also was given IVIG on 1/9/2024 and 1/10/2024  Patient was treated with Decadron 40 mg on 2/18 - 2/20 1-24, with no response  Bone marrow biopsy negative for acute bone marrow pathology  Plan for IVIG weekly for 5 doses and also prescription for Promacta   Patient started taking Promacta on 4/14/2024  First IVIG given on 4/17/2024    He presented to the office yesterday for planned IVIG infusion.  Per wife, he had been having low-grade fever with slight confusion and cloudy urine.  Prescription given for Cipro.  Post-infusion he had a fever of 103 F, was diaphoretic, and had altered mental status.    Past Medical History:   has a past medical history of Anemia, CAD (coronary artery disease), Chronic deep vein thrombosis (DVT) of both lower extremities (HCC), Chronic deep vein thrombosis (DVT) of femoral vein of right  MULTIVITAMIN-MINERALS) tablet Take 1 tablet by mouth daily 4/1/24   Blanca Ren MD   melatonin 3 MG TABS tablet Take 2 tablets by mouth at bedtime  Patient taking differently: Take 10 mg by mouth at bedtime 4/1/24   Blanca Ren MD   lacosamide (VIMPAT) 100 MG TABS tablet Take 1 tablet by mouth 2 times daily for 90 days. Max Daily Amount: 200 mg 4/1/24 6/30/24  Blanca Ren MD   ipratropium 0.5 mg-albuterol 2.5 mg (DUONEB) 0.5-2.5 (3) MG/3ML SOLN nebulizer solution Inhale 3 mLs into the lungs every 6 hours as needed for Shortness of Breath 4/1/24   Blanca Ren MD   citalopram (CELEXA) 10 MG tablet Take 1 tablet by mouth daily 4/1/24   Blanca Ren MD   Baclofen (LIORESAL) 5 MG tablet Take 1 tablet by mouth 2 times daily 4/1/24   Blanca Ren MD   atorvastatin (LIPITOR) 80 MG tablet Take 1 tablet by mouth nightly 4/1/24   Blanca Ren MD   tamsulosin (FLOMAX) 0.4 MG capsule Take 1 capsule by mouth daily 4/1/24   Blanca Ren MD   Handicap Placard MISC by Does not apply route Duration: 1 year / Dx: Stroke 2/25/24   Dipika Angulo MD   diclofenac sodium (VOLTAREN) 1 % GEL Apply 2 g topically 2 times daily Apply to knees 2/25/24   Dipika Angulo MD     Current Facility-Administered Medications   Medication Dose Route Frequency Provider Last Rate Last Admin    topiramate (TOPAMAX) tablet 75 mg  75 mg Oral BID Leonora Brown MD   75 mg at 05/14/24 0842    cefTRIAXone (ROCEPHIN) 1000 mg in sterile water 10 mL IV syringe  1,000 mg IntraVENous Q24H Xenia Blackman MD   1,000 mg at 05/13/24 1729    lidocaine 4 % external patch 2 patch  2 patch TransDERmal Daily Anthony Maldonado MD   2 patch at 05/14/24 0843    acetaminophen (TYLENOL) tablet 1,000 mg  1,000 mg Oral TID Ady, Reji, DO   1,000 mg at 05/14/24 0842    amitriptyline (ELAVIL) tablet 75 mg  75 mg Oral Nightly Ady, Reji, DO   75 mg at 05/13/24 1947    atorvastatin (LIPITOR) tablet 80 mg  80 mg Oral Nightly Ady, Reji, DO   80 mg at  postsurgical changes in the right cerebral hemisphere.  No mass effect or midline shift. No extra-axial fluid collection. The gray-white differentiation is maintained.     Moderate stenosis at the origin of the right vertebral artery. Otherwise, no acute abnormality or flow-limiting stenosis in the remainder of the major arteries of the head and neck.     CT HEAD WO CONTRAST    Result Date: 4/12/2024  EXAMINATION: CT OF THE HEAD WITHOUT CONTRAST  4/12/2024 3:14 pm TECHNIQUE: CT of the head was performed without the administration of intravenous contrast. Automated exposure control, iterative reconstruction, and/or weight based adjustment of the mA/kV was utilized to reduce the radiation dose to as low as reasonably achievable. COMPARISON: 03/07/2024 HISTORY: ORDERING SYSTEM PROVIDED HISTORY: facial droop, difficulty swallowing, slurred speech FINDINGS: BRAIN/VENTRICLES: Again seen are postsurgical changes from right hemispheric craniectomy.  There are similar areas of outward bulging and concavity of the brain parenchyma at the craniectomy site.  Extensive encephalomalacia throughout the right cerebral hemisphere compatible with prior insult.  There is associated wallerian degeneration.  No significant mass effect or midline shift.  No acute intracranial hemorrhage.  No extra-axial fluid collections. No hydrocephalus. ORBITS: The visualized portion of the orbits demonstrate no acute abnormality. SINUSES: The visualized paranasal sinuses and mastoid air cells demonstrate no acute abnormality. SOFT TISSUES/SKULL:  No acute abnormality of the visualized skull or soft tissues.     No acute intracranial abnormality within constraints of CT acquisition. Stable chronic and postsurgical changes. The findings were sent to the Radiology Results Communication Center at 3:35 pm on 4/12/2024 to be communicated to a licensed caregiver, received by Dr. Petty at 3:38 pm.        Impression:   Primary Problem  Altered mental

## 2024-05-14 NOTE — PROGRESS NOTES
Writer was notified by lab that they did not have enough CSF for the [facility] ENC2 test. They recommended a possible alternative which is a paraneoplastic evaluation. The code for that is ENS2. Resident notified.

## 2024-05-14 NOTE — PROGRESS NOTES
Writer back in to re-evaluate medication adjustments for headache. He informs writer that it is slightly better, but when comparing pain levels between yesterday and today- he is a constant 9/10 on headache scale. Writer notifies neuro resident and new orders received.

## 2024-05-14 NOTE — PROGRESS NOTES
Patient denies any hallucinations so far this am, and denies any suicidal thoughts this am. Writer encourages patient to report any symptoms to RN. He reports understanding. He reports that he has continued headache. Writer informs him that topamax was increased yesterday and to let writer know if medication adjustments help with his headache. He reports good understanding.

## 2024-05-14 NOTE — FLOWSHEET NOTE
[] [facility]   Outpatient Rehabilitation & Therapy  [address]  P: [phone]   F: [phone]     Physical Therapy Communication    Date: 2024  Patient: Brennen Mcclure  : 1965  MRN: [id number]      Spoke to patients wife Mara- patient has been in hospital since last Wednesday due to fever and change in mental status. She reports he had a fall , 24 from his recliner due to dizziness and falling forward. She states patient is very depressed and not showing any emotion. She is fearful he is giving up and reports he has had a psych consult while in the hospital. She reports he has had a large decline and it is very difficult for her to care for him. She reports she would like to return to OP rehab when he is well enough but she is questioning if he would qualify for Inpatient ARU at [facility]. Encouraged patient to inform inpatient staff and reach out to Dr De La Paz regarding qualifications. Patient to keep in touch on any needs. Will place OP therapy on hold at this time and cancel all future visits.     Electronically signed by: Fanta Scott PTA

## 2024-05-15 ENCOUNTER — HOSPITAL ENCOUNTER (OUTPATIENT)
Dept: INFUSION THERAPY | Facility: MEDICAL CENTER | Age: 59
End: 2024-05-15

## 2024-05-15 ENCOUNTER — APPOINTMENT (OUTPATIENT)
Dept: CT IMAGING | Age: 59
End: 2024-05-15
Attending: PSYCHIATRY & NEUROLOGY
Payer: MEDICAID

## 2024-05-15 PROBLEM — D69.3 CHRONIC IDIOPATHIC THROMBOCYTOPENIC PURPURA (HCC): Status: ACTIVE | Noted: 2024-05-15

## 2024-05-15 PROBLEM — F33.3 MAJOR DEPRESSIVE DISORDER, RECURRENT, SEVERE WITH PSYCHOTIC FEATURES (HCC): Status: ACTIVE | Noted: 2024-05-15

## 2024-05-15 PROBLEM — R51.9 ACUTE INTRACTABLE HEADACHE: Status: ACTIVE | Noted: 2024-05-15

## 2024-05-15 LAB
MICROORGANISM SPEC CULT: NORMAL
MICROORGANISM/AGENT SPEC: NORMAL
SPECIMEN DESCRIPTION: NORMAL
WEST NILE IGG, CSF: 0.82 IV
WEST NILE IGM ANTIBODY CSF: 0 IV

## 2024-05-15 PROCEDURE — 70450 CT HEAD/BRAIN W/O DYE: CPT

## 2024-05-15 PROCEDURE — 99222 1ST HOSP IP/OBS MODERATE 55: CPT | Performed by: PSYCHIATRY & NEUROLOGY

## 2024-05-15 PROCEDURE — 6370000000 HC RX 637 (ALT 250 FOR IP)

## 2024-05-15 PROCEDURE — APPSS60 APP SPLIT SHARED TIME 46-60 MINUTES

## 2024-05-15 PROCEDURE — 1240000000 HC EMOTIONAL WELLNESS R&B

## 2024-05-15 PROCEDURE — 99223 1ST HOSP IP/OBS HIGH 75: CPT | Performed by: INTERNAL MEDICINE

## 2024-05-15 PROCEDURE — 99254 IP/OBS CNSLTJ NEW/EST MOD 60: CPT | Performed by: INTERNAL MEDICINE

## 2024-05-15 PROCEDURE — 6370000000 HC RX 637 (ALT 250 FOR IP): Performed by: PSYCHIATRY & NEUROLOGY

## 2024-05-15 PROCEDURE — 6370000000 HC RX 637 (ALT 250 FOR IP): Performed by: INTERNAL MEDICINE

## 2024-05-15 RX ORDER — GABAPENTIN 300 MG/1
300 CAPSULE ORAL 3 TIMES DAILY
Status: DISCONTINUED | OUTPATIENT
Start: 2024-05-15 | End: 2024-05-16

## 2024-05-15 RX ORDER — BACLOFEN 10 MG/1
5 TABLET ORAL 2 TIMES DAILY
Status: DISCONTINUED | OUTPATIENT
Start: 2024-05-15 | End: 2024-05-16

## 2024-05-15 RX ORDER — TAMSULOSIN HYDROCHLORIDE 0.4 MG/1
0.4 CAPSULE ORAL DAILY
Status: DISCONTINUED | OUTPATIENT
Start: 2024-05-15 | End: 2024-05-16

## 2024-05-15 RX ORDER — AMITRIPTYLINE HYDROCHLORIDE 75 MG/1
75 TABLET ORAL NIGHTLY
Status: DISCONTINUED | OUTPATIENT
Start: 2024-05-15 | End: 2024-05-16

## 2024-05-15 RX ORDER — LIDOCAINE 4 G/G
1 PATCH TOPICAL DAILY
Status: DISCONTINUED | OUTPATIENT
Start: 2024-05-15 | End: 2024-05-16

## 2024-05-15 RX ORDER — LACOSAMIDE 100 MG/1
100 TABLET ORAL 2 TIMES DAILY
Status: DISCONTINUED | OUTPATIENT
Start: 2024-05-15 | End: 2024-05-16

## 2024-05-15 RX ORDER — LANOLIN ALCOHOL/MO/W.PET/CERES
400 CREAM (GRAM) TOPICAL DAILY
Status: DISCONTINUED | OUTPATIENT
Start: 2024-05-15 | End: 2024-05-16

## 2024-05-15 RX ORDER — IPRATROPIUM BROMIDE AND ALBUTEROL SULFATE 2.5; .5 MG/3ML; MG/3ML
1 SOLUTION RESPIRATORY (INHALATION) EVERY 6 HOURS PRN
Status: DISCONTINUED | OUTPATIENT
Start: 2024-05-15 | End: 2024-05-16

## 2024-05-15 RX ORDER — FLUOXETINE 10 MG/1
10 CAPSULE ORAL DAILY
Status: DISCONTINUED | OUTPATIENT
Start: 2024-05-15 | End: 2024-05-16

## 2024-05-15 RX ORDER — M-VIT,TX,IRON,MINS/CALC/FOLIC 27MG-0.4MG
1 TABLET ORAL DAILY
Status: DISCONTINUED | OUTPATIENT
Start: 2024-05-15 | End: 2024-05-16 | Stop reason: HOSPADM

## 2024-05-15 RX ORDER — BUTALBITAL, ACETAMINOPHEN AND CAFFEINE 300; 40; 50 MG/1; MG/1; MG/1
1 CAPSULE ORAL 2 TIMES DAILY PRN
Status: DISCONTINUED | OUTPATIENT
Start: 2024-05-15 | End: 2024-05-16 | Stop reason: HOSPADM

## 2024-05-15 RX ORDER — ATORVASTATIN CALCIUM 20 MG/1
80 TABLET, FILM COATED ORAL NIGHTLY
Status: DISCONTINUED | OUTPATIENT
Start: 2024-05-15 | End: 2024-05-16

## 2024-05-15 RX ORDER — OLANZAPINE 5 MG/1
2.5 TABLET ORAL NIGHTLY
Status: DISCONTINUED | OUTPATIENT
Start: 2024-05-15 | End: 2024-05-16 | Stop reason: HOSPADM

## 2024-05-15 RX ORDER — TOPIRAMATE 50 MG/1
75 TABLET, FILM COATED ORAL 2 TIMES DAILY
Status: DISCONTINUED | OUTPATIENT
Start: 2024-05-15 | End: 2024-05-16

## 2024-05-15 RX ADMIN — DICLOFENAC SODIUM 2 G: 10 GEL TOPICAL at 12:16

## 2024-05-15 RX ADMIN — TRAZODONE HYDROCHLORIDE 50 MG: 50 TABLET ORAL at 20:52

## 2024-05-15 RX ADMIN — AMITRIPTYLINE HYDROCHLORIDE 75 MG: 75 TABLET, FILM COATED ORAL at 20:48

## 2024-05-15 RX ADMIN — GABAPENTIN 300 MG: 300 CAPSULE ORAL at 20:50

## 2024-05-15 RX ADMIN — ACETAMINOPHEN 650 MG: 325 TABLET ORAL at 09:05

## 2024-05-15 RX ADMIN — TAMSULOSIN HYDROCHLORIDE 0.4 MG: 0.4 CAPSULE ORAL at 12:15

## 2024-05-15 RX ADMIN — Medication 400 MG: at 12:15

## 2024-05-15 RX ADMIN — ATORVASTATIN CALCIUM 80 MG: 20 TABLET, FILM COATED ORAL at 20:50

## 2024-05-15 RX ADMIN — Medication 1 TABLET: at 12:15

## 2024-05-15 RX ADMIN — ACETAMINOPHEN 650 MG: 325 TABLET ORAL at 16:31

## 2024-05-15 RX ADMIN — BACLOFEN 5 MG: 10 TABLET ORAL at 12:14

## 2024-05-15 RX ADMIN — TOPIRAMATE 75 MG: 50 TABLET, FILM COATED ORAL at 20:51

## 2024-05-15 RX ADMIN — IBUPROFEN 400 MG: 400 TABLET, FILM COATED ORAL at 09:05

## 2024-05-15 RX ADMIN — BUTALBITA,ACETAMINOPHEN AND CAFFEINE 1 CAPSULE: 50; 300; 40 CAPSULE ORAL at 20:50

## 2024-05-15 RX ADMIN — OLANZAPINE 2.5 MG: 5 TABLET, FILM COATED ORAL at 20:50

## 2024-05-15 RX ADMIN — GABAPENTIN 300 MG: 300 CAPSULE ORAL at 14:55

## 2024-05-15 RX ADMIN — GABAPENTIN 300 MG: 300 CAPSULE ORAL at 12:15

## 2024-05-15 RX ADMIN — DICLOFENAC SODIUM 2 G: 10 GEL TOPICAL at 20:48

## 2024-05-15 RX ADMIN — BACLOFEN 5 MG: 10 TABLET ORAL at 20:48

## 2024-05-15 RX ADMIN — LACOSAMIDE 100 MG: 100 TABLET, FILM COATED ORAL at 20:51

## 2024-05-15 RX ADMIN — LACOSAMIDE 100 MG: 100 TABLET, FILM COATED ORAL at 12:14

## 2024-05-15 RX ADMIN — BUTALBITA,ACETAMINOPHEN AND CAFFEINE 1 CAPSULE: 50; 300; 40 CAPSULE ORAL at 12:14

## 2024-05-15 RX ADMIN — IBUPROFEN 400 MG: 400 TABLET, FILM COATED ORAL at 20:52

## 2024-05-15 RX ADMIN — TOPIRAMATE 75 MG: 50 TABLET, FILM COATED ORAL at 12:15

## 2024-05-15 RX ADMIN — FLUOXETINE 10 MG: 10 CAPSULE ORAL at 12:15

## 2024-05-15 ASSESSMENT — PAIN SCALES - GENERAL
PAINLEVEL_OUTOF10: 10
PAINLEVEL_OUTOF10: 8
PAINLEVEL_OUTOF10: 8
PAINLEVEL_OUTOF10: 9
PAINLEVEL_OUTOF10: 8

## 2024-05-15 ASSESSMENT — LIFESTYLE VARIABLES
HOW MANY STANDARD DRINKS CONTAINING ALCOHOL DO YOU HAVE ON A TYPICAL DAY: PATIENT DOES NOT DRINK
HOW OFTEN DO YOU HAVE A DRINK CONTAINING ALCOHOL: NEVER
HOW OFTEN DO YOU HAVE A DRINK CONTAINING ALCOHOL: NEVER
HOW MANY STANDARD DRINKS CONTAINING ALCOHOL DO YOU HAVE ON A TYPICAL DAY: PATIENT DOES NOT DRINK

## 2024-05-15 ASSESSMENT — PAIN DESCRIPTION - LOCATION: LOCATION: HEAD;SHOULDER

## 2024-05-15 ASSESSMENT — PAIN DESCRIPTION - ORIENTATION: ORIENTATION: LEFT;RIGHT

## 2024-05-15 NOTE — GROUP NOTE
Group Therapy Note    Date: 5/15/2024    Group Start Time: 1000  Group End Time: 1050  Group Topic: Psychotherapy     Miriam Love MSW        Group Therapy Note    Attendees: 4/17     Patient was offered group therapy today but declined to participate despite encouragement from staff.  1:1 was offered.    Discipline Responsible: /Counselor      Signature:  MADDI Cotter

## 2024-05-15 NOTE — BH NOTE
[facility]  Admission Note     Admission Type:   Admission Type: Voluntary    Reason for admission:  Reason for Admission: Patient admitted for depression with sucidal ideation. Patient reports feeling suicidal due to medical diagnoses, losing his independence and feeling like a burden to his wife. Patient endorses visual and auditory hallucinations of dead family members who are in the process of \"crossing over.\" Upon arrival to unit patient denies suicidal or homicidal ideation and reports that he just wanted to give his wife a break from caring for him. Patient denies symptoms of anxiety or depression but states that he is in chronic pain.      Addictive Behavior:   Addictive Behavior  In the Past 3 Months, Have You Felt or Has Someone Told You That You Have a Problem With  : None    Medical Problems:   Past Medical History:   Diagnosis Date    Anemia 02/12/2024    CAD (coronary artery disease)     Chronic deep vein thrombosis (DVT) of both lower extremities ([facility])     Chronic deep vein thrombosis (DVT) of femoral vein of right lower extremity ([facility]) 03/07/2024    Chronic deep vein thrombosis (DVT) of proximal vein of right lower extremity ([facility]) 02/22/2024    Chronic deep vein thrombosis (DVT) of right iliac vein ([facility]) 03/07/2024    Chronic idiopathic thrombocytopenia ([facility])     Chronic idiopathic thrombocytopenic purpura ([facility]) 02/18/2024    CVA (cerebral vascular accident) ([facility])     Emphysema lung ([facility])     Hemosiderin pigmentation of skin 03/07/2024    HLD (hyperlipidemia)     Intracranial hemorrhage ([facility]) 01/31/2024    Left hemiplegia ([facility])     Right leg swelling 03/07/2024    Thrombocytopenia ([facility]) 02/12/2024       Status EXAM:  Mental Status and Behavioral Exam  Normal: No  Level of Assistance: Total assist  Facial Expression: Flat  Affect: Appropriate  Level of Consciousness: Alert  Frequency of Checks: 1 staff: 1 patient  - continuous  Mood:Normal: No  Mood: Helpless, Worthless, low

## 2024-05-15 NOTE — PROGRESS NOTES
Behavioral Services  Medicare Certification Upon Admission    I certify that this patient's inpatient psychiatric hospital admission is medically necessary for:    [x] (1) Treatment which could reasonably be expected to improve this patient's condition,       [x] (2) Or for diagnostic study;     AND     [x](2) The inpatient psychiatric services are provided while the individual is under the care of a physician and are included in the individualized plan of care.    Estimated length of stay/service 3-5 days    Plan for post-hospital care hc    Electronically signed by Valencia Miles MD on 5/15/2024 at 9:07 AM

## 2024-05-15 NOTE — H&P
Department of Psychiatry  Attending Physician Psychiatric Assessment     Reason for Admission to Psychiatric Unit:  Threat to self requiring 24 hour professional observation  Concerns about patient's safety in the community  Past Mental Health Diagnosis: a history of  Major Depression  Triggering event or precipitating factor: Grief r/t loss and recent stroke  Length of time/duration of triggering event: Months  Legal Status: Voluntary    CHIEF COMPLAINT:  Depression with suicidal ideation    History obtained from: Patient, electronic medical record          HISTORY OF PRESENT ILLNESS:    Brennen Mcclure is a 59 y.o. male who has a past medical history of intracranial hemorrhage, thrombocytopenia, seizures, DVT, migraine, CVST, and right cerebral ICH, ITP and migraines who presents to the ER with fever and altered mental status and was found to have UTI. He was admitted to medical for treatment of UTI. Psychiatry was consulted and patient was seen by psychiatry, cleared medically and sent to [facility] due to increased depression and suicidal ideation with a plan to shoot himself.  Brennen is agreeable to assessment at bedside.  He does have 1:1 sitter for safety.  The right side of his skull is significantly indented from his craniotomy.  When asked about events leading up to hospitalization, patient reports \"my wife needed a break.\"  Asked patient about his depression and he states, \"I stated I wanted to kill myself and now I'm here.\"  He again repeats, \"my wife needed a break.\"  Brennen states that he had a stroke back in November of 2023.  Due to this he ended up needing significantly medical care and went under a hemicraniectomy.  He was a  and this happened while he was across the country in [place]. He was then transferred to a hospital in [place] where he underwent treatment there for a couple months before coming back to [place].  Brennen all the sudden breaks into tears and starts sobbing uncontrollably. He  was used to authenticate this note.     Please note that this chart was generated using voice recognition Dragon dictation software.  Although every effort was made to ensure the accuracy of this automated transcription, some errors in transcription may have occurred.                                    Psychiatry Attending Attestation     I assessed this patient and reviewed the case and plan of care with Advanced practice provider.  I have reviewed the above documentation and I agree with the findings and treatment plan with the following updates.  Patient is a 59-year-old male with history of depression admitted from [facility] floor where he presented for UTI, for worsening suicidal thoughts and plan to kill self.  Patient reports that he has been feeling extremely depressed for last several weeks now.  Continues to have active suicidal thoughts.  Reports that he has been feeling down secondary to not getting any better medically.  Reports feeling helpless hopeless about this.  Has vague voices at times saying negative things to him.  Discussed with him about trying the combination of Prozac and Zyprexa and is agreeable to the plan.  Has poor sleep and appetite.  Patient was agreeable to try Zyprexa to help with his psychosis and sleep.  Discussed risk benefits and treatment alternate options.     ASSESSMENT  Major depressive disorder, recurrent, severe with psychotic features (HCC)    TREATMENT PLAN  Reviewed labs, EKG  Will obtain records/collateral information and review them today.  Medication adjustment: as discussed as above  Consults: none    Risk level: High     Behavioral Services  Medicare Certification     Admission Day 1  I certify that this patient's inpatient psychiatric hospital admission is medically necessary for:    x (1) treatment which could reasonably be expected to improve this patient's condition, or    x (2) diagnostic study or its equivalent.       --Valencia Miles MD

## 2024-05-15 NOTE — PROGRESS NOTES

## 2024-05-15 NOTE — PLAN OF CARE
Problem: Discharge Planning  Goal: Discharge to home or other facility with appropriate resources  5/10/2024 2300 by Ana Carroll, RN  Outcome: Progressing  5/10/2024 1855 by Alexis Serna  Outcome: Progressing     Problem: Skin/Tissue Integrity  Goal: Absence of new skin breakdown  Description: 1.  Monitor for areas of redness and/or skin breakdown  2.  Assess vascular access sites hourly  3.  Every 4-6 hours minimum:  Change oxygen saturation probe site  4.  Every 4-6 hours:  If on nasal continuous positive airway pressure, respiratory therapy assess nares and determine need for appliance change or resting period.  5/10/2024 2300 by Ana Carroll, RN  Outcome: Progressing  5/10/2024 1855 by Alexis Serna  Outcome: Progressing     Problem: Safety - Adult  Goal: Free from fall injury  5/10/2024 2300 by Ana Carroll, RN  Outcome: Progressing  5/10/2024 1855 by Alexis Serna  Outcome: Progressing     Problem: Chronic Conditions and Co-morbidities  Goal: Patient's chronic conditions and co-morbidity symptoms are monitored and maintained or improved  5/10/2024 2300 by Ana Carroll, RN  Outcome: Progressing  5/10/2024 1855 by Alexis Serna  Outcome: Progressing     
  Problem: Discharge Planning  Goal: Discharge to home or other facility with appropriate resources  5/11/2024 1030 by Alexis Serna  Outcome: Progressing  5/10/2024 2300 by Ana Carroll, RN  Outcome: Progressing     Problem: Skin/Tissue Integrity  Goal: Absence of new skin breakdown  Description: 1.  Monitor for areas of redness and/or skin breakdown  2.  Assess vascular access sites hourly  3.  Every 4-6 hours minimum:  Change oxygen saturation probe site  4.  Every 4-6 hours:  If on nasal continuous positive airway pressure, respiratory therapy assess nares and determine need for appliance change or resting period.  5/11/2024 1030 by Alexis Serna  Outcome: Progressing  5/10/2024 2300 by Ana Carroll, RN  Outcome: Progressing     Problem: Safety - Adult  Goal: Free from fall injury  5/11/2024 1030 by Alexis Serna  Outcome: Progressing  5/10/2024 2300 by Ana Carroll, RN  Outcome: Progressing     Problem: Chronic Conditions and Co-morbidities  Goal: Patient's chronic conditions and co-morbidity symptoms are monitored and maintained or improved  5/11/2024 1030 by Alexis Serna  Outcome: Progressing  5/10/2024 2300 by Ana Carroll, RN  Outcome: Progressing     
  Problem: Discharge Planning  Goal: Discharge to home or other facility with appropriate resources  5/14/2024 2048 by Brennen Chiang RN  Outcome: Progressing  5/14/2024 0942 by Юлия Sprague RN  Outcome: Progressing  Flowsheets (Taken 5/14/2024 0733)  Discharge to home or other facility with appropriate resources:   Identify barriers to discharge with patient and caregiver   Arrange for needed discharge resources and transportation as appropriate   Identify discharge learning needs (meds, wound care, etc)   Refer to discharge planning if patient needs post-hospital services based on physician order or complex needs related to functional status, cognitive ability or social support system     Problem: Skin/Tissue Integrity  Goal: Absence of new skin breakdown  Description: 1.  Monitor for areas of redness and/or skin breakdown  2.  Assess vascular access sites hourly  3.  Every 4-6 hours minimum:  Change oxygen saturation probe site  4.  Every 4-6 hours:  If on nasal continuous positive airway pressure, respiratory therapy assess nares and determine need for appliance change or resting period.  5/14/2024 2048 by Brennen Chiang RN  Outcome: Progressing  5/14/2024 0942 by Юлия Sprague RN  Outcome: Progressing     Problem: Safety - Adult  Goal: Free from fall injury  5/14/2024 2048 by Brennen Chiang RN  Outcome: Progressing  5/14/2024 0942 by Юлия Sprague RN  Outcome: Progressing     Problem: Chronic Conditions and Co-morbidities  Goal: Patient's chronic conditions and co-morbidity symptoms are monitored and maintained or improved  5/14/2024 2048 by Brennen Chiang RN  Outcome: Progressing  5/14/2024 0942 by Юлия Sprague RN  Outcome: Progressing  Flowsheets (Taken 5/14/2024 0733)  Care Plan - Patient's Chronic Conditions and Co-Morbidity Symptoms are Monitored and Maintained or Improved:   Monitor and assess patient's chronic conditions and comorbid symptoms for stability, deterioration, or 
  Problem: Discharge Planning  Goal: Discharge to home or other facility with appropriate resources  Outcome: Progressing     Problem: Skin/Tissue Integrity  Goal: Absence of new skin breakdown  Description: 1.  Monitor for areas of redness and/or skin breakdown  2.  Assess vascular access sites hourly  3.  Every 4-6 hours minimum:  Change oxygen saturation probe site  4.  Every 4-6 hours:  If on nasal continuous positive airway pressure, respiratory therapy assess nares and determine need for appliance change or resting period.  Outcome: Progressing     Problem: Safety - Adult  Goal: Free from fall injury  Outcome: Progressing     Problem: Chronic Conditions and Co-morbidities  Goal: Patient's chronic conditions and co-morbidity symptoms are monitored and maintained or improved  Outcome: Progressing     
  Problem: Discharge Planning  Goal: Discharge to home or other facility with appropriate resources  Outcome: Progressing     Problem: Skin/Tissue Integrity  Goal: Absence of new skin breakdown  Description: 1.  Monitor for areas of redness and/or skin breakdown  2.  Assess vascular access sites hourly  3.  Every 4-6 hours minimum:  Change oxygen saturation probe site  4.  Every 4-6 hours:  If on nasal continuous positive airway pressure, respiratory therapy assess nares and determine need for appliance change or resting period.  Outcome: Progressing     Problem: Safety - Adult  Goal: Free from fall injury  Outcome: Progressing     Problem: Chronic Conditions and Co-morbidities  Goal: Patient's chronic conditions and co-morbidity symptoms are monitored and maintained or improved  Outcome: Progressing     
  Problem: Discharge Planning  Goal: Discharge to home or other facility with appropriate resources  Outcome: Progressing     Problem: Skin/Tissue Integrity  Goal: Absence of new skin breakdown  Description: 1.  Monitor for areas of redness and/or skin breakdown  2.  Assess vascular access sites hourly  3.  Every 4-6 hours minimum:  Change oxygen saturation probe site  4.  Every 4-6 hours:  If on nasal continuous positive airway pressure, respiratory therapy assess nares and determine need for appliance change or resting period.  Outcome: Progressing     Problem: Safety - Adult  Goal: Free from fall injury  Outcome: Progressing     Problem: Chronic Conditions and Co-morbidities  Goal: Patient's chronic conditions and co-morbidity symptoms are monitored and maintained or improved  Outcome: Progressing     Problem: Pain  Goal: Verbalizes/displays adequate comfort level or baseline comfort level  Outcome: Progressing     
  Problem: Discharge Planning  Goal: Discharge to home or other facility with appropriate resources  Outcome: Progressing     Problem: Skin/Tissue Integrity  Goal: Absence of new skin breakdown  Description: 1.  Monitor for areas of redness and/or skin breakdown  2.  Assess vascular access sites hourly  3.  Every 4-6 hours minimum:  Change oxygen saturation probe site  4.  Every 4-6 hours:  If on nasal continuous positive airway pressure, respiratory therapy assess nares and determine need for appliance change or resting period.  Outcome: Progressing     Problem: Safety - Adult  Goal: Free from fall injury  Outcome: Progressing     Problem: Chronic Conditions and Co-morbidities  Goal: Patient's chronic conditions and co-morbidity symptoms are monitored and maintained or improved  Outcome: Progressing     Problem: Pain  Goal: Verbalizes/displays adequate comfort level or baseline comfort level  Outcome: Progressing     
  Problem: Discharge Planning  Goal: Discharge to home or other facility with appropriate resources  Outcome: Progressing  Flowsheets  Taken 5/11/2024 2300  Discharge to home or other facility with appropriate resources: Identify barriers to discharge with patient and caregiver  Taken 5/11/2024 1900  Discharge to home or other facility with appropriate resources:   Identify barriers to discharge with patient and caregiver   Arrange for needed discharge resources and transportation as appropriate     Problem: Skin/Tissue Integrity  Goal: Absence of new skin breakdown  Description: 1.  Monitor for areas of redness and/or skin breakdown  2.  Assess vascular access sites hourly  3.  Every 4-6 hours minimum:  Change oxygen saturation probe site  4.  Every 4-6 hours:  If on nasal continuous positive airway pressure, respiratory therapy assess nares and determine need for appliance change or resting period.  Outcome: Progressing     Problem: Safety - Adult  Goal: Free from fall injury  Outcome: Progressing  Flowsheets (Taken 5/11/2024 1929)  Free From Fall Injury: Instruct family/caregiver on patient safety     Problem: Chronic Conditions and Co-morbidities  Goal: Patient's chronic conditions and co-morbidity symptoms are monitored and maintained or improved  Outcome: Progressing  Flowsheets  Taken 5/11/2024 2300  Care Plan - Patient's Chronic Conditions and Co-Morbidity Symptoms are Monitored and Maintained or Improved: Monitor and assess patient's chronic conditions and comorbid symptoms for stability, deterioration, or improvement  Taken 5/11/2024 1900  Care Plan - Patient's Chronic Conditions and Co-Morbidity Symptoms are Monitored and Maintained or Improved: Monitor and assess patient's chronic conditions and comorbid symptoms for stability, deterioration, or improvement     
Progressing  Flowsheets (Taken 5/14/2024 0800 by Nena Kim, RN)  Verbalizes/displays adequate comfort level or baseline comfort level: Encourage patient to monitor pain and request assistance     Problem: Risk for Elopement  Goal: Patient will not exit the unit/facility without proper excort  Outcome: Progressing  Flowsheets (Taken 5/14/2024 0733)  Nursing Interventions for Elopement Risk:   Assist with personal care needs such as toileting, eating, dressing, as needed to reduce the risk of wandering   Collaborate with family members/caregivers to mitigate the elopement risk   Collaborate with treatment team for drug withdrawal symptoms treatment   Collaborate with treatment team for nicotine replacement

## 2024-05-15 NOTE — GROUP NOTE
Group Therapy Note    Date: 5/15/2024    Group Start Time: 1430  Group End Time: 1545  Group Topic: Cognitive Skills    Kinsey Hollis CTRS        Group Therapy Note    Attendees: 9/16     Topic: To increase socialization and practice self expression, and exploring positive   coping skills/resources/outlets using creative expression and discussion.        Patient did not participate in Cognitive Skills Group at 14:30, despite staff encouragement   and explanation of benefits and modifications to task made available by RT.   Pt was seclusive to self and room .     Q15 minute safety checks maintained for patient safety and will continue to encourage   patient to attend unit programming.       Discipline Responsible: Psychoeducational Specialist  Signature:  LUPE OLIVARES

## 2024-05-15 NOTE — GROUP NOTE
Group Therapy Note    Date: 5/15/2024    Group Start Time: 1100  Group End Time: 1150  Group Topic: Cognitive Skills    Kinsey Hollis CTRS        Group Therapy Note    Attendees: 4/17     Topic: To increase socialization and practice decision making , and communication skills      Patient did not participate in Cognitive Skills Group at 11:00, despite staff encouragement   and explanation of benefits and modifications to task made available by RT.   Pt was seclusive to room but talking with 1:1 staff .     Q15 minute safety checks maintained for patient safety and will continue to encourage   patient to attend unit programming.       Discipline Responsible: Psychoeducational Specialist  Signature:  LUPE OLIVARES

## 2024-05-15 NOTE — PROGRESS NOTES
Pharmacy Medication History Note      List of current medications patient is taking is complete.    Source of information: [facility], [facility] ([phone]), [facility] ([phone]) spoke with [place] PDMP, [facility] dispense report    Changes made to medication list:  Medications removed (include reason, ex. therapy complete or physician discontinued, noncompliance):  none    Medications flagged for provider review:  Ciprofloxacin - prescribed on 5/8/24 but was not continued at [facility]    Medications added/doses adjusted:  none    Other notes (ex. Recent course of antibiotics, Coumadin dosing):  Patient was previously anticoagulation on Eliquis.  Promacta 50 mg was not continued while at [facility].  Patient is also ordered IVIG 80 mg weekly with titration instructions below:  Recommended initial infusion rate 0.6 mL/kg/hour  for the first thirty minutes.  As tolerated, gradually increase to 2.4 mL/kg/hour  and if tolerated gradually increase rate to a maximum of 4.8 mL/kg/hour       Current Home Medication List at Time of Admission:  Prior to Admission medications    Medication Sig   melatonin 3 MG TABS tablet TAKE 2 TABLETS BY MOUTH AT BEDTIME   topiramate (TOPAMAX) 50 MG tablet Take 1.5 tablets by mouth 2 times daily   magnesium oxide (MAG-OX) 400 (240 Mg) MG tablet Take 1 tablet by mouth daily   eltrombopag olamine (PROMACTA) 50 MG TABS tablet Take 1 tablet by mouth daily   lidocaine (LIDODERM) 5 % Place 2 patches onto the skin daily Change daily remove after 12 hours   ferrous sulfate (IRON 325) 325 (65 Fe) MG tablet Take 1 tablet by mouth every other day   amitriptyline (ELAVIL) 75 MG tablet Take 1 tablet by mouth nightly   acetaminophen (TYLENOL) 500 MG tablet Take 2 tablets by mouth in the morning, at noon, and at bedtime   gabapentin (NEURONTIN) 300 MG capsule Take 1 capsule by mouth 3 times daily for 90 days.   butalbital-APAP-caffeine -40 MG CAPS per capsule Take 1

## 2024-05-15 NOTE — PLAN OF CARE
[facility]  Initial Interdisciplinary Treatment Plan NO      Original treatment plan Date & Time:  5/15/24 1250    Admission Type:  Admission Type: Voluntary    Reason for admission:   Reason for Admission: Patient admitted for depression with sucidal ideation. Patient reports feeling suicidal due to medical diagnoses, losing his independence and feeling like a burden to his wife. Patient endorses visual and auditory hallucinations of dead family members who are in the process of \"crossing over.\" Upon arrival to unit patient denies suicidal or homicidal ideation and reports that he just wanted to give his wife a break from caring for him. Patient denies symptoms of anxiety or depression but states that he is in chronic pain.    Estimated Length of Stay:  5-7days  Estimated Discharge Date: to be determined by physician    PATIENT STRENGTHS:  Patient Strengths:   Patient Strengths and Limitations:   Addictive Behavior: Addictive Behavior  In the Past 3 Months, Have You Felt or Has Someone Told You That You Have a Problem With  : None  Medical Problems:  Past Medical History:   Diagnosis Date    Anemia 02/12/2024    CAD (coronary artery disease)     Chronic deep vein thrombosis (DVT) of both lower extremities ([facility])     Chronic deep vein thrombosis (DVT) of femoral vein of right lower extremity ([facility]) 03/07/2024    Chronic deep vein thrombosis (DVT) of proximal vein of right lower extremity ([facility]) 02/22/2024    Chronic deep vein thrombosis (DVT) of right iliac vein ([facility]) 03/07/2024    Chronic idiopathic thrombocytopenia ([facility])     Chronic idiopathic thrombocytopenic purpura ([facility]) 02/18/2024    CVA (cerebral vascular accident) ([facility])     Emphysema lung ([facility])     Hemosiderin pigmentation of skin 03/07/2024    HLD (hyperlipidemia)     Intracranial hemorrhage ([facility]) 01/31/2024    Left hemiplegia ([facility])     Right leg swelling 03/07/2024    Thrombocytopenia ([facility]) 02/12/2024     Status EXAM:Mental Status and

## 2024-05-15 NOTE — CONSULTS
Today's Date: 5/15/2024  Patient Name: Brennen Mcclure  Date of admission: 5/14/2024  9:51 PM  Patient's age: 59 y.o., 1965  Admission Dx: Depression with suicidal ideation [F32.A, R45.851]    Reason for Consult: ITP on IVIG   Requesting Physician: Valencia Miles MD    CHIEF COMPLAINT:  No chief complaint on file.    History Obtained From:  patient and chart    HISTORY OF PRESENT ILLNESS:      Brennen Mcclure is a 59 y.o. male who is admitted to the [facility] on 5/14/2024  for acute depression    Patient is known to me for his thrombocytopenia/ITP and was recently admitted to [facility].  He has chronic thrombocytopenia, patient had a evaluation in [place] and also in the past and thought to be immune mediated  History of dural sinus thrombosis and history of DVT in 2019  Chronic anticoagulation with Eliquis  Had a hemorrhagic stroke in December 2023  Patient received platelet transfusion during the hospitalization and also was given IVIG on 1/9/2024 and 1/10/2024  Patient was treated with Decadron 40 mg on 2/18 - 2/20 1-24, with no response  Bone marrow biopsy negative for acute bone marrow pathology  Plan for IVIG weekly for 5 doses and also prescription for Promacta   Patient started taking Promacta on 4/14/2024  First IVIG given on 4/17/2024   He presented to the office 5/7 for planned IVIG infusion.  Per wife, he had been having low-grade fever with slight confusion and cloudy urine.  Prescription given for Cipro.  Post-infusion he had a fever of 103 F, was diaphoretic, and had altered mental status.     During the hospitalization his CBC subsequently showed platelet count improved to 116K    He complains of headache and also had depressive symptoms and seen by psychiatry and subsequently transferred to [facility]  Past Medical History:   has a past medical history of Anemia, CAD (coronary artery disease), Chronic deep vein thrombosis (DVT) of both lower extremities (HCC),  °C)    General:  No apparent distress.  HEENT:  Normocephalic, atraumatic, mucus membranes moist.  Right-sided natacha-craniectomy.  Neck:  Trachea midline. No adenopathy.  Chest: Symmetric chest rise. Lungs clear to auscultation bilaterally, no wheezes, rales, crackles or rhonchi. No accessory muscle use.  CV: Regular rhythm. No murmur, no gallops, no rubs.  Abdomen:  Abdomen is soft, non-tender, non-distended, no rebound or guarding. Bowel sounds active.  Extremities:  No lower extremity edema or cyanosis, peripheral pulses intact.  Neurological:  A&O x3, no focal deficits.  Left-sided hemiplegia  Skin:  Warm and dry.  DATA:    Labs:   CBC:   Recent Labs     05/14/24  0246   WBC 7.4   HGB 13.0   HCT 42.0   PLT See Reflexed IPF Result     BMP:   Recent Labs     05/14/24  1556      K 4.1   CO2 21   BUN 20   CREATININE 0.9   LABGLOM >90   GLUCOSE 104*     PT/INR: No results for input(s): \"PROTIME\", \"INR\" in the last 72 hours.    IMAGING DATA:  CT HEAD WO CONTRAST    (Results Pending)       Primary Problem  Major depressive disorder, recurrent, severe with psychotic features (HCC)    Active Hospital Problems    Diagnosis Date Noted    Major depressive disorder, recurrent, severe with psychotic features (HCC) [F33.3] 05/15/2024    Depression with suicidal ideation [F32.A, R45.851] 05/13/2024       IMPRESSION:   Thrombocytopenia on IVIG (started 4/17/24) and Promacta (started 4/14/2024)  Chronic DVT of the right leg   Anemia  History of dural venous sinus thrombosis   History of acute hemorrhagic stroke   Depression with suicidal ideation    RECOMMENDATIONS:  I reviewed the laboratory, imaging [place] prior records, outside records, discussed diagnosis treatment recommendations  Patient is complaining of significant headache so CT head has been ordered  His platelets are stable at 116K, therefore no need of IVIG infusion  He will continue his Promacta.  Continue other management as per primary team  Discussed with

## 2024-05-15 NOTE — PLAN OF CARE
Problem: Pain  Goal: Verbalizes/displays adequate comfort level or baseline comfort level  5/15/2024 1902 by Keyla Gaspar LPN  Outcome: Not Progressing  Note: Patient continues with pain at a 8 to  head and shoulder. All PRNS offered and administered with some relief when resting in bed     Problem: Chronic Conditions and Co-morbidities  Goal: Patient's chronic conditions and co-morbidity symptoms are monitored and maintained or improved  5/15/2024 1902 by Keyla Gaspar LPN  Outcome: Progressing  Note: Patient takes medication as ordered is compliant and helpful with care   5/15/2024 1250 by Slime Potter RN  Outcome: Progressing     Problem: Safety - Adult  Goal: Free from fall injury  5/15/2024 1902 by Keyla Gaspar LPN  Outcome: Progressing  Note: Patient continues with 1:1  to assist with daily cares   5/15/2024 1250 by Slime Potter RN  Outcome: Progressing     Problem: Anxiety  Goal: Will report anxiety at manageable levels  Description: INTERVENTIONS:  1. Administer medication as ordered  2. Teach and rehearse alternative coping skills  3. Provide emotional support with 1:1 interaction with staff  5/15/2024 1902 by Keyla Gaspar LPN  Outcome: Progressing  Note: Patient continues with anxiety due to not being with wife and feeling overwhelmed with wife   5/15/2024 1250 by Slime Potter RN  Outcome: Progressing     Problem: Depression/Self Harm  Goal: Effect of psychiatric condition will be minimized and patient will be protected from self harm  Description: INTERVENTIONS:  1. Assess impact of patient's symptoms on level of functioning, self care needs and offer support as indicated  2. Assess patient/family knowledge of depression, impact on illness and need for teaching  3. Provide emotional support, presence and reassurance  4. Assess for possible suicidal thoughts or ideation. If patient expresses suicidal thoughts or statements do not leave alone, initiate

## 2024-05-15 NOTE — CARE COORDINATION
BHI Biopsychosocial Assessment    Current Level of Psychosocial Functioning     Independent XX  Dependent    Minimal Assist     Comments:    Psychosocial High Risk Factors (check all that apply)    Unable to obtain meds   Chronic illness/pain    Substance abuse   Lack of Family Support   Financial stress   Isolation   Inadequate Community Resources XX  Suicide attempt(s)   Not taking medications   Victim of crime   Developmental Delay  Unable to manage personal needs    Age 65 or older   Homeless  No transportation   Readmission within 30 days  Unemployment  Traumatic Event    Comments:   Psychiatric Advanced Directives: n/a    Family to Involve in Treatment: Wife is supportive and care taker    Sexual Orientation: n/a     Patient Strengths: Patient has housing, social support, insurance    Patient Barriers: Not linked to [facility]       Opiate Education Provided: n/a       CMHC/mental health history: Not linked, no hx    Plan of Care   medication management, group/individual therapies, family meetings, psycho -education, treatment team meetings to assist with stabilization    Initial Discharge Plan: Return home with wife, link to [facility]        Clinical Summary: Patient is a 59 year old male admitted to [facility] for suicidal ideation. Patient denies any hx of suicide attempts or prior psychiatric admissions. Patient states he feels remorse for wife having to be his full time care taker. He identifies her as his primary support. He states his four adult children are also supportive. Patient denies being linked to [facility]. Patients denies any hx of outpatient services. Patient has stable housing. Patient also has insurance. Patient denies suicidal and homicidal ideation. Patient also denies experiencing auditory and visual hallucinations. Social work to provide support and assist with discharge planning.

## 2024-05-15 NOTE — H&P
idiopathic thrombocytopenia (HCC)     Chronic idiopathic thrombocytopenic purpura (HCC) 02/18/2024    CVA (cerebral vascular accident) (HCC)     Emphysema lung (HCC)     Hemosiderin pigmentation of skin 03/07/2024    HLD (hyperlipidemia)     Intracranial hemorrhage (HCC) 01/31/2024    Left hemiplegia (HCC)     Right leg swelling 03/07/2024    Thrombocytopenia (HCC) 02/12/2024        Past Surgical History:     Past Surgical History:   Procedure Laterality Date    CORONARY ARTERY BYPASS GRAFT      x4    CORONARY STENT PLACEMENT      2 stents    CRANIOTOMY Right     RIGHT CRANIOTOMY, FOR HEMATOMA EVACUATION  of frontal and temporal IPH (Right)    CT BONE MARROW BIOPSY  3/18/2024    CT BONE MARROW BIOPSY 3/18/2024 [facility] CT SCAN        Medications Prior to Admission:     Prior to Admission medications    Medication Sig Start Date End Date Taking? Authorizing Provider   melatonin 3 MG TABS tablet TAKE 2 TABLETS BY MOUTH AT BEDTIME 5/14/24   Blanca Ren MD   topiramate (TOPAMAX) 50 MG tablet Take 1.5 tablets by mouth 2 times daily 5/14/24   Leonora Brown MD   magnesium oxide (MAG-OX) 400 (240 Mg) MG tablet Take 1 tablet by mouth daily 5/15/24   Leonora Brown MD   eltrombopag olamine (PROMACTA) 50 MG TABS tablet Take 1 tablet by mouth daily    ProviderZeenat MD   ciprofloxacin (CIPRO) 500 MG tablet Take 1 tablet by mouth 2 times daily for 7 days 5/8/24 5/15/24  Lam Chang MD   lidocaine (LIDODERM) 5 % Place 2 patches onto the skin daily Change daily remove after 12 hours 5/2/24   Danica De La Paz MD   ferrous sulfate (IRON 325) 325 (65 Fe) MG tablet Take 1 tablet by mouth every other day 1/26/24   ProviderZeenat MD   amitriptyline (ELAVIL) 75 MG tablet Take 1 tablet by mouth nightly 4/30/24   Danica De La Paz MD   acetaminophen (TYLENOL) 500 MG tablet Take 2 tablets by mouth in the morning, at noon, and at bedtime 4/30/24   Danica De La Paz MD   gabapentin (NEURONTIN) 300 MG capsule Take 1  nose, throat pain  RESPIRATORY:  negative for shortness of breath, cough, congestion, wheezing.  CARDIOVASCULAR:  negative for chest pain, palpitations.  GASTROINTESTINAL:  negative for nausea, vomiting, diarrhea, constipation, change in bowel habits, abdominal pain   GENITOURINARY:  negative for difficulty of urination, burning with urination, frequency   INTEGUMENT:  negative for rash, skin lesions, easy bruising   HEMATOLOGIC/LYMPHATIC:  negative for swelling/edema   ALLERGIC/IMMUNOLOGIC:  negative for urticaria , itching  ENDOCRINE:  negative increase in drinking, increase in urination, hot or cold intolerance  MUSCULOSKELETAL:  negative joint pains, muscle aches, swelling of joints  NEUROLOGICAL: Complaining of severe headache, chronic weakness of left side of the body  BEHAVIOR/PSYCH:      Physical Exam:     BP (!) 109/52   Pulse 72   Temp 98.5 °F (36.9 °C)   Resp 16   Wt 83.9 kg (185 lb)   SpO2 95%   BMI 26.54 kg/m²   Temp (24hrs), Av.3 °F (36.8 °C), Min:98.1 °F (36.7 °C), Max:98.5 °F (36.9 °C)    No results for input(s): \"POCGLU\" in the last 72 hours.  No intake or output data in the 24 hours ending 05/15/24 1446    General Appearance:  alert, well appearing, and in no acute distress, wheelchair-bound, signs of cranioplasty on right side  Mental status: oriented to person, place, and time with normal affect  Head:  normocephalic, atraumatic.  Eye: no icterus, redness, pupils equal and reactive, extraocular eye movements intact, conjunctiva clear  Ear: normal external ear, no discharge, hearing intact  Nose:  no drainage noted  Mouth: mucous membranes moist  Neck: supple, no carotid bruits, thyroid not palpable  Lungs: Bilateral equal air entry, clear to ausculation, no wheezing, rales or rhonchi, normal effort  Cardiovascular: normal rate, regular rhythm, no murmur, gallop, rub.  Abdomen: Soft, nontender, nondistended, normal bowel sounds, no hepatomegaly or splenomegaly  Neurologic: Weakness in

## 2024-05-15 NOTE — BH NOTE
[facility] [place]  Admission Note     Admission Type:   Voluntary     Reason for admission:  Reason for Admission: Patient admitted for depression with sucidal ideation. Patient reports feeling suicidal due to medical diagnoses, losing his independence and feeling like a burden to his wife. Patient endorses visual and auditory hallucinations of dead family members who are in the process of \"crossing over.\" Upon arrival to unit patient denies suicidal or homicidal ideation and reports that he just wanted to give his wife a break from caring for him. Patient denies symptoms of anxiety or depression but states that he is in chronic pain.      Addictive Behavior:   Addictive Behavior  In the Past 3 Months, Have You Felt or Has Someone Told You That You Have a Problem With  : None    Medical Problems:   Past Medical History:   Diagnosis Date    Anemia 02/12/2024    CAD (coronary artery disease)     Chronic deep vein thrombosis (DVT) of both lower extremities ([facility])     Chronic deep vein thrombosis (DVT) of femoral vein of right lower extremity ([facility]) 03/07/2024    Chronic deep vein thrombosis (DVT) of proximal vein of right lower extremity ([facility]) 02/22/2024    Chronic deep vein thrombosis (DVT) of right iliac vein ([facility]) 03/07/2024    Chronic idiopathic thrombocytopenia ([facility])     Chronic idiopathic thrombocytopenic purpura ([facility]) 02/18/2024    CVA (cerebral vascular accident) ([facility])     Emphysema lung ([facility])     Hemosiderin pigmentation of skin 03/07/2024    HLD (hyperlipidemia)     Intracranial hemorrhage ([facility]) 01/31/2024    Left hemiplegia ([facility])     Right leg swelling 03/07/2024    Thrombocytopenia ([facility]) 02/12/2024       Status EXAM:  Mental Status and Behavioral Exam  Normal: No  Level of Assistance: Total assist  Facial Expression: Flat  Affect: Appropriate  Level of Consciousness: Alert  Frequency of Checks: 1 staff: 1 patient  - continuous  Mood:Normal: No  Mood: Helpless, Worthless, low self-esteem  Motor  ALEJANDRA was reviewed today per office protocol. Report shows No discrepancies. Fill pattern is consistent from single provider(s) at single pharmacy(s). Prescription escribed.  Patient is aware to check within the pharmacy Activity:Normal: No  Motor Activity: Decreased, Unusual posture/gait (uses a scotty steady to transfer)  Eye Contact: Fair  Observed Behavior: Withdrawn, Guarded, Cooperative  Sexual Misconduct History: Current - no  Preception: [place] to person, [place] to time, [place] to place, [place] to situation  Attention:Normal: No  Attention: Distractible  Thought Processes: Circumstantial  Thought Content:Normal: No  Thought Content: Preoccupations  Depression Symptoms: No problems reported or observed.  Anxiety Symptoms: No problems reported or observed.  Sari Symptoms: No problems reported or observed.  Hallucinations: Auditory (comment), Visual (comment) (patient reports seeing dead family members)  Delusions: No  Memory:Normal: No  Memory: Poor recent  Insight and Judgment: No  Insight and Judgment: Poor judgment, Poor insight    Tobacco Screening:  Practical Counseling, on admission, claudio X, if applicable and completed (first 3 are required if patient doesn't refuse):            ( ) Recognizing danger situations (included triggers and roadblocks)                    ( ) Coping skills (new ways to manage stress,relaxation techniques, changing routine, distraction)                                                           ( ) Basic information about quitting (benefits of quitting, techniques in how to quit, available resources  ( ) [facility]                                                                                                                   ( ) Patient refused counseling  ( X) Patient has not smoked in the last 30 days    Metabolic Screening:    No results found for: \"LABA1C\"    Lab Results   Component Value Date    CHOL 153 04/03/2024     Lab Results   Component Value Date    TRIG 165 (H) 04/03/2024     Lab Results   Component Value Date    HDL 44 04/03/2024     No components found for: \"LDLCAL\"  No components found for: \"LABVLDL\"      There is no height or weight

## 2024-05-16 ENCOUNTER — HOSPITAL ENCOUNTER (OUTPATIENT)
Dept: SPEECH THERAPY | Age: 59
Setting detail: THERAPIES SERIES
End: 2024-05-16
Attending: INTERNAL MEDICINE
Payer: MEDICAID

## 2024-05-16 ENCOUNTER — APPOINTMENT (OUTPATIENT)
Dept: PHYSICAL THERAPY | Age: 59
End: 2024-05-16
Attending: INTERNAL MEDICINE
Payer: MEDICAID

## 2024-05-16 ENCOUNTER — HOSPITAL ENCOUNTER (INPATIENT)
Age: 59
LOS: 2 days | Discharge: HOME | DRG: 115 | End: 2024-05-18
Attending: INTERNAL MEDICINE | Admitting: PSYCHIATRY & NEUROLOGY
Payer: MEDICAID

## 2024-05-16 ENCOUNTER — APPOINTMENT (OUTPATIENT)
Dept: OCCUPATIONAL THERAPY | Age: 59
End: 2024-05-16
Attending: INTERNAL MEDICINE
Payer: MEDICAID

## 2024-05-16 ENCOUNTER — APPOINTMENT (OUTPATIENT)
Dept: CT IMAGING | Age: 59
DRG: 115 | End: 2024-05-16
Attending: INTERNAL MEDICINE
Payer: MEDICAID

## 2024-05-16 VITALS
HEART RATE: 63 BPM | RESPIRATION RATE: 16 BRPM | DIASTOLIC BLOOD PRESSURE: 77 MMHG | SYSTOLIC BLOOD PRESSURE: 107 MMHG | WEIGHT: 185 LBS | BODY MASS INDEX: 26.54 KG/M2 | OXYGEN SATURATION: 97 % | TEMPERATURE: 98 F

## 2024-05-16 DIAGNOSIS — R51.9 HEADACHE WITH NEUROLOGIC DEFICIT: Primary | ICD-10-CM

## 2024-05-16 DIAGNOSIS — R29.818 HEADACHE WITH NEUROLOGIC DEFICIT: Primary | ICD-10-CM

## 2024-05-16 LAB
ANION GAP SERPL CALCULATED.3IONS-SCNC: 12 MMOL/L (ref 9–17)
BASOPHILS # BLD: 0 K/UL (ref 0–0.2)
BASOPHILS NFR BLD: 1 % (ref 0–2)
BUN SERPL-MCNC: 25 MG/DL (ref 8–23)
CALCIUM SERPL-MCNC: 9.5 MG/DL (ref 8.6–10.4)
CHLORIDE SERPL-SCNC: 104 MMOL/L (ref 98–107)
CHOLEST SERPL-MCNC: 118 MG/DL
CHOLESTEROL/HDL RATIO: 4.2
CO2 SERPL-SCNC: 18 MMOL/L (ref 20–31)
CREAT SERPL-MCNC: 0.8 MG/DL (ref 0.7–1.2)
EOSINOPHIL # BLD: 0.3 K/UL (ref 0–0.4)
EOSINOPHILS RELATIVE PERCENT: 4 % (ref 0–4)
ERYTHROCYTE [DISTWIDTH] IN BLOOD BY AUTOMATED COUNT: 15.2 % (ref 11.5–14.9)
GFR, ESTIMATED: >90 ML/MIN/1.73M2
GLUCOSE SERPL-MCNC: 101 MG/DL (ref 70–99)
HCT VFR BLD AUTO: 41.5 % (ref 41–53)
HDLC SERPL-MCNC: 28 MG/DL
HGB BLD-MCNC: 13.7 G/DL (ref 13.5–17.5)
LDLC SERPL CALC-MCNC: 54 MG/DL (ref 0–130)
LYMPHOCYTES NFR BLD: 1.1 K/UL (ref 1–4.8)
LYMPHOCYTES RELATIVE PERCENT: 13 % (ref 24–44)
MCH RBC QN AUTO: 29.1 PG (ref 26–34)
MCHC RBC AUTO-ENTMCNC: 33 G/DL (ref 31–37)
MCV RBC AUTO: 88.1 FL (ref 80–100)
MONOCYTES NFR BLD: 0.9 K/UL (ref 0.1–1.3)
MONOCYTES NFR BLD: 11 % (ref 1–7)
NEUTROPHILS NFR BLD: 71 % (ref 36–66)
NEUTS SEG NFR BLD: 5.9 K/UL (ref 1.3–9.1)
PLATELET # BLD AUTO: 147 K/UL (ref 150–450)
PMV BLD AUTO: 9.5 FL (ref 6–12)
POTASSIUM SERPL-SCNC: 4.2 MMOL/L (ref 3.7–5.3)
RBC # BLD AUTO: 4.71 M/UL (ref 4.5–5.9)
SODIUM SERPL-SCNC: 134 MMOL/L (ref 135–144)
TRIGL SERPL-MCNC: 182 MG/DL
WBC OTHER # BLD: 8.3 K/UL (ref 3.5–11)

## 2024-05-16 PROCEDURE — 6370000000 HC RX 637 (ALT 250 FOR IP)

## 2024-05-16 PROCEDURE — 85025 COMPLETE CBC W/AUTO DIFF WBC: CPT

## 2024-05-16 PROCEDURE — 97162 PT EVAL MOD COMPLEX 30 MIN: CPT

## 2024-05-16 PROCEDURE — 36415 COLL VENOUS BLD VENIPUNCTURE: CPT

## 2024-05-16 PROCEDURE — 2580000003 HC RX 258: Performed by: PSYCHIATRY & NEUROLOGY

## 2024-05-16 PROCEDURE — 99239 HOSP IP/OBS DSCHRG MGMT >30: CPT | Performed by: PSYCHIATRY & NEUROLOGY

## 2024-05-16 PROCEDURE — 83036 HEMOGLOBIN GLYCOSYLATED A1C: CPT

## 2024-05-16 PROCEDURE — 70450 CT HEAD/BRAIN W/O DYE: CPT

## 2024-05-16 PROCEDURE — 6370000000 HC RX 637 (ALT 250 FOR IP): Performed by: NURSE PRACTITIONER

## 2024-05-16 PROCEDURE — 80048 BASIC METABOLIC PNL TOTAL CA: CPT

## 2024-05-16 PROCEDURE — 99223 1ST HOSP IP/OBS HIGH 75: CPT | Performed by: PSYCHIATRY & NEUROLOGY

## 2024-05-16 PROCEDURE — 97166 OT EVAL MOD COMPLEX 45 MIN: CPT

## 2024-05-16 PROCEDURE — 2060000000 HC ICU INTERMEDIATE R&B

## 2024-05-16 PROCEDURE — 99223 1ST HOSP IP/OBS HIGH 75: CPT | Performed by: INTERNAL MEDICINE

## 2024-05-16 PROCEDURE — 97530 THERAPEUTIC ACTIVITIES: CPT

## 2024-05-16 PROCEDURE — 80061 LIPID PANEL: CPT

## 2024-05-16 RX ORDER — HYDROXYZINE 50 MG/1
50 TABLET, FILM COATED ORAL 3 TIMES DAILY PRN
Status: CANCELLED | OUTPATIENT
Start: 2024-05-16

## 2024-05-16 RX ORDER — LANOLIN ALCOHOL/MO/W.PET/CERES
6 CREAM (GRAM) TOPICAL NIGHTLY
Status: DISCONTINUED | OUTPATIENT
Start: 2024-05-16 | End: 2024-05-18 | Stop reason: HOSPADM

## 2024-05-16 RX ORDER — GABAPENTIN 300 MG/1
300 CAPSULE ORAL 3 TIMES DAILY
Status: DISCONTINUED | OUTPATIENT
Start: 2024-05-16 | End: 2024-05-16 | Stop reason: HOSPADM

## 2024-05-16 RX ORDER — ACETAMINOPHEN 325 MG/1
650 TABLET ORAL EVERY 6 HOURS PRN
Status: DISCONTINUED | OUTPATIENT
Start: 2024-05-16 | End: 2024-05-16

## 2024-05-16 RX ORDER — SODIUM CHLORIDE 0.9 % (FLUSH) 0.9 %
5-40 SYRINGE (ML) INJECTION EVERY 12 HOURS SCHEDULED
Status: DISCONTINUED | OUTPATIENT
Start: 2024-05-16 | End: 2024-05-18 | Stop reason: HOSPADM

## 2024-05-16 RX ORDER — POLYETHYLENE GLYCOL 3350 17 G/17G
17 POWDER, FOR SOLUTION ORAL DAILY PRN
Status: DISCONTINUED | OUTPATIENT
Start: 2024-05-16 | End: 2024-05-18 | Stop reason: HOSPADM

## 2024-05-16 RX ORDER — LACOSAMIDE 100 MG/1
100 TABLET ORAL 2 TIMES DAILY
Status: DISCONTINUED | OUTPATIENT
Start: 2024-05-16 | End: 2024-05-16 | Stop reason: HOSPADM

## 2024-05-16 RX ORDER — POTASSIUM CHLORIDE 7.45 MG/ML
10 INJECTION INTRAVENOUS PRN
Status: DISCONTINUED | OUTPATIENT
Start: 2024-05-16 | End: 2024-05-18 | Stop reason: HOSPADM

## 2024-05-16 RX ORDER — M-VIT,TX,IRON,MINS/CALC/FOLIC 27MG-0.4MG
1 TABLET ORAL DAILY
Status: CANCELLED | OUTPATIENT
Start: 2024-05-17

## 2024-05-16 RX ORDER — IPRATROPIUM BROMIDE AND ALBUTEROL SULFATE 2.5; .5 MG/3ML; MG/3ML
1 SOLUTION RESPIRATORY (INHALATION) EVERY 6 HOURS PRN
Status: CANCELLED | OUTPATIENT
Start: 2024-05-16

## 2024-05-16 RX ORDER — OLANZAPINE 5 MG/1
2.5 TABLET ORAL NIGHTLY
Status: CANCELLED | OUTPATIENT
Start: 2024-05-16

## 2024-05-16 RX ORDER — LACOSAMIDE 100 MG/1
100 TABLET ORAL 2 TIMES DAILY
Status: DISCONTINUED | OUTPATIENT
Start: 2024-05-16 | End: 2024-05-18 | Stop reason: HOSPADM

## 2024-05-16 RX ORDER — LANOLIN ALCOHOL/MO/W.PET/CERES
400 CREAM (GRAM) TOPICAL DAILY
Status: DISCONTINUED | OUTPATIENT
Start: 2024-05-17 | End: 2024-05-16 | Stop reason: HOSPADM

## 2024-05-16 RX ORDER — HYDROXYZINE 50 MG/1
50 TABLET, FILM COATED ORAL 3 TIMES DAILY PRN
Status: DISCONTINUED | OUTPATIENT
Start: 2024-05-16 | End: 2024-05-18 | Stop reason: HOSPADM

## 2024-05-16 RX ORDER — ACETAMINOPHEN 650 MG/1
650 SUPPOSITORY RECTAL EVERY 6 HOURS PRN
Status: DISCONTINUED | OUTPATIENT
Start: 2024-05-16 | End: 2024-05-16 | Stop reason: HOSPADM

## 2024-05-16 RX ORDER — SODIUM CHLORIDE 0.9 % (FLUSH) 0.9 %
5-40 SYRINGE (ML) INJECTION PRN
Status: DISCONTINUED | OUTPATIENT
Start: 2024-05-16 | End: 2024-05-16 | Stop reason: HOSPADM

## 2024-05-16 RX ORDER — ACETAMINOPHEN 500 MG
1000 TABLET ORAL 3 TIMES DAILY
Status: DISCONTINUED | OUTPATIENT
Start: 2024-05-16 | End: 2024-05-16 | Stop reason: HOSPADM

## 2024-05-16 RX ORDER — LANOLIN ALCOHOL/MO/W.PET/CERES
400 CREAM (GRAM) TOPICAL DAILY
Status: DISCONTINUED | OUTPATIENT
Start: 2024-05-16 | End: 2024-05-18 | Stop reason: HOSPADM

## 2024-05-16 RX ORDER — AMITRIPTYLINE HYDROCHLORIDE 75 MG/1
75 TABLET ORAL NIGHTLY
Status: CANCELLED | OUTPATIENT
Start: 2024-05-16

## 2024-05-16 RX ORDER — BUTALBITAL, ACETAMINOPHEN AND CAFFEINE 300; 40; 50 MG/1; MG/1; MG/1
1 CAPSULE ORAL 2 TIMES DAILY PRN
Status: CANCELLED | OUTPATIENT
Start: 2024-05-16

## 2024-05-16 RX ORDER — MAGNESIUM SULFATE HEPTAHYDRATE 40 MG/ML
2000 INJECTION, SOLUTION INTRAVENOUS PRN
Status: DISCONTINUED | OUTPATIENT
Start: 2024-05-16 | End: 2024-05-16 | Stop reason: HOSPADM

## 2024-05-16 RX ORDER — FLUOXETINE 10 MG/1
10 CAPSULE ORAL DAILY
Status: CANCELLED | OUTPATIENT
Start: 2024-05-17

## 2024-05-16 RX ORDER — BUTALBITAL, ACETAMINOPHEN AND CAFFEINE 300; 40; 50 MG/1; MG/1; MG/1
1 CAPSULE ORAL 2 TIMES DAILY PRN
Status: DISCONTINUED | OUTPATIENT
Start: 2024-05-16 | End: 2024-05-16

## 2024-05-16 RX ORDER — POTASSIUM CHLORIDE 20 MEQ/1
40 TABLET, EXTENDED RELEASE ORAL PRN
Status: DISCONTINUED | OUTPATIENT
Start: 2024-05-16 | End: 2024-05-18 | Stop reason: HOSPADM

## 2024-05-16 RX ORDER — ATORVASTATIN CALCIUM 80 MG/1
80 TABLET, FILM COATED ORAL NIGHTLY
Status: DISCONTINUED | OUTPATIENT
Start: 2024-05-16 | End: 2024-05-18 | Stop reason: HOSPADM

## 2024-05-16 RX ORDER — DEXTROSE MONOHYDRATE 100 MG/ML
INJECTION, SOLUTION INTRAVENOUS CONTINUOUS PRN
Status: DISCONTINUED | OUTPATIENT
Start: 2024-05-16 | End: 2024-05-18 | Stop reason: HOSPADM

## 2024-05-16 RX ORDER — TAMSULOSIN HYDROCHLORIDE 0.4 MG/1
0.4 CAPSULE ORAL DAILY
Status: DISCONTINUED | OUTPATIENT
Start: 2024-05-17 | End: 2024-05-16 | Stop reason: HOSPADM

## 2024-05-16 RX ORDER — SODIUM CHLORIDE 9 MG/ML
INJECTION, SOLUTION INTRAVENOUS PRN
Status: DISCONTINUED | OUTPATIENT
Start: 2024-05-16 | End: 2024-05-16 | Stop reason: HOSPADM

## 2024-05-16 RX ORDER — CITALOPRAM HYDROBROMIDE 10 MG/1
10 TABLET ORAL DAILY
Status: DISCONTINUED | OUTPATIENT
Start: 2024-05-16 | End: 2024-05-16 | Stop reason: HOSPADM

## 2024-05-16 RX ORDER — LIDOCAINE 4 G/G
2 PATCH TOPICAL DAILY
Status: DISCONTINUED | OUTPATIENT
Start: 2024-05-17 | End: 2024-05-16 | Stop reason: HOSPADM

## 2024-05-16 RX ORDER — TRAZODONE HYDROCHLORIDE 50 MG/1
50 TABLET ORAL NIGHTLY PRN
Status: DISCONTINUED | OUTPATIENT
Start: 2024-05-16 | End: 2024-05-18 | Stop reason: HOSPADM

## 2024-05-16 RX ORDER — LANOLIN ALCOHOL/MO/W.PET/CERES
10 CREAM (GRAM) TOPICAL NIGHTLY
Status: DISCONTINUED | OUTPATIENT
Start: 2024-05-16 | End: 2024-05-16 | Stop reason: HOSPADM

## 2024-05-16 RX ORDER — TOPIRAMATE 50 MG/1
75 TABLET, FILM COATED ORAL 2 TIMES DAILY
Status: DISCONTINUED | OUTPATIENT
Start: 2024-05-16 | End: 2024-05-18 | Stop reason: HOSPADM

## 2024-05-16 RX ORDER — AMITRIPTYLINE HYDROCHLORIDE 75 MG/1
75 TABLET ORAL NIGHTLY
Status: DISCONTINUED | OUTPATIENT
Start: 2024-05-16 | End: 2024-05-18 | Stop reason: HOSPADM

## 2024-05-16 RX ORDER — MAGNESIUM SULFATE HEPTAHYDRATE 40 MG/ML
2000 INJECTION, SOLUTION INTRAVENOUS PRN
Status: DISCONTINUED | OUTPATIENT
Start: 2024-05-16 | End: 2024-05-18 | Stop reason: HOSPADM

## 2024-05-16 RX ORDER — LORAZEPAM 2 MG/ML
4 INJECTION INTRAMUSCULAR EVERY 5 MIN PRN
Status: DISCONTINUED | OUTPATIENT
Start: 2024-05-16 | End: 2024-05-16 | Stop reason: HOSPADM

## 2024-05-16 RX ORDER — IPRATROPIUM BROMIDE AND ALBUTEROL SULFATE 2.5; .5 MG/3ML; MG/3ML
1 SOLUTION RESPIRATORY (INHALATION) EVERY 6 HOURS PRN
Status: DISCONTINUED | OUTPATIENT
Start: 2024-05-16 | End: 2024-05-16 | Stop reason: HOSPADM

## 2024-05-16 RX ORDER — TOPIRAMATE 50 MG/1
75 TABLET, FILM COATED ORAL 2 TIMES DAILY
Status: DISCONTINUED | OUTPATIENT
Start: 2024-05-16 | End: 2024-05-16 | Stop reason: HOSPADM

## 2024-05-16 RX ORDER — TAMSULOSIN HYDROCHLORIDE 0.4 MG/1
0.4 CAPSULE ORAL DAILY
Status: DISCONTINUED | OUTPATIENT
Start: 2024-05-16 | End: 2024-05-18 | Stop reason: HOSPADM

## 2024-05-16 RX ORDER — SODIUM CHLORIDE 0.9 % (FLUSH) 0.9 %
5-40 SYRINGE (ML) INJECTION PRN
Status: DISCONTINUED | OUTPATIENT
Start: 2024-05-16 | End: 2024-05-18 | Stop reason: HOSPADM

## 2024-05-16 RX ORDER — LANOLIN ALCOHOL/MO/W.PET/CERES
400 CREAM (GRAM) TOPICAL DAILY
Status: CANCELLED | OUTPATIENT
Start: 2024-05-17

## 2024-05-16 RX ORDER — ONDANSETRON 2 MG/ML
4 INJECTION INTRAMUSCULAR; INTRAVENOUS EVERY 6 HOURS PRN
Status: DISCONTINUED | OUTPATIENT
Start: 2024-05-16 | End: 2024-05-16 | Stop reason: HOSPADM

## 2024-05-16 RX ORDER — BACLOFEN 10 MG/1
5 TABLET ORAL 2 TIMES DAILY
Status: DISCONTINUED | OUTPATIENT
Start: 2024-05-16 | End: 2024-05-18 | Stop reason: HOSPADM

## 2024-05-16 RX ORDER — POTASSIUM CHLORIDE 20 MEQ/1
40 TABLET, EXTENDED RELEASE ORAL PRN
Status: DISCONTINUED | OUTPATIENT
Start: 2024-05-16 | End: 2024-05-16 | Stop reason: HOSPADM

## 2024-05-16 RX ORDER — POLYETHYLENE GLYCOL 3350 17 G/17G
17 POWDER, FOR SOLUTION ORAL DAILY PRN
Status: DISCONTINUED | OUTPATIENT
Start: 2024-05-16 | End: 2024-05-16 | Stop reason: HOSPADM

## 2024-05-16 RX ORDER — SODIUM CHLORIDE 0.9 % (FLUSH) 0.9 %
5-40 SYRINGE (ML) INJECTION EVERY 12 HOURS SCHEDULED
Status: DISCONTINUED | OUTPATIENT
Start: 2024-05-16 | End: 2024-05-16 | Stop reason: HOSPADM

## 2024-05-16 RX ORDER — TOPIRAMATE 50 MG/1
75 TABLET, FILM COATED ORAL 2 TIMES DAILY
Status: CANCELLED | OUTPATIENT
Start: 2024-05-16

## 2024-05-16 RX ORDER — AMITRIPTYLINE HYDROCHLORIDE 75 MG/1
75 TABLET ORAL NIGHTLY
Status: DISCONTINUED | OUTPATIENT
Start: 2024-05-16 | End: 2024-05-16 | Stop reason: HOSPADM

## 2024-05-16 RX ORDER — BACLOFEN 10 MG/1
5 TABLET ORAL 2 TIMES DAILY
Status: CANCELLED | OUTPATIENT
Start: 2024-05-16

## 2024-05-16 RX ORDER — SODIUM CHLORIDE 9 MG/ML
INJECTION, SOLUTION INTRAVENOUS CONTINUOUS
Status: DISCONTINUED | OUTPATIENT
Start: 2024-05-16 | End: 2024-05-18 | Stop reason: HOSPADM

## 2024-05-16 RX ORDER — POLYETHYLENE GLYCOL 3350 17 G/17G
17 POWDER, FOR SOLUTION ORAL DAILY PRN
Status: DISCONTINUED | OUTPATIENT
Start: 2024-05-16 | End: 2024-05-16 | Stop reason: SDUPTHER

## 2024-05-16 RX ORDER — BACLOFEN 10 MG/1
5 TABLET ORAL 2 TIMES DAILY
Status: DISCONTINUED | OUTPATIENT
Start: 2024-05-16 | End: 2024-05-16 | Stop reason: HOSPADM

## 2024-05-16 RX ORDER — BUTALBITAL, ACETAMINOPHEN AND CAFFEINE 300; 40; 50 MG/1; MG/1; MG/1
1 CAPSULE ORAL 2 TIMES DAILY PRN
Status: DISCONTINUED | OUTPATIENT
Start: 2024-05-16 | End: 2024-05-18 | Stop reason: HOSPADM

## 2024-05-16 RX ORDER — LIDOCAINE 4 G/G
1 PATCH TOPICAL DAILY
Status: CANCELLED | OUTPATIENT
Start: 2024-05-17

## 2024-05-16 RX ORDER — SODIUM CHLORIDE 0.9 % (FLUSH) 0.9 %
5-40 SYRINGE (ML) INJECTION EVERY 12 HOURS SCHEDULED
Status: DISCONTINUED | OUTPATIENT
Start: 2024-05-16 | End: 2024-05-16 | Stop reason: SDUPTHER

## 2024-05-16 RX ORDER — ACETAMINOPHEN 650 MG/1
650 SUPPOSITORY RECTAL EVERY 6 HOURS PRN
Status: DISCONTINUED | OUTPATIENT
Start: 2024-05-16 | End: 2024-05-16

## 2024-05-16 RX ORDER — LACOSAMIDE 100 MG/1
100 TABLET ORAL 2 TIMES DAILY
Status: CANCELLED | OUTPATIENT
Start: 2024-05-16

## 2024-05-16 RX ORDER — ENOXAPARIN SODIUM 100 MG/ML
40 INJECTION SUBCUTANEOUS DAILY
Status: DISCONTINUED | OUTPATIENT
Start: 2024-05-16 | End: 2024-05-16 | Stop reason: HOSPADM

## 2024-05-16 RX ORDER — CITALOPRAM 20 MG/1
10 TABLET ORAL DAILY
Status: DISCONTINUED | OUTPATIENT
Start: 2024-05-16 | End: 2024-05-18 | Stop reason: HOSPADM

## 2024-05-16 RX ORDER — FERROUS SULFATE 325(65) MG
325 TABLET ORAL EVERY OTHER DAY
Status: DISCONTINUED | OUTPATIENT
Start: 2024-05-16 | End: 2024-05-18 | Stop reason: HOSPADM

## 2024-05-16 RX ORDER — BUTALBITAL, ACETAMINOPHEN AND CAFFEINE 50; 325; 40 MG/1; MG/1; MG/1
1 TABLET ORAL 2 TIMES DAILY PRN
Status: DISCONTINUED | OUTPATIENT
Start: 2024-05-16 | End: 2024-05-16 | Stop reason: HOSPADM

## 2024-05-16 RX ORDER — ATORVASTATIN CALCIUM 20 MG/1
80 TABLET, FILM COATED ORAL NIGHTLY
Status: CANCELLED | OUTPATIENT
Start: 2024-05-16

## 2024-05-16 RX ORDER — POLYETHYLENE GLYCOL 3350 17 G
2 POWDER IN PACKET (EA) ORAL
Status: CANCELLED | OUTPATIENT
Start: 2024-05-16

## 2024-05-16 RX ORDER — POLYETHYLENE GLYCOL 3350 17 G
2 POWDER IN PACKET (EA) ORAL
Status: DISCONTINUED | OUTPATIENT
Start: 2024-05-16 | End: 2024-05-18 | Stop reason: HOSPADM

## 2024-05-16 RX ORDER — TRAZODONE HYDROCHLORIDE 50 MG/1
50 TABLET ORAL NIGHTLY PRN
Status: CANCELLED | OUTPATIENT
Start: 2024-05-16

## 2024-05-16 RX ORDER — GABAPENTIN 300 MG/1
300 CAPSULE ORAL 3 TIMES DAILY
Status: CANCELLED | OUTPATIENT
Start: 2024-05-16

## 2024-05-16 RX ORDER — ATORVASTATIN CALCIUM 20 MG/1
80 TABLET, FILM COATED ORAL NIGHTLY
Status: DISCONTINUED | OUTPATIENT
Start: 2024-05-16 | End: 2024-05-16 | Stop reason: HOSPADM

## 2024-05-16 RX ORDER — M-VIT,TX,IRON,MINS/CALC/FOLIC 27MG-0.4MG
1 TABLET ORAL DAILY
Status: DISCONTINUED | OUTPATIENT
Start: 2024-05-17 | End: 2024-05-18 | Stop reason: HOSPADM

## 2024-05-16 RX ORDER — POTASSIUM CHLORIDE 7.45 MG/ML
10 INJECTION INTRAVENOUS PRN
Status: DISCONTINUED | OUTPATIENT
Start: 2024-05-16 | End: 2024-05-16 | Stop reason: HOSPADM

## 2024-05-16 RX ORDER — OLANZAPINE 2.5 MG/1
2.5 TABLET, FILM COATED ORAL NIGHTLY
Status: DISCONTINUED | OUTPATIENT
Start: 2024-05-16 | End: 2024-05-18 | Stop reason: HOSPADM

## 2024-05-16 RX ORDER — ONDANSETRON 4 MG/1
4 TABLET, ORALLY DISINTEGRATING ORAL EVERY 8 HOURS PRN
Status: DISCONTINUED | OUTPATIENT
Start: 2024-05-16 | End: 2024-05-16 | Stop reason: HOSPADM

## 2024-05-16 RX ORDER — ACETAMINOPHEN 325 MG/1
650 TABLET ORAL EVERY 6 HOURS PRN
Status: DISCONTINUED | OUTPATIENT
Start: 2024-05-16 | End: 2024-05-16 | Stop reason: HOSPADM

## 2024-05-16 RX ORDER — GABAPENTIN 300 MG/1
300 CAPSULE ORAL 3 TIMES DAILY
Status: DISCONTINUED | OUTPATIENT
Start: 2024-05-16 | End: 2024-05-18 | Stop reason: HOSPADM

## 2024-05-16 RX ORDER — TAMSULOSIN HYDROCHLORIDE 0.4 MG/1
0.4 CAPSULE ORAL DAILY
Status: CANCELLED | OUTPATIENT
Start: 2024-05-17

## 2024-05-16 RX ADMIN — ATORVASTATIN CALCIUM 80 MG: 80 TABLET, FILM COATED ORAL at 21:32

## 2024-05-16 RX ADMIN — LACOSAMIDE 100 MG: 100 TABLET, FILM COATED ORAL at 21:32

## 2024-05-16 RX ADMIN — BACLOFEN 5 MG: 10 TABLET ORAL at 21:32

## 2024-05-16 RX ADMIN — LACOSAMIDE 100 MG: 100 TABLET, FILM COATED ORAL at 09:07

## 2024-05-16 RX ADMIN — BUTALBITA,ACETAMINOPHEN AND CAFFEINE 1 CAPSULE: 50; 300; 40 CAPSULE ORAL at 09:07

## 2024-05-16 RX ADMIN — BUTALBITA,ACETAMINOPHEN AND CAFFEINE 1 CAPSULE: 50; 300; 40 CAPSULE ORAL at 18:54

## 2024-05-16 RX ADMIN — GABAPENTIN 300 MG: 300 CAPSULE ORAL at 09:08

## 2024-05-16 RX ADMIN — AMITRIPTYLINE HYDROCHLORIDE 75 MG: 75 TABLET, FILM COATED ORAL at 21:31

## 2024-05-16 RX ADMIN — TAMSULOSIN HYDROCHLORIDE 0.4 MG: 0.4 CAPSULE ORAL at 09:08

## 2024-05-16 RX ADMIN — FLUOXETINE 10 MG: 10 CAPSULE ORAL at 09:07

## 2024-05-16 RX ADMIN — GABAPENTIN 300 MG: 300 CAPSULE ORAL at 21:31

## 2024-05-16 RX ADMIN — BACLOFEN 5 MG: 10 TABLET ORAL at 09:06

## 2024-05-16 RX ADMIN — Medication 6 MG: at 21:32

## 2024-05-16 RX ADMIN — TOPIRAMATE 75 MG: 50 TABLET, FILM COATED ORAL at 09:08

## 2024-05-16 RX ADMIN — DICLOFENAC SODIUM 2 G: 10 GEL TOPICAL at 21:39

## 2024-05-16 RX ADMIN — OLANZAPINE 2.5 MG: 2.5 TABLET, FILM COATED ORAL at 21:32

## 2024-05-16 RX ADMIN — SODIUM CHLORIDE: 9 INJECTION, SOLUTION INTRAVENOUS at 16:32

## 2024-05-16 RX ADMIN — TOPIRAMATE 75 MG: 50 TABLET, FILM COATED ORAL at 21:32

## 2024-05-16 RX ADMIN — Medication 400 MG: at 09:08

## 2024-05-16 RX ADMIN — DICLOFENAC SODIUM 2 G: 10 GEL TOPICAL at 10:45

## 2024-05-16 RX ADMIN — Medication 1 TABLET: at 09:08

## 2024-05-16 ASSESSMENT — PAIN DESCRIPTION - ORIENTATION
ORIENTATION: LEFT
ORIENTATION: RIGHT;LEFT

## 2024-05-16 ASSESSMENT — PAIN DESCRIPTION - DESCRIPTORS
DESCRIPTORS: ACHING
DESCRIPTORS: ACHING

## 2024-05-16 ASSESSMENT — PAIN SCALES - GENERAL
PAINLEVEL_OUTOF10: 8

## 2024-05-16 ASSESSMENT — PAIN DESCRIPTION - LOCATION
LOCATION: KNEE
LOCATION: KNEE
LOCATION: HEAD
LOCATION: HEAD

## 2024-05-16 ASSESSMENT — PAIN - FUNCTIONAL ASSESSMENT: PAIN_FUNCTIONAL_ASSESSMENT: PREVENTS OR INTERFERES SOME ACTIVE ACTIVITIES AND ADLS

## 2024-05-16 ASSESSMENT — ENCOUNTER SYMPTOMS
CONSTIPATION: 1
EYES NEGATIVE: 1
RESPIRATORY NEGATIVE: 1

## 2024-05-16 NOTE — GROUP NOTE
Group Therapy Note    Date: 5/16/2024    Group Start Time: 1100  Group End Time: 1150  Group Topic: Cognitive Skills    Kinsey Hollis CTRS        Group Therapy Note    Attendees: 4/16     Topic: To increase socialization and practice decision making , and communication skills      Patient did not participate in Cognitive Skills Group at 11:00, despite staff encouragement   and explanation of benefits and modifications to task made available by RT.   Pt was seclusive to self and room .     Q15 minute safety checks maintained for patient safety and will continue to encourage   patient to attend unit programming.       Discipline Responsible: Psychoeducational Specialist  Signature:  LUPE OLIVARES

## 2024-05-16 NOTE — BH NOTE
[facility] [place]  Discharge Note    Pt discharged with followings belongings:   Dental Appliances: None  Vision - Corrective Lenses: Eyeglasses  Hearing Aid: None  Jewelry: None  Body Piercings Removed: N/A  Clothing: Shirt, Pants, Undergarments  Other Valuables: Other (Comment) (none)   Valuables returned to patient. Patient educated on aftercare instructions: transferred to Room 20[facility] at [facility].  Status EXAM upon discharge:  Mental Status and Behavioral Exam  Normal: No  Level of Assistance: Total assist  Facial Expression: Sad, Brightened, Worried  Affect: Appropriate, Unstable  Level of Consciousness: Alert  Frequency of Checks: 1 staff: 1 patient  - continuous  Mood:Normal: No  Mood: Depressed, Anxious, Angry, Ashamed/humiliated, Empty, Helpless, Sad, Worthless, low self-esteem  Motor Activity:Normal: No  Motor Activity: Decreased, Unusual posture/gait  Eye Contact: Good  Observed Behavior: Tearful, Preoccupied, Guarded, Friendly, Cooperative, Impulsive  Sexual Misconduct History: Current - no  Preception: [place] to situation, [place] to place, [place] to time, [place] to person  Attention:Normal: No  Attention: Distractible  Thought Processes: Circumstantial  Thought Content:Normal: No  Thought Content: Obsessions, Paranoia, Preoccupations  Depression Symptoms: Change in energy level, Isolative  Anxiety Symptoms: No problems reported or observed.  Sari Symptoms: No problems reported or observed.  Hallucinations: Visual (comment)  Delusions: Yes  Delusions: Paranoid, Obsessions  Memory:Normal: No  Memory: Poor remote, Confabulation  Insight and Judgment: No  Insight and Judgment: Poor judgment, Poor insight, Unrealistic    Tobacco Screening:  Practical Counseling, on admission, claudio X, if applicable and completed (first 3 are required if patient doesn't refuse):            ( ) Recognizing danger situations (included triggers and roadblocks)                    ( ) Coping skills (new ways

## 2024-05-16 NOTE — DISCHARGE INSTR - DIET

## 2024-05-16 NOTE — PLAN OF CARE
Problem: Chronic Conditions and Co-morbidities  Goal: Patient's chronic conditions and co-morbidity symptoms are monitored and maintained or improved  5/16/2024 0052 by Jess Lewis LPN  Outcome: Not Progressing  Note: Patient educated on resources and non-pharmalogical interventions that can assist in improving current chronic co-morbidities. Patient provided with access to medication and meals appropriate in assisting with co-morbidities. Patient agreed to inform staff if further assistance and/or education is needed. Staff ensures safety by providing safety checks on the unit intermittently and every 15 minutes. Staff continues to provide a safe environment.    5/15/2024 1902 by Keyla Gaspar LPN  Outcome: Progressing  Note: Patient takes medication as ordered is compliant and helpful with care   5/15/2024 1250 by Slime Potter RN  Outcome: Progressing     Problem: Anxiety  Goal: Will report anxiety at manageable levels  Description: INTERVENTIONS:  1. Administer medication as ordered  2. Teach and rehearse alternative coping skills  3. Provide emotional support with 1:1 interaction with staff  5/16/2024 0052 by Jess Lewis LPN  Outcome: Not Progressing  Flowsheets (Taken 5/16/2024 0052)  Will report anxiety at manageable levels:   Administer medication as ordered   Provide emotional support with 1:1 interaction with staff   Teach and rehearse alternative coping skills  Note: Patient remains free from harm to self or others but admits to continued anxiety and depression. Medications available upon request. Staff ensures safety by providing safety checks on the unit intermittently and every 15 minutes. Staff continues to provide a safe environment. Staff encouraged patient to notify if depression continues.   5/15/2024 1902 by Keyla Gaspar LPN  Outcome: Progressing  Note: Patient continues with anxiety due to not being with wife and feeling overwhelmed with wife   5/15/2024 1250 by  Slime Potter, RN  Outcome: Progressing     Problem: Pain  Goal: Verbalizes/displays adequate comfort level or baseline comfort level  5/16/2024 0052 by Jess Lewis LPN  Outcome: Not Progressing  Flowsheets (Taken 5/16/2024 0046)  Verbalizes/displays adequate comfort level or baseline comfort level:   Encourage patient to monitor pain and request assistance   Administer analgesics based on type and severity of pain and evaluate response   Consider cultural and social influences on pain and pain management   Notify Licensed Independent Practitioner if interventions unsuccessful or patient reports new pain   Implement non-pharmacological measures as appropriate and evaluate response   Assess pain using appropriate pain scale  Note: Patient continues to have left shoulder, left knee and head pain. Medications given upon request. Patient stated they will notify staff of continued or resolved pain. Staff continues to monitor patient. Patient has tried and failed multiple non-pharmalogical resolutions with little success including but not limited to: rest, exercise, socializing, watching a movie and therapeutic conversation.  5/15/2024 1902 by Keyla Gaspar LPN  Outcome: Not Progressing  Note: Patient continues with pain at a 8 to  head and shoulder. All PRNS offered and administered with some relief when resting in bed   5/15/2024 1250 by Slime Potter, RN  Outcome: Progressing

## 2024-05-16 NOTE — CONSULTS
[facility] Neurology   IN-PATIENT SERVICE      NEUROLOGY CONSULT  NOTE            Date:   5/16/2024  Patient name:  Brennen Mcclure  Date of admission:  5/14/2024  YOB: 1965      Chief Complaint:     Depression    Reason for Consult:      Headache, worsening CT head    History of Present Illness:     The patient is a 59 y.o. male who presents with fever, altered mental status.  . The patient was seen and examined and the chart was reviewed.  Patient initially seen by our service back in mid April with intermittent episodes of confusion, dysphagia.  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  There is residual left hemiplegia secondary to this.  Eventually was discharged to an acute rehab in [place] after this.  During the April hospitalization he had MRI brain done showing right hemispheric edema, encephalomalacia with hematoma with no acute findings.  EEG was done showing slowing and no epileptiform activities.  He improved somewhat during his hospitalization.  Underwent speech evaluation and cleared for diet.  Hematology consulted for his thrombocytopenia with continued outpatient follow-up.  Patient then presents back to the hospital on 5/8 with again intermittent confusion, fever admitted to neurology service.  Underwent another MRI of the brain showing right frontal parietal temporal craniectomy with residual vasogenic edema and scattered encephalomalacia largely unchanged.  MRV was done which did not show any evidence of cerebral venous sinus thrombosis.  EEG showing mild to moderate encephalopathy and interhemispheric asymmetry suggesting focal lesion on the right and consistent with a breach rhythm.  Found to have UTI, peritonitis.  Lumbar puncture was done to evaluate for meningitis, they were unrevealing largely.  Patient continued to            Diagnostics:      Laboratory Testing:  CBC:   Recent Labs     05/14/24  0246   WBC 7.4   HGB 13.0   PLT See Reflexed IPF Result     BMP:    Recent Labs     05/14/24  1556      K 4.1      CO2 21   BUN 20   CREATININE 0.9   GLUCOSE 104*         Lab Results   Component Value Date    CHOL 153 04/03/2024    HDL 44 04/03/2024    TRIG 165 (H) 04/03/2024    ALT 28 05/08/2024    AST 29 05/08/2024    TSH 3.45 04/13/2024    INR 1.0 05/11/2024    [id number] 915 02/12/2024           Imaging/Diagnostics:      EEG: Mild to moderate diffuse encephalopathy.  Interhemispheric asymmetry suggesting focal lesion in the right as well as consistent with breach rhythm.    MRI brain without contrast    Narrative  EXAMINATION:  MRI OF THE BRAIN WITHOUT CONTRAST  5/9/2024 8:22 pm    TECHNIQUE:  Multiplanar multisequence MRI of the brain was performed without the  administration of intravenous contrast.    COMPARISON:  CT head May 8, 2024    HISTORY:  ORDERING SYSTEM PROVIDED HISTORY: altered mental status  TECHNOLOGIST PROVIDED HISTORY:  altered mental status  Decision Support Exception - unselect if not a suspected or confirmed  emergency medical condition->Emergency Medical Condition (MA)  Reason for Exam: Altered mental status    FINDINGS:  INTRACRANIAL STRUCTURES/VENTRICLES: The patient is status post right frontal  parietal temporal craniectomy.  There is residual vasogenic edema, scattered  encephalomalacia and scattered chronic blood products in the subjacent right  frontal parietal temporal lobes.  There is 2.2 x 2.7 cm fluid collection with  associated restricted diffusion and peripheral susceptibility artifacts in  the right frontal lobe, likely related to surgical cavity with subacute blood  products.  There is additional 5 x 11 mm fluid collection with associated  restricted diffusion in the anterior right frontal lobe along the anterior  margin of the craniectomy, likely related to subacute parenchymal

## 2024-05-16 NOTE — GROUP NOTE
Group Therapy Note    Date: 5/16/2024    Group Start Time: 1000  Group End Time: 1050  Group Topic: Psychotherapy     Miriam Love MSW        Group Therapy Note    Attendees: 5/16     Patient was offered group therapy today but declined to participate despite encouragement from staff.  1:1 was offered.    Discipline Responsible: /Counselor      Signature:  MADDI Cotter

## 2024-05-16 NOTE — TRANSITION OF CARE
Behavioral Health Transition Record    Patient Name: Brennen Mcclure  YOB: 1965   Medical Record Number: [id number]  Date of Admission: 5/14/2024  9:51 PM   Date of Discharge: 5/16/2024    Attending Provider: Valencia Miles,*   Discharging Provider: Ginger  To contact this individual call [phone] and ask the  to page.  If unavailable, ask to be transferred to Behavioral Health Provider on call.  A Behavioral Health Provider will be available on call 24/7 and during holidays.    Primary Care Provider: Blanca Ren MD    No Known Allergies    Reason for Admission: Suicidal ideation due to decline in health    Admission Diagnosis: Depression with suicidal ideation [F32.A, R45.851]    * No surgery found *    No results found for this visit on 05/14/24.    Immunizations administered during this encounter:   There is no immunization history on file for this patient.  Influenza Vaccination Status: Documentation of patient's or caregiver's refusal of influenza vaccine.    Screening for Metabolic Disorders for Patients on Antipsychotic Medications  (Data obtained from the EMR)    Estimated Body Mass Index  Body mass index is 26.54 kg/m².      Vital Signs/Blood Pressure  /77   Pulse 63   Temp 98 °F (36.7 °C) (Temporal)   Resp 16   Wt 83.9 kg (185 lb)   SpO2 97%   BMI 26.54 kg/m²      Fasting Blood Glucose or Hemoglobin A1c  No results found for: \"GLU\", \"GLUCPOC\"    No results found for: \"LABA1C\", \"[id number]\"    Discharge Diagnosis: Major depressive disorder    Discharge Plan/Destination: [facility]    Discharge Medication List and Instructions:      Medication List        CONTINUE taking these medications      Handicap Placard Misc  by Does not apply route Duration: 1 year / Dx: Stroke            ASK your doctor about these medications      acetaminophen 500 MG tablet  Commonly known as: TYLENOL  Take 2 tablets by mouth in the morning, at noon, and at bedtime  Notes

## 2024-05-16 NOTE — PROGRESS NOTES
Physical Therapy  Facility/Department: [facility]  Physical Therapy Initial Assessment    Name: Brennen Mcclure  : 1965  MRN: [id number]  Date of Service: 2024    Discharge Recommendations:  24 hour supervision or assist   PT Equipment Recommendations  Equipment Needed: No      Patient Diagnosis(es): There were no encounter diagnoses.  Past Medical History:  has a past medical history of Anemia, CAD (coronary artery disease), Chronic deep vein thrombosis (DVT) of both lower extremities (HCC), Chronic deep vein thrombosis (DVT) of femoral vein of right lower extremity (HCC), Chronic deep vein thrombosis (DVT) of proximal vein of right lower extremity (HCC), Chronic deep vein thrombosis (DVT) of right iliac vein (HCC), Chronic idiopathic thrombocytopenia (HCC), Chronic idiopathic thrombocytopenic purpura (HCC), CVA (cerebral vascular accident) (HCC), Emphysema lung (HCC), Hemosiderin pigmentation of skin, HLD (hyperlipidemia), Intracranial hemorrhage (HCC), Left hemiplegia (HCC), Right leg swelling, and Thrombocytopenia (HCC).  Past Surgical History:  has a past surgical history that includes Coronary artery bypass graft; Coronary stent placement; craniotomy (Right); and CT BIOPSY BONE MARROW (3/18/2024).    Assessment   Body Structures, Functions, Activity Limitations Requiring Skilled Therapeutic Intervention: Decreased functional mobility ;Decreased endurance;Decreased balance  Assessment: Impaired mobility due to decreased tolerance to activity and safety concerns of decreased balance  Decision Making: Medium Complexity  History: Cerebral Hemorrhage; Seizures  Exam: decreased balance, endurance, mobility  Clinical Presentation: evolving  Requires PT Follow-Up: Yes  Activity Tolerance  Activity Tolerance: Patient tolerated evaluation without incident;Patient limited by endurance     Plan   Physical Therapy Plan  General Plan: 3-5 times per week  Current Treatment Recommendations: Balance training, Functional  mobility training, Transfer training, Endurance training, Therapeutic activities  Safety Devices  Type of Devices: Left in chair, Sitter present, Nurse notified, Patient at risk for falls, Gait belt     Restrictions  Restrictions/Precautions  Restrictions/Precautions: General Precautions, Fall Risk, Suicide  Required Braces or Orthoses?: Yes  Position Activity Restriction  Other position/activity restrictions: up with assist, Hx L plegia and R hemicraniectomy, helmet at all times     Subjective   Pain: pt c/o ahaving a headache rating 8/10  General  Family / Caregiver Present: Yes (sitter)  Follows Commands: Within Functional Limits  Subjective  Subjective: pt pleasant and cooperative         Social/Functional History  Social/Functional History  Lives With: Spouse  Type of Home: House  Home Layout: One level  Home Access: Ramped entrance  Bathroom Shower/Tub: Walk-in shower, Shower chair with back (ride in, bathes in scotty steady)  Bathroom Equipment: Grab bars in shower, Hand-held shower, Shower chair  Home Equipment: Wheelchair - Manual, Hospital bed (scotty steady)  Receives Help From: Family  ADL Assistance: Needs assistance (feeds self, hygiene with R UE as able, assist with dressing.)  Toileting: Needs assistance  Homemaking Assistance: Needs assistance  Homemaking Responsibilities: No  Ambulation Assistance: Non-ambulatory (self propulsion w/c)  Transfer Assistance: Needs assistance (scotty steady)  Active : No  Patient's  Info: Wife  Mode of Transportation: Family  Type of Occupation:   Leisure & Hobbies: therapy, watch movies, games sit in chair outside  Additional Comments: outpatient PT and OT.        Objective   O2 Device: None (Room air)       AROM RLE (degrees)  RLE AROM: WNL  PROM LLE (degrees)  LLE PROM: WFL  Strength RLE  Strength RLE: WFL  Comment: 4/5 grossly  Strength LLE  Comment: no AROM observed           Bed mobility  Supine to Sit: 2 Person assistance;Maximum

## 2024-05-16 NOTE — BH NOTE
Patient continues to complain of a severe headache \"like when I had a stroke\" to writer. Fluids encouraged and medications given per patient request. Writer will continue to monitor.

## 2024-05-16 NOTE — PLAN OF CARE
Problem: Safety - Adult  Goal: Free from fall injury  5/16/2024 0429 by Gricel Stevens  Outcome: Progressing  5/15/2024 1902 by Keyla Gaspar LPN  Outcome: Progressing  Note: Patient continues with 1:1  to assist with daily cares      Problem: Depression/Self Harm  Goal: Effect of psychiatric condition will be minimized and patient will be protected from self harm  Description: INTERVENTIONS:  1. Assess impact of patient's symptoms on level of functioning, self care needs and offer support as indicated  2. Assess patient/family knowledge of depression, impact on illness and need for teaching  3. Provide emotional support, presence and reassurance  4. Assess for possible suicidal thoughts or ideation. If patient expresses suicidal thoughts or statements do not leave alone, initiate Suicide Precautions, move to a room close to the nursing station and obtain sitter  5. Initiate consults as appropriate with Mental Health Professional, Spiritual Care, Psychosocial CNS, and consider a recommendation to the LIP for a Psychiatric Consultation  5/16/2024 0429 by Gricel Stevens  Outcome: Progressing   Patient tearful at times, denies anythoughts to harm self. Patient rates depression and anxiety both at a six. Patient isolative to room patient provided safe environment within [facility]. Will continue to monitor and provide q15 min safety checks.    Problem: Chronic Conditions and Co-morbidities  Goal: Patient's chronic conditions and co-morbidity symptoms are monitored and maintained or improved  5/16/2024 0052 by Jess Lewis LPN  Outcome: Not Progressing  Note: Patient educated on resources and non-pharmalogical interventions that can assist in improving current chronic co-morbidities. Patient provided with access to medication and meals appropriate in assisting with co-morbidities. Patient agreed to inform staff if further assistance and/or education is needed. Staff ensures safety by providing safety  checks on the unit intermittently and every 15 minutes. Staff continues to provide a safe environment.    5/15/2024 1902 by Keyla Gaspar LPN  Outcome: Progressing  Note: Patient takes medication as ordered is compliant and helpful with care      Problem: Anxiety  Goal: Will report anxiety at manageable levels  Description: INTERVENTIONS:  1. Administer medication as ordered  2. Teach and rehearse alternative coping skills  3. Provide emotional support with 1:1 interaction with staff  5/16/2024 0052 by Jess Lewis LPN  Outcome: Not Progressing  Flowsheets (Taken 5/16/2024 0052)  Will report anxiety at manageable levels:   Administer medication as ordered   Provide emotional support with 1:1 interaction with staff   Teach and rehearse alternative coping skills  Note: Patient remains free from harm to self or others but admits to continued anxiety and depression. Medications available upon request. Staff ensures safety by providing safety checks on the unit intermittently and every 15 minutes. Staff continues to provide a safe environment. Staff encouraged patient to notify if depression continues.   5/15/2024 1902 by Keyla Gaspar LPN  Outcome: Progressing  Note: Patient continues with anxiety due to not being with wife and feeling overwhelmed with wife      Problem: Pain  Goal: Verbalizes/displays adequate comfort level or baseline comfort level  5/16/2024 0052 by Jess Lewis LPN  Outcome: Not Progressing  Flowsheets (Taken 5/16/2024 0046)  Verbalizes/displays adequate comfort level or baseline comfort level:   Encourage patient to monitor pain and request assistance   Administer analgesics based on type and severity of pain and evaluate response   Consider cultural and social influences on pain and pain management   Notify Licensed Independent Practitioner if interventions unsuccessful or patient reports new pain   Implement non-pharmacological measures as appropriate and evaluate response

## 2024-05-16 NOTE — DISCHARGE SUMMARY
hemoglobin. Microscopic urinalysis showed  WBC, 20-50 RBC, 5-10 casts, no bacteria. Due to concern of intermittent severe fever, decision to admit to inpatient neurology was made.     Heme-oncology were consulted for thrombocytopenia with recommendation to start steroids if plt drops below [id number]. Patient was previously on Eliquis for DVT, however is not on anticoagulation or antiplatelets at this time. He was recently started on IVIG to improve platelet counts to eventually undergo surgical repair of right hemicraniectomy.     CT head without IV contrast done on 5/8/2024 shows deep white matter edema in right cerebral hemisphere with external herniation, 3 mm left to right midline shift, no hemorrhage or extra-axial fluid collection. Right dural reaction.     MRI brain showed - s/p right frontal parietal temporal craniectomy. Residual vasogenic edema, scattered encephalomalacia and scattered chronic blood products in the subjacent right frontal parietal temporal lobes. Mass effect but no midline shift.     Routine EEG - The slowing mentioned above suggests mild-moderate non specific encephalopathy.     Due to constant concern from wife that patient becomes forgetful after we evaluate, and patient having headaches a CTV was done for possible recurrence of CVST however the test was mostly an angiogram showing stable moderate focal stenosis of right vertebral artery and mild hyper enhancement of the left parotid gland for which ID were consulted with recommendation to resume ceftriaxone til 5/14. ID test for mycoplasma and was negative, UA repeat WBC 10-20, positive leukocyte esterase, negative for nitries, urine culture no growth.    MRV head was unremarkable    Lumbar puncture through IR was performed to r/o aseptic meningitis due to IVIG use.     CSF Studies:    Glucose 52, Protein 46.7, WBC 3, RBC 13  Meningitis panel was non reactive  West Nile,Cryptococcal Ag, VDRL were negative   OCB pending, IgG index  by mouth nightly, Disp-30 tablet, R-2Normal      acetaminophen (TYLENOL) 500 MG tablet Take 2 tablets by mouth in the morning, at noon, and at bedtime, Disp-180 tablet, R-2Normal      gabapentin (NEURONTIN) 300 MG capsule Take 1 capsule by mouth 3 times daily for 90 days., Disp-90 capsule, R-2Normal      butalbital-APAP-caffeine -40 MG CAPS per capsule Take 1 capsule by mouth 2 times daily as needed for Headaches, Disp-60 capsule, R-5Normal      Multiple Vitamins-Minerals (THERAPEUTIC MULTIVITAMIN-MINERALS) tablet Take 1 tablet by mouth daily, Disp-30 tablet, R-0Normal      lacosamide (VIMPAT) 100 MG TABS tablet Take 1 tablet by mouth 2 times daily for 90 days. Max Daily Amount: 200 mg, Disp-60 tablet, R-2Normal      ipratropium 0.5 mg-albuterol 2.5 mg (DUONEB) 0.5-2.5 (3) MG/3ML SOLN nebulizer solution Inhale 3 mLs into the lungs every 6 hours as needed for Shortness of Breath, Disp-360 mL, R-0Normal      citalopram (CELEXA) 10 MG tablet Take 1 tablet by mouth daily, Disp-30 tablet, R-3Normal      Baclofen (LIORESAL) 5 MG tablet Take 1 tablet by mouth 2 times daily, Disp-60 tablet, R-2Normal      atorvastatin (LIPITOR) 80 MG tablet Take 1 tablet by mouth nightly, Disp-30 tablet, R-3Normal      tamsulosin (FLOMAX) 0.4 MG capsule Take 1 capsule by mouth daily, Disp-30 capsule, R-0Normal      Handicap Placard MISC Starting Sun 2/25/2024, Disp-1 each, R-0, PrintDuration: 1 year / Dx: Stroke      diclofenac sodium (VOLTAREN) 1 % GEL Apply 2 g topically 2 times daily Apply to knees, Topical, 2 TIMES DAILY Starting Sun 2/25/2024, OTC           STOP taking these medications       methylPREDNISolone (MEDROL DOSEPACK) 4 MG tablet Comments:   Reason for Stopping:               Activity: activity as tolerated    Restrictions: None    Diet: regular diet    Follow-up:    Lam Chang [place]  [id number] [id number] [phone]    Go to  appointment scheduled for 5/22 at 11AM.    Gloria Corbett,

## 2024-05-16 NOTE — DISCHARGE SUMMARY
unspecified headache type R51.9        Admission Condition: poor    Discharged Condition: stable    Indication for Admission: threat to self    History of Present Illnes (present tense wording is of findings from admission exam and are not necessarily indicative of current findings)  Hospital Course:   Brennen Mcclure is a 59 y.o. male who has a past medical history of intracranial hemorrhage, thrombocytopenia, seizures, DVT, migraine, CVST, and right cerebral ICH, ITP and migraines who presents to the ER with fever and altered mental status and was found to have UTI. He was admitted to medical for treatment of UTI. Psychiatry was consulted and patient was seen by psychiatry, cleared medically and sent to [facility] due to increased depression and suicidal ideation with a plan to shoot himself.  Brennen is agreeable to assessment at bedside.  He does have 1:1 sitter for safety.  The right side of his skull is significantly indented from his craniotomy.  When asked about events leading up to hospitalization, patient reports \"my wife needed a break.\"  Asked patient about his depression and he states, \"I stated I wanted to kill myself and now I'm here.\"  He again repeats, \"my wife needed a break.\"  Brennen states that he had a stroke back in November of 2023.  Due to this he ended up needing significantly medical care and went under a hemicraniectomy.  He was a  and this happened while he was across the country in [place]. He was then transferred to a hospital in [place] where he underwent treatment there for a couple months before coming back to [place].  Brennen all the sudden breaks into tears and starts sobbing uncontrollably. He states, \"My wife shouldn't have to care for me.  I can't do anything that I used to anymore. I can't work anymore.\"  Writer does sit with patient for a while as he cries and attempts to de-escalate him which he eventually calms down.  He reports that because of all of this he has been significantly  Take 1 capsule by mouth daily, Disp-30 capsule, R-0Normal      Handicap Placard MISC Starting Sun 2/25/2024, Disp-1 each, R-0, PrintDuration: 1 year / Dx: Stroke      diclofenac sodium (VOLTAREN) 1 % GEL Apply 2 g topically 2 times daily Apply to knees, Topical, 2 TIMES DAILY Starting Sun 2/25/2024, OTC           STOP taking these medications       ciprofloxacin (CIPRO) 500 MG tablet Comments:   Reason for Stopping:                Core Measures statement:   Not applicable    Discharge Exam:  Level of consciousness:  Within normal limits  Appearance: Street clothes, seated, with good grooming  Behavior/Motor: No abnormalities noted  Attitude toward examiner:  Cooperative, attentive, good eye contact  Speech:  spontaneous, normal rate, normal volume and well articulated  Mood:  depressed  Affect:  withdrawn  Thought processes:  linear, goal directed and coherent  Thought content:  denies homicidal ideation  Suicidal Ideation:  passive suicidal ideation  Delusions:  no evidence of delusions  Perceptual Disturbance:  denies any perceptual disturbance  Cognition:  Intact  Memory: age appropriate  Insight & Judgement: fair  Medication side effects: denies     Disposition: medical floor    Signed:    Electronically signed by Valencia Miles MD on 5/16/24 at 6:12 PM EDT    Time Spent on discharge is more than 40 minutes in the examination, evaluation, counseling and review of medications and discharge plan.

## 2024-05-16 NOTE — DISCHARGE INSTRUCTIONS
Information:  Medications:   Medication summary provided   I understand that I should take only the medications on my list.     -why and when I need to take each medicine.     -which side effects to watch for.     -that I should carry my medication information at all times in case of     Emergency situations.    I will take all of my medicines to follow up appointments.     -check with my physician or pharmacist before taking any new    Medication, over the counter product or drink alcohol.    -Ask about food, drug or dietary supplement interactions.    -discard old lists and update records with medication providers.    Notify Physician:  Notify physician if you notice:   Always call 911 if you feel your life is in danger  In case of an emergency call 911 immediately!  If 911 is not available call your local emergency medical system for help    Behavioral Health Follow Up:  Original Referral Source: [facility]  Discharge Diagnosis: Depression with suicidal ideation [F32.A, R45.851]  Recommendations for Level of Care: Take all medications as prescribed  Patient status at discharge: Transferred to [facility] in-patient for higher level of care  My hospital  was: [facility]  Aftercare plan faxed: Transferred to [facility] in-patient (AVS sent with patient)   -faxed by: Staff   -date: 5/16/24   -time: 0956  Prescriptions: No scripts sent at this time    Smoking: Quit Smoking.   Call the [employer]'s smoking quitline at 5-694-60W-QUIT  Know the signs of a heart attack   If you have any of the following symptoms call 911 immediately, do not wait more    Than five minutes.    1. Pressure, fullness and/ or squeezing in the center of the chest spreading to    The jaw, neck or shoulder.    2. Chest discomfort with light headedness, fainting, sweating, nausea or    Shortness of breath.   3. Upper abdominal pressure or discomfort.   4. Lower chest pain, back pain, unusual fatigue, shortness of breath, nausea   Or

## 2024-05-16 NOTE — PROGRESS NOTES
Patient arrived to the unit, telemetry placed on the patient. Patient put in bed with a serasteady and 2 assist.

## 2024-05-16 NOTE — BH NOTE
Patient transported with two staff members in wheelchair with belongings and valuables to room 2098.

## 2024-05-16 NOTE — H&P
[facility]  IN-PATIENT SERVICE  [facility]  IN-PATIENT SERVICE   [facility]     HISTORY AND PHYSICAL EXAMINATION            Date:   5/16/2024  Patient name:  Brennen Mcclure  Date of admission:  5/16/2024  3:01 PM  MRN:   [id number]  Account:  [id number]  YOB: 1965  PCP:    Balnca Ren MD  Room:   2098/2098-01  Code Status:    DNR-CCA    Chief Complaint:     No chief complaint on file.      History Obtained From:     patient, electronic medical record    History of Present Illness:     Patient 59 years old male, patient with a chronic medical problem of cavernous sinus thrombosis, migraine headaches, seizure on gabapentin and Vimpat, history of DVT was on anticoagulation, discontinued due to intracranial bleeding, history of ITP and was currently getting treated with IVIG, parotitis, patient initially sent to the [facility] in mid April with intermittent episodes of confusion and dysphagia.  He has a very complex neurological history including cerebral venous sinus thrombosis with subsequent right hemispheric hemorrhage status post right hemicraniectomy due to hemorrhagic stroke last 2023.  There is residual left hemiplegia secondary to this.  Patient was discharged to acute rehab.  During the April hospitalization he had MRI brain done showing right hemispheric edema and encephalomalacia with hematoma with no acute finding.  EEG was done and showing slowing and no epileptiform activities.  He underwent speech evaluation and cleared for diet.  Hematology was consulted for his thrombocytopenia with continued outpatient follow-up.  Patient then presented back to the hospital on 5/8 with intermittent confusion, fever and was admitted to neurology service.  Underwent another MRI of the brain showing right frontoparietal temporal craniectomy with residual vasogenic edema and scattered  achievable. COMPARISON: 04/12/2024 HISTORY: Fall. FINDINGS: BRAIN/VENTRICLES: Stable postsurgical changes from right-sided craniectomy with dural thickening.  No evidence of acute infarct.  Extensive encephalomalacia within the right cerebral hemisphere is unchanged.  No new intracranial hemorrhage.  No midline shift.  No hydrocephalus. ORBITS: The visualized portion of the orbits demonstrate no acute abnormality. SINUSES: The visualized paranasal sinuses and mastoid air cells demonstrate no acute abnormality. SOFT TISSUES/SKULL:  No acute abnormality.     Stable postsurgical and chronic changes without acute abnormality.     CT CERVICAL SPINE WO CONTRAST    Result Date: 5/2/2024  EXAMINATION: CT OF THE CERVICAL SPINE WITHOUT CONTRAST 5/2/2024 5:02 pm TECHNIQUE: CT of the cervical spine was performed without the administration of intravenous contrast. Multiplanar reformatted images are provided for review. Automated exposure control, iterative reconstruction, and/or weight based adjustment of the mA/kV was utilized to reduce the radiation dose to as low as reasonably achievable. COMPARISON: None. HISTORY: ORDERING SYSTEM PROVIDED HISTORY: fall TECHNOLOGIST PROVIDED HISTORY: fall Decision Support Exception - unselect if not a suspected or confirmed emergency medical condition->Emergency Medical Condition (MA) Reason for Exam: Headache; Fall Relevant Medical/Surgical History: crainiotomy FINDINGS: BONES/ALIGNMENT: There is no acute fracture or traumatic malalignment. DEGENERATIVE CHANGES: Mild degenerative changes. SOFT TISSUES: There is no prevertebral soft tissue swelling.     No evidence for acute fracture or subluxation. Mild degenerative changes.     XR SHOULDER LEFT (MIN 2 VIEWS)    Result Date: 4/24/2024  EXAMINATION: 3 XRAY VIEWS OF THE LEFT SHOULDER; TWO XRAY VIEWS OF THE LEFT HIP 4/23/2024 3:42 pm COMPARISON: None. HISTORY: ORDERING SYSTEM PROVIDED HISTORY: Hemiplga following ntrm intcrbl hemor aff left  CONSULT TO SOCIAL WORK  IP CONSULT TO HEM/ONC  IP CONSULT TO PSYCHIATRY     Patient is admitted as inpatient status because of co-morbiditieslisted above, severity of signs and symptoms as outlined, requirement for current medical therapies and most importantly because of direct risk to patient if care not provided in a hospital setting.    Zeus Jordan MD  5/16/2024  3:42 PM    Copy sent to Blanca Tony MD    Attending Physician Statement  I have discussed the care of Brennen Mcclure and I have examined the patient myself and taken ROS and HPI, including pertinent history and exam findings, with the resident. I have reviewed the key elements of all parts of the encounter with the resident.  I agree with the assessment, plan and orders as documented by the resident.      Electronically signed by Ayaz Hernandez MD

## 2024-05-16 NOTE — PROGRESS NOTES
strength/endurance needed for ADLs/IADLs    Plan  Occupational Therapy Plan  Times Per Week: 4-6  Current Treatment Recommendations: Balance training, Functional mobility training, Endurance training, Safety education & training, Patient/Caregiver education & training, Equipment evaluation, education, & procurement, Strengthening, ROM, Self-Care / ADL, Positioning, Cognitive/Perceptual training, Home management training      OT Individual Minutes  Time In: 1036  Time Out: 1102  Minutes: 26  Time Code Minutes   Timed Code Treatment Minutes: 15 Minutes        Electronically signed by GARRETT Jeffries/L on 5/16/24 at 2:03 PM EDT

## 2024-05-16 NOTE — CARE COORDINATION
Name: Brennen Mcclure    : 1965    Auth number: [id number]     Discharge Date: 24    Destination: Medical floor    Discharge Medications:      Medication List        CONTINUE taking these medications      Handicap Placard Misc  by Does not apply route Duration: 1 year / Dx: Stroke            ASK your doctor about these medications      acetaminophen 500 MG tablet  Commonly known as: TYLENOL  Take 2 tablets by mouth in the morning, at noon, and at bedtime  Notes to patient: Pain/fever     amitriptyline 75 MG tablet  Commonly known as: ELAVIL  Take 1 tablet by mouth nightly  Notes to patient: Mood     atorvastatin 80 MG tablet  Commonly known as: LIPITOR  Take 1 tablet by mouth nightly  Notes to patient: Cholesterol     Baclofen 5 MG tablet  Commonly known as: LIORESAL  Take 1 tablet by mouth 2 times daily  Notes to patient: Muscle relaxer     butalbital-APAP-caffeine -40 MG Caps per capsule  Take 1 capsule by mouth 2 times daily as needed for Headaches  Notes to patient: Headache     ciprofloxacin 500 MG tablet  Commonly known as: CIPRO  Take 1 tablet by mouth 2 times daily for 7 days  Ask about: Should I take this medication?  Notes to patient: Antibiotic     citalopram 10 MG tablet  Commonly known as: CELEXA  Take 1 tablet by mouth daily  Notes to patient: Anxiety     diclofenac sodium 1 % Gel  Commonly known as: VOLTAREN  Apply 2 g topically 2 times daily Apply to knees  Notes to patient: Pain     ferrous sulfate 325 (65 Fe) MG tablet  Commonly known as: IRON 325  Notes to patient: Supplement     gabapentin 300 MG capsule  Commonly known as: NEURONTIN  Take 1 capsule by mouth 3 times daily for 90 days.  Notes to patient: Pain     ipratropium 0.5 mg-albuterol 2.5 mg 0.5-2.5 (3) MG/3ML Soln nebulizer solution  Commonly known as: DUONEB  Inhale 3 mLs into the lungs every 6 hours as needed for Shortness of Breath  Notes to patient: Shortness of breath     lacosamide 100 MG Tabs tablet  Commonly known

## 2024-05-17 ENCOUNTER — APPOINTMENT (OUTPATIENT)
Dept: CT IMAGING | Age: 59
DRG: 115 | End: 2024-05-17
Attending: INTERNAL MEDICINE
Payer: MEDICAID

## 2024-05-17 PROBLEM — Z86.73: Status: ACTIVE | Noted: 2024-05-17

## 2024-05-17 LAB
ANION GAP SERPL CALCULATED.3IONS-SCNC: 11 MMOL/L (ref 9–17)
BASOPHILS # BLD: 0 K/UL (ref 0–0.2)
BASOPHILS NFR BLD: 1 % (ref 0–2)
BILIRUB UR QL STRIP: NEGATIVE
BUN SERPL-MCNC: 23 MG/DL (ref 8–23)
CALCIUM SERPL-MCNC: 9.6 MG/DL (ref 8.6–10.4)
CHLORIDE SERPL-SCNC: 106 MMOL/L (ref 98–107)
CLARITY UR: CLEAR
CO2 SERPL-SCNC: 21 MMOL/L (ref 20–31)
COLOR UR: YELLOW
COMMENT: NORMAL
CREAT SERPL-MCNC: 0.8 MG/DL (ref 0.7–1.2)
CSF ISOELECTRIC FOCUSING INTERPRETATION: NORMAL
EOSINOPHIL # BLD: 0.4 K/UL (ref 0–0.4)
EOSINOPHILS RELATIVE PERCENT: 6 % (ref 0–4)
ERYTHROCYTE [DISTWIDTH] IN BLOOD BY AUTOMATED COUNT: 15.2 % (ref 11.5–14.9)
EST. AVERAGE GLUCOSE BLD GHB EST-MCNC: 103 MG/DL
GFR, ESTIMATED: >90 ML/MIN/1.73M2
GLUCOSE BLD-MCNC: 104 MG/DL (ref 75–110)
GLUCOSE SERPL-MCNC: 90 MG/DL (ref 70–99)
GLUCOSE UR STRIP-MCNC: NEGATIVE MG/DL
HBA1C MFR BLD: 5.2 % (ref 4–6)
HCT VFR BLD AUTO: 39.1 % (ref 41–53)
HGB BLD-MCNC: 12.9 G/DL (ref 13.5–17.5)
HGB UR QL STRIP.AUTO: NEGATIVE
KETONES UR STRIP-MCNC: NEGATIVE MG/DL
LEUKOCYTE ESTERASE UR QL STRIP: NEGATIVE
LYMPHOCYTES NFR BLD: 1.3 K/UL (ref 1–4.8)
LYMPHOCYTES RELATIVE PERCENT: 20 % (ref 24–44)
MCH RBC QN AUTO: 28.8 PG (ref 26–34)
MCHC RBC AUTO-ENTMCNC: 33 G/DL (ref 31–37)
MCV RBC AUTO: 87.1 FL (ref 80–100)
MONOCYTES NFR BLD: 0.6 K/UL (ref 0.1–1.3)
MONOCYTES NFR BLD: 10 % (ref 1–7)
NEUTROPHILS NFR BLD: 63 % (ref 36–66)
NEUTS SEG NFR BLD: 4.1 K/UL (ref 1.3–9.1)
NITRITE UR QL STRIP: NEGATIVE
OLIGOCLONAL BANDS CSF IEF: 0 BANDS (ref 0–1)
OLIGOCLONAL BANDS: NEGATIVE
PH UR STRIP: 6.5 [PH] (ref 5–8)
PLATELET # BLD AUTO: 137 K/UL (ref 150–450)
PMV BLD AUTO: 9.7 FL (ref 6–12)
POTASSIUM SERPL-SCNC: 4.1 MMOL/L (ref 3.7–5.3)
PROT UR STRIP-MCNC: NEGATIVE MG/DL
RBC # BLD AUTO: 4.49 M/UL (ref 4.5–5.9)
SODIUM SERPL-SCNC: 138 MMOL/L (ref 135–144)
SP GR UR STRIP: 1.02 (ref 1–1.03)
UROBILINOGEN UR STRIP-ACNC: NORMAL EU/DL (ref 0–1)
WBC OTHER # BLD: 6.5 K/UL (ref 3.5–11)

## 2024-05-17 PROCEDURE — 99232 SBSQ HOSP IP/OBS MODERATE 35: CPT | Performed by: INTERNAL MEDICINE

## 2024-05-17 PROCEDURE — 97530 THERAPEUTIC ACTIVITIES: CPT

## 2024-05-17 PROCEDURE — 99223 1ST HOSP IP/OBS HIGH 75: CPT | Performed by: PSYCHIATRY & NEUROLOGY

## 2024-05-17 PROCEDURE — 99222 1ST HOSP IP/OBS MODERATE 55: CPT | Performed by: PSYCHIATRY & NEUROLOGY

## 2024-05-17 PROCEDURE — 80048 BASIC METABOLIC PNL TOTAL CA: CPT

## 2024-05-17 PROCEDURE — 81003 URINALYSIS AUTO W/O SCOPE: CPT

## 2024-05-17 PROCEDURE — 2060000000 HC ICU INTERMEDIATE R&B

## 2024-05-17 PROCEDURE — 2580000003 HC RX 258: Performed by: PSYCHIATRY & NEUROLOGY

## 2024-05-17 PROCEDURE — 82947 ASSAY GLUCOSE BLOOD QUANT: CPT

## 2024-05-17 PROCEDURE — 6370000000 HC RX 637 (ALT 250 FOR IP): Performed by: NURSE PRACTITIONER

## 2024-05-17 PROCEDURE — 6370000000 HC RX 637 (ALT 250 FOR IP)

## 2024-05-17 PROCEDURE — 97162 PT EVAL MOD COMPLEX 30 MIN: CPT

## 2024-05-17 PROCEDURE — 70450 CT HEAD/BRAIN W/O DYE: CPT

## 2024-05-17 PROCEDURE — 6360000002 HC RX W HCPCS: Performed by: PSYCHIATRY & NEUROLOGY

## 2024-05-17 PROCEDURE — 85025 COMPLETE CBC W/AUTO DIFF WBC: CPT

## 2024-05-17 PROCEDURE — 97166 OT EVAL MOD COMPLEX 45 MIN: CPT

## 2024-05-17 PROCEDURE — 2580000003 HC RX 258

## 2024-05-17 PROCEDURE — 36415 COLL VENOUS BLD VENIPUNCTURE: CPT

## 2024-05-17 RX ORDER — ACETAMINOPHEN 325 MG/1
650 TABLET ORAL EVERY 6 HOURS PRN
Status: DISCONTINUED | OUTPATIENT
Start: 2024-05-17 | End: 2024-05-18 | Stop reason: HOSPADM

## 2024-05-17 RX ORDER — KETOROLAC TROMETHAMINE 30 MG/ML
30 INJECTION, SOLUTION INTRAMUSCULAR; INTRAVENOUS EVERY 6 HOURS PRN
Status: DISCONTINUED | OUTPATIENT
Start: 2024-05-17 | End: 2024-05-18 | Stop reason: HOSPADM

## 2024-05-17 RX ADMIN — CITALOPRAM HYDROBROMIDE 10 MG: 20 TABLET ORAL at 08:55

## 2024-05-17 RX ADMIN — LACOSAMIDE 100 MG: 100 TABLET, FILM COATED ORAL at 08:55

## 2024-05-17 RX ADMIN — Medication 6 MG: at 22:45

## 2024-05-17 RX ADMIN — ACETAMINOPHEN 650 MG: 325 TABLET ORAL at 11:36

## 2024-05-17 RX ADMIN — GABAPENTIN 300 MG: 300 CAPSULE ORAL at 22:44

## 2024-05-17 RX ADMIN — BUTALBITA,ACETAMINOPHEN AND CAFFEINE 1 CAPSULE: 50; 300; 40 CAPSULE ORAL at 08:54

## 2024-05-17 RX ADMIN — BACLOFEN 5 MG: 10 TABLET ORAL at 22:45

## 2024-05-17 RX ADMIN — ACETAMINOPHEN 650 MG: 325 TABLET ORAL at 00:43

## 2024-05-17 RX ADMIN — GABAPENTIN 300 MG: 300 CAPSULE ORAL at 13:49

## 2024-05-17 RX ADMIN — KETOROLAC TROMETHAMINE 30 MG: 30 INJECTION, SOLUTION INTRAMUSCULAR at 16:06

## 2024-05-17 RX ADMIN — LACOSAMIDE 100 MG: 100 TABLET, FILM COATED ORAL at 22:45

## 2024-05-17 RX ADMIN — ATORVASTATIN CALCIUM 80 MG: 80 TABLET, FILM COATED ORAL at 22:45

## 2024-05-17 RX ADMIN — Medication 1 TABLET: at 08:55

## 2024-05-17 RX ADMIN — BACLOFEN 5 MG: 10 TABLET ORAL at 08:54

## 2024-05-17 RX ADMIN — TOPIRAMATE 75 MG: 50 TABLET, FILM COATED ORAL at 22:48

## 2024-05-17 RX ADMIN — TOPIRAMATE 75 MG: 50 TABLET, FILM COATED ORAL at 08:56

## 2024-05-17 RX ADMIN — TAMSULOSIN HYDROCHLORIDE 0.4 MG: 0.4 CAPSULE ORAL at 08:54

## 2024-05-17 RX ADMIN — DICLOFENAC SODIUM 2 G: 10 GEL TOPICAL at 22:55

## 2024-05-17 RX ADMIN — SODIUM CHLORIDE 950 ML: 9 INJECTION, SOLUTION INTRAVENOUS at 22:39

## 2024-05-17 RX ADMIN — Medication 400 MG: at 08:55

## 2024-05-17 RX ADMIN — Medication 10 ML: at 09:02

## 2024-05-17 RX ADMIN — AMITRIPTYLINE HYDROCHLORIDE 75 MG: 75 TABLET, FILM COATED ORAL at 22:45

## 2024-05-17 RX ADMIN — OLANZAPINE 2.5 MG: 2.5 TABLET, FILM COATED ORAL at 22:48

## 2024-05-17 RX ADMIN — GABAPENTIN 300 MG: 300 CAPSULE ORAL at 08:54

## 2024-05-17 RX ADMIN — DICLOFENAC SODIUM 2 G: 10 GEL TOPICAL at 08:56

## 2024-05-17 ASSESSMENT — PAIN SCALES - GENERAL
PAINLEVEL_OUTOF10: 7
PAINLEVEL_OUTOF10: 0
PAINLEVEL_OUTOF10: 9
PAINLEVEL_OUTOF10: 8
PAINLEVEL_OUTOF10: 8
PAINLEVEL_OUTOF10: 7
PAINLEVEL_OUTOF10: 8

## 2024-05-17 ASSESSMENT — PAIN DESCRIPTION - DESCRIPTORS
DESCRIPTORS: ACHING
DESCRIPTORS: ACHING;DISCOMFORT
DESCRIPTORS: ACHING;DISCOMFORT

## 2024-05-17 ASSESSMENT — PAIN - FUNCTIONAL ASSESSMENT: PAIN_FUNCTIONAL_ASSESSMENT: PREVENTS OR INTERFERES SOME ACTIVE ACTIVITIES AND ADLS

## 2024-05-17 ASSESSMENT — PAIN DESCRIPTION - LOCATION
LOCATION: HEAD
LOCATION: KNEE
LOCATION: HEAD

## 2024-05-17 ASSESSMENT — PAIN DESCRIPTION - ORIENTATION: ORIENTATION: RIGHT;LEFT

## 2024-05-17 NOTE — PROGRESS NOTES
Physical Therapy  Facility/Department: [facility] PROGRESSIVE CARE  Physical Therapy Initial Assessment    Name: Brennen Mcclure  : 1965  MRN: [id number]  Date of Service: 2024    Discharge Recommendations:  Patient would benefit from continued therapy after discharge, Therapy recommended at discharge          Patient Diagnosis(es): There were no encounter diagnoses.  Past Medical History:  has a past medical history of Anemia, CAD (coronary artery disease), Chronic deep vein thrombosis (DVT) of both lower extremities (HCC), Chronic deep vein thrombosis (DVT) of femoral vein of right lower extremity (HCC), Chronic deep vein thrombosis (DVT) of proximal vein of right lower extremity (HCC), Chronic deep vein thrombosis (DVT) of right iliac vein (HCC), Chronic idiopathic thrombocytopenia (HCC), Chronic idiopathic thrombocytopenic purpura (HCC), CVA (cerebral vascular accident) (HCC), Emphysema lung (HCC), Hemosiderin pigmentation of skin, HLD (hyperlipidemia), Intracranial hemorrhage (HCC), Left hemiplegia (HCC), Right leg swelling, and Thrombocytopenia (HCC).  Past Surgical History:  has a past surgical history that includes Coronary artery bypass graft; Coronary stent placement; craniotomy (Right); and CT BIOPSY BONE MARROW (3/18/2024).    Assessment   Assessment: Pt with hx of CVA/craniotomy with L side deficts. requires 2 person assist for bed mobility and sit <.stnad with scotty lazaro. Pt wth decreased safety awarensss and is [facility] fall risk.Pt has assistance from his spouse for mobility/self care. Pt will benefit from continued therapy to  improve fucntion.  Treatment Diagnosis: Impaired function  Specific Instructions for Next Treatment: co-tx, Helmet at on during fucntionl mobility/OOB  Therapy Prognosis: Good  Decision Making: High Complexity  History: Hx of craniotomy-Left sided weakness, has Helme tto wer for OOB activity/dangling.  Barriers to Learning: Cognition, pt impulsive  Requires PT Follow-Up:  side lean intermittently with fatigue. More L side lean than R side lean noted, sit<>stand 4 to 5 times, standing tolerance for  5 to 30 seconds.        Balance  Posture: Fair  Sitting - Static: Fair;- (Min A- CGA with L/R side lean intermittently with fatigue. More L side lean than R side lean noted))  Sitting - Dynamic: Poor;+  Standing - Static: -;Fair (in Scotty Morrison, A to support LUE for joint protection. L side lean. Limited tolerancve due to dizziness)  Comments: scotty morrison used while assessing standing balance           OutComes Score                                                  AM-PAC - Mobility    AM-PAC Basic Mobility - Inpatient   How much help is needed turning from your back to your side while in a flat bed without using bedrails?: Total  How much help is needed moving from lying on your back to sitting on the side of a flat bed without using bedrails?: Total  How much help is needed moving to and from a bed to a chair?: Total  How much help is needed standing up from a chair using your arms?: Total  How much help is needed walking in hospital room?: Total  How much help is needed climbing 3-5 steps with a railing?: Total  AM-PAC Inpatient Mobility Raw Score : 6  AM-PAC Inpatient T-Scale Score : 23.55  Mobility Inpatient CMS 0-100% Score: 100  Mobility Inpatient CMS G-Code Modifier : CN         Tinneti Score       Goals  Short Term Goals  Time Frame for Short Term Goals: 7 visits  Short Term Goal 1: Pt will be mod A for bed mobility  Short Term Goal 2: Tolerate dangling aat EOB for 20 to 25 minutes to owrk on trunk stability,  Short Term Goal 3: Pt will be able to miantian seated balance at CGA at EOB  Short Term Goal 4: Sit<>stand at min A x 1 from bed height, mod A from chair height  Short Term Goal 5: Improve standing toelrance in scotty morrison to 2 to 4 minutes to improve trunk stability/posture and LE strength       Education  Patient Education  Education Given To: Patient  Education Provided:  None

## 2024-05-17 NOTE — DISCHARGE INSTR - COC
leg swelling M79.89    Dysphagia R13.10    Transient neurological symptoms R29.818    Intraparenchymal hemorrhage of brain (HCC) I61.9    History of epilepsy Z86.69    Disorientation R41.0    Altered mental status R41.82    Acute cystitis with hematuria N30.01    Migraine G43.909    Acute encephalopathy G93.40    Fever and chills R50.9    Parotitis K11.20    Depression with suicidal ideation F32.A, R45.851    Major depressive disorder, recurrent, severe with psychotic features ([facility]) F33.3    Acute intractable headache R51.9    Chronic idiopathic thrombocytopenic purpura ([facility]) D69.3    Intractable headache, unspecified chronicity pattern, unspecified headache type R51.9       Isolation/Infection:   Isolation            No Isolation          Patient Infection Status       None to display            Nurse Assessment:  Last Vital Signs: /74   Pulse 65   Temp 97.7 °F (36.5 °C) (Oral)   Resp 20   Ht 1.778 m (5' 10\")   Wt 84.8 kg (186 lb 15.2 oz)   SpO2 98%   BMI 26.82 kg/m²     Last documented pain score (0-10 scale): Pain Level: 7  Last Weight:   Wt Readings from Last 1 Encounters:   05/16/24 84.8 kg (186 lb 15.2 oz)     Mental Status:  oriented, alert, and intermittent confusion    IV Access:  - None    Nursing Mobility/ADLs:  Walking   Dependent  Transfer  Dependent  Bathing  Dependent  Dressing  Assisted  Toileting  Assisted  Feeding  Assisted  Med Admin  Assisted  Med Delivery   whole ( Tends to pocket pills in mouth)    Wound Care Documentation and Therapy:        Elimination:  Continence:   Bowel: No  Bladder: No  Urinary Catheter: None   Colostomy/Ileostomy/Ileal Conduit: No       Date of Last BM: ***    Intake/Output Summary (Last 24 hours) at 5/17/2024 1518  Last data filed at 5/17/2024 1239  Gross per 24 hour   Intake --   Output 1725 ml   Net -1725 ml     I/O last 3 completed shifts:  In: -   Out: 775 [Urine:775]    Safety Concerns:     History of Falls (last 30 days) and At Risk for  5/17/24 at 3:19 PM EDT

## 2024-05-17 NOTE — PROGRESS NOTES
[facility]  IN-PATIENT SERVICE  [facility]    PROGRESS NOTE             5/17/2024    9:15 AM    Name:   Brennen Mcclure  MRN:     [id number]     Acct:      [id number]   Room:   2098/2098-01   Day:  1  Admit Date:  5/16/2024  3:01 PM    PCP:  Blanca Ren MD  Code Status:  DNR-CCA    Subjective:     C/C: No chief complaint on file.    Interval History Status: not changed.    Patient seen and examined at bedside this morning, no acute event overnight, patient was tearful, depressed asking to call to talk with his wife, did discuss with him the results of the new CT scan which showed resolved of midline brain shift.  Wants to talk with a neurologist.  Still complaining of headache on a scale 8 out of 10, but said that did improve with medications that he is taking it.  Still on IV fluid    Brief History:     Patient 59 years old male, patient with a chronic medical problem of cavernous sinus thrombosis, migraine headaches, seizure on gabapentin and Vimpat, history of DVT was on anticoagulation, discontinued due to intracranial bleeding, history of ITP and was currently getting treated with IVIG, parotitis, patient initially sent to the [facility] in mid April with intermittent episodes of confusion and dysphagia.  He has a very complex neurological history including cerebral venous sinus thrombosis with subsequent right hemispheric hemorrhage status post right hemicraniectomy due to hemorrhagic stroke last 2023.  There is residual left hemiplegia secondary to this.  Patient was discharged to acute rehab.  During the April hospitalization he had MRI brain done showing right hemispheric edema and encephalomalacia with hematoma with no acute finding.  EEG was done and showing slowing and no epileptiform activities.  He underwent speech evaluation and cleared for diet.  Hematology was consulted for his thrombocytopenia with continued outpatient follow-up.   size at the upper limits of normal.  No airspace consolidations.  No pleural effusion or pneumothorax.  There is mild interstitial prominence.     No definite acute abnormality. Unchanged mild interstitial prominence which may be related to emphysema.     CT HEAD WO CONTRAST    Result Date: 5/2/2024  EXAMINATION: CT OF THE HEAD WITHOUT CONTRAST  5/2/2024 5:03 pm TECHNIQUE: CT of the head was performed without the administration of intravenous contrast. Automated exposure control, iterative reconstruction, and/or weight based adjustment of the mA/kV was utilized to reduce the radiation dose to as low as reasonably achievable. COMPARISON: 04/12/2024 HISTORY: Fall. FINDINGS: BRAIN/VENTRICLES: Stable postsurgical changes from right-sided craniectomy with dural thickening.  No evidence of acute infarct.  Extensive encephalomalacia within the right cerebral hemisphere is unchanged.  No new intracranial hemorrhage.  No midline shift.  No hydrocephalus. ORBITS: The visualized portion of the orbits demonstrate no acute abnormality. SINUSES: The visualized paranasal sinuses and mastoid air cells demonstrate no acute abnormality. SOFT TISSUES/SKULL:  No acute abnormality.     Stable postsurgical and chronic changes without acute abnormality.     CT CERVICAL SPINE WO CONTRAST    Result Date: 5/2/2024  EXAMINATION: CT OF THE CERVICAL SPINE WITHOUT CONTRAST 5/2/2024 5:02 pm TECHNIQUE: CT of the cervical spine was performed without the administration of intravenous contrast. Multiplanar reformatted images are provided for review. Automated exposure control, iterative reconstruction, and/or weight based adjustment of the mA/kV was utilized to reduce the radiation dose to as low as reasonably achievable. COMPARISON: None. HISTORY: ORDERING SYSTEM PROVIDED HISTORY: fall TECHNOLOGIST PROVIDED HISTORY: fall Decision Support Exception - unselect if not a suspected or confirmed emergency medical condition->Emergency Medical Condition

## 2024-05-17 NOTE — PLAN OF CARE
Problem: Pain  Goal: Verbalizes/displays adequate comfort level or baseline comfort level  5/17/2024 1600 by Erica Salazar RN  Outcome: Not Progressing     Problem: Safety - Adult  Goal: Free from fall injury  5/17/2024 1600 by Erica Salazar RN  Outcome: Progressing     Problem: Discharge Planning  Goal: Discharge to home or other facility with appropriate resources  5/17/2024 1600 by Erica Salazar RN  Outcome: Progressing  Flowsheets (Taken 5/17/2024 0900)  Discharge to home or other facility with appropriate resources:   Identify barriers to discharge with patient and caregiver   Arrange for needed discharge resources and transportation as appropriate   Identify discharge learning needs (meds, wound care, etc)   Refer to discharge planning if patient needs post-hospital services based on physician order or complex needs related to functional status, cognitive ability or social support system     Problem: Risk for Elopement  Goal: Patient will not exit the unit/facility without proper excort  Outcome: Progressing  Flowsheets (Taken 5/17/2024 0900)  Nursing Interventions for Elopement Risk:   Assist with personal care needs such as toileting, eating, dressing, as needed to reduce the risk of wandering   Collaborate with family members/caregivers to mitigate the elopement risk   Collaborate with treatment team for drug withdrawal symptoms treatment   Collaborate with treatment team for nicotine replacement     Problem: Skin/Tissue Integrity  Goal: Absence of new skin breakdown  Description: 1.  Monitor for areas of redness and/or skin breakdown  2.  Assess vascular access sites hourly  3.  Every 4-6 hours minimum:  Change oxygen saturation probe site  4.  Every 4-6 hours:  If on nasal continuous positive airway pressure, respiratory therapy assess nares and determine need for appliance change or resting period.  Outcome: Progressing     Problem: ABCDS Injury Assessment  Goal: Absence of physical  injury  Outcome: Progressing     Problem: Self Harm/Suicidality  Goal: Will have no self-injury during hospital stay  Description: INTERVENTIONS:  1.  Ensure constant observer at bedside with Q15M safety checks  2.  Maintain a safe environment  3.  Secure patient belongings  4.  Ensure family/visitors adhere to safety recommendations  5.  Ensure safety tray has been added to patient's diet order  6.  Every shift and PRN: Re-assess suicidal risk via Frequent Screener    5/17/2024 1600 by Erica Salazar RN  Outcome: Progressing     Problem: Chronic Conditions and Co-morbidities  Goal: Patient's chronic conditions and co-morbidity symptoms are monitored and maintained or improved  Outcome: Progressing  Flowsheets (Taken 5/17/2024 0900)  Care Plan - Patient's Chronic Conditions and Co-Morbidity Symptoms are Monitored and Maintained or Improved:   Monitor and assess patient's chronic conditions and comorbid symptoms for stability, deterioration, or improvement   Collaborate with multidisciplinary team to address chronic and comorbid conditions and prevent exacerbation or deterioration   Update acute care plan with appropriate goals if chronic or comorbid symptoms are exacerbated and prevent overall improvement and discharge     Problem: Pain  Goal: Verbalizes/displays adequate comfort level or baseline comfort level  5/17/2024 1600 by Erica Salazar, RN  Outcome: Not Progressing  5/17/2024 0317 by Colleen Spence, RN  Outcome: Progressing  Note: Pt medicated with pain medication prn.  Assessed all pain characteristics including level, type, location, frequency, and onset.  Non-pharmacologic interventions offered to pt as well.  Pt states pain is tolerable at this time.

## 2024-05-17 NOTE — PROGRESS NOTES
[facility]   Occupational Therapy Evaluation  Date: 24  Patient Name: Brennen Mcclure       Room: 8-  MRN: [id number]  Account: [id number]   : 1965  (59 y.o.) Gender: male     Discharge Recommendations:  Further Occupational Therapy is recommended upon facility discharge.    OT Equipment Recommendations  Other: TBD    Referring Practitioner: Sushma Keith APRN - CNP  Diagnosis: Intractable headache        Treatment Diagnosis: impaired self care status    Past Medical History:  has a past medical history of Anemia, CAD (coronary artery disease), Chronic deep vein thrombosis (DVT) of both lower extremities (HCC), Chronic deep vein thrombosis (DVT) of femoral vein of right lower extremity (HCC), Chronic deep vein thrombosis (DVT) of proximal vein of right lower extremity (HCC), Chronic deep vein thrombosis (DVT) of right iliac vein (HCC), Chronic idiopathic thrombocytopenia (HCC), Chronic idiopathic thrombocytopenic purpura (HCC), CVA (cerebral vascular accident) (HCC), Emphysema lung (HCC), Hemosiderin pigmentation of skin, HLD (hyperlipidemia), Intracranial hemorrhage (HCC), Left hemiplegia (HCC), Right leg swelling, and Thrombocytopenia (HCC).    Past Surgical History:   has a past surgical history that includes Coronary artery bypass graft; Coronary stent placement; craniotomy (Right); and CT BIOPSY BONE MARROW (3/18/2024).    Restrictions  Restrictions/Precautions  Restrictions/Precautions: General Precautions, Fall Risk, Suicide  Required Braces or Orthoses?: Yes  Required Braces or Orthoses  Left Upper Extremity Brace/Splint: Resting hand (pt wears at all times at home; not present in hospital)  Position Activity Restriction  Other position/activity restrictions: Helmet on when OOB; up with assist, Hx L plegia and R hemicraniectomy, helmet at all times; safety precautions, suicide precautions      Vitals  Vitals  O2 Device: None (Room air)  Complexity  Discharge Recommendations: Patient would benefit from continued therapy after discharge    Activity Tolerance  Activity Tolerance: Patient Tolerated treatment well, Patient limited by fatigue, Patient limited by pain    Safety Devices  Type of Devices: All fall risk precautions in place, Bed alarm in place, Call light within reach, Gait belt, Patient at risk for falls, Left in bed, Nurse notified    Patient Education  Patient Education  Education Given To: Patient  Education Provided: Role of Therapy, Plan of Care, Precautions, Transfer Training  Education Method: Demonstration, Verbal  Barriers to Learning: Cognition  Education Outcome: Verbalized understanding, Continued education needed      Functional Outcome Measures  AM-PAC Daily Activity - Inpatient   How much help is needed for putting on and taking off regular lower body clothing?: Total  How much help is needed for bathing (which includes washing, rinsing, drying)?: A Lot  How much help is needed for toileting (which includes using toilet, bedpan, or urinal)?: Total  How much help is needed for putting on and taking off regular upper body clothing?: A Lot  How much help is needed for taking care of personal grooming?: A Little  How much help for eating meals?: A Little  [facility] Raw Score: 12  AM-PAC Inpatient ADL T-Scale Score : 30.6  ADL Inpatient CMS 0-100% Score: 66.57  ADL Inpatient CMS G-Code Modifier : CL       Goals     Short Term Goals  Time Frame for Short Term Goals: By discharge  Short Term Goal 1: Pt will complete bed mobility with Min A to sit EOB unsupported with SBA for 10+ mintues during functional task  Short Term Goal 2: Pt will complete upper body bathing/dressing with CGA and Good safety with use of adaptive strategies as needed  Short Term Goal 3: Pt will complete lower body dressing/bathing with Mod A and Good safety with use of AE as needed  Short Term Goal 4: Pt will complete functional transfers

## 2024-05-17 NOTE — CONSULTS
Department of Psychiatry  Consult Service   Psychiatric Assessment        REASON FOR CONSULT: MDD, [facility] transfer    CONSULTING PHYSICIAN: Zeus Jordan    History obtained from: Patient, wife, nursing staff, EHR    HISTORY OF PRESENT ILLNESS:          The patient is a 59 y.o. male with significant psychiatric history of depression and psychosis who is admitted medically from [facility] with intractable headaches.  Patient has a past medical history of intracranial hemorrhage, thrombocytopenia, seizures, DVT, migraine, CVST, and right cerebral ICH, ITP and migraines.  She was initially evaluated for depression and suicidal ideation while hospitalized at [facility], he was transferred to [facility] for continued psychotropic treatment.  His suicidal ideation is largely related to the grief he is experiencing from last dependence due to his current medical condition.  He was started on olanzapine 2.5 mg nightly, he reports that the visual hallucinations are less distressing.  He continues to wake up occasionally throughout the night having nightmares, last night he had a nightmare of getting \"stuck in my truck\".  This is how his original stroke occurred.  He continues to have episodes of tearfulness.  He does report improvement in suicidal ideation.  He continues to have a one-to-one sitter.  His wife reports that she has noticed some improvement, specifically and suicidal statements.  We discussed that the one-to-one sitter will be maintained until we see some stability and thoughts of self-harm.  His appetite is fair however with a headache he reports that he has a significant decline in his interest eating.  He is open to continue adjustments in psychotropic medications.  He is working with physical therapy and presents today with improvement in mood lability.    The patient is currently receiving care for the above psychiatric illness.    Past Psychiatric History:  Prior Diagnosis: Depression  Outpatient psychiatric  psychosocial and environmental stressors    Plan:      As discussed with attending:  Consider further titration of olanzapine, he is tolerating it and it appears efficacious  Continue one-to-one sitter for suicide precaution    Additional recommendations will follow the clinical course.       Thank you very much for allowing us to participate in the care of this patient.      Electronically signed by EMILY Amanda CNP on 5/17/24 at 11:17 AM EDT    Please note that this chart was generated using voice recognition Dragon dictation software.  Although every effort was made to ensure the accuracy of this automated transcription, some errors in transcription may have occurred.   I independently saw and evaluated the patient.  I reviewed the  documentation above    .  Any additional comments or changes to the   documentation are stated below otherwise agree with assessment.      QTc Calculation (Bazett)   Date Value Ref Range Status   05/12/2024 494 ms Final       The patient was seen face-to-face.  He is known to me from his previous admission to acute rehab and recent admission to psychiatry.  His wife was present at bedside.  The patient had reported suicidal thoughts leading up to his psychiatry admission.  At the moment his mood is improved.  He wants to go home.  He denies suicidal ideation.  He has no lability like he did earlier during his admission to acute rehab.  He denies any auditory visual hallucinations..  He does not need a sitter or admission to psychiatry     PLAN  Medications as noted above  Attempt to develop insight  Psycho-education conducted.  Supportive Therapy conducted.  Follow-up daily while on inpatient unit    Electronically signed by LEAH ESCALANTE MD on 5/17/24 at 7:42 PM EDT

## 2024-05-17 NOTE — PLAN OF CARE
Problem: Safety - Adult  Goal: Free from fall injury  5/17/2024 0317 by Colleen Spence, RN  Outcome: Progressing  Note: Pt assessed as a fall risk this shift. Remains free from falls and accidental injury at this time. Fall precautions in place, including falling star sign and fall risk band on pt. Floor free from obstacles, and bed is locked and in lowest position. Adequate lighting provided.  Pt encouraged to call before getting OOB for any need.  Bed alarm activated. Will continue to monitor needs during hourly rounding, and reinforce education on use of call light.      Problem: Pain  Goal: Verbalizes/displays adequate comfort level or baseline comfort level  5/17/2024 0317 by Colleen Spence, RN  Outcome: Progressing  Note: Pt medicated with pain medication prn.  Assessed all pain characteristics including level, type, location, frequency, and onset.  Non-pharmacologic interventions offered to pt as well.  Pt states pain is tolerable at this time.       Problem: Self Harm/Suicidality  Goal: Will have no self-injury during hospital stay  Description: INTERVENTIONS:  1.  Ensure constant observer at bedside with Q15M safety checks  2.  Maintain a safe environment  3.  Secure patient belongings  4.  Ensure family/visitors adhere to safety recommendations  5.  Ensure safety tray has been added to patient's diet order  6.  Every shift and PRN: Re-assess suicidal risk via Frequent Screener    Outcome: Progressing  Note: 1:1 sitter at bedside for patient safety. All other suicide precautions in place.      Problem: Discharge Planning  Goal: Discharge to home or other facility with appropriate resources  5/17/2024 0317 by Colleen Spence, RN  Outcome: Progressing

## 2024-05-17 NOTE — PROGRESS NOTES
Patient and wife known from previous admission; visit with wife while patient was in medical testing; wife provided medical update and talked about the medical issues with patient since writer last saw them; wife is the sole caregiver for patient; wife overwhelmed and stated that their family will not help them; patient arrived from testing and talked about his worries about his wife; patient stated \"she needs a break\"; listening presence and support;     05/16/24 2004   Encounter Summary   Encounter Overview/Reason Spiritual/Emotional Needs   Service Provided For Patient and family together   Referral/Consult From [facility]   Support System Spouse   Last Encounter  05/16/24   Complexity of Encounter Moderate   Grief, Loss, and Adjustments   Type Adjustment to illness   Assessment/Intervention/Outcome   Assessment Coping;Hopeful;Anxious;Powerlessness   Intervention Active listening;Discussed illness injury and it’s impact;Explored/Affirmed feelings, thoughts, concerns;Sustaining Presence/Ministry of presence   Outcome Comfort;Coping;Engaged in conversation;Expressed feelings, needs, and concerns;Expressed Gratitude;Receptive

## 2024-05-17 NOTE — PLAN OF CARE
Problem: Safety - Adult  Goal: Free from fall injury  Outcome: Progressing     Problem: Discharge Planning  Goal: Discharge to home or other facility with appropriate resources  Outcome: Progressing     Problem: Risk for Elopement  Goal: Patient will not exit the unit/facility without proper excort  Outcome: Progressing     Problem: Pain  Goal: Verbalizes/displays adequate comfort level or baseline comfort level  Outcome: Progressing     Problem: Skin/Tissue Integrity  Goal: Absence of new skin breakdown  Description: 1.  Monitor for areas of redness and/or skin breakdown  2.  Assess vascular access sites hourly  3.  Every 4-6 hours minimum:  Change oxygen saturation probe site  4.  Every 4-6 hours:  If on nasal continuous positive airway pressure, respiratory therapy assess nares and determine need for appliance change or resting period.  Outcome: Progressing     Problem: ABCDS Injury Assessment  Goal: Absence of physical injury  Outcome: Progressing

## 2024-05-17 NOTE — CONSULTS
[facility] Neurology   IN-PATIENT SERVICE      NEUROLOGY CONSULT  NOTE            Date:   5/17/2024  Patient name:  Brennen Mcclure  Date of admission:  5/16/2024  YOB: 1965      Chief Complaint:     Headaches    Reason for Consult:      Headaches, worsening CT head    History of Present Illness:     The patient is a 59 y.o. male who presents with fever, altered mental status.  . The patient was seen and examined and the chart was reviewed.  Patient initially seen by our service back in mid April with intermittent episodes of confusion, dysphagia.  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  There is residual left hemiplegia secondary to this.  Eventually was discharged to an acute rehab in [place] after this.  During the April hospitalization he had MRI brain done showing right hemispheric edema, encephalomalacia with hematoma with no acute findings.  EEG was done showing slowing and no epileptiform activities.  He improved somewhat during his hospitalization.  Underwent speech evaluation and cleared for diet.  Hematology consulted for his thrombocytopenia with continued outpatient follow-up.  Patient then presents back to the hospital on 5/8 with again intermittent confusion, fever admitted to neurology service.  Underwent another MRI of the brain showing right frontal parietal temporal craniectomy with residual vasogenic edema and scattered encephalomalacia largely unchanged.  MRV was done which did not show any evidence of cerebral venous sinus thrombosis.  EEG showing mild to moderate encephalopathy and interhemispheric asymmetry suggesting focal lesion on the right and consistent with a breach rhythm.  Found to have UTI, peritonitis.  Lumbar puncture was done to evaluate for meningitis, they were unrevealing largely.  Patient continued to  hospital encounter of 05/16/24    CT HEAD WO CONTRAST    Narrative  EXAMINATION:  CT OF THE HEAD WITHOUT CONTRAST  5/16/2024 6:29 pm    TECHNIQUE:  CT of the head was performed without the administration of intravenous  contrast. Automated exposure control, iterative reconstruction, and/or weight  based adjustment of the mA/kV was utilized to reduce the radiation dose to as  low as reasonably achievable.    COMPARISON:  May 15, 2024    HISTORY:  ORDERING SYSTEM PROVIDED HISTORY: follow up on prior CT head, midline shift  TECHNOLOGIST PROVIDED HISTORY:    follow up on prior CT head, midline shift  Reason for Exam: follow up on prior CT head, midline shift    FINDINGS:  BRAIN/VENTRICLES: Redemonstration of a wide craniotomy defect.  The previous  evaluation there is a midline shift of approximately 5 mm.  In today's  evaluation the midline shift has resolved.  Ventricular system appears  stable.  There is mild residual edema which is improved especially in the  right frontal area compared to the previous evaluation.  The left hemisphere  appears stable.    ORBITS: The visualized portion of the orbits demonstrate no acute abnormality.    SINUSES: The visualized paranasal sinuses and mastoid air cells demonstrate  no acute abnormality.    SOFT TISSUES/SKULL:  Wide right craniotomy defect.    Impression  Postsurgical changes from wide craniotomy. The midline shift seen on the  previous examination has resolved. There is mild residual edema seen in the right frontal area which is improved compared to the previous evaluation.                             5/16                                                                                  5/15:         I personally reviewed all of the above medications, clinical laboratory, imaging and other diagnostic tests.         Impression:      Persistent headaches as well as increased midline shift with concavity in the right cerebral hemisphere likely from recent lumbar puncture and  decreased CSF; repeat CT head showing improvement in midline shift.  History of cerebral venous sinus thrombosis, subsequent intracranial hemorrhage status post right hemicraniectomy 2023; residual left hemiplegia  Depression with suicidal ideation  Idiopathic thrombocytopenia    Plan:     Currently receiving Fioricet for the headaches.  Will add as needed Toradol as I recall this worked when he was in ARU in the past.  He is also on Elavil as well as Topamax for headache prevention  Ultimately will need cranioplasty by neurosurgery.  Discussed with Dr. Corbett; will need a special scan so he can have his skull flap fabricated.  Will await further recommendations.  Does not appear that surgery will be done as inpatient. He will have them follow up as outpatient within next 2 weeks.   Discussed with Dr. Chang; platelets are improved at this time which is one of the obstacles in the past as far as proceeding forward with cranioplasty.  Discussed at length with patient and wife at bedside.  They voiced understanding at this time.       Thank you for this very interesting consultation.      Electronically signed by Heri Nguyen DO on 5/17/2024 at 12:11 PM      Heri Nguyen DO  [facility] [place]  Neurology

## 2024-05-18 VITALS
TEMPERATURE: 97.2 F | HEIGHT: 70 IN | WEIGHT: 186.95 LBS | DIASTOLIC BLOOD PRESSURE: 87 MMHG | RESPIRATION RATE: 18 BRPM | OXYGEN SATURATION: 96 % | BODY MASS INDEX: 26.76 KG/M2 | SYSTOLIC BLOOD PRESSURE: 112 MMHG | HEART RATE: 63 BPM

## 2024-05-18 PROBLEM — D69.3 ACUTE IDIOPATHIC THROMBOCYTOPENIC PURPURA (HCC): Status: ACTIVE | Noted: 2024-05-18

## 2024-05-18 LAB
ANION GAP SERPL CALCULATED.3IONS-SCNC: 10 MMOL/L (ref 9–17)
B BURGDOR AB CSF IA-ACNC: 0.28 IV
BASOPHILS # BLD: 0.1 K/UL (ref 0–0.2)
BASOPHILS NFR BLD: 1 % (ref 0–2)
BUN SERPL-MCNC: 19 MG/DL (ref 8–23)
CALCIUM SERPL-MCNC: 8.9 MG/DL (ref 8.6–10.4)
CHLORIDE SERPL-SCNC: 107 MMOL/L (ref 98–107)
CO2 SERPL-SCNC: 20 MMOL/L (ref 20–31)
CREAT SERPL-MCNC: 0.7 MG/DL (ref 0.7–1.2)
EOSINOPHIL # BLD: 0.3 K/UL (ref 0–0.4)
EOSINOPHILS RELATIVE PERCENT: 6 % (ref 0–4)
ERYTHROCYTE [DISTWIDTH] IN BLOOD BY AUTOMATED COUNT: 15.1 % (ref 11.5–14.9)
GFR, ESTIMATED: >90 ML/MIN/1.73M2
GLUCOSE SERPL-MCNC: 85 MG/DL (ref 70–99)
HCT VFR BLD AUTO: 35.4 % (ref 41–53)
HGB BLD-MCNC: 11.8 G/DL (ref 13.5–17.5)
LYMPHOCYTES NFR BLD: 1.2 K/UL (ref 1–4.8)
LYMPHOCYTES RELATIVE PERCENT: 22 % (ref 24–44)
MCH RBC QN AUTO: 28.9 PG (ref 26–34)
MCHC RBC AUTO-ENTMCNC: 33.2 G/DL (ref 31–37)
MCV RBC AUTO: 86.9 FL (ref 80–100)
MONOCYTES NFR BLD: 0.4 K/UL (ref 0.1–1.3)
MONOCYTES NFR BLD: 8 % (ref 1–7)
NEUTROPHILS NFR BLD: 63 % (ref 36–66)
NEUTS SEG NFR BLD: 3.4 K/UL (ref 1.3–9.1)
PLATELET # BLD AUTO: 119 K/UL (ref 150–450)
PMV BLD AUTO: 9.2 FL (ref 6–12)
POTASSIUM SERPL-SCNC: 4.1 MMOL/L (ref 3.7–5.3)
RBC # BLD AUTO: 4.08 M/UL (ref 4.5–5.9)
SODIUM SERPL-SCNC: 137 MMOL/L (ref 135–144)
WBC OTHER # BLD: 5.4 K/UL (ref 3.5–11)

## 2024-05-18 PROCEDURE — 6370000000 HC RX 637 (ALT 250 FOR IP)

## 2024-05-18 PROCEDURE — 6360000002 HC RX W HCPCS: Performed by: PSYCHIATRY & NEUROLOGY

## 2024-05-18 PROCEDURE — 6370000000 HC RX 637 (ALT 250 FOR IP): Performed by: NURSE PRACTITIONER

## 2024-05-18 PROCEDURE — 99239 HOSP IP/OBS DSCHRG MGMT >30: CPT | Performed by: INTERNAL MEDICINE

## 2024-05-18 PROCEDURE — 99232 SBSQ HOSP IP/OBS MODERATE 35: CPT | Performed by: INTERNAL MEDICINE

## 2024-05-18 PROCEDURE — 36415 COLL VENOUS BLD VENIPUNCTURE: CPT

## 2024-05-18 PROCEDURE — 99232 SBSQ HOSP IP/OBS MODERATE 35: CPT | Performed by: PSYCHIATRY & NEUROLOGY

## 2024-05-18 PROCEDURE — 85025 COMPLETE CBC W/AUTO DIFF WBC: CPT

## 2024-05-18 PROCEDURE — 80048 BASIC METABOLIC PNL TOTAL CA: CPT

## 2024-05-18 RX ORDER — BUTALBITAL, ACETAMINOPHEN, CAFFEINE AND CODEINE PHOSPHATE 300; 50; 40; 30 MG/1; MG/1; MG/1; MG/1
1 CAPSULE ORAL 2 TIMES DAILY
Qty: 60 CAPSULE | Refills: 2 | Status: ON HOLD | OUTPATIENT
Start: 2024-05-18 | End: 2024-05-28

## 2024-05-18 RX ADMIN — TOPIRAMATE 75 MG: 50 TABLET, FILM COATED ORAL at 09:08

## 2024-05-18 RX ADMIN — GABAPENTIN 300 MG: 300 CAPSULE ORAL at 09:07

## 2024-05-18 RX ADMIN — TAMSULOSIN HYDROCHLORIDE 0.4 MG: 0.4 CAPSULE ORAL at 09:08

## 2024-05-18 RX ADMIN — KETOROLAC TROMETHAMINE 30 MG: 30 INJECTION, SOLUTION INTRAMUSCULAR at 11:25

## 2024-05-18 RX ADMIN — FERROUS SULFATE TAB 325 MG (65 MG ELEMENTAL FE) 325 MG: 325 (65 FE) TAB at 09:11

## 2024-05-18 RX ADMIN — Medication 1 TABLET: at 09:08

## 2024-05-18 RX ADMIN — LACOSAMIDE 100 MG: 100 TABLET, FILM COATED ORAL at 09:07

## 2024-05-18 RX ADMIN — BACLOFEN 5 MG: 10 TABLET ORAL at 09:07

## 2024-05-18 RX ADMIN — CITALOPRAM HYDROBROMIDE 10 MG: 20 TABLET ORAL at 09:07

## 2024-05-18 RX ADMIN — ACETAMINOPHEN 650 MG: 325 TABLET ORAL at 14:06

## 2024-05-18 RX ADMIN — BUTALBITA,ACETAMINOPHEN AND CAFFEINE 1 CAPSULE: 50; 300; 40 CAPSULE ORAL at 09:08

## 2024-05-18 RX ADMIN — DICLOFENAC SODIUM 2 G: 10 GEL TOPICAL at 09:08

## 2024-05-18 RX ADMIN — GABAPENTIN 300 MG: 300 CAPSULE ORAL at 14:06

## 2024-05-18 RX ADMIN — HYDROXYZINE HYDROCHLORIDE 50 MG: 50 TABLET, FILM COATED ORAL at 11:25

## 2024-05-18 RX ADMIN — Medication 400 MG: at 09:08

## 2024-05-18 ASSESSMENT — PAIN DESCRIPTION - LOCATION
LOCATION: HEAD
LOCATION: HEAD
LOCATION: KNEE

## 2024-05-18 ASSESSMENT — PAIN SCALES - GENERAL
PAINLEVEL_OUTOF10: 6
PAINLEVEL_OUTOF10: 5
PAINLEVEL_OUTOF10: 6
PAINLEVEL_OUTOF10: 8
PAINLEVEL_OUTOF10: 6

## 2024-05-18 ASSESSMENT — PAIN DESCRIPTION - DESCRIPTORS
DESCRIPTORS: ACHING
DESCRIPTORS: DISCOMFORT
DESCRIPTORS: ACHING

## 2024-05-18 NOTE — DISCHARGE INSTRUCTIONS
Follow up with your primary care physician,7 days  Follow up with neurologist  in 14 days  Follow-up with neurosurgeon in 2 weeks  Follow-up with hematology and oncology within 2 to 4 weeks  Take all your medication as prescribed . If your prescription has refills, obtain the medication refills from the pharmacy before you run out. Seek medical attention if you run out of a medication you need and do not have any refills of.   Reasons to call your doctor or go to the ER: Severe headache, nausea or vomiting, chest pain, severe depression, or any other concerning symptoms.

## 2024-05-18 NOTE — PROGRESS NOTES
[facility]  IN-PATIENT SERVICE  [facility]    PROGRESS NOTE             5/18/2024    8:25 AM    Name:   Brennen Mcclure  MRN:     [id number]     Acct:      [id number]   Room:   2098/2098-01   Day:  2  Admit Date:  5/16/2024  3:01 PM    PCP:  Blanca Ren MD  Code Status:  DNR-CCA    Subjective:     C/C: No chief complaint on file.    Interval History Status: not changed.    Patient seen and examined at bedside this morning, no acute event overnight, patient still complaining of headache 8 out of 10, added Toradol as needed for headache.  Platelets 119 this morning, plan for cranioplasty by neurosurgery as an outpatient  Anticipate discharge today      Brief History:     Patient 59 years old male, patient with a chronic medical problem of cavernous sinus thrombosis, migraine headaches, seizure on gabapentin and Vimpat, history of DVT was on anticoagulation, discontinued due to intracranial bleeding, history of ITP and was currently getting treated with IVIG, parotitis, patient initially sent to the [facility] in mid April with intermittent episodes of confusion and dysphagia.  He has a very complex neurological history including cerebral venous sinus thrombosis with subsequent right hemispheric hemorrhage status post right hemicraniectomy due to hemorrhagic stroke last 2023.  There is residual left hemiplegia secondary to this.  Patient was discharged to acute rehab.  During the April hospitalization he had MRI brain done showing right hemispheric edema and encephalomalacia with hematoma with no acute finding.  EEG was done and showing slowing and no epileptiform activities.  He underwent speech evaluation and cleared for diet.  Hematology was consulted for his thrombocytopenia with continued outpatient follow-up.  Patient then presented back to the hospital on 5/8 with intermittent confusion, fever and was admitted to neurology service.  Underwent another  was performed without the administration of intravenous contrast. COMPARISON: CT head May 8, 2024 HISTORY: ORDERING SYSTEM PROVIDED HISTORY: altered mental status TECHNOLOGIST PROVIDED HISTORY: altered mental status Decision Support Exception - unselect if not a suspected or confirmed emergency medical condition->Emergency Medical Condition (MA) Reason for Exam: Altered mental status FINDINGS: INTRACRANIAL STRUCTURES/VENTRICLES: The patient is status post right frontal parietal temporal craniectomy.  There is residual vasogenic edema, scattered encephalomalacia and scattered chronic blood products in the subjacent right frontal parietal temporal lobes.  There is 2.2 x 2.7 cm fluid collection with associated restricted diffusion and peripheral susceptibility artifacts in the right frontal lobe, likely related to surgical cavity with subacute blood products.  There is additional 5 x 11 mm fluid collection with associated restricted diffusion in the anterior right frontal lobe along the anterior margin of the craniectomy, likely related to subacute parenchymal hematoma. There is mass effect in the right cerebral hemisphere with decreased sulcation.  No midline shift. There is mild T2/FLAIR hyperintensity in the periventricular and subcortical white matter, likely related to mild chronic microvascular disease.  There is no acute infarct.   There is no evidence of hydrocephalus. The sellar/suprasellar regions appear unremarkable. ORBITS: The visualized portion of the orbits demonstrate no acute abnormality. SINUSES: There is scattered minimal mucosal thickening in the paranasal sinuses.  There is mild right mastoid effusion. BONES/SOFT TISSUES: The bone marrow signal intensity appears normal. The soft tissues demonstrate no acute abnormality.     Status post right frontal parietal temporal craniectomy. Residual vasogenic edema, scattered encephalomalacia and scattered chronic blood products in the subjacent right frontal  acute fracture, dislocation or fine flattening.  Atherosclerotic calcification of the vasculature.  Mild stool burden.  Surgical clip adjacent to the left greater trochanter.  Mild stool burden.     Left shoulder: 1. Diffuse osteopenia.  Mild degenerative change of the left AC joint. Inferior subluxation of the left humeral head in relation to the glenoid.  No dislocation. 2. No acute fracture. 3. Prior median sternotomy and CABG.  Coronary artery disease. Left hip: No acute fracture or dislocation.         Physical Examination:        Physical Exam  Constitutional:       Appearance: He is normal weight.      Comments: Right hemicraniectomy  HENT:      Nose: Nose normal. No congestion or rhinorrhea.      Mouth/Throat:      Mouth: Mucous membranes are moist.      Pharynx: No oropharyngeal exudate or posterior oropharyngeal erythema.   Eyes:      Extraocular Movements: Extraocular movements intact.      Conjunctiva/sclera: Conjunctivae normal.      Pupils: Pupils are equal, round, and reactive to light.   Cardiovascular:      Rate and Rhythm: Normal rate and regular rhythm.      Pulses: Normal pulses.      Heart sounds: Normal heart sounds. No murmur heard.  Pulmonary:      Effort: Pulmonary effort is normal.      Breath sounds: Normal breath sounds. No wheezing, rhonchi or rales.   Abdominal:      General: Abdomen is flat. There is no distension.      Tenderness: There is no abdominal tenderness.   Musculoskeletal:      Right lower leg: No edema.      Left lower leg: No edema.   Neurological:      Mental Status: He is alert and oriented to person, place, and time. Mental status is at baseline.      Cranial Nerves: Cranial nerves 2-12 are intact.      Motor: Weakness (0/5 left upper and lower extremity) present.   Psychiatric:         Thought Content: Thought content does not include homicidal or suicidal ideation. Thought content does not include homicidal or suicidal plan.          Assessment:        Primary

## 2024-05-18 NOTE — DISCHARGE SUMMARY
[facility]   IN-PATIENT SERVICE   [facility]    Discharge Summary     Patient ID: Brennen Mcclure  :  1965   MRN: [id number]     ACCOUNT:  [id number]   Patient's PCP: Blanca Ren MD  Admit Date: 2024   Discharge Date: 2024     Length of Stay: 2  Code Status:  DNR-CCA  Admitting Physician: Asher Seo MD  Discharge Physician: Zeus Jordan MD     Active Discharge Diagnoses:       Primary Problem  Intractable headache, unspecified chronicity pattern, unspecified headache type      Hospital Problems  Active Hospital Problems    Diagnosis Date Noted    Acute idiopathic thrombocytopenic purpura (HCC) [D69.3] 2024    History of cerebral venous infarction [Z86.73] 2024    Intractable headache, unspecified chronicity pattern, unspecified headache type [R51.9] 2024       Admission Condition:  fair     Discharged Condition: fair    Hospital Stay:       Hospital Course:  Brennen Mcclure is a 59 y.o. male who was admitted for the management of   Intractable headache, unspecified chronicity pattern, unspecified headache type , presented to ER with No chief complaint on file.  patient with a chronic medical problem of cavernous sinus thrombosis, migraine headaches, seizure on gabapentin and Vimpat, history of DVT was on anticoagulation, discontinued due to intracranial bleeding, history of ITP and was currently getting treated with IVIG, parotitis, patient initially sent to the [facility] in mid April with intermittent episodes of confusion and dysphagia.  He has a very complex neurological history including cerebral venous sinus thrombosis with subsequent right hemispheric hemorrhage status post right hemicraniectomy due to hemorrhagic stroke last .  There is residual left hemiplegia secondary to this.During the April hospitalization he had MRI brain done showing right hemispheric edema and encephalomalacia with hematoma with no  acute finding  Patient was discharged to acute rehab.  Patient then presented back to the hospital on 5/8 with intermittent confusion, fever and was admitted to neurology service.  Underwent another MRI of the brain showing right frontoparietal temporal craniectomy with residual vasogenic edema and scattered encephalomalacia largely unchanged.  MRV was done which did not show any evidence of cerebral venous sinus thrombosis.  EEG again showed mild to moderate encephalopathy and interhemispheric asymmetry suggesting focal lesion on the right and consistent with breach rhythm.  Lumbar puncture was done to evaluate for meningitis there were unrevealing.  Patient continued to complain of intermittent headache.  He also having significant anxiety with suicidal ideation and psychiatry was consulted and recommended to [facility] transfer.  On 5/14 admitted to the [facility] due to moderate depression and severe suicidal ideation, after found to have UTI and fever, patient was started on Rocephin,.  Patient started on Zyprexa.  Then due to continued to have worsening headache and CT head done and did show worsening right to left midline shift and concavity in the right hemicraniectomy region.  Patient was transferred to medical floor, on examination patient was alert, awake and oriented x 3, denies any suicidal or homicidal ideation, complaining of severe headache 8 out of 10.  Neurologist was consulted, started on IV fluid 100 mL/h, CT scan repeated again and did show that the midline shift has resolved with mild residual edema in the right frontal area which improved, was on Fioricet twice daily as needed and was added on Elavil 75 mg nightly and Topamax 75 mg twice daily in addition to magnesium oxide daily, regarding his platelet was stable, we consulted heme-onc, no recommendation IVIG will be restarted on eltrombopag olamine  Psychiatry was consulted, no indication for [facility] readmission, patient does not have any suicidal  of.   Reasons to call your doctor or go to the ER: Severe headache, nausea or vomiting, chest pain, severe depression, or any other concerning symptoms.    Discharge Medications:      Medication List        START taking these medications      butalbital-acetaminophen-caffeine-codeine -60-30 MG per capsule  Commonly known as: FIORICET WITH CODEINE  Take 1 capsule by mouth in the morning and at bedtime for 10 days. Max Daily Amount: 2 capsules            CONTINUE taking these medications      acetaminophen 500 MG tablet  Commonly known as: TYLENOL  Take 2 tablets by mouth in the morning, at noon, and at bedtime     amitriptyline 75 MG tablet  Commonly known as: ELAVIL  Take 1 tablet by mouth nightly     atorvastatin 80 MG tablet  Commonly known as: LIPITOR  Take 1 tablet by mouth nightly     Baclofen 5 MG tablet  Commonly known as: LIORESAL  Take 1 tablet by mouth 2 times daily     citalopram 10 MG tablet  Commonly known as: CELEXA  Take 1 tablet by mouth daily     diclofenac sodium 1 % Gel  Commonly known as: VOLTAREN  Apply 2 g topically 2 times daily Apply to knees     ferrous sulfate 325 (65 Fe) MG tablet  Commonly known as: IRON 325     gabapentin 300 MG capsule  Commonly known as: NEURONTIN  Take 1 capsule by mouth 3 times daily for 90 days.     Handicap Placard Misc  by Does not apply route Duration: 1 year / Dx: Stroke     ipratropium 0.5 mg-albuterol 2.5 mg 0.5-2.5 (3) MG/3ML Soln nebulizer solution  Commonly known as: DUONEB  Inhale 3 mLs into the lungs every 6 hours as needed for Shortness of Breath     lacosamide 100 MG Tabs tablet  Commonly known as: VIMPAT  Take 1 tablet by mouth 2 times daily for 90 days. Max Daily Amount: 200 mg     lidocaine 5 %  Commonly known as: Lidoderm  Place 2 patches onto the skin daily Change daily remove after 12 hours     magnesium oxide 400 (240 Mg) MG tablet  Commonly known as: MAG-OX  Take 1 tablet by mouth daily     melatonin 3 MG Tabs tablet  TAKE 2 TABLETS BY

## 2024-05-18 NOTE — PROGRESS NOTES
[facility] Neurology   IN-PATIENT SERVICE      NEUROLOGY PROGRESS  NOTE            Date:   5/18/2024  Patient name:  Brennen Mcclure  Date of admission:  5/16/2024  YOB: 1965      Interval History:     Much better spirits today.  Headache currently slightly improved.  Had special CT scan done yesterday ordered by neurosurgery in preparation for cranioplasty in the upcoming weeks.    History of Present Illness:     The patient is a 59 y.o. male who presents with fever, altered mental status.  . The patient was seen and examined and the chart was reviewed.  Patient initially seen by our service back in mid April with intermittent episodes of confusion, dysphagia.  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  There is residual left hemiplegia secondary to this.  Eventually was discharged to an acute rehab in [place] after this.  During the April hospitalization he had MRI brain done showing right hemispheric edema, encephalomalacia with hematoma with no acute findings.  EEG was done showing slowing and no epileptiform activities.  He improved somewhat during his hospitalization.  Underwent speech evaluation and cleared for diet.  Hematology consulted for his thrombocytopenia with continued outpatient follow-up.  Patient then presents back to the hospital on 5/8 with again intermittent confusion, fever admitted to neurology service.  Underwent another MRI of the brain showing right frontal parietal temporal craniectomy with residual vasogenic edema and scattered encephalomalacia largely unchanged.  MRV was done which did not show any evidence of cerebral venous sinus thrombosis.  EEG showing mild to moderate encephalopathy and interhemispheric asymmetry suggesting focal lesion on the right and consistent with a breach rhythm.  Found to have UTI,  cavity with subacute blood products.    Additional 5 x 11 mm fluid collection with associated restricted diffusion in  the anterior right frontal lobe along the anterior margin of the craniectomy,  likely related to subacute parenchymal hematoma.    No results found for this or any previous visit.    No results found for this or any previous visit.    Results for orders placed during the hospital encounter of 05/16/24    CT HEAD WO CONTRAST (Preliminary)  This result has not been signed. Information might be incomplete.    Narrative  EXAMINATION:  CT OF THE HEAD WITHOUT CONTRAST  5/17/2024 4:47 pm    TECHNIQUE:  CT of the head was performed without the administration of intravenous  contrast. Automated exposure control, iterative reconstruction, and/or weight  based adjustment of the mA/kV was utilized to reduce the radiation dose to as  low as reasonably achievable.    COMPARISON:  05/16/2024    HISTORY:  ORDERING SYSTEM PROVIDED HISTORY: for cranial implant, please do with Kwame  protocol  TECHNOLOGIST PROVIDED HISTORY:  for cranial implant, please do with Kwame protocol    Reason for Exam: For cranial implant, please do with Kwame protocol    FINDINGS:  BRAIN/VENTRICLES: Preoperative planning CT head was obtained.  The right  lateral ventricle has re-expanded and there is slightly more external brain  herniation through the right hemispheric craniectomy defect.  Areas of  encephalomalacia throughout the right cerebral hemisphere are unchanged.  No  acute hemorrhage.    ORBITS: The visualized portion of the orbits demonstrate no acute abnormality.    SINUSES: The visualized paranasal sinuses and mastoid air cells demonstrate  no acute abnormality.    SOFT TISSUES/SKULL:  No acute abnormality of the visualized skull or soft  tissues.    Impression  Minimally increased external brain herniation through the craniectomy defect  with re-expansion of the right lateral ventricle to a relatively normal size.      I

## 2024-05-18 NOTE — PLAN OF CARE
Problem: Safety - Adult  Goal: Free from fall injury  Outcome: Adequate for Discharge     Problem: Discharge Planning  Goal: Discharge to home or other facility with appropriate resources  Outcome: Adequate for Discharge  Flowsheets (Taken 5/18/2024 0900)  Discharge to home or other facility with appropriate resources:   Identify barriers to discharge with patient and caregiver   Arrange for needed discharge resources and transportation as appropriate   Identify discharge learning needs (meds, wound care, etc)   Refer to discharge planning if patient needs post-hospital services based on physician order or complex needs related to functional status, cognitive ability or social support system     Problem: Risk for Elopement  Goal: Patient will not exit the unit/facility without proper excort  Outcome: Adequate for Discharge  Flowsheets (Taken 5/18/2024 0900)  Nursing Interventions for Elopement Risk:   Assist with personal care needs such as toileting, eating, dressing, as needed to reduce the risk of wandering   Collaborate with family members/caregivers to mitigate the elopement risk   Collaborate with treatment team for drug withdrawal symptoms treatment   Collaborate with treatment team for nicotine replacement     Problem: Pain  Goal: Verbalizes/displays adequate comfort level or baseline comfort level  Outcome: Adequate for Discharge     Problem: Skin/Tissue Integrity  Goal: Absence of new skin breakdown  Description: 1.  Monitor for areas of redness and/or skin breakdown  2.  Assess vascular access sites hourly  3.  Every 4-6 hours minimum:  Change oxygen saturation probe site  4.  Every 4-6 hours:  If on nasal continuous positive airway pressure, respiratory therapy assess nares and determine need for appliance change or resting period.  Outcome: Adequate for Discharge     Problem: ABCDS Injury Assessment  Goal: Absence of physical injury  Outcome: Adequate for Discharge     Problem: Self

## 2024-05-18 NOTE — DISCHARGE INSTR - DIET

## 2024-05-18 NOTE — PROGRESS NOTES
Today's Date: 5/18/2024  Patient Name: Brennen Mcclure  Date of admission: 5/16/2024  3:01 PM  Patient's age: 59 y.o., 1965  Admission Dx: Intractable headache, unspecified chronicity pattern, unspecified headache type [R51.9]    Reason for Consult: ITP on IVIG   Requesting Physician: Asher Seo MD    CHIEF COMPLAINT:  No chief complaint on file.    History Obtained From:  patient and chart  INTERVAL HISTORY :    Patient is seen and examined  Patient now transferred from [facility] to [facility] unit  Still complains of headache and neurology on board  His platelets dropped 137> 119 K  plan for cranioplasty by neurosurgery as an outpatient       HISTORY OF PRESENT ILLNESS:    Brennen Mcclure is a 59 y.o. male who is admitted to the [facility] at [facility] on 5/16/2024  for acute depression    Patient is known to me for his thrombocytopenia/ITP and was recently admitted to [facility].  He has chronic thrombocytopenia, patient had a evaluation in [place] and also in the past and thought to be immune mediated  History of dural sinus thrombosis and history of DVT in 2019  Chronic anticoagulation with Eliquis  Had a hemorrhagic stroke in December 2023  Patient received platelet transfusion during the hospitalization and also was given IVIG on 1/9/2024 and 1/10/2024  Patient was treated with Decadron 40 mg on 2/18 - 2/20 1-24, with no response  Bone marrow biopsy negative for acute bone marrow pathology  Plan for IVIG weekly for 5 doses and also prescription for Promacta   Patient started taking Promacta on 4/14/2024  First IVIG given on 4/17/2024   He presented to the office 5/7 for planned IVIG infusion.  Per wife, he had been having low-grade fever with slight confusion and cloudy urine.  Prescription given for Cipro.  Post-infusion he had a fever of 103 F, was diaphoretic, and had altered mental status.     During the hospitalization his CBC subsequently showed platelet count improved to 116K    He  MD Zeus        amitriptyline (ELAVIL) tablet 75 mg  75 mg Oral Nightly Zeus Garcia MD   75 mg at 05/17/24 2245    atorvastatin (LIPITOR) tablet 80 mg  80 mg Oral Nightly Zeus Garcia MD   80 mg at 05/17/24 2245    ferrous sulfate (IRON 325) tablet 325 mg  325 mg Oral Every Other Day Zeus Garcia MD   325 mg at 05/18/24 0911    baclofen (LIORESAL) tablet 5 mg  5 mg Oral BID Zeus Garcia MD   5 mg at 05/18/24 0907    citalopram (CELEXA) tablet 10 mg  10 mg Oral Daily Zeus Garcia MD   10 mg at 05/18/24 0907    gabapentin (NEURONTIN) capsule 300 mg  300 mg Oral TID Zeus Garcia MD   300 mg at 05/18/24 0907    lacosamide (VIMPAT) tablet 100 mg  100 mg Oral BID Zeus Garcia MD   100 mg at 05/18/24 0907    melatonin tablet 6 mg  6 mg Oral Nightly Zeus Garcia MD   6 mg at 05/17/24 2245    topiramate (TOPAMAX) tablet 75 mg  75 mg Oral BID Zeus Garcia MD   75 mg at 05/18/24 0908    tamsulosin (FLOMAX) capsule 0.4 mg  0.4 mg Oral Daily Zeus Garcia MD   0.4 mg at 05/18/24 0908    eltrombopag olamine (PROMACTA) 50 MG tablet TABS 50 mg (Patient Supplied)  50 mg Oral Daily Zeus Garcia MD   50 mg at 05/18/24 0908    magnesium oxide (MAG-OX) tablet 400 mg  400 mg Oral Daily Zeus Garcia MD   400 mg at 05/18/24 0908       Allergies:  Patient has no known allergies.    Social History:   reports that he quit smoking about 6 months ago. His smoking use included cigarettes. He has never used smokeless tobacco. He reports that he does not currently use alcohol. He reports that he does not use drugs.     Family History: family history includes Coronary Art Dis in his mother; Stroke in an other family member.    REVIEW OF SYSTEMS:    Constitutional: No fever or chills. No night sweats, no weight loss   Eyes: No eye discharge, double vision, or eye pain   HEENT: negative for sore mouth, sore throat, hoarseness and

## 2024-05-21 ENCOUNTER — HOSPITAL ENCOUNTER (OUTPATIENT)
Age: 59
Setting detail: SPECIMEN
Discharge: HOME | End: 2024-05-21

## 2024-05-21 ENCOUNTER — APPOINTMENT (OUTPATIENT)
Dept: OCCUPATIONAL THERAPY | Age: 59
End: 2024-05-21
Attending: INTERNAL MEDICINE
Payer: MEDICAID

## 2024-05-21 ENCOUNTER — APPOINTMENT (OUTPATIENT)
Dept: SPEECH THERAPY | Age: 59
End: 2024-05-21
Attending: INTERNAL MEDICINE
Payer: MEDICAID

## 2024-05-21 ENCOUNTER — TELEPHONE (OUTPATIENT)
Dept: INTERNAL MEDICINE CLINIC | Age: 59
End: 2024-05-21

## 2024-05-21 ENCOUNTER — APPOINTMENT (OUTPATIENT)
Dept: PHYSICAL THERAPY | Age: 59
End: 2024-05-21
Attending: INTERNAL MEDICINE
Payer: MEDICAID

## 2024-05-21 DIAGNOSIS — R29.818 HEADACHE WITH NEUROLOGIC DEFICIT: ICD-10-CM

## 2024-05-21 DIAGNOSIS — R51.9 HEADACHE WITH NEUROLOGIC DEFICIT: ICD-10-CM

## 2024-05-21 LAB
BASOPHILS # BLD: 0.06 K/UL (ref 0–0.2)
BASOPHILS NFR BLD: 1 % (ref 0–2)
EOSINOPHIL # BLD: 0.29 K/UL (ref 0–0.44)
EOSINOPHILS RELATIVE PERCENT: 6 % (ref 1–4)
ERYTHROCYTE [DISTWIDTH] IN BLOOD BY AUTOMATED COUNT: 16.3 % (ref 11.8–14.4)
HCT VFR BLD AUTO: 40.4 % (ref 40.7–50.3)
HGB BLD-MCNC: 12.8 G/DL (ref 13–17)
IMM GRANULOCYTES # BLD AUTO: 0.04 K/UL (ref 0–0.3)
IMM GRANULOCYTES NFR BLD: 1 %
LYMPHOCYTES NFR BLD: 1.1 K/UL (ref 1.1–3.7)
LYMPHOCYTES RELATIVE PERCENT: 22 % (ref 24–43)
MCH RBC QN AUTO: 28.2 PG (ref 25.2–33.5)
MCHC RBC AUTO-ENTMCNC: 31.7 G/DL (ref 28.4–34.8)
MCV RBC AUTO: 89 FL (ref 82.6–102.9)
MONOCYTES NFR BLD: 0.4 K/UL (ref 0.1–1.2)
MONOCYTES NFR BLD: 8 % (ref 3–12)
NEUTROPHILS NFR BLD: 62 % (ref 36–65)
NEUTS SEG NFR BLD: 3.2 K/UL (ref 1.5–8.1)
NRBC BLD-RTO: 0 PER 100 WBC
PLATELET # BLD AUTO: 140 K/UL (ref 138–453)
PMV BLD AUTO: 12.1 FL (ref 8.1–13.5)
RBC # BLD AUTO: 4.54 M/UL (ref 4.21–5.77)
RBC # BLD: ABNORMAL 10*6/UL
WBC OTHER # BLD: 5.1 K/UL (ref 3.5–11.3)

## 2024-05-21 RX ORDER — BUTALBITAL, ACETAMINOPHEN, CAFFEINE AND CODEINE PHOSPHATE 300; 50; 40; 30 MG/1; MG/1; MG/1; MG/1
1 CAPSULE ORAL 2 TIMES DAILY
Qty: 60 CAPSULE | Refills: 2 | Status: CANCELLED | OUTPATIENT
Start: 2024-05-21 | End: 2024-05-31

## 2024-05-21 NOTE — TELEPHONE ENCOUNTER
Care Transitions Initial Follow Up Call    Outreach made within 2 business days of discharge: Yes    Patient: Brennen Mcclure Patient : 1965   MRN: [id number]  Reason for Admission: There are no discharge diagnoses documented for the most recent discharge.  Discharge Date: 24       Spoke with: Patients Wife     Discharge department/facility: [facility] Interactive Patient Contact:  Was patient able to fill all prescriptions: NO  Was patient instructed to bring all medications to the follow-up visit: Yes  Is patient taking all medications as directed in the discharge summary? Yes  Does patient understand their discharge instructions: Yes  Does patient have questions or concerns that need addressed prior to 7-14 day follow up office visit: yes - patients wife reports he is in excruciating pain and unable to get Fioricet filled from the pharmacy. It appears there was a problem with the electronic prescription- please send to pharmacy, will pend Rx. MA notified PA will be needed.     Scheduled appointment with PCP within 7-14 days - denied scheduling at this time as pt is scheduled for brain surgery next week. Patients wife would like to wait until after surgery for appt.     Follow Up  Future Appointments   Date Time Provider Department Center   2024 11:00 AM Lam Chang MD [facility]   2024  2:00 PM Danica De La Paz MD [facility]   2024 11:00 AM Angelica Ruiz APRN - CNP [facility]   2024 10:30 AM Gloria Corbett DO [facility]   2024  4:00 PM Danica De La Paz MD [facility]       Merle De Jesus

## 2024-05-22 ENCOUNTER — HOSPITAL ENCOUNTER (OUTPATIENT)
Facility: MEDICAL CENTER | Age: 59
End: 2024-05-22

## 2024-05-23 ENCOUNTER — HOSPITAL ENCOUNTER (OUTPATIENT)
Dept: PHYSICAL THERAPY | Age: 59
Setting detail: THERAPIES SERIES
End: 2024-05-23
Attending: INTERNAL MEDICINE
Payer: MEDICAID

## 2024-05-23 ENCOUNTER — APPOINTMENT (OUTPATIENT)
Dept: OCCUPATIONAL THERAPY | Age: 59
End: 2024-05-23
Attending: INTERNAL MEDICINE
Payer: MEDICAID

## 2024-05-23 ENCOUNTER — APPOINTMENT (OUTPATIENT)
Dept: SPEECH THERAPY | Age: 59
End: 2024-05-23
Attending: INTERNAL MEDICINE
Payer: MEDICAID

## 2024-05-24 ENCOUNTER — TELEPHONE (OUTPATIENT)
Dept: INTERNAL MEDICINE CLINIC | Age: 59
End: 2024-05-24

## 2024-05-24 ENCOUNTER — TELEPHONE (OUTPATIENT)
Dept: ONCOLOGY | Age: 59
End: 2024-05-24

## 2024-05-24 ENCOUNTER — OFFICE VISIT (OUTPATIENT)
Dept: ONCOLOGY | Age: 59
End: 2024-05-24
Payer: MEDICAID

## 2024-05-24 VITALS — HEART RATE: 76 BPM | SYSTOLIC BLOOD PRESSURE: 132 MMHG | TEMPERATURE: 98.8 F | DIASTOLIC BLOOD PRESSURE: 86 MMHG

## 2024-05-24 DIAGNOSIS — D69.6 THROMBOCYTOPENIA (HCC): Primary | ICD-10-CM

## 2024-05-24 PROCEDURE — 99214 OFFICE O/P EST MOD 30 MIN: CPT | Performed by: INTERNAL MEDICINE

## 2024-05-24 PROCEDURE — 99211 OFF/OP EST MAY X REQ PHY/QHP: CPT

## 2024-05-24 NOTE — TELEPHONE ENCOUNTER
Pharmacy called and stated that the Fioricet with codeine insurance will not cover it in capsule form can it be ordered in tablet form. Please advise.

## 2024-05-24 NOTE — PROGRESS NOTES
liver did show fatty liver    RECOMMENDATIONS:  I reviewed recent lab work, recent hospitalization record, discussed diagnosis, prognosis treatment recommendations   His platelets recently are improved to 140 K   Is planning surgery on May 29   No need of further IVIG   He will continue Promacta   I will see him 2 weeks after his surgery       During today's visit, the patient and the family had a number of reasonable questions which were answered to their satisfaction.  They verbalized understanding of the information provided and they agreed to proceed as outlined above.      Lam Chang MD  Hematologist/Medical Oncologist    On this date 5/24/24  I have spent 40 minutes reviewing previous notes, test results and face to face with the patient discussing the diagnosis and importance of compliance with the treatment plan. Greater than 50% of that time was spent face-to-face with the patient in counseling and coordinating her care.       This note is created with the assistance of a speech recognition program.  While intending to generate a document that actually reflects the content of the visit, the document can still have some errors including those of syntax and sound a like substitutions which may escape proof reading.  It such instances, actual meaning can be extrapolated by contextual diversion.

## 2024-05-28 ENCOUNTER — ANESTHESIA EVENT (OUTPATIENT)
Dept: OPERATING ROOM | Age: 59
End: 2024-05-28
Payer: MEDICAID

## 2024-05-28 ENCOUNTER — HOSPITAL ENCOUNTER (INPATIENT)
Age: 59
LOS: 17 days | Discharge: NURSING, REHAB OR LONG-TERM CARE | End: 2024-06-14
Attending: NEUROLOGICAL SURGERY | Admitting: NEUROLOGICAL SURGERY
Payer: MEDICAID

## 2024-05-28 ENCOUNTER — HOSPITAL ENCOUNTER (OUTPATIENT)
Dept: SPEECH THERAPY | Age: 59
Setting detail: THERAPIES SERIES
End: 2024-05-28
Attending: INTERNAL MEDICINE
Payer: MEDICAID

## 2024-05-28 ENCOUNTER — APPOINTMENT (OUTPATIENT)
Dept: PHYSICAL THERAPY | Age: 59
End: 2024-05-28
Attending: INTERNAL MEDICINE
Payer: MEDICAID

## 2024-05-28 ENCOUNTER — APPOINTMENT (OUTPATIENT)
Dept: GENERAL RADIOLOGY | Age: 59
End: 2024-05-28
Attending: NEUROLOGICAL SURGERY
Payer: MEDICAID

## 2024-05-28 ENCOUNTER — APPOINTMENT (OUTPATIENT)
Dept: OCCUPATIONAL THERAPY | Age: 59
End: 2024-05-28
Attending: INTERNAL MEDICINE
Payer: MEDICAID

## 2024-05-28 ENCOUNTER — HOSPITAL ENCOUNTER (OUTPATIENT)
Age: 59
Setting detail: SPECIMEN
Discharge: HOME | End: 2024-05-28

## 2024-05-28 DIAGNOSIS — R29.818 HEADACHE WITH NEUROLOGIC DEFICIT: ICD-10-CM

## 2024-05-28 DIAGNOSIS — I61.9 BRAIN BLEED (HCC): Primary | ICD-10-CM

## 2024-05-28 DIAGNOSIS — R51.9 HEADACHE WITH NEUROLOGIC DEFICIT: ICD-10-CM

## 2024-05-28 PROBLEM — M95.2 DEFECT OF SKULL: Status: ACTIVE | Noted: 2024-05-28

## 2024-05-28 PROBLEM — Z86.718 HISTORY OF DVT (DEEP VEIN THROMBOSIS): Status: ACTIVE | Noted: 2024-05-28

## 2024-05-28 PROBLEM — Q75.8: Status: ACTIVE | Noted: 2024-05-28

## 2024-05-28 LAB
ABO + RH BLD: NORMAL
ANION GAP SERPL CALCULATED.3IONS-SCNC: 10 MMOL/L (ref 9–16)
ARM BAND NUMBER: NORMAL
BASOPHILS # BLD: 0.05 K/UL (ref 0–0.2)
BASOPHILS # BLD: 0.06 K/UL (ref 0–0.2)
BASOPHILS NFR BLD: 1 % (ref 0–2)
BASOPHILS NFR BLD: 1 % (ref 0–2)
BLOOD BANK SAMPLE EXPIRATION: NORMAL
BLOOD GROUP ANTIBODIES SERPL: NEGATIVE
BUN SERPL-MCNC: 22 MG/DL (ref 8–23)
CALCIUM SERPL-MCNC: 9.4 MG/DL (ref 8.6–10.4)
CHLORIDE SERPL-SCNC: 102 MMOL/L (ref 98–107)
CO2 SERPL-SCNC: 25 MMOL/L (ref 20–31)
CREAT SERPL-MCNC: 0.9 MG/DL (ref 0.7–1.2)
EOSINOPHIL # BLD: 0.26 K/UL (ref 0–0.44)
EOSINOPHIL # BLD: 0.27 K/UL (ref 0–0.44)
EOSINOPHILS RELATIVE PERCENT: 5 % (ref 1–4)
EOSINOPHILS RELATIVE PERCENT: 5 % (ref 1–4)
ERYTHROCYTE [DISTWIDTH] IN BLOOD BY AUTOMATED COUNT: 16.9 % (ref 11.8–14.4)
ERYTHROCYTE [DISTWIDTH] IN BLOOD BY AUTOMATED COUNT: 17.4 % (ref 11.8–14.4)
GFR, ESTIMATED: >90 ML/MIN/1.73M2
GLUCOSE SERPL-MCNC: 86 MG/DL (ref 74–99)
HCT VFR BLD AUTO: 43.3 % (ref 40.7–50.3)
HCT VFR BLD AUTO: 44.3 % (ref 40.7–50.3)
HGB BLD-MCNC: 13.4 G/DL (ref 13–17)
HGB BLD-MCNC: 13.6 G/DL (ref 13–17)
IMM GRANULOCYTES # BLD AUTO: <0.03 K/UL (ref 0–0.3)
IMM GRANULOCYTES # BLD AUTO: <0.03 K/UL (ref 0–0.3)
IMM GRANULOCYTES NFR BLD: 0 %
IMM GRANULOCYTES NFR BLD: 0 %
INR PPP: 0.9
LYMPHOCYTES NFR BLD: 1.1 K/UL (ref 1.1–3.7)
LYMPHOCYTES NFR BLD: 1.14 K/UL (ref 1.1–3.7)
LYMPHOCYTES RELATIVE PERCENT: 20 % (ref 24–43)
LYMPHOCYTES RELATIVE PERCENT: 21 % (ref 24–43)
MCH RBC QN AUTO: 28.6 PG (ref 25.2–33.5)
MCH RBC QN AUTO: 28.9 PG (ref 25.2–33.5)
MCHC RBC AUTO-ENTMCNC: 30.2 G/DL (ref 28.4–34.8)
MCHC RBC AUTO-ENTMCNC: 31.4 G/DL (ref 28.4–34.8)
MCV RBC AUTO: 91.2 FL (ref 82.6–102.9)
MCV RBC AUTO: 95.7 FL (ref 82.6–102.9)
MONOCYTES NFR BLD: 0.42 K/UL (ref 0.1–1.2)
MONOCYTES NFR BLD: 0.45 K/UL (ref 0.1–1.2)
MONOCYTES NFR BLD: 7 % (ref 3–12)
MONOCYTES NFR BLD: 8 % (ref 3–12)
NEUTROPHILS NFR BLD: 65 % (ref 36–65)
NEUTROPHILS NFR BLD: 67 % (ref 36–65)
NEUTS SEG NFR BLD: 3.46 K/UL (ref 1.5–8.1)
NEUTS SEG NFR BLD: 3.87 K/UL (ref 1.5–8.1)
NRBC BLD-RTO: 0 PER 100 WBC
NRBC BLD-RTO: 0 PER 100 WBC
PARTIAL THROMBOPLASTIN TIME: 24.1 SEC (ref 23–36.5)
PLATELET # BLD AUTO: ABNORMAL K/UL (ref 138–453)
PLATELET # BLD AUTO: ABNORMAL K/UL (ref 138–453)
PLATELET, FLUORESCENCE: 56 K/UL (ref 138–453)
PLATELET, FLUORESCENCE: 64 K/UL (ref 138–453)
PLATELETS.RETICULATED NFR BLD AUTO: 11 % (ref 1.1–10.3)
PLATELETS.RETICULATED NFR BLD AUTO: 12.2 % (ref 1.1–10.3)
POTASSIUM SERPL-SCNC: 4.2 MMOL/L (ref 3.7–5.3)
PROTHROMBIN TIME: 12.5 SEC (ref 11.7–14.9)
RBC # BLD AUTO: 4.63 M/UL (ref 4.21–5.77)
RBC # BLD AUTO: 4.75 M/UL (ref 4.21–5.77)
RBC # BLD: ABNORMAL 10*6/UL
RBC # BLD: ABNORMAL 10*6/UL
SODIUM SERPL-SCNC: 137 MMOL/L (ref 136–145)
WBC OTHER # BLD: 5.4 K/UL (ref 3.5–11.3)
WBC OTHER # BLD: 5.8 K/UL (ref 3.5–11.3)

## 2024-05-28 PROCEDURE — 6360000002 HC RX W HCPCS: Performed by: INTERNAL MEDICINE

## 2024-05-28 PROCEDURE — 71045 X-RAY EXAM CHEST 1 VIEW: CPT

## 2024-05-28 PROCEDURE — 99255 IP/OBS CONSLTJ NEW/EST HI 80: CPT | Performed by: INTERNAL MEDICINE

## 2024-05-28 PROCEDURE — 80048 BASIC METABOLIC PNL TOTAL CA: CPT

## 2024-05-28 PROCEDURE — 94761 N-INVAS EAR/PLS OXIMETRY MLT: CPT

## 2024-05-28 PROCEDURE — 85025 COMPLETE CBC W/AUTO DIFF WBC: CPT

## 2024-05-28 PROCEDURE — 99222 1ST HOSP IP/OBS MODERATE 55: CPT | Performed by: NEUROLOGICAL SURGERY

## 2024-05-28 PROCEDURE — 86901 BLOOD TYPING SEROLOGIC RH(D): CPT

## 2024-05-28 PROCEDURE — 86850 RBC ANTIBODY SCREEN: CPT

## 2024-05-28 PROCEDURE — 93005 ELECTROCARDIOGRAM TRACING: CPT | Performed by: REGISTERED NURSE

## 2024-05-28 PROCEDURE — 85730 THROMBOPLASTIN TIME PARTIAL: CPT

## 2024-05-28 PROCEDURE — 36415 COLL VENOUS BLD VENIPUNCTURE: CPT

## 2024-05-28 PROCEDURE — 86900 BLOOD TYPING SEROLOGIC ABO: CPT

## 2024-05-28 PROCEDURE — 2580000003 HC RX 258: Performed by: INTERNAL MEDICINE

## 2024-05-28 PROCEDURE — 2060000000 HC ICU INTERMEDIATE R&B

## 2024-05-28 PROCEDURE — 2580000003 HC RX 258: Performed by: REGISTERED NURSE

## 2024-05-28 PROCEDURE — APPSS45 APP SPLIT SHARED TIME 31-45 MINUTES: Performed by: REGISTERED NURSE

## 2024-05-28 PROCEDURE — 6370000000 HC RX 637 (ALT 250 FOR IP): Performed by: REGISTERED NURSE

## 2024-05-28 PROCEDURE — 85055 RETICULATED PLATELET ASSAY: CPT

## 2024-05-28 PROCEDURE — 85610 PROTHROMBIN TIME: CPT

## 2024-05-28 RX ORDER — GABAPENTIN 300 MG/1
300 CAPSULE ORAL 3 TIMES DAILY
Status: DISCONTINUED | OUTPATIENT
Start: 2024-05-28 | End: 2024-06-08

## 2024-05-28 RX ORDER — POLYETHYLENE GLYCOL 3350 17 G/17G
17 POWDER, FOR SOLUTION ORAL DAILY PRN
Status: DISCONTINUED | OUTPATIENT
Start: 2024-05-28 | End: 2024-06-07

## 2024-05-28 RX ORDER — SODIUM CHLORIDE 0.9 % (FLUSH) 0.9 %
5-40 SYRINGE (ML) INJECTION EVERY 12 HOURS SCHEDULED
Status: DISCONTINUED | OUTPATIENT
Start: 2024-05-28 | End: 2024-06-14 | Stop reason: HOSPADM

## 2024-05-28 RX ORDER — SODIUM CHLORIDE 9 MG/ML
INJECTION, SOLUTION INTRAVENOUS PRN
Status: DISCONTINUED | OUTPATIENT
Start: 2024-05-28 | End: 2024-06-14 | Stop reason: HOSPADM

## 2024-05-28 RX ORDER — ACETAMINOPHEN 500 MG
1000 TABLET ORAL 3 TIMES DAILY
Status: DISCONTINUED | OUTPATIENT
Start: 2024-05-28 | End: 2024-06-08

## 2024-05-28 RX ORDER — MAGNESIUM SULFATE IN WATER 40 MG/ML
2000 INJECTION, SOLUTION INTRAVENOUS PRN
Status: DISCONTINUED | OUTPATIENT
Start: 2024-05-28 | End: 2024-06-14 | Stop reason: HOSPADM

## 2024-05-28 RX ORDER — ATORVASTATIN CALCIUM 80 MG/1
80 TABLET, FILM COATED ORAL NIGHTLY
Status: DISCONTINUED | OUTPATIENT
Start: 2024-05-28 | End: 2024-06-14 | Stop reason: HOSPADM

## 2024-05-28 RX ORDER — POTASSIUM CHLORIDE 20 MEQ/1
40 TABLET, EXTENDED RELEASE ORAL PRN
Status: DISCONTINUED | OUTPATIENT
Start: 2024-05-28 | End: 2024-06-14 | Stop reason: HOSPADM

## 2024-05-28 RX ORDER — BACLOFEN 5 MG/1
5 TABLET ORAL 2 TIMES DAILY
Status: DISCONTINUED | OUTPATIENT
Start: 2024-05-28 | End: 2024-06-14 | Stop reason: HOSPADM

## 2024-05-28 RX ORDER — ONDANSETRON 4 MG/1
4 TABLET, ORALLY DISINTEGRATING ORAL EVERY 8 HOURS PRN
Status: DISCONTINUED | OUTPATIENT
Start: 2024-05-28 | End: 2024-06-14 | Stop reason: HOSPADM

## 2024-05-28 RX ORDER — ALPRAZOLAM 0.5 MG/1
0.5 TABLET ORAL ONCE
Status: COMPLETED | OUTPATIENT
Start: 2024-05-28 | End: 2024-05-28

## 2024-05-28 RX ORDER — ACETAMINOPHEN 650 MG/1
650 SUPPOSITORY RECTAL EVERY 6 HOURS PRN
Status: DISCONTINUED | OUTPATIENT
Start: 2024-05-28 | End: 2024-06-14 | Stop reason: HOSPADM

## 2024-05-28 RX ORDER — LIDOCAINE 4 G/G
1 PATCH TOPICAL DAILY
Status: DISCONTINUED | OUTPATIENT
Start: 2024-05-28 | End: 2024-06-14 | Stop reason: HOSPADM

## 2024-05-28 RX ORDER — SODIUM CHLORIDE 0.9 % (FLUSH) 0.9 %
5-40 SYRINGE (ML) INJECTION PRN
Status: DISCONTINUED | OUTPATIENT
Start: 2024-05-28 | End: 2024-06-14 | Stop reason: HOSPADM

## 2024-05-28 RX ORDER — IPRATROPIUM BROMIDE AND ALBUTEROL SULFATE 2.5; .5 MG/3ML; MG/3ML
1 SOLUTION RESPIRATORY (INHALATION) EVERY 6 HOURS PRN
Status: DISCONTINUED | OUTPATIENT
Start: 2024-05-28 | End: 2024-06-10

## 2024-05-28 RX ORDER — LANOLIN ALCOHOL/MO/W.PET/CERES
400 CREAM (GRAM) TOPICAL DAILY
Status: DISCONTINUED | OUTPATIENT
Start: 2024-05-28 | End: 2024-06-14 | Stop reason: HOSPADM

## 2024-05-28 RX ORDER — CITALOPRAM HYDROBROMIDE 10 MG/1
10 TABLET ORAL DAILY
Status: DISCONTINUED | OUTPATIENT
Start: 2024-05-28 | End: 2024-06-08

## 2024-05-28 RX ORDER — ACETAMINOPHEN 325 MG/1
650 TABLET ORAL EVERY 6 HOURS PRN
Status: DISCONTINUED | OUTPATIENT
Start: 2024-05-28 | End: 2024-06-14 | Stop reason: HOSPADM

## 2024-05-28 RX ORDER — BUTALBITAL, ACETAMINOPHEN AND CAFFEINE 50; 325; 40 MG/1; MG/1; MG/1
1 TABLET ORAL 2 TIMES DAILY PRN
Status: DISCONTINUED | OUTPATIENT
Start: 2024-05-28 | End: 2024-06-04

## 2024-05-28 RX ORDER — TOPIRAMATE 50 MG/1
50 TABLET, FILM COATED ORAL 2 TIMES DAILY
Status: DISCONTINUED | OUTPATIENT
Start: 2024-05-28 | End: 2024-06-08

## 2024-05-28 RX ORDER — TAMSULOSIN HYDROCHLORIDE 0.4 MG/1
0.4 CAPSULE ORAL DAILY
Status: DISCONTINUED | OUTPATIENT
Start: 2024-05-29 | End: 2024-06-14 | Stop reason: HOSPADM

## 2024-05-28 RX ORDER — SODIUM CHLORIDE 9 MG/ML
INJECTION, SOLUTION INTRAVENOUS CONTINUOUS
Status: ACTIVE | OUTPATIENT
Start: 2024-05-28 | End: 2024-05-30

## 2024-05-28 RX ORDER — PHENOL 1.4 %
10 AEROSOL, SPRAY (ML) MUCOUS MEMBRANE NIGHTLY
COMMUNITY

## 2024-05-28 RX ORDER — UREA 10 %
10 LOTION (ML) TOPICAL NIGHTLY
Status: DISCONTINUED | OUTPATIENT
Start: 2024-05-28 | End: 2024-06-14 | Stop reason: HOSPADM

## 2024-05-28 RX ORDER — ONDANSETRON 2 MG/ML
4 INJECTION INTRAMUSCULAR; INTRAVENOUS EVERY 6 HOURS PRN
Status: DISCONTINUED | OUTPATIENT
Start: 2024-05-28 | End: 2024-06-14 | Stop reason: HOSPADM

## 2024-05-28 RX ORDER — POTASSIUM CHLORIDE 7.45 MG/ML
10 INJECTION INTRAVENOUS PRN
Status: DISCONTINUED | OUTPATIENT
Start: 2024-05-28 | End: 2024-06-14 | Stop reason: HOSPADM

## 2024-05-28 RX ORDER — LACOSAMIDE 100 MG/1
100 TABLET ORAL 2 TIMES DAILY
Status: DISCONTINUED | OUTPATIENT
Start: 2024-05-28 | End: 2024-06-14 | Stop reason: HOSPADM

## 2024-05-28 RX ORDER — BUTALBITAL, ACETAMINOPHEN, CAFFEINE AND CODEINE PHOSPHATE 300; 50; 40; 30 MG/1; MG/1; MG/1; MG/1
1 CAPSULE ORAL 2 TIMES DAILY
Status: DISCONTINUED | OUTPATIENT
Start: 2024-05-28 | End: 2024-05-28

## 2024-05-28 RX ADMIN — AMITRIPTYLINE HYDROCHLORIDE 75 MG: 50 TABLET, FILM COATED ORAL at 20:20

## 2024-05-28 RX ADMIN — TOPIRAMATE 50 MG: 50 TABLET, FILM COATED ORAL at 20:23

## 2024-05-28 RX ADMIN — SODIUM CHLORIDE: 9 INJECTION, SOLUTION INTRAVENOUS at 16:28

## 2024-05-28 RX ADMIN — SODIUM CHLORIDE, PRESERVATIVE FREE 10 ML: 5 INJECTION INTRAVENOUS at 20:20

## 2024-05-28 RX ADMIN — DICLOFENAC SODIUM 2 G: 10 GEL TOPICAL at 20:20

## 2024-05-28 RX ADMIN — ACETAMINOPHEN 1000 MG: 500 TABLET ORAL at 20:20

## 2024-05-28 RX ADMIN — Medication 10 MG: at 20:20

## 2024-05-28 RX ADMIN — METHYLPREDNISOLONE SODIUM SUCCINATE 60 MG: 40 INJECTION, POWDER, LYOPHILIZED, FOR SOLUTION INTRAMUSCULAR; INTRAVENOUS at 23:57

## 2024-05-28 RX ADMIN — GABAPENTIN 300 MG: 300 CAPSULE ORAL at 20:20

## 2024-05-28 RX ADMIN — ATORVASTATIN CALCIUM 80 MG: 80 TABLET, FILM COATED ORAL at 20:20

## 2024-05-28 RX ADMIN — ALPRAZOLAM 0.5 MG: 0.5 TABLET ORAL at 16:25

## 2024-05-28 RX ADMIN — BACLOFEN 5 MG: 5 TABLET ORAL at 20:20

## 2024-05-28 RX ADMIN — LACOSAMIDE 100 MG: 100 TABLET, FILM COATED ORAL at 20:20

## 2024-05-28 NOTE — PLAN OF CARE
Problem: Discharge Planning  Goal: Discharge to home or other facility with appropriate resources  Outcome: Progressing  Flowsheets (Taken 5/28/2024 1415)  Discharge to home or other facility with appropriate resources:   Identify barriers to discharge with patient and caregiver   Identify discharge learning needs (meds, wound care, etc)   Refer to discharge planning if patient needs post-hospital services based on physician order or complex needs related to functional status, cognitive ability or social support system   Arrange for needed discharge resources and transportation as appropriate     Problem: Chronic Conditions and Co-morbidities  Goal: Patient's chronic conditions and co-morbidity symptoms are monitored and maintained or improved  Outcome: Progressing     Problem: Safety - Adult  Goal: Free from fall injury  Outcome: Progressing     Problem: Skin/Tissue Integrity  Goal: Absence of new skin breakdown  Description: 1.  Monitor for areas of redness and/or skin breakdown  2.  Assess vascular access sites hourly  3.  Every 4-6 hours minimum:  Change oxygen saturation probe site  4.  Every 4-6 hours:  If on nasal continuous positive airway pressure, respiratory therapy assess nares and determine need for appliance change or resting period.  Outcome: Progressing

## 2024-05-28 NOTE — H&P
Department of Neurosurgery                                            Nurse Practitioner H&P      Neurosurgeon:   [x] Dr. Corbett  [] Dr. Almanza  [] Dr. Mac  [] Dr. Valencia      History Obtained From:  patient, spouse, electronic medical record    CHIEF COMPLAINT:         No chief complaint on file.      HISTORY OF PRESENT ILLNESS:       The patient is a 59 y.o. male with history of right sided thrombosis with infarct s/p right sided decompressive hemicraniectomy while in [place], ITP treated with IVIG seeing hematology, seizure on vimpat, and headaches presents today as direct admit for planned cranioplasty on 5/29. Patient reports feeling anxious today but otherwise feeling himself. He has baseline left sided hemiplegia. His wife is present with him today. Per wife and patient he is a DNR-CCA and does not wish to have any heroic measures if his heart were to stop. Living will and health care power of  is scanned into the chart. Patient has been following hematology for ITP and has been getting IVIG weekly with last dose on 4/17/2024 and is also taking Promacta. He had no response while on decadron.         PAST MEDICAL HISTORY :       Past Medical History:        Diagnosis Date    Anemia 02/12/2024    CAD (coronary artery disease)     Chronic deep vein thrombosis (DVT) of both lower extremities ([facility]ly [facility])     Chronic deep vein thrombosis (DVT) of femoral vein of right lower extremity ([facility]) 03/07/2024    Chronic deep vein thrombosis (DVT) of proximal vein of right lower extremity ([facility]) 02/22/2024    Chronic deep vein thrombosis (DVT) of right iliac vein ([facility]) 03/07/2024    Chronic idiopathic thrombocytopenia ([facility]ly [facility])     Chronic idiopathic thrombocytopenic purpura (Formerly [facility]) 02/18/2024    CVA (cerebral vascular accident) (Formerly [facility])     Emphysema lung ([facility]ly [facility])     Hemosiderin pigmentation of skin 03/07/2024    HLD (hyperlipidemia)     Intracranial hemorrhage ([facility]) 01/31/2024    Left hemiplegia (Formerly [facility])     Right leg        Localizes pain - 5 []       Withdraws pain - 4 []       Abnormal flexion - 3 []       Abnormal extension - 2 []       None - 1 []            Total GCS: 15    Mental Status:  alert, oriented x3, follows all commands, speech clear                Cranial Nerves:    cranial nerves II-XII are grossly intact    Motor Exam:    Tone:  abnormal - LUE and LLE some rigidity     Motor exam is 5 out of 5 all extremities with the exception of Left HF 1-2/5 other wise LUE and LLE no movement     Sensory:    Right Upper Extremity:  normal  Left Upper Extremity:  normal  Right Lower Extremity:  normal  Left Lower Extremity:  normal         LABS AND IMAGING:     CBC with Differential:    Lab Results   Component Value Date/Time    WBC 5.4 05/28/2024 08:18 AM    RBC 4.63 05/28/2024 08:18 AM    HGB 13.4 05/28/2024 08:18 AM    HCT 44.3 05/28/2024 08:18 AM    PLT See Reflexed IPF Result 05/28/2024 08:18 AM    MCV 95.7 05/28/2024 08:18 AM    MCH 28.9 05/28/2024 08:18 AM    MCHC 30.2 05/28/2024 08:18 AM    RDW 17.4 05/28/2024 08:18 AM    NRBC 1 05/08/2024 08:23 PM    LYMPHOPCT 21 05/28/2024 08:18 AM    MONOPCT 8 05/28/2024 08:18 AM    EOSPCT 5 05/28/2024 08:18 AM    BASOPCT 1 05/28/2024 08:18 AM    MONOSABS 0.45 05/28/2024 08:18 AM    LYMPHSABS 1.90 02/05/2013 11:36 AM    EOSABS 0.27 05/28/2024 08:18 AM    BASOSABS 0.06 05/28/2024 08:18 AM    DIFFTYPE NOT REPORTED 02/05/2013 11:36 AM     BMP:    Lab Results   Component Value Date/Time     05/18/2024 05:05 AM    K 4.1 05/18/2024 05:05 AM     05/18/2024 05:05 AM    CO2 20 05/18/2024 05:05 AM    BUN 19 05/18/2024 05:05 AM    CREATININE 0.7 05/18/2024 05:05 AM    CALCIUM 8.9 05/18/2024 05:05 AM    GFRAA >60 02/05/2013 11:36 AM    LABGLOM >90 05/18/2024 05:05 AM    LABGLOM 86 04/24/2024 09:56 AM    GLUCOSE 85 05/18/2024 05:05 AM         ASSESSMENT AND PLAN:       Patient Active Problem List   Diagnosis    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 (HCC)

## 2024-05-28 NOTE — CONSENT
Informed Consent for Blood Component Transfusion Note    I have discussed with the patient the rationale for blood component transfusion; its benefits in treating or preventing fatigue, organ damage, or death; and its risk which includes mild transfusion reactions, rare risk of blood borne infection, or more serious but rare reactions. I have discussed the alternatives to transfusion, including the risk and consequences of not receiving transfusion. The patient had an opportunity to ask questions and had agreed to proceed with transfusion of blood components.    Electronically signed by EMILY MOYER CNP on 5/28/24 at 4:06 PM EDT

## 2024-05-28 NOTE — CONSULTS
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  Chronic ITP.  History of intraparenchymal brain bleed    History Obtained From:  patient, electronic medical record    HISTORY OF PRESENT ILLNESS:      The patient is a 59 y.o.  male who is admitted  to the neurosurgery team for an elective cranioplasty scheduled for 5/29.  Hematology oncology team consulted due to patient's history of thrombocytopenia and ITP.  Patient is relevant hematologic history is as below.  Patient is currently on Promacta for ITP.  Patient not seen in office by Dr. Chang on 5/21.  Platelet count was 1 40,000 at that time.  Patient platelet count has dropped to 56,000 now.  Hemoglobin 13.6 WBC count 5.8.  Hematology oncology team consulted due to thrombocytopenia in light of patient's upcoming surgery.    Patient does have history of dural venous thrombosis and acute hemorrhagic stroke.  Patient also has history of DVT.    DIAGNOSIS:   Chronic thrombocytopenia, patient had a evaluation in [place] and also in the past and thought to be immune mediated  History of dural sinus thrombosis and history of DVT in 2019  Chronic anticoagulation with Eliquis  Had a hemorrhagic stroke in December 2023  Patient received platelet transfusion during the hospitalization and also was given IVIG on 1/9/2024 and 1/10/2024  Patient was treated with Decadron 40 mg on 2/18 - 2/20 1-24, with no response  Patient planning surgical procedure with neurosurgery  Bone marrow biopsy negative for acute bone marrow pathology  I will plan to get IVIG weekly for 5 doses and also prescription for Promacta was sent  Patient started taking Promacta on 4/14/2024  First IVIG will be given        Or    potassium bicarb-citric acid (EFFER-K) effervescent tablet 40 mEq  40 mEq Oral PRN Oswaldo Dennis APRN - CNP        Or    potassium chloride 10 mEq/100 mL IVPB (Peripheral Line)  10 mEq IntraVENous PRN Oswaldo Dennis APRN - CNP        magnesium sulfate 2000 mg in 50 mL IVPB premix  2,000 mg IntraVENous PRN Oswaldo Dennis APRN - CNP        ondansetron (ZOFRAN-ODT) disintegrating tablet 4 mg  4 mg Oral Q8H PRN Oswaldo Dennis APRN - CNP        Or    ondansetron (ZOFRAN) injection 4 mg  4 mg IntraVENous Q6H PRN Oswaldo Dennis APRN - CNP        polyethylene glycol (GLYCOLAX) packet 17 g  17 g Oral Daily PRN Oswaldo Dennis APRN - CNP        acetaminophen (TYLENOL) tablet 650 mg  650 mg Oral Q6H PRN Oswaldo Dennis APRN - CNP        Or    acetaminophen (TYLENOL) suppository 650 mg  650 mg Rectal Q6H PRN Oswaldo Dennis APRN - CNP        0.9 % sodium chloride infusion   IntraVENous Continuous Oswaldo Dennis APRN - CNP 75 mL/hr at 05/28/24 1628 New Bag at 05/28/24 1628    acetaminophen (TYLENOL) tablet 1,000 mg  1,000 mg Oral TID Oswaldo Dennis APRN - CNP        amitriptyline (ELAVIL) tablet 75 mg  75 mg Oral Nightly Oswaldo Dennis APRN - CNP        atorvastatin (LIPITOR) tablet 80 mg  80 mg Oral Nightly Oswaldo Dennis APRN - CNP        Baclofen (LIORESAL) tablet 5 mg  5 mg Oral BID Oswaldo Dennis APRN - CNP        citalopram (CELEXA) tablet 10 mg  10 mg Oral Daily Oswaldo Dennis APRN - CNP        diclofenac sodium (VOLTAREN) 1 % gel 2 g  2 g Topical BID Oswaldo Dennis APRN - CNP        gabapentin (NEURONTIN) capsule 300 mg  300 mg Oral TID Oswaldo Dennis APRN - CNP        ipratropium 0.5 mg-albuterol 2.5 mg (DUONEB) nebulizer solution 1 Dose  1 Dose Inhalation Q6H PRN Oswaldo Dennis APRN - CNP        lacosamide (VIMPAT) tablet 100 mg  100 mg Oral BID Oswaldo Dennis, APRN - CNP        lidocaine 4 % external patch 1 patch  1 patch Topical Daily Oswaldo Dennis, APRN - CNP

## 2024-05-29 ENCOUNTER — ANESTHESIA (OUTPATIENT)
Dept: OPERATING ROOM | Age: 59
End: 2024-05-29
Payer: MEDICAID

## 2024-05-29 LAB
BASOPHILS # BLD: 0 K/UL (ref 0–0.2)
BASOPHILS NFR BLD: 0 % (ref 0–2)
EKG ATRIAL RATE: 63 BPM
EKG P AXIS: 67 DEGREES
EKG P-R INTERVAL: 192 MS
EKG Q-T INTERVAL: 464 MS
EKG QRS DURATION: 146 MS
EKG QTC CALCULATION (BAZETT): 474 MS
EKG R AXIS: -55 DEGREES
EKG T AXIS: 62 DEGREES
EKG VENTRICULAR RATE: 63 BPM
EOSINOPHIL # BLD: 0 K/UL (ref 0–0.44)
EOSINOPHILS RELATIVE PERCENT: 0 % (ref 1–4)
ERYTHROCYTE [DISTWIDTH] IN BLOOD BY AUTOMATED COUNT: 16.4 % (ref 11.8–14.4)
HCT VFR BLD AUTO: 41 % (ref 40.7–50.3)
HGB BLD-MCNC: 13.3 G/DL (ref 13–17)
IMM GRANULOCYTES # BLD AUTO: 0.05 K/UL (ref 0–0.3)
IMM GRANULOCYTES NFR BLD: 1 %
LYMPHOCYTES NFR BLD: 0.48 K/UL (ref 1.1–3.7)
LYMPHOCYTES RELATIVE PERCENT: 10 % (ref 24–43)
MCH RBC QN AUTO: 28.3 PG (ref 25.2–33.5)
MCHC RBC AUTO-ENTMCNC: 32.4 G/DL (ref 28.4–34.8)
MCV RBC AUTO: 87.2 FL (ref 82.6–102.9)
MONOCYTES NFR BLD: 0.19 K/UL (ref 0.1–1.2)
MONOCYTES NFR BLD: 4 % (ref 3–12)
MORPHOLOGY: ABNORMAL
NEUTROPHILS NFR BLD: 85 % (ref 36–65)
NEUTS SEG NFR BLD: 4.08 K/UL (ref 1.5–8.1)
NRBC BLD-RTO: 0 PER 100 WBC
PLATELET # BLD AUTO: ABNORMAL K/UL (ref 138–453)
PLATELET, FLUORESCENCE: 57 K/UL (ref 138–453)
PLATELETS.RETICULATED NFR BLD AUTO: 8.5 % (ref 1.1–10.3)
RBC # BLD AUTO: 4.7 M/UL (ref 4.21–5.77)
RBC # BLD: ABNORMAL 10*6/UL
WBC OTHER # BLD: 4.8 K/UL (ref 3.5–11.3)

## 2024-05-29 PROCEDURE — 2060000000 HC ICU INTERMEDIATE R&B

## 2024-05-29 PROCEDURE — 6360000002 HC RX W HCPCS

## 2024-05-29 PROCEDURE — 6360000002 HC RX W HCPCS: Performed by: INTERNAL MEDICINE

## 2024-05-29 PROCEDURE — 99232 SBSQ HOSP IP/OBS MODERATE 35: CPT | Performed by: INTERNAL MEDICINE

## 2024-05-29 PROCEDURE — 36415 COLL VENOUS BLD VENIPUNCTURE: CPT

## 2024-05-29 PROCEDURE — 2580000003 HC RX 258: Performed by: REGISTERED NURSE

## 2024-05-29 PROCEDURE — 93010 ELECTROCARDIOGRAM REPORT: CPT | Performed by: INTERNAL MEDICINE

## 2024-05-29 PROCEDURE — 6370000000 HC RX 637 (ALT 250 FOR IP): Performed by: REGISTERED NURSE

## 2024-05-29 PROCEDURE — 2580000003 HC RX 258: Performed by: INTERNAL MEDICINE

## 2024-05-29 PROCEDURE — APPSS45 APP SPLIT SHARED TIME 31-45 MINUTES: Performed by: REGISTERED NURSE

## 2024-05-29 PROCEDURE — 85055 RETICULATED PLATELET ASSAY: CPT

## 2024-05-29 PROCEDURE — 85025 COMPLETE CBC W/AUTO DIFF WBC: CPT

## 2024-05-29 PROCEDURE — 94761 N-INVAS EAR/PLS OXIMETRY MLT: CPT

## 2024-05-29 PROCEDURE — 99232 SBSQ HOSP IP/OBS MODERATE 35: CPT | Performed by: NEUROLOGICAL SURGERY

## 2024-05-29 RX ADMIN — GABAPENTIN 300 MG: 300 CAPSULE ORAL at 15:04

## 2024-05-29 RX ADMIN — ACETAMINOPHEN 1000 MG: 500 TABLET ORAL at 20:45

## 2024-05-29 RX ADMIN — MAGNESIUM GLUCONATE 500 MG ORAL TABLET 400 MG: 500 TABLET ORAL at 09:45

## 2024-05-29 RX ADMIN — BUTALBITAL, ACETAMINOPHEN, AND CAFFEINE 1 TABLET: 50; 325; 40 TABLET ORAL at 06:54

## 2024-05-29 RX ADMIN — METHYLPREDNISOLONE SODIUM SUCCINATE 60 MG: 40 INJECTION, POWDER, LYOPHILIZED, FOR SOLUTION INTRAMUSCULAR; INTRAVENOUS at 06:05

## 2024-05-29 RX ADMIN — AMITRIPTYLINE HYDROCHLORIDE 75 MG: 50 TABLET, FILM COATED ORAL at 23:40

## 2024-05-29 RX ADMIN — DICLOFENAC SODIUM 2 G: 10 GEL TOPICAL at 09:46

## 2024-05-29 RX ADMIN — Medication 10 MG: at 20:45

## 2024-05-29 RX ADMIN — LACOSAMIDE 100 MG: 100 TABLET, FILM COATED ORAL at 20:45

## 2024-05-29 RX ADMIN — BACLOFEN 5 MG: 5 TABLET ORAL at 09:46

## 2024-05-29 RX ADMIN — DICLOFENAC SODIUM 2 G: 10 GEL TOPICAL at 20:51

## 2024-05-29 RX ADMIN — TOPIRAMATE 50 MG: 50 TABLET, FILM COATED ORAL at 09:45

## 2024-05-29 RX ADMIN — ATORVASTATIN CALCIUM 80 MG: 80 TABLET, FILM COATED ORAL at 20:45

## 2024-05-29 RX ADMIN — SODIUM CHLORIDE, PRESERVATIVE FREE 10 ML: 5 INJECTION INTRAVENOUS at 09:47

## 2024-05-29 RX ADMIN — METHYLPREDNISOLONE SODIUM SUCCINATE 60 MG: 40 INJECTION, POWDER, LYOPHILIZED, FOR SOLUTION INTRAMUSCULAR; INTRAVENOUS at 15:05

## 2024-05-29 RX ADMIN — IMMUNE GLOBULIN (HUMAN) 75000 MG: 10 INJECTION INTRAVENOUS; SUBCUTANEOUS at 00:08

## 2024-05-29 RX ADMIN — METHYLPREDNISOLONE SODIUM SUCCINATE 60 MG: 40 INJECTION, POWDER, LYOPHILIZED, FOR SOLUTION INTRAMUSCULAR; INTRAVENOUS at 22:50

## 2024-05-29 RX ADMIN — LACOSAMIDE 100 MG: 100 TABLET, FILM COATED ORAL at 09:46

## 2024-05-29 RX ADMIN — CITALOPRAM 10 MG: 10 TABLET, FILM COATED ORAL at 09:46

## 2024-05-29 RX ADMIN — TAMSULOSIN HYDROCHLORIDE 0.4 MG: 0.4 CAPSULE ORAL at 09:45

## 2024-05-29 RX ADMIN — GABAPENTIN 300 MG: 300 CAPSULE ORAL at 20:46

## 2024-05-29 RX ADMIN — BACLOFEN 5 MG: 5 TABLET ORAL at 20:45

## 2024-05-29 RX ADMIN — GABAPENTIN 300 MG: 300 CAPSULE ORAL at 09:46

## 2024-05-29 RX ADMIN — IMMUNE GLOBULIN (HUMAN) 75000 MG: 10 INJECTION INTRAVENOUS; SUBCUTANEOUS at 20:35

## 2024-05-29 RX ADMIN — ACETAMINOPHEN 1000 MG: 500 TABLET ORAL at 15:04

## 2024-05-29 RX ADMIN — TOPIRAMATE 50 MG: 50 TABLET, FILM COATED ORAL at 22:40

## 2024-05-29 RX ADMIN — ACETAMINOPHEN 1000 MG: 500 TABLET ORAL at 09:46

## 2024-05-29 ASSESSMENT — PAIN SCALES - GENERAL: PAINLEVEL_OUTOF10: 8

## 2024-05-29 ASSESSMENT — PAIN DESCRIPTION - LOCATION: LOCATION: HEAD

## 2024-05-29 NOTE — PROGRESS NOTES
Neurosurgery NANNETTE/Resident    Daily Progress Note   No chief complaint on file.    5/29/2024  9:28 AM    Chart reviewed.  No acute events overnight.  No new complaints.    Vitals:    05/29/24 0300 05/29/24 0400 05/29/24 0500 05/29/24 0600   BP: (!) 144/66 (!) 146/73 126/74 127/89   Pulse: (!) 49 53 51 60   Resp: 11 12 (!) 6 10   Temp: 98.1 °F (36.7 °C)      TempSrc: Oral      SpO2: 97% 94% 98% 96%   Weight:       Height:             PE:   AOx3   CNII-XII intact   PERRL, EOMI   Motor   Right side 5/5   Left sided hemiplegia       Lab Results   Component Value Date    WBC 5.8 05/28/2024    HGB 13.6 05/28/2024    HCT 43.3 05/28/2024    PLT See Reflexed IPF Result 05/28/2024    CHOL 118 05/16/2024    TRIG 182 (H) 05/16/2024    HDL 28 (L) 05/16/2024    ALT 28 05/08/2024    AST 29 05/08/2024     05/28/2024    K 4.2 05/28/2024     05/28/2024    CREATININE 0.9 05/28/2024    BUN 22 05/28/2024    CO2 25 05/28/2024    TSH 3.45 04/13/2024    INR 0.9 05/28/2024    LABA1C 5.2 05/16/2024    CRP 33.1 (H) 05/12/2024         A/P  59 y.o. male who presents with skull defect right side status post craniectomy     - surgery cancelled for today due to PLT 50s  - f/u hematology recs today   - plan for cranioplasty later this week if PLT count improves   - regular diet  - activity as tolerated     Please contact neurosurgery with any changes in patients neurologic status.       Oswaldo Dennis, CNP  5/29/24  9:28 AM

## 2024-05-29 NOTE — CARE COORDINATION
Case Management Assessment  Initial Evaluation    Date/Time of Evaluation: 5/29/2024 10:48 AM  Assessment Completed by: Valeria Fuller RN    If patient is discharged prior to next notation, then this note serves as note for discharge by case management.    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 Yes    Chart Reviewed: Yes      History Provided by:    Patient Orientation: Alert and Oriented    Patient Cognition: Alert    Hospitalization in the last 30 days (Readmission):  Yes    If yes, Readmission Assessment in  Navigator will be completed.    Advance Directives:      Code Status: DNR-CCA   Patient's Primary Decision Maker is: Legal Next of Kin    Primary Decision Maker: Mara Mcclure - Spouse - [phone]    Secondary Decision Maker: Keturah Grayson - Brother/Sister - [phone]    Discharge Planning:    Patient lives with: Spouse/Significant Other Type of Home: House  Primary Care Giver: Spouse  Patient Support Systems include: Spouse/Significant Other   Current Financial resources: Medicaid  Current community resources: None  Current services prior to admission: None            Current DME: Wheelchair, Other (Comment) (scotty steady)            Type of Home Care services:  None    ADLS  Prior functional level: Independent in ADLs/IADLs, Assistance with the following:, Bathing, Dressing, Toileting, Cooking, Housework, Shopping, Mobility  Current functional level: Assistance with the following:, Bathing, Dressing, Toileting, Cooking, Housework, Shopping, Mobility    PT AM-PAC:   /24  OT AM-PAC:   /24    Family can provide assistance at DC: Yes  Would you like Case Management to discuss the

## 2024-05-29 NOTE — CARE COORDINATION
05/29/24 1039   Readmission Assessment   Number of Days since last admission? 8-30 days   Previous Disposition Home with Family   Who is being Interviewed Patient   What was the patient's/caregiver's perception as to why they think they needed to return back to the hospital? Other (Comment)  (scheduled surgery)   Did you visit your Primary Care Physician after you left the hospital, before you returned this time? Yes   Why weren't you able to visit your PCP? Did not have an appointment   Did you see a specialist, such as Cardiac, Pulmonary, Orthopedic Physician, etc. after you left the hospital? Yes   Who advised the patient to return to the hospital? Physician;Other (Comment)  (scheduled surgery)   Does the patient report anything that got in the way of taking their medications? No   In our efforts to provide the best possible care to you and others like you, can you think of anything that we could have done to help you after you left the hospital the first time, so that you might not have needed to return so soon? Other (Comment)  (scheduled)

## 2024-05-29 NOTE — PLAN OF CARE
Problem: Discharge Planning  Goal: Discharge to home or other facility with appropriate resources  5/29/2024 0415 by Avtar Nelson, RN  Outcome: Progressing  Flowsheets (Taken 5/28/2024 1900)  Discharge to home or other facility with appropriate resources: Identify barriers to discharge with patient and caregiver  5/28/2024 1556 by Gume Daily RN  Outcome: Progressing  Flowsheets (Taken 5/28/2024 1554)  Discharge to home or other facility with appropriate resources:   Identify barriers to discharge with patient and caregiver   Identify discharge learning needs (meds, wound care, etc)   Refer to discharge planning if patient needs post-hospital services based on physician order or complex needs related to functional status, cognitive ability or social support system   Arrange for needed discharge resources and transportation as appropriate     Problem: Chronic Conditions and Co-morbidities  Goal: Patient's chronic conditions and co-morbidity symptoms are monitored and maintained or improved  5/29/2024 0415 by Avtar Nelson, RN  Outcome: Progressing  Flowsheets (Taken 5/28/2024 1900)  Care Plan - Patient's Chronic Conditions and Co-Morbidity Symptoms are Monitored and Maintained or Improved: Monitor and assess patient's chronic conditions and comorbid symptoms for stability, deterioration, or improvement  5/28/2024 1556 by Gume Daily RN  Outcome: Progressing     Problem: Safety - Adult  Goal: Free from fall injury  5/29/2024 0415 by Avtar Nelson RN  Outcome: Progressing  Flowsheets (Taken 5/28/2024 1923)  Free From Fall Injury: Instruct family/caregiver on patient safety  5/28/2024 1556 by Gume Daily RN  Outcome: Progressing     Problem: Skin/Tissue Integrity  Goal: Absence of new skin breakdown  Description: 1.  Monitor for areas of redness and/or skin breakdown  2.  Assess vascular access sites hourly  3.  Every 4-6 hours minimum:  Change oxygen saturation probe site  4.  Every 4-6

## 2024-05-29 NOTE — PROGRESS NOTES
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 Chronic ITP. History of intraparenchymal brain bleed     Interval Changes:  Patient seen and examined.  Labs and vitals reviewed.  Tentative plan for OR later in the week with neurosurgery for cranioplasty  Platelet count 57 from 56. Will likely give another dose of IVIG today.  Patient and spouse hopeful for surgery tomorrow    History of Present Illness:    The patient is a 59 y.o.  male who is admitted  to the neurosurgery team for an elective cranioplasty scheduled for 5/29.  Hematology oncology team consulted due to patient's history of thrombocytopenia and ITP.  Patient is relevant hematologic history is as below.  Patient is currently on Promacta for ITP.  Patient not seen in office by Dr. Chang on 5/21.  Platelet count was 1 40,000 at that time.  Patient platelet count has dropped to 56,000 now.  Hemoglobin 13.6 WBC count 5.8.  Hematology oncology team consulted due to thrombocytopenia in light of patient's upcoming surgery.     Patient does have history of dural venous thrombosis and acute hemorrhagic stroke.  Patient also has history of DVT.     DIAGNOSIS:   Chronic thrombocytopenia, patient had a evaluation in [place] and also in the past and thought to be immune mediated  History of dural sinus thrombosis and history of DVT in 2019  Chronic anticoagulation with Eliquis  Had a hemorrhagic stroke in December 2023  Patient received platelet transfusion during the hospitalization and also was given IVIG on 1/9/2024 and 1/10/2024  Patient was treated with Decadron 40 mg on 2/18 - 2/20 1-24, with no response  Patient planning surgical procedure with neurosurgery  Bone  craniectomy.  External herniation of the right cerebral hemisphere with extensive deep white matter edema unchanged allowing for difference in technique.     XR CHEST PORTABLE    Result Date: 5/8/2024  EXAMINATION: ONE XRAY VIEW OF THE CHEST 5/8/2024 8:58 pm COMPARISON: 05/02/2024 HISTORY: ORDERING SYSTEM PROVIDED HISTORY: sepsis w/u, chest pain TECHNOLOGIST PROVIDED HISTORY: sepsis w/u, chest pain FINDINGS: Status post median sternotomy. The lungs are without acute focal process.  There is no effusion or pneumothorax. The cardiomediastinal silhouette is stable. The osseous structures are stable.     No acute process.     XR PELVIS (1-2 VIEWS)    Result Date: 5/2/2024  EXAMINATION: ONE XRAY VIEW OF THE PELVIS 5/2/2024 4:34 pm COMPARISON: None. HISTORY: ORDERING SYSTEM PROVIDED HISTORY: fall TECHNOLOGIST PROVIDED HISTORY: fall Reason for Exam: fall FINDINGS: No acute displaced pelvic fracture is identified.  The sacroiliac joints and pubic symphysis are maintained.  Both hip joints are maintained.  The surrounding soft tissues are within normal limits.     No acute displaced pelvic or hip fracture.     XR CHEST PORTABLE    Result Date: 5/2/2024  EXAMINATION: ONE XRAY VIEW OF THE CHEST 5/2/2024 4:34 pm COMPARISON: 04/12/2024 HISTORY: ORDERING SYSTEM PROVIDED HISTORY: fall TECHNOLOGIST PROVIDED HISTORY: fall Reason for Exam: fall FINDINGS: Sternotomy wires.  Heart size at the upper limits of normal.  No airspace consolidations.  No pleural effusion or pneumothorax.  There is mild interstitial prominence.     No definite acute abnormality. Unchanged mild interstitial prominence which may be related to emphysema.     CT HEAD WO CONTRAST    Result Date: 5/2/2024  EXAMINATION: CT OF THE HEAD WITHOUT CONTRAST  5/2/2024 5:03 pm TECHNIQUE: CT of the head was performed without the administration of intravenous contrast. Automated exposure control, iterative reconstruction, and/or weight based adjustment of the mA/kV was

## 2024-05-30 ENCOUNTER — APPOINTMENT (OUTPATIENT)
Dept: OCCUPATIONAL THERAPY | Age: 59
End: 2024-05-30
Attending: INTERNAL MEDICINE
Payer: MEDICAID

## 2024-05-30 ENCOUNTER — APPOINTMENT (OUTPATIENT)
Dept: PHYSICAL THERAPY | Age: 59
End: 2024-05-30
Attending: INTERNAL MEDICINE
Payer: MEDICAID

## 2024-05-30 ENCOUNTER — APPOINTMENT (OUTPATIENT)
Dept: SPEECH THERAPY | Age: 59
End: 2024-05-30
Attending: INTERNAL MEDICINE
Payer: MEDICAID

## 2024-05-30 LAB
BASOPHILS # BLD: 0.03 K/UL (ref 0–0.2)
BASOPHILS NFR BLD: 0 % (ref 0–2)
EOSINOPHIL # BLD: 0.05 K/UL (ref 0–0.44)
EOSINOPHILS RELATIVE PERCENT: 1 % (ref 1–4)
ERYTHROCYTE [DISTWIDTH] IN BLOOD BY AUTOMATED COUNT: 17.2 % (ref 11.8–14.4)
HCT VFR BLD AUTO: 39.6 % (ref 40.7–50.3)
HGB BLD-MCNC: 12.1 G/DL (ref 13–17)
IMM GRANULOCYTES # BLD AUTO: 0.04 K/UL (ref 0–0.3)
IMM GRANULOCYTES NFR BLD: 1 %
LYMPHOCYTES NFR BLD: 0.81 K/UL (ref 1.1–3.7)
LYMPHOCYTES RELATIVE PERCENT: 12 % (ref 24–43)
MCH RBC QN AUTO: 28.7 PG (ref 25.2–33.5)
MCHC RBC AUTO-ENTMCNC: 30.6 G/DL (ref 28.4–34.8)
MCV RBC AUTO: 94.1 FL (ref 82.6–102.9)
MONOCYTES NFR BLD: 0.59 K/UL (ref 0.1–1.2)
MONOCYTES NFR BLD: 9 % (ref 3–12)
NEUTROPHILS NFR BLD: 77 % (ref 36–65)
NEUTS SEG NFR BLD: 5.15 K/UL (ref 1.5–8.1)
NRBC BLD-RTO: 0 PER 100 WBC
PLATELET # BLD AUTO: 68 K/UL (ref 138–453)
PMV BLD AUTO: 12.9 FL (ref 8.1–13.5)
RBC # BLD AUTO: 4.21 M/UL (ref 4.21–5.77)
RBC # BLD: ABNORMAL 10*6/UL
WBC OTHER # BLD: 6.7 K/UL (ref 3.5–11.3)

## 2024-05-30 PROCEDURE — 6370000000 HC RX 637 (ALT 250 FOR IP): Performed by: REGISTERED NURSE

## 2024-05-30 PROCEDURE — APPSS15 APP SPLIT SHARED TIME 0-15 MINUTES: Performed by: NURSE PRACTITIONER

## 2024-05-30 PROCEDURE — 2580000003 HC RX 258: Performed by: INTERNAL MEDICINE

## 2024-05-30 PROCEDURE — 6360000002 HC RX W HCPCS: Performed by: INTERNAL MEDICINE

## 2024-05-30 PROCEDURE — 85025 COMPLETE CBC W/AUTO DIFF WBC: CPT

## 2024-05-30 PROCEDURE — 2580000003 HC RX 258: Performed by: REGISTERED NURSE

## 2024-05-30 PROCEDURE — 99231 SBSQ HOSP IP/OBS SF/LOW 25: CPT | Performed by: NEUROLOGICAL SURGERY

## 2024-05-30 PROCEDURE — 36415 COLL VENOUS BLD VENIPUNCTURE: CPT

## 2024-05-30 PROCEDURE — 99232 SBSQ HOSP IP/OBS MODERATE 35: CPT | Performed by: INTERNAL MEDICINE

## 2024-05-30 PROCEDURE — 2060000000 HC ICU INTERMEDIATE R&B

## 2024-05-30 RX ADMIN — ACETAMINOPHEN 1000 MG: 500 TABLET ORAL at 19:43

## 2024-05-30 RX ADMIN — AMITRIPTYLINE HYDROCHLORIDE 75 MG: 50 TABLET, FILM COATED ORAL at 22:10

## 2024-05-30 RX ADMIN — BUTALBITAL, ACETAMINOPHEN, AND CAFFEINE 1 TABLET: 50; 325; 40 TABLET ORAL at 21:22

## 2024-05-30 RX ADMIN — METHYLPREDNISOLONE SODIUM SUCCINATE 60 MG: 40 INJECTION, POWDER, LYOPHILIZED, FOR SOLUTION INTRAMUSCULAR; INTRAVENOUS at 06:26

## 2024-05-30 RX ADMIN — ACETAMINOPHEN 1000 MG: 500 TABLET ORAL at 07:57

## 2024-05-30 RX ADMIN — IMMUNE GLOBULIN (HUMAN) 75000 MG: 10 INJECTION INTRAVENOUS; SUBCUTANEOUS at 22:58

## 2024-05-30 RX ADMIN — LACOSAMIDE 100 MG: 100 TABLET, FILM COATED ORAL at 21:20

## 2024-05-30 RX ADMIN — SODIUM CHLORIDE, PRESERVATIVE FREE 10 ML: 5 INJECTION INTRAVENOUS at 22:57

## 2024-05-30 RX ADMIN — CITALOPRAM 10 MG: 10 TABLET, FILM COATED ORAL at 07:56

## 2024-05-30 RX ADMIN — LACOSAMIDE 100 MG: 100 TABLET, FILM COATED ORAL at 07:57

## 2024-05-30 RX ADMIN — METHYLPREDNISOLONE SODIUM SUCCINATE 60 MG: 40 INJECTION, POWDER, LYOPHILIZED, FOR SOLUTION INTRAMUSCULAR; INTRAVENOUS at 14:55

## 2024-05-30 RX ADMIN — SODIUM CHLORIDE, PRESERVATIVE FREE 5 ML: 5 INJECTION INTRAVENOUS at 08:00

## 2024-05-30 RX ADMIN — GABAPENTIN 300 MG: 300 CAPSULE ORAL at 07:57

## 2024-05-30 RX ADMIN — DICLOFENAC SODIUM 2 G: 10 GEL TOPICAL at 21:21

## 2024-05-30 RX ADMIN — TAMSULOSIN HYDROCHLORIDE 0.4 MG: 0.4 CAPSULE ORAL at 07:56

## 2024-05-30 RX ADMIN — BACLOFEN 5 MG: 5 TABLET ORAL at 21:20

## 2024-05-30 RX ADMIN — GABAPENTIN 300 MG: 300 CAPSULE ORAL at 14:55

## 2024-05-30 RX ADMIN — METHYLPREDNISOLONE SODIUM SUCCINATE 60 MG: 40 INJECTION, POWDER, LYOPHILIZED, FOR SOLUTION INTRAMUSCULAR; INTRAVENOUS at 22:11

## 2024-05-30 RX ADMIN — GABAPENTIN 300 MG: 300 CAPSULE ORAL at 21:20

## 2024-05-30 RX ADMIN — DICLOFENAC SODIUM 2 G: 10 GEL TOPICAL at 07:57

## 2024-05-30 RX ADMIN — TOPIRAMATE 50 MG: 50 TABLET, FILM COATED ORAL at 22:11

## 2024-05-30 RX ADMIN — BACLOFEN 5 MG: 5 TABLET ORAL at 07:57

## 2024-05-30 RX ADMIN — ACETAMINOPHEN 1000 MG: 500 TABLET ORAL at 14:54

## 2024-05-30 RX ADMIN — ATORVASTATIN CALCIUM 80 MG: 80 TABLET, FILM COATED ORAL at 21:20

## 2024-05-30 RX ADMIN — Medication 10 MG: at 21:20

## 2024-05-30 RX ADMIN — TOPIRAMATE 50 MG: 50 TABLET, FILM COATED ORAL at 07:57

## 2024-05-30 RX ADMIN — MAGNESIUM GLUCONATE 500 MG ORAL TABLET 400 MG: 500 TABLET ORAL at 07:56

## 2024-05-30 ASSESSMENT — PAIN SCALES - GENERAL
PAINLEVEL_OUTOF10: 6
PAINLEVEL_OUTOF10: 7
PAINLEVEL_OUTOF10: 8
PAINLEVEL_OUTOF10: 7
PAINLEVEL_OUTOF10: 4
PAINLEVEL_OUTOF10: 8
PAINLEVEL_OUTOF10: 7
PAINLEVEL_OUTOF10: 4
PAINLEVEL_OUTOF10: 7
PAINLEVEL_OUTOF10: 8

## 2024-05-30 ASSESSMENT — PAIN DESCRIPTION - ORIENTATION
ORIENTATION: LEFT
ORIENTATION: ANTERIOR
ORIENTATION: RIGHT;LEFT

## 2024-05-30 ASSESSMENT — PAIN DESCRIPTION - DESCRIPTORS
DESCRIPTORS: THROBBING
DESCRIPTORS: THROBBING;ACHING;DISCOMFORT
DESCRIPTORS: ACHING

## 2024-05-30 ASSESSMENT — PAIN DESCRIPTION - LOCATION
LOCATION: KNEE
LOCATION: HEAD

## 2024-05-30 ASSESSMENT — PAIN SCALES - WONG BAKER: WONGBAKER_NUMERICALRESPONSE: HURTS A LITTLE BIT

## 2024-05-30 NOTE — PROGRESS NOTES
Neurosurgery NANNETTE/Resident    Daily Progress Note   CC:No chief complaint on file.    5/30/2024  9:00 AM    Chart reviewed.  No acute events overnight.  No new complaints. PLT continue to be low, we spoke with patients wife and she reports that is took 3-4 weeks for his platelets to be over 100    Vitals:    05/30/24 0000 05/30/24 0100 05/30/24 0200 05/30/24 0400   BP: 117/77 109/70 116/69 112/66   Pulse: 65 66 66 62   Resp: 13 13 15 13   Temp: 98.6 °F (37 °C)   98 °F (36.7 °C)   TempSrc: Oral   Oral   SpO2: 100%      Weight:       Height:           PE:     AOx3   PERRL, EOMI  5/5 RUE and RLE   Left  side hemiplegia     Sensation: intact     Sunken right bone flap     Lab Results   Component Value Date    WBC 6.7 05/30/2024    HGB 12.1 (L) 05/30/2024    HCT 39.6 (L) 05/30/2024    PLT 68 (L) 05/30/2024    CHOL 118 05/16/2024    TRIG 182 (H) 05/16/2024    HDL 28 (L) 05/16/2024    ALT 28 05/08/2024    AST 29 05/08/2024     05/28/2024    K 4.2 05/28/2024     05/28/2024    CREATININE 0.9 05/28/2024    BUN 22 05/28/2024    CO2 25 05/28/2024    TSH 3.45 04/13/2024    INR 0.9 05/28/2024    LABA1C 5.2 05/16/2024    CRP 33.1 (H) 05/12/2024         A/P  59 y.o. male who presents with thrombocytopenia, skull defect    Hem following post IVIG 5/28 and adjusted dose of Promacta- continue to be thrombocytopenic  Pending plan due to thrombocytopenia         Please contact neurosurgery with any changes in patients neurologic status.       Oswaldo Knight, CNP  5/30/24  9:00 AM

## 2024-05-30 NOTE — PROGRESS NOTES
neck was performed with the administration of intravenous contrast. Multiplanar reformatted images are provided for review.  MIP images are provided for review. Stenosis of the internal carotid arteries measured using NASCET criteria. Automated exposure control, iterative reconstruction, and/or weight based adjustment of the mA/kV was utilized to reduce the radiation dose to as low as reasonably achievable. COMPARISON: 04/12/2024 HISTORY: ORDERING SYSTEM PROVIDED HISTORY: CTV of head and neck TECHNOLOGIST PROVIDED HISTORY: CTV of head and neck FINDINGS: CTA NECK: AORTIC ARCH/ARCH VESSELS: No dissection or arterial injury.  No significant stenosis of the brachiocephalic or subclavian arteries. CAROTID ARTERIES: No dissection, arterial injury, or hemodynamically significant stenosis by NASCET criteria. VERTEBRAL ARTERIES: Stable moderate stenosis of the right vertebral artery origin.  No significant left vertebral artery stenosis.  No dissection. SOFT TISSUES: The lung apices are clear.  No cervical or superior mediastinal lymphadenopathy.  The larynx and pharynx are unremarkable.  There is new mild enlargement and hyperenhancement of the left parotid gland.  Right parotid, bilateral submandibular, and thyroid glands are unremarkable. BONES: No acute osseous abnormality.  Moderate C5-6 degenerative disc disease. CTA HEAD: ANTERIOR CIRCULATION: No significant stenosis of the intracranial internal carotid, anterior cerebral, or middle cerebral arteries. No aneurysm. POSTERIOR CIRCULATION: No significant stenosis of the vertebral, basilar, or posterior cerebral arteries. No aneurysm. OTHER: No dural venous sinus thrombosis on this non-dedicated study. BRAIN: Prior large decompressive right craniectomy with vasogenic edema in the right cerebral hemisphere and stable degree of brain herniation through the craniectomy defect.  See recent brain MRI for full details.     1. No acute arterial abnormality in the head or neck.      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 I explained the significance of these abnormalities and possible etiology and management options  Patient has steroid refractory ITP.  Patient also has history of DVT but has not been able to tolerate anticoagulation due to recurrent brain bleed according to the patient's wife.  Subsequently it was decided not to anticoagulate the patient  Patient has complex hematologic history.  Patient platelet count was in good range about a week ago now has dropped again to 56,000.  Will adjust dose of Promacta.  Prior to admission, patient was on Promacta 50 mg daily. Will increase to 75 mg from 5/29/2024 and on  Status post IVIG on 5/28/2024 and 5/29/24  Continue steroids  Given patient has ITP, platelet transfusions unlikely to be helpful in this situation  Continue symptomatic supportive care  We will continue to follow  Final recommendations to follow after discussing with attending physician, Dr. Thomas      Discussed with patient and family and Nurse.    We will continue to follow up on this patient.    Laura Cabral, EMILY-CNP      [facility] Hematology/Oncology  5/30/2024, 7:13 AM      I have discussed the care of  this patient and I have personally examined the patient myselft and taken ros and hpi , including pertinent history and exam findings, labs, imaging , medication ad other relavent clincal data.  I

## 2024-05-31 LAB
BASOPHILS # BLD: 0 K/UL (ref 0–0.2)
BASOPHILS NFR BLD: 0 % (ref 0–2)
EOSINOPHIL # BLD: 0 K/UL (ref 0–0.4)
EOSINOPHILS RELATIVE PERCENT: 0 % (ref 1–4)
ERYTHROCYTE [DISTWIDTH] IN BLOOD BY AUTOMATED COUNT: 17.2 % (ref 11.8–14.4)
HCT VFR BLD AUTO: 39.5 % (ref 40.7–50.3)
HGB BLD-MCNC: 12.4 G/DL (ref 13–17)
IMM GRANULOCYTES # BLD AUTO: 0.09 K/UL (ref 0–0.3)
IMM GRANULOCYTES NFR BLD: 1 %
LYMPHOCYTES NFR BLD: 0.37 K/UL (ref 1–4.8)
LYMPHOCYTES RELATIVE PERCENT: 4 % (ref 24–44)
MCH RBC QN AUTO: 29 PG (ref 25.2–33.5)
MCHC RBC AUTO-ENTMCNC: 31.4 G/DL (ref 28.4–34.8)
MCV RBC AUTO: 92.5 FL (ref 82.6–102.9)
MONOCYTES NFR BLD: 0.28 K/UL (ref 0.1–0.8)
MONOCYTES NFR BLD: 3 % (ref 1–7)
MORPHOLOGY: ABNORMAL
NEUTROPHILS NFR BLD: 92 % (ref 36–66)
NEUTS SEG NFR BLD: 8.56 K/UL (ref 1.8–7.7)
NRBC BLD-RTO: 0 PER 100 WBC
PLATELET # BLD AUTO: 81 K/UL (ref 138–453)
PMV BLD AUTO: 12.4 FL (ref 8.1–13.5)
RBC # BLD AUTO: 4.27 M/UL (ref 4.21–5.77)
RBC # BLD: ABNORMAL 10*6/UL
WBC OTHER # BLD: 9.3 K/UL (ref 3.5–11.3)

## 2024-05-31 PROCEDURE — 99232 SBSQ HOSP IP/OBS MODERATE 35: CPT | Performed by: INTERNAL MEDICINE

## 2024-05-31 PROCEDURE — 97163 PT EVAL HIGH COMPLEX 45 MIN: CPT

## 2024-05-31 PROCEDURE — 97530 THERAPEUTIC ACTIVITIES: CPT

## 2024-05-31 PROCEDURE — 6370000000 HC RX 637 (ALT 250 FOR IP): Performed by: REGISTERED NURSE

## 2024-05-31 PROCEDURE — 36415 COLL VENOUS BLD VENIPUNCTURE: CPT

## 2024-05-31 PROCEDURE — 2580000003 HC RX 258: Performed by: REGISTERED NURSE

## 2024-05-31 PROCEDURE — 85025 COMPLETE CBC W/AUTO DIFF WBC: CPT

## 2024-05-31 PROCEDURE — 97167 OT EVAL HIGH COMPLEX 60 MIN: CPT

## 2024-05-31 PROCEDURE — 2580000003 HC RX 258: Performed by: INTERNAL MEDICINE

## 2024-05-31 PROCEDURE — 6360000002 HC RX W HCPCS: Performed by: INTERNAL MEDICINE

## 2024-05-31 PROCEDURE — 97535 SELF CARE MNGMENT TRAINING: CPT

## 2024-05-31 PROCEDURE — APPSS15 APP SPLIT SHARED TIME 0-15 MINUTES: Performed by: NURSE PRACTITIONER

## 2024-05-31 PROCEDURE — 2060000000 HC ICU INTERMEDIATE R&B

## 2024-05-31 RX ADMIN — AMITRIPTYLINE HYDROCHLORIDE 75 MG: 50 TABLET, FILM COATED ORAL at 20:21

## 2024-05-31 RX ADMIN — SODIUM CHLORIDE, PRESERVATIVE FREE 10 ML: 5 INJECTION INTRAVENOUS at 08:18

## 2024-05-31 RX ADMIN — DICLOFENAC SODIUM 2 G: 10 GEL TOPICAL at 20:22

## 2024-05-31 RX ADMIN — GABAPENTIN 300 MG: 300 CAPSULE ORAL at 20:19

## 2024-05-31 RX ADMIN — Medication 10 MG: at 20:19

## 2024-05-31 RX ADMIN — METHYLPREDNISOLONE SODIUM SUCCINATE 60 MG: 40 INJECTION, POWDER, LYOPHILIZED, FOR SOLUTION INTRAMUSCULAR; INTRAVENOUS at 06:50

## 2024-05-31 RX ADMIN — TOPIRAMATE 50 MG: 50 TABLET, FILM COATED ORAL at 20:23

## 2024-05-31 RX ADMIN — BUTALBITAL, ACETAMINOPHEN, AND CAFFEINE 1 TABLET: 50; 325; 40 TABLET ORAL at 11:17

## 2024-05-31 RX ADMIN — SODIUM CHLORIDE, PRESERVATIVE FREE 10 ML: 5 INJECTION INTRAVENOUS at 20:26

## 2024-05-31 RX ADMIN — DICLOFENAC SODIUM 2 G: 10 GEL TOPICAL at 08:17

## 2024-05-31 RX ADMIN — TOPIRAMATE 50 MG: 50 TABLET, FILM COATED ORAL at 08:17

## 2024-05-31 RX ADMIN — ACETAMINOPHEN 1000 MG: 500 TABLET ORAL at 08:17

## 2024-05-31 RX ADMIN — ACETAMINOPHEN 1000 MG: 500 TABLET ORAL at 20:18

## 2024-05-31 RX ADMIN — MAGNESIUM GLUCONATE 500 MG ORAL TABLET 400 MG: 500 TABLET ORAL at 08:16

## 2024-05-31 RX ADMIN — LACOSAMIDE 100 MG: 100 TABLET, FILM COATED ORAL at 20:20

## 2024-05-31 RX ADMIN — BACLOFEN 5 MG: 5 TABLET ORAL at 20:19

## 2024-05-31 RX ADMIN — CITALOPRAM 10 MG: 10 TABLET, FILM COATED ORAL at 08:16

## 2024-05-31 RX ADMIN — TAMSULOSIN HYDROCHLORIDE 0.4 MG: 0.4 CAPSULE ORAL at 08:16

## 2024-05-31 RX ADMIN — GABAPENTIN 300 MG: 300 CAPSULE ORAL at 08:17

## 2024-05-31 RX ADMIN — LACOSAMIDE 100 MG: 100 TABLET, FILM COATED ORAL at 08:16

## 2024-05-31 RX ADMIN — METHYLPREDNISOLONE SODIUM SUCCINATE 60 MG: 40 INJECTION, POWDER, LYOPHILIZED, FOR SOLUTION INTRAMUSCULAR; INTRAVENOUS at 14:44

## 2024-05-31 RX ADMIN — ATORVASTATIN CALCIUM 80 MG: 80 TABLET, FILM COATED ORAL at 20:18

## 2024-05-31 RX ADMIN — BACLOFEN 5 MG: 5 TABLET ORAL at 08:17

## 2024-05-31 RX ADMIN — METHYLPREDNISOLONE SODIUM SUCCINATE 60 MG: 40 INJECTION, POWDER, LYOPHILIZED, FOR SOLUTION INTRAMUSCULAR; INTRAVENOUS at 22:23

## 2024-05-31 RX ADMIN — GABAPENTIN 300 MG: 300 CAPSULE ORAL at 14:41

## 2024-05-31 ASSESSMENT — PAIN SCALES - GENERAL
PAINLEVEL_OUTOF10: 0
PAINLEVEL_OUTOF10: 8
PAINLEVEL_OUTOF10: 8

## 2024-05-31 ASSESSMENT — PAIN DESCRIPTION - DESCRIPTORS
DESCRIPTORS: ACHING
DESCRIPTORS: ACHING

## 2024-05-31 ASSESSMENT — PAIN DESCRIPTION - LOCATION
LOCATION: HEAD
LOCATION: HEAD

## 2024-05-31 NOTE — PROGRESS NOTES
Physical Therapy  Facility/Department: [address] NEURO  Physical Therapy Initial Assessment    Name: Brennen Mcclure  : 1965  MRN: [id number]  Date of Service: 2024    Copied from chart: \"The patient is a 59 y.o. male with history of right sided thrombosis with infarct s/p right sided decompressive hemicraniectomy while in [place], ITP treated with IVIG seeing hematology, seizure on vimpat, and headaches presents today as direct admit for planned cranioplasty on . Patient reports feeling anxious today but otherwise feeling himself. He has baseline left sided hemiplegia.\"      Discharge Recommendations: Further therapy recommended at discharge.The patient should be able to tolerate at least 3 hours of therapy per day over 5 days or 15 hours over 7 days.   This patient may benefit from a Physical Medicine and Rehab consult.       PT Equipment Recommendations  Equipment Needed: Yes  Mobility Devices: Wheelchair  Wheelchair: Standard      Patient Diagnosis(es): There were no encounter diagnoses.  Past Medical History:  has a past medical history of Anemia, CAD (coronary artery disease), Chronic deep vein thrombosis (DVT) of both lower extremities (HCC), Chronic deep vein thrombosis (DVT) of femoral vein of right lower extremity (HCC), Chronic deep vein thrombosis (DVT) of proximal vein of right lower extremity (HCC), Chronic deep vein thrombosis (DVT) of right iliac vein (HCC), Chronic idiopathic thrombocytopenia (HCC), Chronic idiopathic thrombocytopenic purpura (HCC), CVA (cerebral vascular accident) (HCC), Emphysema lung (HCC), Hemosiderin pigmentation of skin, HLD (hyperlipidemia), Intracranial hemorrhage (HCC), Left hemiplegia (HCC), Right leg swelling, and Thrombocytopenia (HCC).  Past Surgical History:  has a past surgical history that includes Coronary artery bypass graft; Coronary stent placement; craniotomy (Right); and CT BIOPSY BONE MARROW (3/18/2024).    Assessment   Body Structures, Functions,

## 2024-05-31 NOTE — PLAN OF CARE
Problem: Discharge Planning  Goal: Discharge to home or other facility with appropriate resources  Outcome: Progressing  Flowsheets (Taken 5/30/2024 1950)  Discharge to home or other facility with appropriate resources:   Identify barriers to discharge with patient and caregiver   Arrange for needed discharge resources and transportation as appropriate     Problem: Chronic Conditions and Co-morbidities  Goal: Patient's chronic conditions and co-morbidity symptoms are monitored and maintained or improved  Outcome: Progressing  Flowsheets (Taken 5/30/2024 1950)  Care Plan - Patient's Chronic Conditions and Co-Morbidity Symptoms are Monitored and Maintained or Improved: Monitor and assess patient's chronic conditions and comorbid symptoms for stability, deterioration, or improvement     Problem: Safety - Adult  Goal: Free from fall injury  Outcome: Progressing  Flowsheets (Taken 5/30/2024 1948)  Free From Fall Injury: Instruct family/caregiver on patient safety     Problem: Skin/Tissue Integrity  Goal: Absence of new skin breakdown  Description: 1.  Monitor for areas of redness and/or skin breakdown  2.  Assess vascular access sites hourly  3.  Every 4-6 hours minimum:  Change oxygen saturation probe site  4.  Every 4-6 hours:  If on nasal continuous positive airway pressure, respiratory therapy assess nares and determine need for appliance change or resting period.  Outcome: Progressing     Problem: Safety - Adult  Goal: Free from fall injury  Outcome: Progressing  Flowsheets (Taken 5/30/2024 1948)  Free From Fall Injury: Instruct family/caregiver on patient safety     Problem: Pain  Goal: Verbalizes/displays adequate comfort level or baseline comfort level  Outcome: Progressing     Problem: Skin/Tissue Integrity  Goal: Absence of new skin breakdown  Description: 1.  Monitor for areas of redness and/or skin breakdown  2.  Assess vascular access sites hourly  3.  Every 4-6 hours minimum:  Change oxygen saturation

## 2024-05-31 NOTE — PROGRESS NOTES
Neurosurgery NANNETTE/Resident    Daily Progress Note   CC:No chief complaint on file.    5/31/2024  7:25 AM    Chart reviewed.  No acute events overnight.  No new complaints. Denies headache, platelet count this morning pending      Vitals:    05/30/24 1609 05/30/24 1936 05/30/24 2300 05/31/24 0331   BP: (!) 113/95 119/70 118/89 136/83   Pulse: 64 66 52 53   Resp: 18 17 13 15   Temp:  97.5 °F (36.4 °C) 97.5 °F (36.4 °C) 98.6 °F (37 °C)   TempSrc: Oral Oral Axillary Oral   SpO2: 97% 94% 93% 95%   Weight:       Height:           PE:   Awake alert oriented  5/5 RUE and RLE  Left side hemiplegic    Sunken right bone flap        Lab Results   Component Value Date    WBC 6.7 05/30/2024    HGB 12.1 (L) 05/30/2024    HCT 39.6 (L) 05/30/2024    PLT 68 (L) 05/30/2024    CHOL 118 05/16/2024    TRIG 182 (H) 05/16/2024    HDL 28 (L) 05/16/2024    ALT 28 05/08/2024    AST 29 05/08/2024     05/28/2024    K 4.2 05/28/2024     05/28/2024    CREATININE 0.9 05/28/2024    BUN 22 05/28/2024    CO2 25 05/28/2024    TSH 3.45 04/13/2024    INR 0.9 05/28/2024    LABA1C 5.2 05/16/2024    CRP 33.1 (H) 05/12/2024         A/P  59 y.o. male who presents with thrombocytopenia, skull defect     Hem following post IVIG 5/28 and adjusted dose of Promacta- continue to be thrombocytopenic  Hematology doing another round IV IG today  Will attempt to optimize platelets over next couple days to see if they trend in right direction for surgery     Please contact neurosurgery with any changes in patients neurologic status.       Oswaldo Knight, CNP  5/31/24  7:25 AM

## 2024-05-31 NOTE — PLAN OF CARE
Problem: Safety - Adult  Goal: Free from fall injury  5/31/2024 1052 by Margot William RN  Outcome: Progressing   Patient free from falls. Bed locked and in lowest position. Hourly rounding maintained. Call light and patient belongings within reach. Room free of clutter and adequate lighting provided.

## 2024-05-31 NOTE — PROGRESS NOTES
Occupational Therapy  Facility/Department: [address] NEURO  Occupational Therapy Initial Assessment    Name: Brennen Mcclure  : 1965  MRN: [id number]  Date of Service: 2024    Discharge Recommendations:  Patient would benefit from continued acute care/post acute care OT services to address functional deficits through skilled intervention of ADL, balance, safety and transfer training, and succesful use of AE/DME, to promote functional outcomes.The patient should be able to tolerate at least 3 hours of therapy per day over 5 days or 15 hours over 7 days.   This patient may benefit from a Physical Medicine and Rehab consult.     OT Equipment Recommendations  Equipment Needed: Yes (CTA)  Mobility Devices: ADL Assistive Devices;Wheelchair (for distance)  ADL Assistive Devices:  (bed rail towards foot of bed)     Patient Diagnosis(es): There were no encounter diagnoses.    Past Medical History:  has a past medical history of Anemia, CAD (coronary artery disease), Chronic deep vein thrombosis (DVT) of both lower extremities (HCC), Chronic deep vein thrombosis (DVT) of femoral vein of right lower extremity (HCC), Chronic deep vein thrombosis (DVT) of proximal vein of right lower extremity (HCC), Chronic deep vein thrombosis (DVT) of right iliac vein (HCC), Chronic idiopathic thrombocytopenia (HCC), Chronic idiopathic thrombocytopenic purpura (HCC), CVA (cerebral vascular accident) (HCC), Emphysema lung (HCC), Hemosiderin pigmentation of skin, HLD (hyperlipidemia), Intracranial hemorrhage (HCC), Left hemiplegia (HCC), Right leg swelling, and Thrombocytopenia (HCC).    Past Surgical History:  has a past surgical history that includes Coronary artery bypass graft; Coronary stent placement; craniotomy (Right); and CT BIOPSY BONE MARROW (3/18/2024).    Treatment Diagnosis: Absence of Bone of Skull    Assessment   Pt lying supine in bed upon entrance to room. Helmet placed supine in bed prior to mobility and doffed supine  SBA to increase activity tolerance for ADL'S  Short Term Goal 6: Dem a functional transfer without use of scotty steady mod a with LRAD for daily occupations to improve balance  Short Term Goal 7: Dem weight bearing through LUE to increase proprioceptive input for adl tasks  Short Term Goal 8: Dem dynamic mobility with use of LRAD mod x 2 for household distance       Therapy Time   Individual Concurrent Group Co-treatment   Time In 1352         Time Out 1458         Minutes 66         Timed Code Treatment Minutes: 38 Minutes     Co eval with PT    LESLIE MERINO OTR/L

## 2024-05-31 NOTE — PROGRESS NOTES
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 Chronic ITP. History of intraparenchymal brain bleed     Interval Changes:  Patient seen and examined.  Labs and vitals reviewed.  Currently working with PT  Platelet count 81 from 68.   Patient states continued headache, chronic  Patient hopeful for surgery soon    History of Present Illness:    The patient is a 59 y.o.  male who is admitted  to the neurosurgery team for an elective cranioplasty scheduled for 5/29.  Hematology oncology team consulted due to patient's history of thrombocytopenia and ITP.  Patient is relevant hematologic history is as below.  Patient is currently on Promacta for ITP.  Patient not seen in office by Dr. Chang on 5/21.  Platelet count was 1 40,000 at that time.  Patient platelet count has dropped to 56,000 now.  Hemoglobin 13.6 WBC count 5.8.  Hematology oncology team consulted due to thrombocytopenia in light of patient's upcoming surgery.     Patient does have history of dural venous thrombosis and acute hemorrhagic stroke.  Patient also has history of DVT.     DIAGNOSIS:   Chronic thrombocytopenia, patient had a evaluation in [place] and also in the past and thought to be immune mediated  History of dural sinus thrombosis and history of DVT in 2019  Chronic anticoagulation with Eliquis  Had a hemorrhagic stroke in December 2023  Patient received platelet transfusion during the hospitalization and also was given IVIG on 1/9/2024 and 1/10/2024  Patient was treated with Decadron 40 mg on 2/18 - 2/20 1-24, with no response  Patient planning surgical procedure with neurosurgery  Bone marrow biopsy negative for acute bone marrow pathology  I will

## 2024-05-31 NOTE — CARE COORDINATION
Transitional Planning  Spoke with patient, plan is to return home with spouse, has transportation. Requesting side rail for hospital bed.  He is not sure where hosp. Bed came from.    Called spouse MALIKA Terry supplied bed and requests side rail for bottom of the bed due to patient kicks his legs out of it all the time.      915 am Call placed to [phone] spoke with Estefania, states need face to face and order for 1/2 bottom side rail.     PS Oswaldo Knight NP requesting the above.      Oswaldo is talking with spouse regarding obtaining a side rail to prevent patient from falling out of bed.

## 2024-06-01 PROBLEM — D69.3 IMMUNE THROMBOCYTOPENIA (HCC): Status: ACTIVE | Noted: 2024-02-12

## 2024-06-01 LAB
ERYTHROCYTE [DISTWIDTH] IN BLOOD BY AUTOMATED COUNT: 17.9 % (ref 11.8–14.4)
HCT VFR BLD AUTO: 43.3 % (ref 40.7–50.3)
HGB BLD-MCNC: 13.8 G/DL (ref 13–17)
MCH RBC QN AUTO: 29 PG (ref 25.2–33.5)
MCHC RBC AUTO-ENTMCNC: 31.9 G/DL (ref 28.4–34.8)
MCV RBC AUTO: 91 FL (ref 82.6–102.9)
NRBC BLD-RTO: 0 PER 100 WBC
PLATELET # BLD AUTO: ABNORMAL K/UL (ref 138–453)
PLATELET, FLUORESCENCE: 71 K/UL (ref 138–453)
PLATELETS.RETICULATED NFR BLD AUTO: 11.1 % (ref 1.1–10.3)
RBC # BLD AUTO: 4.76 M/UL (ref 4.21–5.77)
WBC OTHER # BLD: 8.6 K/UL (ref 3.5–11.3)

## 2024-06-01 PROCEDURE — 85027 COMPLETE CBC AUTOMATED: CPT

## 2024-06-01 PROCEDURE — 85055 RETICULATED PLATELET ASSAY: CPT

## 2024-06-01 PROCEDURE — 6360000002 HC RX W HCPCS: Performed by: INTERNAL MEDICINE

## 2024-06-01 PROCEDURE — 36415 COLL VENOUS BLD VENIPUNCTURE: CPT

## 2024-06-01 PROCEDURE — APPSS15 APP SPLIT SHARED TIME 0-15 MINUTES: Performed by: NURSE PRACTITIONER

## 2024-06-01 PROCEDURE — 6370000000 HC RX 637 (ALT 250 FOR IP): Performed by: REGISTERED NURSE

## 2024-06-01 PROCEDURE — 2580000003 HC RX 258: Performed by: REGISTERED NURSE

## 2024-06-01 PROCEDURE — 2060000000 HC ICU INTERMEDIATE R&B

## 2024-06-01 PROCEDURE — 99232 SBSQ HOSP IP/OBS MODERATE 35: CPT | Performed by: INTERNAL MEDICINE

## 2024-06-01 PROCEDURE — 2580000003 HC RX 258: Performed by: INTERNAL MEDICINE

## 2024-06-01 RX ADMIN — TOPIRAMATE 50 MG: 50 TABLET, FILM COATED ORAL at 08:50

## 2024-06-01 RX ADMIN — Medication 10 MG: at 20:20

## 2024-06-01 RX ADMIN — METHYLPREDNISOLONE SODIUM SUCCINATE 60 MG: 40 INJECTION, POWDER, LYOPHILIZED, FOR SOLUTION INTRAMUSCULAR; INTRAVENOUS at 14:41

## 2024-06-01 RX ADMIN — GABAPENTIN 300 MG: 300 CAPSULE ORAL at 08:48

## 2024-06-01 RX ADMIN — DICLOFENAC SODIUM 2 G: 10 GEL TOPICAL at 20:24

## 2024-06-01 RX ADMIN — TOPIRAMATE 50 MG: 50 TABLET, FILM COATED ORAL at 20:21

## 2024-06-01 RX ADMIN — LACOSAMIDE 100 MG: 100 TABLET, FILM COATED ORAL at 20:21

## 2024-06-01 RX ADMIN — GABAPENTIN 300 MG: 300 CAPSULE ORAL at 14:41

## 2024-06-01 RX ADMIN — METHYLPREDNISOLONE SODIUM SUCCINATE 60 MG: 40 INJECTION, POWDER, LYOPHILIZED, FOR SOLUTION INTRAMUSCULAR; INTRAVENOUS at 22:47

## 2024-06-01 RX ADMIN — SODIUM CHLORIDE, PRESERVATIVE FREE 10 ML: 5 INJECTION INTRAVENOUS at 08:50

## 2024-06-01 RX ADMIN — SODIUM CHLORIDE, PRESERVATIVE FREE 10 ML: 5 INJECTION INTRAVENOUS at 20:28

## 2024-06-01 RX ADMIN — DICLOFENAC SODIUM 2 G: 10 GEL TOPICAL at 08:49

## 2024-06-01 RX ADMIN — METHYLPREDNISOLONE SODIUM SUCCINATE 60 MG: 40 INJECTION, POWDER, LYOPHILIZED, FOR SOLUTION INTRAMUSCULAR; INTRAVENOUS at 05:05

## 2024-06-01 RX ADMIN — BACLOFEN 5 MG: 5 TABLET ORAL at 08:48

## 2024-06-01 RX ADMIN — ACETAMINOPHEN 1000 MG: 500 TABLET ORAL at 20:21

## 2024-06-01 RX ADMIN — MAGNESIUM GLUCONATE 500 MG ORAL TABLET 400 MG: 500 TABLET ORAL at 08:50

## 2024-06-01 RX ADMIN — AMITRIPTYLINE HYDROCHLORIDE 75 MG: 50 TABLET, FILM COATED ORAL at 20:21

## 2024-06-01 RX ADMIN — ATORVASTATIN CALCIUM 80 MG: 80 TABLET, FILM COATED ORAL at 20:21

## 2024-06-01 RX ADMIN — ACETAMINOPHEN 1000 MG: 500 TABLET ORAL at 08:48

## 2024-06-01 RX ADMIN — GABAPENTIN 300 MG: 300 CAPSULE ORAL at 20:23

## 2024-06-01 RX ADMIN — BACLOFEN 5 MG: 5 TABLET ORAL at 20:23

## 2024-06-01 RX ADMIN — CITALOPRAM 10 MG: 10 TABLET, FILM COATED ORAL at 08:50

## 2024-06-01 RX ADMIN — TAMSULOSIN HYDROCHLORIDE 0.4 MG: 0.4 CAPSULE ORAL at 08:48

## 2024-06-01 RX ADMIN — LACOSAMIDE 100 MG: 100 TABLET, FILM COATED ORAL at 08:48

## 2024-06-01 RX ADMIN — ACETAMINOPHEN 1000 MG: 500 TABLET ORAL at 14:40

## 2024-06-01 ASSESSMENT — PAIN SCALES - GENERAL
PAINLEVEL_OUTOF10: 8
PAINLEVEL_OUTOF10: 8

## 2024-06-01 ASSESSMENT — PAIN DESCRIPTION - LOCATION: LOCATION: KNEE

## 2024-06-01 ASSESSMENT — PAIN DESCRIPTION - ORIENTATION: ORIENTATION: RIGHT;LEFT

## 2024-06-01 ASSESSMENT — PAIN DESCRIPTION - DESCRIPTORS: DESCRIPTORS: ACHING

## 2024-06-01 NOTE — PROGRESS NOTES
Neurosurgery NANNETTE/Resident    Daily Progress Note   CC:No chief complaint on file.    6/1/2024  8:41 AM    Chart reviewed.  No acute events overnight.  No new complaints.    Vitals:    05/31/24 1945 05/31/24 2326 06/01/24 0312 06/01/24 0734   BP: 116/79 119/79 119/81 123/82   Pulse: 64 58 52 63   Resp: 11 17 10 18   Temp: 97.5 °F (36.4 °C) 98.4 °F (36.9 °C) 97.5 °F (36.4 °C) 97.5 °F (36.4 °C)   TempSrc: Oral Axillary Axillary Oral   SpO2: 92%  96%    Weight:       Height:           PE:   Awake alert oriented  5/5 RUE and RLE  Left side hemiplegic     Sunken right bone flap          Lab Results   Component Value Date    WBC 9.3 05/31/2024    HGB 12.4 (L) 05/31/2024    HCT 39.5 (L) 05/31/2024    PLT 81 (L) 05/31/2024    CHOL 118 05/16/2024    TRIG 182 (H) 05/16/2024    HDL 28 (L) 05/16/2024    ALT 28 05/08/2024    AST 29 05/08/2024     05/28/2024    K 4.2 05/28/2024     05/28/2024    CREATININE 0.9 05/28/2024    BUN 22 05/28/2024    CO2 25 05/28/2024    TSH 3.45 04/13/2024    INR 0.9 05/28/2024    LABA1C 5.2 05/16/2024    CRP 33.1 (H) 05/12/2024         A/P  59 y.o. male who presents with thrombocytopenia, skull defect     Hem following post IVIG 5/28 and adjusted dose of Promacta- continue to be thrombocytopenic  Hematology doing another round IV IG today  Will attempt to optimize platelets over next couple days to see if they trend in right direction for surgery   Pending platelet labs this morning       Please contact neurosurgery with any changes in patients neurologic status.       Oswaldo Knight, CNP  6/1/24  8:41 AM

## 2024-06-01 NOTE — PROGRESS NOTES
Oswaldo Dennis APRN - CNP        Or    potassium chloride 10 mEq/100 mL IVPB (Peripheral Line)  10 mEq IntraVENous PRN Oswaldo Dennis APRN - CNP        magnesium sulfate 2000 mg in 50 mL IVPB premix  2,000 mg IntraVENous PRN Oswaldo Dennis APRN - CNP        ondansetron (ZOFRAN-ODT) disintegrating tablet 4 mg  4 mg Oral Q8H PRN Oswaldo Dennis APRN - CNP        Or    ondansetron (ZOFRAN) injection 4 mg  4 mg IntraVENous Q6H PRN Oswaldo Dennis APRN - CNP        polyethylene glycol (GLYCOLAX) packet 17 g  17 g Oral Daily PRN Oswaldo Dennis APRN - CNP        acetaminophen (TYLENOL) tablet 650 mg  650 mg Oral Q6H PRN Oswaldo Dennis APRN - CNP        Or    acetaminophen (TYLENOL) suppository 650 mg  650 mg Rectal Q6H PRN Oswaldo Dennis APRN - CNP        acetaminophen (TYLENOL) tablet 1,000 mg  1,000 mg Oral TID Oswaldo Dennis APRN - CNP   1,000 mg at 06/01/24 1440    amitriptyline (ELAVIL) tablet 75 mg  75 mg Oral Nightly Oswaldo Dennis APRN - CNP   75 mg at 05/31/24 2021    atorvastatin (LIPITOR) tablet 80 mg  80 mg Oral Nightly Oswaldo Dennis APRN - CNP   80 mg at 05/31/24 2018    Baclofen (LIORESAL) tablet 5 mg  5 mg Oral BID Oswaldo Dennis APRN - CNP   5 mg at 06/01/24 0848    citalopram (CELEXA) tablet 10 mg  10 mg Oral Daily Oswaldo Dennis APRN - CNP   10 mg at 06/01/24 0850    diclofenac sodium (VOLTAREN) 1 % gel 2 g  2 g Topical BID Oswaldo Dennis APRN - CNP   2 g at 06/01/24 0849    gabapentin (NEURONTIN) capsule 300 mg  300 mg Oral TID Oswaldo Dennis APRN - CNP   300 mg at 06/01/24 1441    ipratropium 0.5 mg-albuterol 2.5 mg (DUONEB) nebulizer solution 1 Dose  1 Dose Inhalation Q6H PRN Oswaldo Dennis APRN - CNP        lacosamide (VIMPAT) tablet 100 mg  100 mg Oral BID Oswaldo Dennis, APRN - CNP   100 mg at 06/01/24 0848    lidocaine 4 % external patch 1 patch  1 patch Topical Daily Oswaldo Dennis APRN - CNP   1 patch at 06/01/24 0848    magnesium oxide (MAG-OX) tablet 400 mg     No results for input(s): \"NA\", \"K\", \"BUN\", \"CREATININE\", \"CL\", \"CO2\" in the last 72 hours.    Invalid input(s): \"ANION GAP\"   LFTS  No results for input(s): \"ALKPHOS\", \"ALT\", \"AST\", \"BILITOT\", \"BILIDIR\", \"LABALBU\" in the last 72 hours.    Imaging:  XR CHEST PORTABLE    Result Date: 5/28/2024  Low lung volumes.  No acute cardiopulmonary disease.     XR CHEST PORTABLE    Result Date: 5/28/2024  EXAMINATION: ONE XRAY VIEW OF THE CHEST 5/28/2024 6:10 pm COMPARISON: 05/08/2024. HISTORY: ORDERING SYSTEM PROVIDED HISTORY: pre-op TECHNOLOGIST PROVIDED HISTORY: pre-op Reason for Exam: prre-op  upright port FINDINGS: The cardiac silhouette is mildly enlarged and stable with post sternotomy changes.  There is mild prominence of the interstitial markings throughout the lungs, likely accentuated by decreased lung volumes and underlying pulmonary emphysema.  No focal consolidation, significant pleural fluid or evidence of overt failure.     Low lung volumes.  No acute cardiopulmonary disease.     CT HEAD WO CONTRAST    Result Date: 5/20/2024  EXAMINATION: CT OF THE HEAD WITHOUT CONTRAST  5/17/2024 4:47 pm TECHNIQUE: CT of the head was performed without the administration of intravenous contrast. Automated exposure control, iterative reconstruction, and/or weight based adjustment of the mA/kV was utilized to reduce the radiation dose to as low as reasonably achievable. COMPARISON: 05/16/2024 HISTORY: ORDERING SYSTEM PROVIDED HISTORY: for cranial implant, please do with [facility] protocol TECHNOLOGIST PROVIDED HISTORY: for cranial implant, please do with [facility]ame protocol Reason for Exam: For cranial implant, please do with [facility]ame protocol FINDINGS: BRAIN/VENTRICLES: Preoperative planning CT head was obtained.  The right lateral ventricle has re-expanded and there is slightly more external brain herniation through the right hemispheric craniectomy defect.  Areas of encephalomalacia throughout the right cerebral hemisphere are

## 2024-06-02 LAB
ERYTHROCYTE [DISTWIDTH] IN BLOOD BY AUTOMATED COUNT: 17.9 % (ref 11.8–14.4)
HCT VFR BLD AUTO: 41 % (ref 40.7–50.3)
HGB BLD-MCNC: 13.7 G/DL (ref 13–17)
MCH RBC QN AUTO: 29.5 PG (ref 25.2–33.5)
MCHC RBC AUTO-ENTMCNC: 33.4 G/DL (ref 28.4–34.8)
MCV RBC AUTO: 88.2 FL (ref 82.6–102.9)
NRBC BLD-RTO: 0.4 PER 100 WBC
PLATELET # BLD AUTO: 78 K/UL (ref 138–453)
PMV BLD AUTO: 12.4 FL (ref 8.1–13.5)
RBC # BLD AUTO: 4.65 M/UL (ref 4.21–5.77)
WBC OTHER # BLD: 8.2 K/UL (ref 3.5–11.3)

## 2024-06-02 PROCEDURE — 6360000002 HC RX W HCPCS: Performed by: INTERNAL MEDICINE

## 2024-06-02 PROCEDURE — 6360000002 HC RX W HCPCS: Performed by: NURSE PRACTITIONER

## 2024-06-02 PROCEDURE — 2580000003 HC RX 258: Performed by: REGISTERED NURSE

## 2024-06-02 PROCEDURE — 2580000003 HC RX 258: Performed by: INTERNAL MEDICINE

## 2024-06-02 PROCEDURE — APPSS15 APP SPLIT SHARED TIME 0-15 MINUTES: Performed by: NURSE PRACTITIONER

## 2024-06-02 PROCEDURE — 2060000000 HC ICU INTERMEDIATE R&B

## 2024-06-02 PROCEDURE — 99232 SBSQ HOSP IP/OBS MODERATE 35: CPT | Performed by: INTERNAL MEDICINE

## 2024-06-02 PROCEDURE — 6370000000 HC RX 637 (ALT 250 FOR IP): Performed by: REGISTERED NURSE

## 2024-06-02 PROCEDURE — 36415 COLL VENOUS BLD VENIPUNCTURE: CPT

## 2024-06-02 PROCEDURE — 85027 COMPLETE CBC AUTOMATED: CPT

## 2024-06-02 RX ADMIN — TAMSULOSIN HYDROCHLORIDE 0.4 MG: 0.4 CAPSULE ORAL at 08:52

## 2024-06-02 RX ADMIN — CITALOPRAM 10 MG: 10 TABLET, FILM COATED ORAL at 08:55

## 2024-06-02 RX ADMIN — SODIUM CHLORIDE, PRESERVATIVE FREE 10 ML: 5 INJECTION INTRAVENOUS at 08:55

## 2024-06-02 RX ADMIN — AMITRIPTYLINE HYDROCHLORIDE 75 MG: 50 TABLET, FILM COATED ORAL at 20:33

## 2024-06-02 RX ADMIN — TOPIRAMATE 50 MG: 50 TABLET, FILM COATED ORAL at 21:25

## 2024-06-02 RX ADMIN — METHYLPREDNISOLONE SODIUM SUCCINATE 60 MG: 40 INJECTION, POWDER, LYOPHILIZED, FOR SOLUTION INTRAMUSCULAR; INTRAVENOUS at 14:33

## 2024-06-02 RX ADMIN — BACLOFEN 5 MG: 5 TABLET ORAL at 08:55

## 2024-06-02 RX ADMIN — GABAPENTIN 300 MG: 300 CAPSULE ORAL at 14:33

## 2024-06-02 RX ADMIN — ACETAMINOPHEN 1000 MG: 500 TABLET ORAL at 14:33

## 2024-06-02 RX ADMIN — ACETAMINOPHEN 1000 MG: 500 TABLET ORAL at 20:30

## 2024-06-02 RX ADMIN — DICLOFENAC SODIUM 2 G: 10 GEL TOPICAL at 21:32

## 2024-06-02 RX ADMIN — IMMUNE GLOBULIN (HUMAN) 70000 MG: 10 INJECTION INTRAVENOUS; SUBCUTANEOUS at 08:43

## 2024-06-02 RX ADMIN — METHYLPREDNISOLONE SODIUM SUCCINATE 60 MG: 40 INJECTION, POWDER, LYOPHILIZED, FOR SOLUTION INTRAMUSCULAR; INTRAVENOUS at 21:25

## 2024-06-02 RX ADMIN — MAGNESIUM GLUCONATE 500 MG ORAL TABLET 400 MG: 500 TABLET ORAL at 08:55

## 2024-06-02 RX ADMIN — ATORVASTATIN CALCIUM 80 MG: 80 TABLET, FILM COATED ORAL at 20:31

## 2024-06-02 RX ADMIN — ACETAMINOPHEN 1000 MG: 500 TABLET ORAL at 08:52

## 2024-06-02 RX ADMIN — Medication 10 MG: at 20:30

## 2024-06-02 RX ADMIN — LACOSAMIDE 100 MG: 100 TABLET, FILM COATED ORAL at 20:31

## 2024-06-02 RX ADMIN — GABAPENTIN 300 MG: 300 CAPSULE ORAL at 20:31

## 2024-06-02 RX ADMIN — LACOSAMIDE 100 MG: 100 TABLET, FILM COATED ORAL at 08:52

## 2024-06-02 RX ADMIN — BACLOFEN 5 MG: 5 TABLET ORAL at 20:31

## 2024-06-02 RX ADMIN — DICLOFENAC SODIUM 2 G: 10 GEL TOPICAL at 08:54

## 2024-06-02 RX ADMIN — SODIUM CHLORIDE, PRESERVATIVE FREE 10 ML: 5 INJECTION INTRAVENOUS at 21:33

## 2024-06-02 RX ADMIN — METHYLPREDNISOLONE SODIUM SUCCINATE 60 MG: 40 INJECTION, POWDER, LYOPHILIZED, FOR SOLUTION INTRAMUSCULAR; INTRAVENOUS at 06:12

## 2024-06-02 RX ADMIN — GABAPENTIN 300 MG: 300 CAPSULE ORAL at 08:52

## 2024-06-02 RX ADMIN — TOPIRAMATE 50 MG: 50 TABLET, FILM COATED ORAL at 08:54

## 2024-06-02 ASSESSMENT — PAIN SCALES - GENERAL
PAINLEVEL_OUTOF10: 9
PAINLEVEL_OUTOF10: 8
PAINLEVEL_OUTOF10: 8
PAINLEVEL_OUTOF10: 3

## 2024-06-02 ASSESSMENT — PAIN DESCRIPTION - FREQUENCY
FREQUENCY: CONTINUOUS
FREQUENCY: CONTINUOUS

## 2024-06-02 ASSESSMENT — PAIN DESCRIPTION - DESCRIPTORS
DESCRIPTORS: ACHING;DISCOMFORT
DESCRIPTORS: THROBBING
DESCRIPTORS: ACHING
DESCRIPTORS: SHARP

## 2024-06-02 ASSESSMENT — PAIN DESCRIPTION - LOCATION
LOCATION: HEAD
LOCATION: KNEE
LOCATION: HEAD
LOCATION: HEAD

## 2024-06-02 ASSESSMENT — PAIN DESCRIPTION - ORIENTATION
ORIENTATION: RIGHT
ORIENTATION: RIGHT
ORIENTATION: LEFT
ORIENTATION: RIGHT

## 2024-06-02 ASSESSMENT — PAIN DESCRIPTION - PAIN TYPE
TYPE: CHRONIC PAIN
TYPE: CHRONIC PAIN

## 2024-06-02 ASSESSMENT — PAIN DESCRIPTION - ONSET
ONSET: ON-GOING
ONSET: ON-GOING

## 2024-06-02 NOTE — PLAN OF CARE
Problem: Discharge Planning  Goal: Discharge to home or other facility with appropriate resources  Outcome: Progressing- pending medical course  Flowsheets (Taken 6/2/2024 0817)  Discharge to home or other facility with appropriate resources:   Identify barriers to discharge with patient and caregiver   Identify discharge learning needs (meds, wound care, etc)   Arrange for needed discharge resources and transportation as appropriate   Refer to discharge planning if patient needs post-hospital services based on physician order or complex needs related to functional status, cognitive ability or social support system     Problem: Chronic Conditions and Co-morbidities  Goal: Patient's chronic conditions and co-morbidity symptoms are monitored and maintained or improved  6/2/2024 1618 by Lindy Olivares, RN  Outcome: Progressing- monitored and medicated as ordered/needed  Flowsheets (Taken 6/2/2024 0817)  Care Plan - Patient's Chronic Conditions and Co-Morbidity Symptoms are Monitored and Maintained or Improved:   Monitor and assess patient's chronic conditions and comorbid symptoms for stability, deterioration, or improvement   Collaborate with multidisciplinary team to address chronic and comorbid conditions and prevent exacerbation or deterioration   Update acute care plan with appropriate goals if chronic or comorbid symptoms are exacerbated and prevent overall improvement and discharge     Problem: Safety - Adult  Goal: Free from fall injury  Outcome: Progressing-pt remains free from falls this shift     Problem: Skin/Tissue Integrity  Goal: Absence of new skin breakdown  Description: 1.  Monitor for areas of redness and/or skin breakdown  2.  Assess vascular access sites hourly  3.  Every 4-6 hours minimum:  Change oxygen saturation probe site  4.  Every 4-6 hours:  If on nasal continuous positive airway pressure, respiratory therapy assess nares and determine need for appliance change or resting

## 2024-06-02 NOTE — PROGRESS NOTES
Date Taking? Authorizing Provider   Melatonin 10 MG TABS Take 10 mg by mouth nightly   Yes Zeenat Mercer MD   topiramate (TOPAMAX) 50 MG tablet Take 1.5 tablets by mouth 2 times daily  Patient taking differently: Take 1 tablet by mouth 2 times daily 5/14/24   Leonora Brown MD   magnesium oxide (MAG-OX) 400 (240 Mg) MG tablet Take 1 tablet by mouth daily 5/15/24   Leonora Brown MD   eltrombopag olamine (PROMACTA) 50 MG TABS tablet Take 1 tablet by mouth daily    Zeenat Mercer MD   lidocaine (LIDODERM) 5 % Place 2 patches onto the skin daily Change daily remove after 12 hours 5/2/24   Danica De La Paz MD   amitriptyline (ELAVIL) 75 MG tablet Take 1 tablet by mouth nightly 4/30/24   Danica De La Paz MD   acetaminophen (TYLENOL) 500 MG tablet Take 2 tablets by mouth in the morning, at noon, and at bedtime 4/30/24   Danica De La Paz MD   gabapentin (NEURONTIN) 300 MG capsule Take 1 capsule by mouth 3 times daily for 90 days. 4/30/24 7/29/24  Danica De La Paz MD   Multiple Vitamins-Minerals (THERAPEUTIC MULTIVITAMIN-MINERALS) tablet Take 1 tablet by mouth daily 4/1/24   Blanca Ren MD   lacosamide (VIMPAT) 100 MG TABS tablet Take 1 tablet by mouth 2 times daily for 90 days. Max Daily Amount: 200 mg 4/1/24 6/30/24  Blanca Ren MD   ipratropium 0.5 mg-albuterol 2.5 mg (DUONEB) 0.5-2.5 (3) MG/3ML SOLN nebulizer solution Inhale 3 mLs into the lungs every 6 hours as needed for Shortness of Breath 4/1/24   Blanca Ren MD   citalopram (CELEXA) 10 MG tablet Take 1 tablet by mouth daily 4/1/24   Blanca Ren MD   Baclofen (LIORESAL) 5 MG tablet Take 1 tablet by mouth 2 times daily 4/1/24   Blanca Ren MD   atorvastatin (LIPITOR) 80 MG tablet Take 1 tablet by mouth nightly 4/1/24   Blanca Ren MD   tamsulosin (FLOMAX) 0.4 MG capsule Take 1 capsule by mouth daily 4/1/24   Blanca Ren MD   Handicap Placard MISC by Does not apply route Duration: 1 year / Dx: Stroke 2/25/24   Kev  is mass effect in the right cerebral hemisphere with decreased sulcation.  No midline shift. There is mild T2/FLAIR hyperintensity in the periventricular and subcortical white matter, likely related to mild chronic microvascular disease.  There is no acute infarct.   There is no evidence of hydrocephalus. The sellar/suprasellar regions appear unremarkable. ORBITS: The visualized portion of the orbits demonstrate no acute abnormality. SINUSES: There is scattered minimal mucosal thickening in the paranasal sinuses.  There is mild right mastoid effusion. BONES/SOFT TISSUES: The bone marrow signal intensity appears normal. The soft tissues demonstrate no acute abnormality.     Status post right frontal parietal temporal craniectomy. Residual vasogenic edema, scattered encephalomalacia and scattered chronic blood products in the subjacent right frontal parietal temporal lobes.  Mass effect in the right cerebral hemisphere. No midline shift. 2.2 x 2.7 cm fluid collection with associated restricted diffusion and peripheral susceptibility artifacts in the right frontal lobe, likely related to surgical cavity with subacute blood products. Additional 5 x 11 mm fluid collection with associated restricted diffusion in the anterior right frontal lobe along the anterior margin of the craniectomy, likely related to subacute parenchymal hematoma.     CT HEAD WO CONTRAST    Result Date: 5/8/2024  EXAMINATION: CT OF THE HEAD WITHOUT CONTRAST  5/8/2024 8:50 pm TECHNIQUE: CT of the head was performed without the administration of intravenous contrast. Automated exposure control, iterative reconstruction, and/or weight based adjustment of the mA/kV was utilized to reduce the radiation dose to as low as reasonably achievable. COMPARISON: May 2, 2024 CT head HISTORY: ORDERING SYSTEM PROVIDED HISTORY: weakness, prior right craniectomy, fluctuation over right crani site, fever TECHNOLOGIST PROVIDED HISTORY: weakness, prior right

## 2024-06-02 NOTE — PROGRESS NOTES
Neurosurgery NANNETTE/Resident    Daily Progress Note   CC:No chief complaint on file.    6/2/2024  6:45 AM    Chart reviewed.  No acute events overnight.  No new complaints. Patient was supposed to be on 75mg daily of Promacta which appears that it was never increased- unable to crush chew of split the current tablets that patient has (our hospital does not have this medication in stock )    Vitals:    06/01/24 1543 06/01/24 1902 06/01/24 2306 06/02/24 0309   BP: 121/74 (!) 126/91 124/84 113/89   Pulse: 64 80 76 62   Resp: 17 16 14 10   Temp: 97.7 °F (36.5 °C) 97.8 °F (36.6 °C) 97.6 °F (36.4 °C) 98.4 °F (36.9 °C)   TempSrc: Oral Axillary Oral Oral   SpO2:       Weight:       Height:           PE:     Awake alert oriented  5/5 RUE and RLE  Left side hemiplegic     Sunken right bone flap     Lab Results   Component Value Date    WBC 8.6 06/01/2024    HGB 13.8 06/01/2024    HCT 43.3 06/01/2024    PLT See Reflexed IPF Result 06/01/2024    CHOL 118 05/16/2024    TRIG 182 (H) 05/16/2024    HDL 28 (L) 05/16/2024    ALT 28 05/08/2024    AST 29 05/08/2024     05/28/2024    K 4.2 05/28/2024     05/28/2024    CREATININE 0.9 05/28/2024    BUN 22 05/28/2024    CO2 25 05/28/2024    TSH 3.45 04/13/2024    INR 0.9 05/28/2024    LABA1C 5.2 05/16/2024    CRP 33.1 (H) 05/12/2024           A/P  59 y.o. male who presents with  thrombocytopenia, skull defect     Hem following post IVIG 5/28   Appears that we are currently unable to adjust the dose of Promacta to 75mg sand he is currently still on 50mg   Spoke with Dr Nellie FERREIRA ordered IVIG 70gm for patient to receive this morning   Hematology following  Will attempt to optimize platelets over next couple days to see if they trend in right direction for surgery        Please contact neurosurgery with any changes in patients neurologic status.       Oswaldo Knight, CNP  6/2/24  6:45 AM

## 2024-06-03 LAB
ABO + RH BLD: NORMAL
ARM BAND NUMBER: NORMAL
BLOOD BANK SAMPLE EXPIRATION: NORMAL
BLOOD GROUP ANTIBODIES SERPL: NEGATIVE
ERYTHROCYTE [DISTWIDTH] IN BLOOD BY AUTOMATED COUNT: 18.4 % (ref 11.8–14.4)
HCT VFR BLD AUTO: 39 % (ref 40.7–50.3)
HGB BLD-MCNC: 12.4 G/DL (ref 13–17)
INR PPP: 1.1
MCH RBC QN AUTO: 29.4 PG (ref 25.2–33.5)
MCHC RBC AUTO-ENTMCNC: 31.8 G/DL (ref 28.4–34.8)
MCV RBC AUTO: 92.4 FL (ref 82.6–102.9)
NRBC BLD-RTO: 0.6 PER 100 WBC
PARTIAL THROMBOPLASTIN TIME: 22 SEC (ref 23–36.5)
PLATELET # BLD AUTO: ABNORMAL K/UL (ref 138–453)
PLATELET, FLUORESCENCE: 110 K/UL (ref 138–453)
PLATELETS.RETICULATED NFR BLD AUTO: 9.2 % (ref 1.1–10.3)
PROTHROMBIN TIME: 13.9 SEC (ref 11.7–14.9)
RBC # BLD AUTO: 4.22 M/UL (ref 4.21–5.77)
WBC OTHER # BLD: 9.7 K/UL (ref 3.5–11.3)

## 2024-06-03 PROCEDURE — 86850 RBC ANTIBODY SCREEN: CPT

## 2024-06-03 PROCEDURE — 85027 COMPLETE CBC AUTOMATED: CPT

## 2024-06-03 PROCEDURE — 2580000003 HC RX 258: Performed by: REGISTERED NURSE

## 2024-06-03 PROCEDURE — 6360000002 HC RX W HCPCS: Performed by: INTERNAL MEDICINE

## 2024-06-03 PROCEDURE — 97110 THERAPEUTIC EXERCISES: CPT

## 2024-06-03 PROCEDURE — 6370000000 HC RX 637 (ALT 250 FOR IP): Performed by: REGISTERED NURSE

## 2024-06-03 PROCEDURE — 97530 THERAPEUTIC ACTIVITIES: CPT

## 2024-06-03 PROCEDURE — 85610 PROTHROMBIN TIME: CPT

## 2024-06-03 PROCEDURE — 85730 THROMBOPLASTIN TIME PARTIAL: CPT

## 2024-06-03 PROCEDURE — 2060000000 HC ICU INTERMEDIATE R&B

## 2024-06-03 PROCEDURE — 86900 BLOOD TYPING SEROLOGIC ABO: CPT

## 2024-06-03 PROCEDURE — 99232 SBSQ HOSP IP/OBS MODERATE 35: CPT | Performed by: INTERNAL MEDICINE

## 2024-06-03 PROCEDURE — 86901 BLOOD TYPING SEROLOGIC RH(D): CPT

## 2024-06-03 PROCEDURE — 2580000003 HC RX 258: Performed by: INTERNAL MEDICINE

## 2024-06-03 PROCEDURE — 6370000000 HC RX 637 (ALT 250 FOR IP)

## 2024-06-03 PROCEDURE — 85055 RETICULATED PLATELET ASSAY: CPT

## 2024-06-03 PROCEDURE — 36415 COLL VENOUS BLD VENIPUNCTURE: CPT

## 2024-06-03 RX ORDER — CHLORHEXIDINE GLUCONATE 40 MG/ML
SOLUTION TOPICAL DAILY
Status: COMPLETED | OUTPATIENT
Start: 2024-06-03 | End: 2024-06-04

## 2024-06-03 RX ORDER — SODIUM CHLORIDE 9 MG/ML
INJECTION, SOLUTION INTRAVENOUS CONTINUOUS
Status: DISCONTINUED | OUTPATIENT
Start: 2024-06-04 | End: 2024-06-11

## 2024-06-03 RX ORDER — ALPRAZOLAM 0.25 MG/1
0.25 TABLET ORAL ONCE
Status: COMPLETED | OUTPATIENT
Start: 2024-06-03 | End: 2024-06-03

## 2024-06-03 RX ADMIN — METHYLPREDNISOLONE SODIUM SUCCINATE 60 MG: 40 INJECTION, POWDER, LYOPHILIZED, FOR SOLUTION INTRAMUSCULAR; INTRAVENOUS at 22:31

## 2024-06-03 RX ADMIN — ACETAMINOPHEN 1000 MG: 500 TABLET ORAL at 14:01

## 2024-06-03 RX ADMIN — MAGNESIUM GLUCONATE 500 MG ORAL TABLET 400 MG: 500 TABLET ORAL at 08:29

## 2024-06-03 RX ADMIN — LACOSAMIDE 100 MG: 100 TABLET, FILM COATED ORAL at 20:13

## 2024-06-03 RX ADMIN — SODIUM CHLORIDE, PRESERVATIVE FREE 10 ML: 5 INJECTION INTRAVENOUS at 08:30

## 2024-06-03 RX ADMIN — TAMSULOSIN HYDROCHLORIDE 0.4 MG: 0.4 CAPSULE ORAL at 08:25

## 2024-06-03 RX ADMIN — AMITRIPTYLINE HYDROCHLORIDE 75 MG: 50 TABLET, FILM COATED ORAL at 20:12

## 2024-06-03 RX ADMIN — DICLOFENAC SODIUM 2 G: 10 GEL TOPICAL at 20:18

## 2024-06-03 RX ADMIN — LACOSAMIDE 100 MG: 100 TABLET, FILM COATED ORAL at 08:25

## 2024-06-03 RX ADMIN — ACETAMINOPHEN 1000 MG: 500 TABLET ORAL at 08:25

## 2024-06-03 RX ADMIN — ATORVASTATIN CALCIUM 80 MG: 80 TABLET, FILM COATED ORAL at 20:13

## 2024-06-03 RX ADMIN — DICLOFENAC SODIUM 2 G: 10 GEL TOPICAL at 08:27

## 2024-06-03 RX ADMIN — CITALOPRAM 10 MG: 10 TABLET, FILM COATED ORAL at 08:29

## 2024-06-03 RX ADMIN — SODIUM CHLORIDE, PRESERVATIVE FREE 10 ML: 5 INJECTION INTRAVENOUS at 20:20

## 2024-06-03 RX ADMIN — GABAPENTIN 300 MG: 300 CAPSULE ORAL at 08:25

## 2024-06-03 RX ADMIN — METHYLPREDNISOLONE SODIUM SUCCINATE 60 MG: 40 INJECTION, POWDER, LYOPHILIZED, FOR SOLUTION INTRAMUSCULAR; INTRAVENOUS at 14:00

## 2024-06-03 RX ADMIN — TOPIRAMATE 50 MG: 50 TABLET, FILM COATED ORAL at 08:30

## 2024-06-03 RX ADMIN — ALPRAZOLAM 0.25 MG: 0.25 TABLET ORAL at 11:57

## 2024-06-03 RX ADMIN — GABAPENTIN 300 MG: 300 CAPSULE ORAL at 20:13

## 2024-06-03 RX ADMIN — Medication 10 MG: at 20:13

## 2024-06-03 RX ADMIN — BACLOFEN 5 MG: 5 TABLET ORAL at 08:25

## 2024-06-03 RX ADMIN — ACETAMINOPHEN 1000 MG: 500 TABLET ORAL at 20:12

## 2024-06-03 RX ADMIN — TOPIRAMATE 50 MG: 50 TABLET, FILM COATED ORAL at 20:13

## 2024-06-03 RX ADMIN — CHLORHEXIDINE GLUCONATE: 213 SOLUTION TOPICAL at 12:14

## 2024-06-03 RX ADMIN — METHYLPREDNISOLONE SODIUM SUCCINATE 60 MG: 40 INJECTION, POWDER, LYOPHILIZED, FOR SOLUTION INTRAMUSCULAR; INTRAVENOUS at 06:06

## 2024-06-03 RX ADMIN — BACLOFEN 5 MG: 5 TABLET ORAL at 20:13

## 2024-06-03 RX ADMIN — GABAPENTIN 300 MG: 300 CAPSULE ORAL at 14:01

## 2024-06-03 ASSESSMENT — PAIN DESCRIPTION - ONSET: ONSET: ON-GOING

## 2024-06-03 ASSESSMENT — PAIN DESCRIPTION - LOCATION: LOCATION: GENERALIZED

## 2024-06-03 ASSESSMENT — PAIN SCALES - GENERAL
PAINLEVEL_OUTOF10: 8
PAINLEVEL_OUTOF10: 0
PAINLEVEL_OUTOF10: 5
PAINLEVEL_OUTOF10: 0

## 2024-06-03 ASSESSMENT — PAIN DESCRIPTION - FREQUENCY: FREQUENCY: CONTINUOUS

## 2024-06-03 ASSESSMENT — PAIN DESCRIPTION - DESCRIPTORS: DESCRIPTORS: ACHING

## 2024-06-03 NOTE — CARE COORDINATION
Transitional Planning  Trending platelets for surgical intervention.  Plan is home with spouse, has transportation. Monitor for post op needs.

## 2024-06-03 NOTE — PLAN OF CARE
Problem: Discharge Planning  Goal: Discharge to home or other facility with appropriate resources  6/3/2024 0456 by Amaris Moreno, RN  Outcome: Progressing

## 2024-06-03 NOTE — PROGRESS NOTES
Physician Progress Note      PATIENT:               GINO SKINNER  CSN #:                  [id number]  :                       1965  ADMIT DATE:       2024 3:01 PM  DISCH DATE:        2024 4:01 PM  RESPONDING  PROVIDER #:        Ayaz LAW MD          QUERY TEXT:    Pt admitted with headache.  Noted documentation of Persistent headaches as   well as increased midline shift with concavity in the right cerebral   hemisphere likely from recent lumbar puncture and decreased CSF on neuro   consult note on 24.  If possible, please document in progress notes and   discharge summary:    The medical record reflects the following:  Risk Factors: recent cranioplasty, LP  Clinical Indicators: H & P REPORT 24? Intractable headache-Neurology   consulted-IV fluid 100 ml/h, severe headache 8 out of 10-Follow CT scan   head-Continue Elavil 75 mg nightly, Topamax 75 mg twice daily, magnesium oxide   daily?  CONSULT NOTE 24? Persistent headaches as well as increased midline shift   with concavity in the right cerebral hemisphere likely from recent lumbar   puncture and decreased CSF; repeat CT head showing improvement in midline   shift.?  DISCHARGE SUMMARY 24 ?Primary Problem: ?Intractable headache, unspecified   chronicity pattern, unspecified headache type?  Treatment: butalbital-APAP-caffeine, diclofenac sodium, eltrombopag olamine,   Neurology consulted.    Thank You,  ROBIN Zamarripa, CDS  Options provided:  -- Headache due complication of recent lumbar puncture confirmed present on   admission  -- headache due complication of recent lumbar puncture ruled out  -- Other - I will add my own diagnosis  -- Disagree - Not applicable / Not valid  -- Disagree - Clinically unable to determine / Unknown  -- Refer to Clinical Documentation Reviewer    PROVIDER RESPONSE TEXT:    The diagnosis headache due complication of recent lumbar puncture was   confirmed as present on admission.    Query

## 2024-06-03 NOTE — PROGRESS NOTES
Neurosurgery NANNETTE/Resident    Daily Progress Note   No chief complaint on file.    6/3/2024  6:10 AM    Chart reviewed.  No acute events overnight.  No new complaints. Patient does endorse a slight headache. No vision changes or new or worsening weakness. Platelets this morning 110.     Vitals:    06/02/24 1531 06/02/24 2004 06/03/24 0007 06/03/24 0400   BP: 133/77  (!) 139/96 138/82   Pulse: 87  64 65   Resp: 17  12 16   Temp: 98.2 °F (36.8 °C) 98.3 °F (36.8 °C) 98.4 °F (36.9 °C) 97.8 °F (36.6 °C)   TempSrc: Oral Oral Oral Axillary   SpO2: 95%  94% 94%   Weight:       Height:             PE:   Awake alert oriented  5/5 RUE and RLE  Left side hemiplegic     Sunken right bone flap      Lab Results   Component Value Date    WBC 9.7 06/03/2024    HGB 12.4 (L) 06/03/2024    HCT 39.0 (L) 06/03/2024    PLT See Reflexed IPF Result 06/03/2024    CHOL 118 05/16/2024    TRIG 182 (H) 05/16/2024    HDL 28 (L) 05/16/2024    ALT 28 05/08/2024    AST 29 05/08/2024     05/28/2024    K 4.2 05/28/2024     05/28/2024    CREATININE 0.9 05/28/2024    BUN 22 05/28/2024    CO2 25 05/28/2024    TSH 3.45 04/13/2024    INR 0.9 05/28/2024    LABA1C 5.2 05/16/2024    CRP 33.1 (H) 05/12/2024     A/P  59 y.o. male who presents with thrombocytopenia, skull defect     Platelets increased s/p IVIG given yesterday  Hematology following, okay for discharge from their standpoint when platelets normalize  Will continue to trend platelets with plan for surgery tomorrow. Order placed for CBC at 0300  NPO at midnight    Please contact neurosurgery with any changes in patients neurologic status.     Electronically signed by EMILY Zhang CNP on 6/3/2024 at 10:07 AM

## 2024-06-03 NOTE — PLAN OF CARE
Problem: Discharge Planning  Goal: Discharge to home or other facility with appropriate resources  6/3/2024 1707 by Silvia De Guzman, RN  Outcome: Progressing  Flowsheets (Taken 6/3/2024 0815)  Discharge to home or other facility with appropriate resources:   Arrange for needed discharge resources and transportation as appropriate   Identify barriers to discharge with patient and caregiver   Identify discharge learning needs (meds, wound care, etc)  6/3/2024 0456 by Amaris Moreno, RN  Outcome: Progressing     Problem: Chronic Conditions and Co-morbidities  Goal: Patient's chronic conditions and co-morbidity symptoms are monitored and maintained or improved  Outcome: Progressing  Flowsheets (Taken 6/3/2024 0815)  Care Plan - Patient's Chronic Conditions and Co-Morbidity Symptoms are Monitored and Maintained or Improved:   Monitor and assess patient's chronic conditions and comorbid symptoms for stability, deterioration, or improvement   Collaborate with multidisciplinary team to address chronic and comorbid conditions and prevent exacerbation or deterioration   Update acute care plan with appropriate goals if chronic or comorbid symptoms are exacerbated and prevent overall improvement and discharge     Problem: Safety - Adult  Goal: Free from fall injury  Outcome: Progressing

## 2024-06-03 NOTE — RT PROTOCOL NOTE
RT Inhaler-Nebulizer Bronchodilator Protocol Note    There is a bronchodilator order in the chart from a provider indicating to follow the RT Bronchodilator Protocol and there is an “Initiate RT Inhaler-Nebulizer Bronchodilator Protocol” order as well (see protocol at bottom of note).    CXR Findings:  No results found.    The findings from the last RT Protocol Assessment were as follows:   History Pulmonary Disease: Chronic pulmonary disease  Respiratory Pattern: Regular pattern and RR 12-20 bpm  Breath Sounds: Clear breath sounds  Cough: Strong, spontaneous, non-productive  Indication for Bronchodilator Therapy: None  Bronchodilator Assessment Score: 2    Aerosolized bronchodilator medication orders have been revised according to the RT Inhaler-Nebulizer Bronchodilator Protocol below.    Respiratory Therapist to perform RT Therapy Protocol Assessment initially then follow the protocol.  Repeat RT Therapy Protocol Assessment PRN for score 0-3 or on second treatment, BID, and PRN for scores above 3.    No Indications - adjust the frequency to every 6 hours PRN wheezing or bronchospasm, if no treatments needed after 48 hours then discontinue using Per Protocol order mode.     If indication present, adjust the RT bronchodilator orders based on the Bronchodilator Assessment Score as indicated below.  Use Inhaler orders unless patient has one or more of the following: on home nebulizer, not able to hold breath for 10 seconds, is not alert and oriented, cannot activate and use MDI correctly, or respiratory rate 25 breaths per minute or more, then use the equivalent nebulizer order(s) with same Frequency and PRN reasons based on the score.  If a patient is on this medication at home then do not decrease Frequency below that used at home.    0-3 - enter or revise RT bronchodilator order(s) to equivalent RT Bronchodilator order with Frequency of every 4 hours PRN for wheezing or increased work of breathing using Per

## 2024-06-03 NOTE — ANESTHESIA PRE PROCEDURE
Applicable):  No results found for: \"LABABO\"    Drug/Infectious Status (If Applicable):  No results found for: \"HIV\", \"HEPCAB\"    COVID-19 Screening (If Applicable): No results found for: \"COVID19\"        Anesthesia Evaluation  Patient summary reviewed  Airway: Mallampati: II  TM distance: >3 FB   Neck ROM: full  Mouth opening: > = 3 FB   Dental: normal exam         Pulmonary: breath sounds clear to auscultation  (+)  COPD:                                     Cardiovascular:    (+) CAD:, hyperlipidemia      ECG reviewed  Rhythm: regular  Rate: normal  Echocardiogram reviewed                  Neuro/Psych:   (+) CVA:, headaches: migraine headaches, psychiatric history:             ROS comment:  history of dural venous thrombosis and acute hemorrhagic stroke  L hemiplegia. Sunken R flap GI/Hepatic/Renal:             Endo/Other:    (+) blood dyscrasia (ITP s/p IVIG): thrombocytopenia:..                 Abdominal:             Vascular:          Other Findings:    05/24/2024  Normal sinus rhythm  Left axis deviation  Right bundle branch block  Inferior infarct (cited on or before 05-FEB-2013)  Possible Anterolateral infarct (cited on or before 13-APR-2024)  Abnormal ECG  When compared with ECG of 12-MAY-2024 19:34,  Questionable change in initial forces of Anterolateral leads  T wave inversion less evident in Anterior leads        Anesthesia Plan      general     ASA 3     (Pt is DNR. He states that we can perform any life saving measured up until arrest. No compressions at any point)  Induction: intravenous.    MIPS: Postoperative opioids intended and Prophylactic antiemetics administered.  Anesthetic plan and risks discussed with patient.      Plan discussed with resident.                Gerber Olivera MD

## 2024-06-04 ENCOUNTER — APPOINTMENT (OUTPATIENT)
Dept: CT IMAGING | Age: 59
End: 2024-06-04
Attending: NEUROLOGICAL SURGERY
Payer: MEDICAID

## 2024-06-04 LAB
ERYTHROCYTE [DISTWIDTH] IN BLOOD BY AUTOMATED COUNT: 18.7 % (ref 11.8–14.4)
ERYTHROCYTE [DISTWIDTH] IN BLOOD BY AUTOMATED COUNT: 19.1 % (ref 11.8–14.4)
HCT VFR BLD AUTO: 37.6 % (ref 40.7–50.3)
HCT VFR BLD AUTO: 38.2 % (ref 40.7–50.3)
HGB BLD-MCNC: 12.2 G/DL (ref 13–17)
HGB BLD-MCNC: 12.3 G/DL (ref 13–17)
MCH RBC QN AUTO: 28.8 PG (ref 25.2–33.5)
MCH RBC QN AUTO: 29.4 PG (ref 25.2–33.5)
MCHC RBC AUTO-ENTMCNC: 31.9 G/DL (ref 28.4–34.8)
MCHC RBC AUTO-ENTMCNC: 32.7 G/DL (ref 28.4–34.8)
MCV RBC AUTO: 90 FL (ref 82.6–102.9)
MCV RBC AUTO: 90.1 FL (ref 82.6–102.9)
NRBC BLD-RTO: 1 PER 100 WBC
NRBC BLD-RTO: 1.1 PER 100 WBC
PLATELET # BLD AUTO: 124 K/UL (ref 138–453)
PLATELET # BLD AUTO: 136 K/UL (ref 138–453)
PMV BLD AUTO: 11.4 FL (ref 8.1–13.5)
PMV BLD AUTO: 11.6 FL (ref 8.1–13.5)
RBC # BLD AUTO: 4.18 M/UL (ref 4.21–5.77)
RBC # BLD AUTO: 4.24 M/UL (ref 4.21–5.77)
WBC OTHER # BLD: 11.5 K/UL (ref 3.5–11.3)
WBC OTHER # BLD: 14.1 K/UL (ref 3.5–11.3)

## 2024-06-04 PROCEDURE — 6370000000 HC RX 637 (ALT 250 FOR IP): Performed by: REGISTERED NURSE

## 2024-06-04 PROCEDURE — 62141 CRNOP SKULL DEFECT>5 CM DIAM: CPT | Performed by: NEUROLOGICAL SURGERY

## 2024-06-04 PROCEDURE — 99232 SBSQ HOSP IP/OBS MODERATE 35: CPT | Performed by: INTERNAL MEDICINE

## 2024-06-04 PROCEDURE — 2720000010 HC SURG SUPPLY STERILE: Performed by: NEUROLOGICAL SURGERY

## 2024-06-04 PROCEDURE — 2580000003 HC RX 258: Performed by: NEUROLOGICAL SURGERY

## 2024-06-04 PROCEDURE — 3600000014 HC SURGERY LEVEL 4 ADDTL 15MIN: Performed by: NEUROLOGICAL SURGERY

## 2024-06-04 PROCEDURE — 2580000003 HC RX 258: Performed by: REGISTERED NURSE

## 2024-06-04 PROCEDURE — 85576 BLOOD PLATELET AGGREGATION: CPT

## 2024-06-04 PROCEDURE — 6370000000 HC RX 637 (ALT 250 FOR IP): Performed by: PHYSICIAN ASSISTANT

## 2024-06-04 PROCEDURE — 6360000002 HC RX W HCPCS

## 2024-06-04 PROCEDURE — 2000000000 HC ICU R&B

## 2024-06-04 PROCEDURE — 3700000000 HC ANESTHESIA ATTENDED CARE: Performed by: NEUROLOGICAL SURGERY

## 2024-06-04 PROCEDURE — 6370000000 HC RX 637 (ALT 250 FOR IP)

## 2024-06-04 PROCEDURE — 2580000003 HC RX 258

## 2024-06-04 PROCEDURE — 85390 FIBRINOLYSINS SCREEN I&R: CPT

## 2024-06-04 PROCEDURE — 3600000004 HC SURGERY LEVEL 4 BASE: Performed by: NEUROLOGICAL SURGERY

## 2024-06-04 PROCEDURE — 3700000001 HC ADD 15 MINUTES (ANESTHESIA): Performed by: NEUROLOGICAL SURGERY

## 2024-06-04 PROCEDURE — 6360000002 HC RX W HCPCS: Performed by: PHYSICIAN ASSISTANT

## 2024-06-04 PROCEDURE — 85347 COAGULATION TIME ACTIVATED: CPT

## 2024-06-04 PROCEDURE — 6360000002 HC RX W HCPCS: Performed by: INTERNAL MEDICINE

## 2024-06-04 PROCEDURE — C1713 ANCHOR/SCREW BN/BN,TIS/BN: HCPCS | Performed by: NEUROLOGICAL SURGERY

## 2024-06-04 PROCEDURE — 62141 CRNOP SKULL DEFECT>5 CM DIAM: CPT | Performed by: PHYSICIAN ASSISTANT

## 2024-06-04 PROCEDURE — 7100000001 HC PACU RECOVERY - ADDTL 15 MIN: Performed by: NEUROLOGICAL SURGERY

## 2024-06-04 PROCEDURE — 2500000003 HC RX 250 WO HCPCS: Performed by: NEUROLOGICAL SURGERY

## 2024-06-04 PROCEDURE — 2580000003 HC RX 258: Performed by: PHYSICIAN ASSISTANT

## 2024-06-04 PROCEDURE — 2500000003 HC RX 250 WO HCPCS

## 2024-06-04 PROCEDURE — 36415 COLL VENOUS BLD VENIPUNCTURE: CPT

## 2024-06-04 PROCEDURE — 0NR00JZ REPLACEMENT OF SKULL WITH SYNTHETIC SUBSTITUTE, OPEN APPROACH: ICD-10-PCS | Performed by: NEUROLOGICAL SURGERY

## 2024-06-04 PROCEDURE — 6360000002 HC RX W HCPCS: Performed by: NEUROLOGICAL SURGERY

## 2024-06-04 PROCEDURE — 6370000000 HC RX 637 (ALT 250 FOR IP): Performed by: NEUROLOGICAL SURGERY

## 2024-06-04 PROCEDURE — 85027 COMPLETE CBC AUTOMATED: CPT

## 2024-06-04 PROCEDURE — 2580000003 HC RX 258: Performed by: INTERNAL MEDICINE

## 2024-06-04 PROCEDURE — 2709999900 HC NON-CHARGEABLE SUPPLY: Performed by: NEUROLOGICAL SURGERY

## 2024-06-04 PROCEDURE — 99231 SBSQ HOSP IP/OBS SF/LOW 25: CPT | Performed by: NEUROLOGICAL SURGERY

## 2024-06-04 PROCEDURE — 7100000000 HC PACU RECOVERY - FIRST 15 MIN: Performed by: NEUROLOGICAL SURGERY

## 2024-06-04 PROCEDURE — 94761 N-INVAS EAR/PLS OXIMETRY MLT: CPT

## 2024-06-04 PROCEDURE — 85384 FIBRINOGEN ACTIVITY: CPT

## 2024-06-04 PROCEDURE — 70450 CT HEAD/BRAIN W/O DYE: CPT

## 2024-06-04 DEVICE — 2 HOLE PLATES W/TAB(3) SD SCREW(6) AXS
Type: IMPLANTABLE DEVICE | Site: CRANIAL | Status: NON-FUNCTIONAL
Removed: 2024-06-06

## 2024-06-04 RX ORDER — HYDRALAZINE HYDROCHLORIDE 20 MG/ML
10 INJECTION INTRAMUSCULAR; INTRAVENOUS
Status: DISCONTINUED | OUTPATIENT
Start: 2024-06-04 | End: 2024-06-04 | Stop reason: HOSPADM

## 2024-06-04 RX ORDER — OXYCODONE HYDROCHLORIDE 5 MG/1
5 TABLET ORAL EVERY 4 HOURS PRN
Status: DISCONTINUED | OUTPATIENT
Start: 2024-06-04 | End: 2024-06-08

## 2024-06-04 RX ORDER — DEXMEDETOMIDINE HYDROCHLORIDE 100 UG/ML
INJECTION, SOLUTION INTRAVENOUS PRN
Status: DISCONTINUED | OUTPATIENT
Start: 2024-06-04 | End: 2024-06-04 | Stop reason: SDUPTHER

## 2024-06-04 RX ORDER — SODIUM CHLORIDE 9 MG/ML
INJECTION, SOLUTION INTRAVENOUS PRN
Status: DISCONTINUED | OUTPATIENT
Start: 2024-06-04 | End: 2024-06-14 | Stop reason: HOSPADM

## 2024-06-04 RX ORDER — NALOXONE HYDROCHLORIDE 0.4 MG/ML
INJECTION, SOLUTION INTRAMUSCULAR; INTRAVENOUS; SUBCUTANEOUS PRN
Status: DISCONTINUED | OUTPATIENT
Start: 2024-06-04 | End: 2024-06-04 | Stop reason: HOSPADM

## 2024-06-04 RX ORDER — DEXAMETHASONE SODIUM PHOSPHATE 4 MG/ML
INJECTION, SOLUTION INTRA-ARTICULAR; INTRALESIONAL; INTRAMUSCULAR; INTRAVENOUS; SOFT TISSUE PRN
Status: DISCONTINUED | OUTPATIENT
Start: 2024-06-04 | End: 2024-06-04 | Stop reason: SDUPTHER

## 2024-06-04 RX ORDER — SENNOSIDES A AND B 8.6 MG/1
1 TABLET, FILM COATED ORAL DAILY PRN
Status: DISCONTINUED | OUTPATIENT
Start: 2024-06-04 | End: 2024-06-14 | Stop reason: HOSPADM

## 2024-06-04 RX ORDER — PROCHLORPERAZINE EDISYLATE 5 MG/ML
5 INJECTION INTRAMUSCULAR; INTRAVENOUS
Status: DISCONTINUED | OUTPATIENT
Start: 2024-06-04 | End: 2024-06-04 | Stop reason: HOSPADM

## 2024-06-04 RX ORDER — LIDOCAINE HYDROCHLORIDE 10 MG/ML
INJECTION, SOLUTION EPIDURAL; INFILTRATION; INTRACAUDAL; PERINEURAL PRN
Status: DISCONTINUED | OUTPATIENT
Start: 2024-06-04 | End: 2024-06-04 | Stop reason: SDUPTHER

## 2024-06-04 RX ORDER — MAGNESIUM HYDROXIDE 1200 MG/15ML
LIQUID ORAL CONTINUOUS PRN
Status: DISCONTINUED | OUTPATIENT
Start: 2024-06-04 | End: 2024-06-04 | Stop reason: HOSPADM

## 2024-06-04 RX ORDER — VANCOMYCIN HYDROCHLORIDE 1 G/20ML
INJECTION, POWDER, LYOPHILIZED, FOR SOLUTION INTRAVENOUS PRN
Status: DISCONTINUED | OUTPATIENT
Start: 2024-06-04 | End: 2024-06-04 | Stop reason: HOSPADM

## 2024-06-04 RX ORDER — SODIUM CHLORIDE 9 MG/ML
INJECTION, SOLUTION INTRAVENOUS CONTINUOUS
Status: DISCONTINUED | OUTPATIENT
Start: 2024-06-04 | End: 2024-06-04

## 2024-06-04 RX ORDER — SODIUM CHLORIDE, SODIUM LACTATE, POTASSIUM CHLORIDE, CALCIUM CHLORIDE 600; 310; 30; 20 MG/100ML; MG/100ML; MG/100ML; MG/100ML
INJECTION, SOLUTION INTRAVENOUS CONTINUOUS PRN
Status: DISCONTINUED | OUTPATIENT
Start: 2024-06-04 | End: 2024-06-04 | Stop reason: SDUPTHER

## 2024-06-04 RX ORDER — BISACODYL 10 MG
10 SUPPOSITORY, RECTAL RECTAL DAILY PRN
Status: DISCONTINUED | OUTPATIENT
Start: 2024-06-04 | End: 2024-06-07

## 2024-06-04 RX ORDER — POLYETHYLENE GLYCOL 3350 17 G/17G
17 POWDER, FOR SOLUTION ORAL DAILY
Status: DISCONTINUED | OUTPATIENT
Start: 2024-06-04 | End: 2024-06-14 | Stop reason: HOSPADM

## 2024-06-04 RX ORDER — SODIUM CHLORIDE 0.9 % (FLUSH) 0.9 %
5-40 SYRINGE (ML) INJECTION EVERY 12 HOURS SCHEDULED
Status: DISCONTINUED | OUTPATIENT
Start: 2024-06-04 | End: 2024-06-04 | Stop reason: HOSPADM

## 2024-06-04 RX ORDER — FENTANYL CITRATE 50 UG/ML
INJECTION, SOLUTION INTRAMUSCULAR; INTRAVENOUS PRN
Status: DISCONTINUED | OUTPATIENT
Start: 2024-06-04 | End: 2024-06-04 | Stop reason: SDUPTHER

## 2024-06-04 RX ORDER — CEFAZOLIN SODIUM 1 G/3ML
INJECTION, POWDER, FOR SOLUTION INTRAMUSCULAR; INTRAVENOUS PRN
Status: DISCONTINUED | OUTPATIENT
Start: 2024-06-04 | End: 2024-06-04 | Stop reason: SDUPTHER

## 2024-06-04 RX ORDER — FAMOTIDINE 20 MG/1
20 TABLET, FILM COATED ORAL 2 TIMES DAILY
Status: DISCONTINUED | OUTPATIENT
Start: 2024-06-04 | End: 2024-06-14 | Stop reason: HOSPADM

## 2024-06-04 RX ORDER — SODIUM CHLORIDE 9 MG/ML
INJECTION, SOLUTION INTRAVENOUS PRN
Status: DISCONTINUED | OUTPATIENT
Start: 2024-06-04 | End: 2024-06-04 | Stop reason: HOSPADM

## 2024-06-04 RX ORDER — LABETALOL HYDROCHLORIDE 5 MG/ML
10 INJECTION, SOLUTION INTRAVENOUS
Status: DISCONTINUED | OUTPATIENT
Start: 2024-06-04 | End: 2024-06-04 | Stop reason: HOSPADM

## 2024-06-04 RX ORDER — OXYCODONE HYDROCHLORIDE 5 MG/1
5 TABLET ORAL
Status: DISCONTINUED | OUTPATIENT
Start: 2024-06-04 | End: 2024-06-04 | Stop reason: HOSPADM

## 2024-06-04 RX ORDER — SODIUM CHLORIDE 0.9 % (FLUSH) 0.9 %
5-40 SYRINGE (ML) INJECTION PRN
Status: DISCONTINUED | OUTPATIENT
Start: 2024-06-04 | End: 2024-06-04 | Stop reason: HOSPADM

## 2024-06-04 RX ORDER — IPRATROPIUM BROMIDE AND ALBUTEROL SULFATE 2.5; .5 MG/3ML; MG/3ML
1 SOLUTION RESPIRATORY (INHALATION)
Status: DISCONTINUED | OUTPATIENT
Start: 2024-06-04 | End: 2024-06-04 | Stop reason: HOSPADM

## 2024-06-04 RX ORDER — LIDOCAINE HYDROCHLORIDE AND EPINEPHRINE 10; 10 MG/ML; UG/ML
INJECTION, SOLUTION INFILTRATION; PERINEURAL PRN
Status: DISCONTINUED | OUTPATIENT
Start: 2024-06-04 | End: 2024-06-04 | Stop reason: HOSPADM

## 2024-06-04 RX ORDER — OXYCODONE HYDROCHLORIDE 5 MG/1
10 TABLET ORAL EVERY 4 HOURS PRN
Status: DISCONTINUED | OUTPATIENT
Start: 2024-06-04 | End: 2024-06-08

## 2024-06-04 RX ORDER — METOCLOPRAMIDE HYDROCHLORIDE 5 MG/ML
10 INJECTION INTRAMUSCULAR; INTRAVENOUS
Status: DISCONTINUED | OUTPATIENT
Start: 2024-06-04 | End: 2024-06-04 | Stop reason: HOSPADM

## 2024-06-04 RX ORDER — ROCURONIUM BROMIDE 10 MG/ML
INJECTION, SOLUTION INTRAVENOUS PRN
Status: DISCONTINUED | OUTPATIENT
Start: 2024-06-04 | End: 2024-06-04 | Stop reason: SDUPTHER

## 2024-06-04 RX ORDER — ONDANSETRON 2 MG/ML
INJECTION INTRAMUSCULAR; INTRAVENOUS PRN
Status: DISCONTINUED | OUTPATIENT
Start: 2024-06-04 | End: 2024-06-04 | Stop reason: SDUPTHER

## 2024-06-04 RX ORDER — PROPOFOL 10 MG/ML
INJECTION, EMULSION INTRAVENOUS PRN
Status: DISCONTINUED | OUTPATIENT
Start: 2024-06-04 | End: 2024-06-04 | Stop reason: SDUPTHER

## 2024-06-04 RX ORDER — SODIUM CHLORIDE 0.9 % (FLUSH) 0.9 %
5-40 SYRINGE (ML) INJECTION PRN
Status: DISCONTINUED | OUTPATIENT
Start: 2024-06-04 | End: 2024-06-14 | Stop reason: HOSPADM

## 2024-06-04 RX ORDER — SODIUM CHLORIDE 0.9 % (FLUSH) 0.9 %
5-40 SYRINGE (ML) INJECTION EVERY 12 HOURS SCHEDULED
Status: DISCONTINUED | OUTPATIENT
Start: 2024-06-04 | End: 2024-06-14 | Stop reason: HOSPADM

## 2024-06-04 RX ADMIN — Medication 2000 MG: at 21:48

## 2024-06-04 RX ADMIN — CEFAZOLIN 2 G: 1 INJECTION, POWDER, FOR SOLUTION INTRAMUSCULAR; INTRAVENOUS at 12:51

## 2024-06-04 RX ADMIN — SODIUM CHLORIDE, PRESERVATIVE FREE 10 ML: 5 INJECTION INTRAVENOUS at 20:21

## 2024-06-04 RX ADMIN — FENTANYL CITRATE 50 MCG: 50 INJECTION, SOLUTION INTRAMUSCULAR; INTRAVENOUS at 14:11

## 2024-06-04 RX ADMIN — DICLOFENAC SODIUM 2 G: 10 GEL TOPICAL at 23:14

## 2024-06-04 RX ADMIN — SODIUM CHLORIDE, PRESERVATIVE FREE 10 ML: 5 INJECTION INTRAVENOUS at 08:35

## 2024-06-04 RX ADMIN — FENTANYL CITRATE 50 MCG: 50 INJECTION, SOLUTION INTRAMUSCULAR; INTRAVENOUS at 13:24

## 2024-06-04 RX ADMIN — GABAPENTIN 300 MG: 300 CAPSULE ORAL at 08:30

## 2024-06-04 RX ADMIN — DEXMEDETOMIDINE HYDROCHLORIDE 12 MCG: 100 INJECTION, SOLUTION INTRAVENOUS at 14:56

## 2024-06-04 RX ADMIN — FENTANYL CITRATE 50 MCG: 50 INJECTION, SOLUTION INTRAMUSCULAR; INTRAVENOUS at 15:58

## 2024-06-04 RX ADMIN — GABAPENTIN 300 MG: 300 CAPSULE ORAL at 21:18

## 2024-06-04 RX ADMIN — ACETAMINOPHEN 1000 MG: 500 TABLET ORAL at 21:17

## 2024-06-04 RX ADMIN — SODIUM CHLORIDE, POTASSIUM CHLORIDE, SODIUM LACTATE AND CALCIUM CHLORIDE: 600; 310; 30; 20 INJECTION, SOLUTION INTRAVENOUS at 15:57

## 2024-06-04 RX ADMIN — HYDROMORPHONE HYDROCHLORIDE 0.5 MG: 1 INJECTION, SOLUTION INTRAMUSCULAR; INTRAVENOUS; SUBCUTANEOUS at 17:29

## 2024-06-04 RX ADMIN — FAMOTIDINE 20 MG: 20 TABLET, FILM COATED ORAL at 21:17

## 2024-06-04 RX ADMIN — TOPIRAMATE 50 MG: 50 TABLET, FILM COATED ORAL at 08:35

## 2024-06-04 RX ADMIN — METHYLPREDNISOLONE SODIUM SUCCINATE 60 MG: 40 INJECTION, POWDER, LYOPHILIZED, FOR SOLUTION INTRAMUSCULAR; INTRAVENOUS at 23:44

## 2024-06-04 RX ADMIN — FENTANYL CITRATE 100 MCG: 50 INJECTION, SOLUTION INTRAMUSCULAR; INTRAVENOUS at 12:50

## 2024-06-04 RX ADMIN — SODIUM CHLORIDE, PRESERVATIVE FREE 10 ML: 5 INJECTION INTRAVENOUS at 08:31

## 2024-06-04 RX ADMIN — SODIUM CHLORIDE: 9 INJECTION, SOLUTION INTRAVENOUS at 00:44

## 2024-06-04 RX ADMIN — BACLOFEN 5 MG: 5 TABLET ORAL at 08:30

## 2024-06-04 RX ADMIN — DEXAMETHASONE SODIUM PHOSPHATE 8 MG: 4 INJECTION, SOLUTION INTRAMUSCULAR; INTRAVENOUS at 13:28

## 2024-06-04 RX ADMIN — ROCURONIUM BROMIDE 50 MG: 10 INJECTION INTRAVENOUS at 12:50

## 2024-06-04 RX ADMIN — BACLOFEN 5 MG: 5 TABLET ORAL at 21:18

## 2024-06-04 RX ADMIN — MAGNESIUM GLUCONATE 500 MG ORAL TABLET 400 MG: 500 TABLET ORAL at 08:35

## 2024-06-04 RX ADMIN — SUGAMMADEX 200 MG: 100 INJECTION, SOLUTION INTRAVENOUS at 15:50

## 2024-06-04 RX ADMIN — Medication 10 MG: at 21:18

## 2024-06-04 RX ADMIN — LACOSAMIDE 100 MG: 100 TABLET, FILM COATED ORAL at 21:17

## 2024-06-04 RX ADMIN — ACETAMINOPHEN 1000 MG: 500 TABLET ORAL at 08:30

## 2024-06-04 RX ADMIN — ONDANSETRON 4 MG: 2 INJECTION INTRAMUSCULAR; INTRAVENOUS at 14:53

## 2024-06-04 RX ADMIN — DICLOFENAC SODIUM 2 G: 10 GEL TOPICAL at 08:33

## 2024-06-04 RX ADMIN — SODIUM CHLORIDE: 9 INJECTION, SOLUTION INTRAVENOUS at 10:49

## 2024-06-04 RX ADMIN — FENTANYL CITRATE 50 MCG: 50 INJECTION, SOLUTION INTRAMUSCULAR; INTRAVENOUS at 15:16

## 2024-06-04 RX ADMIN — TAMSULOSIN HYDROCHLORIDE 0.4 MG: 0.4 CAPSULE ORAL at 08:31

## 2024-06-04 RX ADMIN — METHYLPREDNISOLONE SODIUM SUCCINATE 60 MG: 40 INJECTION, POWDER, LYOPHILIZED, FOR SOLUTION INTRAMUSCULAR; INTRAVENOUS at 05:56

## 2024-06-04 RX ADMIN — AMITRIPTYLINE HYDROCHLORIDE 75 MG: 50 TABLET, FILM COATED ORAL at 21:21

## 2024-06-04 RX ADMIN — PROPOFOL 150 MG: 10 INJECTION, EMULSION INTRAVENOUS at 12:50

## 2024-06-04 RX ADMIN — DEXMEDETOMIDINE HYDROCHLORIDE 8 MCG: 100 INJECTION, SOLUTION INTRAVENOUS at 15:06

## 2024-06-04 RX ADMIN — DEXMEDETOMIDINE HYDROCHLORIDE 12 MCG: 100 INJECTION, SOLUTION INTRAVENOUS at 15:01

## 2024-06-04 RX ADMIN — LACOSAMIDE 100 MG: 100 TABLET, FILM COATED ORAL at 08:30

## 2024-06-04 RX ADMIN — TOPIRAMATE 50 MG: 50 TABLET, FILM COATED ORAL at 21:18

## 2024-06-04 RX ADMIN — CITALOPRAM 10 MG: 10 TABLET, FILM COATED ORAL at 08:35

## 2024-06-04 RX ADMIN — CHLORHEXIDINE GLUCONATE: 213 SOLUTION TOPICAL at 08:33

## 2024-06-04 RX ADMIN — SODIUM CHLORIDE, POTASSIUM CHLORIDE, SODIUM LACTATE AND CALCIUM CHLORIDE: 600; 310; 30; 20 INJECTION, SOLUTION INTRAVENOUS at 12:50

## 2024-06-04 RX ADMIN — HYDROMORPHONE HYDROCHLORIDE 0.5 MG: 1 INJECTION, SOLUTION INTRAMUSCULAR; INTRAVENOUS; SUBCUTANEOUS at 23:33

## 2024-06-04 RX ADMIN — LIDOCAINE HYDROCHLORIDE 50 MG: 10 INJECTION, SOLUTION EPIDURAL; INFILTRATION; INTRACAUDAL; PERINEURAL at 12:50

## 2024-06-04 ASSESSMENT — PAIN SCALES - GENERAL
PAINLEVEL_OUTOF10: 8
PAINLEVEL_OUTOF10: 0
PAINLEVEL_OUTOF10: 7
PAINLEVEL_OUTOF10: 8
PAINLEVEL_OUTOF10: 6

## 2024-06-04 ASSESSMENT — PAIN - FUNCTIONAL ASSESSMENT
PAIN_FUNCTIONAL_ASSESSMENT: FACE, LEGS, ACTIVITY, CRY, AND CONSOLABILITY (FLACC)
PAIN_FUNCTIONAL_ASSESSMENT: FACE, LEGS, ACTIVITY, CRY, AND CONSOLABILITY (FLACC)
PAIN_FUNCTIONAL_ASSESSMENT: 0-10
PAIN_FUNCTIONAL_ASSESSMENT: FACE, LEGS, ACTIVITY, CRY, AND CONSOLABILITY (FLACC)
PAIN_FUNCTIONAL_ASSESSMENT: FACE, LEGS, ACTIVITY, CRY, AND CONSOLABILITY (FLACC)

## 2024-06-04 ASSESSMENT — PAIN DESCRIPTION - LOCATION
LOCATION: HEAD;NECK
LOCATION: GENERALIZED
LOCATION: GENERALIZED
LOCATION: HEAD

## 2024-06-04 ASSESSMENT — PAIN DESCRIPTION - DESCRIPTORS
DESCRIPTORS: ACHING
DESCRIPTORS: THROBBING

## 2024-06-04 ASSESSMENT — PAIN DESCRIPTION - ORIENTATION: ORIENTATION: RIGHT

## 2024-06-04 ASSESSMENT — PAIN DESCRIPTION - ONSET: ONSET: ON-GOING

## 2024-06-04 NOTE — PROGRESS NOTES
Neurosurgery NANNETTE/Resident    Daily Progress Note   No chief complaint on file.    6/4/2024  10:08 AM    Chart reviewed.  No acute events overnight.  No new complaints. Platelets 124 this morning. Plan for OR for right cranioplasty today.     Vitals:    06/03/24 2335 06/04/24 0301 06/04/24 0404 06/04/24 0726   BP: 121/83 (!) 141/109 129/87 (!) 136/90   Pulse: 68 68  67   Resp: 14 13  14   Temp: 98.2 °F (36.8 °C) 98.3 °F (36.8 °C)  98.3 °F (36.8 °C)   TempSrc: Oral Oral  Axillary   SpO2: 97% 100%  100%   Weight:       Height:             PE:   Awake alert oriented  5/5 RUE and RLE  Left side hemiplegic     Sunken right bone flap      Lab Results   Component Value Date    WBC 11.5 (H) 06/04/2024    HGB 12.2 (L) 06/04/2024    HCT 38.2 (L) 06/04/2024     (L) 06/04/2024    CHOL 118 05/16/2024    TRIG 182 (H) 05/16/2024    HDL 28 (L) 05/16/2024    ALT 28 05/08/2024    AST 29 05/08/2024     05/28/2024    K 4.2 05/28/2024     05/28/2024    CREATININE 0.9 05/28/2024    BUN 22 05/28/2024    CO2 25 05/28/2024    TSH 3.45 04/13/2024    INR 1.1 06/03/2024    LABA1C 5.2 05/16/2024    CRP 33.1 (H) 05/12/2024       A/P  59 y.o. male who presents with skull defect.    NPO at this time  OR today for right cranioplasty   Neuro checks per floor protocol      Please contact neurosurgery with any changes in patients neurologic status.       Electronically signed by EMILY Zhang CNP on 6/4/2024 at 10:09 AM

## 2024-06-04 NOTE — PLAN OF CARE
Problem: Discharge Planning  Goal: Discharge to home or other facility with appropriate resources  6/4/2024 1830 by Gabby Chris RN  Outcome: Progressing  Flowsheets (Taken 6/4/2024 0800 by Silvia De Guzman, RN)  Discharge to home or other facility with appropriate resources:   Identify barriers to discharge with patient and caregiver   Arrange for needed discharge resources and transportation as appropriate   Identify discharge learning needs (meds, wound care, etc)     Problem: Chronic Conditions and Co-morbidities  Goal: Patient's chronic conditions and co-morbidity symptoms are monitored and maintained or improved  6/4/2024 1830 by Gabby Chris RN  Outcome: Progressing  Flowsheets (Taken 6/4/2024 0800 by Silvia De Guzman, RN)  Care Plan - Patient's Chronic Conditions and Co-Morbidity Symptoms are Monitored and Maintained or Improved:   Monitor and assess patient's chronic conditions and comorbid symptoms for stability, deterioration, or improvement   Collaborate with multidisciplinary team to address chronic and comorbid conditions and prevent exacerbation or deterioration   Update acute care plan with appropriate goals if chronic or comorbid symptoms are exacerbated and prevent overall improvement and discharge     Problem: Safety - Adult  Goal: Free from fall injury  6/4/2024 1830 by Gabby Chris RN  Outcome: Progressing     Problem: Skin/Tissue Integrity  Goal: Absence of new skin breakdown  Description: 1.  Monitor for areas of redness and/or skin breakdown  2.  Assess vascular access sites hourly  3.  Every 4-6 hours minimum:  Change oxygen saturation probe site  4.  Every 4-6 hours:  If on nasal continuous positive airway pressure, respiratory therapy assess nares and determine need for appliance change or resting period.  6/4/2024 1830 by Gabby Chris RN  Outcome: Progressing     Problem: Pain  Goal: Verbalizes/displays adequate comfort level or baseline comfort level  6/4/2024 1830 by

## 2024-06-04 NOTE — PROGRESS NOTES
Comprehensive Nutrition Assessment    Type and Reason for Visit:  RD Nutrition Re-Screen/LOS    Nutrition Recommendations/Plan:   No recommendations at this time     Malnutrition Assessment:  Malnutrition Status:  Insufficient data (06/04/24 1433)    Context:  Acute Illness     Findings of the 6 clinical characteristics of malnutrition:  Energy Intake:  Unable to assess  Weight Loss:  Unable to assess     Body Fat Loss:  Unable to assess     Muscle Mass Loss:  Unable to assess    Fluid Accumulation:  Unable to assess     Strength:  Not Performed    Nutrition Assessment:    Pt seen for LOS. Pt in OR at time of visit - spoke with RN. Per RN, pt has a good appetite adn eating % of meals. LBM documented 6/1. Pt wt stable. No concerns with labs.    Nutrition Related Findings:    Meds/labs reviewed Wound Type: Surgical Incision       Current Nutrition Intake & Therapies:    Average Meal Intake: NPO  Average Supplements Intake: NPO, None Ordered  Diet NPO Exceptions are: Sips of Water with Meds    Anthropometric Measures:  Height: 177.8 cm (5' 10\")  Ideal Body Weight (IBW): 166 lbs (75 kg)       Fluid (ml/day): per MD    Nutrition Diagnosis:   No nutrition diagnosis at this time related to   as evidenced by      Nutrition Interventions:   Food and/or Nutrient Delivery: Continue NPO  Nutrition Education/Counseling: No recommendation at this time  Coordination of Nutrition Care: No recommendation at this time  Plan of Care discussed with: RN    Nutrition Monitoring and Evaluation:   Behavioral-Environmental Outcomes: None Identified  Food/Nutrient Intake Outcomes: Diet Advancement/Tolerance  Physical Signs/Symptoms Outcomes: None Identified    Discharge Planning:    No discharge needs at this time     Sarita Mcleod MS, RDN, LDN  Contact: [phone]

## 2024-06-04 NOTE — PROGRESS NOTES
Physical Therapy        Physical Therapy Cancel Note      DATE: 2024    NAME: Brennen Mcclure  MRN: [id number]   : 1965      Patient not seen this date for Physical Therapy due to:    Surgery/Procedure: Surgery planned for today for skull defect      Electronically signed by Amalia Mayo PT on 2024 at 9:07 AM

## 2024-06-04 NOTE — PROGRESS NOTES
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 Chronic ITP. History of intraparenchymal brain bleed     Interval Changes:  Patient seen and examined.  Labs and vitals reviewed.  Platelet up to 136  Plan for OR for right cranioplasty today       History of Present Illness:    The patient is a 59 y.o.  male who is admitted  to the neurosurgery team for an elective cranioplasty scheduled for 5/29.  Hematology oncology team consulted due to patient's history of thrombocytopenia and ITP.  Patient is relevant hematologic history is as below.  Patient is currently on Promacta for ITP.  Patient not seen in office by Dr. Chang on 5/21.  Platelet count was 1 40,000 at that time.  Patient platelet count has dropped to 56,000 now.  Hemoglobin 13.6 WBC count 5.8.  Hematology oncology team consulted due to thrombocytopenia in light of patient's upcoming surgery.     Patient does have history of dural venous thrombosis and acute hemorrhagic stroke.  Patient also has history of DVT.     DIAGNOSIS:   Chronic thrombocytopenia, patient had a evaluation in [place] and also in the past and thought to be immune mediated  History of dural sinus thrombosis and history of DVT in 2019  Chronic anticoagulation with Eliquis  Had a hemorrhagic stroke in December 2023  Patient received platelet transfusion during the hospitalization and also was given IVIG on 1/9/2024 and 1/10/2024  Patient was treated with Decadron 40 mg on 2/18 - 2/20 1-24, with no response  Patient planning surgical procedure with neurosurgery  Bone marrow biopsy negative for acute bone marrow pathology  I will plan to get IVIG weekly for 5 doses and also prescription for Promacta  hyperenhancement of the left parotid gland consistent with parotitis.     MRI brain without contrast    Result Date: 5/9/2024  EXAMINATION: MRI OF THE BRAIN WITHOUT CONTRAST  5/9/2024 8:22 pm TECHNIQUE: Multiplanar multisequence MRI of the brain was performed without the administration of intravenous contrast. COMPARISON: CT head May 8, 2024 HISTORY: ORDERING SYSTEM PROVIDED HISTORY: altered mental status TECHNOLOGIST PROVIDED HISTORY: altered mental status Decision Support Exception - unselect if not a suspected or confirmed emergency medical condition->Emergency Medical Condition (MA) Reason for Exam: Altered mental status FINDINGS: INTRACRANIAL STRUCTURES/VENTRICLES: The patient is status post right frontal parietal temporal craniectomy.  There is residual vasogenic edema, scattered encephalomalacia and scattered chronic blood products in the subjacent right frontal parietal temporal lobes.  There is 2.2 x 2.7 cm fluid collection with associated restricted diffusion and peripheral susceptibility artifacts in the right frontal lobe, likely related to surgical cavity with subacute blood products.  There is additional 5 x 11 mm fluid collection with associated restricted diffusion in the anterior right frontal lobe along the anterior margin of the craniectomy, likely related to subacute parenchymal hematoma. There is mass effect in the right cerebral hemisphere with decreased sulcation.  No midline shift. There is mild T2/FLAIR hyperintensity in the periventricular and subcortical white matter, likely related to mild chronic microvascular disease.  There is no acute infarct.   There is no evidence of hydrocephalus. The sellar/suprasellar regions appear unremarkable. ORBITS: The visualized portion of the orbits demonstrate no acute abnormality. SINUSES: There is scattered minimal mucosal thickening in the paranasal sinuses.  There is mild right mastoid effusion. BONES/SOFT TISSUES: The bone marrow signal intensity

## 2024-06-04 NOTE — PLAN OF CARE
Problem: Discharge Planning  Goal: Discharge to home or other facility with appropriate resources  6/4/2024 0530 by Fifi Alejo RN  Outcome: Progressing  Flowsheets (Taken 6/3/2024 2000)  Discharge to home or other facility with appropriate resources: Identify barriers to discharge with patient and caregiver  6/3/2024 1707 by Silvia De Guzman RN  Outcome: Progressing  Flowsheets (Taken 6/3/2024 0815)  Discharge to home or other facility with appropriate resources:   Arrange for needed discharge resources and transportation as appropriate   Identify barriers to discharge with patient and caregiver   Identify discharge learning needs (meds, wound care, etc)     Problem: Chronic Conditions and Co-morbidities  Goal: Patient's chronic conditions and co-morbidity symptoms are monitored and maintained or improved  6/4/2024 0530 by Fifi Alejo RN  Outcome: Progressing  Flowsheets (Taken 6/3/2024 2000)  Care Plan - Patient's Chronic Conditions and Co-Morbidity Symptoms are Monitored and Maintained or Improved: Monitor and assess patient's chronic conditions and comorbid symptoms for stability, deterioration, or improvement  6/3/2024 1707 by Silvia De Guzman RN  Outcome: Progressing  Flowsheets (Taken 6/3/2024 0815)  Care Plan - Patient's Chronic Conditions and Co-Morbidity Symptoms are Monitored and Maintained or Improved:   Monitor and assess patient's chronic conditions and comorbid symptoms for stability, deterioration, or improvement   Collaborate with multidisciplinary team to address chronic and comorbid conditions and prevent exacerbation or deterioration   Update acute care plan with appropriate goals if chronic or comorbid symptoms are exacerbated and prevent overall improvement and discharge

## 2024-06-04 NOTE — PROGRESS NOTES
he quit smoking about 6 months ago. His smoking use included cigarettes. He has never used smokeless tobacco. He reports that he does not currently use alcohol. He reports that he does not use drugs.     Family History: family history includes Coronary Art Dis in his mother; Stroke in an other family member.    Review of Systems:      Constitutional: No fever or chills. No night sweats, no weight loss.  Positive for fatigue  Eyes: No eye discharge, double vision, or eye pain   HEENT: negative for sore mouth, sore throat, hoarseness and voice change.  S/p craniotomy  Respiratory: negative for cough , sputum, dyspnea, wheezing, hemoptysis, chest pain   Cardiovascular: negative for chest pain, dyspnea, palpitations, orthopnea, PND   Gastrointestinal: negative for nausea, vomiting, diarrhea, constipation, abdominal pain, Dysphagia, hematemesis and hematochezia   Genitourinary: negative for frequency, dysuria, nocturia, urinary incontinence, and hematuria   Integument: negative for rash, skin lesions, bruises.   Hematologic/Lymphatic: negative for easy bruising, bleeding, lymphadenopathy, or petechiae   Endocrine: negative for heat or cold intolerance,weight changes, change in bowel habits and hair loss   Musculoskeletal: negative for myalgias, arthralgias, pain, joint swelling,and bone pain   Neurological: Positive for headaches, negative for dizziness, seizures, weakness, numbness    Physical Exam:      /63   Pulse 78   Temp 97.6 °F (36.4 °C) (Oral)   Resp 18   Ht 1.778 m (5' 10\")   Wt 85.1 kg (187 lb 11.2 oz)   SpO2 93%   BMI 26.93 kg/m²    Temp (24hrs), Av.9 °F (36.6 °C), Min:97.3 °F (36.3 °C), Max:98.4 °F (36.9 °C)      General:  Pleasant and cooperative. No apparent distress.  HEENT:  Normocephalic, atraumatic, mucus membranes moist. S/p craniotomy changes noted on the head   Neck:  Trachea midline. No adenopathy.  Chest: Symmetric chest rise. Lungs clear to auscultation bilaterally, no wheezes,  Nurse.    We will continue to follow up on this patient.                                       Les Armstrong MD                          [facility]                            This note is created with the assistance of a speech recognition program.  While intending to generate a document that actually reflects the content of the visit, the document can still have some errors including those of syntax and sound a like substitutions which may escape proof reading.  It such instances, actual meaning can be extrapolated by contextual diversion.

## 2024-06-04 NOTE — ANESTHESIA POSTPROCEDURE EVALUATION
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    Jose A Phase I: Jose A Score: 10    Jose A Phase II:      Anesthesia Post Evaluation    Patient location during evaluation: bedside  Patient participation: complete - patient participated  Level of consciousness: awake  Airway patency: patent  Nausea & Vomiting: no nausea and no vomiting  Cardiovascular status: blood pressure returned to baseline  Respiratory status: acceptable  Hydration status: euvolemic  Comments: BP (!) 144/95   Pulse 66   Temp 96.8 °F (36 °C) (Axillary)   Resp 11   Ht 1.778 m (5' 10\")   Wt 85.1 kg (187 lb 11.2 oz)   SpO2 95%   BMI 26.93 kg/m²     Pain management: adequate    No notable events documented.

## 2024-06-04 NOTE — PROGRESS NOTES
Neurosurgery Post op Progress Note      SUBJECTIVE: Status post: 1.  Right-sided cranioplasty with synthetic implant.  Patient seen while in recovery.  Patient is extremely lethargic.  He will open his eyes when spoken to, but then falls asleep quickly thereafter.  His pupils are equal and reactive to light.  He will follow commands, for a brief moment and then falls back to sleep.  He will move his right upper and lower extremities upon command.  He has a history of left-sided spastic hemiplegia.  He currently denies the presence of any headache, nausea or emesis.        OBJECTIVE      Physical exam   VITALS:    Vitals:    06/04/24 1645   BP: 125/63   Pulse: 62   Resp: (!) 9   Temp:    SpO2: 93%     INTAKE:    Intake/Output Summary (Last 24 hours) at 6/4/2024 1647  Last data filed at 6/4/2024 1557  Gross per 24 hour   Intake 1120 ml   Output 2225 ml   Net -1105 ml     URINARY CATHETER OUTPUT (Blanton): No Blanton catheter.    DRAIN/TUBE OUTPUT: Subgaleal drain noted to the head. Drain appears to be holding suction and working.  Bulb reservoir contains approximately 20 mL of blood.      Neurological exam reveals Responds to voice and Responds to tactile stimuli  no involuntary movements  alert, speaks when asked questions.  Patient has left-sided hemiparesis.  Patient has +5/5 right upper and lower extremities.       Wound   Post op wound: Right side of head.       well approximated incision clean, dry, and no drainage. Closed w/ staples/nylon sutures with overlying bacitracin ointment and island dressing.    Data      LABS:   Lab Results   Component Value Date    WBC 14.1 (H) 06/04/2024    HGB 12.3 (L) 06/04/2024    HCT 37.6 (L) 06/04/2024    MCV 90.0 06/04/2024     (L) 06/04/2024      Lab Results   Component Value Date     05/28/2024    K 4.2 05/28/2024     05/28/2024    CO2 25 05/28/2024     Lab Results   Component Value Date    BUN 22 05/28/2024      Lab Results   Component Value Date     CREATININE 0.9 05/28/2024        ASSESSMENT AND PLAN  1.  Okay to transfer patient from recovery to neuro ICU if stable.  2.  IV antibiotics for 24 hours and appropriate analgesics for pain.  3.  Consultations to both physical therapy and Occupational Therapy.  4.  Consult to neurocritical care.  5.  Hematology/oncology already on board.  6.  Will obtain a noncontrast head CT today.  Will obtain a noncontrast head CT tomorrow as well.  7.  We will hold anticoagulants.  SCDs for now.  8.  Patient will need a secondary neurological survey once sedation is worn off.  9.  We will continue to closely follow this patient while he remains in house.

## 2024-06-04 NOTE — CONSULTS
Neurocritical care consult note    Reason for admission:  post surgical care   Requesting Physician: CALVIN   Admitting Physician: Dr. Zarate   History Obtained From: Chart review  Allergies: Patient has no known allergies.    History of Presenting Illness     Brennen Mcclure is a 59 y.o. male with a history of CVST complicated with ICH after starting anticoagulation s/p right hemicraniectomy in November 2023 in [place] with residual dense left-sided hemiplegia, chronic idiopathic thrombocytopenic purpura, migraines, seizures presented for elective cranioplasty which was performed on 6/4/2024.  Neurocritical care was consulted for postsurgical management.    Seen and examined 5:26 PM. Neuro exam: Patient is quite somnolent with dysarthric speech.  ANO x 4.  Flaccid weakness on the left, 3/5 on the right.  Complaining of a right-sided headache.  CTH reviewed there is right SDH, repeat CTH tomorrow unless a change in neuro exam.     Review of Systems   Constitutional:  Negative for activity change, appetite change, chills, diaphoresis, fatigue and fever.   HENT:  Negative for congestion, drooling, rhinorrhea, sore throat, trouble swallowing and voice change.    Eyes:  Negative for photophobia, pain, discharge, redness, itching and visual disturbance.   Respiratory:  Negative for cough, choking, chest tightness, shortness of breath and wheezing.    Cardiovascular:  Negative for chest pain, palpitations and leg swelling.   Gastrointestinal:  Negative for abdominal distention, abdominal pain, constipation, diarrhea and nausea.   Endocrine: Negative for polydipsia, polyphagia and polyuria.   Genitourinary:  Negative for difficulty urinating and dysuria.   Musculoskeletal:  Negative for arthralgias, back pain, gait problem, joint swelling, myalgias and neck pain.   Neurological:  Negative for dizziness, seizures, syncope, facial asymmetry, speech difficulty, weakness, light-headedness, numbness and headaches.  PM    MCV 90.0 06/04/2024 02:51 PM    MCH 29.4 06/04/2024 02:51 PM    MCHC 32.7 06/04/2024 02:51 PM    RDW 19.1 06/04/2024 02:51 PM    NRBC 1 05/08/2024 08:23 PM    LYMPHOPCT 4 05/31/2024 09:57 AM    MONOPCT 3 05/31/2024 09:57 AM    EOSPCT 0 05/31/2024 09:57 AM    BASOPCT 0 05/31/2024 09:57 AM    MONOSABS 0.28 05/31/2024 09:57 AM    LYMPHSABS 1.90 02/05/2013 11:36 AM    EOSABS 0.00 05/31/2024 09:57 AM    BASOSABS 0.00 05/31/2024 09:57 AM    DIFFTYPE NOT REPORTED 02/05/2013 11:36 AM     BMP:    Lab Results   Component Value Date/Time     05/28/2024 04:54 PM    K 4.2 05/28/2024 04:54 PM     05/28/2024 04:54 PM    CO2 25 05/28/2024 04:54 PM    BUN 22 05/28/2024 04:54 PM    CREATININE 0.9 05/28/2024 04:54 PM    CALCIUM 9.4 05/28/2024 04:54 PM    GFRAA >60 02/05/2013 11:36 AM    LABGLOM >90 05/28/2024 04:54 PM    LABGLOM 86 04/24/2024 09:56 AM    GLUCOSE 86 05/28/2024 04:54 PM       Radiology Review:    XR CHEST PORTABLE    Result Date: 5/28/2024  Low lung volumes.  No acute cardiopulmonary disease.     Assessment & Plan     Brennen Mcclure is a 59 y.o. male with a history of CVST complicated with ICH after starting anticoagulation s/p right hemicraniectomy in November 2023 in [place] with residual dense left-sided hemiplegia, chronic idiopathic thrombocytopenic purpura, migraines, seizures presented for elective cranioplasty which was performed on 6/4/2024.  Neurocritical care was consulted for postsurgical management.      Neurological  History of cerebral venous thrombosis complicated by ICH with recurrent bleeds due to anticoagulation therapy status post hemicraniectomy here for elective cranioplasty  Baseline left hemiplegia  Hx of migraines  History of mood disorder    Plan:  Neurological checks q1h  Seizure, fall, aspiration precautions  SBP goal normotension  Continue Vimpat 100 mg twice daily  Obtain CT head now and tomorrow AM   C/w Elavil 75 nightly, topamax 50 mg BID   Citalopram 10 mg daily  Continue

## 2024-06-04 NOTE — PROGRESS NOTES
Writer updated family in Neuro family waiting area on patient's status and informed them they will be directed to his room once RN has him settled. All questions answered.

## 2024-06-04 NOTE — OP NOTE
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  Patient with absence of bone flap on the right side with unprotected brain.  Patient with complicated scenario involving thrombocytopenia related to idiopathic thrombocytopenic purpura.  After extensive workup with hematology and prolonged admission optimizing platelet level patient was scheduled for elective cranioplasty involving synthetic custom implant which was utilized due to the [place] and no [place] of her autograft.  Risks and benefits of the procedure including significant bleeding seizures infection were explained to the family prior to proceeding.       Procedure  Cranioplasty with synthetic custom peek implant right side    Surgeon(s):  Gloria Corbett DO    Assistant:   Physician Assistant: Emory Allen PA-C    Anesthesia: General    Estimated Blood Loss (mL): 200     Complications: None    Specimens:   * No specimens in log *    Implants:  Implant Name Type Inv. Item Serial No.  Lot No. LRB No. Used Action   PLATE QUIKFLAP 3X2 SD AXS - [id number]  PLATE QUIKFLAP 3X2 SD AXS  [employer] [id number] Right 1 Implanted   PLATE QUIKFLAP 3X2 SD AXS - [id number]  PLATE QUIKFLAP 3X2 SD AXS  [employer] [id number] Right 1 Implanted         Drains:   Closed/Suction Drain Right Scalp Bag (Active)       External Urinary Catheter (Active)   Site Assessment Clean,dry & intact 06/04/24 0400   Placement Repositioned 06/04/24 0400   Securement Method Securing device (Describe) 06/04/24 0400   Catheter Care Catheter/Wick replaced;Suction Canister/Tubing changed 06/03/24 1657   Perineal Care Yes 06/03/24 1657   Suction 40 mmgHg continuous  used to stay within the epidural plane identifying the dura and try to stay below the temporalis.  In a few areas this was very difficult due to scarring and I did have to leave a very thin layer of muscle or paracranium behind.  Ultimately the plane was identified and circumferential dissection of the bone edges was made.  Floseal and Gelfoam used to control hemostasis along with bipolar cautery.  The custom implant was opened and circumferential dog bones were placed.  The plate implant was then placed over the native skull and affixed with screws.  Vancomycin powder was placed on the inner and outer surfaces of the bone flap.  Hemostasis was obtained and a 7 flat CHICHO drain was tunneled below the galea and secured with a drain stitch.  Flap was then closed with inverted 2-0 Vicryl for the superficial temporalis fascia as well as galea followed by a combination of 3-0 nylon vertical mattress along with staples for the skin and a drain stitch.  Patient was awoken in stable condition.  Synthetic custom implant was approximately 11 x 13 cm.  Ildefonso gutierrez physicians assistant assisted with all portions of the case including the exposure flap placement and closure    Electronically signed by Gloria Corbett DO on 6/4/2024 at 3:27 PM

## 2024-06-04 NOTE — ANESTHESIA PROCEDURE NOTES
Airway  Date/Time: 6/4/2024 12:52 PM  Urgency: elective    Airway not difficult    General Information and Staff    Patient location during procedure: OR  Anesthesiologist: Gerber Olivera MD  Resident/CRNA: Taurus Feliz DO  Performed: resident/CRNA/CAA   Performed by: Taurus Feliz DO  Authorized by: Gerber Olivera MD      Indications and Patient Condition  Indications for airway management: anesthesia  Preoxygenated: yes  Patient position: sniffing  Mask difficulty assessment: vent by bag mask    Final Airway Details  Final airway type: endotracheal airway      Successful airway: ETT  Cuffed: yes   Successful intubation technique: direct laryngoscopy  Endotracheal tube insertion site: oral  Blade: Gogo  Blade size: #3  ETT size (mm): 7.5  Cormack-Lehane Classification: grade I - full view of glottis  Placement verified by: chest auscultation and capnometry   Measured from: lips  ETT to lips (cm): 23  Number of attempts at approach: 1    no

## 2024-06-05 ENCOUNTER — APPOINTMENT (OUTPATIENT)
Dept: CT IMAGING | Age: 59
End: 2024-06-05
Attending: NEUROLOGICAL SURGERY
Payer: MEDICAID

## 2024-06-05 PROBLEM — D69.3 IMMUNE THROMBOCYTOPENIA (HCC): Status: ACTIVE | Noted: 2024-06-05

## 2024-06-05 LAB
ANION GAP SERPL CALCULATED.3IONS-SCNC: 11 MMOL/L (ref 9–16)
BASOPHILS # BLD: 0 K/UL (ref 0–0.2)
BASOPHILS NFR BLD: 0 % (ref 0–2)
BUN SERPL-MCNC: 33 MG/DL (ref 8–23)
CALCIUM SERPL-MCNC: 8.9 MG/DL (ref 8.6–10.4)
CHLORIDE SERPL-SCNC: 103 MMOL/L (ref 98–107)
CO2 SERPL-SCNC: 19 MMOL/L (ref 20–31)
CREAT SERPL-MCNC: 0.8 MG/DL (ref 0.7–1.2)
EOSINOPHIL # BLD: 0 K/UL (ref 0–0.44)
EOSINOPHILS RELATIVE PERCENT: 0 % (ref 1–4)
ERYTHROCYTE [DISTWIDTH] IN BLOOD BY AUTOMATED COUNT: 19.5 % (ref 11.8–14.4)
FIBRINOGEN, FUNCTIONAL TEG: 18.3 MM
GFR, ESTIMATED: >90 ML/MIN/1.73M2
GLUCOSE SERPL-MCNC: 126 MG/DL (ref 74–99)
HCT VFR BLD AUTO: 34.9 % (ref 40.7–50.3)
HGB BLD-MCNC: 11.2 G/DL (ref 13–17)
IMM GRANULOCYTES # BLD AUTO: 0.51 K/UL (ref 0–0.3)
IMM GRANULOCYTES NFR BLD: 3 %
INR PPP: 1.1
LY30 (LYSIS) TEG: 0 % (ref 0–2.6)
LYMPHOCYTES NFR BLD: 0.85 K/UL (ref 1.1–3.7)
LYMPHOCYTES RELATIVE PERCENT: 5 % (ref 24–43)
MA(MAX CLOT) RAPID TEG: 55.7 MM (ref 52–70)
MCH RBC QN AUTO: 29.2 PG (ref 25.2–33.5)
MCHC RBC AUTO-ENTMCNC: 32.1 G/DL (ref 28.4–34.8)
MCV RBC AUTO: 90.9 FL (ref 82.6–102.9)
MONOCYTES NFR BLD: 1.7 K/UL (ref 0.1–1.2)
MONOCYTES NFR BLD: 10 % (ref 3–12)
MORPHOLOGY: ABNORMAL
MORPHOLOGY: ABNORMAL
NEUTROPHILS NFR BLD: 82 % (ref 36–65)
NEUTS SEG NFR BLD: 13.94 K/UL (ref 1.5–8.1)
NRBC BLD-RTO: 0.6 PER 100 WBC
PARTIAL THROMBOPLASTIN TIME: 22.4 SEC (ref 23–36.5)
PERFORMING LOCATION: ABNORMAL
PLATELET # BLD AUTO: 112 K/UL (ref 138–453)
PMV BLD AUTO: 11.4 FL (ref 8.1–13.5)
POTASSIUM SERPL-SCNC: 4.5 MMOL/L (ref 3.7–5.3)
PROTHROMBIN TIME: 14 SEC (ref 11.7–14.9)
RBC # BLD AUTO: 3.84 M/UL (ref 4.21–5.77)
REACTION TIME TEG: 3.8 MIN (ref 4.6–9.1)
SODIUM SERPL-SCNC: 133 MMOL/L (ref 136–145)
WBC OTHER # BLD: 17 K/UL (ref 3.5–11.3)

## 2024-06-05 PROCEDURE — 99232 SBSQ HOSP IP/OBS MODERATE 35: CPT | Performed by: INTERNAL MEDICINE

## 2024-06-05 PROCEDURE — 2580000003 HC RX 258

## 2024-06-05 PROCEDURE — 85025 COMPLETE CBC W/AUTO DIFF WBC: CPT

## 2024-06-05 PROCEDURE — 2500000003 HC RX 250 WO HCPCS

## 2024-06-05 PROCEDURE — 80048 BASIC METABOLIC PNL TOTAL CA: CPT

## 2024-06-05 PROCEDURE — 6360000002 HC RX W HCPCS

## 2024-06-05 PROCEDURE — 2060000000 HC ICU INTERMEDIATE R&B

## 2024-06-05 PROCEDURE — 6370000000 HC RX 637 (ALT 250 FOR IP): Performed by: PHYSICIAN ASSISTANT

## 2024-06-05 PROCEDURE — 6360000002 HC RX W HCPCS: Performed by: PHYSICIAN ASSISTANT

## 2024-06-05 PROCEDURE — 2580000003 HC RX 258: Performed by: PHYSICIAN ASSISTANT

## 2024-06-05 PROCEDURE — 70450 CT HEAD/BRAIN W/O DYE: CPT

## 2024-06-05 PROCEDURE — 99233 SBSQ HOSP IP/OBS HIGH 50: CPT | Performed by: STUDENT IN AN ORGANIZED HEALTH CARE EDUCATION/TRAINING PROGRAM

## 2024-06-05 PROCEDURE — 85610 PROTHROMBIN TIME: CPT

## 2024-06-05 PROCEDURE — 85730 THROMBOPLASTIN TIME PARTIAL: CPT

## 2024-06-05 PROCEDURE — 36415 COLL VENOUS BLD VENIPUNCTURE: CPT

## 2024-06-05 RX ORDER — MAGNESIUM SULFATE 1 G/100ML
1000 INJECTION INTRAVENOUS ONCE
Status: COMPLETED | OUTPATIENT
Start: 2024-06-05 | End: 2024-06-05

## 2024-06-05 RX ADMIN — DICLOFENAC SODIUM 2 G: 10 GEL TOPICAL at 19:59

## 2024-06-05 RX ADMIN — METHYLPREDNISOLONE SODIUM SUCCINATE 60 MG: 40 INJECTION, POWDER, LYOPHILIZED, FOR SOLUTION INTRAMUSCULAR; INTRAVENOUS at 15:44

## 2024-06-05 RX ADMIN — Medication 10 MG: at 19:57

## 2024-06-05 RX ADMIN — OXYCODONE HYDROCHLORIDE 10 MG: 5 TABLET ORAL at 19:58

## 2024-06-05 RX ADMIN — GABAPENTIN 300 MG: 300 CAPSULE ORAL at 12:17

## 2024-06-05 RX ADMIN — GABAPENTIN 300 MG: 300 CAPSULE ORAL at 08:44

## 2024-06-05 RX ADMIN — TAMSULOSIN HYDROCHLORIDE 0.4 MG: 0.4 CAPSULE ORAL at 08:43

## 2024-06-05 RX ADMIN — OXYCODONE HYDROCHLORIDE 10 MG: 5 TABLET ORAL at 16:12

## 2024-06-05 RX ADMIN — OXYCODONE HYDROCHLORIDE 10 MG: 5 TABLET ORAL at 12:16

## 2024-06-05 RX ADMIN — Medication 2000 MG: at 12:17

## 2024-06-05 RX ADMIN — METHYLPREDNISOLONE SODIUM SUCCINATE 60 MG: 40 INJECTION, POWDER, LYOPHILIZED, FOR SOLUTION INTRAMUSCULAR; INTRAVENOUS at 08:43

## 2024-06-05 RX ADMIN — DICLOFENAC SODIUM 2 G: 10 GEL TOPICAL at 08:45

## 2024-06-05 RX ADMIN — TOPIRAMATE 50 MG: 50 TABLET, FILM COATED ORAL at 19:57

## 2024-06-05 RX ADMIN — AMITRIPTYLINE HYDROCHLORIDE 75 MG: 50 TABLET, FILM COATED ORAL at 19:59

## 2024-06-05 RX ADMIN — ACETAMINOPHEN 1000 MG: 500 TABLET ORAL at 15:44

## 2024-06-05 RX ADMIN — LACOSAMIDE 100 MG: 100 TABLET, FILM COATED ORAL at 08:43

## 2024-06-05 RX ADMIN — ACETAMINOPHEN 1000 MG: 500 TABLET ORAL at 08:43

## 2024-06-05 RX ADMIN — OXYCODONE HYDROCHLORIDE 10 MG: 5 TABLET ORAL at 03:10

## 2024-06-05 RX ADMIN — HYDROMORPHONE HYDROCHLORIDE 0.5 MG: 1 INJECTION, SOLUTION INTRAMUSCULAR; INTRAVENOUS; SUBCUTANEOUS at 07:35

## 2024-06-05 RX ADMIN — BACLOFEN 5 MG: 5 TABLET ORAL at 08:43

## 2024-06-05 RX ADMIN — ACETAMINOPHEN 1000 MG: 500 TABLET ORAL at 19:57

## 2024-06-05 RX ADMIN — METHYLPREDNISOLONE SODIUM SUCCINATE 60 MG: 40 INJECTION, POWDER, LYOPHILIZED, FOR SOLUTION INTRAMUSCULAR; INTRAVENOUS at 21:59

## 2024-06-05 RX ADMIN — FAMOTIDINE 20 MG: 20 TABLET, FILM COATED ORAL at 20:00

## 2024-06-05 RX ADMIN — CITALOPRAM 10 MG: 10 TABLET, FILM COATED ORAL at 08:43

## 2024-06-05 RX ADMIN — TOPIRAMATE 50 MG: 50 TABLET, FILM COATED ORAL at 08:44

## 2024-06-05 RX ADMIN — Medication 2000 MG: at 06:10

## 2024-06-05 RX ADMIN — VALPROATE SODIUM 500 MG: 100 INJECTION, SOLUTION INTRAVENOUS at 12:39

## 2024-06-05 RX ADMIN — MAGNESIUM GLUCONATE 500 MG ORAL TABLET 400 MG: 500 TABLET ORAL at 08:42

## 2024-06-05 RX ADMIN — MAGNESIUM SULFATE HEPTAHYDRATE 1000 MG: 1 INJECTION, SOLUTION INTRAVENOUS at 11:18

## 2024-06-05 RX ADMIN — GABAPENTIN 300 MG: 300 CAPSULE ORAL at 19:59

## 2024-06-05 RX ADMIN — LACOSAMIDE 100 MG: 100 TABLET, FILM COATED ORAL at 19:59

## 2024-06-05 RX ADMIN — FAMOTIDINE 20 MG: 20 TABLET, FILM COATED ORAL at 08:44

## 2024-06-05 RX ADMIN — POLYETHYLENE GLYCOL 3350 17 G: 17 POWDER, FOR SOLUTION ORAL at 08:43

## 2024-06-05 RX ADMIN — OXYCODONE HYDROCHLORIDE 10 MG: 5 TABLET ORAL at 08:43

## 2024-06-05 RX ADMIN — BACLOFEN 5 MG: 5 TABLET ORAL at 19:59

## 2024-06-05 ASSESSMENT — PAIN SCALES - GENERAL
PAINLEVEL_OUTOF10: 7
PAINLEVEL_OUTOF10: 8
PAINLEVEL_OUTOF10: 9
PAINLEVEL_OUTOF10: 7

## 2024-06-05 ASSESSMENT — PAIN DESCRIPTION - LOCATION
LOCATION: HEAD
LOCATION: HEAD;NECK

## 2024-06-05 ASSESSMENT — PAIN DESCRIPTION - DESCRIPTORS
DESCRIPTORS: ACHING
DESCRIPTORS: ACHING

## 2024-06-05 NOTE — PROGRESS NOTES
Occupational Therapy    [facility]  Occupational Therapy Not Seen Note    DATE: 2024    NAME: Brennen Mcclure  MRN: [id number]   : 1965      Patient not seen this date for Occupational Therapy due to: Pt declined therapy d/t \"I have a headache.\" RN notified.     Next Scheduled Treatment: 24    Electronically signed by FORTINO RICHARDS on 2024 at 10:43 AM

## 2024-06-05 NOTE — PROGRESS NOTES
Neurosurgery NANNETTE/Resident    Daily Progress Note   No chief complaint on file.    6/5/2024  7:16 AM    Chart reviewed.  No acute events overnight.  No new complaints. CHICHO drain remains in place with 60mL/12 hours and 110mL since OR. CTH post op did show small SDH. Repeat CTH this morning pending.     Vitals:    06/05/24 0200 06/05/24 0300 06/05/24 0400 06/05/24 0500   BP: (!) 140/85 (!) 140/78 136/78 (!) 145/75   Pulse: 60 57 64 67   Resp: 10 10 11 12   Temp:   98.2 °F (36.8 °C)    TempSrc:       SpO2:       Weight:       Height:             PE:   AOx3   5/5 RUE and RLE  Left side hemiplegic    Incision: CDI  Drain: 60mL/12 hours, 110mL since OR      Lab Results   Component Value Date    WBC 17.0 (H) 06/05/2024    HGB 11.2 (L) 06/05/2024    HCT 34.9 (L) 06/05/2024     (L) 06/05/2024    CHOL 118 05/16/2024    TRIG 182 (H) 05/16/2024    HDL 28 (L) 05/16/2024    ALT 28 05/08/2024    AST 29 05/08/2024     (L) 06/05/2024    K 4.5 06/05/2024     06/05/2024    CREATININE 0.8 06/05/2024    BUN 33 (H) 06/05/2024    CO2 19 (L) 06/05/2024    TSH 3.45 04/13/2024    INR 1.1 06/05/2024    LABA1C 5.2 05/16/2024    CRP 33.1 (H) 05/12/2024       Radiology   CT Head wo Contrast    Result Date: 6/4/2024  EXAMINATION: CT OF THE HEAD WITHOUT CONTRAST  6/4/2024 4:04 pm TECHNIQUE: CT of the head was performed without the administration of intravenous contrast. Automated exposure control, iterative reconstruction, and/or weight based adjustment of the mA/kV was utilized to reduce the radiation dose to as low as reasonably achievable. COMPARISON: CT brain 05/17/2024 HISTORY: ORDERING SYSTEM PROVIDED HISTORY: f/u post op TECHNOLOGIST PROVIDED HISTORY: Postop right cranioplasty. f/u post op FINDINGS: BRAIN/VENTRICLES: Right cranioplasty changes are present with a small amount of pneumocephalus. There is a collection of extra-axial blood products overlying the right frontal convexity measuring up to 9 mm in thickness. There

## 2024-06-05 NOTE — PROGRESS NOTES
Physical Therapy        Physical Therapy Cancel Note      DATE: 2024    NAME: Brennen Mcclure  MRN: [id number]   : 1965      Patient not seen this date for Physical Therapy due to:    Patient Declined: Pt refusing to mobilize d/t \"I have a headache.\" RN notified.     Plan to check back as able.      Electronically signed by SHELLY JENKINS PTA on 2024 at 9:53 AM

## 2024-06-05 NOTE — PLAN OF CARE
Problem: Discharge Planning  Goal: Discharge to home or other facility with appropriate resources  6/5/2024 0302 by Laura Rosado RN  Outcome: Progressing  6/4/2024 1830 by Gabby Chris RN  Outcome: Progressing  Flowsheets (Taken 6/4/2024 0800 by Silvia De Guzman RN)  Discharge to home or other facility with appropriate resources:   Identify barriers to discharge with patient and caregiver   Arrange for needed discharge resources and transportation as appropriate   Identify discharge learning needs (meds, wound care, etc)     Problem: Chronic Conditions and Co-morbidities  Goal: Patient's chronic conditions and co-morbidity symptoms are monitored and maintained or improved  6/5/2024 0302 by Laura Rosado RN  Outcome: Progressing  6/4/2024 1830 by Gabby Chris RN  Outcome: Progressing  Flowsheets (Taken 6/4/2024 0800 by Silvia De Guzman RN)  Care Plan - Patient's Chronic Conditions and Co-Morbidity Symptoms are Monitored and Maintained or Improved:   Monitor and assess patient's chronic conditions and comorbid symptoms for stability, deterioration, or improvement   Collaborate with multidisciplinary team to address chronic and comorbid conditions and prevent exacerbation or deterioration   Update acute care plan with appropriate goals if chronic or comorbid symptoms are exacerbated and prevent overall improvement and discharge     Problem: Safety - Adult  Goal: Free from fall injury  6/5/2024 0302 by Laura Rosado RN  Outcome: Progressing  6/4/2024 1830 by Gabby Chris RN  Outcome: Progressing     Problem: Skin/Tissue Integrity  Goal: Absence of new skin breakdown  Description: 1.  Monitor for areas of redness and/or skin breakdown  2.  Assess vascular access sites hourly  3.  Every 4-6 hours minimum:  Change oxygen saturation probe site  4.  Every 4-6 hours:  If on nasal continuous positive airway pressure, respiratory therapy assess nares and determine need for appliance change or  resting period.  6/5/2024 0302 by Laura Rosado, RN  Outcome: Progressing  6/4/2024 1830 by Gabby Chris RN  Outcome: Progressing     Problem: Pain  Goal: Verbalizes/displays adequate comfort level or baseline comfort level  6/4/2024 1830 by Gabby Chris, RN  Outcome: Progressing     Problem: ABCDS Injury Assessment  Goal: Absence of physical injury  6/4/2024 1830 by Gabby Chris RN  Outcome: Progressing

## 2024-06-05 NOTE — PROGRESS NOTES
Daily Progress Note  Neuro Critical Care    Patient Name: Brennen Mcclure  Patient : 1965  Room/Bed: 0127/0127-01  Code Status: DNR CCA  Allergies: No Known Allergies    CHIEF COMPLAINT:        Elective cranioplasty     INTERVAL HISTORY    Initial Presentation (Admitted 2024):    Brennen Mcclure is a 59 y.o. male with a history of CVST complicated with ICH after starting anticoagulation s/p right hemicraniectomy in 2023 in [place] with residual dense left-sided hemiplegia, chronic idiopathic thrombocytopenic purpura, migraines, seizures presented for elective cranioplasty which was performed on 2024.  Neurocritical care was consulted for postsurgical management.     Seen and examined 5:26 PM. Neuro exam: Patient is quite somnolent with dysarthric speech.  ANO x 4.  Flaccid weakness on the left, 3/5 on the right.  Complaining of a right-sided headache.  CTH reviewed there is right SDH, repeat CTH tomorrow unless a change in neuro exam.    Hospital Course:     Interval Events:   No acute events overnight.  Patient remained hemodynamically stable with no significant pain or other associated complaints.  Neurological exam remained unchanged.  Follow-up CT head this morning showing right cranioplasty changes, small amount of SAH overlying the right perisylvian cortex and within the right sylvian fissure, no midline shift    CURRENT MEDICATIONS:  SCHEDULED MEDICATIONS:   valproate (DEPACON) 500 mg in sodium chloride 0.9 % 100 mL IVPB  500 mg IntraVENous Once    sodium chloride flush  5-40 mL IntraVENous 2 times per day    polyethylene glycol  17 g Oral Daily    famotidine  20 mg Oral BID    Or    famotidine (PEPCID) injection  20 mg IntraVENous BID    eltrombopag olamine  50 mg Oral Daily    sodium chloride flush  5-40 mL IntraVENous 2 times per day    acetaminophen  1,000 mg Oral TID    amitriptyline  75 mg Oral Nightly    [Held by provider] atorvastatin  80 mg Oral Nightly    Baclofen  5 mg Oral  anticoagulation therapy status post hemicraniectomy here for elective cranioplasty  Baseline left hemiplegia  Hx of migraines  History of mood disorder     Plan:  Neurological checks q1h  Seizure, fall, aspiration precautions  SBP goal normotension  Continue Vimpat 100 mg twice daily  CT head this morning, currently  C/w Elavil 75 nightly, topamax 50 mg BID  Will give a dose of Depacon 500 mg IV over 15 minutes once  Citalopram 10 mg daily  Continue to hold Fioricet      Cardiovascular  Continuous cardiac telemetry  History of hyperlipidemia     Plan:  SBP goal normotension  Labetalol 10mg IV q4h prn   Hold statin     Pulmonary  Saturating well on room air     Plan:  Aspiration precautions, head of bed above 30 degrees        Renal, fluid status, and electrolytes   Renal function normal     Plan:  Monitor daily BMP, Mg, Phos  [place] with temperature probe for strict I&O monitoring in critical care setting  Avoid hypotonic fluids as this can worsen cerebral edema  IVFs with NS at 75/h      Gastroenterology and nutrition  Diet NPO Exceptions are: Sips of Water with Meds  Last BM: unknown      Plan  Bedside swallow eval      Infectious disease  Current access: PIVs (placed)  Keep normothermic     Plan:  Tylenol 1 g 3 times daily  Daily CBC with differential  Monitor for fevers     Hematological  ITP     Plan:  Heme following  On Promacta and Solumedrol 60 mg TID     Endocrinological  Normoglycemia      Plan:  Frequent gluc checks      Prophylaxis:  Stress ulcer: Pepcid   DVT: Bilateral SCD's    DISPOSITION:  [x] To remain ICU:  [] OK for out of ICU from Neuro Critical Care standpoint    We will continue to follow along.     For any changes in exam or patient status please contact Neuro Critical Care.      Leonora Brown MD  Neuro Critical Care  6/5/2024     12:33 PM

## 2024-06-05 NOTE — PROGRESS NOTES
500 MG tablet Take 2 tablets by mouth in the morning, at noon, and at bedtime 4/30/24   Danica De La Paz MD   gabapentin (NEURONTIN) 300 MG capsule Take 1 capsule by mouth 3 times daily for 90 days. 4/30/24 7/29/24  Danica De La Paz MD   Multiple Vitamins-Minerals (THERAPEUTIC MULTIVITAMIN-MINERALS) tablet Take 1 tablet by mouth daily 4/1/24   Blanca Ren MD   lacosamide (VIMPAT) 100 MG TABS tablet Take 1 tablet by mouth 2 times daily for 90 days. Max Daily Amount: 200 mg 4/1/24 6/30/24  Blanca Ren MD   ipratropium 0.5 mg-albuterol 2.5 mg (DUONEB) 0.5-2.5 (3) MG/3ML SOLN nebulizer solution Inhale 3 mLs into the lungs every 6 hours as needed for Shortness of Breath 4/1/24   Blanca Ren MD   citalopram (CELEXA) 10 MG tablet Take 1 tablet by mouth daily 4/1/24   Blanca Ren MD   Baclofen (LIORESAL) 5 MG tablet Take 1 tablet by mouth 2 times daily 4/1/24   Blanca Ren MD   atorvastatin (LIPITOR) 80 MG tablet Take 1 tablet by mouth nightly 4/1/24   Blanca Ren MD   tamsulosin (FLOMAX) 0.4 MG capsule Take 1 capsule by mouth daily 4/1/24   Blanca Ren MD   Handicap Placard MISC by Does not apply route Duration: 1 year / Dx: Stroke 2/25/24   Dipika Angulo MD   diclofenac sodium (VOLTAREN) 1 % GEL Apply 2 g topically 2 times daily Apply to knees 2/25/24   Dipika Angulo MD     Current Facility-Administered Medications   Medication Dose Route Frequency Provider Last Rate Last Admin    sodium chloride flush 0.9 % injection 5-40 mL  5-40 mL IntraVENous 2 times per day Emory Allen PA-C        sodium chloride flush 0.9 % injection 5-40 mL  5-40 mL IntraVENous PRN Emory Allen PA-C        0.9 % sodium chloride infusion   IntraVENous PRN Emory Allen PA-C        oxyCODONE (ROXICODONE) immediate release tablet 5 mg  5 mg Oral Q4H PRN Emory Allen PA-C        Or    oxyCODONE (ROXICODONE) immediate release tablet 10 mg  10 mg Oral Q4H PRN Emory Allen,  silhouette is stable. The osseous structures are stable.     No acute process.     XR PELVIS (1-2 VIEWS)    Result Date: 5/2/2024  EXAMINATION: ONE XRAY VIEW OF THE PELVIS 5/2/2024 4:34 pm COMPARISON: None. HISTORY: ORDERING SYSTEM PROVIDED HISTORY: fall TECHNOLOGIST PROVIDED HISTORY: fall Reason for Exam: fall FINDINGS: No acute displaced pelvic fracture is identified.  The sacroiliac joints and pubic symphysis are maintained.  Both hip joints are maintained.  The surrounding soft tissues are within normal limits.     No acute displaced pelvic or hip fracture.     XR CHEST PORTABLE    Result Date: 5/2/2024  EXAMINATION: ONE XRAY VIEW OF THE CHEST 5/2/2024 4:34 pm COMPARISON: 04/12/2024 HISTORY: ORDERING SYSTEM PROVIDED HISTORY: fall TECHNOLOGIST PROVIDED HISTORY: fall Reason for Exam: fall FINDINGS: Sternotomy wires.  Heart size at the upper limits of normal.  No airspace consolidations.  No pleural effusion or pneumothorax.  There is mild interstitial prominence.     No definite acute abnormality. Unchanged mild interstitial prominence which may be related to emphysema.     CT HEAD WO CONTRAST    Result Date: 5/2/2024  EXAMINATION: CT OF THE HEAD WITHOUT CONTRAST  5/2/2024 5:03 pm TECHNIQUE: CT of the head was performed without the administration of intravenous contrast. Automated exposure control, iterative reconstruction, and/or weight based adjustment of the mA/kV was utilized to reduce the radiation dose to as low as reasonably achievable. COMPARISON: 04/12/2024 HISTORY: Fall. FINDINGS: BRAIN/VENTRICLES: Stable postsurgical changes from right-sided craniectomy with dural thickening.  No evidence of acute infarct.  Extensive encephalomalacia within the right cerebral hemisphere is unchanged.  No new intracranial hemorrhage.  No midline shift.  No hydrocephalus. ORBITS: The visualized portion of the orbits demonstrate no acute abnormality. SINUSES: The visualized paranasal sinuses and mastoid air cells

## 2024-06-05 NOTE — PLAN OF CARE
Problem: Discharge Planning  Goal: Discharge to home or other facility with appropriate resources  6/5/2024 1309 by Arti Loving RN  Outcome: Progressing  6/5/2024 0302 by Laura Rosado RN  Outcome: Progressing     Problem: Chronic Conditions and Co-morbidities  Goal: Patient's chronic conditions and co-morbidity symptoms are monitored and maintained or improved  6/5/2024 1309 by Arti Loving RN  Outcome: Progressing  6/5/2024 0302 by Laura Rosado RN  Outcome: Progressing     Problem: Safety - Adult  Goal: Free from fall injury  6/5/2024 1309 by Arti Loving RN  Outcome: Progressing  6/5/2024 0302 by Laura Rosado RN  Outcome: Progressing     Problem: Skin/Tissue Integrity  Goal: Absence of new skin breakdown  Description: 1.  Monitor for areas of redness and/or skin breakdown  2.  Assess vascular access sites hourly  3.  Every 4-6 hours minimum:  Change oxygen saturation probe site  4.  Every 4-6 hours:  If on nasal continuous positive airway pressure, respiratory therapy assess nares and determine need for appliance change or resting period.  6/5/2024 1309 by Arti Loving, RN  Outcome: Progressing  6/5/2024 0302 by Laura Rosado RN  Outcome: Progressing     Problem: Pain  Goal: Verbalizes/displays adequate comfort level or baseline comfort level  Outcome: Progressing     Problem: ABCDS Injury Assessment  Goal: Absence of physical injury  Outcome: Progressing

## 2024-06-05 NOTE — PROGRESS NOTES
Occupational Therapy    [facility]  Occupational Therapy Not Seen Note    DATE: 2024    NAME: Brennen Mcclure  MRN: [id number]   : 1965      Patient not seen this date for Occupational Therapy due to: Sx-bone flat placement    Next Scheduled Treatment: 24    Electronically signed by FORTINO RICHARDS on 2024 at 10:45 AM

## 2024-06-06 ENCOUNTER — APPOINTMENT (OUTPATIENT)
Dept: CT IMAGING | Age: 59
End: 2024-06-06
Attending: NEUROLOGICAL SURGERY
Payer: MEDICAID

## 2024-06-06 ENCOUNTER — APPOINTMENT (OUTPATIENT)
Dept: GENERAL RADIOLOGY | Age: 59
End: 2024-06-06
Attending: NEUROLOGICAL SURGERY
Payer: MEDICAID

## 2024-06-06 ENCOUNTER — ANESTHESIA (OUTPATIENT)
Dept: OPERATING ROOM | Age: 59
End: 2024-06-06
Payer: MEDICAID

## 2024-06-06 ENCOUNTER — ANESTHESIA EVENT (OUTPATIENT)
Dept: OPERATING ROOM | Age: 59
End: 2024-06-06
Payer: MEDICAID

## 2024-06-06 LAB
ANION GAP SERPL CALCULATED.3IONS-SCNC: 11 MMOL/L (ref 9–16)
ARTERIAL PATENCY WRIST A: ABNORMAL
BASOPHILS # BLD: 0 K/UL (ref 0–0.2)
BASOPHILS NFR BLD: 0 % (ref 0–2)
BODY TEMPERATURE: 37
BUN SERPL-MCNC: 32 MG/DL (ref 8–23)
CALCIUM SERPL-MCNC: 9 MG/DL (ref 8.6–10.4)
CHLORIDE SERPL-SCNC: 102 MMOL/L (ref 98–107)
CHLORIDE, WHOLE BLOOD: 109 MMOL/L (ref 98–110)
CO2 SERPL-SCNC: 20 MMOL/L (ref 20–31)
COHGB MFR BLD: 2 % (ref 0–5)
CREAT SERPL-MCNC: 0.7 MG/DL (ref 0.7–1.2)
EOSINOPHIL # BLD: 0 K/UL (ref 0–0.4)
EOSINOPHILS RELATIVE PERCENT: 0 % (ref 1–4)
ERYTHROCYTE [DISTWIDTH] IN BLOOD BY AUTOMATED COUNT: 20.4 % (ref 11.8–14.4)
FIO2 ON VENT: 60 %
GFR, ESTIMATED: >90 ML/MIN/1.73M2
GLUCOSE BLD-MCNC: 145 MG/DL (ref 75–110)
GLUCOSE SERPL-MCNC: 190 MG/DL (ref 74–99)
HCO3 ARTERIAL: 21.5 MMOL/L (ref 22–27)
HCT VFR BLD AUTO: 38.5 % (ref 40.7–50.3)
HCT VFR BLD CALC: 32.9 % (ref 40.7–50.3)
HEMOGLOBIN: 10.6 GM/DL (ref 13–17)
HGB BLD-MCNC: 12.6 G/DL (ref 13–17)
IMM GRANULOCYTES # BLD AUTO: 0.17 K/UL (ref 0–0.3)
IMM GRANULOCYTES NFR BLD: 1 %
LACTIC ACID, WHOLE BLOOD: 2.2 MMOL/L (ref 0.7–2.1)
LYMPHOCYTES NFR BLD: 2.02 K/UL (ref 1–4.8)
LYMPHOCYTES RELATIVE PERCENT: 12 % (ref 24–44)
MAGNESIUM SERPL-MCNC: 2.1 MG/DL (ref 1.6–2.4)
MCH RBC QN AUTO: 29.2 PG (ref 25.2–33.5)
MCHC RBC AUTO-ENTMCNC: 32.7 G/DL (ref 28.4–34.8)
MCV RBC AUTO: 89.3 FL (ref 82.6–102.9)
MONOCYTES NFR BLD: 13 % (ref 1–7)
MONOCYTES NFR BLD: 2.18 K/UL (ref 0.1–0.8)
MORPHOLOGY: ABNORMAL
MORPHOLOGY: ABNORMAL
NEGATIVE BASE EXCESS, ART: 4 MMOL/L (ref 0–2)
NEUTROPHILS NFR BLD: 74 % (ref 36–66)
NEUTS SEG NFR BLD: 12.43 K/UL (ref 1.8–7.7)
NRBC BLD-RTO: 1.8 PER 100 WBC
NUCLEATED RED BLOOD CELLS: 3 PER 100 WBC
O2 SAT, ARTERIAL: 99.4 % (ref 94–100)
PCO2 ARTERIAL: 43.3 MMHG (ref 32–45)
PH ARTERIAL: 7.32 (ref 7.35–7.45)
PHOSPHATE SERPL-MCNC: 2.7 MG/DL (ref 2.5–4.5)
PLATELET # BLD AUTO: 187 K/UL (ref 138–453)
PMV BLD AUTO: 11.5 FL (ref 8.1–13.5)
PO2 ARTERIAL: 168 MMHG (ref 75–95)
POTASSIUM SERPL-SCNC: 3.9 MMOL/L (ref 3.7–5.3)
POTASSIUM, WHOLE BLOOD: 3.5 MMOL/L (ref 3.6–5)
RBC # BLD AUTO: 4.31 M/UL (ref 4.21–5.77)
SODIUM SERPL-SCNC: 133 MMOL/L (ref 136–145)
SODIUM, WHOLE BLOOD: 136 MMOL/L (ref 136–145)
TROPONIN I SERPL HS-MCNC: 7 NG/L (ref 0–22)
WBC OTHER # BLD: 16.8 K/UL (ref 3.5–11.3)

## 2024-06-06 PROCEDURE — APPSS15 APP SPLIT SHARED TIME 0-15 MINUTES: Performed by: NURSE PRACTITIONER

## 2024-06-06 PROCEDURE — 2060000000 HC ICU INTERMEDIATE R&B

## 2024-06-06 PROCEDURE — 71045 X-RAY EXAM CHEST 1 VIEW: CPT

## 2024-06-06 PROCEDURE — 2500000003 HC RX 250 WO HCPCS

## 2024-06-06 PROCEDURE — 97530 THERAPEUTIC ACTIVITIES: CPT

## 2024-06-06 PROCEDURE — 6360000002 HC RX W HCPCS

## 2024-06-06 PROCEDURE — C1713 ANCHOR/SCREW BN/BN,TIS/BN: HCPCS | Performed by: NEUROLOGICAL SURGERY

## 2024-06-06 PROCEDURE — 80048 BASIC METABOLIC PNL TOTAL CA: CPT

## 2024-06-06 PROCEDURE — 6360000002 HC RX W HCPCS: Performed by: NURSE PRACTITIONER

## 2024-06-06 PROCEDURE — 84132 ASSAY OF SERUM POTASSIUM: CPT

## 2024-06-06 PROCEDURE — 82330 ASSAY OF CALCIUM: CPT

## 2024-06-06 PROCEDURE — 2580000003 HC RX 258: Performed by: NEUROLOGICAL SURGERY

## 2024-06-06 PROCEDURE — 84484 ASSAY OF TROPONIN QUANT: CPT

## 2024-06-06 PROCEDURE — 3600000004 HC SURGERY LEVEL 4 BASE: Performed by: NEUROLOGICAL SURGERY

## 2024-06-06 PROCEDURE — 6370000000 HC RX 637 (ALT 250 FOR IP): Performed by: PHYSICIAN ASSISTANT

## 2024-06-06 PROCEDURE — 97110 THERAPEUTIC EXERCISES: CPT

## 2024-06-06 PROCEDURE — 36415 COLL VENOUS BLD VENIPUNCTURE: CPT

## 2024-06-06 PROCEDURE — 2580000003 HC RX 258: Performed by: PSYCHIATRY & NEUROLOGY

## 2024-06-06 PROCEDURE — 85025 COMPLETE CBC W/AUTO DIFF WBC: CPT

## 2024-06-06 PROCEDURE — 83735 ASSAY OF MAGNESIUM: CPT

## 2024-06-06 PROCEDURE — 85018 HEMOGLOBIN: CPT

## 2024-06-06 PROCEDURE — 2580000003 HC RX 258: Performed by: PHYSICIAN ASSISTANT

## 2024-06-06 PROCEDURE — 71250 CT THORAX DX C-: CPT

## 2024-06-06 PROCEDURE — 84100 ASSAY OF PHOSPHORUS: CPT

## 2024-06-06 PROCEDURE — 6370000000 HC RX 637 (ALT 250 FOR IP): Performed by: NEUROLOGICAL SURGERY

## 2024-06-06 PROCEDURE — 6360000004 HC RX CONTRAST MEDICATION

## 2024-06-06 PROCEDURE — 3600000014 HC SURGERY LEVEL 4 ADDTL 15MIN: Performed by: NEUROLOGICAL SURGERY

## 2024-06-06 PROCEDURE — 84295 ASSAY OF SERUM SODIUM: CPT

## 2024-06-06 PROCEDURE — 2720000010 HC SURG SUPPLY STERILE: Performed by: NEUROLOGICAL SURGERY

## 2024-06-06 PROCEDURE — 85014 HEMATOCRIT: CPT

## 2024-06-06 PROCEDURE — 61313 CRNEC/CRNOT STTL ICERE: CPT | Performed by: NEUROLOGICAL SURGERY

## 2024-06-06 PROCEDURE — 97112 NEUROMUSCULAR REEDUCATION: CPT

## 2024-06-06 PROCEDURE — 97535 SELF CARE MNGMENT TRAINING: CPT

## 2024-06-06 PROCEDURE — 2709999900 HC NON-CHARGEABLE SUPPLY: Performed by: NEUROLOGICAL SURGERY

## 2024-06-06 PROCEDURE — 82435 ASSAY OF BLOOD CHLORIDE: CPT

## 2024-06-06 PROCEDURE — 6360000002 HC RX W HCPCS: Performed by: NEUROLOGICAL SURGERY

## 2024-06-06 PROCEDURE — 82962 GLUCOSE BLOOD TEST: CPT

## 2024-06-06 PROCEDURE — 6360000002 HC RX W HCPCS: Performed by: PHYSICIAN ASSISTANT

## 2024-06-06 PROCEDURE — C1763 CONN TISS, NON-HUMAN: HCPCS | Performed by: NEUROLOGICAL SURGERY

## 2024-06-06 PROCEDURE — 00C00ZZ EXTIRPATION OF MATTER FROM BRAIN, OPEN APPROACH: ICD-10-PCS | Performed by: NEUROLOGICAL SURGERY

## 2024-06-06 PROCEDURE — 70496 CT ANGIOGRAPHY HEAD: CPT

## 2024-06-06 PROCEDURE — 2500000003 HC RX 250 WO HCPCS: Performed by: NEUROLOGICAL SURGERY

## 2024-06-06 PROCEDURE — 3700000001 HC ADD 15 MINUTES (ANESTHESIA): Performed by: NEUROLOGICAL SURGERY

## 2024-06-06 PROCEDURE — 3700000000 HC ANESTHESIA ATTENDED CARE: Performed by: NEUROLOGICAL SURGERY

## 2024-06-06 PROCEDURE — 99232 SBSQ HOSP IP/OBS MODERATE 35: CPT | Performed by: INTERNAL MEDICINE

## 2024-06-06 PROCEDURE — 00C30ZZ EXTIRPATION OF MATTER FROM INTRACRANIAL EPIDURAL SPACE, OPEN APPROACH: ICD-10-PCS | Performed by: NEUROLOGICAL SURGERY

## 2024-06-06 PROCEDURE — 82805 BLOOD GASES W/O2 SATURATION: CPT

## 2024-06-06 PROCEDURE — 83605 ASSAY OF LACTIC ACID: CPT

## 2024-06-06 PROCEDURE — 70450 CT HEAD/BRAIN W/O DYE: CPT

## 2024-06-06 DEVICE — DURAGEN® PLUS DURAL REGENERATION MATRIX, 3 IN X 3 IN (7.5 CM X 7.5 CM)
Type: IMPLANTABLE DEVICE | Site: BRAIN | Status: FUNCTIONAL
Brand: DURAGEN® PLUS

## 2024-06-06 DEVICE — BONE SCREWS, CROSS-PIN, SELF-DRILLING: Type: IMPLANTABLE DEVICE | Site: CRANIAL | Status: FUNCTIONAL

## 2024-06-06 DEVICE — PLATE, RIGID
Type: IMPLANTABLE DEVICE | Site: CRANIAL | Status: FUNCTIONAL
Brand: UNIVERSAL NEURO 2, QUIKFLAP

## 2024-06-06 RX ORDER — TRANEXAMIC ACID 10 MG/ML
1000 INJECTION, SOLUTION INTRAVENOUS ONCE
Status: COMPLETED | OUTPATIENT
Start: 2024-06-06 | End: 2024-06-06

## 2024-06-06 RX ORDER — LEVETIRACETAM 10 MG/ML
INJECTION INTRAVASCULAR PRN
Status: DISCONTINUED | OUTPATIENT
Start: 2024-06-06 | End: 2024-06-07 | Stop reason: SDUPTHER

## 2024-06-06 RX ORDER — CEFAZOLIN SODIUM 2 G/50ML
SOLUTION INTRAVENOUS PRN
Status: DISCONTINUED | OUTPATIENT
Start: 2024-06-06 | End: 2024-06-07 | Stop reason: SDUPTHER

## 2024-06-06 RX ORDER — HYDRALAZINE HYDROCHLORIDE 20 MG/ML
10 INJECTION INTRAMUSCULAR; INTRAVENOUS EVERY 4 HOURS PRN
Status: DISCONTINUED | OUTPATIENT
Start: 2024-06-06 | End: 2024-06-07

## 2024-06-06 RX ORDER — VANCOMYCIN HYDROCHLORIDE 1 G/20ML
INJECTION, POWDER, LYOPHILIZED, FOR SOLUTION INTRAVENOUS PRN
Status: DISCONTINUED | OUTPATIENT
Start: 2024-06-06 | End: 2024-06-07 | Stop reason: ALTCHOICE

## 2024-06-06 RX ORDER — HYDRALAZINE HYDROCHLORIDE 20 MG/ML
10 INJECTION INTRAMUSCULAR; INTRAVENOUS ONCE
Status: COMPLETED | OUTPATIENT
Start: 2024-06-06 | End: 2024-06-06

## 2024-06-06 RX ORDER — MANNITOL 20 G/100ML
50 INJECTION, SOLUTION INTRAVENOUS ONCE
Status: COMPLETED | OUTPATIENT
Start: 2024-06-06 | End: 2024-06-06

## 2024-06-06 RX ORDER — PROPOFOL 10 MG/ML
INJECTION, EMULSION INTRAVENOUS
Status: COMPLETED
Start: 2024-06-06 | End: 2024-06-06

## 2024-06-06 RX ORDER — FENTANYL CITRATE 50 UG/ML
INJECTION, SOLUTION INTRAMUSCULAR; INTRAVENOUS PRN
Status: DISCONTINUED | OUTPATIENT
Start: 2024-06-06 | End: 2024-06-07 | Stop reason: SDUPTHER

## 2024-06-06 RX ORDER — ENOXAPARIN SODIUM 100 MG/ML
30 INJECTION SUBCUTANEOUS DAILY
Status: DISCONTINUED | OUTPATIENT
Start: 2024-06-06 | End: 2024-06-11

## 2024-06-06 RX ORDER — ROCURONIUM BROMIDE 10 MG/ML
INJECTION, SOLUTION INTRAVENOUS PRN
Status: DISCONTINUED | OUTPATIENT
Start: 2024-06-06 | End: 2024-06-07 | Stop reason: SDUPTHER

## 2024-06-06 RX ORDER — HYDRALAZINE HYDROCHLORIDE 20 MG/ML
INJECTION INTRAMUSCULAR; INTRAVENOUS
Status: COMPLETED
Start: 2024-06-06 | End: 2024-06-06

## 2024-06-06 RX ORDER — NICARDIPINE HYDROCHLORIDE 0.1 MG/ML
2.5-15 INJECTION INTRAVENOUS CONTINUOUS
Status: DISCONTINUED | OUTPATIENT
Start: 2024-06-06 | End: 2024-06-07

## 2024-06-06 RX ORDER — MAGNESIUM HYDROXIDE 1200 MG/15ML
LIQUID ORAL CONTINUOUS PRN
Status: COMPLETED | OUTPATIENT
Start: 2024-06-06 | End: 2024-06-07

## 2024-06-06 RX ORDER — PROPOFOL 10 MG/ML
5-50 INJECTION, EMULSION INTRAVENOUS CONTINUOUS
Status: DISCONTINUED | OUTPATIENT
Start: 2024-06-06 | End: 2024-06-08

## 2024-06-06 RX ADMIN — CITALOPRAM 10 MG: 10 TABLET, FILM COATED ORAL at 09:57

## 2024-06-06 RX ADMIN — DICLOFENAC SODIUM 2 G: 10 GEL TOPICAL at 09:59

## 2024-06-06 RX ADMIN — ACETAMINOPHEN 1000 MG: 500 TABLET ORAL at 09:54

## 2024-06-06 RX ADMIN — ROCURONIUM BROMIDE 30 MG: 10 INJECTION, SOLUTION INTRAVENOUS at 23:21

## 2024-06-06 RX ADMIN — SODIUM CHLORIDE, PRESERVATIVE FREE 10 ML: 5 INJECTION INTRAVENOUS at 09:59

## 2024-06-06 RX ADMIN — PROPOFOL 50 MCG/KG/MIN: 10 INJECTION, EMULSION INTRAVENOUS at 22:31

## 2024-06-06 RX ADMIN — ROCURONIUM BROMIDE 50 MG: 10 INJECTION, SOLUTION INTRAVENOUS at 22:15

## 2024-06-06 RX ADMIN — MAGNESIUM GLUCONATE 500 MG ORAL TABLET 400 MG: 500 TABLET ORAL at 09:57

## 2024-06-06 RX ADMIN — FAMOTIDINE 20 MG: 20 TABLET, FILM COATED ORAL at 09:55

## 2024-06-06 RX ADMIN — ACETAMINOPHEN 1000 MG: 500 TABLET ORAL at 16:45

## 2024-06-06 RX ADMIN — FENTANYL CITRATE 50 MCG: 50 INJECTION, SOLUTION INTRAMUSCULAR; INTRAVENOUS at 22:27

## 2024-06-06 RX ADMIN — LACOSAMIDE 100 MG: 100 TABLET, FILM COATED ORAL at 09:55

## 2024-06-06 RX ADMIN — FENTANYL CITRATE 25 MCG: 50 INJECTION, SOLUTION INTRAMUSCULAR; INTRAVENOUS at 22:51

## 2024-06-06 RX ADMIN — TOPIRAMATE 50 MG: 50 TABLET, FILM COATED ORAL at 09:55

## 2024-06-06 RX ADMIN — GABAPENTIN 300 MG: 300 CAPSULE ORAL at 13:34

## 2024-06-06 RX ADMIN — GABAPENTIN 300 MG: 300 CAPSULE ORAL at 09:56

## 2024-06-06 RX ADMIN — NICARDIPINE HYDROCHLORIDE 5 MG/HR: 0.1 INJECTION INTRAVENOUS at 20:49

## 2024-06-06 RX ADMIN — PROPOFOL 20 MCG/KG/MIN: 10 INJECTION, EMULSION INTRAVENOUS at 21:30

## 2024-06-06 RX ADMIN — CEFAZOLIN SODIUM 2000 MG: 2 SOLUTION INTRAVENOUS at 22:24

## 2024-06-06 RX ADMIN — HYDRALAZINE HYDROCHLORIDE 10 MG: 20 INJECTION, SOLUTION INTRAMUSCULAR; INTRAVENOUS at 20:20

## 2024-06-06 RX ADMIN — OXYCODONE HYDROCHLORIDE 10 MG: 5 TABLET ORAL at 05:19

## 2024-06-06 RX ADMIN — METHYLPREDNISOLONE SODIUM SUCCINATE 60 MG: 40 INJECTION, POWDER, LYOPHILIZED, FOR SOLUTION INTRAMUSCULAR; INTRAVENOUS at 13:30

## 2024-06-06 RX ADMIN — SODIUM CHLORIDE, PRESERVATIVE FREE 5 ML: 5 INJECTION INTRAVENOUS at 20:30

## 2024-06-06 RX ADMIN — TAMSULOSIN HYDROCHLORIDE 0.4 MG: 0.4 CAPSULE ORAL at 09:59

## 2024-06-06 RX ADMIN — MANNITOL 50 G: 20 INJECTION, SOLUTION INTRAVENOUS at 20:55

## 2024-06-06 RX ADMIN — ROCURONIUM BROMIDE 30 MG: 10 INJECTION, SOLUTION INTRAVENOUS at 23:49

## 2024-06-06 RX ADMIN — BACLOFEN 5 MG: 5 TABLET ORAL at 09:55

## 2024-06-06 RX ADMIN — FENTANYL CITRATE 50 MCG: 50 INJECTION, SOLUTION INTRAMUSCULAR; INTRAVENOUS at 23:56

## 2024-06-06 RX ADMIN — HYDRALAZINE HYDROCHLORIDE 10 MG: 20 INJECTION INTRAMUSCULAR; INTRAVENOUS at 20:20

## 2024-06-06 RX ADMIN — ENOXAPARIN SODIUM 30 MG: 100 INJECTION SUBCUTANEOUS at 13:35

## 2024-06-06 RX ADMIN — IOPAMIDOL 75 ML: 755 INJECTION, SOLUTION INTRAVENOUS at 22:01

## 2024-06-06 RX ADMIN — SODIUM CHLORIDE: 9 INJECTION, SOLUTION INTRAVENOUS at 23:39

## 2024-06-06 RX ADMIN — HYDROMORPHONE HYDROCHLORIDE 0.5 MG: 1 INJECTION, SOLUTION INTRAMUSCULAR; INTRAVENOUS; SUBCUTANEOUS at 00:08

## 2024-06-06 RX ADMIN — HYDROMORPHONE HYDROCHLORIDE 0.5 MG: 1 INJECTION, SOLUTION INTRAMUSCULAR; INTRAVENOUS; SUBCUTANEOUS at 17:45

## 2024-06-06 RX ADMIN — LEVETIRACETAM 1000 MG: 10 INJECTION, SOLUTION INTRAVENOUS at 22:25

## 2024-06-06 RX ADMIN — ROCURONIUM BROMIDE 20 MG: 10 INJECTION, SOLUTION INTRAVENOUS at 22:51

## 2024-06-06 RX ADMIN — OXYCODONE HYDROCHLORIDE 10 MG: 5 TABLET ORAL at 09:52

## 2024-06-06 RX ADMIN — FENTANYL CITRATE 25 MCG: 50 INJECTION, SOLUTION INTRAMUSCULAR; INTRAVENOUS at 23:05

## 2024-06-06 RX ADMIN — SODIUM CHLORIDE, PRESERVATIVE FREE 10 ML: 5 INJECTION INTRAVENOUS at 09:58

## 2024-06-06 RX ADMIN — METHYLPREDNISOLONE SODIUM SUCCINATE 60 MG: 40 INJECTION, POWDER, LYOPHILIZED, FOR SOLUTION INTRAMUSCULAR; INTRAVENOUS at 05:19

## 2024-06-06 RX ADMIN — POLYETHYLENE GLYCOL 3350 17 G: 17 POWDER, FOR SOLUTION ORAL at 09:56

## 2024-06-06 RX ADMIN — TRANEXAMIC ACID 1000 MG: 10 INJECTION, SOLUTION INTRAVENOUS at 22:27

## 2024-06-06 ASSESSMENT — PAIN DESCRIPTION - DESCRIPTORS
DESCRIPTORS: ACHING

## 2024-06-06 ASSESSMENT — PAIN DESCRIPTION - LOCATION
LOCATION: HEAD

## 2024-06-06 ASSESSMENT — PAIN DESCRIPTION - ORIENTATION: ORIENTATION: POSTERIOR

## 2024-06-06 ASSESSMENT — PAIN DESCRIPTION - PAIN TYPE: TYPE: CHRONIC PAIN

## 2024-06-06 ASSESSMENT — PAIN SCALES - GENERAL
PAINLEVEL_OUTOF10: 0
PAINLEVEL_OUTOF10: 9
PAINLEVEL_OUTOF10: 8

## 2024-06-06 NOTE — PROGRESS NOTES
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 Chronic ITP. History of intraparenchymal brain bleed     Interval Changes:  Patient seen and examined.  Labs and vitals reviewed.  Status post cranioplasty  Platelet slowly trending down  Pending CT brain      History of Present Illness:    The patient is a 59 y.o.  male who is admitted  to the neurosurgery team for an elective cranioplasty scheduled for 5/29.  Hematology oncology team consulted due to patient's history of thrombocytopenia and ITP.  Patient is relevant hematologic history is as below.  Patient is currently on Promacta for ITP.  Patient not seen in office by Dr. Chang on 5/21.  Platelet count was 1 40,000 at that time.  Patient platelet count has dropped to 56,000 now.  Hemoglobin 13.6 WBC count 5.8.  Hematology oncology team consulted due to thrombocytopenia in light of patient's upcoming surgery.     Patient does have history of dural venous thrombosis and acute hemorrhagic stroke.  Patient also has history of DVT.     DIAGNOSIS:   Chronic thrombocytopenia, patient had a evaluation in [place] and also in the past and thought to be immune mediated  History of dural sinus thrombosis and history of DVT in 2019  Chronic anticoagulation with Eliquis  Had a hemorrhagic stroke in December 2023  Patient received platelet transfusion during the hospitalization and also was given IVIG on 1/9/2024 and 1/10/2024  Patient was treated with Decadron 40 mg on 2/18 - 2/20 1-24, with no response  Patient planning surgical procedure with neurosurgery  Bone marrow biopsy negative for acute bone marrow pathology  I will plan to get IVIG weekly for 5 doses and also prescription  MD MENDEZ   acetaminophen (TYLENOL) 500 MG tablet Take 2 tablets by mouth in the morning, at noon, and at bedtime 4/30/24   Danica De La Paz MD   gabapentin (NEURONTIN) 300 MG capsule Take 1 capsule by mouth 3 times daily for 90 days. 4/30/24 7/29/24  Danica De La Paz MD   Multiple Vitamins-Minerals (THERAPEUTIC MULTIVITAMIN-MINERALS) tablet Take 1 tablet by mouth daily 4/1/24   Blanca Ren MD   lacosamide (VIMPAT) 100 MG TABS tablet Take 1 tablet by mouth 2 times daily for 90 days. Max Daily Amount: 200 mg 4/1/24 6/30/24  Blanca Ren MD   ipratropium 0.5 mg-albuterol 2.5 mg (DUONEB) 0.5-2.5 (3) MG/3ML SOLN nebulizer solution Inhale 3 mLs into the lungs every 6 hours as needed for Shortness of Breath 4/1/24   Blanca Ren MD   citalopram (CELEXA) 10 MG tablet Take 1 tablet by mouth daily 4/1/24   Blanca Ren MD   Baclofen (LIORESAL) 5 MG tablet Take 1 tablet by mouth 2 times daily 4/1/24   Blanca Ren MD   atorvastatin (LIPITOR) 80 MG tablet Take 1 tablet by mouth nightly 4/1/24   Blanca Ren MD   tamsulosin (FLOMAX) 0.4 MG capsule Take 1 capsule by mouth daily 4/1/24   Blanca Ren MD   Handicap Placard MISC by Does not apply route Duration: 1 year / Dx: Stroke 2/25/24   Dipika Angulo MD   diclofenac sodium (VOLTAREN) 1 % GEL Apply 2 g topically 2 times daily Apply to knees 2/25/24   Dipika Angulo MD     Current Facility-Administered Medications   Medication Dose Route Frequency Provider Last Rate Last Admin    enoxaparin Sodium (LOVENOX) injection 30 mg  30 mg SubCUTAneous Daily Oswaldo Knight, APRN - NP   30 mg at 06/06/24 1335    sodium chloride flush 0.9 % injection 5-40 mL  5-40 mL IntraVENous 2 times per day Emory Allen PA-C   10 mL at 06/06/24 0958    sodium chloride flush 0.9 % injection 5-40 mL  5-40 mL IntraVENous PRN Emory Allen PA-C        0.9 % sodium chloride infusion   IntraVENous PRN Emory Allen PA-C        oxyCODONE (ROXICODONE)

## 2024-06-06 NOTE — PROGRESS NOTES
Occupational Therapy  Facility/Department: [address] NEURO ICU  Occupational Daily Treatment Note    Name: Brennen Mcclure  : 1965  MRN: [id number]  Date of Service: 2024    Discharge Recommendations:  Patient would benefit from continued therapy after discharge  Patient Diagnosis(es): There were no encounter diagnoses.  Past Medical History:  has a past medical history of Anemia, CAD (coronary artery disease), Chronic deep vein thrombosis (DVT) of both lower extremities (HCC), Chronic deep vein thrombosis (DVT) of femoral vein of right lower extremity (HCC), Chronic deep vein thrombosis (DVT) of proximal vein of right lower extremity (HCC), Chronic deep vein thrombosis (DVT) of right iliac vein (HCC), Chronic idiopathic thrombocytopenia (HCC), Chronic idiopathic thrombocytopenic purpura (HCC), CVA (cerebral vascular accident) (HCC), Emphysema lung (HCC), Hemosiderin pigmentation of skin, HLD (hyperlipidemia), Intracranial hemorrhage (HCC), Left hemiplegia (HCC), Right leg swelling, and Thrombocytopenia (HCC).  Past Surgical History:  has a past surgical history that includes Coronary artery bypass graft; Coronary stent placement; craniotomy (Right); CT BIOPSY BONE MARROW (2024); Cranioplasty (Right, 2024); and craniotomy (Right, 2024).    Treatment Diagnosis: Absence of Bone of Skull  Assessment   Performance deficits / Impairments: Decreased functional mobility ;Decreased safe awareness;Decreased balance;Decreased coordination;Decreased ADL status;Decreased cognition;Decreased ROM;Decreased strength;Decreased fine motor control;Decreased endurance;Decreased posture  Prognosis: Fair  Activity Tolerance  Activity Tolerance: Patient Tolerated treatment well;Treatment limited secondary to decreased cognition  Activity Tolerance Comments: L side weakness        Plan   Occupational Therapy Plan  Times Per Week: 5-6 x week  Current Treatment Recommendations: Strengthening, ROM, Balance training,  shoulder abd/add, digits extension). Wash cloth placed in pt L hand  to promote flex.  Education Given To: Patient  Education Provided Comments: OT POC, transfer/walker safety, importance of participation in therapy, ROM exercises, bed mobility, sitting posture with poor/fair return.  Barriers to Learning: Cognition  AM-PAC - ADL  AM-PAC Daily Activity - Inpatient   How much help is needed for putting on and taking off regular lower body clothing?: A Lot  How much help is needed for bathing (which includes washing, rinsing, drying)?: A Lot  How much help is needed for toileting (which includes using toilet, bedpan, or urinal)?: A Lot  How much help is needed for putting on and taking off regular upper body clothing?: A Lot  How much help is needed for taking care of personal grooming?: A Little  How much help for eating meals?: A Little  AM-[facility] Inpatient Daily Activity Raw Score: 14  AM-PAC Inpatient ADL T-Scale Score : 33.39  ADL Inpatient CMS 0-100% Score: 59.67  ADL Inpatient CMS G-Code Modifier : CK    Goals  Short Term Goals  Time Frame for Short Term Goals: Pt will, by discharge:  Short Term Goal 1: Dem CGA with functional transfers for daily occupations within scotty steady  Short Term Goal 2: Dem min a for bed mobility with use of bed rail  Short Term Goal 3: Dem good safety awareness tech for all transfers/mobility with one verbal cue  Short Term Goal 4: Dem Mod with adl performance with use of AE as needed  Short Term Goal 5: Dem a 20 min dynamic sitting balance task with SBA to increase activity tolerance for ADL'S  Short Term Goal 6: Dem a functional transfer without use of scotty steady mod a with LRAD for daily occupations to improve balance  Short Term Goal 7: Dem weight bearing through LUE to increase proprioceptive input for adl tasks  Short Term Goal 8: Dem dynamic mobility with use of LRAD mod x 2 for household distance       Therapy Time   Individual Concurrent Group Co-treatment   Time In 0957

## 2024-06-06 NOTE — PROGRESS NOTES
intra-axial hemorrhage is seen.     IR LUMBAR PUNCTURE FOR DIAGNOSIS    Result Date: 5/12/2024  EXAMINATION: FLUOROSCOPIC GUIDED LUMBAR PUNCTURE 5/12/2024 4:15 pm HISTORY: ORDERING SYSTEM PROVIDED HISTORY: for altered mental status TECHNOLOGIST PROVIDED HISTORY: for altered mental status FLUOROSCOPY DOSE AND TYPE: Radiation Exposure Index: DAP cGy*cm2, 244 PROCEDURE: :  Audi Rosas MD Informed consent was obtained after the risks and benefits of the procedure were discussed with the patient and all questions were answered fully. [place] protocol was observed and a standard timeout was performed. The patient was positioned prone and the back was prepped and draped in the normal sterile fashion. 1% lidocaine was used for local anesthesia. The subarachnoid space was accessed with a 22-gauge 3.5\" spinal needle at the L3 level. Free flow of clear CSF was noted. Approximately 7 ml of CSF was removed and sent for analysis. The stylet was reinserted, spinal needle was removed and brief pressure was applied at the puncture site. There were no immediate complications and the patient tolerated the procedure well. EBL: None     Successful fluoroscopic-guided lumbar puncture.     MRV HEAD WO CONTRAST    Result Date: 5/12/2024  EXAMINATION: MRV OF THE HEAD WITHOUT CONTRAST  5/12/2024: TECHNIQUE: Multiplanar multisequence MRV of the head was performed without the administration of intravenous contrast. COMPARISON: None. HISTORY: ORDERING SYSTEM PROVIDED HISTORY: looking for venous occlusion TECHNOLOGIST PROVIDED HISTORY: looking for venous occlusion Reason for Exam: looking for venous occlusion FINDINGS: No focal stenosis or thrombus is seen of the major dural venous sinuses.     Unremarkable MRV of the head.     CTA HEAD NECK W CONTRAST    Result Date: 5/10/2024  EXAMINATION: CTA OF THE HEAD AND NECK WITH CONTRAST 5/10/2024 4:18 pm: TECHNIQUE: CTA of the head and neck was performed with the administration of

## 2024-06-06 NOTE — PLAN OF CARE

## 2024-06-06 NOTE — PLAN OF CARE
Output by Drain (mL) 06/04/24 0701 - 06/04/24 1900 06/04/24 1901 - 06/05/24 0700 06/05/24 0701 - 06/05/24 1900 06/05/24 1901 - 06/06/24 0700 06/06/24 0701 - 06/06/24 1014   Closed/Suction Drain Right Scalp Bag 50 60 40 10        Drain Removal Note    []Subdural Drain   [x]Subgaleal Drain  []Ventriculostomy Drain    Drain removed from suction, prepped with betadine and sterile towels placed to create sterile field. Drain suture cut and drain removed. A single figure 8 suture placed with 3.0 nylon over drain hole with hemostasis.     No complications, patient tolerated procedure well.    --  Oswaldo Knight CNP  10:14 AM EDT

## 2024-06-06 NOTE — PROGRESS NOTES
Physical Therapy  Facility/Department: [address] NEURO ICU  Physical Therapy Daily Treatment Note    Name: Brennen Mcclure  : 1965  MRN: [id number]  Date of Service: 2024    Discharge Recommendations:  Patient would benefit from continued therapy after discharge Further therapy recommended at discharge.The patient should be able to tolerate at least 3 hours of therapy per day over 5 days or 15 hours over 7 days.   This patient may benefit from a Physical Medicine and Rehab consult.    PT Equipment Recommendations  Equipment Needed: Yes (Pt using a hemiwalker with therapy at this time, but requires assistance. Pt has a scotty stedy at home.)  Mobility Devices: Wheelchair  Wheelchair: Standard      Patient Diagnosis(es): There were no encounter diagnoses.  Past Medical History:  has a past medical history of Anemia, CAD (coronary artery disease), Chronic deep vein thrombosis (DVT) of both lower extremities (HCC), Chronic deep vein thrombosis (DVT) of femoral vein of right lower extremity (HCC), Chronic deep vein thrombosis (DVT) of proximal vein of right lower extremity (HCC), Chronic deep vein thrombosis (DVT) of right iliac vein (HCC), Chronic idiopathic thrombocytopenia (HCC), Chronic idiopathic thrombocytopenic purpura (HCC), CVA (cerebral vascular accident) (HCC), Emphysema lung (HCC), Hemosiderin pigmentation of skin, HLD (hyperlipidemia), Intracranial hemorrhage (HCC), Left hemiplegia (HCC), Right leg swelling, and Thrombocytopenia (HCC).  Past Surgical History:  has a past surgical history that includes Coronary artery bypass graft; Coronary stent placement; craniotomy (Right); CT BIOPSY BONE MARROW (2024); Cranioplasty (Right, 2024); and craniotomy (Right, 2024).    Assessment   Body Structures, Functions, Activity Limitations Requiring Skilled Therapeutic Intervention: Decreased functional mobility ;Decreased endurance;Decreased cognition;Decreased coordination;Decreased safe  very talkative and needs max verbal cues for redirect.        Balance  Posture: Fair  Sitting - Static: -  Sitting - Dynamic: Fair;-  Standing - Static: Poor;+  Standing - Dynamic: Poor  Comments: Assessed sitting at EOB and standing at hemiwalker  Dynamic Sitting Balance Exercises: Pt required Naveen to maintain sitting at EOB requiring max verbal cues to correct posture utilzing mirror  (not successful) to correct posture. Pt benefitted from verbal and tactile cues to correct posture. Attempted R hand on R knee with L UE on L LE while sitting to challenge balance; pt maintained ~1 minute, but then started to posterior lean and required R UE on bed rail to prevent posterior lean.  Pt demonstrated primarily with posterior and L trunk rotation posteriorly with difficulty correcting.  Dynamic Standing Balance Exercises: Max Ax2 to weighshift during stand pivot transfers blocking L knee with modA to progress L LE towards WC and recliner.  Verbal cues to encourage upright posture when standing at natacha walker ~3 mins requiring modA. AROM R LE in all planes x 30 reps. Tactile and verbal cues to promote control of movement to progress pt towards goals.PROM L LE in all planes x 10 reps.      OutComes Score  AM-PAC - Mobility  AM-PAC Basic Mobility - Inpatient   How much help is needed turning from your back to your side while in a flat bed without using bedrails?: A Lot  How much help is needed moving from lying on your back to sitting on the side of a flat bed without using bedrails?: A Lot  How much help is needed moving to and from a bed to a chair?: Total  How much help is needed standing up from a chair using your arms?: A Lot  How much help is needed walking in hospital room?: Total  How much help is needed climbing 3-5 steps with a railing?: Total  AM-PAC Inpatient Mobility Raw Score : 9  AM-PAC Inpatient T-Scale Score : 30.55  Mobility Inpatient CMS 0-100% Score: 81.38  Mobility Inpatient CMS G-Code Modifier :

## 2024-06-06 NOTE — DISCHARGE INSTRUCTIONS
Cranioplasty Discharge Instructions     Thank you for choosing [facility] for your surgical needs. The following instructions will help to ensure your comfort and that you are well prepared after your surgery.       Post-Operative Visit:   The office is located at:    [facility]    [facility] [address], [address], [place] [address]    [phone]     Please also call your primary care physician to schedule an appointment for further evaluation and care.       Diet:   You may resume your regular diet   Be sure to eat a well-balanced diet. Protein promotes wound healing.   Pain medication and decreased activity can cause constipation. Drink 8-10 glasses of water a day, eat fresh fruits and vegetables, and add prunes, raisins and bran cereals to your diet if you do become constipated. A stool softener taken 1-2 times a day is helpful. Dulcolax suppositories or Fleets enemas are also available without a prescription. Call our office if the problem continues.     Activity and Exercise:   No lifting greater than 5 pounds (gallon of milk) for the first few weeks after surgery.   No driving until you are seen in the office. If you have had a seizure or have visual deficits, you may not drive until cleared by a neurologist.   Avoid riding in a car for the first two weeks until you come to the office for your scheduled follow-up.   Start taking short, frequent walks in the beginning. [place], more frequent walks throughout the day are more beneficial than one long walk each day. You may gradually increase the distance; as tolerated.   If your pain increases, you may be walking too much or too far. Try backing off for a day or two and then resume slowly.   No baths, swimming or hot tub until you discuss this with your doctor.     Incision Care and Hygiene:   Your incision is closed with staples or sutures and

## 2024-06-07 ENCOUNTER — APPOINTMENT (OUTPATIENT)
Dept: GENERAL RADIOLOGY | Age: 59
End: 2024-06-07
Attending: NEUROLOGICAL SURGERY
Payer: MEDICAID

## 2024-06-07 ENCOUNTER — APPOINTMENT (OUTPATIENT)
Dept: CT IMAGING | Age: 59
End: 2024-06-07
Attending: NEUROLOGICAL SURGERY
Payer: MEDICAID

## 2024-06-07 PROBLEM — S06.4XAA EPIDURAL HEMATOMA (HCC): Status: ACTIVE | Noted: 2024-06-07

## 2024-06-07 PROBLEM — I61.9 BRAIN BLEED (HCC): Status: ACTIVE | Noted: 2024-06-07

## 2024-06-07 PROBLEM — J96.01 ACUTE RESPIRATORY FAILURE WITH HYPOXIA (HCC): Status: ACTIVE | Noted: 2024-06-07

## 2024-06-07 PROBLEM — S06.360A INTRACEREBRAL HEMATOMA (HCC): Status: ACTIVE | Noted: 2024-01-31

## 2024-06-07 LAB
ALLEN TEST: ABNORMAL
ANION GAP SERPL CALCULATED.3IONS-SCNC: 11 MMOL/L (ref 9–16)
BACTERIA URNS QL MICRO: NORMAL
BASOPHILS # BLD: 0 K/UL (ref 0–0.2)
BASOPHILS NFR BLD: 0 % (ref 0–2)
BILIRUB UR QL STRIP: NEGATIVE
BUN SERPL-MCNC: 30 MG/DL (ref 8–23)
CALCIUM SERPL-MCNC: 8.8 MG/DL (ref 8.6–10.4)
CASTS #/AREA URNS LPF: NORMAL /LPF (ref 0–8)
CHLORIDE SERPL-SCNC: 106 MMOL/L (ref 98–107)
CLARITY UR: ABNORMAL
CO2 SERPL-SCNC: 21 MMOL/L (ref 20–31)
COLOR UR: YELLOW
CREAT SERPL-MCNC: 0.8 MG/DL (ref 0.7–1.2)
EOSINOPHIL # BLD: 0 K/UL (ref 0–0.4)
EOSINOPHILS RELATIVE PERCENT: 0 % (ref 1–4)
EPI CELLS #/AREA URNS HPF: NORMAL /HPF (ref 0–5)
ERYTHROCYTE [DISTWIDTH] IN BLOOD BY AUTOMATED COUNT: 20.5 % (ref 11.8–14.4)
ERYTHROCYTE [DISTWIDTH] IN BLOOD BY AUTOMATED COUNT: 20.9 % (ref 11.8–14.4)
FIO2: 40
FIO2: 50
GFR, ESTIMATED: >90 ML/MIN/1.73M2
GLUCOSE BLD-MCNC: 129 MG/DL (ref 74–100)
GLUCOSE BLD-MCNC: 155 MG/DL (ref 74–100)
GLUCOSE SERPL-MCNC: 147 MG/DL (ref 74–99)
GLUCOSE UR STRIP-MCNC: NEGATIVE MG/DL
HCT VFR BLD AUTO: 34.2 % (ref 40.7–50.3)
HCT VFR BLD AUTO: 35.9 % (ref 40.7–50.3)
HGB BLD-MCNC: 11.2 G/DL (ref 13–17)
HGB BLD-MCNC: 11.8 G/DL (ref 13–17)
HGB UR QL STRIP.AUTO: ABNORMAL
IMM GRANULOCYTES # BLD AUTO: 0.67 K/UL (ref 0–0.3)
IMM GRANULOCYTES NFR BLD: 3 %
KETONES UR STRIP-MCNC: NEGATIVE MG/DL
LEUKOCYTE ESTERASE UR QL STRIP: ABNORMAL
LYMPHOCYTES NFR BLD: 5.13 K/UL (ref 1–4.8)
LYMPHOCYTES RELATIVE PERCENT: 23 % (ref 24–44)
MCH RBC QN AUTO: 29.8 PG (ref 25.2–33.5)
MCH RBC QN AUTO: 29.9 PG (ref 25.2–33.5)
MCHC RBC AUTO-ENTMCNC: 32.7 G/DL (ref 28.4–34.8)
MCHC RBC AUTO-ENTMCNC: 32.9 G/DL (ref 28.4–34.8)
MCV RBC AUTO: 90.9 FL (ref 82.6–102.9)
MCV RBC AUTO: 91 FL (ref 82.6–102.9)
MODE: ABNORMAL
MONOCYTES NFR BLD: 10 % (ref 1–7)
MONOCYTES NFR BLD: 2.23 K/UL (ref 0.1–0.8)
MORPHOLOGY: ABNORMAL
MORPHOLOGY: ABNORMAL
NEGATIVE BASE EXCESS, ART: 1.4 MMOL/L (ref 0–2)
NEGATIVE BASE EXCESS, ART: 1.8 MMOL/L (ref 0–2)
NEUTROPHILS NFR BLD: 64 % (ref 36–66)
NEUTS SEG NFR BLD: 14.27 K/UL (ref 1.8–7.7)
NITRITE UR QL STRIP: NEGATIVE
NRBC BLD-RTO: 1.2 PER 100 WBC
NRBC BLD-RTO: 2.6 PER 100 WBC
O2 DELIVERY DEVICE: ABNORMAL
PH UR STRIP: 5 [PH] (ref 5–8)
PLATELET # BLD AUTO: 176 K/UL (ref 138–453)
PLATELET # BLD AUTO: 263 K/UL (ref 138–453)
PMV BLD AUTO: 11.2 FL (ref 8.1–13.5)
PMV BLD AUTO: 11.3 FL (ref 8.1–13.5)
POC HCO3: 22.4 MMOL/L (ref 21–28)
POC HCO3: 22.6 MMOL/L (ref 21–28)
POC LACTIC ACID: 1.6 MMOL/L (ref 0.56–1.39)
POC O2 SATURATION: 99.5 % (ref 94–98)
POC O2 SATURATION: 99.7 % (ref 94–98)
POC PCO2: 34 MM HG (ref 35–48)
POC PCO2: 36.8 MM HG (ref 35–48)
POC PH: 7.4 (ref 7.35–7.45)
POC PH: 7.43 (ref 7.35–7.45)
POC PO2: 171.6 MM HG (ref 83–108)
POC PO2: 187.4 MM HG (ref 83–108)
POTASSIUM SERPL-SCNC: 4 MMOL/L (ref 3.7–5.3)
PROT UR STRIP-MCNC: ABNORMAL MG/DL
RBC # BLD AUTO: 3.76 M/UL (ref 4.21–5.77)
RBC # BLD AUTO: 3.95 M/UL (ref 4.21–5.77)
RBC #/AREA URNS HPF: NORMAL /HPF (ref 0–4)
SAMPLE SITE: ABNORMAL
SODIUM SERPL-SCNC: 138 MMOL/L (ref 136–145)
SP GR UR STRIP: 1.04 (ref 1–1.03)
TROPONIN I SERPL HS-MCNC: 13 NG/L (ref 0–22)
UROBILINOGEN UR STRIP-ACNC: NORMAL EU/DL (ref 0–1)
WBC #/AREA URNS HPF: NORMAL /HPF (ref 0–5)
WBC OTHER # BLD: 22.3 K/UL (ref 3.5–11.3)
WBC OTHER # BLD: 25.8 K/UL (ref 3.5–11.3)

## 2024-06-07 PROCEDURE — 6360000002 HC RX W HCPCS: Performed by: STUDENT IN AN ORGANIZED HEALTH CARE EDUCATION/TRAINING PROGRAM

## 2024-06-07 PROCEDURE — 37799 UNLISTED PX VASCULAR SURGERY: CPT

## 2024-06-07 PROCEDURE — 36415 COLL VENOUS BLD VENIPUNCTURE: CPT

## 2024-06-07 PROCEDURE — 85027 COMPLETE CBC AUTOMATED: CPT

## 2024-06-07 PROCEDURE — 6370000000 HC RX 637 (ALT 250 FOR IP)

## 2024-06-07 PROCEDURE — 2500000003 HC RX 250 WO HCPCS

## 2024-06-07 PROCEDURE — 6360000002 HC RX W HCPCS

## 2024-06-07 PROCEDURE — 82947 ASSAY GLUCOSE BLOOD QUANT: CPT

## 2024-06-07 PROCEDURE — 2580000003 HC RX 258

## 2024-06-07 PROCEDURE — 94761 N-INVAS EAR/PLS OXIMETRY MLT: CPT

## 2024-06-07 PROCEDURE — 87070 CULTURE OTHR SPECIMN AEROBIC: CPT

## 2024-06-07 PROCEDURE — 80048 BASIC METABOLIC PNL TOTAL CA: CPT

## 2024-06-07 PROCEDURE — A4216 STERILE WATER/SALINE, 10 ML: HCPCS

## 2024-06-07 PROCEDURE — 2700000000 HC OXYGEN THERAPY PER DAY

## 2024-06-07 PROCEDURE — 87077 CULTURE AEROBIC IDENTIFY: CPT

## 2024-06-07 PROCEDURE — 87205 SMEAR GRAM STAIN: CPT

## 2024-06-07 PROCEDURE — 93005 ELECTROCARDIOGRAM TRACING: CPT | Performed by: STUDENT IN AN ORGANIZED HEALTH CARE EDUCATION/TRAINING PROGRAM

## 2024-06-07 PROCEDURE — 2580000003 HC RX 258: Performed by: STUDENT IN AN ORGANIZED HEALTH CARE EDUCATION/TRAINING PROGRAM

## 2024-06-07 PROCEDURE — 70450 CT HEAD/BRAIN W/O DYE: CPT

## 2024-06-07 PROCEDURE — 83605 ASSAY OF LACTIC ACID: CPT

## 2024-06-07 PROCEDURE — 2000000000 HC ICU R&B

## 2024-06-07 PROCEDURE — 85025 COMPLETE CBC W/AUTO DIFF WBC: CPT

## 2024-06-07 PROCEDURE — 84484 ASSAY OF TROPONIN QUANT: CPT

## 2024-06-07 PROCEDURE — 82803 BLOOD GASES ANY COMBINATION: CPT

## 2024-06-07 PROCEDURE — 99232 SBSQ HOSP IP/OBS MODERATE 35: CPT | Performed by: INTERNAL MEDICINE

## 2024-06-07 PROCEDURE — 94002 VENT MGMT INPAT INIT DAY: CPT

## 2024-06-07 PROCEDURE — 99291 CRITICAL CARE FIRST HOUR: CPT | Performed by: STUDENT IN AN ORGANIZED HEALTH CARE EDUCATION/TRAINING PROGRAM

## 2024-06-07 PROCEDURE — APPSS15 APP SPLIT SHARED TIME 0-15 MINUTES: Performed by: NURSE PRACTITIONER

## 2024-06-07 PROCEDURE — 87040 BLOOD CULTURE FOR BACTERIA: CPT

## 2024-06-07 PROCEDURE — 71045 X-RAY EXAM CHEST 1 VIEW: CPT

## 2024-06-07 PROCEDURE — 87186 SC STD MICRODIL/AGAR DIL: CPT

## 2024-06-07 PROCEDURE — 81001 URINALYSIS AUTO W/SCOPE: CPT

## 2024-06-07 RX ORDER — NICARDIPINE HYDROCHLORIDE 0.1 MG/ML
2.5-15 INJECTION INTRAVENOUS CONTINUOUS
Status: DISCONTINUED | OUTPATIENT
Start: 2024-06-07 | End: 2024-06-08

## 2024-06-07 RX ORDER — HYDRALAZINE HYDROCHLORIDE 20 MG/ML
10 INJECTION INTRAMUSCULAR; INTRAVENOUS EVERY 4 HOURS PRN
Status: DISCONTINUED | OUTPATIENT
Start: 2024-06-07 | End: 2024-06-14 | Stop reason: HOSPADM

## 2024-06-07 RX ADMIN — MAGNESIUM GLUCONATE 500 MG ORAL TABLET 400 MG: 500 TABLET ORAL at 08:10

## 2024-06-07 RX ADMIN — SODIUM CHLORIDE, PRESERVATIVE FREE 10 ML: 5 INJECTION INTRAVENOUS at 09:10

## 2024-06-07 RX ADMIN — DICLOFENAC SODIUM 2 G: 10 GEL TOPICAL at 11:56

## 2024-06-07 RX ADMIN — ACETAMINOPHEN 1000 MG: 500 TABLET ORAL at 08:09

## 2024-06-07 RX ADMIN — TOPIRAMATE 50 MG: 50 TABLET, FILM COATED ORAL at 22:00

## 2024-06-07 RX ADMIN — FAMOTIDINE 20 MG: 10 INJECTION, SOLUTION INTRAVENOUS at 22:00

## 2024-06-07 RX ADMIN — AMITRIPTYLINE HYDROCHLORIDE 75 MG: 50 TABLET, FILM COATED ORAL at 22:00

## 2024-06-07 RX ADMIN — FAMOTIDINE 20 MG: 20 TABLET, FILM COATED ORAL at 08:10

## 2024-06-07 RX ADMIN — METHYLPREDNISOLONE SODIUM SUCCINATE 60 MG: 40 INJECTION, POWDER, LYOPHILIZED, FOR SOLUTION INTRAMUSCULAR; INTRAVENOUS at 13:52

## 2024-06-07 RX ADMIN — Medication 2000 MG: at 06:57

## 2024-06-07 RX ADMIN — ACETAMINOPHEN 1000 MG: 500 TABLET ORAL at 22:00

## 2024-06-07 RX ADMIN — PROPOFOL 10 MCG/KG/MIN: 10 INJECTION, EMULSION INTRAVENOUS at 08:21

## 2024-06-07 RX ADMIN — LACOSAMIDE 100 MG: 100 TABLET, FILM COATED ORAL at 08:09

## 2024-06-07 RX ADMIN — NICARDIPINE HYDROCHLORIDE 5 MG/HR: 0.1 INJECTION INTRAVENOUS at 03:32

## 2024-06-07 RX ADMIN — NICARDIPINE HYDROCHLORIDE 5 MG/HR: 0.1 INJECTION INTRAVENOUS at 02:05

## 2024-06-07 RX ADMIN — POLYETHYLENE GLYCOL 3350 17 G: 17 POWDER, FOR SOLUTION ORAL at 08:08

## 2024-06-07 RX ADMIN — ACETAMINOPHEN 1000 MG: 500 TABLET ORAL at 05:16

## 2024-06-07 RX ADMIN — METHYLPREDNISOLONE SODIUM SUCCINATE 60 MG: 40 INJECTION, POWDER, LYOPHILIZED, FOR SOLUTION INTRAMUSCULAR; INTRAVENOUS at 23:26

## 2024-06-07 RX ADMIN — PIPERACILLIN AND TAZOBACTAM 3375 MG: 3; .375 INJECTION, POWDER, LYOPHILIZED, FOR SOLUTION INTRAVENOUS at 13:50

## 2024-06-07 RX ADMIN — BACLOFEN 5 MG: 5 TABLET ORAL at 08:09

## 2024-06-07 RX ADMIN — TOPIRAMATE 50 MG: 50 TABLET, FILM COATED ORAL at 08:09

## 2024-06-07 RX ADMIN — SODIUM CHLORIDE, PRESERVATIVE FREE 10 ML: 5 INJECTION INTRAVENOUS at 22:23

## 2024-06-07 RX ADMIN — LACOSAMIDE 100 MG: 100 TABLET, FILM COATED ORAL at 22:00

## 2024-06-07 RX ADMIN — PROPOFOL 20 MCG/KG/MIN: 10 INJECTION, EMULSION INTRAVENOUS at 03:01

## 2024-06-07 RX ADMIN — HYDROMORPHONE HYDROCHLORIDE 0.25 MG: 1 INJECTION, SOLUTION INTRAMUSCULAR; INTRAVENOUS; SUBCUTANEOUS at 13:51

## 2024-06-07 RX ADMIN — HYDROMORPHONE HYDROCHLORIDE 0.25 MG: 1 INJECTION, SOLUTION INTRAMUSCULAR; INTRAVENOUS; SUBCUTANEOUS at 17:34

## 2024-06-07 RX ADMIN — DICLOFENAC SODIUM 2 G: 10 GEL TOPICAL at 22:00

## 2024-06-07 RX ADMIN — NICARDIPINE HYDROCHLORIDE 2.5 MG/HR: 0.1 INJECTION INTRAVENOUS at 08:20

## 2024-06-07 RX ADMIN — PIPERACILLIN AND TAZOBACTAM 3375 MG: 3; .375 INJECTION, POWDER, LYOPHILIZED, FOR SOLUTION INTRAVENOUS at 20:00

## 2024-06-07 RX ADMIN — SODIUM CHLORIDE: 9 INJECTION, SOLUTION INTRAVENOUS at 12:22

## 2024-06-07 RX ADMIN — BACLOFEN 5 MG: 5 TABLET ORAL at 22:00

## 2024-06-07 RX ADMIN — FENTANYL CITRATE 50 MCG: 50 INJECTION, SOLUTION INTRAMUSCULAR; INTRAVENOUS at 00:08

## 2024-06-07 RX ADMIN — GABAPENTIN 300 MG: 300 CAPSULE ORAL at 08:09

## 2024-06-07 RX ADMIN — ROCURONIUM BROMIDE 20 MG: 10 INJECTION, SOLUTION INTRAVENOUS at 00:08

## 2024-06-07 RX ADMIN — METHYLPREDNISOLONE SODIUM SUCCINATE 60 MG: 40 INJECTION, POWDER, LYOPHILIZED, FOR SOLUTION INTRAMUSCULAR; INTRAVENOUS at 06:58

## 2024-06-07 RX ADMIN — CITALOPRAM 10 MG: 10 TABLET, FILM COATED ORAL at 08:10

## 2024-06-07 RX ADMIN — GABAPENTIN 300 MG: 300 CAPSULE ORAL at 22:00

## 2024-06-07 ASSESSMENT — PAIN DESCRIPTION - LOCATION
LOCATION: HEAD
LOCATION: HEAD

## 2024-06-07 ASSESSMENT — PULMONARY FUNCTION TESTS
PIF_VALUE: 22
PIF_VALUE: 16
PIF_VALUE: 22
PIF_VALUE: 23
PIF_VALUE: 17

## 2024-06-07 ASSESSMENT — PAIN DESCRIPTION - ORIENTATION
ORIENTATION: OTHER (COMMENT)
ORIENTATION: RIGHT;LEFT;ANTERIOR;POSTERIOR

## 2024-06-07 ASSESSMENT — PAIN SCALES - GENERAL: PAINLEVEL_OUTOF10: 9

## 2024-06-07 ASSESSMENT — PAIN DESCRIPTION - DESCRIPTORS
DESCRIPTORS: PATIENT UNABLE TO DESCRIBE
DESCRIPTORS: ACHING;DISCOMFORT;THROBBING

## 2024-06-07 NOTE — PROGRESS NOTES
Neurosurgery NANNETTE/Resident    Daily Progress Note   CC:No chief complaint on file.    6/7/2024  10:26 AM    Chart reviewed.  Decline in mentation last night, went for STAT CT head showing large right IPH and epidural hematoma, he was intubated while in ED to CT scanner due to continued decline, he was given TXa (received a dose of lovenox yesterday afternoon), he was also given Mannitol, He was taken to OR emergently with Dr gilmore for hematoma evacuation and remains intubated     Vitals:    06/07/24 0800 06/07/24 0815 06/07/24 0900 06/07/24 1000   BP: (!) 143/74 126/71 120/85 127/87   Pulse: (!) 106  (!) 103 100   Resp: 12  13 13   Temp: (!) 101.3 °F (38.5 °C)  (!) 101.3 °F (38.5 °C) (!) 101.1 °F (38.4 °C)   TempSrc:       SpO2:       Weight:       Height:           PE:   Intubated and sedation with sedation on hold for exam  PERRL  Following commands RUE and RLE   Left hemiplegic  Did not open eyes for me this morning but later he was opening for the nurses   Drain output: nothing documented in chart for output   Incision: CDI       Lab Results   Component Value Date    WBC 25.8 (H) 06/07/2024    HGB 11.8 (L) 06/07/2024    HCT 35.9 (L) 06/07/2024     06/07/2024    CHOL 118 05/16/2024    TRIG 182 (H) 05/16/2024    HDL 28 (L) 05/16/2024    ALT 28 05/08/2024    AST 29 05/08/2024     06/07/2024    K 4.0 06/07/2024     06/07/2024    CREATININE 0.8 06/07/2024    BUN 30 (H) 06/07/2024    CO2 21 06/07/2024    TSH 3.45 04/13/2024    INR 1.1 06/05/2024    LABA1C 5.2 05/16/2024    CRP 33.1 (H) 05/12/2024       Radiology   CT HEAD WO CONTRAST    Result Date: 6/7/2024  EXAMINATION: CT OF THE HEAD WITHOUT CONTRAST  6/7/2024 1:11 am TECHNIQUE: CT of the head was performed without the administration of intravenous contrast. Automated exposure control, iterative reconstruction, and/or weight based adjustment of the mA/kV was utilized to reduce the radiation dose to as low as reasonably achievable. COMPARISON: June  vasogenic edema, scattered encephalomalacia and scattered chronic blood products in the subjacent right frontal parietal temporal lobes.  There is 2.2 x 2.7 cm fluid collection with associated restricted diffusion and peripheral susceptibility artifacts in the right frontal lobe, likely related to surgical cavity with subacute blood products.  There is additional 5 x 11 mm fluid collection with associated restricted diffusion in the anterior right frontal lobe along the anterior margin of the craniectomy, likely related to subacute parenchymal hematoma. There is mass effect in the right cerebral hemisphere with decreased sulcation.  No midline shift. There is mild T2/FLAIR hyperintensity in the periventricular and subcortical white matter, likely related to mild chronic microvascular disease.  There is no acute infarct.   There is no evidence of hydrocephalus. The sellar/suprasellar regions appear unremarkable. ORBITS: The visualized portion of the orbits demonstrate no acute abnormality. SINUSES: There is scattered minimal mucosal thickening in the paranasal sinuses.  There is mild right mastoid effusion. BONES/SOFT TISSUES: The bone marrow signal intensity appears normal. The soft tissues demonstrate no acute abnormality.     Status post right frontal parietal temporal craniectomy. Residual vasogenic edema, scattered encephalomalacia and scattered chronic blood products in the subjacent right frontal parietal temporal lobes.  Mass effect in the right cerebral hemisphere. No midline shift. 2.2 x 2.7 cm fluid collection with associated restricted diffusion and peripheral susceptibility artifacts in the right frontal lobe, likely related to surgical cavity with subacute blood products. Additional 5 x 11 mm fluid collection with associated restricted diffusion in the anterior right frontal lobe along the anterior margin of the craniectomy, likely related to subacute parenchymal hematoma.     CT HEAD WO

## 2024-06-07 NOTE — PLAN OF CARE
Problem: Discharge Planning  Goal: Discharge to home or other facility with appropriate resources  6/7/2024 1554 by Rakel Torres, RN  Outcome: Progressing  6/7/2024 0938 by Efrain Alvarez, RN  Outcome: Progressing  Flowsheets  Taken 6/7/2024 0400  Discharge to home or other facility with appropriate resources:   Identify barriers to discharge with patient and caregiver   Arrange for needed discharge resources and transportation as appropriate   Identify discharge learning needs (meds, wound care, etc)   Refer to discharge planning if patient needs post-hospital services based on physician order or complex needs related to functional status, cognitive ability or social support system  Taken 6/7/2024 0145  Discharge to home or other facility with appropriate resources:   Identify barriers to discharge with patient and caregiver   Arrange for needed discharge resources and transportation as appropriate   Identify discharge learning needs (meds, wound care, etc)   Refer to discharge planning if patient needs post-hospital services based on physician order or complex needs related to functional status, cognitive ability or social support system     Problem: Chronic Conditions and Co-morbidities  Goal: Patient's chronic conditions and co-morbidity symptoms are monitored and maintained or improved  6/7/2024 1554 by Rakel Torres, RN  Outcome: Progressing  6/7/2024 0938 by Efrain Alvarez, RN  Outcome: Progressing  Flowsheets  Taken 6/7/2024 0400  Care Plan - Patient's Chronic Conditions and Co-Morbidity Symptoms are Monitored and Maintained or Improved:   Monitor and assess patient's chronic conditions and comorbid symptoms for stability, deterioration, or improvement   Collaborate with multidisciplinary team to address chronic and comorbid conditions and prevent exacerbation or deterioration   Update acute care plan with appropriate goals if chronic or comorbid symptoms are exacerbated and prevent overall improvement and  hydration  6/7/2024 0938 by Efrain Alvarez RN  Outcome: Progressing  Flowsheets  Taken 6/7/2024 0800 by Rakel Torres RN  Remains free of injury from restraints (restraint for interference with medical device):   Determine that other, less restrictive measures have been tried or would not be effective before applying the restraint   Evaluate the patient's condition at the time of restraint application   Inform patient/family regarding the reason for restraint   Every 2 hours: Monitor safety, psychosocial status, comfort, nutrition and hydration  Taken 6/7/2024 0600 by Efrain Alvarez RN  Remains free of injury from restraints (restraint for interference with medical device):   Determine that other, less restrictive measures have been tried or would not be effective before applying the restraint   Evaluate the patient's condition at the time of restraint application   Inform patient/family regarding the reason for restraint   Every 2 hours: Monitor safety, psychosocial status, comfort, nutrition and hydration  Taken 6/7/2024 0400 by Efrain Alvarez RN  Remains free of injury from restraints (restraint for interference with medical device):   Determine that other, less restrictive measures have been tried or would not be effective before applying the restraint   Evaluate the patient's condition at the time of restraint application   Inform patient/family regarding the reason for restraint   Every 2 hours: Monitor safety, psychosocial status, comfort, nutrition and hydration  Taken 6/7/2024 0200 by Efrain Alvarez RN  Remains free of injury from restraints (restraint for interference with medical device):   Determine that other, less restrictive measures have been tried or would not be effective before applying the restraint   Evaluate the patient's condition at the time of restraint application   Inform patient/family regarding the reason for restraint   Every 2 hours: Monitor safety, psychosocial status, comfort,

## 2024-06-07 NOTE — PROGRESS NOTES
SPIRITUAL HEALTH - [facility]  PROGRESS NOTE    Shift date: 6/06/2024  Shift day: Thursday   Shift # 3    Room # 0127/0127-01   Name: Brennen Mcclure                [facility]: Unknown   [facility] of [facility]: Unknown    Referral:  Bedside Nurse    Admit Date & Time: 5/28/2024  3:19 PM    Assessment:  Brennen Mcclure is a 59 y.o. male in the hospital because of \"Absence of Bone of Skull.\" Upon entering the room writer observes patient \"intubated\" in room, with spouse not currently present. Patient's bedside nurse introduced  to spouse in waiting room and then returned to bedside. During visit, patient had been \"weaned from sedation\" and was moving his hand as well as \"coughing\" while respiratory care was \"suctioning.\"     Intervention:   responded to Perfect[employer] message from bedside nurse, requesting support for spouse.  sat with patient's spouse in room and provided an active listening presence to her. Per spouse, patient sustained injuries in November as a  involved in [place] \"[place],\" [place]. Patient was admitted to the hospital this week to undergo further surgery to have a \"plate placed.\" Patient's spouse was tearful as she shared about patient undergoing \"emergent surgery\" in which he was \"intubated and given blood thinners.\" Per spouse, patient's wishes were \"to not be intubated\" and patient has a \"blood disorder\" where he cannot receive \"blood thinners.\"  provided support and care to spouse throughout encounter.  shared information for patient advocate with spouse and  also made a referral to other hospital resources to support patient's family and care team.     Outcome:  Patient's spouse thanked  for visit and care.     Plan:  Chaplains will remain available to offer spiritual and emotional support as needed.       06/07/24 0702   Encounter Summary   Encounter Overview/Reason Spiritual/Emotional Needs   Service Provided For Family    Referral/Consult From Nurse   Support System Spouse;Children   Last Encounter  06/06/24   Complexity of Encounter High   Begin Time 0702   End Time  0754   Total Time Calculated 52 min   Spiritual/Emotional needs   Type Spiritual Support   Grief, Loss, and Adjustments   Type Adjustment to illness   Assessment/Intervention/Outcome   Assessment Angry;Anxious;Fearful;Powerlessness;Tearful   Intervention Active listening;Discussed illness injury and it’s impact;Explored/Affirmed feelings, thoughts, concerns;Explored Coping Skills/Resources;Sustaining Presence/Ministry of presence   Outcome Engaged in conversation;Receptive;Venting emotion   Plan and Referrals   Plan/Referrals Continue to visit, (comment)  (as needed)     Electronically signed by Mateo Woodlain, on 6/7/2024 at 8:44 AM.  [facility]  [phone]

## 2024-06-07 NOTE — PLAN OF CARE
Problem: Discharge Planning  Goal: Discharge to home or other facility with appropriate resources  Outcome: Progressing  Flowsheets  Taken 6/7/2024 0400  Discharge to home or other facility with appropriate resources:   Identify barriers to discharge with patient and caregiver   Arrange for needed discharge resources and transportation as appropriate   Identify discharge learning needs (meds, wound care, etc)   Refer to discharge planning if patient needs post-hospital services based on physician order or complex needs related to functional status, cognitive ability or social support system  Taken 6/7/2024 0145  Discharge to home or other facility with appropriate resources:   Identify barriers to discharge with patient and caregiver   Arrange for needed discharge resources and transportation as appropriate   Identify discharge learning needs (meds, wound care, etc)   Refer to discharge planning if patient needs post-hospital services based on physician order or complex needs related to functional status, cognitive ability or social support system     Problem: Chronic Conditions and Co-morbidities  Goal: Patient's chronic conditions and co-morbidity symptoms are monitored and maintained or improved  Outcome: Progressing  Flowsheets  Taken 6/7/2024 0400  Care Plan - Patient's Chronic Conditions and Co-Morbidity Symptoms are Monitored and Maintained or Improved:   Monitor and assess patient's chronic conditions and comorbid symptoms for stability, deterioration, or improvement   Collaborate with multidisciplinary team to address chronic and comorbid conditions and prevent exacerbation or deterioration   Update acute care plan with appropriate goals if chronic or comorbid symptoms are exacerbated and prevent overall improvement and discharge  Taken 6/7/2024 0145  Care Plan - Patient's Chronic Conditions and Co-Morbidity Symptoms are Monitored and Maintained or Improved:   Monitor and assess patient's chronic  conditions and comorbid symptoms for stability, deterioration, or improvement   Collaborate with multidisciplinary team to address chronic and comorbid conditions and prevent exacerbation or deterioration   Update acute care plan with appropriate goals if chronic or comorbid symptoms are exacerbated and prevent overall improvement and discharge     Problem: Safety - Adult  Goal: Free from fall injury  Outcome: Progressing  Flowsheets (Taken 6/6/2024 2000 by Sterling Faustin RN)  Free From Fall Injury: Instruct family/caregiver on patient safety     Problem: Skin/Tissue Integrity  Goal: Absence of new skin breakdown  Description: 1.  Monitor for areas of redness and/or skin breakdown  2.  Assess vascular access sites hourly  3.  Every 4-6 hours minimum:  Change oxygen saturation probe site  4.  Every 4-6 hours:  If on nasal continuous positive airway pressure, respiratory therapy assess nares and determine need for appliance change or resting period.  Outcome: Progressing     Problem: Pain  Goal: Verbalizes/displays adequate comfort level or baseline comfort level  Outcome: Progressing  Flowsheets (Taken 6/7/2024 0516)  Verbalizes/displays adequate comfort level or baseline comfort level:   Encourage patient to monitor pain and request assistance   Assess pain using appropriate pain scale   Administer analgesics based on type and severity of pain and evaluate response   Implement non-pharmacological measures as appropriate and evaluate response   Consider cultural and social influences on pain and pain management   Notify Licensed Independent Practitioner if interventions unsuccessful or patient reports new pain     Problem: ABCDS Injury Assessment  Goal: Absence of physical injury  Outcome: Progressing  Flowsheets (Taken 6/6/2024 2000 by Sterling Faustin, RN)  Absence of Physical Injury: Implement safety measures based on patient assessment     Problem: Safety - Medical Restraint  Goal: Remains free of injury from

## 2024-06-07 NOTE — PROGRESS NOTES
[Held by provider] enoxaparin Sodium (LOVENOX) injection 30 mg  30 mg SubCUTAneous Daily Leonora Brown MD   30 mg at 06/06/24 1335    propofol infusion  5-50 mcg/kg/min IntraVENous Continuous Leonora Brown MD   Stopped at 06/07/24 1151    sodium chloride flush 0.9 % injection 5-40 mL  5-40 mL IntraVENous 2 times per day Leonora Brown MD   5 mL at 06/06/24 2030    sodium chloride flush 0.9 % injection 5-40 mL  5-40 mL IntraVENous PRN Leonora Brown MD        0.9 % sodium chloride infusion   IntraVENous PRN Leonora Brown MD        oxyCODONE (ROXICODONE) immediate release tablet 5 mg  5 mg Oral Q4H PRN Leonora Brown MD        Or    oxyCODONE (ROXICODONE) immediate release tablet 10 mg  10 mg Oral Q4H PRN Leonora Brown MD   10 mg at 06/06/24 0952    HYDROmorphone (DILAUDID) injection 0.25 mg  0.25 mg IntraVENous Q3H PRN Leonora Brown MD        Or    HYDROmorphone (DILAUDID) injection 0.5 mg  0.5 mg IntraVENous Q3H PRN Leonora Brown MD   0.5 mg at 06/06/24 1745    polyethylene glycol (GLYCOLAX) packet 17 g  17 g Oral Daily Leonora Brown MD   17 g at 06/07/24 0808    senna (SENOKOT) tablet 8.6 mg  1 tablet Oral Daily PRN Leonora Brown MD        famotidine (PEPCID) tablet 20 mg  20 mg Oral BID Leonora Brown MD   20 mg at 06/07/24 0810    Or    famotidine (PEPCID) 20 mg in sodium chloride (PF) 0.9 % 10 mL injection  20 mg IntraVENous BID Leonora Brown MD        0.9 % sodium chloride infusion   IntraVENous Continuous Leonora Brown MD 75 mL/hr at 06/07/24 1222 New Bag at 06/07/24 1222    eltrombopag olamine (PROMACTA) 50 MG tablet TABS 50 mg (Patient Supplied)  50 mg Oral Daily Leonora Brown MD   50 mg at 06/07/24 0810    sodium chloride flush 0.9 % injection 5-40 mL  5-40 mL IntraVENous 2 times per day Leonora Brown MD   10 mL at 06/07/24 0910    sodium chloride flush 0.9 % injection 5-40 mL  5-40 mL IntraVENous PRN Leonora Brown MD   10 mL at 06/04/24 0831    0.9 % sodium chloride infusion    given 1 dose by neurosurgery yesterday(discontinued)  Status post emergent right craniotomy for hematoma evacuation  Follow-up closely platelet count/coagulation      Discussed with patient and family and Nurse.    We will continue to follow up on this patient.                                       Les Armstrong MD                          [facility]                            This note is created with the assistance of a speech recognition program.  While intending to generate a document that actually reflects the content of the visit, the document can still have some errors including those of syntax and sound a like substitutions which may escape proof reading.  It such instances, actual meaning can be extrapolated by contextual diversion.

## 2024-06-07 NOTE — PROGRESS NOTES
Patient was brought down to the emergency department CT scanner from neuro intensive care unit.  While in the emergency department, patient GCS significantly declining and patient no longer protecting airway.  Provider was called for patient intubation.  Of note, patient has history of recent craniotomy patient was intubated with etomidate and succinylcholine at bedside, 7.5 cm ET tube was placed at 24 cm at the teeth.  Patient tolerated intubation.  Chest x-ray showing ET tube required advancement by 1 cm which was subsequently performed.    Intubation Procedure Note    Performed by: Umu Stafford MD    Indication: impending respiratory failure and impending airway compromise    Consent: Unable to be obtained due to the emergent nature of this procedure.    Time out performed: Immediately prior to the procedure a \"time out\" was called to verify the correct patient, the correct procedure, equipment, support staff and site/side marked as required.      Medications Used: etomidate intravenously and succinycholine intravenously    Procedure: The patient was placed in the appropriate position.  Intubation was performed using indirect laryngoscopy with Glidescope, a 7.5 cuffed endotracheal tube.  The cuff was then inflated and the tube was secured appropriately at a distance of 24 cm to the dental ridge.  Initial confirmation of placement included bilateral breath sounds, an end tidal CO2 detector, and tube fogging.  A chest x-ray to verify correct placement of the tube showed the tube needed advancing and the tube was repositioned accordingly.    The patient tolerated the procedure well.     Complications: None

## 2024-06-07 NOTE — PROGRESS NOTES
Date: 6/6/2024  Time: 2121  Patient identity confirmed:  Yes  Indications: airway protection  Preoxygenation: NRB mask    Laryngoscope size and type Glidescope  Airway introducer used: Yes  Evac: Yes  ETT size:a 7.5 cuffed  Number of attempts:1   Cords visualized:  [x] Clearly  [] Poorly  Breath sounds present bilaterally: Yes   ETCO2   [x] Positive   ETT secured at  24 @ teeth    ETT secured with commercial tube javier  Chest x-ray ordered: Yes     Difficult airway:    No       If yes, was red tape placed around ETT:   N/A    Was this a Code Situation:    No             BP: 120/89        Procedure performed by: Dr. Rivera Banks RCP  9:26 PM

## 2024-06-07 NOTE — PROGRESS NOTES
Neurosurgery Post op Progress Note      POD# 0    s/p craniotomy and intra subdural hematoma evacuation    SUBJECTIVE:      Patient presents initially for elective cranioplasty, POD # 2, was found to have altered mental status with rapidly worsening GCS score, vomiting, unstable vital signs with a CT head showing an intraparenchymal hematoma at the right temporal lobe, increased extra-axial hemorrhage over right cerebral convexity with 1.4 cm right to left midline shift      OBJECTIVE      Physical exam   VITALS:    Vitals:    06/07/24 0317   BP:    Pulse: 90   Resp: 18   Temp:    SpO2: 100%     INTAKE:    Intake/Output Summary (Last 24 hours) at 6/7/2024 0340  Last data filed at 6/7/2024 0033  Gross per 24 hour   Intake 2959.54 ml   Output 2470 ml   Net 489.54 ml            Neurological exam   Patient sedated and intubated  Off sedation he was following simple commands, able to squeeze right hand, triple flexion of right lower extremity, withdraws to painful stimuli over left upper and lower extremities      Wound   Post op wound:    Dressing is clean/dry/intact with no signs of drainage   Drain     ASSESSMENT AND PLAN    59 y.o. male status post craniotomy and hematoma evacuation post op day # 0    - Analgesia:  On propofol    - Periop Antibiotics: Ancef 2g q8hrs for 3 doses   - Activity: Bedrest, HOB 30  - DVT prophylaxis: SCDs  - Diet: NP  - GI prophylaxis: Pepcid  - Seizure prophylaxis: On Vimpat    Electronically signed by Leonora Brown MD on 6/7/2024 at 3:40 AM

## 2024-06-07 NOTE — PROGRESS NOTES
Induction/intubation of PT in ed:    21:17:  20 etomidate 100 sucs requested.   21:19: etom in.  21:19 Sucs in.  21:20: 90 HR, 94 SPO2, 169/80 BP  21:21: postiv color change, equal breath sounds  7.5 24 at teeth.  21:22: Hr 83, 98 SPO2, 17RR, 163/91  21:22: Prop requested for sedation.

## 2024-06-07 NOTE — PROGRESS NOTES
Writer brought resident to bedside for neuro change. Pt vomited, bradycardic, obtunded, not following commands and HTN. Resident notified attending Cami. CTH ordered, labs, Xray chest, BP medications and Mannitol ordered. See orders. Cardene ordered, unable to override, pharmacy called twice to verify. Due to Pt unstable heart rate and HTN resident request to delay ct until more stable.

## 2024-06-07 NOTE — PROGRESS NOTES
Order obtained for extubation.  SpO2 of 98 on 40% FiO2.   Patient extubated and placed on 3 liters/min via nasal cannula.   Post extubation SpO2 is 96% with HR  86 bpm and RR 14 breaths/min.    Patient had strong cough that was productive of tan sputum.  Extubation Well tolerated by patient..   Breath Sounds: clear / decreased    Valarie Reyes RCP   3:06 PM

## 2024-06-07 NOTE — ANESTHESIA PRE PROCEDURE
Department of Anesthesiology  Preprocedure Note       Name:  Brennen Mcclure   Age:  59 y.o.  :  1965                                          MRN:  [id number]         Date:  2024      Surgeon: Surgeon(s):  Cami Almanza DO    Procedure: Procedure(s):  CRANIOTOMY VERSUS CRANIECTOMY FOR SUBDURAL HEMATOMA EVACUATION    Medications prior to admission:   Prior to Admission medications    Medication Sig Start Date End Date Taking? Authorizing Provider   Melatonin 10 MG TABS Take 10 mg by mouth nightly   Yes Zeenat Mercer MD   topiramate (TOPAMAX) 50 MG tablet Take 1.5 tablets by mouth 2 times daily  Patient taking differently: Take 1 tablet by mouth 2 times daily 24   Leonora Brown MD   magnesium oxide (MAG-OX) 400 (240 Mg) MG tablet Take 1 tablet by mouth daily 5/15/24   Leonora Brown MD   eltrombopag olamine (PROMACTA) 50 MG TABS tablet Take 1 tablet by mouth daily    ProviderZeenat MD   lidocaine (LIDODERM) 5 % Place 2 patches onto the skin daily Change daily remove after 12 hours 24   Danica De La Paz MD   amitriptyline (ELAVIL) 75 MG tablet Take 1 tablet by mouth nightly 24   Danica De La Paz MD   acetaminophen (TYLENOL) 500 MG tablet Take 2 tablets by mouth in the morning, at noon, and at bedtime 24   Danica De La Paz MD   gabapentin (NEURONTIN) 300 MG capsule Take 1 capsule by mouth 3 times daily for 90 days. 24  Danica De La Paz MD   Multiple Vitamins-Minerals (THERAPEUTIC MULTIVITAMIN-MINERALS) tablet Take 1 tablet by mouth daily 24   Blanca Ren MD   lacosamide (VIMPAT) 100 MG TABS tablet Take 1 tablet by mouth 2 times daily for 90 days. Max Daily Amount: 200 mg 24  Blanca Ren MD   ipratropium 0.5 mg-albuterol 2.5 mg (DUONEB) 0.5-2.5 (3) MG/3ML SOLN nebulizer solution Inhale 3 mLs into the lungs every 6 hours as needed for Shortness of Breath 24   Blanca Ren MD   citalopram (CELEXA) 10 MG tablet Take 1 tablet by

## 2024-06-07 NOTE — PROGRESS NOTES
Daily Progress Note  Neuro Critical Care    Patient Name: Brennen Mcclure  Patient : 1965  Room/Bed: 0127/0127-01  Code Status: DNR-CCA   Allergies: No Known Allergies    CHIEF COMPLAINT:     Elective cranioplasty     INTERVAL HISTORY    Initial Presentation (Admitted 2024):     Brennen Mcclure is a 59 y.o. male with a history of CVST complicated with ICH after starting anticoagulation s/p right hemicraniectomy in 2023 in [place] with residual dense left-sided hemiplegia, chronic idiopathic thrombocytopenic purpura, migraines, seizures presented for elective cranioplasty which was performed on 2024.  Neurocritical care was consulted for postsurgical management.     Seen and examined 5:26 PM. Neuro exam: Patient is quite somnolent with dysarthric speech.  ANO x 4.  Flaccid weakness on the left, 3/5 on the right.  Complaining of a right-sided headache.  CTH reviewed there is right SDH, repeat CTH tomorrow unless a change in neuro exam.     Hospital Course:   : No acute events overnight.  Patient remained hemodynamically stable with no significant pain or other associated complaints.  Neurological exam remained unchanged.  Follow-up CT head this morning showing right cranioplasty changes, small amount of SAH overlying the right perisylvian cortex and within the right sylvian fissure, no midline shift      Interval Events:   : NSG was called to bedside around 8 PM due to altered mental status, not following commands, prior to the patient was having some headache, apparently did have a dose of Dilaudid and was thought to be drowsy due to that.  Was hypertensive with BP around 190s and given a dose of hydralazine, this failed to drop blood pressure and was started on Cardene, was given a dose of mannitol 20% about 50 gm and was sent for stat CT head, patient then had a bout of vomiting, not following commands and no motor movement while in ED waiting for CT scan eventually leading to

## 2024-06-07 NOTE — PROGRESS NOTES
06/07/24 0734   Vent Information   Vent Mode (S)  CPAP/PS   Ventilator Settings   FiO2  (S)  40 %   PEEP/CPAP (cmH2O) (S)  8   Pressure Support (cm H2O) (S)  8 cm H2O

## 2024-06-07 NOTE — OP NOTE
Operative Note      Patient: Brennen Mcclure  YOB: 1965  MRN: [id number]    Date of Procedure: 6/6/2024    Pre-Op Diagnosis Codes:     * Subdural/epidural hematoma (HCC) [S06.5XAA]  Intracerebral hematoma  Post-Op Diagnosis: Same       Procedure(s):  EMERGENCY CRANIOTOMY FOR EVACUATION OF EPIDURAL HEMATOMA  EVACUATION OF INTRACEREBRAL HEMATOMA  Ultrasonography preoperative  Surgeon(s):  Cami Almanza DO    Assistant:   * No surgical staff found *    Anesthesia: General    Estimated Blood Loss (mL): 300     Complications: None    Specimens:   * No specimens in log *    Implants:  Implant Name Type Inv. Item Serial No.  Lot No. LRB No. Used Action   GRAFT DURA [id number] ULTRAPURE CLLGN ADH ASHTON MTRX DURAGN + - [id number]  GRAFT DURA [id number] ULTRAPURE CLLGN ADH ASHTON MTRX DURAGN +  [employer] [id number] Right 1 Implanted   PLATE QUIKFLAP 3X2 SD AXS - [id number]  PLATE QUIKFLAP 3X2 SD AXS  [employer]MAN[employer]EL CRANIOMAXILLOF[employer]WD [id number] Right 1 Explanted   PLATE QUIKFLAP 3X2 SD AXS - [id number]  PLATE QUIKFLAP 3X2 SD AXS  [employer]MAN[employer]EL CRANIOMAXILLOFACIAL-WD [id number] Right 1 Explanted   SCREW BNE L5MM DIA1.5MM UNIV SELF DRL CRSS PIN 5/EA - [id number]  SCREW BNE L5MM DIA1.5MM UNIV SELF DRL CRSS PIN 5/EA  IMMANUEL CRANIOMAXILLOFACIAL-WD  Right 10 Implanted   PLATE BNE BAR L12MM 2 H CRANIOMAXILLOFACIAL TI RIG FOR UNIV - [id number]  PLATE BNE BAR L12MM 2 H CRANIOMAXILLOFACIAL TI RIG FOR UNIV  IMMANUEL CRANIOMAXILLOF[employer]IAL-WD  Right 5 Implanted         Drains:   NG/OG/NJ/NE Tube Orogastric Center mouth (Active)       Urinary Catheter 06/06/24 (Active)       REMOVED closed/Suction Drain Right Scalp Bag (Removed)   Site Description Clean, dry & intact 06/06/24 0800   Dressing Status Clean, dry & intact 06/06/24 0800   Drainage Appearance Bloody 06/06/24 0800   Drain Status Compressed;To bulb suction 06/06/24 0800   Output (ml) 10 ml 06/06/24 0800       REMOVED external  underlying brain surface can be seen covered by what appears to be old dural substitute.  This was all done very quickly in the matter of minutes.  There was quite a bit of bleeding from the scalp bone and the epidural tissue.  I then spent time with hemostasis for this. hemostasis obtained with a combination of cautery and tamponade.  At the lower temporal area as source of the intracerebral hematoma can be seen and this was evacuated with mostly suction.  The brain tissue was gently handled with a cecilia between any instruments.  There was no active bleeding.  I ensured the completeness of the evacuation with intraoperative ultrasonography which shows total evacuation of intracerebral hematoma and no blood in the subdural space. Once I was happy with the evacuation,   I started to ensure appropriate hemostasis.  Wall of the cerebral cavity was lined with Surgicel.  Surgicel was also used to cover the cortical defect on the surface of the cortex.  DuraGen matrix was used to enforce that repair.  Additional Surgicel was onlayed on top of the more oozy epidural tissue.  Vancomycin powder was placed in the epidural space.  The original synthetic bone plate was then plated onto the bone with cranial fixation plates.  A 15 Bulgarian channel drain was tunneled outside the skin.  The galea was closed with interrupted sutures. Skin was closed with staples.  The drain was secured to the scalp with stay sutures and staples.  Patient was then taken out of the OR table and taken to ensure good decompression of the brain and recovered in the neuro ICU.      Electronically signed by Cami Almanza DO on 6/7/2024 at 1:01 AM

## 2024-06-07 NOTE — PROGRESS NOTES
Occupational Therapy    [facility]  Occupational Therapy Not Seen Note    DATE: 2024    NAME: Brennen Mcclure  MRN: [id number]   : 1965      Patient not seen this date for Occupational Therapy due to:    Patient is not appropriate for active participation in OT treatment at this time d/t intubated/sedated d/t emergency craniotomy performed.    Next Scheduled Treatment:     Electronically signed by FORTINO Martin on 2024 at 10:51 AM

## 2024-06-07 NOTE — ANESTHESIA POSTPROCEDURE EVALUATION
Department of Anesthesiology  Postprocedure Note    Patient: Brennen Mcclure  MRN: [id number]  YOB: 1965  Date of evaluation: 6/7/2024    Procedure Summary       Date: 06/06/24 Room / Location: [address]    Anesthesia Start: 2206 Anesthesia Stop: 06/07/24 0115    Procedure: CRANIOTOMY FOR INTRASUBDURAL HEMATOMA EVACUATION (Right: Head) Diagnosis:       Subdural hematoma (HCC)      (Subdural hematoma (HCC) [S06.5XAA])    Surgeons: Cami Almanza DO Responsible Provider: Beck Farris MD    Anesthesia Type: general ASA Status: 3 - Emergent            Anesthesia Type: No value filed.    Jose A Phase I: Jose A Score: 7    Jose A Phase II:      Anesthesia Post Evaluation    Patient location during evaluation: bedside  Patient participation: complete - patient cannot participate  Level of consciousness: sedated and ventilated  Airway patency: patent  Nausea & Vomiting: no vomiting and no nausea  Cardiovascular status: hemodynamically stable  Respiratory status: ventilator and intubated  Hydration status: stable  Comments: /66   Pulse 98   Temp (!) 100.6 °F (38.1 °C)   Resp 21   Ht 1.778 m (5' 10\")   Wt 85.1 kg (187 lb 11.2 oz)   SpO2 97%   BMI 26.93 kg/m²     Pain management: adequate    No notable events documented.

## 2024-06-08 ENCOUNTER — APPOINTMENT (OUTPATIENT)
Dept: CT IMAGING | Age: 59
End: 2024-06-08
Attending: NEUROLOGICAL SURGERY
Payer: MEDICAID

## 2024-06-08 LAB
ANION GAP SERPL CALCULATED.3IONS-SCNC: 8 MMOL/L (ref 9–16)
BUN SERPL-MCNC: 31 MG/DL (ref 8–23)
CALCIUM SERPL-MCNC: 8.7 MG/DL (ref 8.6–10.4)
CHLORIDE SERPL-SCNC: 108 MMOL/L (ref 98–107)
CO2 SERPL-SCNC: 24 MMOL/L (ref 20–31)
CREAT SERPL-MCNC: 0.6 MG/DL (ref 0.7–1.2)
EKG ATRIAL RATE: 78 BPM
EKG ATRIAL RATE: 78 BPM
EKG ATRIAL RATE: 83 BPM
EKG P AXIS: 111 DEGREES
EKG P AXIS: 117 DEGREES
EKG P AXIS: 77 DEGREES
EKG P-R INTERVAL: 158 MS
EKG P-R INTERVAL: 162 MS
EKG P-R INTERVAL: 168 MS
EKG Q-T INTERVAL: 418 MS
EKG Q-T INTERVAL: 442 MS
EKG Q-T INTERVAL: 448 MS
EKG QRS DURATION: 130 MS
EKG QRS DURATION: 138 MS
EKG QRS DURATION: 138 MS
EKG QTC CALCULATION (BAZETT): 491 MS
EKG QTC CALCULATION (BAZETT): 503 MS
EKG QTC CALCULATION (BAZETT): 510 MS
EKG R AXIS: -132 DEGREES
EKG R AXIS: -133 DEGREES
EKG R AXIS: -64 DEGREES
EKG T AXIS: 112 DEGREES
EKG T AXIS: 121 DEGREES
EKG T AXIS: 64 DEGREES
EKG VENTRICULAR RATE: 78 BPM
EKG VENTRICULAR RATE: 78 BPM
EKG VENTRICULAR RATE: 83 BPM
ERYTHROCYTE [DISTWIDTH] IN BLOOD BY AUTOMATED COUNT: 20.9 % (ref 11.8–14.4)
GFR, ESTIMATED: >90 ML/MIN/1.73M2
GLUCOSE SERPL-MCNC: 134 MG/DL (ref 74–99)
HCT VFR BLD AUTO: 28.3 % (ref 40.7–50.3)
HGB BLD-MCNC: 9.1 G/DL (ref 13–17)
MCH RBC QN AUTO: 29.8 PG (ref 25.2–33.5)
MCHC RBC AUTO-ENTMCNC: 32.2 G/DL (ref 28.4–34.8)
MCV RBC AUTO: 92.8 FL (ref 82.6–102.9)
NRBC BLD-RTO: 0.6 PER 100 WBC
PLATELET # BLD AUTO: 125 K/UL (ref 138–453)
PMV BLD AUTO: 11.7 FL (ref 8.1–13.5)
POTASSIUM SERPL-SCNC: 4 MMOL/L (ref 3.7–5.3)
RBC # BLD AUTO: 3.05 M/UL (ref 4.21–5.77)
SODIUM SERPL-SCNC: 140 MMOL/L (ref 136–145)
TROPONIN I SERPL HS-MCNC: 10 NG/L (ref 0–22)
WBC OTHER # BLD: 14.4 K/UL (ref 3.5–11.3)

## 2024-06-08 PROCEDURE — A4216 STERILE WATER/SALINE, 10 ML: HCPCS | Performed by: PSYCHIATRY & NEUROLOGY

## 2024-06-08 PROCEDURE — 92610 EVALUATE SWALLOWING FUNCTION: CPT

## 2024-06-08 PROCEDURE — 93005 ELECTROCARDIOGRAM TRACING: CPT | Performed by: PSYCHIATRY & NEUROLOGY

## 2024-06-08 PROCEDURE — 6370000000 HC RX 637 (ALT 250 FOR IP)

## 2024-06-08 PROCEDURE — 97110 THERAPEUTIC EXERCISES: CPT

## 2024-06-08 PROCEDURE — 36415 COLL VENOUS BLD VENIPUNCTURE: CPT

## 2024-06-08 PROCEDURE — 97535 SELF CARE MNGMENT TRAINING: CPT

## 2024-06-08 PROCEDURE — 99232 SBSQ HOSP IP/OBS MODERATE 35: CPT | Performed by: STUDENT IN AN ORGANIZED HEALTH CARE EDUCATION/TRAINING PROGRAM

## 2024-06-08 PROCEDURE — 2060000000 HC ICU INTERMEDIATE R&B

## 2024-06-08 PROCEDURE — 6360000002 HC RX W HCPCS

## 2024-06-08 PROCEDURE — C9113 INJ PANTOPRAZOLE SODIUM, VIA: HCPCS | Performed by: PSYCHIATRY & NEUROLOGY

## 2024-06-08 PROCEDURE — 6370000000 HC RX 637 (ALT 250 FOR IP): Performed by: PSYCHIATRY & NEUROLOGY

## 2024-06-08 PROCEDURE — 97112 NEUROMUSCULAR REEDUCATION: CPT

## 2024-06-08 PROCEDURE — 93005 ELECTROCARDIOGRAM TRACING: CPT | Performed by: NEUROLOGICAL SURGERY

## 2024-06-08 PROCEDURE — 6360000002 HC RX W HCPCS: Performed by: PSYCHIATRY & NEUROLOGY

## 2024-06-08 PROCEDURE — 2580000003 HC RX 258

## 2024-06-08 PROCEDURE — 2580000003 HC RX 258: Performed by: PSYCHIATRY & NEUROLOGY

## 2024-06-08 PROCEDURE — 99232 SBSQ HOSP IP/OBS MODERATE 35: CPT | Performed by: INTERNAL MEDICINE

## 2024-06-08 PROCEDURE — 6360000002 HC RX W HCPCS: Performed by: STUDENT IN AN ORGANIZED HEALTH CARE EDUCATION/TRAINING PROGRAM

## 2024-06-08 PROCEDURE — 80048 BASIC METABOLIC PNL TOTAL CA: CPT

## 2024-06-08 PROCEDURE — 85027 COMPLETE CBC AUTOMATED: CPT

## 2024-06-08 PROCEDURE — 84484 ASSAY OF TROPONIN QUANT: CPT

## 2024-06-08 PROCEDURE — 6360000004 HC RX CONTRAST MEDICATION: Performed by: NEUROLOGICAL SURGERY

## 2024-06-08 PROCEDURE — 70498 CT ANGIOGRAPHY NECK: CPT

## 2024-06-08 PROCEDURE — A4216 STERILE WATER/SALINE, 10 ML: HCPCS

## 2024-06-08 PROCEDURE — 94761 N-INVAS EAR/PLS OXIMETRY MLT: CPT

## 2024-06-08 PROCEDURE — 97530 THERAPEUTIC ACTIVITIES: CPT

## 2024-06-08 PROCEDURE — 70450 CT HEAD/BRAIN W/O DYE: CPT

## 2024-06-08 PROCEDURE — 2500000003 HC RX 250 WO HCPCS

## 2024-06-08 PROCEDURE — 2580000003 HC RX 258: Performed by: STUDENT IN AN ORGANIZED HEALTH CARE EDUCATION/TRAINING PROGRAM

## 2024-06-08 PROCEDURE — 2700000000 HC OXYGEN THERAPY PER DAY

## 2024-06-08 RX ORDER — CITALOPRAM 20 MG/1
20 TABLET ORAL DAILY
Status: DISCONTINUED | OUTPATIENT
Start: 2024-06-08 | End: 2024-06-08

## 2024-06-08 RX ORDER — TOPIRAMATE 50 MG/1
100 TABLET, FILM COATED ORAL 2 TIMES DAILY
Status: DISCONTINUED | OUTPATIENT
Start: 2024-06-08 | End: 2024-06-14 | Stop reason: HOSPADM

## 2024-06-08 RX ORDER — AMITRIPTYLINE HYDROCHLORIDE 50 MG/1
50 TABLET, FILM COATED ORAL NIGHTLY
Status: DISCONTINUED | OUTPATIENT
Start: 2024-06-08 | End: 2024-06-08

## 2024-06-08 RX ORDER — AMITRIPTYLINE HYDROCHLORIDE 50 MG/1
100 TABLET, FILM COATED ORAL NIGHTLY
Status: DISCONTINUED | OUTPATIENT
Start: 2024-06-08 | End: 2024-06-08

## 2024-06-08 RX ORDER — TRAMADOL HYDROCHLORIDE 50 MG/1
50 TABLET ORAL EVERY 6 HOURS PRN
Status: DISCONTINUED | OUTPATIENT
Start: 2024-06-08 | End: 2024-06-14 | Stop reason: HOSPADM

## 2024-06-08 RX ORDER — CITALOPRAM HYDROBROMIDE 10 MG/1
10 TABLET ORAL DAILY
Status: DISCONTINUED | OUTPATIENT
Start: 2024-06-08 | End: 2024-06-08

## 2024-06-08 RX ORDER — GABAPENTIN 600 MG/1
600 TABLET ORAL ONCE
Status: COMPLETED | OUTPATIENT
Start: 2024-06-08 | End: 2024-06-08

## 2024-06-08 RX ORDER — GABAPENTIN 300 MG/1
600 CAPSULE ORAL 3 TIMES DAILY
Status: DISCONTINUED | OUTPATIENT
Start: 2024-06-08 | End: 2024-06-14 | Stop reason: HOSPADM

## 2024-06-08 RX ORDER — VENLAFAXINE HYDROCHLORIDE 75 MG/1
75 CAPSULE, EXTENDED RELEASE ORAL
Status: DISCONTINUED | OUTPATIENT
Start: 2024-06-09 | End: 2024-06-14 | Stop reason: HOSPADM

## 2024-06-08 RX ORDER — DEXTROMETHORPHAN POLISTIREX 30 MG/5ML
60 SUSPENSION ORAL EVERY 12 HOURS SCHEDULED
Status: DISCONTINUED | OUTPATIENT
Start: 2024-06-08 | End: 2024-06-14 | Stop reason: HOSPADM

## 2024-06-08 RX ORDER — CLONAZEPAM 0.25 MG/1
0.25 TABLET, ORALLY DISINTEGRATING ORAL ONCE
Status: COMPLETED | OUTPATIENT
Start: 2024-06-08 | End: 2024-06-08

## 2024-06-08 RX ORDER — VENLAFAXINE HYDROCHLORIDE 75 MG/1
75 CAPSULE, EXTENDED RELEASE ORAL ONCE
Status: COMPLETED | OUTPATIENT
Start: 2024-06-08 | End: 2024-06-08

## 2024-06-08 RX ADMIN — ACETAMINOPHEN 1000 MG: 500 TABLET ORAL at 07:57

## 2024-06-08 RX ADMIN — TOPIRAMATE 100 MG: 50 TABLET, FILM COATED ORAL at 21:26

## 2024-06-08 RX ADMIN — DICLOFENAC SODIUM 2 G: 10 GEL TOPICAL at 21:39

## 2024-06-08 RX ADMIN — POLYETHYLENE GLYCOL 3350 17 G: 17 POWDER, FOR SOLUTION ORAL at 07:58

## 2024-06-08 RX ADMIN — TAMSULOSIN HYDROCHLORIDE 0.4 MG: 0.4 CAPSULE ORAL at 07:59

## 2024-06-08 RX ADMIN — GABAPENTIN 600 MG: 600 TABLET, FILM COATED ORAL at 12:51

## 2024-06-08 RX ADMIN — SODIUM CHLORIDE, PRESERVATIVE FREE 10 ML: 5 INJECTION INTRAVENOUS at 21:41

## 2024-06-08 RX ADMIN — PIPERACILLIN AND TAZOBACTAM 3375 MG: 3; .375 INJECTION, POWDER, LYOPHILIZED, FOR SOLUTION INTRAVENOUS at 03:29

## 2024-06-08 RX ADMIN — CLONAZEPAM 0.25 MG: 0.25 TABLET, ORALLY DISINTEGRATING ORAL at 15:03

## 2024-06-08 RX ADMIN — GABAPENTIN 600 MG: 300 CAPSULE ORAL at 21:25

## 2024-06-08 RX ADMIN — FAMOTIDINE 20 MG: 20 TABLET, FILM COATED ORAL at 21:41

## 2024-06-08 RX ADMIN — Medication 10 MG: at 21:25

## 2024-06-08 RX ADMIN — IOPAMIDOL 90 ML: 755 INJECTION, SOLUTION INTRAVENOUS at 13:40

## 2024-06-08 RX ADMIN — LACOSAMIDE 100 MG: 100 TABLET, FILM COATED ORAL at 21:25

## 2024-06-08 RX ADMIN — LACOSAMIDE 100 MG: 100 TABLET, FILM COATED ORAL at 07:58

## 2024-06-08 RX ADMIN — ACETAMINOPHEN 650 MG: 325 TABLET ORAL at 17:45

## 2024-06-08 RX ADMIN — METHYLPREDNISOLONE SODIUM SUCCINATE 60 MG: 40 INJECTION, POWDER, LYOPHILIZED, FOR SOLUTION INTRAMUSCULAR; INTRAVENOUS at 23:04

## 2024-06-08 RX ADMIN — OXYCODONE 5 MG: 5 TABLET ORAL at 11:41

## 2024-06-08 RX ADMIN — GABAPENTIN 300 MG: 300 CAPSULE ORAL at 07:57

## 2024-06-08 RX ADMIN — CITALOPRAM 10 MG: 10 TABLET, FILM COATED ORAL at 07:59

## 2024-06-08 RX ADMIN — PANTOPRAZOLE SODIUM 40 MG: 40 INJECTION, POWDER, FOR SOLUTION INTRAVENOUS at 15:03

## 2024-06-08 RX ADMIN — DEXTROMETHORPHAN POLISTIREX 60 MG: 30 SUSPENSION ORAL at 15:17

## 2024-06-08 RX ADMIN — BACLOFEN 5 MG: 5 TABLET ORAL at 21:26

## 2024-06-08 RX ADMIN — PIPERACILLIN AND TAZOBACTAM 3375 MG: 3; .375 INJECTION, POWDER, LYOPHILIZED, FOR SOLUTION INTRAVENOUS at 11:52

## 2024-06-08 RX ADMIN — PIPERACILLIN AND TAZOBACTAM 3375 MG: 3; .375 INJECTION, POWDER, LYOPHILIZED, FOR SOLUTION INTRAVENOUS at 19:55

## 2024-06-08 RX ADMIN — AMITRIPTYLINE HYDROCHLORIDE 75 MG: 50 TABLET, FILM COATED ORAL at 21:26

## 2024-06-08 RX ADMIN — MAGNESIUM GLUCONATE 500 MG ORAL TABLET 400 MG: 500 TABLET ORAL at 07:59

## 2024-06-08 RX ADMIN — VENLAFAXINE HYDROCHLORIDE 75 MG: 75 CAPSULE, EXTENDED RELEASE ORAL at 15:05

## 2024-06-08 RX ADMIN — DICLOFENAC SODIUM 2 G: 10 GEL TOPICAL at 07:58

## 2024-06-08 RX ADMIN — BACLOFEN 5 MG: 5 TABLET ORAL at 07:58

## 2024-06-08 RX ADMIN — SODIUM CHLORIDE, PRESERVATIVE FREE 10 ML: 5 INJECTION INTRAVENOUS at 09:21

## 2024-06-08 RX ADMIN — SENNOSIDES 8.6 MG: 8.6 TABLET, FILM COATED ORAL at 15:03

## 2024-06-08 RX ADMIN — FAMOTIDINE 20 MG: 10 INJECTION, SOLUTION INTRAVENOUS at 07:58

## 2024-06-08 RX ADMIN — METHYLPREDNISOLONE SODIUM SUCCINATE 60 MG: 40 INJECTION, POWDER, LYOPHILIZED, FOR SOLUTION INTRAMUSCULAR; INTRAVENOUS at 06:07

## 2024-06-08 RX ADMIN — METHYLPREDNISOLONE SODIUM SUCCINATE 60 MG: 40 INJECTION, POWDER, LYOPHILIZED, FOR SOLUTION INTRAMUSCULAR; INTRAVENOUS at 12:51

## 2024-06-08 RX ADMIN — TOPIRAMATE 50 MG: 50 TABLET, FILM COATED ORAL at 07:57

## 2024-06-08 NOTE — PROGRESS NOTES
Physical Therapy  Facility/Department: [address] NEURO ICU  Physical Therapy Treatment Note    Name: Brennen Mcclure  : 1965  MRN: [id number]  Date of Service: 2024    Discharge Recommendations:  Further therapy recommended at discharge.The patient should be able to tolerate at least 3 hours of therapy per day over 5 days or 15 hours over 7 days.   This patient may benefit from a Physical Medicine and Rehab consult.    PT Equipment Recommendations  Equipment Needed: No  Other: pt currently unsafe to attempt any aspect of mobility without significant physical assistance      Patient Diagnosis(es): There were no encounter diagnoses.  Past Medical History:  has a past medical history of Anemia, CAD (coronary artery disease), Chronic deep vein thrombosis (DVT) of both lower extremities (HCC), Chronic deep vein thrombosis (DVT) of femoral vein of right lower extremity (HCC), Chronic deep vein thrombosis (DVT) of proximal vein of right lower extremity (HCC), Chronic deep vein thrombosis (DVT) of right iliac vein (HCC), Chronic idiopathic thrombocytopenia (HCC), Chronic idiopathic thrombocytopenic purpura (HCC), CVA (cerebral vascular accident) (HCC), Emphysema lung (HCC), Hemosiderin pigmentation of skin, HLD (hyperlipidemia), Intracranial hemorrhage (HCC), Left hemiplegia (HCC), Right leg swelling, and Thrombocytopenia (HCC).  Past Surgical History:  has a past surgical history that includes Coronary artery bypass graft; Coronary stent placement; craniotomy (Right); CT BIOPSY BONE MARROW (2024); Cranioplasty (Right, 2024); craniotomy (Right, 2024); and craniotomy (Right, 2024).    Assessment   Body Structures, Functions, Activity Limitations Requiring Skilled Therapeutic Intervention: Decreased functional mobility ;Decreased endurance;Decreased cognition;Decreased coordination;Decreased safe awareness;Decreased balance;Increased pain;Decreased posture;Decreased ROM  Assessment: Pt required maxA    Minutes 31         Timed Code Treatment Minutes: 23 Minutes     Co- treatment with OT warranted secondary to decreased patient safety and independence with functional mobility requiring skilled physical assistance of two professionals to simultaneously address individualized discipline goals. PT is addressing seated balance and B LE strengthening , while OT is addressing their individualized functional mobility/self-care task.     Valarie Nobles, PTA

## 2024-06-08 NOTE — PROGRESS NOTES
SLP ALL NOTES  Facility/Department: [address] NEURO ICU   CLINICAL BEDSIDE SWALLOW EVALUATION    MBS is recommended for objective evaluation of swallowing function and to rule out silent aspiration. Patient and spouse educated on aspiration risk and patient wishes to continue oral diet until MBS is completed tomorrow 24.  1:1 supervision with oral intake recommended with low threshold for NPO. Will continue to follow.     NAME: Brennen Mcclure  : 1965  MRN: [id number]    ADMISSION DATE: 2024  ADMITTING DIAGNOSIS: has 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 (HCC); Pseudobulbar affect; Headache with neurologic deficit; Intracerebral hematoma (HCC); Thrombocytopenia (HCC); Anemia; Chronic ITP (idiopathic thrombocytopenia) (HCC); Seizures (HCC); Dural venous sinus thrombosis; Chronic deep vein thrombosis (DVT) of proximal vein of right lower extremity (HCC); Chronic deep vein thrombosis (DVT) of right iliac vein (HCC); Chronic deep vein thrombosis (DVT) of femoral vein of right lower extremity (HCC); Chronic venous insufficiency of lower extremity; Hemosiderin pigmentation of skin; Right leg swelling; Dysphagia; Transient neurological symptoms; Intraparenchymal hemorrhage of brain (HCC); History of epilepsy; Disorientation; Altered mental status; Acute cystitis with hematuria; Migraine; Acute encephalopathy; Fever and chills; Parotitis; Depression with suicidal ideation; Major depressive disorder, recurrent, severe with psychotic features (HCC); Acute intractable headache; Chronic idiopathic thrombocytopenic purpura (HCC); Intractable headache, unspecified chronicity pattern, unspecified headache type; History of cerebral venous infarction; Acute idiopathic thrombocytopenic purpura (HCC); Absence of bone of skull; Defect of skull; History of DVT (deep vein thrombosis); Immune thrombocytopenia (HCC); Epidural hematoma (HCC); Acute respiratory failure with hypoxia (HCC); and Brain bleed  declined and wishes to eat. Informed consent completed with pt and spouse. They are aware of the potential of developing pulmonary infection related to aspiration and wish to proceed with oral diet. ST recommending level 6 soft and bite sized solids and thin liquids, with 1:1 supervision feeding for safety as choking risk may be high d/t left side weakness and decreased mastication. Low threshold for NPO should pt decline. MBS will be completed tomorrow 6/9/24 with diet recommendations to follow. Pt should be upright at 90 degrees for all meals. Alternate liquids/solids. Small bites/sips. And check for pocketing. There is potential for aspiration with any oral intake and therefore nursing staff should monitor closely. Spouse , pt and RN have been educated thoroughly and are in agreement with the plan. ST will continue to follow for dysphagia management and complete MBS tomorrow. Diet recommendations to follow.  Dysphagia Outcome Severity Scale: Level 4: Mild moderate dysphagia- Intermittent supervision/cueing. One - two diet consistencies restricted     Treatment Plan  Requires SLP Intervention: Yes  Duration of Treatment: until discharge  D/C Recommendations: Ongoing speech therapy is recommended at next level of care;Ongoing speech therapy is recommended during this hospitalization       Recommended Diet and Intervention  Diet Solids Recommendation: Soft & Bite Sized  Liquid Consistency Recommendation: Thin  Recommended Form of Meds: Meds in puree (as tolerated)  Recommendations: Modified barium swallow study;Dysphagia treatment  Therapeutic Interventions: Therapeutic PO trials with SLP;Patient/Family education;Laryngeal exercises    Compensatory Swallowing Strategies  Compensatory Swallowing Strategies : Alternate solids and liquids;Assist feed;Remain upright for 30-45 minutes after meals;Upright as possible for all oral intake;Check for pocketing of food on the Left;Total feed;Check for pocketing of food on the

## 2024-06-08 NOTE — PROGRESS NOTES
Neurosurgery NANNETTE/Resident    Daily Progress Note   CC:No chief complaint on file.    6/8/2024  11:27 AM    Chart reviewed.  Patient POD#2 s/p hematoma evacuation. Exutbated and at side of the bed working with PT/OT this am.     Vitals:    06/08/24 0800 06/08/24 0900 06/08/24 0904 06/08/24 1000   BP: 115/74 97/61  (!) 88/68   Pulse: 76 80 75 74   Resp: 16 13 12 16   Temp: 99.3 °F (37.4 °C) 99.5 °F (37.5 °C)  99.3 °F (37.4 °C)   TempSrc:       SpO2: 99% 95% 98% 95%   Weight:       Height:           PE:   Awake and alert.   PERRL  Following commands RUE and RLE   Left hemiplegia  Drain output: 30 ml in 12 hours, 90 ml over 24 hours   Incision: CDI       Lab Results   Component Value Date    WBC 14.4 (H) 06/08/2024    HGB 9.1 (L) 06/08/2024    HCT 28.3 (L) 06/08/2024     (L) 06/08/2024    CHOL 118 05/16/2024    TRIG 182 (H) 05/16/2024    HDL 28 (L) 05/16/2024    ALT 28 05/08/2024    AST 29 05/08/2024     06/08/2024    K 4.0 06/08/2024     (H) 06/08/2024    CREATININE 0.6 (L) 06/08/2024    BUN 31 (H) 06/08/2024    CO2 24 06/08/2024    TSH 3.45 04/13/2024    INR 1.1 06/05/2024    LABA1C 5.2 05/16/2024    CRP 33.1 (H) 05/12/2024       Radiology   CT HEAD WO CONTRAST    Result Date: 6/7/2024  EXAMINATION: CT OF THE HEAD WITHOUT CONTRAST  6/7/2024 1:11 am TECHNIQUE: CT of the head was performed without the administration of intravenous contrast. Automated exposure control, iterative reconstruction, and/or weight based adjustment of the mA/kV was utilized to reduce the radiation dose to as low as reasonably achievable. COMPARISON: June 6, 2024 HISTORY: ORDERING SYSTEM PROVIDED HISTORY: POST OP TECHNOLOGIST PROVIDED HISTORY: POST OP Reason for Exam: post op FINDINGS: There has been interval surgical evacuation of the intraparenchymal hematoma as well as the adjacent subdural hematoma with decreased white matter edema and mass effect. 6 mm of right-to-left midline shift is noted. There is postoperative  neurosurgery with any changes in patients neurologic status.     Sue Ramirez, APRN - CNP  6/8/24  11:27 AM

## 2024-06-08 NOTE — PLAN OF CARE
Problem: Discharge Planning  Goal: Discharge to home or other facility with appropriate resources  6/8/2024 1646 by Rakel Torres, RN  Outcome: Progressing  6/8/2024 0842 by Efrain Alvarez, GARY  Outcome: Progressing  Flowsheets  Taken 6/8/2024 0400  Discharge to home or other facility with appropriate resources:   Identify barriers to discharge with patient and caregiver   Arrange for needed discharge resources and transportation as appropriate   Identify discharge learning needs (meds, wound care, etc)   Refer to discharge planning if patient needs post-hospital services based on physician order or complex needs related to functional status, cognitive ability or social support system  Taken 6/8/2024 0000  Discharge to home or other facility with appropriate resources:   Identify barriers to discharge with patient and caregiver   Arrange for needed discharge resources and transportation as appropriate   Identify discharge learning needs (meds, wound care, etc)   Refer to discharge planning if patient needs post-hospital services based on physician order or complex needs related to functional status, cognitive ability or social support system  Taken 6/7/2024 2000  Discharge to home or other facility with appropriate resources:   Identify barriers to discharge with patient and caregiver   Arrange for needed discharge resources and transportation as appropriate   Identify discharge learning needs (meds, wound care, etc)   Refer to discharge planning if patient needs post-hospital services based on physician order or complex needs related to functional status, cognitive ability or social support system     Problem: Chronic Conditions and Co-morbidities  Goal: Patient's chronic conditions and co-morbidity symptoms are monitored and maintained or improved  6/8/2024 1646 by Rakel Torres, RN  Outcome: Progressing  6/8/2024 0842 by Efrain Alvarez, RN  Outcome: Progressing  Flowsheets  Taken 6/8/2024 0400  Care Plan - Patient's  sites hourly  3.  Every 4-6 hours minimum:  Change oxygen saturation probe site  4.  Every 4-6 hours:  If on nasal continuous positive airway pressure, respiratory therapy assess nares and determine need for appliance change or resting period.  6/8/2024 1646 by Rakel Torres RN  Outcome: Progressing  6/8/2024 0842 by Efrain Alvarez RN  Outcome: Progressing     Problem: Pain  Goal: Verbalizes/displays adequate comfort level or baseline comfort level  6/8/2024 1646 by Rakel Torres RN  Outcome: Progressing  6/8/2024 0842 by Efrain Alvarez RN  Outcome: Progressing     Problem: ABCDS Injury Assessment  Goal: Absence of physical injury  6/8/2024 1646 by Rakel Torres RN  Outcome: Progressing  6/8/2024 0842 by Efrain Alvarez RN  Outcome: Progressing  Flowsheets (Taken 6/7/2024 2000)  Absence of Physical Injury: Implement safety measures based on patient assessment     Problem: Safety - Medical Restraint  Goal: Remains free of injury from restraints (Restraint for Interference with Medical Device)  Description: INTERVENTIONS:  1. Determine that other, less restrictive measures have been tried or would not be effective before applying the restraint  2. Evaluate the patient's condition at the time of restraint application  3. Inform patient/family regarding the reason for restraint  4. Q2H: Monitor safety, psychosocial status, comfort, nutrition and hydration  6/8/2024 1646 by Rakel Torres RN  Outcome: Progressing  6/8/2024 0842 by Efrain Alvarez RN  Outcome: Progressing

## 2024-06-08 NOTE — PROGRESS NOTES
Occupational Therapy  Facility/Department: [address] NEURO ICU  Occupational Therapy Daily Treatment Note    Name: Brennen Mcclure  : 1965  MRN: [id number]  Date of Service: 2024    Discharge Recommendations: Further therapy recommended at discharge.The patient should be able to tolerate at least 3 hours of therapy per day over 5 days or 15 hours over 7 days.   This patient may benefit from a Physical Medicine and Rehab consult.    Patient would benefit from continued therapy after discharge     Patient Diagnosis(es): There were no encounter diagnoses.  Past Medical History:  has a past medical history of Anemia, CAD (coronary artery disease), Chronic deep vein thrombosis (DVT) of both lower extremities (HCC), Chronic deep vein thrombosis (DVT) of femoral vein of right lower extremity (HCC), Chronic deep vein thrombosis (DVT) of proximal vein of right lower extremity (HCC), Chronic deep vein thrombosis (DVT) of right iliac vein (HCC), Chronic idiopathic thrombocytopenia (HCC), Chronic idiopathic thrombocytopenic purpura (HCC), CVA (cerebral vascular accident) (HCC), Emphysema lung (HCC), Hemosiderin pigmentation of skin, HLD (hyperlipidemia), Intracranial hemorrhage (HCC), Left hemiplegia (HCC), Right leg swelling, and Thrombocytopenia (HCC).  Past Surgical History:  has a past surgical history that includes Coronary artery bypass graft; Coronary stent placement; craniotomy (Right); CT BIOPSY BONE MARROW (2024); Cranioplasty (Right, 2024); craniotomy (Right, 2024); and craniotomy (Right, 2024).    Treatment Diagnosis: Absence of Bone of Skull      Assessment   Performance deficits / Impairments: Decreased functional mobility ;Decreased safe awareness;Decreased balance;Decreased coordination;Decreased ADL status;Decreased cognition;Decreased ROM;Decreased strength;Decreased fine motor control;Decreased endurance;Decreased posture  Assessment: Pt limited by the above noted deficits impacting  during sitting and completed dynamic reach with RUE x5-6 reps, 1-2 in each plane. Pt unable to mobilize RUE)  Standing:  (Pt not safe to stand this session due to decreased sitting balance and awareness)        ADL  Grooming: Minimal assistance;Setup;Verbal cueing;Increased time to complete  Grooming Skilled Clinical Factors: Pt demosntrated ability to wash right side of face with MIN A to reach all the way to left side of face and forehead. Pt required cue to discontinue task as he kept wiping right eye  LE Dressing: Dependent/Total  LE Dressing Skilled Clinical Factors: Donning slipper socks due to poor sitting balance and decreased awareness  Additional Comments: Pt completed tasks seated EOB with assist as noted above. Pt cooperative and became labile during session. Pt cooperative and ffatigues quickly with frequent rest breaks provided  Skin Care: Soap and water     Activity Tolerance  Activity Tolerance: Treatment limited secondary to decreased cognition;Patient limited by endurance;Patient limited by fatigue  Bed mobility  Supine to Sit: Maximum assistance;2 Person assistance  Sit to Supine: Maximum assistance;2 Person assistance  Scooting: Maximal assistance  Bed Mobility Comments: HOB elevated. Increased time needed to complete.  Transfers  Transfer Comments: Pt demonstrated poor sitting balance and decreased alertness and not safe to attempt standing at this session     Cognition  Overall Cognitive Status: Exceptions  Arousal/Alertness: Delayed responses to stimuli  Following Commands: Follows one step commands with repetition  Attention Span: Attends with cues to redirect;Difficulty attending to directions;Unable to maintain attention  Memory: Decreased recall of precautions  Safety Judgement: Decreased awareness of need for assistance;Decreased awareness of need for safety  Problem Solving: Assistance required to identify errors made;Assistance required to correct errors made;Decreased awareness of

## 2024-06-08 NOTE — PLAN OF CARE
deterioration, or improvement   Collaborate with multidisciplinary team to address chronic and comorbid conditions and prevent exacerbation or deterioration   Update acute care plan with appropriate goals if chronic or comorbid symptoms are exacerbated and prevent overall improvement and discharge  Taken 6/8/2024 0000  Care Plan - Patient's Chronic Conditions and Co-Morbidity Symptoms are Monitored and Maintained or Improved:   Monitor and assess patient's chronic conditions and comorbid symptoms for stability, deterioration, or improvement   Collaborate with multidisciplinary team to address chronic and comorbid conditions and prevent exacerbation or deterioration   Update acute care plan with appropriate goals if chronic or comorbid symptoms are exacerbated and prevent overall improvement and discharge  Taken 6/7/2024 2000  Care Plan - Patient's Chronic Conditions and Co-Morbidity Symptoms are Monitored and Maintained or Improved:   Monitor and assess patient's chronic conditions and comorbid symptoms for stability, deterioration, or improvement   Collaborate with multidisciplinary team to address chronic and comorbid conditions and prevent exacerbation or deterioration   Update acute care plan with appropriate goals if chronic or comorbid symptoms are exacerbated and prevent overall improvement and discharge     Problem: Safety - Adult  Goal: Free from fall injury  Outcome: Progressing  Flowsheets (Taken 6/7/2024 2000)  Free From Fall Injury: Instruct family/caregiver on patient safety     Problem: Skin/Tissue Integrity  Goal: Absence of new skin breakdown  Description: 1.  Monitor for areas of redness and/or skin breakdown  2.  Assess vascular access sites hourly  3.  Every 4-6 hours minimum:  Change oxygen saturation probe site  4.  Every 4-6 hours:  If on nasal continuous positive airway pressure, respiratory therapy assess nares and determine need for appliance change or resting period.  Outcome:  Progressing     Problem: Pain  Goal: Verbalizes/displays adequate comfort level or baseline comfort level  Outcome: Progressing     Problem: ABCDS Injury Assessment  Goal: Absence of physical injury  Outcome: Progressing  Flowsheets (Taken 6/7/2024 2000)  Absence of Physical Injury: Implement safety measures based on patient assessment     Problem: Safety - Medical Restraint  Goal: Remains free of injury from restraints (Restraint for Interference with Medical Device)  Description: INTERVENTIONS:  1. Determine that other, less restrictive measures have been tried or would not be effective before applying the restraint  2. Evaluate the patient's condition at the time of restraint application  3. Inform patient/family regarding the reason for restraint  4. Q2H: Monitor safety, psychosocial status, comfort, nutrition and hydration  Outcome: Progressing

## 2024-06-08 NOTE — PROGRESS NOTES
Daily Progress Note  Neuro Critical Care    Patient Name: Brennen Mcclure  Patient : 1965  Room/Bed: 0127/0127-01  Code Status: DNR-CCA   Allergies: No Known Allergies    CHIEF COMPLAINT:     Elective cranioplasty     INTERVAL HISTORY    Initial Presentation (Admitted 2024):     Brennen Mcclure is a 59 y.o. male with a history of CVST complicated with ICH after starting anticoagulation s/p right hemicraniectomy in 2023 in [place] with residual dense left-sided hemiplegia, chronic idiopathic thrombocytopenic purpura, migraines, seizures presented for elective cranioplasty which was performed on 2024.  Neurocritical care was consulted for postsurgical management.     Seen and examined 5:26 PM. Neuro exam: Patient is quite somnolent with dysarthric speech.  ANO x 4.  Flaccid weakness on the left, 3/5 on the right.  Complaining of a right-sided headache.  CTH reviewed there is right SDH, repeat CTH tomorrow unless a change in neuro exam.     Hospital Course:   : No acute events overnight.  Patient remained hemodynamically stable with no significant pain or other associated complaints.  Neurological exam remained unchanged.  Follow-up CT head this morning showing right cranioplasty changes, small amount of SAH overlying the right perisylvian cortex and within the right sylvian fissure, no midline shift  : NSG was called to bedside around 8 PM due to altered mental status, not following commands, prior to the patient was having some headache, apparently did have a dose of Dilaudid and was thought to be drowsy due to that.  Was hypertensive with BP around 190s and given a dose of hydralazine, this failed to drop blood pressure and was started on Cardene, was given a dose of mannitol 20% about 50 gm and was sent for stat CT head, patient then had a bout of vomiting, not following commands and no motor movement while in ED waiting for CT scan eventually leading to intubation and was sedated with    Component Value Date    WBC 14.4 (H) 06/08/2024    HGB 9.1 (L) 06/08/2024    HCT 28.3 (L) 06/08/2024     (L) 06/08/2024    CHOL 118 05/16/2024    TRIG 182 (H) 05/16/2024    HDL 28 (L) 05/16/2024    ALT 28 05/08/2024    AST 29 05/08/2024     06/08/2024    K 4.0 06/08/2024     (H) 06/08/2024    CREATININE 0.6 (L) 06/08/2024    BUN 31 (H) 06/08/2024    CO2 24 06/08/2024    TSH 3.45 04/13/2024    INR 1.1 06/05/2024    LABA1C 5.2 05/16/2024     24 HOUR INTAKE/OUTPUT:  Intake/Output Summary (Last 24 hours) at 6/8/2024 0853  Last data filed at 6/8/2024 0700  Gross per 24 hour   Intake 1019.23 ml   Output 1595 ml   Net -575.77 ml         IMAGING:   Place summary of recent imaging here.    Labs and Images reviewed with:  [x] Dr. José Luis Vazquez  [] Dr. Reed Spring  [] Dr. Taj Crow  [] There are no new interval images to review.     PHYSICAL EXAM       CONSTITUTIONAL:  Sedated and intubated, off sedation was following simple commands   HEAD:  normocephalic, atraumatic    EYES:  PERRLA, EOMI.   ENT:  moist mucous membranes   NECK:  supple, symmetric   LUNGS:  Equal air entry bilaterally   CARDIOVASCULAR:  normal s1 / s2, RRR, distal pulses intact   ABDOMEN:  Soft, no rigidity   NEUROLOGIC:  Mental Status: Sedated and intubated, off sedation was following simple commands             Cranial Nerves:    cranial nerves II-XII are grossly intact with pupils 3 mm bilaterally and sluggishly reactive    Motor Exam:    Was withdrawing to pain over left upper and lower extremity, following simple commands in right upper extremity, spinal reflex at right lower    Sensory:    Touch:    Withdraws to painful stimuli over left side    Deep Tendon Reflexes:    Right Bicep:  2+  Left Bicep:  2+  Right Knee:  2+  Left Knee:  2+    Plantar Response:  Right:  downgoing  Left:  downgoing    Clonus: Absent  De La Paz's:  absent    Coordination/Dysmetria:  N/A    Gait: Not examined   NIH Stroke Scale Total (if not done

## 2024-06-09 ENCOUNTER — APPOINTMENT (OUTPATIENT)
Dept: GENERAL RADIOLOGY | Age: 59
End: 2024-06-09
Attending: NEUROLOGICAL SURGERY
Payer: MEDICAID

## 2024-06-09 LAB
ANION GAP SERPL CALCULATED.3IONS-SCNC: 8 MMOL/L (ref 9–16)
BUN SERPL-MCNC: 30 MG/DL (ref 8–23)
CALCIUM SERPL-MCNC: 8.7 MG/DL (ref 8.6–10.4)
CHLORIDE SERPL-SCNC: 103 MMOL/L (ref 98–107)
CO2 SERPL-SCNC: 21 MMOL/L (ref 20–31)
CREAT SERPL-MCNC: 0.7 MG/DL (ref 0.7–1.2)
ERYTHROCYTE [DISTWIDTH] IN BLOOD BY AUTOMATED COUNT: 21.1 % (ref 11.8–14.4)
GFR, ESTIMATED: >90 ML/MIN/1.73M2
GLUCOSE SERPL-MCNC: 252 MG/DL (ref 74–99)
HCT VFR BLD AUTO: 28.8 % (ref 40.7–50.3)
HGB BLD-MCNC: 9.2 G/DL (ref 13–17)
MCH RBC QN AUTO: 30.6 PG (ref 25.2–33.5)
MCHC RBC AUTO-ENTMCNC: 31.9 G/DL (ref 28.4–34.8)
MCV RBC AUTO: 95.7 FL (ref 82.6–102.9)
MICROORGANISM SPEC CULT: ABNORMAL
MICROORGANISM SPEC CULT: ABNORMAL
MICROORGANISM/AGENT SPEC: ABNORMAL
NRBC BLD-RTO: 0.9 PER 100 WBC
PLATELET # BLD AUTO: 124 K/UL (ref 138–453)
PMV BLD AUTO: 12.5 FL (ref 8.1–13.5)
POTASSIUM SERPL-SCNC: 3.7 MMOL/L (ref 3.7–5.3)
RBC # BLD AUTO: 3.01 M/UL (ref 4.21–5.77)
SERVICE CMNT-IMP: ABNORMAL
SODIUM SERPL-SCNC: 132 MMOL/L (ref 136–145)
SPECIMEN DESCRIPTION: ABNORMAL
WBC OTHER # BLD: 13.8 K/UL (ref 3.5–11.3)

## 2024-06-09 PROCEDURE — 2580000003 HC RX 258

## 2024-06-09 PROCEDURE — 6360000002 HC RX W HCPCS: Performed by: PSYCHIATRY & NEUROLOGY

## 2024-06-09 PROCEDURE — 6370000000 HC RX 637 (ALT 250 FOR IP)

## 2024-06-09 PROCEDURE — 92611 MOTION FLUOROSCOPY/SWALLOW: CPT

## 2024-06-09 PROCEDURE — 2580000003 HC RX 258: Performed by: PSYCHIATRY & NEUROLOGY

## 2024-06-09 PROCEDURE — 6370000000 HC RX 637 (ALT 250 FOR IP): Performed by: PSYCHIATRY & NEUROLOGY

## 2024-06-09 PROCEDURE — 94640 AIRWAY INHALATION TREATMENT: CPT

## 2024-06-09 PROCEDURE — 36415 COLL VENOUS BLD VENIPUNCTURE: CPT

## 2024-06-09 PROCEDURE — 2060000000 HC ICU INTERMEDIATE R&B

## 2024-06-09 PROCEDURE — 6360000002 HC RX W HCPCS

## 2024-06-09 PROCEDURE — 0NP030Z REMOVAL OF DRAINAGE DEVICE FROM SKULL, PERCUTANEOUS APPROACH: ICD-10-PCS | Performed by: REGISTERED NURSE

## 2024-06-09 PROCEDURE — 74230 X-RAY XM SWLNG FUNCJ C+: CPT

## 2024-06-09 PROCEDURE — 99233 SBSQ HOSP IP/OBS HIGH 50: CPT | Performed by: STUDENT IN AN ORGANIZED HEALTH CARE EDUCATION/TRAINING PROGRAM

## 2024-06-09 PROCEDURE — 80048 BASIC METABOLIC PNL TOTAL CA: CPT

## 2024-06-09 PROCEDURE — 85027 COMPLETE CBC AUTOMATED: CPT

## 2024-06-09 PROCEDURE — 99232 SBSQ HOSP IP/OBS MODERATE 35: CPT | Performed by: INTERNAL MEDICINE

## 2024-06-09 RX ADMIN — ACETAMINOPHEN 650 MG: 325 TABLET ORAL at 09:04

## 2024-06-09 RX ADMIN — ONDANSETRON 4 MG: 4 TABLET, ORALLY DISINTEGRATING ORAL at 09:31

## 2024-06-09 RX ADMIN — METHYLPREDNISOLONE SODIUM SUCCINATE 60 MG: 40 INJECTION, POWDER, LYOPHILIZED, FOR SOLUTION INTRAMUSCULAR; INTRAVENOUS at 06:13

## 2024-06-09 RX ADMIN — TOPIRAMATE 100 MG: 50 TABLET, FILM COATED ORAL at 09:04

## 2024-06-09 RX ADMIN — TRAMADOL HYDROCHLORIDE 50 MG: 50 TABLET, COATED ORAL at 11:38

## 2024-06-09 RX ADMIN — IPRATROPIUM BROMIDE AND ALBUTEROL SULFATE 1 DOSE: .5; 3 SOLUTION RESPIRATORY (INHALATION) at 23:10

## 2024-06-09 RX ADMIN — VENLAFAXINE HYDROCHLORIDE 75 MG: 75 CAPSULE, EXTENDED RELEASE ORAL at 09:05

## 2024-06-09 RX ADMIN — METHYLPREDNISOLONE SODIUM SUCCINATE 60 MG: 40 INJECTION, POWDER, LYOPHILIZED, FOR SOLUTION INTRAMUSCULAR; INTRAVENOUS at 14:38

## 2024-06-09 RX ADMIN — GABAPENTIN 600 MG: 300 CAPSULE ORAL at 09:04

## 2024-06-09 RX ADMIN — TRAMADOL HYDROCHLORIDE 50 MG: 50 TABLET, COATED ORAL at 20:02

## 2024-06-09 RX ADMIN — SODIUM CHLORIDE, PRESERVATIVE FREE 10 ML: 5 INJECTION INTRAVENOUS at 21:18

## 2024-06-09 RX ADMIN — FAMOTIDINE 20 MG: 20 TABLET, FILM COATED ORAL at 09:04

## 2024-06-09 RX ADMIN — DEXTROMETHORPHAN POLISTIREX 60 MG: 30 SUSPENSION ORAL at 20:01

## 2024-06-09 RX ADMIN — METHYLPREDNISOLONE SODIUM SUCCINATE 60 MG: 40 INJECTION, POWDER, LYOPHILIZED, FOR SOLUTION INTRAMUSCULAR; INTRAVENOUS at 21:11

## 2024-06-09 RX ADMIN — DICLOFENAC SODIUM 2 G: 10 GEL TOPICAL at 20:02

## 2024-06-09 RX ADMIN — PIPERACILLIN AND TAZOBACTAM 3375 MG: 3; .375 INJECTION, POWDER, LYOPHILIZED, FOR SOLUTION INTRAVENOUS at 03:38

## 2024-06-09 RX ADMIN — FAMOTIDINE 20 MG: 20 TABLET, FILM COATED ORAL at 20:01

## 2024-06-09 RX ADMIN — LACOSAMIDE 100 MG: 100 TABLET, FILM COATED ORAL at 21:11

## 2024-06-09 RX ADMIN — MAGNESIUM GLUCONATE 500 MG ORAL TABLET 400 MG: 500 TABLET ORAL at 09:05

## 2024-06-09 RX ADMIN — PIPERACILLIN AND TAZOBACTAM 3375 MG: 3; .375 INJECTION, POWDER, LYOPHILIZED, FOR SOLUTION INTRAVENOUS at 11:44

## 2024-06-09 RX ADMIN — DEXTROMETHORPHAN POLISTIREX 60 MG: 30 SUSPENSION ORAL at 09:05

## 2024-06-09 RX ADMIN — DICLOFENAC SODIUM 2 G: 10 GEL TOPICAL at 09:06

## 2024-06-09 RX ADMIN — LACOSAMIDE 100 MG: 100 TABLET, FILM COATED ORAL at 09:04

## 2024-06-09 RX ADMIN — TOPIRAMATE 100 MG: 50 TABLET, FILM COATED ORAL at 21:19

## 2024-06-09 RX ADMIN — TAMSULOSIN HYDROCHLORIDE 0.4 MG: 0.4 CAPSULE ORAL at 09:05

## 2024-06-09 RX ADMIN — GABAPENTIN 600 MG: 300 CAPSULE ORAL at 21:11

## 2024-06-09 RX ADMIN — PIPERACILLIN AND TAZOBACTAM 3375 MG: 3; .375 INJECTION, POWDER, LYOPHILIZED, FOR SOLUTION INTRAVENOUS at 19:26

## 2024-06-09 RX ADMIN — TRAMADOL HYDROCHLORIDE 50 MG: 50 TABLET, COATED ORAL at 04:17

## 2024-06-09 RX ADMIN — Medication 10 MG: at 21:11

## 2024-06-09 RX ADMIN — AMITRIPTYLINE HYDROCHLORIDE 75 MG: 50 TABLET, FILM COATED ORAL at 20:01

## 2024-06-09 RX ADMIN — GABAPENTIN 600 MG: 300 CAPSULE ORAL at 14:39

## 2024-06-09 ASSESSMENT — PAIN SCALES - GENERAL
PAINLEVEL_OUTOF10: 0
PAINLEVEL_OUTOF10: 5
PAINLEVEL_OUTOF10: 10
PAINLEVEL_OUTOF10: 8
PAINLEVEL_OUTOF10: 7
PAINLEVEL_OUTOF10: 8
PAINLEVEL_OUTOF10: 0

## 2024-06-09 ASSESSMENT — PAIN DESCRIPTION - LOCATION
LOCATION: HEAD;INCISION
LOCATION: HEAD
LOCATION: HEAD

## 2024-06-09 ASSESSMENT — PAIN SCALES - WONG BAKER
WONGBAKER_NUMERICALRESPONSE: HURTS A LITTLE BIT

## 2024-06-09 ASSESSMENT — PAIN DESCRIPTION - DESCRIPTORS
DESCRIPTORS: ACHING;DISCOMFORT;SORE
DESCRIPTORS: ACHING;SORE

## 2024-06-09 ASSESSMENT — PAIN DESCRIPTION - ONSET
ONSET: ON-GOING
ONSET: ON-GOING

## 2024-06-09 ASSESSMENT — PAIN DESCRIPTION - FREQUENCY
FREQUENCY: CONTINUOUS
FREQUENCY: CONTINUOUS

## 2024-06-09 ASSESSMENT — PAIN DESCRIPTION - ORIENTATION
ORIENTATION: RIGHT
ORIENTATION: RIGHT

## 2024-06-09 ASSESSMENT — PAIN DESCRIPTION - PAIN TYPE
TYPE: CHRONIC PAIN
TYPE: CHRONIC PAIN

## 2024-06-09 NOTE — DISCHARGE INSTR - COC
ACCESS:[id number]}    Nursing Mobility/ADLs:  Walking   Dependent  Transfer  Dependent  Bathing  Dependent  Dressing  Dependent  Toileting  Dependent  Feeding  Assisted  Med Admin  Assisted  Med Delivery   whole    Wound Care Documentation and Therapy:  Incision 06/04/24 Face Lateral;Anterior (Active)   Dressing Status Clean;Dry;Intact 06/09/24 0800   Incision Cleansed Not Cleansed 06/09/24 0800   Dressing/Treatment Dry dressing 06/09/24 0800   Closure Other (Comment) 06/09/24 0800   Margins Other (Comment) 06/09/24 0800   Incision Assessment Dry 06/06/24 2000   Drainage Amount None (dry) 06/09/24 0800   Odor None 06/09/24 0800   Kristel-incision Assessment Other (Comment) 06/06/24 2000   Number of days: 4       Incision 06/06/24 Head Left;Upper (Active)   Dressing Status Intact;Old drainage noted 06/09/24 0800   Incision Cleansed Not Cleansed 06/09/24 0800   Dressing/Treatment Other (comment) 06/07/24 0400   Margins Other (Comment) 06/09/24 0800   Drainage Amount Scant (moist but unmeasurable) 06/07/24 2000   Drainage Description Sanguinous 06/07/24 2000   Number of days: 2        Elimination:  Continence:   Bowel: No  Bladder: No  Urinary Catheter: None   Colostomy/Ileostomy/Ileal Conduit: No       Date of Last BM: 6/12    Intake/Output Summary (Last 24 hours) at 6/9/2024 1430  Last data filed at 6/9/2024 0800  Gross per 24 hour   Intake --   Output 870 ml   Net -870 ml     I/O last 3 completed shifts:  In: 700.9 [I.V.:609.9; IV Piggyback:91]  Out: 1520 [Urine:1440; Drains:80]    Safety Concerns:     At Risk for Falls, History of Seizures, and Aspiration Risk    Impairments/Disabilities:      Speech, Vision, and Paralysis - Left side    Nutrition Therapy:  Current Nutrition Therapy:   - Oral Diet:  Easy to Chew    Routes of Feeding: Oral  Liquids: No Restrictions  Daily Fluid Restriction: no  Last Modified Barium Swallow with Video (Video Swallowing Test): not done    Treatments at the Time of Hospital Discharge:    Respiratory Treatments: ***  Oxygen Therapy:  is on oxygen at 2 L/min per nasal cannula.  Ventilator:    {MH CC Vent List:[id number]}    Rehab Therapies: Physical Therapy, Occupational Therapy, and Speech/Language Therapy  Weight Bearing Status/Restrictions: No weight bearing restrictions  Other Medical Equipment (for information only, NOT a DME order):  hospital bed  Other Treatments: ***    Patient's personal belongings (please select all that are sent with patient):  Sent with wife.    RN SIGNATURE:  Electronically signed by Janice Espinoza RN on 6/13/24 at 10:32 AM EDT    CASE MANAGEMENT/SOCIAL WORK SECTION    Inpatient Status Date: 5-28-24    Readmission Risk Assessment Score:  Readmission Risk              Risk of Unplanned Readmission:  42           Discharging to Facility/ Agency   Name: [facility]   Address:  Phone:  Fax:      / signature: Electronically signed by Lindsey Toro RN on 6/9/24 at 2:31 PM EDT    PHYSICIAN SECTION    Prognosis: Good    Condition at Discharge: Stable    Rehab Potential (if transferring to Rehab): Good    Recommended Labs or Other Treatments After Discharge: inpatient rehab PT and OT     Physician Certification: I certify the above information and transfer of Brennen Mcclure  is necessary for the continuing treatment of the diagnosis listed and that he requires Acute Rehab for greater 30 days.     Update Admission H&P: No change in H&P    PHYSICIAN SIGNATURE:  Electronically signed by Gloria Corbett DO on 6/13/24 at 7:48 AM EDT

## 2024-06-09 NOTE — PROGRESS NOTES
Neurosurgery NANNETTE/Resident    Daily Progress Note   CC:No chief complaint on file.    6/9/2024  10:41 AM    Chart reviewed.  Patient POD#3 s/p hematoma evacuation. Patient doing well this am.     Vitals:    06/08/24 2200 06/08/24 2300 06/09/24 0000 06/09/24 0400   BP: 118/76 115/79 113/71 121/73   Pulse: 80 72 71 63   Resp: 15 15 17 13   Temp:    98.8 °F (37.1 °C)   TempSrc:       SpO2:    97%   Weight:       Height:           PE:   Awake and alert.   PERRL  Following commands RUE and RLE   Left hemiplegia  Drain output: 30 ml in 12 hours, 45 ml over 24 hours   Incision: CDI       Lab Results   Component Value Date    WBC 13.8 (H) 06/09/2024    HGB 9.2 (L) 06/09/2024    HCT 28.8 (L) 06/09/2024     (L) 06/09/2024    CHOL 118 05/16/2024    TRIG 182 (H) 05/16/2024    HDL 28 (L) 05/16/2024    ALT 28 05/08/2024    AST 29 05/08/2024     (L) 06/09/2024    K 3.7 06/09/2024     06/09/2024    CREATININE 0.7 06/09/2024    BUN 30 (H) 06/09/2024    CO2 21 06/09/2024    TSH 3.45 04/13/2024    INR 1.1 06/05/2024    LABA1C 5.2 05/16/2024    CRP 33.1 (H) 05/12/2024       Radiology   CT HEAD WO CONTRAST    Result Date: 6/7/2024  EXAMINATION: CT OF THE HEAD WITHOUT CONTRAST  6/7/2024 1:11 am TECHNIQUE: CT of the head was performed without the administration of intravenous contrast. Automated exposure control, iterative reconstruction, and/or weight based adjustment of the mA/kV was utilized to reduce the radiation dose to as low as reasonably achievable. COMPARISON: June 6, 2024 HISTORY: ORDERING SYSTEM PROVIDED HISTORY: POST OP TECHNOLOGIST PROVIDED HISTORY: POST OP Reason for Exam: post op FINDINGS: There has been interval surgical evacuation of the intraparenchymal hematoma as well as the adjacent subdural hematoma with decreased white matter edema and mass effect. 6 mm of right-to-left midline shift is noted. There is postoperative pneumocephalus. The patient is status post right-sided craniectomy.     Interval  along the anterior margin of the craniectomy, likely related to subacute parenchymal hematoma. There is mass effect in the right cerebral hemisphere with decreased sulcation.  No midline shift. There is mild T2/FLAIR hyperintensity in the periventricular and subcortical white matter, likely related to mild chronic microvascular disease.  There is no acute infarct.   There is no evidence of hydrocephalus. The sellar/suprasellar regions appear unremarkable. ORBITS: The visualized portion of the orbits demonstrate no acute abnormality. SINUSES: There is scattered minimal mucosal thickening in the paranasal sinuses.  There is mild right mastoid effusion. BONES/SOFT TISSUES: The bone marrow signal intensity appears normal. The soft tissues demonstrate no acute abnormality.     Status post right frontal parietal temporal craniectomy. Residual vasogenic edema, scattered encephalomalacia and scattered chronic blood products in the subjacent right frontal parietal temporal lobes.  Mass effect in the right cerebral hemisphere. No midline shift. 2.2 x 2.7 cm fluid collection with associated restricted diffusion and peripheral susceptibility artifacts in the right frontal lobe, likely related to surgical cavity with subacute blood products. Additional 5 x 11 mm fluid collection with associated restricted diffusion in the anterior right frontal lobe along the anterior margin of the craniectomy, likely related to subacute parenchymal hematoma.     CT HEAD WO CONTRAST    Result Date: 5/8/2024  EXAMINATION: CT OF THE HEAD WITHOUT CONTRAST  5/8/2024 8:50 pm TECHNIQUE: CT of the head was performed without the administration of intravenous contrast. Automated exposure control, iterative reconstruction, and/or weight based adjustment of the mA/kV was utilized to reduce the radiation dose to as low as reasonably achievable. COMPARISON: May 2, 2024 CT head HISTORY: ORDERING SYSTEM PROVIDED HISTORY: weakness, prior right craniectomy,

## 2024-06-09 NOTE — PLAN OF CARE
Problem: Discharge Planning  Goal: Discharge to home or other facility with appropriate resources  Outcome: Progressing     Problem: Chronic Conditions and Co-morbidities  Goal: Patient's chronic conditions and co-morbidity symptoms are monitored and maintained or improved  Outcome: Progressing     Problem: Safety - Adult  Goal: Free from fall injury  Outcome: Progressing     Problem: Skin/Tissue Integrity  Goal: Absence of new skin breakdown  Description: 1.  Monitor for areas of redness and/or skin breakdown  2.  Assess vascular access sites hourly  3.  Every 4-6 hours minimum:  Change oxygen saturation probe site  4.  Every 4-6 hours:  If on nasal continuous positive airway pressure, respiratory therapy assess nares and determine need for appliance change or resting period.  Outcome: Progressing     Problem: Pain  Goal: Verbalizes/displays adequate comfort level or baseline comfort level  Outcome: Progressing     Problem: ABCDS Injury Assessment  Goal: Absence of physical injury  Outcome: Progressing     Problem: Safety - Medical Restraint  Goal: Remains free of injury from restraints (Restraint for Interference with Medical Device)  Description: INTERVENTIONS:  1. Determine that other, less restrictive measures have been tried or would not be effective before applying the restraint  2. Evaluate the patient's condition at the time of restraint application  3. Inform patient/family regarding the reason for restraint  4. Q2H: Monitor safety, psychosocial status, comfort, nutrition and hydration  Outcome: Progressing

## 2024-06-09 NOTE — CARE COORDINATION
Transitional Planning  Spoke with dtr at bedside, and pt's spouse on speaker phone to get ARU choices.    [facility]    Referrals sent. PM&R ordered     1215 pm Call from Lindsey at [facility], states attempted to run benefits and insurance came back inactive.  Per Auth/cert note, patient has straight [place] Medicaid, no longer has HMO.      Called Lindsey back at [facility], up provided.      1245 pm Call from [facility] at [facility], informed her patient has [place] Medicaid.  She will review.      136 pm Call from [facility] at [facility], able to accept.      230 pm Dtr notified at bedside.

## 2024-06-09 NOTE — PROGRESS NOTES
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 Chronic ITP. History of intraparenchymal brain bleed     Interval Changes:  Patient seen and examined.  Doing well clinically.  Alert and oriented.  No new events.  No headaches.  No seizure activities.  Labs were reviewed.  Platelets 124.        History of Present Illness:    The patient is a 59 y.o.  male who is admitted  to the neurosurgery team for an elective cranioplasty scheduled for 5/29.  Hematology oncology team consulted due to patient's history of thrombocytopenia and ITP.  Patient is relevant hematologic history is as below.  Patient is currently on Promacta for ITP.  Patient not seen in office by Dr. Chang on 5/21.  Platelet count was 1 40,000 at that time.  Patient platelet count has dropped to 56,000 now.  Hemoglobin 13.6 WBC count 5.8.  Hematology oncology team consulted due to thrombocytopenia in light of patient's upcoming surgery.     Patient does have history of dural venous thrombosis and acute hemorrhagic stroke.  Patient also has history of DVT.     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 COMPARISON: 04/12/2024 HISTORY: Fall. FINDINGS: BRAIN/VENTRICLES: Stable postsurgical changes from right-sided craniectomy with dural thickening.  No evidence of acute infarct.  Extensive encephalomalacia within the right cerebral hemisphere is unchanged.  No new intracranial hemorrhage.  No midline shift.  No hydrocephalus. ORBITS: The visualized portion of the orbits demonstrate no acute abnormality. SINUSES: The visualized paranasal sinuses and mastoid air cells demonstrate no acute abnormality. SOFT TISSUES/SKULL:  No acute abnormality.     Stable postsurgical and chronic changes without acute abnormality.     CT CERVICAL SPINE WO CONTRAST    Result Date: 5/2/2024  EXAMINATION: CT OF THE CERVICAL SPINE WITHOUT CONTRAST 5/2/2024 5:02 pm TECHNIQUE: CT of the cervical spine was performed without the administration of intravenous contrast. Multiplanar reformatted images are provided for review. Automated exposure control, iterative reconstruction, and/or weight based adjustment of the mA/kV was utilized to reduce the radiation dose to as low as reasonably achievable. COMPARISON: None. HISTORY: ORDERING SYSTEM PROVIDED HISTORY: fall TECHNOLOGIST PROVIDED HISTORY: fall Decision Support Exception - unselect if not a suspected or confirmed emergency medical condition->Emergency Medical Condition (MA) Reason for Exam: Headache; Fall Relevant Medical/Surgical History: crainiotomy FINDINGS: BONES/ALIGNMENT: There is no acute fracture or traumatic malalignment. DEGENERATIVE CHANGES: Mild degenerative changes. SOFT TISSUES: There is no prevertebral soft tissue swelling.     No evidence for acute fracture or subluxation. Mild degenerative changes.        Impression:   Primary Problem  Absence of bone of skull    Active Hospital Problems    Diagnosis Date Noted    Epidural hematoma

## 2024-06-09 NOTE — PROCEDURES
PROCEDURE NOTE  Date: 6/9/2024   Name: Brennen Mcclure  YOB: 1965      Drain Removal Note    [x]Subdural Drain   []Subgaleal Drain  []Ventriculostomy Drain    Drain removed from suction, prepped with betadine and sterile towels placed to create sterile field. Drain suture cut and drain removed. A single staple was placed over drain hole with hemostasis.     No complications, patient tolerated procedure well.    --  Sue Ramirez, EMILY - CNP  1:03 PM EDT

## 2024-06-09 NOTE — PROGRESS NOTES
At approximately 0330 writer went into to patient's room to give Zosyn, do rhiannon-care, change his external urinary catheter and do 0400 neuro exam and vitals.     At 0400 patient complained of a headache with a pain level of 8. Writer gave patient a PRN dose of Tramadol.    At 0500 patient stated that he is seeing people he knows isn't there in his room. He was aware that I was really with him and that his son is not really there. I reassessed his pain and he stated that he was at a pain level of 7. When asked if he wanted RN to do other methods of pain control to help, patient declined. Resident notified of hallucinations. Resident came to bedside.    At approximately 0530 patient calls his wife on his cell phone and mentions to her that he is having hallucinations. Patient hands the phone to writer and requests that writer reassures wife that he is fine. Writer speaks with wife about the hallucinations patient was having, that RN notified the resident and that the Tramadol could potentially cause hallucinations per resident's assessment.    Wife states \"You guys cannot keep taking away his medicine. He has had these hallucinations since [place]. Everyone knows he has hallucinations. You only have been with my  for a couple of hours and you don't know him. You can't leave him without pain medications after having surgery.\"  \"I thought you were going to leave him alone and let him sleep because that's what he needs is to be left alone so he can sleep. That was why I left.\"    Writer tried explaining that he DID receive pain medication and that it is the RN's job to assess the patient per protocol.     Wife became progressively aggressive over the phone reiterating everything that was just said previously. Wife was upset that patient was hungry and wanted to eat and that RN set him up so that he could eat. RN assisted with feeding him 2 chocolate ice creams, 1 applesauce, 2 Jellos and cut up his peanut butter and

## 2024-06-09 NOTE — PLAN OF CARE
Problem: Discharge Planning  Goal: Discharge to home or other facility with appropriate resources  6/9/2024 0244 by Sophia Montero RN  Outcome: Progressing     Problem: Chronic Conditions and Co-morbidities  Goal: Patient's chronic conditions and co-morbidity symptoms are monitored and maintained or improved  6/9/2024 0244 by Sophia Montero RN  Outcome: Progressing     Problem: Safety - Adult  Goal: Free from fall injury  6/9/2024 0244 by Sophia Montero RN  Outcome: Progressing     Problem: Skin/Tissue Integrity  Goal: Absence of new skin breakdown  Description: 1.  Monitor for areas of redness and/or skin breakdown  2.  Assess vascular access sites hourly  3.  Every 4-6 hours minimum:  Change oxygen saturation probe site  4.  Every 4-6 hours:  If on nasal continuous positive airway pressure, respiratory therapy assess nares and determine need for appliance change or resting period.  6/9/2024 0244 by Sophia Montero RN  Outcome: Progressing     Problem: Pain  Goal: Verbalizes/displays adequate comfort level or baseline comfort level  6/9/2024 0244 by Sophia Montero RN  Outcome: Progressing     Problem: ABCDS Injury Assessment  Goal: Absence of physical injury  6/9/2024 0244 by Sophia Montero RN  Outcome: Progressing

## 2024-06-09 NOTE — PROGRESS NOTES
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 Chronic ITP. History of intraparenchymal brain bleed     Interval Changes:  Patient seen and examined.  Doing well clinically.  Alert and oriented.  No new events.  No headaches.  No seizure activities.  Labs were reviewed.  Platelets 125.        History of Present Illness:    The patient is a 59 y.o.  male who is admitted  to the neurosurgery team for an elective cranioplasty scheduled for 5/29.  Hematology oncology team consulted due to patient's history of thrombocytopenia and ITP.  Patient is relevant hematologic history is as below.  Patient is currently on Promacta for ITP.  Patient not seen in office by Dr. Chang on 5/21.  Platelet count was 1 40,000 at that time.  Patient platelet count has dropped to 56,000 now.  Hemoglobin 13.6 WBC count 5.8.  Hematology oncology team consulted due to thrombocytopenia in light of patient's upcoming surgery.     Patient does have history of dural venous thrombosis and acute hemorrhagic stroke.  Patient also has history of DVT.     DIAGNOSIS:   Chronic thrombocytopenia, patient had a evaluation in [place] and also in the past and thought to be immune mediated  History of dural sinus thrombosis and history of DVT in 2019  Chronic anticoagulation with Eliquis  Had a hemorrhagic stroke in December 2023  Patient received platelet transfusion during the hospitalization and also was given IVIG on 1/9/2024 and 1/10/2024  Patient was treated with Decadron 40 mg on 2/18 - 2/20 1-24, with no response  Patient planning surgical procedure with neurosurgery  Bone marrow biopsy negative for acute bone marrow pathology  I will plan to get IVIG weekly for 5 doses and also  Oral 2 times per day DavidaElliott S, DO   60 mg at 06/08/24 1517    amitriptyline (ELAVIL) tablet 75 mg  75 mg Oral Nightly DavidaSherrellm S, DO        hydrALAZINE (APRESOLINE) injection 10 mg  10 mg IntraVENous Q4H PRN Leonora Brown MD        piperacillin-tazobactam (ZOSYN) 3,375 mg in sodium chloride 0.9 % 50 mL IVPB (mini-bag)  3,375 mg IntraVENous Q8H Elliott Hoffman S, DO 12.5 mL/hr at 06/08/24 1955 3,375 mg at 06/08/24 1955    [Held by provider] enoxaparin Sodium (LOVENOX) injection 30 mg  30 mg SubCUTAneous Daily Leonora Brown MD   30 mg at 06/06/24 1335    sodium chloride flush 0.9 % injection 5-40 mL  5-40 mL IntraVENous 2 times per day Leonora Brown MD   10 mL at 06/08/24 0921    sodium chloride flush 0.9 % injection 5-40 mL  5-40 mL IntraVENous PRN Leonora Brown MD        0.9 % sodium chloride infusion   IntraVENous PRN Leonora Brown MD        polyethylene glycol (GLYCOLAX) packet 17 g  17 g Oral Daily Leonora Brown MD   17 g at 06/08/24 0758    senna (SENOKOT) tablet 8.6 mg  1 tablet Oral Daily PRN Leonora Brown MD   8.6 mg at 06/08/24 1503    famotidine (PEPCID) tablet 20 mg  20 mg Oral BID Leonora Brown MD   20 mg at 06/07/24 0810    Or    famotidine (PEPCID) 20 mg in sodium chloride (PF) 0.9 % 10 mL injection  20 mg IntraVENous BID Leonora Brown MD   20 mg at 06/08/24 0758    0.9 % sodium chloride infusion   IntraVENous Continuous Leonora Brown MD 75 mL/hr at 06/08/24 0700 Rate Verify at 06/08/24 0700    eltrombopag olamine (PROMACTA) 50 MG tablet TABS 50 mg (Patient Supplied)  50 mg Oral Daily Leonora Brown MD   50 mg at 06/08/24 0759    sodium chloride flush 0.9 % injection 5-40 mL  5-40 mL IntraVENous 2 times per day Leonora Brown MD   10 mL at 06/07/24 2223    sodium chloride flush 0.9 % injection 5-40 mL  5-40 mL IntraVENous PRN Leonora Brown MD   10 mL at 06/04/24 0831    0.9 % sodium chloride infusion   IntraVENous PRN Leonora Brown MD        potassium chloride (KLOR-CON M)

## 2024-06-09 NOTE — PROCEDURES
INSTRUMENTAL SWALLOW REPORT  MODIFIED BARIUM SWALLOW    NAME: Brennen Mcclure   : 1965  MRN: [id number]       Date of Eval: 2024              Referring Diagnosis(es):      Past Medical History:  has a past medical history of Anemia, CAD (coronary artery disease), Chronic deep vein thrombosis (DVT) of both lower extremities (HCC), Chronic deep vein thrombosis (DVT) of femoral vein of right lower extremity (HCC), Chronic deep vein thrombosis (DVT) of proximal vein of right lower extremity (HCC), Chronic deep vein thrombosis (DVT) of right iliac vein (HCC), Chronic idiopathic thrombocytopenia (HCC), Chronic idiopathic thrombocytopenic purpura (HCC), CVA (cerebral vascular accident) (HCC), Emphysema lung (HCC), Hemosiderin pigmentation of skin, HLD (hyperlipidemia), Intracranial hemorrhage (HCC), Left hemiplegia (HCC), Right leg swelling, and Thrombocytopenia (HCC).  Past Surgical History:  has a past surgical history that includes Coronary artery bypass graft; Coronary stent placement; craniotomy (Right); CT BIOPSY BONE MARROW (2024); Cranioplasty (Right, 2024); craniotomy (Right, 2024); and craniotomy (Right, 2024).               Type of Study: Initial MBS       Recent CXR/CT of Chest: 24  IMPRESSION:  Stable chest with no acute parenchymal abnormality demonstrated    Patient Complaints/Reason for Referral:  Brennen Mcclure was referred for a MBS to assess the efficiency of his/her swallow function, assess for aspiration, and to make recommendations regarding safe dietary consistencies, effective compensatory strategies, and safe eating environment.       Onset of problem:      The patient is a 59 y.o. male with history of right sided thrombosis with infarct s/p right sided decompressive hemicraniectomy while in [place], ITP treated with IVIG seeing hematology, seizure on vimpat, and headaches presents today as direct admit for planned cranioplasty on . Patient reports feeling

## 2024-06-09 NOTE — PROGRESS NOTES
APTT 22.4 06/05/2024 03:58 AM     Basal Metabolic Profile:   Recent Labs     06/07/24  0745 06/08/24  0608 06/09/24  0630    140 132*   K 4.0 4.0 3.7   BUN 30* 31* 30*   CREATININE 0.8 0.6* 0.7    108* 103   CO2 21 24 21      LFTS  No results for input(s): \"ALKPHOS\", \"ALT\", \"AST\", \"BILITOT\", \"BILIDIR\", \"LABALBU\" in the last 72 hours.    Imaging:  XR CHEST PORTABLE    Result Date: 5/28/2024  Low lung volumes.  No acute cardiopulmonary disease.     XR CHEST PORTABLE    Result Date: 5/28/2024  EXAMINATION: ONE XRAY VIEW OF THE CHEST 5/28/2024 6:10 pm COMPARISON: 05/08/2024. HISTORY: ORDERING SYSTEM PROVIDED HISTORY: pre-op TECHNOLOGIST PROVIDED HISTORY: pre-op Reason for Exam: prre-op  upright port FINDINGS: The cardiac silhouette is mildly enlarged and stable with post sternotomy changes.  There is mild prominence of the interstitial markings throughout the lungs, likely accentuated by decreased lung volumes and underlying pulmonary emphysema.  No focal consolidation, significant pleural fluid or evidence of overt failure.     Low lung volumes.  No acute cardiopulmonary disease.     CT HEAD WO CONTRAST    Result Date: 5/20/2024  EXAMINATION: CT OF THE HEAD WITHOUT CONTRAST  5/17/2024 4:47 pm TECHNIQUE: CT of the head was performed without the administration of intravenous contrast. Automated exposure control, iterative reconstruction, and/or weight based adjustment of the mA/kV was utilized to reduce the radiation dose to as low as reasonably achievable. COMPARISON: 05/16/2024 HISTORY: ORDERING SYSTEM PROVIDED HISTORY: for cranial implant, please do with [place] protocol TECHNOLOGIST PROVIDED HISTORY: for cranial implant, please do with [place] protocol Reason for Exam: For cranial implant, please do with [place] protocol FINDINGS: BRAIN/VENTRICLES: Preoperative planning CT head was obtained.  The right lateral ventricle has re-expanded and there is slightly more external brain herniation through the  iterative reconstruction, and/or weight based adjustment of the mA/kV was utilized to reduce the radiation dose to as low as reasonably achievable. COMPARISON: 04/12/2024 HISTORY: Fall. FINDINGS: BRAIN/VENTRICLES: Stable postsurgical changes from right-sided craniectomy with dural thickening.  No evidence of acute infarct.  Extensive encephalomalacia within the right cerebral hemisphere is unchanged.  No new intracranial hemorrhage.  No midline shift.  No hydrocephalus. ORBITS: The visualized portion of the orbits demonstrate no acute abnormality. SINUSES: The visualized paranasal sinuses and mastoid air cells demonstrate no acute abnormality. SOFT TISSUES/SKULL:  No acute abnormality.     Stable postsurgical and chronic changes without acute abnormality.     CT CERVICAL SPINE WO CONTRAST    Result Date: 5/2/2024  EXAMINATION: CT OF THE CERVICAL SPINE WITHOUT CONTRAST 5/2/2024 5:02 pm TECHNIQUE: CT of the cervical spine was performed without the administration of intravenous contrast. Multiplanar reformatted images are provided for review. Automated exposure control, iterative reconstruction, and/or weight based adjustment of the mA/kV was utilized to reduce the radiation dose to as low as reasonably achievable. COMPARISON: None. HISTORY: ORDERING SYSTEM PROVIDED HISTORY: fall TECHNOLOGIST PROVIDED HISTORY: fall Decision Support Exception - unselect if not a suspected or confirmed emergency medical condition->Emergency Medical Condition (MA) Reason for Exam: Headache; Fall Relevant Medical/Surgical History: crainiotomy FINDINGS: BONES/ALIGNMENT: There is no acute fracture or traumatic malalignment. DEGENERATIVE CHANGES: Mild degenerative changes. SOFT TISSUES: There is no prevertebral soft tissue swelling.     No evidence for acute fracture or subluxation. Mild degenerative changes.        Impression:   Primary Problem  Absence of bone of skull    Active Hospital Problems    Diagnosis Date Noted    Epidural hematoma

## 2024-06-09 NOTE — PROGRESS NOTES
normotension  Continue Vimpat 100 mg twice daily  C/w Elavil 75 nightly, topamax 100 mg BID   Citalopram 10 mg daily - discontinued   Tramadol 50 mg every 6 hours as needed once overnight  Baclofen 5 mg twice daily hold for mentation   Melatonin 10 mg oral daily  Continue to hold Fioricet   Neurosurgery following, appreciate recommendations  Lipitor held due to active bleed  Hold DVT prophylaxis  Gabapentin 600 mg 3 times daily  Dextromethorphan 60 mg every 12 hours may consider adding quinidine  to help treat pseudobulbar symptoms      Cardiovascular  Normotensive today  Continuous cardiac telemetry  History of hyperlipidemia     Plan:  SBP goal normotension  Hydralazine 10mg IV q4h prn   Hold statin     Pulmonary  Saturating well on room air     Plan:  Aspiration precautions, head of bed above 30 degrees        Renal, fluid status, and electrolytes   Renal function normal     Plan:  Monitor daily BMP  External cath in place  Flomax daily  Avoid hypotonic fluids as this can worsen cerebral edema  IVFs with NS at 75/h   400 mg mag ox daily     Gastroenterology and nutrition  Diet adult diet dysphagia soft and bite-size  Bedside swallow eval completed and video barium swallow to be completed  Last BM: unknown      Plan  Barium swallow with video and further recommendations from speech     Infectious disease  Aspiration pneumonia.  No fevers over the past 24 hours     Plan:  Tylenol   Zosyn started on 6/7/2024. Will give for total of 7 days, end date 6/14/2024.  Daily CBC with differential  Monitor for fevers  Respiratory culture showing yeast mixed bacteria and light pseudomonas     Hematological  ITP   Platelet count decreased from 263 two days prior to 124 today  Normocytic anemia   hemoglobin is 9.2, stable from yesterday  Plan:  Heme following  On Promacta and Solumedrol 60 mg TID  Daily CBC     Endocrinological  Mild hyperglycemia     Plan:  Frequent gluc checks      Prophylaxis:  Stress ulcer: Pepcid   DVT:

## 2024-06-10 LAB
ANION GAP SERPL CALCULATED.3IONS-SCNC: 10 MMOL/L (ref 9–16)
BUN SERPL-MCNC: 23 MG/DL (ref 8–23)
CALCIUM SERPL-MCNC: 9 MG/DL (ref 8.6–10.4)
CHLORIDE SERPL-SCNC: 103 MMOL/L (ref 98–107)
CO2 SERPL-SCNC: 19 MMOL/L (ref 20–31)
CREAT SERPL-MCNC: 0.6 MG/DL (ref 0.7–1.2)
ERYTHROCYTE [DISTWIDTH] IN BLOOD BY AUTOMATED COUNT: 21.6 % (ref 11.8–14.4)
GFR, ESTIMATED: >90 ML/MIN/1.73M2
GLUCOSE SERPL-MCNC: 123 MG/DL (ref 74–99)
HCT VFR BLD AUTO: 32.5 % (ref 40.7–50.3)
HGB BLD-MCNC: 9.7 G/DL (ref 13–17)
MCH RBC QN AUTO: 29.8 PG (ref 25.2–33.5)
MCHC RBC AUTO-ENTMCNC: 29.8 G/DL (ref 28.4–34.8)
MCV RBC AUTO: 99.7 FL (ref 82.6–102.9)
NRBC BLD-RTO: 0.8 PER 100 WBC
PLATELET # BLD AUTO: 100 K/UL (ref 138–453)
PMV BLD AUTO: 12 FL (ref 8.1–13.5)
POTASSIUM SERPL-SCNC: 4.4 MMOL/L (ref 3.7–5.3)
RBC # BLD AUTO: 3.26 M/UL (ref 4.21–5.77)
SODIUM SERPL-SCNC: 132 MMOL/L (ref 136–145)
WBC OTHER # BLD: 9.7 K/UL (ref 3.5–11.3)

## 2024-06-10 PROCEDURE — 6360000002 HC RX W HCPCS

## 2024-06-10 PROCEDURE — 6370000000 HC RX 637 (ALT 250 FOR IP)

## 2024-06-10 PROCEDURE — 85027 COMPLETE CBC AUTOMATED: CPT

## 2024-06-10 PROCEDURE — 6360000002 HC RX W HCPCS: Performed by: PSYCHIATRY & NEUROLOGY

## 2024-06-10 PROCEDURE — 97112 NEUROMUSCULAR REEDUCATION: CPT

## 2024-06-10 PROCEDURE — 36415 COLL VENOUS BLD VENIPUNCTURE: CPT

## 2024-06-10 PROCEDURE — 99254 IP/OBS CNSLTJ NEW/EST MOD 60: CPT | Performed by: STUDENT IN AN ORGANIZED HEALTH CARE EDUCATION/TRAINING PROGRAM

## 2024-06-10 PROCEDURE — 92523 SPEECH SOUND LANG COMPREHEN: CPT

## 2024-06-10 PROCEDURE — 2580000003 HC RX 258

## 2024-06-10 PROCEDURE — 94640 AIRWAY INHALATION TREATMENT: CPT

## 2024-06-10 PROCEDURE — 2580000003 HC RX 258: Performed by: PSYCHIATRY & NEUROLOGY

## 2024-06-10 PROCEDURE — 6370000000 HC RX 637 (ALT 250 FOR IP): Performed by: PSYCHIATRY & NEUROLOGY

## 2024-06-10 PROCEDURE — 2700000000 HC OXYGEN THERAPY PER DAY

## 2024-06-10 PROCEDURE — 97535 SELF CARE MNGMENT TRAINING: CPT

## 2024-06-10 PROCEDURE — 94761 N-INVAS EAR/PLS OXIMETRY MLT: CPT

## 2024-06-10 PROCEDURE — 97530 THERAPEUTIC ACTIVITIES: CPT

## 2024-06-10 PROCEDURE — 97110 THERAPEUTIC EXERCISES: CPT

## 2024-06-10 PROCEDURE — 2060000000 HC ICU INTERMEDIATE R&B

## 2024-06-10 PROCEDURE — 80048 BASIC METABOLIC PNL TOTAL CA: CPT

## 2024-06-10 PROCEDURE — 99232 SBSQ HOSP IP/OBS MODERATE 35: CPT | Performed by: INTERNAL MEDICINE

## 2024-06-10 RX ORDER — IPRATROPIUM BROMIDE AND ALBUTEROL SULFATE 2.5; .5 MG/3ML; MG/3ML
SOLUTION RESPIRATORY (INHALATION)
Status: COMPLETED
Start: 2024-06-10 | End: 2024-06-10

## 2024-06-10 RX ORDER — IPRATROPIUM BROMIDE AND ALBUTEROL SULFATE 2.5; .5 MG/3ML; MG/3ML
1 SOLUTION RESPIRATORY (INHALATION)
Status: DISCONTINUED | OUTPATIENT
Start: 2024-06-10 | End: 2024-06-12

## 2024-06-10 RX ORDER — OXYCODONE HYDROCHLORIDE 5 MG/1
5 TABLET ORAL EVERY 4 HOURS PRN
Status: DISCONTINUED | OUTPATIENT
Start: 2024-06-10 | End: 2024-06-11

## 2024-06-10 RX ADMIN — SODIUM CHLORIDE, PRESERVATIVE FREE 10 ML: 5 INJECTION INTRAVENOUS at 09:13

## 2024-06-10 RX ADMIN — GABAPENTIN 600 MG: 300 CAPSULE ORAL at 13:31

## 2024-06-10 RX ADMIN — GABAPENTIN 600 MG: 300 CAPSULE ORAL at 20:29

## 2024-06-10 RX ADMIN — IPRATROPIUM BROMIDE AND ALBUTEROL SULFATE 1 DOSE: .5; 2.5 SOLUTION RESPIRATORY (INHALATION) at 09:50

## 2024-06-10 RX ADMIN — DICLOFENAC SODIUM 2 G: 10 GEL TOPICAL at 20:27

## 2024-06-10 RX ADMIN — DICLOFENAC SODIUM 2 G: 10 GEL TOPICAL at 09:11

## 2024-06-10 RX ADMIN — METHYLPREDNISOLONE SODIUM SUCCINATE 60 MG: 40 INJECTION, POWDER, LYOPHILIZED, FOR SOLUTION INTRAMUSCULAR; INTRAVENOUS at 04:58

## 2024-06-10 RX ADMIN — OXYCODONE HYDROCHLORIDE 5 MG: 5 TABLET ORAL at 20:30

## 2024-06-10 RX ADMIN — SODIUM CHLORIDE, PRESERVATIVE FREE 10 ML: 5 INJECTION INTRAVENOUS at 09:12

## 2024-06-10 RX ADMIN — METHYLPREDNISOLONE SODIUM SUCCINATE 60 MG: 40 INJECTION, POWDER, LYOPHILIZED, FOR SOLUTION INTRAMUSCULAR; INTRAVENOUS at 23:26

## 2024-06-10 RX ADMIN — ACETAMINOPHEN 650 MG: 325 TABLET ORAL at 09:09

## 2024-06-10 RX ADMIN — OXYCODONE HYDROCHLORIDE 5 MG: 5 TABLET ORAL at 11:25

## 2024-06-10 RX ADMIN — TOPIRAMATE 100 MG: 50 TABLET, FILM COATED ORAL at 20:29

## 2024-06-10 RX ADMIN — PIPERACILLIN AND TAZOBACTAM 3375 MG: 3; .375 INJECTION, POWDER, LYOPHILIZED, FOR SOLUTION INTRAVENOUS at 03:01

## 2024-06-10 RX ADMIN — METHYLPREDNISOLONE SODIUM SUCCINATE 60 MG: 40 INJECTION, POWDER, LYOPHILIZED, FOR SOLUTION INTRAMUSCULAR; INTRAVENOUS at 13:31

## 2024-06-10 RX ADMIN — SODIUM CHLORIDE, PRESERVATIVE FREE 10 ML: 5 INJECTION INTRAVENOUS at 20:27

## 2024-06-10 RX ADMIN — Medication 10 MG: at 20:28

## 2024-06-10 RX ADMIN — IPRATROPIUM BROMIDE AND ALBUTEROL SULFATE 1 DOSE: .5; 3 SOLUTION RESPIRATORY (INHALATION) at 09:50

## 2024-06-10 RX ADMIN — FAMOTIDINE 20 MG: 20 TABLET, FILM COATED ORAL at 09:10

## 2024-06-10 RX ADMIN — FAMOTIDINE 20 MG: 20 TABLET, FILM COATED ORAL at 20:29

## 2024-06-10 RX ADMIN — GABAPENTIN 600 MG: 300 CAPSULE ORAL at 09:09

## 2024-06-10 RX ADMIN — TRAMADOL HYDROCHLORIDE 50 MG: 50 TABLET, COATED ORAL at 14:50

## 2024-06-10 RX ADMIN — VENLAFAXINE HYDROCHLORIDE 75 MG: 75 CAPSULE, EXTENDED RELEASE ORAL at 09:10

## 2024-06-10 RX ADMIN — TOPIRAMATE 100 MG: 50 TABLET, FILM COATED ORAL at 09:12

## 2024-06-10 RX ADMIN — PIPERACILLIN AND TAZOBACTAM 3375 MG: 3; .375 INJECTION, POWDER, LYOPHILIZED, FOR SOLUTION INTRAVENOUS at 11:28

## 2024-06-10 RX ADMIN — PIPERACILLIN AND TAZOBACTAM 3375 MG: 3; .375 INJECTION, POWDER, LYOPHILIZED, FOR SOLUTION INTRAVENOUS at 18:38

## 2024-06-10 RX ADMIN — LACOSAMIDE 100 MG: 100 TABLET, FILM COATED ORAL at 09:10

## 2024-06-10 RX ADMIN — AMITRIPTYLINE HYDROCHLORIDE 75 MG: 50 TABLET, FILM COATED ORAL at 20:29

## 2024-06-10 RX ADMIN — LACOSAMIDE 100 MG: 100 TABLET, FILM COATED ORAL at 20:28

## 2024-06-10 RX ADMIN — DEXTROMETHORPHAN POLISTIREX 60 MG: 30 SUSPENSION ORAL at 20:30

## 2024-06-10 RX ADMIN — TAMSULOSIN HYDROCHLORIDE 0.4 MG: 0.4 CAPSULE ORAL at 09:10

## 2024-06-10 RX ADMIN — MAGNESIUM GLUCONATE 500 MG ORAL TABLET 400 MG: 500 TABLET ORAL at 09:10

## 2024-06-10 RX ADMIN — IPRATROPIUM BROMIDE AND ALBUTEROL SULFATE 1 DOSE: .5; 2.5 SOLUTION RESPIRATORY (INHALATION) at 19:40

## 2024-06-10 RX ADMIN — POLYETHYLENE GLYCOL 3350 17 G: 17 POWDER, FOR SOLUTION ORAL at 09:11

## 2024-06-10 RX ADMIN — DEXTROMETHORPHAN POLISTIREX 60 MG: 30 SUSPENSION ORAL at 09:09

## 2024-06-10 ASSESSMENT — PAIN DESCRIPTION - ORIENTATION
ORIENTATION: POSTERIOR
ORIENTATION: RIGHT
ORIENTATION: RIGHT

## 2024-06-10 ASSESSMENT — PAIN - FUNCTIONAL ASSESSMENT
PAIN_FUNCTIONAL_ASSESSMENT: ACTIVITIES ARE NOT PREVENTED
PAIN_FUNCTIONAL_ASSESSMENT: ACTIVITIES ARE NOT PREVENTED
PAIN_FUNCTIONAL_ASSESSMENT: PREVENTS OR INTERFERES SOME ACTIVE ACTIVITIES AND ADLS
PAIN_FUNCTIONAL_ASSESSMENT: ACTIVITIES ARE NOT PREVENTED

## 2024-06-10 ASSESSMENT — PAIN DESCRIPTION - PAIN TYPE
TYPE: CHRONIC PAIN
TYPE: CHRONIC PAIN;SURGICAL PAIN

## 2024-06-10 ASSESSMENT — PAIN SCALES - GENERAL
PAINLEVEL_OUTOF10: 0
PAINLEVEL_OUTOF10: 8
PAINLEVEL_OUTOF10: 8
PAINLEVEL_OUTOF10: 0
PAINLEVEL_OUTOF10: 8

## 2024-06-10 ASSESSMENT — PAIN DESCRIPTION - FREQUENCY
FREQUENCY: CONTINUOUS
FREQUENCY: CONTINUOUS

## 2024-06-10 ASSESSMENT — ENCOUNTER SYMPTOMS
COUGH: 1
SHORTNESS OF BREATH: 1

## 2024-06-10 ASSESSMENT — PAIN DESCRIPTION - DESCRIPTORS
DESCRIPTORS: ACHING;DISCOMFORT
DESCRIPTORS: THROBBING

## 2024-06-10 ASSESSMENT — PAIN DESCRIPTION - ONSET
ONSET: ON-GOING
ONSET: ON-GOING

## 2024-06-10 ASSESSMENT — PAIN DESCRIPTION - LOCATION
LOCATION: HEAD
LOCATION: HEAD;SHOULDER
LOCATION: HEAD
LOCATION: HEAD

## 2024-06-10 NOTE — PROGRESS NOTES
Occupational Therapy  Facility/Department: [address] STEPDOWN  Occupational Therapy Daily Treatment Note    Name: Brennen Mcclure  : 1965  MRN: [id number]  Date of Service: 6/10/2024    Discharge Recommendations:  Patient would benefit from continued therapy after discharge        Patient Diagnosis(es): There were no encounter diagnoses.  Past Medical History:  has a past medical history of Anemia, CAD (coronary artery disease), Chronic deep vein thrombosis (DVT) of both lower extremities (HCC), Chronic deep vein thrombosis (DVT) of femoral vein of right lower extremity (HCC), Chronic deep vein thrombosis (DVT) of proximal vein of right lower extremity (HCC), Chronic deep vein thrombosis (DVT) of right iliac vein (HCC), Chronic idiopathic thrombocytopenia (HCC), Chronic idiopathic thrombocytopenic purpura (HCC), CVA (cerebral vascular accident) (HCC), Emphysema lung (HCC), Hemosiderin pigmentation of skin, HLD (hyperlipidemia), Intracranial hemorrhage (HCC), Left hemiplegia (HCC), Right leg swelling, and Thrombocytopenia (HCC).  Past Surgical History:  has a past surgical history that includes Coronary artery bypass graft; Coronary stent placement; craniotomy (Right); CT BIOPSY BONE MARROW (2024); Cranioplasty (Right, 2024); craniotomy (Right, 2024); and craniotomy (Right, 2024).    Treatment Diagnosis: Absence of Bone of Skull      Assessment   Performance deficits / Impairments: Decreased functional mobility ;Decreased safe awareness;Decreased balance;Decreased coordination;Decreased ADL status;Decreased cognition;Decreased ROM;Decreased strength;Decreased fine motor control;Decreased endurance;Decreased posture;Decreased high-level IADLs;Decreased vision/visual deficit  Assessment: Pt would benefit from continued acute care and post acute care OT to address above listed deficits.  Prognosis: Good  Activity Tolerance  Activity Tolerance: Patient Tolerated treatment well;Treatment limited  Inpatient Daily Activity Raw Score: 14  AM-PAC Inpatient ADL T-Scale Score : 33.39  ADL Inpatient CMS 0-100% Score: 59.67  ADL Inpatient CMS G-Code Modifier : CK       Goals  Short Term Goals  Time Frame for Short Term Goals: Pt will, by discharge:  Short Term Goal 1: Dem CGA with functional transfers for daily occupations within scotty steady  Short Term Goal 2: Dem min a for bed mobility with use of bed rail  Short Term Goal 3: Dem good safety awareness tech for all transfers/mobility with one verbal cue  Short Term Goal 4: Dem Mod with adl performance with use of AE as needed  Short Term Goal 5: Dem a 20 min dynamic sitting balance task with SBA to increase activity tolerance for ADL'S  Short Term Goal 6: Dem a functional transfer without use of scotty steady mod a with LRAD for daily occupations to improve balance  Short Term Goal 7: Dem weight bearing through LUE to increase proprioceptive input for adl tasks  Short Term Goal 8: Dem dynamic mobility with use of LRAD mod x 2 for household distance       Therapy Time   Individual Concurrent Group Co-treatment   Time In 1343         Time Out 1452         Minutes 69         Timed Code Treatment Minutes: 69 Minutes       MARQUIS RICHARDS/MENDEZ

## 2024-06-10 NOTE — PROGRESS NOTES
Neurosurgery NANNETTE/Resident    Daily Progress Note   No chief complaint on file.    6/10/2024  9:41 AM    Chart reviewed.  No acute events overnight.  No new complaints. Spoke to case management this morning regarding plan for inpatient rehab.     Vitals:    06/09/24 2310 06/10/24 0017 06/10/24 0400 06/10/24 0813   BP:  120/82 (!) 119/90 113/81   Pulse: 62 67 59 58   Resp: 14 14 15 15   Temp:  97.9 °F (36.6 °C) 98 °F (36.7 °C) 97.9 °F (36.6 °C)   TempSrc:  Axillary Oral Oral   SpO2: 97% 96% 96% 97%   Weight:       Height:             PE:   Awake and alert.   PERRL  Following commands RUE and RLE   Left hemiplegia  Incision: CDI       Lab Results   Component Value Date    WBC 9.7 06/10/2024    HGB 9.7 (L) 06/10/2024    HCT 32.5 (L) 06/10/2024     (L) 06/10/2024    CHOL 118 05/16/2024    TRIG 182 (H) 05/16/2024    HDL 28 (L) 05/16/2024    ALT 28 05/08/2024    AST 29 05/08/2024     (L) 06/10/2024    K 4.4 06/10/2024     06/10/2024    CREATININE 0.6 (L) 06/10/2024    BUN 23 06/10/2024    CO2 19 (L) 06/10/2024    TSH 3.45 04/13/2024    INR 1.1 06/05/2024    LABA1C 5.2 05/16/2024    CRP 33.1 (H) 05/12/2024       A/P  59 y.o. male who presents with skull defect, 6/6 - right epidural hematoma and IPH.   POD#6 s/p right cranioplasty. POD#3 s/p emergent right craniotomy for hematoma (epidural and IPH) evacuation.      - Will get CTH tomorrow morning  - discussed with case management plan for [facility]  -PT/OT  - this morning  -Hold all anticoagulation and antiplatelet medications   -Sharmin and dilaudid for post op pain management   -DNR CCA and DNI       Please contact neurosurgery with any changes in patients neurologic status.     Electronically signed by EMILY Zhang CNP on 6/10/2024 at 9:44 AM

## 2024-06-10 NOTE — PROGRESS NOTES
SLP ALL NOTES  Facility/Department: [address] STEPDOWN  Initial Speech/Language/Cognitive Assessment    NAME: Brennen Mcclure  : 1965   MRN: [id number]  ADMISSION DATE: 2024  ADMITTING DIAGNOSIS: has 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 (HCC); Pseudobulbar affect; Headache with neurologic deficit; Intracerebral hematoma (HCC); Thrombocytopenia (HCC); Anemia; Chronic ITP (idiopathic thrombocytopenia) (HCC); Seizures (HCC); Dural venous sinus thrombosis; Chronic deep vein thrombosis (DVT) of proximal vein of right lower extremity (HCC); Chronic deep vein thrombosis (DVT) of right iliac vein (HCC); Chronic deep vein thrombosis (DVT) of femoral vein of right lower extremity (HCC); Chronic venous insufficiency of lower extremity; Hemosiderin pigmentation of skin; Right leg swelling; Dysphagia; Transient neurological symptoms; Intraparenchymal hemorrhage of brain (HCC); History of epilepsy; Disorientation; Altered mental status; Acute cystitis with hematuria; Migraine; Acute encephalopathy; Fever and chills; Parotitis; Depression with suicidal ideation; Major depressive disorder, recurrent, severe with psychotic features (HCC); Acute intractable headache; Chronic idiopathic thrombocytopenic purpura (HCC); Intractable headache, unspecified chronicity pattern, unspecified headache type; History of cerebral venous infarction; Acute idiopathic thrombocytopenic purpura (HCC); Absence of bone of skull; Defect of skull; History of DVT (deep vein thrombosis); Immune thrombocytopenia (HCC); Epidural hematoma (HCC); Acute respiratory failure with hypoxia (HCC); and Brain bleed (HCC) on their problem list.      Date of Eval: 6/10/2024   Evaluating Therapist: DIANNE BERNARD    RECENT RESULTS  CT OF HEAD/MRI:   IMPRESSION: 2024  Moderate to severe stenosis at the origin of the right vertebral artery.     60% stenosis in the V2 segment of the right vertebral artery.     2 mm saccular aneurysm versus

## 2024-06-10 NOTE — PLAN OF CARE
Problem: Discharge Planning  Goal: Discharge to home or other facility with appropriate resources  6/10/2024 0430 by Amaris Moreno RN  Outcome: Progressing

## 2024-06-10 NOTE — CONSULTS
Physical Medicine & Rehabilitation  Consult Note      Admitting Physician:  Gloria Corbett DO    Primary Care Provider:  Blanca Ren MD     Reason for Consult:  Acute Inpatient Rehabilitation    Chief Complaint:  Cranial defect secondary to craniectomy    History of Present Illness:  Referring Provider is requesting an evaluation for appropriate placement upon discharge from acute care. History from chart review, patient, and patient's wife.    Brennen Mcclure is a 59 y.o. right-handed male with history of CVA s/p craniectomy, seizures, CAD, HLD, DVT, chronic thrombocytopenia (on IVIG and Promacta) admitted to [facility] on 5/28/2024.      He initially presented for cranioplasty, which was completed with synthetic custom peek implant on 6/4/24 (Dr. Corbett).  Procedure was initially delayed due to platelet level.  He developed altered mental status with rapidly worsening GCS on 6/6/24.  CT head showed acute intraparenchymal hematoma in the right temporal lobe and increased size of extra-axial hemorrhage over the right cerebral convexity with 1.4 cm midline shift.  He required emergency craniotomy for evacuation of epidural hematoma and intracerebral hematoma on 6/6/24 (Dr. Almanza).  Extubated 6/7/24.  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  He is being treated for aspiration pneumonia as well.    He reports ongoing left-sided weakness.  He also continues to report headaches.  His wife notes that he is having numbness/decreased sensation on the left side, which she noticed because he has not been reporting left shoulder and hip pain like he has been for several months.  He also states that he has some shortness of breath and cough.    Review of Systems:  Review of Systems   Constitutional:  Positive for fever.   Respiratory:  Positive for cough and shortness of breath.    Neurological:  Positive for weakness and headaches.      Premorbid function:  Needing assistance with ADLs and mobility    Current

## 2024-06-10 NOTE — CARE COORDINATION
Transitional Planning  Spoke with Tamiko from [facility], tentative dc on Wednesday. Plan is [facility].      121 pm VMM received from Sheila, able to accept. Patient has [place] GEMINI.      200 pm Spoke with patient and spouse at bedside, plan is [facility].  Called and spoke with Sheila, update provided.

## 2024-06-10 NOTE — PROGRESS NOTES
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 Chronic ITP. History of intraparenchymal brain bleed     Interval Changes:  Patient seen and examined.  Doing well clinically.  Alert and oriented.  No new events.  No headaches.  No seizure activities.  Labs were reviewed.  Hemoglobin 9.7.  Platelets 100.        History of Present Illness:    The patient is a 59 y.o.  male who is admitted  to the neurosurgery team for an elective cranioplasty scheduled for 5/29.  Hematology oncology team consulted due to patient's history of thrombocytopenia and ITP.  Patient is relevant hematologic history is as below.  Patient is currently on Promacta for ITP.  Patient not seen in office by Dr. Chang on 5/21.  Platelet count was 1 40,000 at that time.  Patient platelet count has dropped to 56,000 now.  Hemoglobin 13.6 WBC count 5.8.  Hematology oncology team consulted due to thrombocytopenia in light of patient's upcoming surgery.     Patient does have history of dural venous thrombosis and acute hemorrhagic stroke.  Patient also has history of DVT.     DIAGNOSIS:   Chronic thrombocytopenia, patient had a evaluation in [place] and also in the past and thought to be immune mediated  History of dural sinus thrombosis and history of DVT in 2019  Chronic anticoagulation with Eliquis  Had a hemorrhagic stroke in December 2023  Patient received platelet transfusion during the hospitalization and also was given IVIG on 1/9/2024 and 1/10/2024  Patient was treated with Decadron 40 mg on 2/18 - 2/20 1-24, with no response  Patient planning surgical procedure with neurosurgery  Bone marrow biopsy negative for acute bone marrow pathology  I will plan to get IVIG weekly for 5  iterative reconstruction, and/or weight based adjustment of the mA/kV was utilized to reduce the radiation dose to as low as reasonably achievable. COMPARISON: 04/12/2024 HISTORY: Fall. FINDINGS: BRAIN/VENTRICLES: Stable postsurgical changes from right-sided craniectomy with dural thickening.  No evidence of acute infarct.  Extensive encephalomalacia within the right cerebral hemisphere is unchanged.  No new intracranial hemorrhage.  No midline shift.  No hydrocephalus. ORBITS: The visualized portion of the orbits demonstrate no acute abnormality. SINUSES: The visualized paranasal sinuses and mastoid air cells demonstrate no acute abnormality. SOFT TISSUES/SKULL:  No acute abnormality.     Stable postsurgical and chronic changes without acute abnormality.     CT CERVICAL SPINE WO CONTRAST    Result Date: 5/2/2024  EXAMINATION: CT OF THE CERVICAL SPINE WITHOUT CONTRAST 5/2/2024 5:02 pm TECHNIQUE: CT of the cervical spine was performed without the administration of intravenous contrast. Multiplanar reformatted images are provided for review. Automated exposure control, iterative reconstruction, and/or weight based adjustment of the mA/kV was utilized to reduce the radiation dose to as low as reasonably achievable. COMPARISON: None. HISTORY: ORDERING SYSTEM PROVIDED HISTORY: fall TECHNOLOGIST PROVIDED HISTORY: fall Decision Support Exception - unselect if not a suspected or confirmed emergency medical condition->Emergency Medical Condition (MA) Reason for Exam: Headache; Fall Relevant Medical/Surgical History: crainiotomy FINDINGS: BONES/ALIGNMENT: There is no acute fracture or traumatic malalignment. DEGENERATIVE CHANGES: Mild degenerative changes. SOFT TISSUES: There is no prevertebral soft tissue swelling.     No evidence for acute fracture or subluxation. Mild degenerative changes.        Impression:   Primary Problem  Absence of bone of skull    Active Hospital Problems    Diagnosis Date Noted    Epidural hematoma

## 2024-06-10 NOTE — PLAN OF CARE
Problem: Discharge Planning  Goal: Discharge to home or other facility with appropriate resources  6/10/2024 1738 by Rakel Torres, RN  Outcome: Progressing  6/10/2024 0430 by Amaris Moreno, RN  Outcome: Progressing     Problem: Chronic Conditions and Co-morbidities  Goal: Patient's chronic conditions and co-morbidity symptoms are monitored and maintained or improved  Outcome: Progressing     Problem: Safety - Adult  Goal: Free from fall injury  Outcome: Progressing  Flowsheets (Taken 6/10/2024 0800 by Yadira Viera, RN)  Free From Fall Injury: Instruct family/caregiver on patient safety     Problem: Skin/Tissue Integrity  Goal: Absence of new skin breakdown  Description: 1.  Monitor for areas of redness and/or skin breakdown  2.  Assess vascular access sites hourly  3.  Every 4-6 hours minimum:  Change oxygen saturation probe site  4.  Every 4-6 hours:  If on nasal continuous positive airway pressure, respiratory therapy assess nares and determine need for appliance change or resting period.  Outcome: Progressing     Problem: Pain  Goal: Verbalizes/displays adequate comfort level or baseline comfort level  Outcome: Progressing     Problem: ABCDS Injury Assessment  Goal: Absence of physical injury  Outcome: Progressing  Flowsheets (Taken 6/10/2024 0800 by Yadira Viera, RN)  Absence of Physical Injury: Implement safety measures based on patient assessment     Problem: Safety - Medical Restraint  Goal: Remains free of injury from restraints (Restraint for Interference with Medical Device)  Description: INTERVENTIONS:  1. Determine that other, less restrictive measures have been tried or would not be effective before applying the restraint  2. Evaluate the patient's condition at the time of restraint application  3. Inform patient/family regarding the reason for restraint  4. Q2H: Monitor safety, psychosocial status, comfort, nutrition and hydration  Outcome: Progressing

## 2024-06-10 NOTE — PROGRESS NOTES
Physical Therapy  Facility/Department: [address] STEPDOWN  Physical Therapy Daily Treatment Note    Name: Brennen Mcclure  : 1965  MRN: [id number]  Date of Service: 6/10/2024    Discharge Recommendations:  Patient would benefit from continued therapy after discharge Further therapy recommended at discharge.The patient should be able to tolerate at least 3 hours of therapy per day over 5 days or 15 hours over 7 days.   This patient may benefit from a Physical Medicine and Rehab consult.    PT Equipment Recommendations  Equipment Needed: No (defer to rehab facility.)      Patient Diagnosis(es): There were no encounter diagnoses.  Past Medical History:  has a past medical history of Anemia, CAD (coronary artery disease), Chronic deep vein thrombosis (DVT) of both lower extremities (HCC), Chronic deep vein thrombosis (DVT) of femoral vein of right lower extremity (HCC), Chronic deep vein thrombosis (DVT) of proximal vein of right lower extremity (HCC), Chronic deep vein thrombosis (DVT) of right iliac vein (HCC), Chronic idiopathic thrombocytopenia (HCC), Chronic idiopathic thrombocytopenic purpura (HCC), CVA (cerebral vascular accident) (HCC), Emphysema lung (HCC), Hemosiderin pigmentation of skin, HLD (hyperlipidemia), Intracranial hemorrhage (HCC), Left hemiplegia (HCC), Right leg swelling, and Thrombocytopenia (HCC).  Past Surgical History:  has a past surgical history that includes Coronary artery bypass graft; Coronary stent placement; craniotomy (Right); CT BIOPSY BONE MARROW (2024); Cranioplasty (Right, 2024); craniotomy (Right, 2024); and craniotomy (Right, 2024).    Assessment   Body Structures, Functions, Activity Limitations Requiring Skilled Therapeutic Intervention: Decreased functional mobility ;Decreased endurance;Decreased cognition;Decreased coordination;Decreased safe awareness;Decreased balance;Increased pain;Decreased posture;Decreased ROM  Assessment: Pt required maxA x2 for

## 2024-06-11 ENCOUNTER — APPOINTMENT (OUTPATIENT)
Dept: CT IMAGING | Age: 59
End: 2024-06-11
Attending: NEUROLOGICAL SURGERY
Payer: MEDICAID

## 2024-06-11 LAB
ANION GAP SERPL CALCULATED.3IONS-SCNC: 10 MMOL/L (ref 9–16)
BUN SERPL-MCNC: 22 MG/DL (ref 8–23)
CALCIUM SERPL-MCNC: 9.2 MG/DL (ref 8.6–10.4)
CHLORIDE SERPL-SCNC: 102 MMOL/L (ref 98–107)
CO2 SERPL-SCNC: 22 MMOL/L (ref 20–31)
CREAT SERPL-MCNC: 0.6 MG/DL (ref 0.7–1.2)
ERYTHROCYTE [DISTWIDTH] IN BLOOD BY AUTOMATED COUNT: 22 % (ref 11.8–14.4)
GFR, ESTIMATED: >90 ML/MIN/1.73M2
GLUCOSE SERPL-MCNC: 119 MG/DL (ref 74–99)
HCT VFR BLD AUTO: 32.6 % (ref 40.7–50.3)
HGB BLD-MCNC: 10 G/DL (ref 13–17)
MCH RBC QN AUTO: 30 PG (ref 25.2–33.5)
MCHC RBC AUTO-ENTMCNC: 30.7 G/DL (ref 28.4–34.8)
MCV RBC AUTO: 97.9 FL (ref 82.6–102.9)
NRBC BLD-RTO: 1.5 PER 100 WBC
PLATELET # BLD AUTO: 97 K/UL (ref 138–453)
PMV BLD AUTO: 12.7 FL (ref 8.1–13.5)
POTASSIUM SERPL-SCNC: 4.5 MMOL/L (ref 3.7–5.3)
RBC # BLD AUTO: 3.33 M/UL (ref 4.21–5.77)
SODIUM SERPL-SCNC: 134 MMOL/L (ref 136–145)
WBC OTHER # BLD: 9.2 K/UL (ref 3.5–11.3)

## 2024-06-11 PROCEDURE — 36415 COLL VENOUS BLD VENIPUNCTURE: CPT

## 2024-06-11 PROCEDURE — 6370000000 HC RX 637 (ALT 250 FOR IP)

## 2024-06-11 PROCEDURE — 6370000000 HC RX 637 (ALT 250 FOR IP): Performed by: REGISTERED NURSE

## 2024-06-11 PROCEDURE — 2700000000 HC OXYGEN THERAPY PER DAY

## 2024-06-11 PROCEDURE — 97112 NEUROMUSCULAR REEDUCATION: CPT

## 2024-06-11 PROCEDURE — 2580000003 HC RX 258

## 2024-06-11 PROCEDURE — 97110 THERAPEUTIC EXERCISES: CPT

## 2024-06-11 PROCEDURE — 85027 COMPLETE CBC AUTOMATED: CPT

## 2024-06-11 PROCEDURE — 95819 EEG AWAKE AND ASLEEP: CPT

## 2024-06-11 PROCEDURE — 6370000000 HC RX 637 (ALT 250 FOR IP): Performed by: PSYCHIATRY & NEUROLOGY

## 2024-06-11 PROCEDURE — 97129 THER IVNTJ 1ST 15 MIN: CPT

## 2024-06-11 PROCEDURE — 70450 CT HEAD/BRAIN W/O DYE: CPT

## 2024-06-11 PROCEDURE — 80048 BASIC METABOLIC PNL TOTAL CA: CPT

## 2024-06-11 PROCEDURE — 97530 THERAPEUTIC ACTIVITIES: CPT

## 2024-06-11 PROCEDURE — 94640 AIRWAY INHALATION TREATMENT: CPT

## 2024-06-11 PROCEDURE — 99232 SBSQ HOSP IP/OBS MODERATE 35: CPT | Performed by: INTERNAL MEDICINE

## 2024-06-11 PROCEDURE — 97535 SELF CARE MNGMENT TRAINING: CPT

## 2024-06-11 PROCEDURE — APPSS30 APP SPLIT SHARED TIME 16-30 MINUTES: Performed by: REGISTERED NURSE

## 2024-06-11 PROCEDURE — 2580000003 HC RX 258: Performed by: PSYCHIATRY & NEUROLOGY

## 2024-06-11 PROCEDURE — 2060000000 HC ICU INTERMEDIATE R&B

## 2024-06-11 PROCEDURE — 6360000002 HC RX W HCPCS

## 2024-06-11 PROCEDURE — 6360000002 HC RX W HCPCS: Performed by: PSYCHIATRY & NEUROLOGY

## 2024-06-11 PROCEDURE — 94761 N-INVAS EAR/PLS OXIMETRY MLT: CPT

## 2024-06-11 PROCEDURE — 92526 ORAL FUNCTION THERAPY: CPT

## 2024-06-11 RX ORDER — BUTALBITAL, ACETAMINOPHEN AND CAFFEINE 50; 325; 40 MG/1; MG/1; MG/1
1 TABLET ORAL 2 TIMES DAILY
Status: DISCONTINUED | OUTPATIENT
Start: 2024-06-11 | End: 2024-06-14 | Stop reason: HOSPADM

## 2024-06-11 RX ADMIN — TRAMADOL HYDROCHLORIDE 50 MG: 50 TABLET, COATED ORAL at 06:54

## 2024-06-11 RX ADMIN — LACOSAMIDE 100 MG: 100 TABLET, FILM COATED ORAL at 22:05

## 2024-06-11 RX ADMIN — TOPIRAMATE 100 MG: 50 TABLET, FILM COATED ORAL at 22:06

## 2024-06-11 RX ADMIN — BACLOFEN 5 MG: 5 TABLET ORAL at 22:04

## 2024-06-11 RX ADMIN — AMITRIPTYLINE HYDROCHLORIDE 75 MG: 50 TABLET, FILM COATED ORAL at 22:04

## 2024-06-11 RX ADMIN — IPRATROPIUM BROMIDE AND ALBUTEROL SULFATE 1 DOSE: .5; 2.5 SOLUTION RESPIRATORY (INHALATION) at 09:35

## 2024-06-11 RX ADMIN — SODIUM CHLORIDE, PRESERVATIVE FREE 10 ML: 5 INJECTION INTRAVENOUS at 22:07

## 2024-06-11 RX ADMIN — BUTALBITAL, ACETAMINOPHEN, AND CAFFEINE 1 TABLET: 325; 50; 40 TABLET ORAL at 09:57

## 2024-06-11 RX ADMIN — METHYLPREDNISOLONE SODIUM SUCCINATE 60 MG: 40 INJECTION, POWDER, LYOPHILIZED, FOR SOLUTION INTRAMUSCULAR; INTRAVENOUS at 15:12

## 2024-06-11 RX ADMIN — TAMSULOSIN HYDROCHLORIDE 0.4 MG: 0.4 CAPSULE ORAL at 08:58

## 2024-06-11 RX ADMIN — PIPERACILLIN AND TAZOBACTAM 3375 MG: 3; .375 INJECTION, POWDER, LYOPHILIZED, FOR SOLUTION INTRAVENOUS at 12:22

## 2024-06-11 RX ADMIN — DICLOFENAC SODIUM 2 G: 10 GEL TOPICAL at 09:00

## 2024-06-11 RX ADMIN — SODIUM CHLORIDE: 9 INJECTION, SOLUTION INTRAVENOUS at 12:18

## 2024-06-11 RX ADMIN — IPRATROPIUM BROMIDE AND ALBUTEROL SULFATE 1 DOSE: .5; 2.5 SOLUTION RESPIRATORY (INHALATION) at 20:17

## 2024-06-11 RX ADMIN — BUTALBITAL, ACETAMINOPHEN, AND CAFFEINE 1 TABLET: 325; 50; 40 TABLET ORAL at 22:04

## 2024-06-11 RX ADMIN — Medication 10 MG: at 22:05

## 2024-06-11 RX ADMIN — FAMOTIDINE 20 MG: 20 TABLET, FILM COATED ORAL at 22:05

## 2024-06-11 RX ADMIN — POLYETHYLENE GLYCOL 3350 17 G: 17 POWDER, FOR SOLUTION ORAL at 08:58

## 2024-06-11 RX ADMIN — GABAPENTIN 600 MG: 300 CAPSULE ORAL at 22:05

## 2024-06-11 RX ADMIN — DEXTROMETHORPHAN POLISTIREX 60 MG: 30 SUSPENSION ORAL at 22:04

## 2024-06-11 RX ADMIN — FAMOTIDINE 20 MG: 20 TABLET, FILM COATED ORAL at 08:57

## 2024-06-11 RX ADMIN — METHYLPREDNISOLONE SODIUM SUCCINATE 60 MG: 40 INJECTION, POWDER, LYOPHILIZED, FOR SOLUTION INTRAMUSCULAR; INTRAVENOUS at 22:06

## 2024-06-11 RX ADMIN — GABAPENTIN 600 MG: 300 CAPSULE ORAL at 08:57

## 2024-06-11 RX ADMIN — TOPIRAMATE 100 MG: 50 TABLET, FILM COATED ORAL at 09:00

## 2024-06-11 RX ADMIN — LACOSAMIDE 100 MG: 100 TABLET, FILM COATED ORAL at 08:58

## 2024-06-11 RX ADMIN — OXYCODONE HYDROCHLORIDE 5 MG: 5 TABLET ORAL at 05:38

## 2024-06-11 RX ADMIN — GABAPENTIN 600 MG: 300 CAPSULE ORAL at 15:12

## 2024-06-11 RX ADMIN — PIPERACILLIN AND TAZOBACTAM 3375 MG: 3; .375 INJECTION, POWDER, LYOPHILIZED, FOR SOLUTION INTRAVENOUS at 22:21

## 2024-06-11 RX ADMIN — MAGNESIUM GLUCONATE 500 MG ORAL TABLET 400 MG: 500 TABLET ORAL at 08:59

## 2024-06-11 RX ADMIN — METHYLPREDNISOLONE SODIUM SUCCINATE 60 MG: 40 INJECTION, POWDER, LYOPHILIZED, FOR SOLUTION INTRAMUSCULAR; INTRAVENOUS at 06:48

## 2024-06-11 RX ADMIN — SODIUM CHLORIDE, PRESERVATIVE FREE 10 ML: 5 INJECTION INTRAVENOUS at 09:10

## 2024-06-11 RX ADMIN — DEXTROMETHORPHAN POLISTIREX 60 MG: 30 SUSPENSION ORAL at 09:03

## 2024-06-11 RX ADMIN — DICLOFENAC SODIUM 2 G: 10 GEL TOPICAL at 22:05

## 2024-06-11 RX ADMIN — VENLAFAXINE HYDROCHLORIDE 75 MG: 75 CAPSULE, EXTENDED RELEASE ORAL at 08:59

## 2024-06-11 RX ADMIN — PIPERACILLIN AND TAZOBACTAM 3375 MG: 3; .375 INJECTION, POWDER, LYOPHILIZED, FOR SOLUTION INTRAVENOUS at 03:23

## 2024-06-11 RX ADMIN — SODIUM CHLORIDE, PRESERVATIVE FREE 10 ML: 5 INJECTION INTRAVENOUS at 22:03

## 2024-06-11 ASSESSMENT — PAIN SCALES - GENERAL
PAINLEVEL_OUTOF10: 0
PAINLEVEL_OUTOF10: 8

## 2024-06-11 ASSESSMENT — PAIN DESCRIPTION - ORIENTATION
ORIENTATION: RIGHT

## 2024-06-11 ASSESSMENT — PAIN DESCRIPTION - DESCRIPTORS
DESCRIPTORS: ACHING;THROBBING
DESCRIPTORS: ACHING

## 2024-06-11 ASSESSMENT — PAIN - FUNCTIONAL ASSESSMENT
PAIN_FUNCTIONAL_ASSESSMENT: PREVENTS OR INTERFERES SOME ACTIVE ACTIVITIES AND ADLS
PAIN_FUNCTIONAL_ASSESSMENT: PREVENTS OR INTERFERES SOME ACTIVE ACTIVITIES AND ADLS
PAIN_FUNCTIONAL_ASSESSMENT: ACTIVITIES ARE NOT PREVENTED
PAIN_FUNCTIONAL_ASSESSMENT: PREVENTS OR INTERFERES SOME ACTIVE ACTIVITIES AND ADLS
PAIN_FUNCTIONAL_ASSESSMENT: PREVENTS OR INTERFERES SOME ACTIVE ACTIVITIES AND ADLS

## 2024-06-11 ASSESSMENT — PAIN DESCRIPTION - LOCATION
LOCATION: HEAD

## 2024-06-11 NOTE — PROGRESS NOTES
Occupational Therapy  Facility/Department: [address] STE[address]OWN  Occupational Therapy Daily Treatment Note      Name: Brennen Mcclure  : 1965  MRN: [id number]  Date of Service: 2024    Discharge Recommendations:  Patient would benefit from continued therapy after discharge        Patient Diagnosis(es): There were no encounter diagnoses.  Past Medical History:  has a past medical history of Anemia, CAD (coronary artery disease), Chronic deep vein thrombosis (DVT) of both lower extremities (HCC), Chronic deep vein thrombosis (DVT) of femoral vein of right lower extremity (HCC), Chronic deep vein thrombosis (DVT) of proximal vein of right lower extremity (HCC), Chronic deep vein thrombosis (DVT) of right iliac vein (HCC), Chronic idiopathic thrombocytopenia (HCC), Chronic idiopathic thrombocytopenic purpura (HCC), CVA (cerebral vascular accident) (HCC), Emphysema lung (HCC), Hemosiderin pigmentation of skin, HLD (hyperlipidemia), Intracranial hemorrhage (HCC), Left hemiplegia (HCC), Right leg swelling, and Thrombocytopenia (HCC).  Past Surgical History:  has a past surgical history that includes Coronary artery bypass graft; Coronary stent placement; craniotomy (Right); CT BIOPSY BONE MARROW (2024); Cranioplasty (Right, 2024); craniotomy (Right, 2024); and craniotomy (Right, 2024).    Treatment Diagnosis: Absence of Bone of Skull      Assessment   Performance deficits / Impairments: Decreased functional mobility ;Decreased safe awareness;Decreased balance;Decreased coordination;Decreased ADL status;Decreased cognition;Decreased ROM;Decreased strength;Decreased fine motor control;Decreased endurance;Decreased posture;Decreased high-level IADLs;Decreased vision/visual deficit  Assessment: Pt would benefit from continued acute care and post acute care OT to address above listed deficits.  Treatment Diagnosis: Absence of Bone of Skull  Prognosis: Good  Activity Tolerance  Activity Tolerance: Patient  support (stood x3 in SS for ~30 seconds w/max assist x2)  Transfer Training  Transfer Training: Yes  Sit to stand: Max assist x2 in SS  Stand to sit: Max assist x2 in SS  Functional mobility: LUIZ-d/t poor sitting and standing balance        ADL  Feeding: Minimal assistance;Setup;Increased time to complete;Verbal cueing (assist to open containers, able to scoop and feed self, assist w/lifting cup to mouth w/straw)  Grooming: Setup;Verbal cueing;Increased time to complete;Stand by assistance (vc's to initiate and complete washing face and swabbing mouth)  LE Dressing: Maximum assistance;Setup;Verbal cueing;Increased time to complete (assist to don footies)  Toileting: Unable to assess (ext male cath, wearing brief, assist w/rhiannon care)  Functional Mobility: Unable to assess (use of SS, not safe for func mob d/t poor sitting balance)  Additional Comments: Pt in bed upon arrival. HOB elevated and setup for grooming and then to eat lunch (see above for LOF). Transferred to EOB w/max assist x2. Pt required mod-max assist to maintain sitting balance w/left posterior lean at EOB. Completed WB through LUE and PROM all joints, multi plane, 10 reps each. Pt said ouch w/shoulder flex to ~90 degrees tight at end range.        Cognition  Overall Cognitive Status: Exceptions  Arousal/Alertness: Delayed responses to stimuli  Following Commands: Follows one step commands with repetition;Follows one step commands with increased time  Attention Span: Attends with cues to redirect;Difficulty attending to directions;Unable to maintain attention  Memory: Decreased recall of precautions  Safety Judgement: Decreased awareness of need for assistance;Decreased awareness of need for safety  Problem Solving: Assistance required to identify errors made;Assistance required to correct errors made;Decreased awareness of errors  Insights: Decreased awareness of deficits  Initiation: Requires cues for all  Sequencing: Requires cues for all  Cognition

## 2024-06-11 NOTE — PROGRESS NOTES
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 Chronic ITP. History of intraparenchymal brain bleed     Interval Changes:  Patient seen and examined.    Labs reviewed.  Hemoglobin 10.  Platelets 97.  Follow-up CT head completed this morning stable  Doing well clinically.  Alert and oriented.  No new events.  No headaches.  No seizure activities.  [facility]    History of Present Illness:    The patient is a 59 y.o.  male who is admitted  to the neurosurgery team for an elective cranioplasty scheduled for 5/29.  Hematology oncology team consulted due to patient's history of thrombocytopenia and ITP.  Patient is relevant hematologic history is as below.  Patient is currently on Promacta for ITP.  Patient not seen in office by Dr. Chang on 5/21.  Platelet count was 1 40,000 at that time.  Patient platelet count has dropped to 56,000 now.  Hemoglobin 13.6 WBC count 5.8.  Hematology oncology team consulted due to thrombocytopenia in light of patient's upcoming surgery.     Patient does have history of dural venous thrombosis and acute hemorrhagic stroke.  Patient also has history of DVT.     DIAGNOSIS:   Chronic thrombocytopenia, patient had a evaluation in [place] and also in the past and thought to be immune mediated  History of dural sinus thrombosis and history of DVT in 2019  Chronic anticoagulation with Eliquis  Had a hemorrhagic stroke in December 2023  Patient received platelet transfusion during the hospitalization and also was given IVIG on 1/9/2024 and 1/10/2024  Patient was treated with Decadron 40 mg on 2/18 - 2/20 1-24, with no response  Patient planning surgical procedure with  right frontal parietal temporal craniectomy. Residual vasogenic edema, scattered encephalomalacia and scattered chronic blood products in the subjacent right frontal parietal temporal lobes.  Mass effect in the right cerebral hemisphere. No midline shift. 2.2 x 2.7 cm fluid collection with associated restricted diffusion and peripheral susceptibility artifacts in the right frontal lobe, likely related to surgical cavity with subacute blood products. Additional 5 x 11 mm fluid collection with associated restricted diffusion in the anterior right frontal lobe along the anterior margin of the craniectomy, likely related to subacute parenchymal hematoma.     CT HEAD WO CONTRAST    Result Date: 5/8/2024  EXAMINATION: CT OF THE HEAD WITHOUT CONTRAST  5/8/2024 8:50 pm TECHNIQUE: CT of the head was performed without the administration of intravenous contrast. Automated exposure control, iterative reconstruction, and/or weight based adjustment of the mA/kV was utilized to reduce the radiation dose to as low as reasonably achievable. COMPARISON: May 2, 2024 CT head HISTORY: ORDERING SYSTEM PROVIDED HISTORY: weakness, prior right craniectomy, fluctuation over right crani site, fever TECHNOLOGIST PROVIDED HISTORY: weakness, prior right craniectomy, fluctuation over right crani site, fever Decision Support Exception - unselect if not a suspected or confirmed emergency medical condition->Emergency Medical Condition (MA) Reason for Exam: weakness, prior right craniectomy, fluctuation over right crani site, fever FINDINGS: BRAIN/VENTRICLES: Deep white matter edema right cerebral hemisphere with external herniation.  3 mm left-to-right midline shift.  No hemorrhage or extra-axial fluid.  Right dural reaction again noted. ORBITS: The visualized portion of the orbits demonstrate no acute abnormality. SINUSES: The visualized paranasal sinuses and mastoid air cells demonstrate no acute abnormality. SOFT TISSUES/SKULL:  Extensive right

## 2024-06-11 NOTE — PROGRESS NOTES
Physical Therapy  Facility/Department: [address] STEPDOWN  Physical Therapy Daily Treatment Note    Name: Brennen Mcclure  : 1965  MRN: [id number]  Date of Service: 2024    Discharge Recommendations:  Patient would benefit from continued therapy after discharge Further therapy recommended at discharge.The patient should be able to tolerate at least 3 hours of therapy per day over 5 days or 15 hours over 7 days.   This patient may benefit from a Physical Medicine and Rehab consult.    PT Equipment Recommendations  Equipment Needed: No (defer to rehab facility.)      Patient Diagnosis(es): There were no encounter diagnoses.  Past Medical History:  has a past medical history of Anemia, CAD (coronary artery disease), Chronic deep vein thrombosis (DVT) of both lower extremities (HCC), Chronic deep vein thrombosis (DVT) of femoral vein of right lower extremity (HCC), Chronic deep vein thrombosis (DVT) of proximal vein of right lower extremity (HCC), Chronic deep vein thrombosis (DVT) of right iliac vein (HCC), Chronic idiopathic thrombocytopenia (HCC), Chronic idiopathic thrombocytopenic purpura (HCC), CVA (cerebral vascular accident) (HCC), Emphysema lung (HCC), Hemosiderin pigmentation of skin, HLD (hyperlipidemia), Intracranial hemorrhage (HCC), Left hemiplegia (HCC), Right leg swelling, and Thrombocytopenia (HCC).  Past Surgical History:  has a past surgical history that includes Coronary artery bypass graft; Coronary stent placement; craniotomy (Right); CT BIOPSY BONE MARROW (2024); Cranioplasty (Right, 2024); craniotomy (Right, 2024); and craniotomy (Right, 2024).    Assessment   Body Structures, Functions, Activity Limitations Requiring Skilled Therapeutic Intervention: Decreased functional mobility ;Decreased endurance;Decreased cognition;Decreased coordination;Decreased safe awareness;Decreased balance;Increased pain;Decreased posture;Decreased ROM  Assessment: Pt required maxA x2 for

## 2024-06-11 NOTE — PROGRESS NOTES
Speech Language Pathology  [facility]    Cognitive Treatment Note    Date: 6/11/2024  Patient’s Name: Brennen Mcclure  MRN: [id number]    Patient Active Problem List   Diagnosis Code    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 (HCC) I69.152    Pseudobulbar affect F48.2    Headache with neurologic deficit R51.9, R29.818    Intracerebral hematoma (HCC) S06.360A    Thrombocytopenia ([facility]) D69.6    Anemia D64.9    Chronic ITP (idiopathic thrombocytopenia) ([facility]) D69.3    Seizures ([facility]) R56.9    Dural venous sinus thrombosis G08    Chronic deep vein thrombosis (DVT) of proximal vein of right lower extremity ([facility]) I82.5Y1    Chronic deep vein thrombosis (DVT) of right iliac vein ([facility]) I82.521    Chronic deep vein thrombosis (DVT) of femoral vein of right lower extremity ([facility]) I82.511    Chronic venous insufficiency of lower extremity I87.2    Hemosiderin pigmentation of skin L81.8    Right leg swelling M79.89    Dysphagia R13.10    Transient neurological symptoms R29.818    Intraparenchymal hemorrhage of brain ([facility]) I61.9    History of epilepsy Z86.69    Disorientation R41.0    Altered mental status R41.82    Acute cystitis with hematuria N30.01    Migraine G43.909    Acute encephalopathy G93.40    Fever and chills R50.9    Parotitis K11.20    Depression with suicidal ideation F32.A, R45.851    Major depressive disorder, recurrent, severe with psychotic features ([facility]) F33.3    Acute intractable headache R51.9    Chronic idiopathic thrombocytopenic purpura (HCC) D69.3    Intractable headache, unspecified chronicity pattern, unspecified headache type R51.9    History of cerebral venous infarction Z86.73    Acute idiopathic thrombocytopenic purpura (HCC) D69.3    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

## 2024-06-11 NOTE — PROGRESS NOTES
Patient seen and examined at the bedside.  Stable left-sided hemiplegia.  Patient without any new complaints.  Spouse was concerned regarding the CODE STATUS which was DNR CCA on the board.  She was concerned that she had previously asked for it to be DNR CC.  I had extensive discussion explained to her and the patient the difference between comfort care and comfort care arrest upon which it was clear that the patient and spouse were actually desiring comfort care arrest but were confused as to the meaning.  He I reviewed in detail that if the patient was switched to comfort care then there would be no role for blood draws monitoring of the platelets evaluation for potential seizure or any other treatments outside of keeping him comfortable.  Upon explaining this it was quite clear from the patient standpoint and the patient's spouse that they still want these measures but were not wanting anything [place] in terms of surgery intubation or ACLS.    At this point we decided to maintain his current CODE STATUS which is DNR CCA, DNI      Neurosurgery NANNETTE/Resident    Daily Progress Note   No chief complaint on file.    6/11/2024  9:45 AM    Chart reviewed.  No acute events overnight.  No new complaints. PLT 97. CT head done this AM.     Vitals:    06/11/24 0608 06/11/24 0654 06/11/24 0837 06/11/24 0935   BP:   116/79    Pulse:   58 66   Resp: 11 10 16 14   Temp:   97.2 °F (36.2 °C)    TempSrc:   Axillary    SpO2:   99% 98%   Weight:       Height:             PE:   AOx3   PERRL, EOMI   Motor   Left hemiplegia  RUE and RLE 5/5    Incision right cranial site open to air, c/d      Lab Results   Component Value Date    WBC 9.2 06/11/2024    HGB 10.0 (L) 06/11/2024    HCT 32.6 (L) 06/11/2024    PLT 97 (L) 06/11/2024    CHOL 118 05/16/2024    TRIG 182 (H) 05/16/2024    HDL 28 (L) 05/16/2024    ALT 28 05/08/2024    AST 29 05/08/2024     (L) 06/11/2024    K 4.5 06/11/2024     06/11/2024    CREATININE 0.6 (L)  06/11/2024    BUN 22 06/11/2024    CO2 22 06/11/2024    TSH 3.45 04/13/2024    INR 1.1 06/05/2024    LABA1C 5.2 05/16/2024    CRP 33.1 (H) 05/12/2024       Radiology   CT HEAD WO CONTRAST    Result Date: 6/11/2024  EXAMINATION: CT OF THE HEAD WITHOUT CONTRAST  6/11/2024 4:48 am TECHNIQUE: CT of the head was performed without the administration of intravenous contrast. Automated exposure control, iterative reconstruction, and/or weight based adjustment of the mA/kV was utilized to reduce the radiation dose to as low as reasonably achievable. COMPARISON: 06/08/2024 HISTORY: ORDERING SYSTEM PROVIDED HISTORY: F/U post op TECHNOLOGIST PROVIDED HISTORY: F/U post op Reason for Exam: F/U post op FINDINGS: BRAIN/VENTRICLES: Stable changes again noted in right frontal, parietal and temporal lobes with minimal patchy parenchymal hemorrhage in the temporal lobe.  There is unchanged minimal right convexity subdural hematoma and postop pneumocephalus.  There is moderate effacement of the right lateral ventricle.  No significant midline shift.  No new hemorrhage evident. ORBITS: The visualized portion of the orbits demonstrate no acute abnormality. SINUSES: The visualized paranasal sinuses and mastoid air cells demonstrate no acute abnormality. SOFT TISSUES/SKULL:  Postop changes related to the large right convexity cranioplasty.     Stable exam compared to 06/08/2024.       A/P  59 y.o. male who presents with skull defect    POD 7 s/p right cranioplasty  6/6 - right epidural hematoma and IPH.  POD 4 s/p emergent right craniotomy for hematoma evacuation.      - monitor platelets daily - PLT 97 today  - hematology following   - activity as tolerated, PT and OT  - hold all anticoagulation and antiplatelet medications   - pain control  - DNR CCA and DNI   - discharge planning- IPR       Please contact neurosurgery with any changes in patients neurologic status.       Oswalod Dennis, CNP  6/11/24  9:45 AM

## 2024-06-11 NOTE — PLAN OF CARE
Problem: Discharge Planning  Goal: Discharge to home or other facility with appropriate resources  Outcome: Progressing  Flowsheets (Taken 6/11/2024 0856)  Discharge to home or other facility with appropriate resources: Identify barriers to discharge with patient and caregiver     Problem: Chronic Conditions and Co-morbidities  Goal: Patient's chronic conditions and co-morbidity symptoms are monitored and maintained or improved  Outcome: Progressing  Flowsheets (Taken 6/11/2024 0856)  Care Plan - Patient's Chronic Conditions and Co-Morbidity Symptoms are Monitored and Maintained or Improved: Monitor and assess patient's chronic conditions and comorbid symptoms for stability, deterioration, or improvement     Problem: Safety - Adult  Goal: Free from fall injury  Outcome: Progressing  Flowsheets (Taken 6/11/2024 0738)  Free From Fall Injury: Instruct family/caregiver on patient safety     Problem: Skin/Tissue Integrity  Goal: Absence of new skin breakdown  Description: 1.  Monitor for areas of redness and/or skin breakdown  2.  Assess vascular access sites hourly  3.  Every 4-6 hours minimum:  Change oxygen saturation probe site  4.  Every 4-6 hours:  If on nasal continuous positive airway pressure, respiratory therapy assess nares and determine need for appliance change or resting period.  Outcome: Progressing     Problem: Pain  Goal: Verbalizes/displays adequate comfort level or baseline comfort level  Outcome: Progressing     Problem: ABCDS Injury Assessment  Goal: Absence of physical injury  Outcome: Progressing  Flowsheets (Taken 6/11/2024 0738)  Absence of Physical Injury: Implement safety measures based on patient assessment     Problem: Safety - Medical Restraint  Goal: Remains free of injury from restraints (Restraint for Interference with Medical Device)  Description: INTERVENTIONS:  1. Determine that other, less restrictive measures have been tried or would not be effective before applying the  restraint  2. Evaluate the patient's condition at the time of restraint application  3. Inform patient/family regarding the reason for restraint  4. Q2H: Monitor safety, psychosocial status, comfort, nutrition and hydration  Outcome: Progressing

## 2024-06-12 LAB
ANION GAP SERPL CALCULATED.3IONS-SCNC: 11 MMOL/L (ref 9–16)
BUN SERPL-MCNC: 25 MG/DL (ref 8–23)
CALCIUM SERPL-MCNC: 8.6 MG/DL (ref 8.6–10.4)
CHLORIDE SERPL-SCNC: 104 MMOL/L (ref 98–107)
CO2 SERPL-SCNC: 21 MMOL/L (ref 20–31)
CREAT SERPL-MCNC: 0.7 MG/DL (ref 0.7–1.2)
ERYTHROCYTE [DISTWIDTH] IN BLOOD BY AUTOMATED COUNT: 22.5 % (ref 11.8–14.4)
GFR, ESTIMATED: >90 ML/MIN/1.73M2
GLUCOSE SERPL-MCNC: 148 MG/DL (ref 74–99)
HCT VFR BLD AUTO: 32.5 % (ref 40.7–50.3)
HGB BLD-MCNC: 10.1 G/DL (ref 13–17)
MCH RBC QN AUTO: 30.8 PG (ref 25.2–33.5)
MCHC RBC AUTO-ENTMCNC: 31.1 G/DL (ref 28.4–34.8)
MCV RBC AUTO: 99.1 FL (ref 82.6–102.9)
MICROORGANISM SPEC CULT: NORMAL
MICROORGANISM SPEC CULT: NORMAL
NRBC BLD-RTO: 0.9 PER 100 WBC
PLATELET # BLD AUTO: 144 K/UL (ref 138–453)
PMV BLD AUTO: 12.6 FL (ref 8.1–13.5)
POTASSIUM SERPL-SCNC: 4.6 MMOL/L (ref 3.7–5.3)
RBC # BLD AUTO: 3.28 M/UL (ref 4.21–5.77)
SERVICE CMNT-IMP: NORMAL
SERVICE CMNT-IMP: NORMAL
SODIUM SERPL-SCNC: 136 MMOL/L (ref 136–145)
SPECIMEN DESCRIPTION: NORMAL
SPECIMEN DESCRIPTION: NORMAL
WBC OTHER # BLD: 10 K/UL (ref 3.5–11.3)

## 2024-06-12 PROCEDURE — 85027 COMPLETE CBC AUTOMATED: CPT

## 2024-06-12 PROCEDURE — 94761 N-INVAS EAR/PLS OXIMETRY MLT: CPT

## 2024-06-12 PROCEDURE — 94640 AIRWAY INHALATION TREATMENT: CPT

## 2024-06-12 PROCEDURE — APPSS15 APP SPLIT SHARED TIME 0-15 MINUTES: Performed by: NURSE PRACTITIONER

## 2024-06-12 PROCEDURE — 6370000000 HC RX 637 (ALT 250 FOR IP)

## 2024-06-12 PROCEDURE — 2580000003 HC RX 258: Performed by: PSYCHIATRY & NEUROLOGY

## 2024-06-12 PROCEDURE — 2700000000 HC OXYGEN THERAPY PER DAY

## 2024-06-12 PROCEDURE — 6370000000 HC RX 637 (ALT 250 FOR IP): Performed by: PSYCHIATRY & NEUROLOGY

## 2024-06-12 PROCEDURE — 80048 BASIC METABOLIC PNL TOTAL CA: CPT

## 2024-06-12 PROCEDURE — 6360000002 HC RX W HCPCS

## 2024-06-12 PROCEDURE — 6370000000 HC RX 637 (ALT 250 FOR IP): Performed by: REGISTERED NURSE

## 2024-06-12 PROCEDURE — 2580000003 HC RX 258

## 2024-06-12 PROCEDURE — 36415 COLL VENOUS BLD VENIPUNCTURE: CPT

## 2024-06-12 PROCEDURE — 2060000000 HC ICU INTERMEDIATE R&B

## 2024-06-12 PROCEDURE — 6360000002 HC RX W HCPCS: Performed by: PSYCHIATRY & NEUROLOGY

## 2024-06-12 PROCEDURE — 99232 SBSQ HOSP IP/OBS MODERATE 35: CPT | Performed by: INTERNAL MEDICINE

## 2024-06-12 RX ORDER — IPRATROPIUM BROMIDE AND ALBUTEROL SULFATE 2.5; .5 MG/3ML; MG/3ML
1 SOLUTION RESPIRATORY (INHALATION) EVERY 4 HOURS PRN
Status: DISCONTINUED | OUTPATIENT
Start: 2024-06-12 | End: 2024-06-14 | Stop reason: HOSPADM

## 2024-06-12 RX ADMIN — METHYLPREDNISOLONE SODIUM SUCCINATE 60 MG: 40 INJECTION, POWDER, LYOPHILIZED, FOR SOLUTION INTRAMUSCULAR; INTRAVENOUS at 21:39

## 2024-06-12 RX ADMIN — GABAPENTIN 600 MG: 300 CAPSULE ORAL at 21:36

## 2024-06-12 RX ADMIN — BACLOFEN 5 MG: 5 TABLET ORAL at 10:07

## 2024-06-12 RX ADMIN — DEXTROMETHORPHAN POLISTIREX 60 MG: 30 SUSPENSION ORAL at 10:08

## 2024-06-12 RX ADMIN — TAMSULOSIN HYDROCHLORIDE 0.4 MG: 0.4 CAPSULE ORAL at 10:07

## 2024-06-12 RX ADMIN — DICLOFENAC SODIUM 2 G: 10 GEL TOPICAL at 21:58

## 2024-06-12 RX ADMIN — PIPERACILLIN AND TAZOBACTAM 3375 MG: 3; .375 INJECTION, POWDER, LYOPHILIZED, FOR SOLUTION INTRAVENOUS at 11:38

## 2024-06-12 RX ADMIN — POLYETHYLENE GLYCOL 3350 17 G: 17 POWDER, FOR SOLUTION ORAL at 10:10

## 2024-06-12 RX ADMIN — LACOSAMIDE 100 MG: 100 TABLET, FILM COATED ORAL at 10:07

## 2024-06-12 RX ADMIN — MAGNESIUM GLUCONATE 500 MG ORAL TABLET 400 MG: 500 TABLET ORAL at 10:07

## 2024-06-12 RX ADMIN — METHYLPREDNISOLONE SODIUM SUCCINATE 60 MG: 40 INJECTION, POWDER, LYOPHILIZED, FOR SOLUTION INTRAMUSCULAR; INTRAVENOUS at 14:37

## 2024-06-12 RX ADMIN — DEXTROMETHORPHAN POLISTIREX 60 MG: 30 SUSPENSION ORAL at 21:39

## 2024-06-12 RX ADMIN — FAMOTIDINE 20 MG: 20 TABLET, FILM COATED ORAL at 10:07

## 2024-06-12 RX ADMIN — METHYLPREDNISOLONE SODIUM SUCCINATE 60 MG: 40 INJECTION, POWDER, LYOPHILIZED, FOR SOLUTION INTRAMUSCULAR; INTRAVENOUS at 05:38

## 2024-06-12 RX ADMIN — TOPIRAMATE 100 MG: 50 TABLET, FILM COATED ORAL at 21:59

## 2024-06-12 RX ADMIN — PIPERACILLIN AND TAZOBACTAM 3375 MG: 3; .375 INJECTION, POWDER, LYOPHILIZED, FOR SOLUTION INTRAVENOUS at 05:44

## 2024-06-12 RX ADMIN — TRAMADOL HYDROCHLORIDE 50 MG: 50 TABLET, COATED ORAL at 10:07

## 2024-06-12 RX ADMIN — BACLOFEN 5 MG: 5 TABLET ORAL at 21:39

## 2024-06-12 RX ADMIN — VENLAFAXINE HYDROCHLORIDE 75 MG: 75 CAPSULE, EXTENDED RELEASE ORAL at 10:08

## 2024-06-12 RX ADMIN — DICLOFENAC SODIUM 2 G: 10 GEL TOPICAL at 10:10

## 2024-06-12 RX ADMIN — TOPIRAMATE 100 MG: 50 TABLET, FILM COATED ORAL at 10:08

## 2024-06-12 RX ADMIN — SODIUM CHLORIDE, PRESERVATIVE FREE 10 ML: 5 INJECTION INTRAVENOUS at 21:49

## 2024-06-12 RX ADMIN — GABAPENTIN 600 MG: 300 CAPSULE ORAL at 10:07

## 2024-06-12 RX ADMIN — BUTALBITAL, ACETAMINOPHEN, AND CAFFEINE 1 TABLET: 325; 50; 40 TABLET ORAL at 21:37

## 2024-06-12 RX ADMIN — TRAMADOL HYDROCHLORIDE 50 MG: 50 TABLET, COATED ORAL at 00:16

## 2024-06-12 RX ADMIN — FAMOTIDINE 20 MG: 20 TABLET, FILM COATED ORAL at 21:37

## 2024-06-12 RX ADMIN — BUTALBITAL, ACETAMINOPHEN, AND CAFFEINE 1 TABLET: 325; 50; 40 TABLET ORAL at 10:07

## 2024-06-12 RX ADMIN — PIPERACILLIN AND TAZOBACTAM 3375 MG: 3; .375 INJECTION, POWDER, LYOPHILIZED, FOR SOLUTION INTRAVENOUS at 20:01

## 2024-06-12 RX ADMIN — LACOSAMIDE 100 MG: 100 TABLET, FILM COATED ORAL at 21:36

## 2024-06-12 RX ADMIN — AMITRIPTYLINE HYDROCHLORIDE 75 MG: 50 TABLET, FILM COATED ORAL at 21:39

## 2024-06-12 RX ADMIN — GABAPENTIN 600 MG: 300 CAPSULE ORAL at 14:37

## 2024-06-12 RX ADMIN — Medication 10 MG: at 21:36

## 2024-06-12 ASSESSMENT — PAIN DESCRIPTION - DESCRIPTORS
DESCRIPTORS: ACHING;DISCOMFORT
DESCRIPTORS: ACHING;THROBBING

## 2024-06-12 ASSESSMENT — PAIN DESCRIPTION - LOCATION
LOCATION: HEAD
LOCATION: HEAD
LOCATION: KNEE

## 2024-06-12 ASSESSMENT — PAIN - FUNCTIONAL ASSESSMENT: PAIN_FUNCTIONAL_ASSESSMENT: PREVENTS OR INTERFERES SOME ACTIVE ACTIVITIES AND ADLS

## 2024-06-12 ASSESSMENT — PAIN SCALES - GENERAL
PAINLEVEL_OUTOF10: 8

## 2024-06-12 ASSESSMENT — PAIN DESCRIPTION - ORIENTATION
ORIENTATION: RIGHT
ORIENTATION: RIGHT;LEFT

## 2024-06-12 NOTE — PROGRESS NOTES
Neurosurgery NANNETTE/Resident    Daily Progress Note   CC:No chief complaint on file.    6/12/2024  10:50 AM    Chart reviewed.  No acute events overnight.  No new complaints. Denies headache, afebrile     Vitals:    06/12/24 0016 06/12/24 0046 06/12/24 0400 06/12/24 0917   BP:   (!) 142/87    Pulse:   65 61   Resp: 14 14 14 16   Temp:   98.1 °F (36.7 °C)    TempSrc:   Axillary    SpO2:   100% 100%   Weight:       Height:           PE:   Awake alert oriented   appears a little down this morning   Following commands with RUE and RLE   Stable left hemiplegic       Sensation: intact   Incision: CDI       Lab Results   Component Value Date    WBC 10.0 06/12/2024    HGB 10.1 (L) 06/12/2024    HCT 32.5 (L) 06/12/2024     06/12/2024    CHOL 118 05/16/2024    TRIG 182 (H) 05/16/2024    HDL 28 (L) 05/16/2024    ALT 28 05/08/2024    AST 29 05/08/2024     06/12/2024    K 4.6 06/12/2024     06/12/2024    CREATININE 0.7 06/12/2024    BUN 25 (H) 06/12/2024    CO2 21 06/12/2024    TSH 3.45 04/13/2024    INR 1.1 06/05/2024    LABA1C 5.2 05/16/2024    CRP 33.1 (H) 05/12/2024       Radiology   CT HEAD WO CONTRAST    Result Date: 6/11/2024  EXAMINATION: CT OF THE HEAD WITHOUT CONTRAST  6/11/2024 4:48 am TECHNIQUE: CT of the head was performed without the administration of intravenous contrast. Automated exposure control, iterative reconstruction, and/or weight based adjustment of the mA/kV was utilized to reduce the radiation dose to as low as reasonably achievable. COMPARISON: 06/08/2024 HISTORY: ORDERING SYSTEM PROVIDED HISTORY: F/U post op TECHNOLOGIST PROVIDED HISTORY: F/U post op Reason for Exam: F/U post op FINDINGS: BRAIN/VENTRICLES: Stable changes again noted in right frontal, parietal and temporal lobes with minimal patchy parenchymal hemorrhage in the temporal lobe.  There is unchanged minimal right convexity subdural hematoma and postop pneumocephalus.  There is moderate effacement of the right lateral  abnormality of the visualized skull or soft tissues.     1. Large right-sided craniotomy defect with right cerebral hemispheric encephalomalacia changes. There is concavity of the brain contour in the right cerebral hemisphere, with effacement of the right lateral ventricle, midline shift toward the left by 5 mm.  Findings suggestive of Trephine syndrome. 2. No acute intra-axial hemorrhage is seen.          A/P  59 y.o. male who presents with skull defect    POD#8  s/p right cranioplasty  6/6 - right epidural hematoma and IPH.  POD#6  s/p emergent right craniotomy for hematoma evacuation.      - monitor platelets daily -  today  - hematology following   - activity as tolerated, PT and OT  - hold all anticoagulation and antiplatelet medications   - pain control  - DNR CCA and DNI   - discharge planning- IPR plan is for discharge tomorrow to [facility]- spoke with Wife - updated CM regarding discharge tomorrow     Please contact neurosurgery with any changes in patients neurologic status.       Oswaldo Knight, CNP  6/12/24  10:50 AM

## 2024-06-12 NOTE — PLAN OF CARE
Problem: Discharge Planning  Goal: Discharge to home or other facility with appropriate resources  6/12/2024 0625 by Erin Rodriguze RN  Outcome: Progressing  Flowsheets (Taken 6/11/2024 2000)  Discharge to home or other facility with appropriate resources: Identify barriers to discharge with patient and caregiver     Problem: Chronic Conditions and Co-morbidities  Goal: Patient's chronic conditions and co-morbidity symptoms are monitored and maintained or improved  6/12/2024 0625 by Erin Rodriguez RN  Outcome: Progressing  Flowsheets (Taken 6/11/2024 2000)  Care Plan - Patient's Chronic Conditions and Co-Morbidity Symptoms are Monitored and Maintained or Improved: Monitor and assess patient's chronic conditions and comorbid symptoms for stability, deterioration, or improvement     Problem: Safety - Adult  Goal: Free from fall injury  6/12/2024 0625 by Erin Rodriguez RN  Outcome: Progressing  Flowsheets (Taken 6/11/2024 2000)  Free From Fall Injury: Instruct family/caregiver on patient safety     Problem: Skin/Tissue Integrity  Goal: Absence of new skin breakdown  Description: 1.  Monitor for areas of redness and/or skin breakdown  2.  Assess vascular access sites hourly  3.  Every 4-6 hours minimum:  Change oxygen saturation probe site  4.  Every 4-6 hours:  If on nasal continuous positive airway pressure, respiratory therapy assess nares and determine need for appliance change or resting period.  6/12/2024 0625 by Erin Rodriguez RN  Outcome: Progressing     Problem: Pain  Goal: Verbalizes/displays adequate comfort level or baseline comfort level  6/12/2024 0625 by Erin Rodriguez RN  Outcome: Progressing     Problem: ABCDS Injury Assessment  Goal: Absence of physical injury  6/12/2024 0625 by Erin Rodriguez RN  Outcome: Progressing  Flowsheets (Taken 6/11/2024 2000)  Absence of Physical Injury: Implement safety measures based on patient assessment     Problem: Safety - Medical  Restraint  Goal: Remains free of injury from restraints (Restraint for Interference with Medical Device)  Description: INTERVENTIONS:  1. Determine that other, less restrictive measures have been tried or would not be effective before applying the restraint  2. Evaluate the patient's condition at the time of restraint application  3. Inform patient/family regarding the reason for restraint  4. Q2H: Monitor safety, psychosocial status, comfort, nutrition and hydration  6/12/2024 0625 by Erin Rodriguez, RN  Outcome: Progressing

## 2024-06-12 NOTE — CARE COORDINATION
Transitional Planning  Spoke with Oswaldo in [facility], plan dc for tomorrow.     135 pm Spoke with Sheila from [facility], update provided. Tentative dc tomorrow.

## 2024-06-12 NOTE — PLAN OF CARE
Problem: Discharge Planning  Goal: Discharge to home or other facility with appropriate resources  6/12/2024 1755 by Latha Singleton RN  Outcome: Progressing  6/12/2024 0625 by Erin Rodriguez RN  Outcome: Progressing  Flowsheets (Taken 6/11/2024 2000)  Discharge to home or other facility with appropriate resources: Identify barriers to discharge with patient and caregiver     Problem: Chronic Conditions and Co-morbidities  Goal: Patient's chronic conditions and co-morbidity symptoms are monitored and maintained or improved  6/12/2024 1755 by Latha Singleton RN  Outcome: Progressing  6/12/2024 0625 by Erin Rodriguez RN  Outcome: Progressing  Flowsheets (Taken 6/11/2024 2000)  Care Plan - Patient's Chronic Conditions and Co-Morbidity Symptoms are Monitored and Maintained or Improved: Monitor and assess patient's chronic conditions and comorbid symptoms for stability, deterioration, or improvement     Problem: Safety - Adult  Goal: Free from fall injury  6/12/2024 1755 by Latha iSngleton RN  Outcome: Progressing  6/12/2024 0625 by Erin Rodriguez RN  Outcome: Progressing  Flowsheets (Taken 6/11/2024 2000)  Free From Fall Injury: Instruct family/caregiver on patient safety     Problem: Skin/Tissue Integrity  Goal: Absence of new skin breakdown  Description: 1.  Monitor for areas of redness and/or skin breakdown  2.  Assess vascular access sites hourly  3.  Every 4-6 hours minimum:  Change oxygen saturation probe site  4.  Every 4-6 hours:  If on nasal continuous positive airway pressure, respiratory therapy assess nares and determine need for appliance change or resting period.  6/12/2024 1755 by Latha Singleton RN  Outcome: Progressing  6/12/2024 0625 by Erin Rodriguez RN  Outcome: Progressing     Problem: Pain  Goal: Verbalizes/displays adequate comfort level or baseline comfort level  6/12/2024 1755 by Latha Singleton RN  Outcome: Progressing  6/12/2024 0625 by Erin Rodriguez

## 2024-06-12 NOTE — PROGRESS NOTES
contrast    Result Date: 5/9/2024  EXAMINATION: MRI OF THE BRAIN WITHOUT CONTRAST  5/9/2024 8:22 pm TECHNIQUE: Multiplanar multisequence MRI of the brain was performed without the administration of intravenous contrast. COMPARISON: CT head May 8, 2024 HISTORY: ORDERING SYSTEM PROVIDED HISTORY: altered mental status TECHNOLOGIST PROVIDED HISTORY: altered mental status Decision Support Exception - unselect if not a suspected or confirmed emergency medical condition->Emergency Medical Condition (MA) Reason for Exam: Altered mental status FINDINGS: INTRACRANIAL STRUCTURES/VENTRICLES: The patient is status post right frontal parietal temporal craniectomy.  There is residual vasogenic edema, scattered encephalomalacia and scattered chronic blood products in the subjacent right frontal parietal temporal lobes.  There is 2.2 x 2.7 cm fluid collection with associated restricted diffusion and peripheral susceptibility artifacts in the right frontal lobe, likely related to surgical cavity with subacute blood products.  There is additional 5 x 11 mm fluid collection with associated restricted diffusion in the anterior right frontal lobe along the anterior margin of the craniectomy, likely related to subacute parenchymal hematoma. There is mass effect in the right cerebral hemisphere with decreased sulcation.  No midline shift. There is mild T2/FLAIR hyperintensity in the periventricular and subcortical white matter, likely related to mild chronic microvascular disease.  There is no acute infarct.   There is no evidence of hydrocephalus. The sellar/suprasellar regions appear unremarkable. ORBITS: The visualized portion of the orbits demonstrate no acute abnormality. SINUSES: There is scattered minimal mucosal thickening in the paranasal sinuses.  There is mild right mastoid effusion. BONES/SOFT TISSUES: The bone marrow signal intensity appears normal. The soft tissues demonstrate no acute abnormality.     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    Defect of skull [M95.2] 05/28/2024    History of DVT (deep vein thrombosis) [Z86.718] 05/28/2024    Thrombocytopenia (HCC) [D69.6] 02/12/2024    Intracerebral hematoma (HCC) [S06.360A] 01/31/2024       Assessment:  Chronic ITP, steroid refractory  History of DVT  History of dural venous thrombosis with acute hemorrhagic stroke  Intolerance to anticoagulation  Status post right cranioplasty  Status post emergent right craniotomy for hematoma    Plan:  I reviewed the labs/imaging available to me,outside records and discussed with the patient.I explained to the patient the nature of this problem. I explained the significance of these abnormalities and possible etiology and management options  Patient has steroid refractory ITP.  Patient also has history of DVT but has not been able to tolerate anticoagulation due to recurrent brain bleed according to the patient's wife.  Subsequently it was decided not to anticoagulate the patient  Platelets are stable and he underwent right-sided cranioplasty with synthetic implant on 6/4/2024  Anticoagulation discontinued by neurosurgery.  Status post emergent right craniotomy for hematoma evacuation on 6/6/2024  Follow-up CT head completed 6/11/24 shows no acute abnormality  Okay to discharge from hematology standpoint with plans to follow-up with Dr. Chang upon discharge  Final recommendations to follow after discussing with attending physician, Dr. Vane Cabral, APRN - CNP  [facility]  6/12/2024, 4:36 PM                                                  Attending Physician Statement   I have discussed the care of Brennen Mcclure, and I have examined the patient myselft and taken ros and hpi , including pertinent history and exam findings,  with the author of this

## 2024-06-12 NOTE — RT PROTOCOL NOTE
RT Inhaler-Nebulizer Bronchodilator Protocol Note    There is a bronchodilator order in the chart from a provider indicating to follow the RT Bronchodilator Protocol and there is an “Initiate RT Inhaler-Nebulizer Bronchodilator Protocol” order as well (see protocol at bottom of note).    CXR Findings:  No results found.    The findings from the last RT Protocol Assessment were as follows:   History Pulmonary Disease: Chronic Pulmonary Disease  Respiratory Pattern: Regular pattern and RR 12-20 bpm  Breath Sounds: Clear breath sounds  Cough: Strong, spontaneous, non-productive  Indication for Bronchodilator Therapy: Decreased or absent breath sounds  Bronchodilator Assessment Score: 2    Aerosolized bronchodilator medication orders have been revised according to the RT Inhaler-Nebulizer Bronchodilator Protocol below.    Respiratory Therapist to perform RT Therapy Protocol Assessment initially then follow the protocol.  Repeat RT Therapy Protocol Assessment PRN for score 0-3 or on second treatment, BID, and PRN for scores above 3.    No Indications - adjust the frequency to every 6 hours PRN wheezing or bronchospasm, if no treatments needed after 48 hours then discontinue using Per Protocol order mode.     If indication present, adjust the RT bronchodilator orders based on the Bronchodilator Assessment Score as indicated below.  Use Inhaler orders unless patient has one or more of the following: on home nebulizer, not able to hold breath for 10 seconds, is not alert and oriented, cannot activate and use MDI correctly, or respiratory rate 25 breaths per minute or more, then use the equivalent nebulizer order(s) with same Frequency and PRN reasons based on the score.  If a patient is on this medication at home then do not decrease Frequency below that used at home.    0-3 - enter or revise RT bronchodilator order(s) to equivalent RT Bronchodilator order with Frequency of every 4 hours PRN for wheezing or increased work

## 2024-06-13 ENCOUNTER — APPOINTMENT (OUTPATIENT)
Dept: CT IMAGING | Age: 59
End: 2024-06-13
Attending: NEUROLOGICAL SURGERY
Payer: MEDICAID

## 2024-06-13 LAB
T3FREE SERPL-MCNC: 1.3 PG/ML (ref 2–4.4)
T4 FREE SERPL-MCNC: 0.9 NG/DL (ref 0.92–1.68)
TSH SERPL DL<=0.05 MIU/L-ACNC: 0.78 UIU/ML (ref 0.27–4.2)

## 2024-06-13 PROCEDURE — 99232 SBSQ HOSP IP/OBS MODERATE 35: CPT | Performed by: INTERNAL MEDICINE

## 2024-06-13 PROCEDURE — 84443 ASSAY THYROID STIM HORMONE: CPT

## 2024-06-13 PROCEDURE — 2060000000 HC ICU INTERMEDIATE R&B

## 2024-06-13 PROCEDURE — 95700 EEG CONT REC W/VID EEG TECH: CPT

## 2024-06-13 PROCEDURE — 36415 COLL VENOUS BLD VENIPUNCTURE: CPT

## 2024-06-13 PROCEDURE — APPSS15 APP SPLIT SHARED TIME 0-15 MINUTES: Performed by: NURSE PRACTITIONER

## 2024-06-13 PROCEDURE — 2580000003 HC RX 258: Performed by: PSYCHIATRY & NEUROLOGY

## 2024-06-13 PROCEDURE — 6370000000 HC RX 637 (ALT 250 FOR IP): Performed by: PSYCHIATRY & NEUROLOGY

## 2024-06-13 PROCEDURE — 95711 VEEG 2-12 HR UNMONITORED: CPT

## 2024-06-13 PROCEDURE — 84439 ASSAY OF FREE THYROXINE: CPT

## 2024-06-13 PROCEDURE — 6360000002 HC RX W HCPCS

## 2024-06-13 PROCEDURE — 6370000000 HC RX 637 (ALT 250 FOR IP): Performed by: REGISTERED NURSE

## 2024-06-13 PROCEDURE — 84481 FREE ASSAY (FT-3): CPT

## 2024-06-13 PROCEDURE — 97130 THER IVNTJ EA ADDL 15 MIN: CPT

## 2024-06-13 PROCEDURE — 2580000003 HC RX 258

## 2024-06-13 PROCEDURE — 6370000000 HC RX 637 (ALT 250 FOR IP)

## 2024-06-13 PROCEDURE — 6360000002 HC RX W HCPCS: Performed by: PSYCHIATRY & NEUROLOGY

## 2024-06-13 PROCEDURE — 97129 THER IVNTJ 1ST 15 MIN: CPT

## 2024-06-13 PROCEDURE — 70450 CT HEAD/BRAIN W/O DYE: CPT

## 2024-06-13 RX ORDER — SENNOSIDES A AND B 8.6 MG/1
1 TABLET, FILM COATED ORAL DAILY PRN
OUTPATIENT
Start: 2024-06-13

## 2024-06-13 RX ORDER — ATORVASTATIN CALCIUM 80 MG/1
80 TABLET, FILM COATED ORAL NIGHTLY
OUTPATIENT
Start: 2024-06-13

## 2024-06-13 RX ORDER — PREDNISONE 20 MG/1
20 TABLET ORAL DAILY
Status: DISCONTINUED | OUTPATIENT
Start: 2024-07-11 | End: 2024-06-14 | Stop reason: HOSPADM

## 2024-06-13 RX ORDER — LIDOCAINE 4 G/G
1 PATCH TOPICAL DAILY
OUTPATIENT
Start: 2024-06-14

## 2024-06-13 RX ORDER — PREDNISONE 10 MG/1
10 TABLET ORAL DAILY
Status: DISCONTINUED | OUTPATIENT
Start: 2024-07-18 | End: 2024-06-14 | Stop reason: HOSPADM

## 2024-06-13 RX ORDER — LANOLIN ALCOHOL/MO/W.PET/CERES
400 CREAM (GRAM) TOPICAL DAILY
OUTPATIENT
Start: 2024-06-14

## 2024-06-13 RX ORDER — UREA 10 %
10 LOTION (ML) TOPICAL NIGHTLY
OUTPATIENT
Start: 2024-06-13

## 2024-06-13 RX ORDER — TAMSULOSIN HYDROCHLORIDE 0.4 MG/1
0.4 CAPSULE ORAL DAILY
OUTPATIENT
Start: 2024-06-14

## 2024-06-13 RX ORDER — SODIUM CHLORIDE 9 MG/ML
INJECTION, SOLUTION INTRAVENOUS PRN
OUTPATIENT
Start: 2024-06-13

## 2024-06-13 RX ORDER — BUTALBITAL, ACETAMINOPHEN AND CAFFEINE 50; 325; 40 MG/1; MG/1; MG/1
1 TABLET ORAL 2 TIMES DAILY
OUTPATIENT
Start: 2024-06-13

## 2024-06-13 RX ORDER — PREDNISONE 20 MG/1
60 TABLET ORAL DAILY
Qty: 21 TABLET | Refills: 0 | Status: SHIPPED | OUTPATIENT
Start: 2024-06-14 | End: 2024-06-21

## 2024-06-13 RX ORDER — BUTALBITAL, ACETAMINOPHEN, CAFFEINE AND CODEINE PHOSPHATE 50; 325; 40; 30 MG/1; MG/1; MG/1; MG/1
1 CAPSULE ORAL EVERY 6 HOURS PRN
Qty: 40 CAPSULE | Refills: 0 | Status: SHIPPED | OUTPATIENT
Start: 2024-06-13 | End: 2024-06-23

## 2024-06-13 RX ORDER — SODIUM CHLORIDE 0.9 % (FLUSH) 0.9 %
5-40 SYRINGE (ML) INJECTION EVERY 12 HOURS SCHEDULED
OUTPATIENT
Start: 2024-06-13

## 2024-06-13 RX ORDER — VENLAFAXINE HYDROCHLORIDE 75 MG/1
75 CAPSULE, EXTENDED RELEASE ORAL
OUTPATIENT
Start: 2024-06-14

## 2024-06-13 RX ORDER — DEXTROMETHORPHAN POLISTIREX 30 MG/5ML
60 SUSPENSION ORAL EVERY 12 HOURS SCHEDULED
OUTPATIENT
Start: 2024-06-13

## 2024-06-13 RX ORDER — TOPIRAMATE 50 MG/1
100 TABLET, FILM COATED ORAL 2 TIMES DAILY
OUTPATIENT
Start: 2024-06-13

## 2024-06-13 RX ORDER — MAGNESIUM SULFATE IN WATER 40 MG/ML
2000 INJECTION, SOLUTION INTRAVENOUS PRN
OUTPATIENT
Start: 2024-06-13

## 2024-06-13 RX ORDER — IPRATROPIUM BROMIDE AND ALBUTEROL SULFATE 2.5; .5 MG/3ML; MG/3ML
1 SOLUTION RESPIRATORY (INHALATION) EVERY 4 HOURS PRN
OUTPATIENT
Start: 2024-06-13

## 2024-06-13 RX ORDER — SODIUM CHLORIDE 0.9 % (FLUSH) 0.9 %
5-40 SYRINGE (ML) INJECTION PRN
OUTPATIENT
Start: 2024-06-13

## 2024-06-13 RX ORDER — POTASSIUM CHLORIDE 7.45 MG/ML
10 INJECTION INTRAVENOUS PRN
OUTPATIENT
Start: 2024-06-13

## 2024-06-13 RX ORDER — BACLOFEN 5 MG/1
5 TABLET ORAL 2 TIMES DAILY
OUTPATIENT
Start: 2024-06-13

## 2024-06-13 RX ORDER — ACETAMINOPHEN 650 MG/1
650 SUPPOSITORY RECTAL EVERY 6 HOURS PRN
OUTPATIENT
Start: 2024-06-13

## 2024-06-13 RX ORDER — PREDNISONE 50 MG/1
50 TABLET ORAL DAILY
Qty: 7 TABLET | Refills: 0 | Status: SHIPPED | OUTPATIENT
Start: 2024-06-21 | End: 2024-06-28

## 2024-06-13 RX ORDER — POTASSIUM CHLORIDE 20 MEQ/1
40 TABLET, EXTENDED RELEASE ORAL PRN
OUTPATIENT
Start: 2024-06-13

## 2024-06-13 RX ORDER — POLYETHYLENE GLYCOL 3350 17 G/17G
17 POWDER, FOR SOLUTION ORAL DAILY
OUTPATIENT
Start: 2024-06-14

## 2024-06-13 RX ORDER — PREDNISONE 50 MG/1
50 TABLET ORAL DAILY
Status: DISCONTINUED | OUTPATIENT
Start: 2024-06-20 | End: 2024-06-14 | Stop reason: HOSPADM

## 2024-06-13 RX ORDER — LACOSAMIDE 100 MG/1
100 TABLET ORAL 2 TIMES DAILY
OUTPATIENT
Start: 2024-06-13

## 2024-06-13 RX ORDER — TRAMADOL HYDROCHLORIDE 50 MG/1
50 TABLET ORAL EVERY 6 HOURS PRN
OUTPATIENT
Start: 2024-06-13

## 2024-06-13 RX ORDER — SENNOSIDES A AND B 8.6 MG/1
1 TABLET, FILM COATED ORAL DAILY PRN
Qty: 30 TABLET | Refills: 0 | Status: SHIPPED | OUTPATIENT
Start: 2024-06-13 | End: 2024-07-13

## 2024-06-13 RX ORDER — PREDNISONE 20 MG/1
40 TABLET ORAL DAILY
Status: DISCONTINUED | OUTPATIENT
Start: 2024-06-27 | End: 2024-06-14 | Stop reason: HOSPADM

## 2024-06-13 RX ORDER — FAMOTIDINE 20 MG/1
20 TABLET, FILM COATED ORAL 2 TIMES DAILY
OUTPATIENT
Start: 2024-06-13

## 2024-06-13 RX ORDER — ACETAMINOPHEN 325 MG/1
650 TABLET ORAL EVERY 6 HOURS PRN
OUTPATIENT
Start: 2024-06-13

## 2024-06-13 RX ORDER — HYDRALAZINE HYDROCHLORIDE 20 MG/ML
10 INJECTION INTRAMUSCULAR; INTRAVENOUS EVERY 4 HOURS PRN
OUTPATIENT
Start: 2024-06-13

## 2024-06-13 RX ORDER — TOPIRAMATE 50 MG/1
50 TABLET, FILM COATED ORAL 2 TIMES DAILY
Qty: 60 TABLET | Refills: 0 | Status: SHIPPED | OUTPATIENT
Start: 2024-06-13

## 2024-06-13 RX ORDER — GABAPENTIN 300 MG/1
600 CAPSULE ORAL 3 TIMES DAILY
OUTPATIENT
Start: 2024-06-13

## 2024-06-13 RX ORDER — PREDNISONE 20 MG/1
40 TABLET ORAL DAILY
Qty: 14 TABLET | Refills: 0 | Status: SHIPPED | OUTPATIENT
Start: 2024-06-28 | End: 2024-07-05

## 2024-06-13 RX ADMIN — PIPERACILLIN AND TAZOBACTAM 3375 MG: 3; .375 INJECTION, POWDER, LYOPHILIZED, FOR SOLUTION INTRAVENOUS at 03:24

## 2024-06-13 RX ADMIN — POLYETHYLENE GLYCOL 3350 17 G: 17 POWDER, FOR SOLUTION ORAL at 09:14

## 2024-06-13 RX ADMIN — MAGNESIUM GLUCONATE 500 MG ORAL TABLET 400 MG: 500 TABLET ORAL at 09:18

## 2024-06-13 RX ADMIN — TRAMADOL HYDROCHLORIDE 50 MG: 50 TABLET, COATED ORAL at 09:12

## 2024-06-13 RX ADMIN — GABAPENTIN 600 MG: 300 CAPSULE ORAL at 19:46

## 2024-06-13 RX ADMIN — VENLAFAXINE HYDROCHLORIDE 75 MG: 75 CAPSULE, EXTENDED RELEASE ORAL at 09:18

## 2024-06-13 RX ADMIN — DICLOFENAC SODIUM 2 G: 10 GEL TOPICAL at 09:18

## 2024-06-13 RX ADMIN — LACOSAMIDE 100 MG: 100 TABLET, FILM COATED ORAL at 19:47

## 2024-06-13 RX ADMIN — TOPIRAMATE 100 MG: 50 TABLET, FILM COATED ORAL at 09:18

## 2024-06-13 RX ADMIN — BACLOFEN 5 MG: 5 TABLET ORAL at 19:53

## 2024-06-13 RX ADMIN — METHYLPREDNISOLONE SODIUM SUCCINATE 60 MG: 40 INJECTION, POWDER, LYOPHILIZED, FOR SOLUTION INTRAMUSCULAR; INTRAVENOUS at 05:45

## 2024-06-13 RX ADMIN — SODIUM CHLORIDE, PRESERVATIVE FREE 10 ML: 5 INJECTION INTRAVENOUS at 09:15

## 2024-06-13 RX ADMIN — BACLOFEN 5 MG: 5 TABLET ORAL at 09:18

## 2024-06-13 RX ADMIN — LACOSAMIDE 100 MG: 100 TABLET, FILM COATED ORAL at 09:14

## 2024-06-13 RX ADMIN — BUTALBITAL, ACETAMINOPHEN, AND CAFFEINE 1 TABLET: 325; 50; 40 TABLET ORAL at 09:13

## 2024-06-13 RX ADMIN — GABAPENTIN 600 MG: 300 CAPSULE ORAL at 09:12

## 2024-06-13 RX ADMIN — GABAPENTIN 600 MG: 300 CAPSULE ORAL at 14:24

## 2024-06-13 RX ADMIN — TOPIRAMATE 100 MG: 50 TABLET, FILM COATED ORAL at 19:46

## 2024-06-13 RX ADMIN — BUTALBITAL, ACETAMINOPHEN, AND CAFFEINE 1 TABLET: 325; 50; 40 TABLET ORAL at 19:47

## 2024-06-13 RX ADMIN — PIPERACILLIN AND TAZOBACTAM 3375 MG: 3; .375 INJECTION, POWDER, LYOPHILIZED, FOR SOLUTION INTRAVENOUS at 11:23

## 2024-06-13 RX ADMIN — DEXTROMETHORPHAN POLISTIREX 60 MG: 30 SUSPENSION ORAL at 19:54

## 2024-06-13 RX ADMIN — METHYLPREDNISOLONE SODIUM SUCCINATE 60 MG: 40 INJECTION, POWDER, LYOPHILIZED, FOR SOLUTION INTRAMUSCULAR; INTRAVENOUS at 13:58

## 2024-06-13 RX ADMIN — PREDNISONE 60 MG: 50 TABLET ORAL at 16:10

## 2024-06-13 RX ADMIN — TRAMADOL HYDROCHLORIDE 50 MG: 50 TABLET, COATED ORAL at 16:05

## 2024-06-13 RX ADMIN — PIPERACILLIN AND TAZOBACTAM 3375 MG: 3; .375 INJECTION, POWDER, LYOPHILIZED, FOR SOLUTION INTRAVENOUS at 19:37

## 2024-06-13 RX ADMIN — TAMSULOSIN HYDROCHLORIDE 0.4 MG: 0.4 CAPSULE ORAL at 09:14

## 2024-06-13 RX ADMIN — AMITRIPTYLINE HYDROCHLORIDE 75 MG: 50 TABLET, FILM COATED ORAL at 19:47

## 2024-06-13 RX ADMIN — DICLOFENAC SODIUM 2 G: 10 GEL TOPICAL at 19:54

## 2024-06-13 RX ADMIN — FAMOTIDINE 20 MG: 20 TABLET, FILM COATED ORAL at 19:46

## 2024-06-13 RX ADMIN — FAMOTIDINE 20 MG: 20 TABLET, FILM COATED ORAL at 09:13

## 2024-06-13 RX ADMIN — DEXTROMETHORPHAN POLISTIREX 60 MG: 30 SUSPENSION ORAL at 09:18

## 2024-06-13 ASSESSMENT — PAIN - FUNCTIONAL ASSESSMENT
PAIN_FUNCTIONAL_ASSESSMENT: PREVENTS OR INTERFERES SOME ACTIVE ACTIVITIES AND ADLS
PAIN_FUNCTIONAL_ASSESSMENT: PREVENTS OR INTERFERES WITH MANY ACTIVE NOT PASSIVE ACTIVITIES

## 2024-06-13 ASSESSMENT — PAIN SCALES - GENERAL
PAINLEVEL_OUTOF10: 8
PAINLEVEL_OUTOF10: 0
PAINLEVEL_OUTOF10: 0
PAINLEVEL_OUTOF10: 8

## 2024-06-13 ASSESSMENT — PAIN DESCRIPTION - ORIENTATION
ORIENTATION: RIGHT

## 2024-06-13 ASSESSMENT — PAIN DESCRIPTION - DESCRIPTORS
DESCRIPTORS: THROBBING
DESCRIPTORS: THROBBING
DESCRIPTORS: ACHING

## 2024-06-13 ASSESSMENT — PAIN SCALES - WONG BAKER: WONGBAKER_NUMERICALRESPONSE: NO HURT

## 2024-06-13 ASSESSMENT — PAIN DESCRIPTION - LOCATION
LOCATION: HEAD
LOCATION: INCISION
LOCATION: HEAD

## 2024-06-13 ASSESSMENT — PAIN DESCRIPTION - FREQUENCY: FREQUENCY: CONTINUOUS

## 2024-06-13 ASSESSMENT — PAIN DESCRIPTION - PAIN TYPE: TYPE: SURGICAL PAIN;CHRONIC PAIN

## 2024-06-13 ASSESSMENT — PAIN DESCRIPTION - ONSET: ONSET: ON-GOING

## 2024-06-13 NOTE — SIGNIFICANT EVENT
Neurosurgery update    Was paged by nursing staff around 2 PM regarding patient having staring spell, more lethargic    I went to examine patient he does appear more lethargic and drowsy compared to this morning does follow commands on the right side can antigravity still, eyes appeared to be midline and not crossing to the left or right, PERRL, continued to be drowsy while I was in the room.  Wife on the phone during this time updated her on the plan.  She reports that she was heading to the hospital.    Updated Dr. Corbett regarding above  Ordered CT head stat  Pending further recommendations once CT head completed and reviewed          Repeat CT head completed and reviewed appears stable  Will hook patient up to LTME to rule out seizures  Holding off on discharge today     Electronically signed by EMILY Morales NP on 6/13/2024 at 2:33 PM

## 2024-06-13 NOTE — PROCEDURES
PROCEDURE NOTE  Date: 6/13/2024   Name: Brennen Mcclure  YOB: 1965    Procedures              Referring physician: Dr. Castro  Date: 6/14/2024  Start Time: 6/13/2024 @ 1539  End Time: 6/14/2024 @ 1520    Indication  Patient with encephalopathy, EEG done to rule out subclinical seizures.       Introduction  This continuous video-EEG was acquired using a e[employer] workstation at 256 samples/s. Electrodes were placed according to the International 10-20 system. Automated spike and seizure detection algorithms were applied. Video was recorded during this study.    Description  During the maximal alert state, a poorly-regulated 6 Hz posterior dominant rhythm was seen which was asymmetrical and attenuated to eye opening. There is continuous right hemispheric focal slowing or interhemispheric asymmetry was noted. Normal sleep structures were observed. There were few to rare right central phase reversal.     Events  6/13-14/2024  No events reported.      Impression  Abnormal continuous vEEG recording, the slowing mentioned above suggests mild non specific encephalopathy.     Interhemispheric asymmetry suggest focal lesion/dysfunction on the right.     The presence of right central phase reversal entails increased risk for focal seizures.      Madison Barton MD  Epilepsy Board Certified.  Neurology Board Certified.    Electronically Signed

## 2024-06-13 NOTE — PROGRESS NOTES
utilized to reduce the radiation dose to as low as reasonably achievable. COMPARISON: 04/12/2024 HISTORY: Fall. FINDINGS: BRAIN/VENTRICLES: Stable postsurgical changes from right-sided craniectomy with dural thickening.  No evidence of acute infarct.  Extensive encephalomalacia within the right cerebral hemisphere is unchanged.  No new intracranial hemorrhage.  No midline shift.  No hydrocephalus. ORBITS: The visualized portion of the orbits demonstrate no acute abnormality. SINUSES: The visualized paranasal sinuses and mastoid air cells demonstrate no acute abnormality. SOFT TISSUES/SKULL:  No acute abnormality.     Stable postsurgical and chronic changes without acute abnormality.     CT CERVICAL SPINE WO CONTRAST    Result Date: 5/2/2024  EXAMINATION: CT OF THE CERVICAL SPINE WITHOUT CONTRAST 5/2/2024 5:02 pm TECHNIQUE: CT of the cervical spine was performed without the administration of intravenous contrast. Multiplanar reformatted images are provided for review. Automated exposure control, iterative reconstruction, and/or weight based adjustment of the mA/kV was utilized to reduce the radiation dose to as low as reasonably achievable. COMPARISON: None. HISTORY: ORDERING SYSTEM PROVIDED HISTORY: fall TECHNOLOGIST PROVIDED HISTORY: fall Decision Support Exception - unselect if not a suspected or confirmed emergency medical condition->Emergency Medical Condition (MA) Reason for Exam: Headache; Fall Relevant Medical/Surgical History: crainiotomy FINDINGS: BONES/ALIGNMENT: There is no acute fracture or traumatic malalignment. DEGENERATIVE CHANGES: Mild degenerative changes. SOFT TISSUES: There is no prevertebral soft tissue swelling.     No evidence for acute fracture or subluxation. Mild degenerative changes.        Impression:   Primary Problem  Absence of bone of skull    Active Hospital Problems    Diagnosis Date Noted    Epidural hematoma (HCC) [S06.4XAA] 06/07/2024    Acute respiratory failure with hypoxia  taken ros and hpi , including pertinent history and exam findings,  with the author of this note. I have reviewed the key elements of all parts of the encounter with the nurse practitioner/resident.  I agree with the assessment, plan and orders as documented by the above health care provider.  I have made necessary changes as deemed appropriate directly in the note.  More than 50% of the time was spent taking care of this patient in addition to the nurse practitioner time/ resident.  That also included history taking follow-up physical examination and review of system.                            MD Micaela [facility]                            This note is created with the assistance of a speech recognition program.  While intending to generate a document that actually reflects the content of the visit, the document can still have some errors including those of syntax and sound a like substitutions which may escape proof reading.  It such instances, actual meaning can be extrapolated by contextual diversion.

## 2024-06-13 NOTE — CARE COORDINATION
Transitional Planning  Called [facility], [phone], VMM left for Sheila regarding admission.  Spoke with Janice VEGA, patient currently has IV running and will not be completed until 3 pm. Faxed [facility], with a request for transportation after 3 pm.      1205 pm PS Oswaldo RODRIGUEZ with NS, requested for medications to be reconciled.       120 pm Called [facility], transportation arranged for 5 pm. Update provided to GARY Camacho. Called [facility], spoke with Sheila, notified of transportation.  Report number [phone] fax [phone].     Called spouse Mara, update provided and agrees with discharge.     Faxed CHANDLER to [phone]    240 pm spoke with Oswaldo VEGA for NS, states dc is postponed, patient is more lethargic, plan for stat CT and LTME. Called [facility], cancelled transportation, VMM left for Sheila at [facility].          Lindsey Toro RN

## 2024-06-13 NOTE — PROGRESS NOTES
Neurosurgery NANNETTE/Resident    Daily Progress Note   CC:No chief complaint on file.    6/13/2024  8:04 AM    Chart reviewed.  No acute events overnight.  No new complaints. Doing well this morning, has a great appetite, reports mild headache     Vitals:    06/12/24 1607 06/12/24 2037 06/13/24 0012 06/13/24 0437   BP: 125/80 119/82 122/85 114/76   Pulse: 69 85 63 60   Resp: 10 18 14 14   Temp: 98.2 °F (36.8 °C) 98.2 °F (36.8 °C) 98.8 °F (37.1 °C) 98.5 °F (36.9 °C)   TempSrc: Axillary Oral Axillary Axillary   SpO2: 96% 94% 100% 99%   Weight:       Height:           PE:   Awake alert oriented   appears a little down this morning   Following commands with RUE and RLE   Stable left hemiplegic         Sensation: intact   Incision: CDI   Lab Results   Component Value Date    WBC 10.0 06/12/2024    HGB 10.1 (L) 06/12/2024    HCT 32.5 (L) 06/12/2024     06/12/2024    CHOL 118 05/16/2024    TRIG 182 (H) 05/16/2024    HDL 28 (L) 05/16/2024    ALT 28 05/08/2024    AST 29 05/08/2024     06/12/2024    K 4.6 06/12/2024     06/12/2024    CREATININE 0.7 06/12/2024    BUN 25 (H) 06/12/2024    CO2 21 06/12/2024    TSH 3.45 04/13/2024    INR 1.1 06/05/2024    LABA1C 5.2 05/16/2024    CRP 33.1 (H) 05/12/2024         A/P  59 y.o. male who presents with  skull defect    POD#9  s/p right cranioplasty  6/6 - right epidural hematoma and IPH.  POD#7  s/p emergent right craniotomy for hematoma evacuation.      - hematology following   - activity as tolerated, PT and OT  - hold all anticoagulation and antiplatelet medications   - pain control  - DNR CCA and DNI   - discharge planning- IPR plan is for discharge today to [facility]       Please contact neurosurgery with any changes in patients neurologic status.       Oswaldo Knight, CNP  6/13/24  8:04 AM

## 2024-06-13 NOTE — PROGRESS NOTES
Speech Language Pathology  [facility]    Cognitive Treatment Note    Date: 6/13/2024  Patient’s Name: Brennen Mcclure  MRN: [id number]    Patient Active Problem List   Diagnosis Code    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 (HCC) I69.152    Pseudobulbar affect F48.2    Headache with neurologic deficit R51.9, R29.818    Intracerebral hematoma (HCC) S06.360A    Thrombocytopenia ([facility]) D69.6    Anemia D64.9    Chronic ITP (idiopathic thrombocytopenia) ([facility]) D69.3    Seizures ([facility]) R56.9    Dural venous sinus thrombosis G08    Chronic deep vein thrombosis (DVT) of proximal vein of right lower extremity ([facility]) I82.5Y1    Chronic deep vein thrombosis (DVT) of right iliac vein ([facility]) I82.521    Chronic deep vein thrombosis (DVT) of femoral vein of right lower extremity ([facility]) I82.511    Chronic venous insufficiency of lower extremity I87.2    Hemosiderin pigmentation of skin L81.8    Right leg swelling M79.89    Dysphagia R13.10    Transient neurological symptoms R29.818    Intraparenchymal hemorrhage of brain ([facility]) I61.9    History of epilepsy Z86.69    Disorientation R41.0    Altered mental status R41.82    Acute cystitis with hematuria N30.01    Migraine G43.909    Acute encephalopathy G93.40    Fever and chills R50.9    Parotitis K11.20    Depression with suicidal ideation F32.A, R45.851    Major depressive disorder, recurrent, severe with psychotic features ([facility]) F33.3    Acute intractable headache R51.9    Chronic idiopathic thrombocytopenic purpura (HCC) D69.3    Intractable headache, unspecified chronicity pattern, unspecified headache type R51.9    History of cerebral venous infarction Z86.73    Acute idiopathic thrombocytopenic purpura (HCC) D69.3    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

## 2024-06-13 NOTE — PLAN OF CARE
Problem: Discharge Planning  Goal: Discharge to home or other facility with appropriate resources  6/13/2024 0430 by Kaila Michelle RN  Outcome: Progressing  6/12/2024 1755 by Latha Singleton RN  Outcome: Progressing     Problem: Chronic Conditions and Co-morbidities  Goal: Patient's chronic conditions and co-morbidity symptoms are monitored and maintained or improved  6/13/2024 0430 by Kaila Michelle RN  Outcome: Progressing  6/12/2024 1755 by Latha Singleton RN  Outcome: Progressing     Problem: Safety - Adult  Goal: Free from fall injury  6/13/2024 0430 by Kaila Michelle RN  Outcome: Progressing  6/12/2024 1755 by Latha Singleton RN  Outcome: Progressing     Problem: Skin/Tissue Integrity  Goal: Absence of new skin breakdown  Description: 1.  Monitor for areas of redness and/or skin breakdown  2.  Assess vascular access sites hourly  3.  Every 4-6 hours minimum:  Change oxygen saturation probe site  4.  Every 4-6 hours:  If on nasal continuous positive airway pressure, respiratory therapy assess nares and determine need for appliance change or resting period.  6/13/2024 0430 by Kaila iMchelle RN  Outcome: Progressing  6/12/2024 1755 by Latha Singleton RN  Outcome: Progressing     Problem: Pain  Goal: Verbalizes/displays adequate comfort level or baseline comfort level  6/13/2024 0430 by Kaila Michelle RN  Outcome: Progressing  6/12/2024 1755 by Latha Singleton RN  Outcome: Progressing

## 2024-06-14 VITALS
WEIGHT: 187.7 LBS | DIASTOLIC BLOOD PRESSURE: 78 MMHG | SYSTOLIC BLOOD PRESSURE: 123 MMHG | TEMPERATURE: 98.1 F | RESPIRATION RATE: 12 BRPM | BODY MASS INDEX: 26.87 KG/M2 | OXYGEN SATURATION: 97 % | HEART RATE: 66 BPM | HEIGHT: 70 IN

## 2024-06-14 PROCEDURE — 97112 NEUROMUSCULAR REEDUCATION: CPT

## 2024-06-14 PROCEDURE — 97129 THER IVNTJ 1ST 15 MIN: CPT

## 2024-06-14 PROCEDURE — 6370000000 HC RX 637 (ALT 250 FOR IP): Performed by: PSYCHIATRY & NEUROLOGY

## 2024-06-14 PROCEDURE — 95714 VEEG EA 12-26 HR UNMNTR: CPT

## 2024-06-14 PROCEDURE — 6370000000 HC RX 637 (ALT 250 FOR IP)

## 2024-06-14 PROCEDURE — 97535 SELF CARE MNGMENT TRAINING: CPT

## 2024-06-14 PROCEDURE — 97168 OT RE-EVAL EST PLAN CARE: CPT

## 2024-06-14 PROCEDURE — 97110 THERAPEUTIC EXERCISES: CPT

## 2024-06-14 PROCEDURE — 92507 TX SP LANG VOICE COMM INDIV: CPT

## 2024-06-14 PROCEDURE — APPSS15 APP SPLIT SHARED TIME 0-15 MINUTES: Performed by: NURSE PRACTITIONER

## 2024-06-14 PROCEDURE — 2580000003 HC RX 258

## 2024-06-14 PROCEDURE — 2580000003 HC RX 258: Performed by: PSYCHIATRY & NEUROLOGY

## 2024-06-14 PROCEDURE — 95720 EEG PHY/QHP EA INCR W/VEEG: CPT | Performed by: PSYCHIATRY & NEUROLOGY

## 2024-06-14 PROCEDURE — 97530 THERAPEUTIC ACTIVITIES: CPT

## 2024-06-14 PROCEDURE — 6360000002 HC RX W HCPCS: Performed by: PSYCHIATRY & NEUROLOGY

## 2024-06-14 PROCEDURE — 6370000000 HC RX 637 (ALT 250 FOR IP): Performed by: REGISTERED NURSE

## 2024-06-14 PROCEDURE — 99232 SBSQ HOSP IP/OBS MODERATE 35: CPT | Performed by: INTERNAL MEDICINE

## 2024-06-14 RX ADMIN — TRAMADOL HYDROCHLORIDE 50 MG: 50 TABLET, COATED ORAL at 20:15

## 2024-06-14 RX ADMIN — MAGNESIUM GLUCONATE 500 MG ORAL TABLET 400 MG: 500 TABLET ORAL at 10:05

## 2024-06-14 RX ADMIN — PIPERACILLIN AND TAZOBACTAM 3375 MG: 3; .375 INJECTION, POWDER, LYOPHILIZED, FOR SOLUTION INTRAVENOUS at 03:23

## 2024-06-14 RX ADMIN — ACETAMINOPHEN 650 MG: 325 TABLET ORAL at 11:25

## 2024-06-14 RX ADMIN — BACLOFEN 5 MG: 5 TABLET ORAL at 10:10

## 2024-06-14 RX ADMIN — POLYETHYLENE GLYCOL 3350 17 G: 17 POWDER, FOR SOLUTION ORAL at 10:04

## 2024-06-14 RX ADMIN — BUTALBITAL, ACETAMINOPHEN, AND CAFFEINE 1 TABLET: 325; 50; 40 TABLET ORAL at 10:09

## 2024-06-14 RX ADMIN — TAMSULOSIN HYDROCHLORIDE 0.4 MG: 0.4 CAPSULE ORAL at 10:09

## 2024-06-14 RX ADMIN — TOPIRAMATE 100 MG: 50 TABLET, FILM COATED ORAL at 10:11

## 2024-06-14 RX ADMIN — TRAMADOL HYDROCHLORIDE 50 MG: 50 TABLET, COATED ORAL at 10:09

## 2024-06-14 RX ADMIN — GABAPENTIN 600 MG: 300 CAPSULE ORAL at 14:11

## 2024-06-14 RX ADMIN — GABAPENTIN 600 MG: 300 CAPSULE ORAL at 10:09

## 2024-06-14 RX ADMIN — LACOSAMIDE 100 MG: 100 TABLET, FILM COATED ORAL at 10:07

## 2024-06-14 RX ADMIN — VENLAFAXINE HYDROCHLORIDE 75 MG: 75 CAPSULE, EXTENDED RELEASE ORAL at 10:11

## 2024-06-14 RX ADMIN — DEXTROMETHORPHAN POLISTIREX 60 MG: 30 SUSPENSION ORAL at 10:05

## 2024-06-14 RX ADMIN — DICLOFENAC SODIUM 2 G: 10 GEL TOPICAL at 10:04

## 2024-06-14 RX ADMIN — SODIUM CHLORIDE, PRESERVATIVE FREE 10 ML: 5 INJECTION INTRAVENOUS at 10:04

## 2024-06-14 RX ADMIN — PREDNISONE 60 MG: 50 TABLET ORAL at 10:05

## 2024-06-14 RX ADMIN — FAMOTIDINE 20 MG: 20 TABLET, FILM COATED ORAL at 10:07

## 2024-06-14 ASSESSMENT — PAIN SCALES - GENERAL
PAINLEVEL_OUTOF10: 4
PAINLEVEL_OUTOF10: 3
PAINLEVEL_OUTOF10: 4
PAINLEVEL_OUTOF10: 8
PAINLEVEL_OUTOF10: 5

## 2024-06-14 ASSESSMENT — PAIN DESCRIPTION - LOCATION
LOCATION: HEAD

## 2024-06-14 ASSESSMENT — PAIN DESCRIPTION - DESCRIPTORS
DESCRIPTORS: ACHING

## 2024-06-14 ASSESSMENT — PAIN DESCRIPTION - ORIENTATION
ORIENTATION: RIGHT
ORIENTATION: RIGHT

## 2024-06-14 NOTE — PROGRESS NOTES
Nutrition Assessment     Type and Reason for Visit: Reassess    Nutrition Recommendations/Plan:   Continue regular; easy to chew diet. Encourage nutrient dense foods.      Malnutrition Assessment:  Malnutrition Status: No malnutrition    Nutrition Assessment:  Pt and spouse in room at time of visit. Eating very well during adm, 100% intake of all meals +snacks. Pt reports being very hungry. Discussed with RN. Noted steriod meds. Pt is edentulous. Tolerating easy to chew diet. Discussed nutrient dense foods vs calorie dense foods with pt and spouse. Weights reviewed.    Nutrition Related Findings:   Meds/labs reviewed Wound Type: Surgical Incision    Current Nutrition Therapies:    ADULT DIET; Easy to Chew    Anthropometric Measures:  Height: 177.8 cm (5' 10\")  Current Body Wt: 85.1 kg (187 lb 9.8 oz)   BMI: 26.9    Nutrition Diagnosis:   No nutrition diagnosis at this time     Nutrition Interventions:   Food and/or Nutrient Delivery: Continue Current Diet  Nutrition Education/Counseling: No recommendation at this time  Coordination of Nutrition Care: No recommendation at this time  Plan of Care discussed with: RN, pt, spouse    Nutrition Monitoring and Evaluation:   Behavioral-Environmental Outcomes: None Identified  Food/Nutrient Intake Outcomes: None Identified  Physical Signs/Symptoms Outcomes: None Identified    Discharge Planning:    No discharge needs at this time     Jaki Farley RD  Contact: [phone]

## 2024-06-14 NOTE — PROGRESS NOTES
without the administration of intravenous contrast. COMPARISON: CT head May 8, 2024 HISTORY: ORDERING SYSTEM PROVIDED HISTORY: altered mental status TECHNOLOGIST PROVIDED HISTORY: altered mental status Decision Support Exception - unselect if not a suspected or confirmed emergency medical condition->Emergency Medical Condition (MA) Reason for Exam: Altered mental status FINDINGS: INTRACRANIAL STRUCTURES/VENTRICLES: The patient is status post right frontal parietal temporal craniectomy.  There is residual vasogenic edema, scattered encephalomalacia and scattered chronic blood products in the subjacent right frontal parietal temporal lobes.  There is 2.2 x 2.7 cm fluid collection with associated restricted diffusion and peripheral susceptibility artifacts in the right frontal lobe, likely related to surgical cavity with subacute blood products.  There is additional 5 x 11 mm fluid collection with associated restricted diffusion in the anterior right frontal lobe along the anterior margin of the craniectomy, likely related to subacute parenchymal hematoma. There is mass effect in the right cerebral hemisphere with decreased sulcation.  No midline shift. There is mild T2/FLAIR hyperintensity in the periventricular and subcortical white matter, likely related to mild chronic microvascular disease.  There is no acute infarct.   There is no evidence of hydrocephalus. The sellar/suprasellar regions appear unremarkable. ORBITS: The visualized portion of the orbits demonstrate no acute abnormality. SINUSES: There is scattered minimal mucosal thickening in the paranasal sinuses.  There is mild right mastoid effusion. BONES/SOFT TISSUES: The bone marrow signal intensity appears normal. The soft tissues demonstrate no acute abnormality.     Status post right frontal parietal temporal craniectomy. Residual vasogenic edema, scattered encephalomalacia and scattered chronic blood products in the subjacent right frontal parietal  changes from right-sided craniectomy with dural thickening.  No evidence of acute infarct.  Extensive encephalomalacia within the right cerebral hemisphere is unchanged.  No new intracranial hemorrhage.  No midline shift.  No hydrocephalus. ORBITS: The visualized portion of the orbits demonstrate no acute abnormality. SINUSES: The visualized paranasal sinuses and mastoid air cells demonstrate no acute abnormality. SOFT TISSUES/SKULL:  No acute abnormality.     Stable postsurgical and chronic changes without acute abnormality.     CT CERVICAL SPINE WO CONTRAST    Result Date: 5/2/2024  EXAMINATION: CT OF THE CERVICAL SPINE WITHOUT CONTRAST 5/2/2024 5:02 pm TECHNIQUE: CT of the cervical spine was performed without the administration of intravenous contrast. Multiplanar reformatted images are provided for review. Automated exposure control, iterative reconstruction, and/or weight based adjustment of the mA/kV was utilized to reduce the radiation dose to as low as reasonably achievable. COMPARISON: None. HISTORY: ORDERING SYSTEM PROVIDED HISTORY: fall TECHNOLOGIST PROVIDED HISTORY: fall Decision Support Exception - unselect if not a suspected or confirmed emergency medical condition->Emergency Medical Condition (MA) Reason for Exam: Headache; Fall Relevant Medical/Surgical History: crainiotomy FINDINGS: BONES/ALIGNMENT: There is no acute fracture or traumatic malalignment. DEGENERATIVE CHANGES: Mild degenerative changes. SOFT TISSUES: There is no prevertebral soft tissue swelling.     No evidence for acute fracture or subluxation. Mild degenerative changes.        Impression:   Primary Problem  Absence of bone of skull    Active Hospital Problems    Diagnosis Date Noted    Epidural hematoma (HCC) [S06.4XAA] 06/07/2024    Acute respiratory failure with hypoxia (HCC) [J96.01] 06/07/2024    Brain bleed (HCC) [I61.9] 06/07/2024    Immune thrombocytopenia (HCC) [D69.3] 06/05/2024    Absence of bone of skull [Q75.8]

## 2024-06-14 NOTE — PROGRESS NOTES
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 (HCC) I69.152    Pseudobulbar affect F48.2    Headache with neurologic deficit R51.9, R29.818    Intracerebral hematoma (HCC) S06.360A    Thrombocytopenia ([facility]) D69.6    Anemia D64.9    Chronic ITP (idiopathic thrombocytopenia) (HCC) D69.3    Seizures ([facility]) R56.9    Dural venous sinus thrombosis G08    Chronic deep vein thrombosis (DVT) of proximal vein of right lower extremity ([facility]) I82.5Y1    Chronic deep vein thrombosis (DVT) of right iliac vein ([facility]) I82.521    Chronic deep vein thrombosis (DVT) of femoral vein of right lower extremity ([facility]) I82.511    Chronic venous insufficiency of lower extremity I87.2    Hemosiderin pigmentation of skin L81.8    Right leg swelling M79.89    Dysphagia R13.10    Transient neurological symptoms R29.818    Intraparenchymal hemorrhage of brain ([facility]) I61.9    History of epilepsy Z86.69    Disorientation R41.0    Altered mental status R41.82    Acute cystitis with hematuria N30.01    Migraine G43.909    Acute encephalopathy G93.40    Fever and chills R50.9    Parotitis K11.20    Depression with suicidal ideation F32.A, R45.851    Major depressive disorder, recurrent, severe with psychotic features ([facility]) F33.3    Acute intractable headache R51.9    Chronic idiopathic thrombocytopenic purpura ([facility]) D69.3    Intractable headache, unspecified chronicity pattern, unspecified headache type R51.9    History of cerebral venous infarction Z86.73    Acute idiopathic thrombocytopenic purpura (HCC) D69.3    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

## 2024-06-14 NOTE — PROGRESS NOTES
Occupational Therapy  Facility/Department: [address] STEPDOWN  Occupational Therapy Re-Assessment    Name: Brennen Mcclure  : 1965  MRN: [id number]  Date of Service: 2024    Discharge Recommendations:  Patient would benefit from continued therapy after discharge.Further therapy recommended at discharge.The patient should be able to tolerate at least 3 hours of therapy per day over 5 days or 15 hours over 7 days.   This patient may benefit from a Physical Medicine and Rehab consult.     OT Equipment Recommendations  Other: unsafe to return to home environment at this time.     Patient Diagnosis(es): The primary encounter diagnosis was Brain bleed (HCC). A diagnosis of Headache with neurologic deficit was also pertinent to this visit.  Past Medical History:  has a past medical history of Anemia, CAD (coronary artery disease), Chronic deep vein thrombosis (DVT) of both lower extremities (HCC), Chronic deep vein thrombosis (DVT) of femoral vein of right lower extremity (HCC), Chronic deep vein thrombosis (DVT) of proximal vein of right lower extremity (HCC), Chronic deep vein thrombosis (DVT) of right iliac vein (HCC), Chronic idiopathic thrombocytopenia (HCC), Chronic idiopathic thrombocytopenic purpura (HCC), CVA (cerebral vascular accident) (HCC), Emphysema lung (HCC), Hemosiderin pigmentation of skin, HLD (hyperlipidemia), Intracranial hemorrhage (HCC), Left hemiplegia (HCC), Right leg swelling, and Thrombocytopenia (HCC).  Past Surgical History:  has a past surgical history that includes Coronary artery bypass graft; Coronary stent placement; craniotomy (Right); CT BIOPSY BONE MARROW (2024); Cranioplasty (Right, 2024); craniotomy (Right, 2024); and craniotomy (Right, 2024).    Assessment   Performance deficits / Impairments: Decreased functional mobility ;Decreased safe awareness;Decreased balance;Decreased coordination;Decreased ADL status;Decreased cognition;Decreased ROM;Decreased  Orthoses?: No  Position Activity Restriction  Other position/activity restrictions: up with assistance, hx CVA with R side craniectomy. 6/4-cranioplasty. Keep SBP between 100-150. Elevate 30 Degrees. Keep off of wound.LTME    Subjective   General  Patient assessed for rehabilitation services?: Yes  Response to previous treatment: Patient with no complaints from previous session  Family / Caregiver Present: No  Subjective  Subjective: RN ok'd patient for OT/PT treatment this morning. Pt supine in bed upon CHO/PTA arrival. Pt agreeable to session and report 8/10 head and said \"ouch\" when LUE mobilized. Pt positioned for best comfort at end of session  General Comment  Comments: RN ok'd patient for OT session. Pt pleasant, cooperative and agreeable. Pt reports 8/10 pain in the L shoulder with PROM, improvement with rest. Pt noted to have tone in the flexors and slightly winged scapula.     Social/Functional History  Social/Functional History  Lives With: Spouse  Type of Home: House  Home Layout: One level  Home Access: Ramped entrance (one external ramp and one internal ramp)  Bathroom Shower/Tub: Walk-in shower, Curtain  Bathroom Toilet: Standard  Bathroom Equipment: Hand-held shower, Grab bars around toilet (grab bars not installed for shower yet- currently using scotty [employer])  Home Equipment: Hospital bed (Scotty stedy, transport chair)  Has the patient had two or more falls in the past year or any fall with injury in the past year?: Yes (2x with therapy at outside facility, 1x at home)  Receives Help From: Outpatient therapy (PT, OT, SLP)  ADL Assistance: Needs assistance  Toileting: Needs assistance  Homemaking Assistance: Needs assistance  Homemaking Responsibilities: No  Ambulation Assistance: Needs assistance  Transfer Assistance: Needs assistance  Active : No  Patient's  Info: Wife  Mode of Transportation: SUV  Occupation: Unemployed  Type of Occupation:   Leisure & Hobbies: 3 [place]

## 2024-06-14 NOTE — PROGRESS NOTES
Physical Therapy  Facility/Department: [address] STEPDOWN  Physical Therapy Daily Treatment Note    Name: Brennen Mcclure  : 1965  MRN: [id number]  Date of Service: 2024    Discharge Recommendations:  Patient would benefit from continued therapy after discharge Further therapy recommended at discharge.The patient should be able to tolerate at least 3 hours of therapy per day over 5 days or 15 hours over 7 days.   This patient may benefit from a Physical Medicine and Rehab consult.    PT Equipment Recommendations  Equipment Needed: No (defer to rehab facility.)      Patient Diagnosis(es): The primary encounter diagnosis was Brain bleed (HCC). A diagnosis of Headache with neurologic deficit was also pertinent to this visit.  Past Medical History:  has a past medical history of Anemia, CAD (coronary artery disease), Chronic deep vein thrombosis (DVT) of both lower extremities (HCC), Chronic deep vein thrombosis (DVT) of femoral vein of right lower extremity (HCC), Chronic deep vein thrombosis (DVT) of proximal vein of right lower extremity (HCC), Chronic deep vein thrombosis (DVT) of right iliac vein (HCC), Chronic idiopathic thrombocytopenia (HCC), Chronic idiopathic thrombocytopenic purpura (HCC), CVA (cerebral vascular accident) (HCC), Emphysema lung (HCC), Hemosiderin pigmentation of skin, HLD (hyperlipidemia), Intracranial hemorrhage (HCC), Left hemiplegia (HCC), Right leg swelling, and Thrombocytopenia (HCC).  Past Surgical History:  has a past surgical history that includes Coronary artery bypass graft; Coronary stent placement; craniotomy (Right); CT BIOPSY BONE MARROW (2024); Cranioplasty (Right, 2024); craniotomy (Right, 2024); and craniotomy (Right, 2024).    Assessment   Body Structures, Functions, Activity Limitations Requiring Skilled Therapeutic Intervention: Decreased functional mobility ;Decreased endurance;Decreased cognition;Decreased coordination;Decreased safe  Treatment: Patient with no complaints from previous session.  Family / Caregiver Present: Yes (Wife entered ~10 mins prior to exiting.)  General Comment  Comments: Pt positioned in supine. L LE heel offloaded. Call light given.  Bed alarm set. all 4 bed rails d/t seizure precautions.  Subjective  Subjective: Pt and RN agreeable to PT. Pt resting in bed upon arrival, Pt having  L shoulder 8/10 pain with ROM from OT but no pain with rest. Pt pleasant and cooperative t/o.    Cognition   Orientation  Overall Orientation Status: Within Functional Limits  Orientation Level: Oriented X4  Cognition  Overall Cognitive Status: Exceptions  Arousal/Alertness: Appropriate responses to stimuli  Following Commands: Follows one step commands with repetition;Follows one step commands with increased time  Attention Span: Attends with cues to redirect;Difficulty attending to directions  Memory: Decreased recall of precautions  Safety Judgement: Decreased awareness of need for assistance;Decreased awareness of need for safety  Problem Solving: Assistance required to identify errors made;Assistance required to correct errors made;Decreased awareness of errors  Insights: Decreased awareness of deficits  Initiation: Requires cues for some  Sequencing: Requires cues for some  Cognition Comment: Pt demo increased alertness today. Pt became tearful with screaming x 2, but able to redirect with conversation.     Objective   Bed mobility  Supine to Sit: Moderate assistance;2 Person assistance (HOB elevated. Pt using R UE to use R bed rail and used R LE to assist with L LE)  Sit to Supine: Moderate assistance;2 Person assistance (HOB flat.  Pt on R elbow to progress to supine)  Scooting: Maximal assistance  Bed Mobility Comments: Pt required modA (staticly) -maxA (dynamically) to maintain sitting at EOB.  Transfers  Sit to Stand: Maximum Assistance;2 Person Assistance  Stand to Sit: Maximum Assistance;2 Person Assistance  Comment: Support

## 2024-06-14 NOTE — CARE COORDINATION
1259 Spoke with Oswaldo Knight. States the patient is ready for discharge, questioned if the rehab facility held the patients bed? Writer to contact [facility].    1302 Called Sheila with [facility]. Message left questioning if the facility can take the patient today.    1431 Spoke with Sheila from [facility]. States the bed was held, patient can come today. Request call once transportation is scheduled.    1444 Faxed [facility] transportation request.    1635 Called [facility] and spoke with Maty, States transportation has been scheduled for a 2000 pickup.     1642 Sent a [employer] message to  Oswaldo Knight informing her the patient will be picked up at 2000 and transferred to the [facility].

## 2024-06-14 NOTE — PROGRESS NOTES
Neurosurgery NANNETTE/Resident    Daily Progress Note   CC:No chief complaint on file.    6/14/2024  7:46 AM    Chart reviewed.  No acute events overnight.  No new complaints. Remains on LTME, no reports  in EHR yet, appears back to his baseline     Vitals:    06/13/24 1635 06/13/24 1938 06/13/24 2353 06/14/24 0323   BP:  110/70 114/74 128/83   Pulse:  73 61 69   Resp: 15 16 13 13   Temp:  97.9 °F (36.6 °C) 98.2 °F (36.8 °C) 98.5 °F (36.9 °C)   TempSrc:  Oral Axillary Axillary   SpO2:  96% 94% 99%   Weight:       Height:           PE:   Awake alert oriented opens eyes briskly  Following commands with RUE and RLE   Stable left hemiplegic      Sensation: intact     Incision: CDI       Lab Results   Component Value Date    WBC 10.0 06/12/2024    HGB 10.1 (L) 06/12/2024    HCT 32.5 (L) 06/12/2024     06/12/2024    CHOL 118 05/16/2024    TRIG 182 (H) 05/16/2024    HDL 28 (L) 05/16/2024    ALT 28 05/08/2024    AST 29 05/08/2024     06/12/2024    K 4.6 06/12/2024     06/12/2024    CREATININE 0.7 06/12/2024    BUN 25 (H) 06/12/2024    CO2 21 06/12/2024    TSH 0.78 06/13/2024    INR 1.1 06/05/2024    LABA1C 5.2 05/16/2024    CRP 33.1 (H) 05/12/2024       Radiology   CT HEAD WO CONTRAST    Result Date: 6/13/2024  EXAMINATION: CT OF THE HEAD WITHOUT CONTRAST  6/13/2024 2:11 pm TECHNIQUE: CT of the head was performed without the administration of intravenous contrast. Automated exposure control, iterative reconstruction, and/or weight based adjustment of the mA/kV was utilized to reduce the radiation dose to as low as reasonably achievable. COMPARISON: 06/11/2024 HISTORY: ORDERING SYSTEM PROVIDED HISTORY: AMS TECHNOLOGIST PROVIDED HISTORY: AMS Reason for Exam: ams FINDINGS: BRAIN/VENTRICLES: Status post right hemispheric cranioplasty.  8 mm thick extra-axial hematoma deep to the cranioplasty is unchanged.  Trace intraventricular and subarachnoid hemorrhage is also unchanged.  There is no herniation or  hydrocephalus.  Ventricular size is stable.  Edema or encephalomalacia in the right cerebral hemisphere is unchanged. ORBITS: The visualized portion of the orbits demonstrate no acute abnormality. SINUSES: The visualized paranasal sinuses and mastoid air cells demonstrate no acute abnormality. SOFT TISSUES/SKULL:  No acute abnormality of the visualized skull or soft tissues.     Unchanged intracranial hemorrhage status post cranioplasty.       A/P  59 y.o. male who presents with skull defect    POD#10  s/p right cranioplasty  6/6 - right epidural hematoma and IPH.  POD#8  s/p emergent right craniotomy for hematoma evacuation.      - hematology following   - activity as tolerated, PT and OT  - hold all anticoagulation and antiplatelet medications   - pain control  - DNR CCA and DNI   - discharge planning- IPR plan is for discharge today to [facility]  - on LTME at this time to rule out subclinical seizures     Please contact neurosurgery with any changes in patients neurologic status.       Oswaldo Knight, CNP  6/14/24  7:46 AM

## 2024-06-17 NOTE — DISCHARGE SUMMARY
Department of Neurosurgery                                            Discharge Summary       PATIENT NAME: Brennen Mcclure  YOB: 1965  MEDICAL RECORD NO. 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 ([facility]) I69.152    Pseudobulbar affect F48.2    Headache with neurologic deficit R51.9, R29.818    Intracerebral hematoma (HCC) S06.360A    Thrombocytopenia ([facility]) D69.6    Anemia D64.9    Chronic ITP (idiopathic thrombocytopenia) ([facility]) D69.3    Seizures ([facility]) R56.9    Dural venous sinus thrombosis G08    Chronic deep vein thrombosis (DVT) of proximal vein of right lower extremity ([facility]) I82.5Y1    Chronic deep vein thrombosis (DVT) of right iliac vein ([facility]) I82.521    Chronic deep vein thrombosis (DVT) of femoral vein of right lower extremity ([facility]) I82.511    Chronic venous insufficiency of lower extremity I87.2    Hemosiderin pigmentation of skin L81.8    Right leg swelling M79.89    Dysphagia R13.10    Transient neurological symptoms R29.818    Intraparenchymal hemorrhage of brain ([facility]) I61.9    History of epilepsy Z86.69    Disorientation R41.0    Altered mental status R41.82    Acute cystitis with hematuria N30.01    Migraine G43.909    Acute encephalopathy G93.40    Fever and chills R50.9    Parotitis K11.20    Depression with suicidal ideation F32.A, R45.851    Major depressive disorder, recurrent, severe with psychotic features ([facility]) F33.3    Acute intractable headache R51.9    Chronic idiopathic thrombocytopenic purpura ([facility]) D69.3    Intractable headache, unspecified chronicity pattern, unspecified headache type R51.9    History of cerebral venous infarction Z86.73    Acute idiopathic thrombocytopenic purpura ([facility]) D69.3    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

## 2024-06-21 ENCOUNTER — TELEPHONE (OUTPATIENT)
Dept: INFUSION THERAPY | Age: 59
End: 2024-06-21

## 2024-06-21 ENCOUNTER — OFFICE VISIT (OUTPATIENT)
Dept: NEUROSURGERY | Age: 59
End: 2024-06-21

## 2024-06-21 VITALS
HEART RATE: 76 BPM | DIASTOLIC BLOOD PRESSURE: 75 MMHG | BODY MASS INDEX: 26.93 KG/M2 | HEIGHT: 70 IN | TEMPERATURE: 98.6 F | SYSTOLIC BLOOD PRESSURE: 112 MMHG

## 2024-06-21 DIAGNOSIS — Z86.79 S/P SUBDURAL HEMATOMA EVACUATION: ICD-10-CM

## 2024-06-21 DIAGNOSIS — Z98.890 S/P SUBDURAL HEMATOMA EVACUATION: ICD-10-CM

## 2024-06-21 DIAGNOSIS — Z98.890 S/P CRANIOTOMY: Primary | ICD-10-CM

## 2024-06-21 DIAGNOSIS — M95.2 ACQUIRED SKULL DEFECT: ICD-10-CM

## 2024-06-21 DIAGNOSIS — G81.14 LEFT SPASTIC HEMIPLEGIA (HCC): ICD-10-CM

## 2024-06-21 PROCEDURE — 99024 POSTOP FOLLOW-UP VISIT: CPT

## 2024-06-21 NOTE — TELEPHONE ENCOUNTER
Pt's wife, Mara, called stating [facility] called them and said the [employer] is now $14,000. She is not sure what to do. Email sent to Carmen, Specialty Pharmacy Liaison, asking for assistance.

## 2024-06-25 ENCOUNTER — CLINICAL DOCUMENTATION (OUTPATIENT)
Dept: PHYSICAL MEDICINE AND REHAB | Age: 59
End: 2024-06-25

## 2024-06-25 NOTE — PROGRESS NOTES
I spoke with Mara, pt's wife, to see if she received my message [facility] since I was not able to reach by phone.  She did receive the message.  She states that she has been in contact with [facility] rehab to place order for Brennen to get transferred.   If she would like to keep in touch with where the process is from our end of things to get him transferred, I gave her the number to the inpt rehab unit and to ask for Sarika, admission coordinator, Mukul is working with the insurance to get approval for the transfer to happen.    Mara was very thankful and hoping this all works out for Brennen because, she states, that he is so miserable at the other facility

## 2024-06-26 NOTE — CARE COORDINATION
ACUTE INPATIENT REHABILITATION  [facility]  PRE-ADMISSION ASSESSMENT    Patient Name: Brennen Mcclure        MRN: [id number]    : 1965  (59 y.o.)  Gender: male   Ethnicity: Not , /a or Upper sorbian Origin  Race: White    Admitted from:  Other Facility: [facility]      Type of Admission:  New Admission     Date of Onset / Admission to the Acute Hospital:  2024     Inpatient Rehabilitation Admitting Diagnosis:  Right epidural hematoma and right temporal hematoma s/p craniotomy for evacuation, cranial defect s/p cranioplasty     Did patient have surgery/procedures?  Yes, As Listed Below   2024: EEG  2024: EEG  2024: Subdural Drain Removal    2024: MBSS   2024: EMERGENCY CRANIOTOMY FOR EVACUATION OF EPIDURAL HEMATOMA  EVACUATION OF INTRACEREBRAL HEMATOMA  Ultrasonography preoperative  2024: Cranioplasty with synthetic custom peek implant right side     Physicians:   Tess Cifuentes MD  Physician  Hematology Oncology    Les Armstrong MD  Physician  Hematology Oncology    Madison Ware MD  Physician  Hematology Oncology    Jovan Thomas MD  Physician  Hematology Oncology    José Luis Vazquez MD   Physician  Neuro-critical Care    Madison Barton MD  Physician  Neurology    Gloria Corbett DO   Physician  Neurology    Cami Almanza DO  Physician  Neurology    Gloria Corbett,   Physician  Neurosurgery    Asa Valencia MD   Physician  Neurosurgery    Risk for Clinical Complications:  High     Co-morbidities:    Right epidural hematoma and right temporal hematoma s/p craniotomy for evacuation on   Cranial defect s/p cranioplasty on   Aspiration pneumonia  Anemia  Hyponatremia  Chronic thrombocytopenia  History of CVA s/p craniectomy  Seizures  CAD  HLD  History of DVT    Financial Information  Primary insurance: Medicaid     Secondary Insurance: None     Precautions:   []Cardiac Precautions: No Cardiac Precautions  []Total hip precautions:

## 2024-06-26 NOTE — CARE COORDINATION
ACUTE INPATIENT REHABILITATION  Financial Disclosure Statement: Eligibility and Benefit Verification    Patient Name: Brennen Mcclure MRN: [id number]     Disclosure Statement  [facility] Acute Inpatient Rehabilitation at [facility] provides 24 hour individualized service to patients with functional limitations due to, but not limited to stroke, brain injury, spinal cord injury, major multiple trauma, hip fracture, amputation, and neurological disorders.  Acute Inpatient Rehabilitation provides rehabilitative nursing, physician coverage, as well as physical therapy, occupational therapy, speech therapy, recreational therapy and other services as deemed necessary by our Board Certified Physical Medicine and Rehabilitation Physicians, Dr. Beck Mason and Dr. Danica De La Paz and Dr. Dipika Angulo.    [facility] Acute Inpatient Rehabilitation at [facility] is fully accredited by the Commission on Accreditation of Rehabilitation Facilities (CARF) and The Joint Commission (TJC).    At a minimum, you will receive 5 days of therapy services totaling at least 15 hours per week. Your treatment program will consist of physical therapy at least 7.5 hours per week; occupational therapy 7.5 hours per week; and speech therapy 1.5 hours per week, as applicable.    Your estimated length of stay is currently:  Approximately 2 Weeks  Please Note: Estimated length of stay is based on individual condition and Acute Inpatient Rehabilitation specific needs. Length of stay may vary based upon interdisciplinary team assessment, insurance approval, and patient progression.    Your insurance coverage has been verified as follows:   Primary Insurance: Medicaid - [place]                      Member/Subscriber ID: [id number]  Coverage: Per [place] Medicaid Guidelines, Per Medicaid Benefit Period      Secondary Insurance: None  Secondary insurance policies often cover co-payments amounts.  Please contact your insurance company to

## 2024-06-27 ENCOUNTER — HOSPITAL ENCOUNTER (INPATIENT)
Age: 59
LOS: 18 days | Discharge: HOSPICE | DRG: 058 | End: 2024-07-15
Attending: PHYSICAL MEDICINE & REHABILITATION | Admitting: PHYSICAL MEDICINE & REHABILITATION
Payer: MEDICAID

## 2024-06-27 DIAGNOSIS — R51.9 CHRONIC DAILY HEADACHE: Primary | ICD-10-CM

## 2024-06-27 PROBLEM — I69.154 HEMIPLEGIA AND HEMIPARESIS FOLLOWING NONTRAUMATIC INTRACEREBRAL HEMORRHAGE AFFECTING LEFT NON-DOMINANT SIDE (HCC): Status: ACTIVE | Noted: 2024-06-27

## 2024-06-27 PROCEDURE — 1180000000 HC REHAB R&B

## 2024-06-27 PROCEDURE — 6370000000 HC RX 637 (ALT 250 FOR IP): Performed by: PHYSICAL MEDICINE & REHABILITATION

## 2024-06-27 RX ORDER — BUTALBITAL, ACETAMINOPHEN AND CAFFEINE 300; 40; 50 MG/1; MG/1; MG/1
1 CAPSULE ORAL 2 TIMES DAILY
Status: DISCONTINUED | OUTPATIENT
Start: 2024-06-27 | End: 2024-07-06

## 2024-06-27 RX ORDER — CALCIUM CARBONATE 500 MG/1
500 TABLET, CHEWABLE ORAL EVERY 6 HOURS PRN
Status: DISCONTINUED | OUTPATIENT
Start: 2024-06-27 | End: 2024-07-15 | Stop reason: HOSPADM

## 2024-06-27 RX ORDER — IPRATROPIUM BROMIDE AND ALBUTEROL SULFATE 2.5; .5 MG/3ML; MG/3ML
1 SOLUTION RESPIRATORY (INHALATION) EVERY 4 HOURS PRN
Status: DISCONTINUED | OUTPATIENT
Start: 2024-06-27 | End: 2024-07-15 | Stop reason: HOSPADM

## 2024-06-27 RX ORDER — SENNOSIDES A AND B 8.6 MG/1
2 TABLET, FILM COATED ORAL PRN
Status: DISCONTINUED | OUTPATIENT
Start: 2024-06-27 | End: 2024-06-27 | Stop reason: SDUPTHER

## 2024-06-27 RX ORDER — LACOSAMIDE 100 MG/1
100 TABLET ORAL 2 TIMES DAILY
Status: DISCONTINUED | OUTPATIENT
Start: 2024-06-27 | End: 2024-07-15 | Stop reason: HOSPADM

## 2024-06-27 RX ORDER — TOPIRAMATE 100 MG/1
100 TABLET, FILM COATED ORAL 2 TIMES DAILY
Status: DISCONTINUED | OUTPATIENT
Start: 2024-06-27 | End: 2024-07-15 | Stop reason: HOSPADM

## 2024-06-27 RX ORDER — AMITRIPTYLINE HYDROCHLORIDE 75 MG/1
75 TABLET ORAL NIGHTLY
Status: DISCONTINUED | OUTPATIENT
Start: 2024-06-27 | End: 2024-07-05

## 2024-06-27 RX ORDER — GABAPENTIN 300 MG/1
600 CAPSULE ORAL 3 TIMES DAILY
Status: DISCONTINUED | OUTPATIENT
Start: 2024-06-27 | End: 2024-07-15 | Stop reason: HOSPADM

## 2024-06-27 RX ORDER — BISACODYL 10 MG
10 SUPPOSITORY, RECTAL RECTAL DAILY PRN
Status: DISCONTINUED | OUTPATIENT
Start: 2024-06-27 | End: 2024-06-27 | Stop reason: SDUPTHER

## 2024-06-27 RX ORDER — ACETAMINOPHEN 325 MG/1
650 TABLET ORAL EVERY 4 HOURS PRN
Status: DISCONTINUED | OUTPATIENT
Start: 2024-06-27 | End: 2024-06-27 | Stop reason: SDUPTHER

## 2024-06-27 RX ORDER — DOCUSATE SODIUM 100 MG/1
100 CAPSULE, LIQUID FILLED ORAL 2 TIMES DAILY
Status: DISCONTINUED | OUTPATIENT
Start: 2024-06-27 | End: 2024-07-15 | Stop reason: HOSPADM

## 2024-06-27 RX ORDER — PREDNISONE 20 MG/1
40 TABLET ORAL DAILY
Status: COMPLETED | OUTPATIENT
Start: 2024-06-28 | End: 2024-07-04

## 2024-06-27 RX ORDER — TAMSULOSIN HYDROCHLORIDE 0.4 MG/1
0.4 CAPSULE ORAL DAILY
Status: DISCONTINUED | OUTPATIENT
Start: 2024-06-28 | End: 2024-07-15 | Stop reason: HOSPADM

## 2024-06-27 RX ORDER — TRAMADOL HYDROCHLORIDE 50 MG/1
50 TABLET ORAL EVERY 6 HOURS PRN
Status: DISCONTINUED | OUTPATIENT
Start: 2024-06-27 | End: 2024-07-06

## 2024-06-27 RX ORDER — CITALOPRAM HYDROBROMIDE 10 MG/1
10 TABLET ORAL DAILY
Status: DISCONTINUED | OUTPATIENT
Start: 2024-06-28 | End: 2024-06-30

## 2024-06-27 RX ORDER — POLYETHYLENE GLYCOL 3350 17 G/17G
17 POWDER, FOR SOLUTION ORAL DAILY PRN
Status: DISCONTINUED | OUTPATIENT
Start: 2024-06-27 | End: 2024-06-27 | Stop reason: SDUPTHER

## 2024-06-27 RX ORDER — VENLAFAXINE HYDROCHLORIDE 75 MG/1
75 CAPSULE, EXTENDED RELEASE ORAL
Status: DISCONTINUED | OUTPATIENT
Start: 2024-06-28 | End: 2024-07-15 | Stop reason: HOSPADM

## 2024-06-27 RX ORDER — ONDANSETRON 4 MG/1
4 TABLET, FILM COATED ORAL EVERY 8 HOURS PRN
Status: DISCONTINUED | OUTPATIENT
Start: 2024-06-27 | End: 2024-07-05

## 2024-06-27 RX ORDER — FAMOTIDINE 20 MG/1
20 TABLET, FILM COATED ORAL 2 TIMES DAILY
Status: DISCONTINUED | OUTPATIENT
Start: 2024-06-27 | End: 2024-07-15 | Stop reason: HOSPADM

## 2024-06-27 RX ORDER — LANOLIN ALCOHOL/MO/W.PET/CERES
6 CREAM (GRAM) TOPICAL NIGHTLY PRN
Status: DISCONTINUED | OUTPATIENT
Start: 2024-06-27 | End: 2024-07-15 | Stop reason: HOSPADM

## 2024-06-27 RX ORDER — SENNOSIDES A AND B 8.6 MG/1
2 TABLET, FILM COATED ORAL DAILY PRN
Status: DISCONTINUED | OUTPATIENT
Start: 2024-06-27 | End: 2024-06-27

## 2024-06-27 RX ORDER — LANOLIN ALCOHOL/MO/W.PET/CERES
400 CREAM (GRAM) TOPICAL DAILY
Status: DISCONTINUED | OUTPATIENT
Start: 2024-06-28 | End: 2024-07-15 | Stop reason: HOSPADM

## 2024-06-27 RX ORDER — ACETAMINOPHEN 325 MG/1
650 TABLET ORAL EVERY 4 HOURS PRN
Status: DISCONTINUED | OUTPATIENT
Start: 2024-06-27 | End: 2024-06-27

## 2024-06-27 RX ORDER — BACLOFEN 10 MG/1
5 TABLET ORAL 2 TIMES DAILY
Status: DISCONTINUED | OUTPATIENT
Start: 2024-06-27 | End: 2024-07-06

## 2024-06-27 RX ORDER — POLYETHYLENE GLYCOL 3350 17 G/17G
17 POWDER, FOR SOLUTION ORAL DAILY
Status: DISCONTINUED | OUTPATIENT
Start: 2024-06-27 | End: 2024-07-15 | Stop reason: HOSPADM

## 2024-06-27 RX ORDER — BISACODYL 10 MG
10 SUPPOSITORY, RECTAL RECTAL DAILY PRN
Status: DISCONTINUED | OUTPATIENT
Start: 2024-06-27 | End: 2024-06-27

## 2024-06-27 RX ADMIN — GABAPENTIN 600 MG: 300 CAPSULE ORAL at 20:36

## 2024-06-27 RX ADMIN — BACLOFEN 5 MG: 10 TABLET ORAL at 20:36

## 2024-06-27 RX ADMIN — DICLOFENAC SODIUM 2 G: 10 GEL TOPICAL at 20:36

## 2024-06-27 RX ADMIN — TOPIRAMATE 100 MG: 100 TABLET, FILM COATED ORAL at 20:36

## 2024-06-27 RX ADMIN — AMITRIPTYLINE HYDROCHLORIDE 75 MG: 75 TABLET, FILM COATED ORAL at 20:36

## 2024-06-27 RX ADMIN — BUTALBITA,ACETAMINOPHEN AND CAFFEINE 1 CAPSULE: 50; 300; 40 CAPSULE ORAL at 20:36

## 2024-06-27 RX ADMIN — DOCUSATE SODIUM 100 MG: 100 CAPSULE, LIQUID FILLED ORAL at 20:36

## 2024-06-27 RX ADMIN — FAMOTIDINE 20 MG: 20 TABLET, FILM COATED ORAL at 20:36

## 2024-06-27 RX ADMIN — LACOSAMIDE 100 MG: 100 TABLET, FILM COATED ORAL at 20:36

## 2024-06-27 ASSESSMENT — PAIN SCALES - GENERAL: PAINLEVEL_OUTOF10: 0

## 2024-06-28 PROBLEM — L89.151 PRESSURE INJURY OF SACRAL REGION, STAGE 1: Status: ACTIVE | Noted: 2024-06-28

## 2024-06-28 PROBLEM — E43 SEVERE MALNUTRITION (HCC): Status: ACTIVE | Noted: 2024-06-28

## 2024-06-28 LAB
ALBUMIN SERPL-MCNC: 3.8 G/DL (ref 3.5–5.2)
ALP SERPL-CCNC: 90 U/L (ref 40–129)
ALT SERPL-CCNC: 44 U/L (ref 5–41)
ANION GAP SERPL CALCULATED.3IONS-SCNC: 12 MMOL/L (ref 9–17)
AST SERPL-CCNC: 23 U/L
BASOPHILS # BLD: 0.07 K/UL (ref 0–0.2)
BASOPHILS NFR BLD: 1 % (ref 0–2)
BILIRUB DIRECT SERPL-MCNC: <0.1 MG/DL
BILIRUB INDIRECT SERPL-MCNC: ABNORMAL MG/DL (ref 0–1)
BILIRUB SERPL-MCNC: <0.2 MG/DL (ref 0.3–1.2)
BUN SERPL-MCNC: 33 MG/DL (ref 8–23)
CALCIUM SERPL-MCNC: 9.7 MG/DL (ref 8.6–10.4)
CHLORIDE SERPL-SCNC: 106 MMOL/L (ref 98–107)
CO2 SERPL-SCNC: 25 MMOL/L (ref 20–31)
CREAT SERPL-MCNC: 0.8 MG/DL (ref 0.7–1.2)
EOSINOPHIL # BLD: 0.35 K/UL (ref 0–0.4)
EOSINOPHILS RELATIVE PERCENT: 5 % (ref 0–4)
ERYTHROCYTE [DISTWIDTH] IN BLOOD BY AUTOMATED COUNT: 20 % (ref 11.5–14.9)
GFR, ESTIMATED: >90 ML/MIN/1.73M2
GLUCOSE SERPL-MCNC: 120 MG/DL (ref 70–99)
HCT VFR BLD AUTO: 39.5 % (ref 41–53)
HGB BLD-MCNC: 12.6 G/DL (ref 13.5–17.5)
LYMPHOCYTES NFR BLD: 1.73 K/UL (ref 1–4.8)
LYMPHOCYTES RELATIVE PERCENT: 25 % (ref 24–44)
MCH RBC QN AUTO: 29.7 PG (ref 26–34)
MCHC RBC AUTO-ENTMCNC: 31.9 G/DL (ref 31–37)
MCV RBC AUTO: 93.1 FL (ref 80–100)
MONOCYTES NFR BLD: 0.28 K/UL (ref 0.1–1.3)
MONOCYTES NFR BLD: 4 % (ref 1–7)
MORPHOLOGY: ABNORMAL
NEUTROPHILS NFR BLD: 65 % (ref 36–66)
NEUTS SEG NFR BLD: 4.47 K/UL (ref 1.3–9.1)
PLATELET # BLD AUTO: 111 K/UL (ref 150–450)
PMV BLD AUTO: 9.5 FL (ref 6–12)
POTASSIUM SERPL-SCNC: 3.9 MMOL/L (ref 3.7–5.3)
PROT SERPL-MCNC: 8 G/DL (ref 6.4–8.3)
RBC # BLD AUTO: 4.24 M/UL (ref 4.5–5.9)
SODIUM SERPL-SCNC: 143 MMOL/L (ref 135–144)
WBC OTHER # BLD: 6.9 K/UL (ref 3.5–11)

## 2024-06-28 PROCEDURE — 36415 COLL VENOUS BLD VENIPUNCTURE: CPT

## 2024-06-28 PROCEDURE — 99223 1ST HOSP IP/OBS HIGH 75: CPT | Performed by: PHYSICAL MEDICINE & REHABILITATION

## 2024-06-28 PROCEDURE — 97112 NEUROMUSCULAR REEDUCATION: CPT

## 2024-06-28 PROCEDURE — 85025 COMPLETE CBC W/AUTO DIFF WBC: CPT

## 2024-06-28 PROCEDURE — 92610 EVALUATE SWALLOWING FUNCTION: CPT

## 2024-06-28 PROCEDURE — 6370000000 HC RX 637 (ALT 250 FOR IP): Performed by: PHYSICAL MEDICINE & REHABILITATION

## 2024-06-28 PROCEDURE — 97530 THERAPEUTIC ACTIVITIES: CPT

## 2024-06-28 PROCEDURE — 1180000000 HC REHAB R&B

## 2024-06-28 PROCEDURE — 97535 SELF CARE MNGMENT TRAINING: CPT

## 2024-06-28 PROCEDURE — 97110 THERAPEUTIC EXERCISES: CPT

## 2024-06-28 PROCEDURE — 97167 OT EVAL HIGH COMPLEX 60 MIN: CPT

## 2024-06-28 PROCEDURE — 99254 IP/OBS CNSLTJ NEW/EST MOD 60: CPT | Performed by: INTERNAL MEDICINE

## 2024-06-28 PROCEDURE — 99221 1ST HOSP IP/OBS SF/LOW 40: CPT

## 2024-06-28 PROCEDURE — 97163 PT EVAL HIGH COMPLEX 45 MIN: CPT

## 2024-06-28 PROCEDURE — 80076 HEPATIC FUNCTION PANEL: CPT

## 2024-06-28 PROCEDURE — 80048 BASIC METABOLIC PNL TOTAL CA: CPT

## 2024-06-28 PROCEDURE — 92523 SPEECH SOUND LANG COMPREHEN: CPT

## 2024-06-28 RX ADMIN — DOCUSATE SODIUM 100 MG: 100 CAPSULE, LIQUID FILLED ORAL at 21:29

## 2024-06-28 RX ADMIN — TOPIRAMATE 100 MG: 100 TABLET, FILM COATED ORAL at 10:27

## 2024-06-28 RX ADMIN — LACOSAMIDE 100 MG: 100 TABLET, FILM COATED ORAL at 10:26

## 2024-06-28 RX ADMIN — BUTALBITA,ACETAMINOPHEN AND CAFFEINE 1 CAPSULE: 50; 300; 40 CAPSULE ORAL at 21:28

## 2024-06-28 RX ADMIN — AMITRIPTYLINE HYDROCHLORIDE 75 MG: 75 TABLET, FILM COATED ORAL at 21:54

## 2024-06-28 RX ADMIN — Medication 400 MG: at 10:26

## 2024-06-28 RX ADMIN — PREDNISONE 40 MG: 20 TABLET ORAL at 10:26

## 2024-06-28 RX ADMIN — FAMOTIDINE 20 MG: 20 TABLET, FILM COATED ORAL at 10:26

## 2024-06-28 RX ADMIN — DICLOFENAC SODIUM 2 G: 10 GEL TOPICAL at 10:29

## 2024-06-28 RX ADMIN — GABAPENTIN 600 MG: 300 CAPSULE ORAL at 10:25

## 2024-06-28 RX ADMIN — BACLOFEN 5 MG: 10 TABLET ORAL at 21:29

## 2024-06-28 RX ADMIN — FAMOTIDINE 20 MG: 20 TABLET, FILM COATED ORAL at 21:29

## 2024-06-28 RX ADMIN — BUTALBITA,ACETAMINOPHEN AND CAFFEINE 1 CAPSULE: 50; 300; 40 CAPSULE ORAL at 10:23

## 2024-06-28 RX ADMIN — TAMSULOSIN HYDROCHLORIDE 0.4 MG: 0.4 CAPSULE ORAL at 10:27

## 2024-06-28 RX ADMIN — TOPIRAMATE 100 MG: 100 TABLET, FILM COATED ORAL at 21:49

## 2024-06-28 RX ADMIN — DICLOFENAC SODIUM 2 G: 10 GEL TOPICAL at 21:50

## 2024-06-28 RX ADMIN — LACOSAMIDE 100 MG: 100 TABLET, FILM COATED ORAL at 21:29

## 2024-06-28 RX ADMIN — VENLAFAXINE HYDROCHLORIDE 75 MG: 75 CAPSULE, EXTENDED RELEASE ORAL at 10:35

## 2024-06-28 RX ADMIN — BACLOFEN 5 MG: 10 TABLET ORAL at 10:26

## 2024-06-28 RX ADMIN — GABAPENTIN 600 MG: 300 CAPSULE ORAL at 14:30

## 2024-06-28 RX ADMIN — GABAPENTIN 600 MG: 300 CAPSULE ORAL at 21:29

## 2024-06-28 RX ADMIN — DOCUSATE SODIUM 100 MG: 100 CAPSULE, LIQUID FILLED ORAL at 10:26

## 2024-06-28 RX ADMIN — CITALOPRAM HYDROBROMIDE 10 MG: 10 TABLET ORAL at 10:27

## 2024-06-28 ASSESSMENT — PAIN DESCRIPTION - DIRECTION: RADIATING_TOWARDS: BILATERAL

## 2024-06-28 ASSESSMENT — PAIN DESCRIPTION - LOCATION
LOCATION: KNEE
LOCATION: HEAD
LOCATION: HEAD

## 2024-06-28 ASSESSMENT — PAIN SCALES - GENERAL
PAINLEVEL_OUTOF10: 8
PAINLEVEL_OUTOF10: 7
PAINLEVEL_OUTOF10: 2
PAINLEVEL_OUTOF10: 8

## 2024-06-28 ASSESSMENT — PAIN DESCRIPTION - ORIENTATION
ORIENTATION: MID;UPPER
ORIENTATION: RIGHT;LEFT
ORIENTATION: RIGHT;LEFT;MID

## 2024-06-28 ASSESSMENT — PAIN DESCRIPTION - DESCRIPTORS
DESCRIPTORS: ACHING

## 2024-06-28 ASSESSMENT — PAIN DESCRIPTION - FREQUENCY
FREQUENCY: INTERMITTENT
FREQUENCY: INTERMITTENT

## 2024-06-28 ASSESSMENT — PAIN DESCRIPTION - PAIN TYPE
TYPE: CHRONIC PAIN
TYPE: CHRONIC PAIN

## 2024-06-28 ASSESSMENT — PAIN DESCRIPTION - ONSET
ONSET: GRADUAL
ONSET: GRADUAL

## 2024-06-28 NOTE — H&P
3. No midline shift.       Premorbid function:  Assistant with wife  Current Function:  PT:  Restrictions/Precautions: Fall Risk, General Precautions, Seizure     6/14  Bed mobility  Supine to Sit: Moderate assistance;2 Person assistance (HOB elevated. Pt using R UE to use R bed rail and used R LE to assist with L LE)  Sit to Supine: Moderate assistance;2 Person assistance (HOB flat.  Pt on R elbow to progress to supine)  Scooting: Maximal assistance  Bed Mobility Comments: Pt required modA (staticly) -maxA (dynamically) to maintain sitting at EOB.  Transfers  Sit to Stand: Maximum Assistance;2 Person Assistance  Stand to Sit: Maximum Assistance;2 Person Assistance  Comment: Support provided to L UE prior to standing. Pt able to assist with placement of L LE on scotty stedy using R LE and verbal cues.  Assessed to scotty stedy x 3 from EOB.  Pt had L lateral lean with R trunk rotation, but no heavy fwd flexed posture today requiring modAx1, maxAx1 each trial ~30s-1 min hold.   Pt holding onto horizontal bar with R UE. Max tactile and verbal cues to promote L hip extension/rotation to neutral; pt unable to demo. Pt having some c/o pain in R thigh with standing, but didn't rate; no pain when sitting at EOB.  Ambulation  Comments: unsafe to attempt today      OT:   Safety Devices  Type of Devices: Call light within reach;Patient at risk for falls;Left in bed;All fall risk precautions in place;Bed alarm in place;Telesitter in use;Gait belt;Heels elevated for pressure relief  Restraints  Restraints Initially in Place: Yes  Restraints: 4 bed rails; seizure precautions  Balance  Sitting: With support (Mod A to Max A seated EOB for ~20 min engaging in dynamic seated balance tasks with emphasis on posture control while utilizing the R UE to reach outside RJ in preparation for ADL tasks with fair tolerance; posterior L lateral lean intermittently with verbal cues to correct; Improved balance with using the R UE for support in

## 2024-06-28 NOTE — CARE COORDINATION
[facility] Acute Inpatient Rehabilitation  Case Management Assessment  Initial Evaluation    Date/Time of Evaluation: 6/28/2024 4:45 PM  Assessment Completed by: Christine Gonzalez RN    If patient is discharged prior to next notation, then this note serves as note for discharge by case management.    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 Yes  Chart Reviewed: Yes      History Provided by: Patient  Patient Orientation: Alert and Oriented    Patient Cognition: Alert    Advance Directives:    Code Status: DNR-CCA   Patient's Primary Decision Maker is: Legal Next of Kin    Primary Decision Maker: Mara Mcclure - Spouse - [phone]    Secondary Decision Maker: Keturah Grayson - Brother/Sister - [phone]    Current Services Prior to Admission:  Current Financial resources: Medicaid  Current community resources: None  Current services prior to admission: Other (Comment) (op therapy)  Type of Home Care services:  OT, PT  Current Home DME: Wheelchair, Other (Comment)  Do you have a wheelchair ramp/Plans to build? yes  Handicap Placard: Has    Discharge Planning:  Patient lives with: Spouse/Significant Other   Type of Home: House  Primary Care Giver: Spouse  Marital Status:   Patient Support Systems include: Spouse/Significant Other, Family Members   Family can provide assistance at DC: Yes  Does patient have 24 hour assistance at home:  Yes  Is patient agreeable to VNS or Outpatient therapy Yes  [place] of choice provided: Yes  List of Home Care Agencies/Outpatient therapy provided: Yes  VNS/Outpatient

## 2024-06-29 PROCEDURE — 99232 SBSQ HOSP IP/OBS MODERATE 35: CPT | Performed by: PHYSICAL MEDICINE & REHABILITATION

## 2024-06-29 PROCEDURE — 97530 THERAPEUTIC ACTIVITIES: CPT

## 2024-06-29 PROCEDURE — 97110 THERAPEUTIC EXERCISES: CPT

## 2024-06-29 PROCEDURE — 1180000000 HC REHAB R&B

## 2024-06-29 PROCEDURE — 99232 SBSQ HOSP IP/OBS MODERATE 35: CPT | Performed by: INTERNAL MEDICINE

## 2024-06-29 PROCEDURE — 97535 SELF CARE MNGMENT TRAINING: CPT

## 2024-06-29 PROCEDURE — 6370000000 HC RX 637 (ALT 250 FOR IP): Performed by: PHYSICAL MEDICINE & REHABILITATION

## 2024-06-29 PROCEDURE — 97116 GAIT TRAINING THERAPY: CPT

## 2024-06-29 PROCEDURE — 6370000000 HC RX 637 (ALT 250 FOR IP): Performed by: INTERNAL MEDICINE

## 2024-06-29 PROCEDURE — 99255 IP/OBS CONSLTJ NEW/EST HI 80: CPT | Performed by: INTERNAL MEDICINE

## 2024-06-29 RX ORDER — PREDNISONE 20 MG/1
20 TABLET ORAL DAILY
Status: DISCONTINUED | OUTPATIENT
Start: 2024-07-12 | End: 2024-07-15 | Stop reason: HOSPADM

## 2024-06-29 RX ORDER — LIDOCAINE 4 G/G
1 PATCH TOPICAL DAILY
Status: DISCONTINUED | OUTPATIENT
Start: 2024-06-29 | End: 2024-07-15 | Stop reason: HOSPADM

## 2024-06-29 RX ORDER — PREDNISONE 10 MG/1
10 TABLET ORAL DAILY
Status: DISCONTINUED | OUTPATIENT
Start: 2024-07-19 | End: 2024-06-30

## 2024-06-29 RX ORDER — ATORVASTATIN CALCIUM 80 MG/1
80 TABLET, FILM COATED ORAL NIGHTLY
Status: DISCONTINUED | OUTPATIENT
Start: 2024-06-29 | End: 2024-07-15 | Stop reason: HOSPADM

## 2024-06-29 RX ADMIN — BACLOFEN 5 MG: 10 TABLET ORAL at 09:22

## 2024-06-29 RX ADMIN — CITALOPRAM HYDROBROMIDE 10 MG: 10 TABLET ORAL at 09:22

## 2024-06-29 RX ADMIN — VENLAFAXINE HYDROCHLORIDE 75 MG: 75 CAPSULE, EXTENDED RELEASE ORAL at 09:22

## 2024-06-29 RX ADMIN — DOCUSATE SODIUM 100 MG: 100 CAPSULE, LIQUID FILLED ORAL at 20:45

## 2024-06-29 RX ADMIN — GABAPENTIN 600 MG: 300 CAPSULE ORAL at 20:44

## 2024-06-29 RX ADMIN — LACOSAMIDE 100 MG: 100 TABLET, FILM COATED ORAL at 09:21

## 2024-06-29 RX ADMIN — DOCUSATE SODIUM 100 MG: 100 CAPSULE, LIQUID FILLED ORAL at 09:22

## 2024-06-29 RX ADMIN — TRAMADOL HYDROCHLORIDE 50 MG: 50 TABLET, COATED ORAL at 20:46

## 2024-06-29 RX ADMIN — BUTALBITA,ACETAMINOPHEN AND CAFFEINE 1 CAPSULE: 50; 300; 40 CAPSULE ORAL at 09:21

## 2024-06-29 RX ADMIN — TOPIRAMATE 100 MG: 100 TABLET, FILM COATED ORAL at 09:23

## 2024-06-29 RX ADMIN — GABAPENTIN 600 MG: 300 CAPSULE ORAL at 14:40

## 2024-06-29 RX ADMIN — TAMSULOSIN HYDROCHLORIDE 0.4 MG: 0.4 CAPSULE ORAL at 09:21

## 2024-06-29 RX ADMIN — TOPIRAMATE 100 MG: 100 TABLET, FILM COATED ORAL at 20:51

## 2024-06-29 RX ADMIN — DICLOFENAC SODIUM 2 G: 10 GEL TOPICAL at 09:24

## 2024-06-29 RX ADMIN — FAMOTIDINE 20 MG: 20 TABLET, FILM COATED ORAL at 20:45

## 2024-06-29 RX ADMIN — AMITRIPTYLINE HYDROCHLORIDE 75 MG: 75 TABLET, FILM COATED ORAL at 20:51

## 2024-06-29 RX ADMIN — BUTALBITA,ACETAMINOPHEN AND CAFFEINE 1 CAPSULE: 50; 300; 40 CAPSULE ORAL at 20:45

## 2024-06-29 RX ADMIN — FAMOTIDINE 20 MG: 20 TABLET, FILM COATED ORAL at 09:22

## 2024-06-29 RX ADMIN — TRAMADOL HYDROCHLORIDE 50 MG: 50 TABLET, COATED ORAL at 06:31

## 2024-06-29 RX ADMIN — PREDNISONE 40 MG: 20 TABLET ORAL at 09:21

## 2024-06-29 RX ADMIN — Medication 400 MG: at 09:21

## 2024-06-29 RX ADMIN — ATORVASTATIN CALCIUM 80 MG: 80 TABLET, FILM COATED ORAL at 20:45

## 2024-06-29 RX ADMIN — DICLOFENAC SODIUM 2 G: 10 GEL TOPICAL at 20:55

## 2024-06-29 RX ADMIN — LACOSAMIDE 100 MG: 100 TABLET, FILM COATED ORAL at 20:41

## 2024-06-29 RX ADMIN — GABAPENTIN 600 MG: 300 CAPSULE ORAL at 09:22

## 2024-06-29 RX ADMIN — BACLOFEN 5 MG: 10 TABLET ORAL at 20:43

## 2024-06-29 ASSESSMENT — PAIN DESCRIPTION - ORIENTATION
ORIENTATION: RIGHT
ORIENTATION: MID;RIGHT;LEFT
ORIENTATION: RIGHT;LEFT;MID
ORIENTATION: RIGHT;LEFT

## 2024-06-29 ASSESSMENT — PAIN SCALES - GENERAL
PAINLEVEL_OUTOF10: 2
PAINLEVEL_OUTOF10: 5
PAINLEVEL_OUTOF10: 9
PAINLEVEL_OUTOF10: 9
PAINLEVEL_OUTOF10: 5
PAINLEVEL_OUTOF10: 8
PAINLEVEL_OUTOF10: 2
PAINLEVEL_OUTOF10: 5

## 2024-06-29 ASSESSMENT — PAIN SCALES - WONG BAKER
WONGBAKER_NUMERICALRESPONSE: HURTS WORST
WONGBAKER_NUMERICALRESPONSE: HURTS LITTLE MORE

## 2024-06-29 ASSESSMENT — PAIN DESCRIPTION - FREQUENCY: FREQUENCY: INTERMITTENT

## 2024-06-29 ASSESSMENT — PAIN DESCRIPTION - LOCATION
LOCATION: KNEE
LOCATION: HEAD

## 2024-06-29 ASSESSMENT — 9 HOLE PEG TEST
TESTTIME_SECONDS: 47.34
TEST_RESULT: IMPAIRED

## 2024-06-29 ASSESSMENT — PAIN DESCRIPTION - DESCRIPTORS
DESCRIPTORS: ACHING

## 2024-06-29 ASSESSMENT — PAIN DESCRIPTION - PAIN TYPE
TYPE: CHRONIC PAIN
TYPE: CHRONIC PAIN

## 2024-06-29 ASSESSMENT — PAIN - FUNCTIONAL ASSESSMENT
PAIN_FUNCTIONAL_ASSESSMENT: PREVENTS OR INTERFERES SOME ACTIVE ACTIVITIES AND ADLS
PAIN_FUNCTIONAL_ASSESSMENT: ACTIVITIES ARE NOT PREVENTED

## 2024-06-29 ASSESSMENT — PAIN DESCRIPTION - ONSET: ONSET: GRADUAL

## 2024-06-30 PROBLEM — F33.1 MODERATE EPISODE OF RECURRENT MAJOR DEPRESSIVE DISORDER (HCC): Status: ACTIVE | Noted: 2024-06-30

## 2024-06-30 PROBLEM — F33.3 SEVERE EPISODE OF RECURRENT MAJOR DEPRESSIVE DISORDER, WITH PSYCHOTIC FEATURES (HCC): Status: ACTIVE | Noted: 2024-06-30

## 2024-06-30 PROBLEM — F33.2 SEVERE EPISODE OF RECURRENT MAJOR DEPRESSIVE DISORDER, WITHOUT PSYCHOTIC FEATURES (HCC): Status: ACTIVE | Noted: 2024-06-30

## 2024-06-30 PROBLEM — F33.1 MODERATE EPISODE OF RECURRENT MAJOR DEPRESSIVE DISORDER (HCC): Status: RESOLVED | Noted: 2024-06-30 | Resolved: 2024-06-30

## 2024-06-30 PROBLEM — F33.3 MAJOR DEPRESSIVE DISORDER, RECURRENT, SEVERE WITH PSYCHOTIC FEATURES (HCC): Status: RESOLVED | Noted: 2024-05-15 | Resolved: 2024-06-30

## 2024-06-30 PROBLEM — F33.3 SEVERE EPISODE OF RECURRENT MAJOR DEPRESSIVE DISORDER, WITH PSYCHOTIC FEATURES (HCC): Status: RESOLVED | Noted: 2024-06-30 | Resolved: 2024-06-30

## 2024-06-30 PROCEDURE — 99232 SBSQ HOSP IP/OBS MODERATE 35: CPT | Performed by: PHYSICAL MEDICINE & REHABILITATION

## 2024-06-30 PROCEDURE — 97535 SELF CARE MNGMENT TRAINING: CPT

## 2024-06-30 PROCEDURE — 6370000000 HC RX 637 (ALT 250 FOR IP): Performed by: PHYSICAL MEDICINE & REHABILITATION

## 2024-06-30 PROCEDURE — 97542 WHEELCHAIR MNGMENT TRAINING: CPT

## 2024-06-30 PROCEDURE — 6370000000 HC RX 637 (ALT 250 FOR IP): Performed by: INTERNAL MEDICINE

## 2024-06-30 PROCEDURE — 99232 SBSQ HOSP IP/OBS MODERATE 35: CPT | Performed by: INTERNAL MEDICINE

## 2024-06-30 PROCEDURE — 92507 TX SP LANG VOICE COMM INDIV: CPT

## 2024-06-30 PROCEDURE — 99253 IP/OBS CNSLTJ NEW/EST LOW 45: CPT | Performed by: NURSE PRACTITIONER

## 2024-06-30 PROCEDURE — 97530 THERAPEUTIC ACTIVITIES: CPT

## 2024-06-30 PROCEDURE — 99231 SBSQ HOSP IP/OBS SF/LOW 25: CPT | Performed by: INTERNAL MEDICINE

## 2024-06-30 PROCEDURE — 97110 THERAPEUTIC EXERCISES: CPT

## 2024-06-30 PROCEDURE — 97112 NEUROMUSCULAR REEDUCATION: CPT

## 2024-06-30 PROCEDURE — 97129 THER IVNTJ 1ST 15 MIN: CPT

## 2024-06-30 PROCEDURE — 1180000000 HC REHAB R&B

## 2024-06-30 RX ORDER — ACETAMINOPHEN 325 MG/1
650 TABLET ORAL EVERY 6 HOURS PRN
Status: DISCONTINUED | OUTPATIENT
Start: 2024-06-30 | End: 2024-07-15 | Stop reason: HOSPADM

## 2024-06-30 RX ORDER — PREDNISONE 10 MG/1
10 TABLET ORAL DAILY
Status: DISCONTINUED | OUTPATIENT
Start: 2024-07-19 | End: 2024-07-15 | Stop reason: HOSPADM

## 2024-06-30 RX ADMIN — ATORVASTATIN CALCIUM 80 MG: 80 TABLET, FILM COATED ORAL at 20:36

## 2024-06-30 RX ADMIN — TAMSULOSIN HYDROCHLORIDE 0.4 MG: 0.4 CAPSULE ORAL at 08:15

## 2024-06-30 RX ADMIN — FAMOTIDINE 20 MG: 20 TABLET, FILM COATED ORAL at 08:15

## 2024-06-30 RX ADMIN — GABAPENTIN 600 MG: 300 CAPSULE ORAL at 20:35

## 2024-06-30 RX ADMIN — Medication 400 MG: at 08:15

## 2024-06-30 RX ADMIN — TOPIRAMATE 100 MG: 100 TABLET, FILM COATED ORAL at 20:41

## 2024-06-30 RX ADMIN — PREDNISONE 40 MG: 20 TABLET ORAL at 08:15

## 2024-06-30 RX ADMIN — GABAPENTIN 600 MG: 300 CAPSULE ORAL at 13:41

## 2024-06-30 RX ADMIN — DOCUSATE SODIUM 100 MG: 100 CAPSULE, LIQUID FILLED ORAL at 08:15

## 2024-06-30 RX ADMIN — BACLOFEN 5 MG: 10 TABLET ORAL at 20:36

## 2024-06-30 RX ADMIN — LACOSAMIDE 100 MG: 100 TABLET, FILM COATED ORAL at 20:36

## 2024-06-30 RX ADMIN — TRAMADOL HYDROCHLORIDE 50 MG: 50 TABLET, COATED ORAL at 06:51

## 2024-06-30 RX ADMIN — BUTALBITA,ACETAMINOPHEN AND CAFFEINE 1 CAPSULE: 50; 300; 40 CAPSULE ORAL at 20:35

## 2024-06-30 RX ADMIN — AMITRIPTYLINE HYDROCHLORIDE 75 MG: 75 TABLET, FILM COATED ORAL at 20:41

## 2024-06-30 RX ADMIN — Medication 6 MG: at 20:36

## 2024-06-30 RX ADMIN — DOCUSATE SODIUM 100 MG: 100 CAPSULE, LIQUID FILLED ORAL at 20:36

## 2024-06-30 RX ADMIN — CITALOPRAM HYDROBROMIDE 10 MG: 10 TABLET ORAL at 08:16

## 2024-06-30 RX ADMIN — TOPIRAMATE 100 MG: 100 TABLET, FILM COATED ORAL at 09:20

## 2024-06-30 RX ADMIN — FAMOTIDINE 20 MG: 20 TABLET, FILM COATED ORAL at 20:36

## 2024-06-30 RX ADMIN — GABAPENTIN 600 MG: 300 CAPSULE ORAL at 08:16

## 2024-06-30 RX ADMIN — BUTALBITA,ACETAMINOPHEN AND CAFFEINE 1 CAPSULE: 50; 300; 40 CAPSULE ORAL at 08:16

## 2024-06-30 RX ADMIN — DICLOFENAC SODIUM 2 G: 10 GEL TOPICAL at 20:56

## 2024-06-30 RX ADMIN — TRAMADOL HYDROCHLORIDE 50 MG: 50 TABLET, COATED ORAL at 20:36

## 2024-06-30 RX ADMIN — DICLOFENAC SODIUM 2 G: 10 GEL TOPICAL at 08:19

## 2024-06-30 RX ADMIN — LACOSAMIDE 100 MG: 100 TABLET, FILM COATED ORAL at 08:15

## 2024-06-30 RX ADMIN — BACLOFEN 5 MG: 10 TABLET ORAL at 08:15

## 2024-06-30 RX ADMIN — VENLAFAXINE HYDROCHLORIDE 75 MG: 75 CAPSULE, EXTENDED RELEASE ORAL at 08:15

## 2024-06-30 ASSESSMENT — PAIN SCALES - GENERAL
PAINLEVEL_OUTOF10: 7
PAINLEVEL_OUTOF10: 5
PAINLEVEL_OUTOF10: 7
PAINLEVEL_OUTOF10: 8
PAINLEVEL_OUTOF10: 10
PAINLEVEL_OUTOF10: 9

## 2024-06-30 ASSESSMENT — PAIN SCALES - WONG BAKER: WONGBAKER_NUMERICALRESPONSE: HURTS WHOLE LOT

## 2024-06-30 ASSESSMENT — PAIN DESCRIPTION - LOCATION
LOCATION: HEAD

## 2024-06-30 ASSESSMENT — PAIN DESCRIPTION - DESCRIPTORS
DESCRIPTORS: ACHING
DESCRIPTORS: STABBING
DESCRIPTORS: STABBING

## 2024-06-30 ASSESSMENT — PAIN - FUNCTIONAL ASSESSMENT
PAIN_FUNCTIONAL_ASSESSMENT: ACTIVITIES ARE NOT PREVENTED
PAIN_FUNCTIONAL_ASSESSMENT: PREVENTS OR INTERFERES WITH MANY ACTIVE NOT PASSIVE ACTIVITIES
PAIN_FUNCTIONAL_ASSESSMENT: PREVENTS OR INTERFERES SOME ACTIVE ACTIVITIES AND ADLS

## 2024-06-30 ASSESSMENT — PAIN DESCRIPTION - ORIENTATION
ORIENTATION: RIGHT;LEFT
ORIENTATION: UPPER

## 2024-06-30 ASSESSMENT — PAIN DESCRIPTION - PAIN TYPE
TYPE: CHRONIC PAIN
TYPE: CHRONIC PAIN

## 2024-07-01 LAB
BASOPHILS # BLD: 0.06 K/UL (ref 0–0.2)
BASOPHILS NFR BLD: 1 % (ref 0–2)
EOSINOPHIL # BLD: 0.12 K/UL (ref 0–0.4)
EOSINOPHILS RELATIVE PERCENT: 2 % (ref 0–4)
ERYTHROCYTE [DISTWIDTH] IN BLOOD BY AUTOMATED COUNT: 19.1 % (ref 11.5–14.9)
HCT VFR BLD AUTO: 38.2 % (ref 41–53)
HGB BLD-MCNC: 12.3 G/DL (ref 13.5–17.5)
LYMPHOCYTES NFR BLD: 1.3 K/UL (ref 1–4.8)
LYMPHOCYTES RELATIVE PERCENT: 21 % (ref 24–44)
MCH RBC QN AUTO: 29.8 PG (ref 26–34)
MCHC RBC AUTO-ENTMCNC: 32.1 G/DL (ref 31–37)
MCV RBC AUTO: 92.7 FL (ref 80–100)
MONOCYTES NFR BLD: 0.5 K/UL (ref 0.1–1.3)
MONOCYTES NFR BLD: 8 % (ref 1–7)
MORPHOLOGY: ABNORMAL
NEUTROPHILS NFR BLD: 68 % (ref 36–66)
NEUTS SEG NFR BLD: 4.22 K/UL (ref 1.3–9.1)
PLATELET # BLD AUTO: 123 K/UL (ref 150–450)
PMV BLD AUTO: 9.1 FL (ref 6–12)
RBC # BLD AUTO: 4.11 M/UL (ref 4.5–5.9)
WBC OTHER # BLD: 6.2 K/UL (ref 3.5–11)

## 2024-07-01 PROCEDURE — 92507 TX SP LANG VOICE COMM INDIV: CPT

## 2024-07-01 PROCEDURE — 99231 SBSQ HOSP IP/OBS SF/LOW 25: CPT | Performed by: INTERNAL MEDICINE

## 2024-07-01 PROCEDURE — 6370000000 HC RX 637 (ALT 250 FOR IP): Performed by: INTERNAL MEDICINE

## 2024-07-01 PROCEDURE — 99232 SBSQ HOSP IP/OBS MODERATE 35: CPT | Performed by: PHYSICAL MEDICINE & REHABILITATION

## 2024-07-01 PROCEDURE — 97129 THER IVNTJ 1ST 15 MIN: CPT

## 2024-07-01 PROCEDURE — 99232 SBSQ HOSP IP/OBS MODERATE 35: CPT | Performed by: INTERNAL MEDICINE

## 2024-07-01 PROCEDURE — 36415 COLL VENOUS BLD VENIPUNCTURE: CPT

## 2024-07-01 PROCEDURE — 6370000000 HC RX 637 (ALT 250 FOR IP): Performed by: PHYSICAL MEDICINE & REHABILITATION

## 2024-07-01 PROCEDURE — 97110 THERAPEUTIC EXERCISES: CPT

## 2024-07-01 PROCEDURE — 97535 SELF CARE MNGMENT TRAINING: CPT

## 2024-07-01 PROCEDURE — 97530 THERAPEUTIC ACTIVITIES: CPT

## 2024-07-01 PROCEDURE — 1180000000 HC REHAB R&B

## 2024-07-01 PROCEDURE — 85025 COMPLETE CBC W/AUTO DIFF WBC: CPT

## 2024-07-01 RX ADMIN — TAMSULOSIN HYDROCHLORIDE 0.4 MG: 0.4 CAPSULE ORAL at 07:34

## 2024-07-01 RX ADMIN — DOCUSATE SODIUM 100 MG: 100 CAPSULE, LIQUID FILLED ORAL at 07:34

## 2024-07-01 RX ADMIN — PREDNISONE 40 MG: 20 TABLET ORAL at 07:34

## 2024-07-01 RX ADMIN — TOPIRAMATE 100 MG: 100 TABLET, FILM COATED ORAL at 12:10

## 2024-07-01 RX ADMIN — FAMOTIDINE 20 MG: 20 TABLET, FILM COATED ORAL at 07:34

## 2024-07-01 RX ADMIN — DOCUSATE SODIUM 100 MG: 100 CAPSULE, LIQUID FILLED ORAL at 21:12

## 2024-07-01 RX ADMIN — TOPIRAMATE 100 MG: 100 TABLET, FILM COATED ORAL at 21:13

## 2024-07-01 RX ADMIN — ATORVASTATIN CALCIUM 80 MG: 80 TABLET, FILM COATED ORAL at 21:12

## 2024-07-01 RX ADMIN — DICLOFENAC SODIUM 2 G: 10 GEL TOPICAL at 07:35

## 2024-07-01 RX ADMIN — VENLAFAXINE HYDROCHLORIDE 75 MG: 75 CAPSULE, EXTENDED RELEASE ORAL at 07:34

## 2024-07-01 RX ADMIN — FAMOTIDINE 20 MG: 20 TABLET, FILM COATED ORAL at 21:12

## 2024-07-01 RX ADMIN — LACOSAMIDE 100 MG: 100 TABLET, FILM COATED ORAL at 07:34

## 2024-07-01 RX ADMIN — GABAPENTIN 600 MG: 300 CAPSULE ORAL at 21:12

## 2024-07-01 RX ADMIN — BUTALBITA,ACETAMINOPHEN AND CAFFEINE 1 CAPSULE: 50; 300; 40 CAPSULE ORAL at 07:34

## 2024-07-01 RX ADMIN — GABAPENTIN 600 MG: 300 CAPSULE ORAL at 15:57

## 2024-07-01 RX ADMIN — AMITRIPTYLINE HYDROCHLORIDE 75 MG: 75 TABLET, FILM COATED ORAL at 21:13

## 2024-07-01 RX ADMIN — LACOSAMIDE 100 MG: 100 TABLET, FILM COATED ORAL at 21:12

## 2024-07-01 RX ADMIN — BACLOFEN 5 MG: 10 TABLET ORAL at 21:12

## 2024-07-01 RX ADMIN — POLYETHYLENE GLYCOL 3350 17 G: 17 POWDER, FOR SOLUTION ORAL at 07:33

## 2024-07-01 RX ADMIN — BUTALBITA,ACETAMINOPHEN AND CAFFEINE 1 CAPSULE: 50; 300; 40 CAPSULE ORAL at 17:44

## 2024-07-01 RX ADMIN — Medication 400 MG: at 07:34

## 2024-07-01 RX ADMIN — BACLOFEN 5 MG: 10 TABLET ORAL at 07:35

## 2024-07-01 RX ADMIN — GABAPENTIN 600 MG: 300 CAPSULE ORAL at 07:34

## 2024-07-01 RX ADMIN — Medication 6 MG: at 21:12

## 2024-07-01 ASSESSMENT — PAIN SCALES - GENERAL
PAINLEVEL_OUTOF10: 0
PAINLEVEL_OUTOF10: 8
PAINLEVEL_OUTOF10: 0
PAINLEVEL_OUTOF10: 0

## 2024-07-01 ASSESSMENT — PAIN DESCRIPTION - LOCATION: LOCATION: HEAD

## 2024-07-01 ASSESSMENT — PAIN DESCRIPTION - PAIN TYPE: TYPE: CHRONIC PAIN

## 2024-07-01 ASSESSMENT — PAIN DESCRIPTION - DESCRIPTORS: DESCRIPTORS: THROBBING

## 2024-07-01 NOTE — INTERDISCIPLINARY ROUNDS
[facility] Inpatient Rehabilitation  INTERDISCIPLINARY MEETING  Date: 24  Patient Name: Brennen Mcclure       Room: 2635/2635-01  MRN: [id number]       : 1965  (59 y.o.)     Gender: male     Patient's care team, including nursing, speech language pathologist, occupational therapist, and/or physical therapist, met to discuss patient's care needs. Any patient concerns, change in medical status, and current transfer/mobility status were identified at this time.     Any Additional Pertinent Information:   2-3 person assistance with Radha Morrison    Participating Team Members:  Nurse: Corinna Fitch RN  Occupational Therapist: Nga Mueller OT   Physical Therapist: Jaswinder Carroll PT  Speech Therapist:  N/A

## 2024-07-01 NOTE — PATIENT CARE CONFERENCE
[facility] Acute Inpatient Rehabilitation  TEAM CONFERENCE NOTE  Date: 24  Patient Name: Brennen Mcclure       Room: 2635/2635-01  MRN: [id number]       : 1965  (59 y.o.)     Gender: male     Referring Practitioner: Dr. 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 side (HCC)    NURSING    Bladder Continence  Incontinent Less Than Daily  Bowel Continence Always Continent    Date of Last BM: 2024    Bladder/Bowel Program Interventions: No Bladder or Bowel Program Indicated    Wounds/Incisions/Ulcers: Incision healing well    Pain Control: Patient's pain currently controlled and pain regimen effective as ordered    Pain Medication Regimen Usage Pattern: MAR reviewed and pain medications are being used at the following frequency (Specify Medication, # Doses Administered on average per day, identified patterns of use - for example: time of day, prior to activity/therapy)  Tramadol 50mg every 6 hours as needed  Tylenol 650mg every 6 hours as needed    Fall Risk:  Falling star program initiated    Medication Education Program: Patient currently unable to manage medications and responsible party being educated    Discharge Preparation Patient/Responsible Party Education In Progress:   Wound Care    Nursing specific communication for TEAM: No additional information identified requiring communication at this time    PHYSICAL THERAPY      Bed Mobility:        Rolling to Left: Minimal assistance  Rolling to Right: Moderate assistance (right UE assist at hand rail)  Supine to Sit: Maximum assistance (VCs for sequencing)  Sit to Supine: Maximum assistance (VCs sequencing)  Scooting: Maximal assistance (hips EOB)  Bed Mobility Comments: head elevated and left handrail, left side as home environment    Transfers:  Sit to Stand: Maximum Assistance  Stand to Sit: Maximum Assistance  Bed to Chair: Dependent/Total (with

## 2024-07-02 PROCEDURE — 6370000000 HC RX 637 (ALT 250 FOR IP): Performed by: PHYSICAL MEDICINE & REHABILITATION

## 2024-07-02 PROCEDURE — 1180000000 HC REHAB R&B

## 2024-07-02 PROCEDURE — 97530 THERAPEUTIC ACTIVITIES: CPT

## 2024-07-02 PROCEDURE — 6370000000 HC RX 637 (ALT 250 FOR IP): Performed by: INTERNAL MEDICINE

## 2024-07-02 PROCEDURE — 99232 SBSQ HOSP IP/OBS MODERATE 35: CPT | Performed by: INTERNAL MEDICINE

## 2024-07-02 PROCEDURE — 99233 SBSQ HOSP IP/OBS HIGH 50: CPT | Performed by: PHYSICAL MEDICINE & REHABILITATION

## 2024-07-02 PROCEDURE — 99231 SBSQ HOSP IP/OBS SF/LOW 25: CPT | Performed by: INTERNAL MEDICINE

## 2024-07-02 PROCEDURE — 92507 TX SP LANG VOICE COMM INDIV: CPT

## 2024-07-02 PROCEDURE — 97535 SELF CARE MNGMENT TRAINING: CPT

## 2024-07-02 PROCEDURE — 97129 THER IVNTJ 1ST 15 MIN: CPT

## 2024-07-02 PROCEDURE — 97110 THERAPEUTIC EXERCISES: CPT

## 2024-07-02 RX ADMIN — GABAPENTIN 600 MG: 300 CAPSULE ORAL at 07:21

## 2024-07-02 RX ADMIN — DOCUSATE SODIUM 100 MG: 100 CAPSULE, LIQUID FILLED ORAL at 07:23

## 2024-07-02 RX ADMIN — BUTALBITA,ACETAMINOPHEN AND CAFFEINE 1 CAPSULE: 50; 300; 40 CAPSULE ORAL at 16:48

## 2024-07-02 RX ADMIN — AMITRIPTYLINE HYDROCHLORIDE 75 MG: 75 TABLET, FILM COATED ORAL at 21:24

## 2024-07-02 RX ADMIN — POLYETHYLENE GLYCOL 3350 17 G: 17 POWDER, FOR SOLUTION ORAL at 07:21

## 2024-07-02 RX ADMIN — FAMOTIDINE 20 MG: 20 TABLET, FILM COATED ORAL at 21:25

## 2024-07-02 RX ADMIN — PREDNISONE 40 MG: 20 TABLET ORAL at 07:23

## 2024-07-02 RX ADMIN — BACLOFEN 5 MG: 10 TABLET ORAL at 07:23

## 2024-07-02 RX ADMIN — Medication 400 MG: at 07:26

## 2024-07-02 RX ADMIN — DOCUSATE SODIUM 100 MG: 100 CAPSULE, LIQUID FILLED ORAL at 21:23

## 2024-07-02 RX ADMIN — LACOSAMIDE 100 MG: 100 TABLET, FILM COATED ORAL at 07:23

## 2024-07-02 RX ADMIN — TRAMADOL HYDROCHLORIDE 50 MG: 50 TABLET, COATED ORAL at 07:21

## 2024-07-02 RX ADMIN — BUTALBITA,ACETAMINOPHEN AND CAFFEINE 1 CAPSULE: 50; 300; 40 CAPSULE ORAL at 06:10

## 2024-07-02 RX ADMIN — BACLOFEN 5 MG: 10 TABLET ORAL at 21:24

## 2024-07-02 RX ADMIN — GABAPENTIN 600 MG: 300 CAPSULE ORAL at 14:49

## 2024-07-02 RX ADMIN — VENLAFAXINE HYDROCHLORIDE 75 MG: 75 CAPSULE, EXTENDED RELEASE ORAL at 07:19

## 2024-07-02 RX ADMIN — FAMOTIDINE 20 MG: 20 TABLET, FILM COATED ORAL at 07:23

## 2024-07-02 RX ADMIN — ATORVASTATIN CALCIUM 80 MG: 80 TABLET, FILM COATED ORAL at 21:23

## 2024-07-02 RX ADMIN — TAMSULOSIN HYDROCHLORIDE 0.4 MG: 0.4 CAPSULE ORAL at 07:23

## 2024-07-02 RX ADMIN — TOPIRAMATE 100 MG: 100 TABLET, FILM COATED ORAL at 21:24

## 2024-07-02 RX ADMIN — LACOSAMIDE 100 MG: 100 TABLET, FILM COATED ORAL at 21:23

## 2024-07-02 RX ADMIN — GABAPENTIN 600 MG: 300 CAPSULE ORAL at 21:23

## 2024-07-02 RX ADMIN — TOPIRAMATE 100 MG: 100 TABLET, FILM COATED ORAL at 07:26

## 2024-07-02 RX ADMIN — DICLOFENAC SODIUM 2 G: 10 GEL TOPICAL at 07:25

## 2024-07-02 RX ADMIN — Medication 6 MG: at 21:23

## 2024-07-02 RX ADMIN — DICLOFENAC SODIUM 2 G: 10 GEL TOPICAL at 21:25

## 2024-07-02 ASSESSMENT — PAIN DESCRIPTION - LOCATION
LOCATION: HEAD
LOCATION: KNEE
LOCATION: HEAD

## 2024-07-02 ASSESSMENT — PAIN SCALES - GENERAL
PAINLEVEL_OUTOF10: 8
PAINLEVEL_OUTOF10: 0
PAINLEVEL_OUTOF10: 0
PAINLEVEL_OUTOF10: 8
PAINLEVEL_OUTOF10: 8
PAINLEVEL_OUTOF10: 7

## 2024-07-02 ASSESSMENT — PAIN - FUNCTIONAL ASSESSMENT
PAIN_FUNCTIONAL_ASSESSMENT: PREVENTS OR INTERFERES SOME ACTIVE ACTIVITIES AND ADLS
PAIN_FUNCTIONAL_ASSESSMENT: ACTIVITIES ARE NOT PREVENTED
PAIN_FUNCTIONAL_ASSESSMENT: PREVENTS OR INTERFERES SOME ACTIVE ACTIVITIES AND ADLS

## 2024-07-02 ASSESSMENT — PAIN DESCRIPTION - DESCRIPTORS
DESCRIPTORS: ACHING

## 2024-07-02 ASSESSMENT — PAIN DESCRIPTION - ORIENTATION: ORIENTATION: RIGHT;LEFT

## 2024-07-03 PROCEDURE — 6370000000 HC RX 637 (ALT 250 FOR IP): Performed by: PHYSICAL MEDICINE & REHABILITATION

## 2024-07-03 PROCEDURE — 99231 SBSQ HOSP IP/OBS SF/LOW 25: CPT | Performed by: INTERNAL MEDICINE

## 2024-07-03 PROCEDURE — 97112 NEUROMUSCULAR REEDUCATION: CPT

## 2024-07-03 PROCEDURE — 1180000000 HC REHAB R&B

## 2024-07-03 PROCEDURE — 97535 SELF CARE MNGMENT TRAINING: CPT

## 2024-07-03 PROCEDURE — 6370000000 HC RX 637 (ALT 250 FOR IP): Performed by: INTERNAL MEDICINE

## 2024-07-03 PROCEDURE — 99232 SBSQ HOSP IP/OBS MODERATE 35: CPT | Performed by: PHYSICAL MEDICINE & REHABILITATION

## 2024-07-03 PROCEDURE — 97542 WHEELCHAIR MNGMENT TRAINING: CPT

## 2024-07-03 PROCEDURE — 97530 THERAPEUTIC ACTIVITIES: CPT

## 2024-07-03 PROCEDURE — 97110 THERAPEUTIC EXERCISES: CPT

## 2024-07-03 PROCEDURE — 97129 THER IVNTJ 1ST 15 MIN: CPT

## 2024-07-03 PROCEDURE — 97130 THER IVNTJ EA ADDL 15 MIN: CPT

## 2024-07-03 RX ADMIN — LACOSAMIDE 100 MG: 100 TABLET, FILM COATED ORAL at 20:44

## 2024-07-03 RX ADMIN — GABAPENTIN 600 MG: 300 CAPSULE ORAL at 16:30

## 2024-07-03 RX ADMIN — BACLOFEN 5 MG: 10 TABLET ORAL at 20:44

## 2024-07-03 RX ADMIN — BACLOFEN 5 MG: 10 TABLET ORAL at 07:41

## 2024-07-03 RX ADMIN — DICLOFENAC SODIUM 2 G: 10 GEL TOPICAL at 20:47

## 2024-07-03 RX ADMIN — DOCUSATE SODIUM 100 MG: 100 CAPSULE, LIQUID FILLED ORAL at 20:44

## 2024-07-03 RX ADMIN — DICLOFENAC SODIUM 2 G: 10 GEL TOPICAL at 08:03

## 2024-07-03 RX ADMIN — ACETAMINOPHEN 650 MG: 325 TABLET ORAL at 20:44

## 2024-07-03 RX ADMIN — ATORVASTATIN CALCIUM 80 MG: 80 TABLET, FILM COATED ORAL at 20:43

## 2024-07-03 RX ADMIN — BUTALBITA,ACETAMINOPHEN AND CAFFEINE 1 CAPSULE: 50; 300; 40 CAPSULE ORAL at 05:23

## 2024-07-03 RX ADMIN — TAMSULOSIN HYDROCHLORIDE 0.4 MG: 0.4 CAPSULE ORAL at 07:42

## 2024-07-03 RX ADMIN — FAMOTIDINE 20 MG: 20 TABLET, FILM COATED ORAL at 20:44

## 2024-07-03 RX ADMIN — TOPIRAMATE 100 MG: 100 TABLET, FILM COATED ORAL at 07:43

## 2024-07-03 RX ADMIN — TOPIRAMATE 100 MG: 100 TABLET, FILM COATED ORAL at 20:47

## 2024-07-03 RX ADMIN — FAMOTIDINE 20 MG: 20 TABLET, FILM COATED ORAL at 07:41

## 2024-07-03 RX ADMIN — BUTALBITA,ACETAMINOPHEN AND CAFFEINE 1 CAPSULE: 50; 300; 40 CAPSULE ORAL at 16:30

## 2024-07-03 RX ADMIN — AMITRIPTYLINE HYDROCHLORIDE 75 MG: 75 TABLET, FILM COATED ORAL at 20:43

## 2024-07-03 RX ADMIN — GABAPENTIN 600 MG: 300 CAPSULE ORAL at 07:41

## 2024-07-03 RX ADMIN — PREDNISONE 40 MG: 20 TABLET ORAL at 07:41

## 2024-07-03 RX ADMIN — VENLAFAXINE HYDROCHLORIDE 75 MG: 75 CAPSULE, EXTENDED RELEASE ORAL at 07:41

## 2024-07-03 RX ADMIN — LACOSAMIDE 100 MG: 100 TABLET, FILM COATED ORAL at 07:42

## 2024-07-03 RX ADMIN — Medication 400 MG: at 07:41

## 2024-07-03 RX ADMIN — GABAPENTIN 600 MG: 300 CAPSULE ORAL at 20:44

## 2024-07-03 RX ADMIN — TRAMADOL HYDROCHLORIDE 50 MG: 50 TABLET, COATED ORAL at 07:41

## 2024-07-03 RX ADMIN — POLYETHYLENE GLYCOL 3350 17 G: 17 POWDER, FOR SOLUTION ORAL at 07:42

## 2024-07-03 RX ADMIN — DOCUSATE SODIUM 100 MG: 100 CAPSULE, LIQUID FILLED ORAL at 07:41

## 2024-07-03 ASSESSMENT — PAIN DESCRIPTION - LOCATION
LOCATION: HEAD
LOCATION: KNEE

## 2024-07-03 ASSESSMENT — PAIN DESCRIPTION - PAIN TYPE
TYPE: ACUTE PAIN
TYPE: CHRONIC PAIN

## 2024-07-03 ASSESSMENT — PAIN SCALES - GENERAL
PAINLEVEL_OUTOF10: 5
PAINLEVEL_OUTOF10: 8
PAINLEVEL_OUTOF10: 8
PAINLEVEL_OUTOF10: 7
PAINLEVEL_OUTOF10: 4
PAINLEVEL_OUTOF10: 0
PAINLEVEL_OUTOF10: 0
PAINLEVEL_OUTOF10: 7

## 2024-07-03 ASSESSMENT — PAIN DESCRIPTION - FREQUENCY
FREQUENCY: CONTINUOUS
FREQUENCY: INTERMITTENT

## 2024-07-03 ASSESSMENT — PAIN - FUNCTIONAL ASSESSMENT
PAIN_FUNCTIONAL_ASSESSMENT: ACTIVITIES ARE NOT PREVENTED

## 2024-07-03 ASSESSMENT — PAIN DESCRIPTION - DESCRIPTORS
DESCRIPTORS: ACHING

## 2024-07-03 ASSESSMENT — PAIN DESCRIPTION - ORIENTATION: ORIENTATION: MID

## 2024-07-03 ASSESSMENT — PAIN DESCRIPTION - ONSET
ONSET: GRADUAL
ONSET: PROGRESSIVE

## 2024-07-03 NOTE — CARE COORDINATION
[facility] CASE MANAGEMENT NOTE:    Admission Date:  6/27/2024 Brennen Mcclure is a 59 y.o.  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 They have decided that the patient will have an informational meeting with [facility]. Palliative is arranging for Friday. They will likely accept hospice at this time. Patient no longer wishes to continue coming into and out of the hospital. Will follow up with them Friday after their meeting.     Will continue to follow for additional discharge needs.    Electronically signed by Christine Gonzalez RN on 7/3/2024 at 4:23 PM

## 2024-07-04 PROCEDURE — 97530 THERAPEUTIC ACTIVITIES: CPT

## 2024-07-04 PROCEDURE — 6370000000 HC RX 637 (ALT 250 FOR IP): Performed by: INTERNAL MEDICINE

## 2024-07-04 PROCEDURE — 99231 SBSQ HOSP IP/OBS SF/LOW 25: CPT | Performed by: INTERNAL MEDICINE

## 2024-07-04 PROCEDURE — 97110 THERAPEUTIC EXERCISES: CPT

## 2024-07-04 PROCEDURE — 99232 SBSQ HOSP IP/OBS MODERATE 35: CPT | Performed by: PHYSICAL MEDICINE & REHABILITATION

## 2024-07-04 PROCEDURE — 97535 SELF CARE MNGMENT TRAINING: CPT

## 2024-07-04 PROCEDURE — 97129 THER IVNTJ 1ST 15 MIN: CPT

## 2024-07-04 PROCEDURE — 6370000000 HC RX 637 (ALT 250 FOR IP): Performed by: PHYSICAL MEDICINE & REHABILITATION

## 2024-07-04 PROCEDURE — 92526 ORAL FUNCTION THERAPY: CPT

## 2024-07-04 PROCEDURE — 1180000000 HC REHAB R&B

## 2024-07-04 PROCEDURE — 99232 SBSQ HOSP IP/OBS MODERATE 35: CPT | Performed by: INTERNAL MEDICINE

## 2024-07-04 RX ADMIN — BACLOFEN 5 MG: 10 TABLET ORAL at 22:12

## 2024-07-04 RX ADMIN — FAMOTIDINE 20 MG: 20 TABLET, FILM COATED ORAL at 08:28

## 2024-07-04 RX ADMIN — FAMOTIDINE 20 MG: 20 TABLET, FILM COATED ORAL at 22:12

## 2024-07-04 RX ADMIN — GABAPENTIN 600 MG: 300 CAPSULE ORAL at 22:11

## 2024-07-04 RX ADMIN — POLYETHYLENE GLYCOL 3350 17 G: 17 POWDER, FOR SOLUTION ORAL at 08:29

## 2024-07-04 RX ADMIN — VENLAFAXINE HYDROCHLORIDE 75 MG: 75 CAPSULE, EXTENDED RELEASE ORAL at 08:28

## 2024-07-04 RX ADMIN — PREDNISONE 40 MG: 20 TABLET ORAL at 08:28

## 2024-07-04 RX ADMIN — TAMSULOSIN HYDROCHLORIDE 0.4 MG: 0.4 CAPSULE ORAL at 08:28

## 2024-07-04 RX ADMIN — BUTALBITA,ACETAMINOPHEN AND CAFFEINE 1 CAPSULE: 50; 300; 40 CAPSULE ORAL at 18:09

## 2024-07-04 RX ADMIN — DOCUSATE SODIUM 100 MG: 100 CAPSULE, LIQUID FILLED ORAL at 22:12

## 2024-07-04 RX ADMIN — BACLOFEN 5 MG: 10 TABLET ORAL at 08:28

## 2024-07-04 RX ADMIN — ACETAMINOPHEN 650 MG: 325 TABLET ORAL at 22:20

## 2024-07-04 RX ADMIN — TOPIRAMATE 100 MG: 100 TABLET, FILM COATED ORAL at 22:14

## 2024-07-04 RX ADMIN — ATORVASTATIN CALCIUM 80 MG: 80 TABLET, FILM COATED ORAL at 22:12

## 2024-07-04 RX ADMIN — GABAPENTIN 600 MG: 300 CAPSULE ORAL at 08:28

## 2024-07-04 RX ADMIN — AMITRIPTYLINE HYDROCHLORIDE 75 MG: 75 TABLET, FILM COATED ORAL at 22:14

## 2024-07-04 RX ADMIN — BUTALBITA,ACETAMINOPHEN AND CAFFEINE 1 CAPSULE: 50; 300; 40 CAPSULE ORAL at 05:26

## 2024-07-04 RX ADMIN — DICLOFENAC SODIUM 2 G: 10 GEL TOPICAL at 22:12

## 2024-07-04 RX ADMIN — DICLOFENAC SODIUM 2 G: 10 GEL TOPICAL at 08:30

## 2024-07-04 RX ADMIN — TRAMADOL HYDROCHLORIDE 50 MG: 50 TABLET, COATED ORAL at 08:27

## 2024-07-04 RX ADMIN — DOCUSATE SODIUM 100 MG: 100 CAPSULE, LIQUID FILLED ORAL at 08:29

## 2024-07-04 RX ADMIN — LACOSAMIDE 100 MG: 100 TABLET, FILM COATED ORAL at 08:29

## 2024-07-04 RX ADMIN — Medication 400 MG: at 08:28

## 2024-07-04 RX ADMIN — TOPIRAMATE 100 MG: 100 TABLET, FILM COATED ORAL at 08:30

## 2024-07-04 RX ADMIN — GABAPENTIN 600 MG: 300 CAPSULE ORAL at 14:01

## 2024-07-04 RX ADMIN — LACOSAMIDE 100 MG: 100 TABLET, FILM COATED ORAL at 22:12

## 2024-07-04 ASSESSMENT — PAIN DESCRIPTION - ORIENTATION
ORIENTATION: ANTERIOR
ORIENTATION: RIGHT;LEFT;MID

## 2024-07-04 ASSESSMENT — PAIN SCALES - GENERAL
PAINLEVEL_OUTOF10: 9
PAINLEVEL_OUTOF10: 5
PAINLEVEL_OUTOF10: 6
PAINLEVEL_OUTOF10: 9

## 2024-07-04 ASSESSMENT — PAIN DESCRIPTION - FREQUENCY: FREQUENCY: INTERMITTENT

## 2024-07-04 ASSESSMENT — PAIN DESCRIPTION - DESCRIPTORS
DESCRIPTORS: THROBBING
DESCRIPTORS: ACHING;SORE

## 2024-07-04 ASSESSMENT — PAIN DESCRIPTION - LOCATION
LOCATION: HEAD;KNEE
LOCATION: HEAD

## 2024-07-04 ASSESSMENT — PAIN DESCRIPTION - PAIN TYPE: TYPE: ACUTE PAIN;CHRONIC PAIN

## 2024-07-04 ASSESSMENT — PAIN DESCRIPTION - ONSET: ONSET: AWAKENED FROM SLEEP

## 2024-07-05 LAB
ANION GAP SERPL CALCULATED.3IONS-SCNC: 13 MMOL/L (ref 9–17)
BASOPHILS # BLD: 0.1 K/UL (ref 0–0.2)
BASOPHILS NFR BLD: 1 % (ref 0–2)
BUN SERPL-MCNC: 24 MG/DL (ref 8–23)
CALCIUM SERPL-MCNC: 9.5 MG/DL (ref 8.6–10.4)
CHLORIDE SERPL-SCNC: 107 MMOL/L (ref 98–107)
CO2 SERPL-SCNC: 23 MMOL/L (ref 20–31)
CREAT SERPL-MCNC: 0.7 MG/DL (ref 0.7–1.2)
EOSINOPHIL # BLD: 0.1 K/UL (ref 0–0.4)
EOSINOPHILS RELATIVE PERCENT: 1 % (ref 0–4)
ERYTHROCYTE [DISTWIDTH] IN BLOOD BY AUTOMATED COUNT: 18.9 % (ref 11.5–14.9)
GFR, ESTIMATED: >90 ML/MIN/1.73M2
GLUCOSE SERPL-MCNC: 85 MG/DL (ref 70–99)
HCT VFR BLD AUTO: 41.3 % (ref 41–53)
HGB BLD-MCNC: 12.9 G/DL (ref 13.5–17.5)
LYMPHOCYTES NFR BLD: 1.2 K/UL (ref 1–4.8)
LYMPHOCYTES RELATIVE PERCENT: 12 % (ref 24–44)
MCH RBC QN AUTO: 28.7 PG (ref 26–34)
MCHC RBC AUTO-ENTMCNC: 31.2 G/DL (ref 31–37)
MCV RBC AUTO: 92 FL (ref 80–100)
MONOCYTES NFR BLD: 0.7 K/UL (ref 0.1–1.3)
MONOCYTES NFR BLD: 7 % (ref 1–7)
NEUTROPHILS NFR BLD: 79 % (ref 36–66)
NEUTS SEG NFR BLD: 7.8 K/UL (ref 1.3–9.1)
PLATELET # BLD AUTO: 135 K/UL (ref 150–450)
PMV BLD AUTO: 10.3 FL (ref 6–12)
POTASSIUM SERPL-SCNC: 3.8 MMOL/L (ref 3.7–5.3)
RBC # BLD AUTO: 4.49 M/UL (ref 4.5–5.9)
SODIUM SERPL-SCNC: 143 MMOL/L (ref 135–144)
WBC OTHER # BLD: 9.9 K/UL (ref 3.5–11)

## 2024-07-05 PROCEDURE — 6370000000 HC RX 637 (ALT 250 FOR IP): Performed by: PHYSICAL MEDICINE & REHABILITATION

## 2024-07-05 PROCEDURE — 99232 SBSQ HOSP IP/OBS MODERATE 35: CPT | Performed by: PHYSICAL MEDICINE & REHABILITATION

## 2024-07-05 PROCEDURE — 92507 TX SP LANG VOICE COMM INDIV: CPT

## 2024-07-05 PROCEDURE — 97530 THERAPEUTIC ACTIVITIES: CPT

## 2024-07-05 PROCEDURE — 97535 SELF CARE MNGMENT TRAINING: CPT

## 2024-07-05 PROCEDURE — 85025 COMPLETE CBC W/AUTO DIFF WBC: CPT

## 2024-07-05 PROCEDURE — 97129 THER IVNTJ 1ST 15 MIN: CPT

## 2024-07-05 PROCEDURE — 99232 SBSQ HOSP IP/OBS MODERATE 35: CPT | Performed by: INTERNAL MEDICINE

## 2024-07-05 PROCEDURE — 36415 COLL VENOUS BLD VENIPUNCTURE: CPT

## 2024-07-05 PROCEDURE — 97110 THERAPEUTIC EXERCISES: CPT

## 2024-07-05 PROCEDURE — 97542 WHEELCHAIR MNGMENT TRAINING: CPT

## 2024-07-05 PROCEDURE — 1180000000 HC REHAB R&B

## 2024-07-05 PROCEDURE — 80048 BASIC METABOLIC PNL TOTAL CA: CPT

## 2024-07-05 PROCEDURE — 6370000000 HC RX 637 (ALT 250 FOR IP): Performed by: INTERNAL MEDICINE

## 2024-07-05 RX ORDER — LIDOCAINE HYDROCHLORIDE 10 MG/ML
4 INJECTION, SOLUTION INFILTRATION; PERINEURAL ONCE
Status: COMPLETED | OUTPATIENT
Start: 2024-07-05 | End: 2024-07-07

## 2024-07-05 RX ORDER — TRIAMCINOLONE ACETONIDE 40 MG/ML
40 INJECTION, SUSPENSION INTRA-ARTICULAR; INTRAMUSCULAR ONCE
Status: COMPLETED | OUTPATIENT
Start: 2024-07-05 | End: 2024-07-07

## 2024-07-05 RX ORDER — AMITRIPTYLINE HYDROCHLORIDE 50 MG/1
100 TABLET, FILM COATED ORAL NIGHTLY
Status: DISCONTINUED | OUTPATIENT
Start: 2024-07-05 | End: 2024-07-15 | Stop reason: HOSPADM

## 2024-07-05 RX ADMIN — LACOSAMIDE 100 MG: 100 TABLET, FILM COATED ORAL at 07:56

## 2024-07-05 RX ADMIN — AMITRIPTYLINE HYDROCHLORIDE 100 MG: 50 TABLET, FILM COATED ORAL at 21:39

## 2024-07-05 RX ADMIN — PREDNISONE 30 MG: 20 TABLET ORAL at 07:55

## 2024-07-05 RX ADMIN — FAMOTIDINE 20 MG: 20 TABLET, FILM COATED ORAL at 21:39

## 2024-07-05 RX ADMIN — TAMSULOSIN HYDROCHLORIDE 0.4 MG: 0.4 CAPSULE ORAL at 07:56

## 2024-07-05 RX ADMIN — DICLOFENAC SODIUM 2 G: 10 GEL TOPICAL at 07:55

## 2024-07-05 RX ADMIN — TRAMADOL HYDROCHLORIDE 50 MG: 50 TABLET, COATED ORAL at 21:39

## 2024-07-05 RX ADMIN — ATORVASTATIN CALCIUM 80 MG: 80 TABLET, FILM COATED ORAL at 21:38

## 2024-07-05 RX ADMIN — BUTALBITA,ACETAMINOPHEN AND CAFFEINE 1 CAPSULE: 50; 300; 40 CAPSULE ORAL at 06:25

## 2024-07-05 RX ADMIN — TOPIRAMATE 100 MG: 100 TABLET, FILM COATED ORAL at 21:38

## 2024-07-05 RX ADMIN — TRAMADOL HYDROCHLORIDE 50 MG: 50 TABLET, COATED ORAL at 14:59

## 2024-07-05 RX ADMIN — Medication 400 MG: at 07:55

## 2024-07-05 RX ADMIN — DICLOFENAC SODIUM 2 G: 10 GEL TOPICAL at 21:41

## 2024-07-05 RX ADMIN — BACLOFEN 5 MG: 10 TABLET ORAL at 21:39

## 2024-07-05 RX ADMIN — LACOSAMIDE 100 MG: 100 TABLET, FILM COATED ORAL at 21:39

## 2024-07-05 RX ADMIN — GABAPENTIN 600 MG: 300 CAPSULE ORAL at 14:59

## 2024-07-05 RX ADMIN — FAMOTIDINE 20 MG: 20 TABLET, FILM COATED ORAL at 07:56

## 2024-07-05 RX ADMIN — GABAPENTIN 600 MG: 300 CAPSULE ORAL at 07:56

## 2024-07-05 RX ADMIN — DOCUSATE SODIUM 100 MG: 100 CAPSULE, LIQUID FILLED ORAL at 21:39

## 2024-07-05 RX ADMIN — POLYETHYLENE GLYCOL 3350 17 G: 17 POWDER, FOR SOLUTION ORAL at 07:55

## 2024-07-05 RX ADMIN — DOCUSATE SODIUM 100 MG: 100 CAPSULE, LIQUID FILLED ORAL at 07:56

## 2024-07-05 RX ADMIN — BACLOFEN 5 MG: 10 TABLET ORAL at 07:56

## 2024-07-05 RX ADMIN — VENLAFAXINE HYDROCHLORIDE 75 MG: 75 CAPSULE, EXTENDED RELEASE ORAL at 07:56

## 2024-07-05 RX ADMIN — TOPIRAMATE 100 MG: 100 TABLET, FILM COATED ORAL at 08:01

## 2024-07-05 RX ADMIN — GABAPENTIN 600 MG: 300 CAPSULE ORAL at 21:38

## 2024-07-05 RX ADMIN — BUTALBITA,ACETAMINOPHEN AND CAFFEINE 1 CAPSULE: 50; 300; 40 CAPSULE ORAL at 17:36

## 2024-07-05 RX ADMIN — ACETAMINOPHEN 650 MG: 325 TABLET ORAL at 12:18

## 2024-07-05 RX ADMIN — ACETAMINOPHEN 650 MG: 325 TABLET ORAL at 19:07

## 2024-07-05 ASSESSMENT — PAIN SCALES - GENERAL
PAINLEVEL_OUTOF10: 8
PAINLEVEL_OUTOF10: 6
PAINLEVEL_OUTOF10: 5
PAINLEVEL_OUTOF10: 9
PAINLEVEL_OUTOF10: 4
PAINLEVEL_OUTOF10: 9
PAINLEVEL_OUTOF10: 7
PAINLEVEL_OUTOF10: 9
PAINLEVEL_OUTOF10: 0
PAINLEVEL_OUTOF10: 0

## 2024-07-05 ASSESSMENT — PAIN DESCRIPTION - LOCATION
LOCATION: HEAD

## 2024-07-05 ASSESSMENT — PAIN DESCRIPTION - ORIENTATION
ORIENTATION: POSTERIOR

## 2024-07-05 ASSESSMENT — PAIN DESCRIPTION - DESCRIPTORS
DESCRIPTORS: CRUSHING
DESCRIPTORS: ACHING

## 2024-07-05 ASSESSMENT — PAIN - FUNCTIONAL ASSESSMENT
PAIN_FUNCTIONAL_ASSESSMENT: ACTIVITIES ARE NOT PREVENTED

## 2024-07-05 ASSESSMENT — PAIN DESCRIPTION - PAIN TYPE: TYPE: CHRONIC PAIN

## 2024-07-06 PROBLEM — G44.40 MEDICATION OVERUSE HEADACHE: Status: ACTIVE | Noted: 2024-07-06

## 2024-07-06 PROBLEM — G44.1 VASCULAR HEADACHE: Status: ACTIVE | Noted: 2024-07-06

## 2024-07-06 PROBLEM — R51.9 CHRONIC DAILY HEADACHE: Status: ACTIVE | Noted: 2024-07-06

## 2024-07-06 PROCEDURE — 97110 THERAPEUTIC EXERCISES: CPT

## 2024-07-06 PROCEDURE — 6370000000 HC RX 637 (ALT 250 FOR IP): Performed by: STUDENT IN AN ORGANIZED HEALTH CARE EDUCATION/TRAINING PROGRAM

## 2024-07-06 PROCEDURE — 6370000000 HC RX 637 (ALT 250 FOR IP): Performed by: PHYSICAL MEDICINE & REHABILITATION

## 2024-07-06 PROCEDURE — 97530 THERAPEUTIC ACTIVITIES: CPT

## 2024-07-06 PROCEDURE — 1180000000 HC REHAB R&B

## 2024-07-06 PROCEDURE — 99232 SBSQ HOSP IP/OBS MODERATE 35: CPT | Performed by: PHYSICAL MEDICINE & REHABILITATION

## 2024-07-06 PROCEDURE — 97535 SELF CARE MNGMENT TRAINING: CPT

## 2024-07-06 PROCEDURE — 99222 1ST HOSP IP/OBS MODERATE 55: CPT | Performed by: STUDENT IN AN ORGANIZED HEALTH CARE EDUCATION/TRAINING PROGRAM

## 2024-07-06 PROCEDURE — 99232 SBSQ HOSP IP/OBS MODERATE 35: CPT | Performed by: INTERNAL MEDICINE

## 2024-07-06 RX ORDER — BACLOFEN 10 MG/1
10 TABLET ORAL 2 TIMES DAILY
Status: DISCONTINUED | OUTPATIENT
Start: 2024-07-07 | End: 2024-07-15 | Stop reason: HOSPADM

## 2024-07-06 RX ORDER — BUTALBITAL, ACETAMINOPHEN AND CAFFEINE 300; 40; 50 MG/1; MG/1; MG/1
1 CAPSULE ORAL DAILY PRN
Status: DISCONTINUED | OUTPATIENT
Start: 2024-07-07 | End: 2024-07-15 | Stop reason: HOSPADM

## 2024-07-06 RX ORDER — HYDROCODONE BITARTRATE AND ACETAMINOPHEN 7.5; 325 MG/1; MG/1
1 TABLET ORAL EVERY 6 HOURS PRN
Status: DISCONTINUED | OUTPATIENT
Start: 2024-07-06 | End: 2024-07-15 | Stop reason: HOSPADM

## 2024-07-06 RX ORDER — BUTALBITAL, ACETAMINOPHEN AND CAFFEINE 300; 40; 50 MG/1; MG/1; MG/1
1 CAPSULE ORAL 2 TIMES DAILY
Status: DISCONTINUED | OUTPATIENT
Start: 2024-07-06 | End: 2024-07-06

## 2024-07-06 RX ORDER — KETOROLAC TROMETHAMINE 30 MG/ML
30 INJECTION, SOLUTION INTRAMUSCULAR; INTRAVENOUS ONCE
Status: DISCONTINUED | OUTPATIENT
Start: 2024-07-06 | End: 2024-07-06

## 2024-07-06 RX ORDER — VERAPAMIL HYDROCHLORIDE 40 MG/1
40 TABLET ORAL EVERY 8 HOURS SCHEDULED
Status: DISCONTINUED | OUTPATIENT
Start: 2024-07-06 | End: 2024-07-15 | Stop reason: HOSPADM

## 2024-07-06 RX ORDER — 0.9 % SODIUM CHLORIDE 0.9 %
500 INTRAVENOUS SOLUTION INTRAVENOUS ONCE
Status: DISCONTINUED | OUTPATIENT
Start: 2024-07-06 | End: 2024-07-06

## 2024-07-06 RX ORDER — DIPHENHYDRAMINE HYDROCHLORIDE 50 MG/ML
25 INJECTION INTRAMUSCULAR; INTRAVENOUS ONCE
Status: DISCONTINUED | OUTPATIENT
Start: 2024-07-06 | End: 2024-07-06

## 2024-07-06 RX ORDER — MAGNESIUM SULFATE HEPTAHYDRATE 40 MG/ML
2000 INJECTION, SOLUTION INTRAVENOUS ONCE
Status: DISCONTINUED | OUTPATIENT
Start: 2024-07-06 | End: 2024-07-06

## 2024-07-06 RX ADMIN — GABAPENTIN 600 MG: 300 CAPSULE ORAL at 07:56

## 2024-07-06 RX ADMIN — TAMSULOSIN HYDROCHLORIDE 0.4 MG: 0.4 CAPSULE ORAL at 07:56

## 2024-07-06 RX ADMIN — TOPIRAMATE 100 MG: 100 TABLET, FILM COATED ORAL at 10:56

## 2024-07-06 RX ADMIN — TRAMADOL HYDROCHLORIDE 50 MG: 50 TABLET, COATED ORAL at 10:55

## 2024-07-06 RX ADMIN — DOCUSATE SODIUM 100 MG: 100 CAPSULE, LIQUID FILLED ORAL at 07:57

## 2024-07-06 RX ADMIN — POLYETHYLENE GLYCOL 3350 17 G: 17 POWDER, FOR SOLUTION ORAL at 07:57

## 2024-07-06 RX ADMIN — DICLOFENAC SODIUM 2 G: 10 GEL TOPICAL at 08:01

## 2024-07-06 RX ADMIN — VENLAFAXINE HYDROCHLORIDE 75 MG: 75 CAPSULE, EXTENDED RELEASE ORAL at 07:56

## 2024-07-06 RX ADMIN — BUTALBITA,ACETAMINOPHEN AND CAFFEINE 1 CAPSULE: 50; 300; 40 CAPSULE ORAL at 17:19

## 2024-07-06 RX ADMIN — Medication 400 MG: at 07:57

## 2024-07-06 RX ADMIN — FAMOTIDINE 20 MG: 20 TABLET, FILM COATED ORAL at 07:57

## 2024-07-06 RX ADMIN — GABAPENTIN 600 MG: 300 CAPSULE ORAL at 13:52

## 2024-07-06 RX ADMIN — LACOSAMIDE 100 MG: 100 TABLET, FILM COATED ORAL at 07:57

## 2024-07-06 RX ADMIN — PREDNISONE 30 MG: 20 TABLET ORAL at 07:56

## 2024-07-06 RX ADMIN — BACLOFEN 5 MG: 10 TABLET ORAL at 07:57

## 2024-07-06 RX ADMIN — BUTALBITA,ACETAMINOPHEN AND CAFFEINE 1 CAPSULE: 50; 300; 40 CAPSULE ORAL at 05:41

## 2024-07-06 ASSESSMENT — PAIN DESCRIPTION - LOCATION
LOCATION: SHOULDER
LOCATION: HEAD
LOCATION: SHOULDER

## 2024-07-06 ASSESSMENT — PAIN SCALES - GENERAL
PAINLEVEL_OUTOF10: 4
PAINLEVEL_OUTOF10: 8
PAINLEVEL_OUTOF10: 4
PAINLEVEL_OUTOF10: 9
PAINLEVEL_OUTOF10: 0

## 2024-07-06 ASSESSMENT — PAIN DESCRIPTION - DESCRIPTORS
DESCRIPTORS: ACHING
DESCRIPTORS: ACHING

## 2024-07-06 ASSESSMENT — PAIN DESCRIPTION - ORIENTATION
ORIENTATION: LEFT
ORIENTATION: MID
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
## 2024-07-07 PROCEDURE — 6370000000 HC RX 637 (ALT 250 FOR IP): Performed by: STUDENT IN AN ORGANIZED HEALTH CARE EDUCATION/TRAINING PROGRAM

## 2024-07-07 PROCEDURE — 3E0U3BZ INTRODUCTION OF ANESTHETIC AGENT INTO JOINTS, PERCUTANEOUS APPROACH: ICD-10-PCS | Performed by: PHYSICAL MEDICINE & REHABILITATION

## 2024-07-07 PROCEDURE — 6370000000 HC RX 637 (ALT 250 FOR IP): Performed by: PHYSICAL MEDICINE & REHABILITATION

## 2024-07-07 PROCEDURE — 3E0U33Z INTRODUCTION OF ANTI-INFLAMMATORY INTO JOINTS, PERCUTANEOUS APPROACH: ICD-10-PCS | Performed by: PHYSICAL MEDICINE & REHABILITATION

## 2024-07-07 PROCEDURE — 99233 SBSQ HOSP IP/OBS HIGH 50: CPT | Performed by: PHYSICAL MEDICINE & REHABILITATION

## 2024-07-07 PROCEDURE — 6360000002 HC RX W HCPCS: Performed by: PHYSICAL MEDICINE & REHABILITATION

## 2024-07-07 PROCEDURE — 20610 DRAIN/INJ JOINT/BURSA W/O US: CPT | Performed by: PHYSICAL MEDICINE & REHABILITATION

## 2024-07-07 PROCEDURE — 2500000003 HC RX 250 WO HCPCS: Performed by: PHYSICAL MEDICINE & REHABILITATION

## 2024-07-07 PROCEDURE — 1180000000 HC REHAB R&B

## 2024-07-07 PROCEDURE — 97110 THERAPEUTIC EXERCISES: CPT

## 2024-07-07 PROCEDURE — 99232 SBSQ HOSP IP/OBS MODERATE 35: CPT | Performed by: STUDENT IN AN ORGANIZED HEALTH CARE EDUCATION/TRAINING PROGRAM

## 2024-07-07 PROCEDURE — 6370000000 HC RX 637 (ALT 250 FOR IP): Performed by: INTERNAL MEDICINE

## 2024-07-07 PROCEDURE — 97530 THERAPEUTIC ACTIVITIES: CPT

## 2024-07-07 RX ADMIN — DOCUSATE SODIUM 100 MG: 100 CAPSULE, LIQUID FILLED ORAL at 08:27

## 2024-07-07 RX ADMIN — FAMOTIDINE 20 MG: 20 TABLET, FILM COATED ORAL at 08:27

## 2024-07-07 RX ADMIN — DICLOFENAC SODIUM 2 G: 10 GEL TOPICAL at 22:36

## 2024-07-07 RX ADMIN — TOPIRAMATE 100 MG: 100 TABLET, FILM COATED ORAL at 22:34

## 2024-07-07 RX ADMIN — TAMSULOSIN HYDROCHLORIDE 0.4 MG: 0.4 CAPSULE ORAL at 08:27

## 2024-07-07 RX ADMIN — VERAPAMIL HYDROCHLORIDE 40 MG: 40 TABLET ORAL at 00:03

## 2024-07-07 RX ADMIN — LIDOCAINE HYDROCHLORIDE 4 ML: 10 INJECTION, SOLUTION INFILTRATION; PERINEURAL at 13:09

## 2024-07-07 RX ADMIN — BACLOFEN 10 MG: 10 TABLET ORAL at 22:32

## 2024-07-07 RX ADMIN — TOPIRAMATE 100 MG: 100 TABLET, FILM COATED ORAL at 00:03

## 2024-07-07 RX ADMIN — VENLAFAXINE HYDROCHLORIDE 75 MG: 75 CAPSULE, EXTENDED RELEASE ORAL at 08:27

## 2024-07-07 RX ADMIN — LACOSAMIDE 100 MG: 100 TABLET, FILM COATED ORAL at 00:03

## 2024-07-07 RX ADMIN — DICLOFENAC SODIUM 2 G: 10 GEL TOPICAL at 08:30

## 2024-07-07 RX ADMIN — POLYETHYLENE GLYCOL 3350 17 G: 17 POWDER, FOR SOLUTION ORAL at 08:32

## 2024-07-07 RX ADMIN — DOCUSATE SODIUM 100 MG: 100 CAPSULE, LIQUID FILLED ORAL at 22:32

## 2024-07-07 RX ADMIN — LACOSAMIDE 100 MG: 100 TABLET, FILM COATED ORAL at 22:32

## 2024-07-07 RX ADMIN — DOCUSATE SODIUM 100 MG: 100 CAPSULE, LIQUID FILLED ORAL at 00:03

## 2024-07-07 RX ADMIN — Medication 6 MG: at 00:35

## 2024-07-07 RX ADMIN — VERAPAMIL HYDROCHLORIDE 40 MG: 40 TABLET ORAL at 22:34

## 2024-07-07 RX ADMIN — GABAPENTIN 600 MG: 300 CAPSULE ORAL at 22:32

## 2024-07-07 RX ADMIN — FAMOTIDINE 20 MG: 20 TABLET, FILM COATED ORAL at 22:32

## 2024-07-07 RX ADMIN — GABAPENTIN 600 MG: 300 CAPSULE ORAL at 00:03

## 2024-07-07 RX ADMIN — GABAPENTIN 600 MG: 300 CAPSULE ORAL at 14:12

## 2024-07-07 RX ADMIN — VERAPAMIL HYDROCHLORIDE 40 MG: 40 TABLET ORAL at 14:15

## 2024-07-07 RX ADMIN — BACLOFEN 10 MG: 10 TABLET ORAL at 08:27

## 2024-07-07 RX ADMIN — TOPIRAMATE 100 MG: 100 TABLET, FILM COATED ORAL at 08:30

## 2024-07-07 RX ADMIN — ATORVASTATIN CALCIUM 80 MG: 80 TABLET, FILM COATED ORAL at 22:32

## 2024-07-07 RX ADMIN — TRIAMCINOLONE ACETONIDE 40 MG: 40 INJECTION, SUSPENSION INTRA-ARTICULAR; INTRAMUSCULAR at 13:09

## 2024-07-07 RX ADMIN — GABAPENTIN 600 MG: 300 CAPSULE ORAL at 08:27

## 2024-07-07 RX ADMIN — DICLOFENAC SODIUM 2 G: 10 GEL TOPICAL at 00:03

## 2024-07-07 RX ADMIN — Medication 6 MG: at 22:31

## 2024-07-07 RX ADMIN — ATORVASTATIN CALCIUM 80 MG: 80 TABLET, FILM COATED ORAL at 00:03

## 2024-07-07 RX ADMIN — AMITRIPTYLINE HYDROCHLORIDE 100 MG: 50 TABLET, FILM COATED ORAL at 00:03

## 2024-07-07 RX ADMIN — AMITRIPTYLINE HYDROCHLORIDE 100 MG: 50 TABLET, FILM COATED ORAL at 22:34

## 2024-07-07 RX ADMIN — ACETAMINOPHEN 650 MG: 325 TABLET ORAL at 08:26

## 2024-07-07 RX ADMIN — VERAPAMIL HYDROCHLORIDE 40 MG: 40 TABLET ORAL at 06:15

## 2024-07-07 RX ADMIN — Medication 400 MG: at 08:27

## 2024-07-07 RX ADMIN — LACOSAMIDE 100 MG: 100 TABLET, FILM COATED ORAL at 08:27

## 2024-07-07 RX ADMIN — FAMOTIDINE 20 MG: 20 TABLET, FILM COATED ORAL at 00:04

## 2024-07-07 RX ADMIN — PREDNISONE 30 MG: 20 TABLET ORAL at 08:27

## 2024-07-07 ASSESSMENT — PAIN DESCRIPTION - ORIENTATION
ORIENTATION: LEFT
ORIENTATION: LEFT

## 2024-07-07 ASSESSMENT — PAIN DESCRIPTION - LOCATION
LOCATION: SHOULDER
LOCATION: SHOULDER

## 2024-07-07 ASSESSMENT — PAIN SCALES - GENERAL
PAINLEVEL_OUTOF10: 6
PAINLEVEL_OUTOF10: 4
PAINLEVEL_OUTOF10: 9
PAINLEVEL_OUTOF10: 8

## 2024-07-08 PROCEDURE — 1180000000 HC REHAB R&B

## 2024-07-08 PROCEDURE — 99232 SBSQ HOSP IP/OBS MODERATE 35: CPT | Performed by: PSYCHIATRY & NEUROLOGY

## 2024-07-08 PROCEDURE — 6370000000 HC RX 637 (ALT 250 FOR IP): Performed by: PHYSICAL MEDICINE & REHABILITATION

## 2024-07-08 PROCEDURE — 6370000000 HC RX 637 (ALT 250 FOR IP): Performed by: STUDENT IN AN ORGANIZED HEALTH CARE EDUCATION/TRAINING PROGRAM

## 2024-07-08 PROCEDURE — 97535 SELF CARE MNGMENT TRAINING: CPT

## 2024-07-08 PROCEDURE — 97129 THER IVNTJ 1ST 15 MIN: CPT

## 2024-07-08 PROCEDURE — 99232 SBSQ HOSP IP/OBS MODERATE 35: CPT | Performed by: PHYSICAL MEDICINE & REHABILITATION

## 2024-07-08 PROCEDURE — 97530 THERAPEUTIC ACTIVITIES: CPT

## 2024-07-08 PROCEDURE — 92507 TX SP LANG VOICE COMM INDIV: CPT

## 2024-07-08 PROCEDURE — 97112 NEUROMUSCULAR REEDUCATION: CPT

## 2024-07-08 PROCEDURE — 6370000000 HC RX 637 (ALT 250 FOR IP): Performed by: INTERNAL MEDICINE

## 2024-07-08 RX ADMIN — GABAPENTIN 600 MG: 300 CAPSULE ORAL at 15:10

## 2024-07-08 RX ADMIN — HYDROCODONE BITARTRATE AND ACETAMINOPHEN 1 TABLET: 7.5; 325 TABLET ORAL at 15:11

## 2024-07-08 RX ADMIN — VENLAFAXINE HYDROCHLORIDE 75 MG: 75 CAPSULE, EXTENDED RELEASE ORAL at 07:22

## 2024-07-08 RX ADMIN — LACOSAMIDE 100 MG: 100 TABLET, FILM COATED ORAL at 07:21

## 2024-07-08 RX ADMIN — TOPIRAMATE 100 MG: 100 TABLET, FILM COATED ORAL at 21:14

## 2024-07-08 RX ADMIN — FAMOTIDINE 20 MG: 20 TABLET, FILM COATED ORAL at 07:21

## 2024-07-08 RX ADMIN — LACOSAMIDE 100 MG: 100 TABLET, FILM COATED ORAL at 21:12

## 2024-07-08 RX ADMIN — TAMSULOSIN HYDROCHLORIDE 0.4 MG: 0.4 CAPSULE ORAL at 07:21

## 2024-07-08 RX ADMIN — GABAPENTIN 600 MG: 300 CAPSULE ORAL at 21:12

## 2024-07-08 RX ADMIN — Medication 6 MG: at 21:12

## 2024-07-08 RX ADMIN — POLYETHYLENE GLYCOL 3350 17 G: 17 POWDER, FOR SOLUTION ORAL at 07:20

## 2024-07-08 RX ADMIN — DICLOFENAC SODIUM 2 G: 10 GEL TOPICAL at 07:22

## 2024-07-08 RX ADMIN — AMITRIPTYLINE HYDROCHLORIDE 100 MG: 50 TABLET, FILM COATED ORAL at 21:12

## 2024-07-08 RX ADMIN — BUTALBITA,ACETAMINOPHEN AND CAFFEINE 1 CAPSULE: 50; 300; 40 CAPSULE ORAL at 05:48

## 2024-07-08 RX ADMIN — DICLOFENAC SODIUM 2 G: 10 GEL TOPICAL at 21:13

## 2024-07-08 RX ADMIN — VERAPAMIL HYDROCHLORIDE 40 MG: 40 TABLET ORAL at 21:16

## 2024-07-08 RX ADMIN — DOCUSATE SODIUM 100 MG: 100 CAPSULE, LIQUID FILLED ORAL at 21:12

## 2024-07-08 RX ADMIN — VERAPAMIL HYDROCHLORIDE 40 MG: 40 TABLET ORAL at 05:48

## 2024-07-08 RX ADMIN — BACLOFEN 10 MG: 10 TABLET ORAL at 21:13

## 2024-07-08 RX ADMIN — VERAPAMIL HYDROCHLORIDE 40 MG: 40 TABLET ORAL at 15:12

## 2024-07-08 RX ADMIN — BACLOFEN 10 MG: 10 TABLET ORAL at 07:21

## 2024-07-08 RX ADMIN — ATORVASTATIN CALCIUM 80 MG: 80 TABLET, FILM COATED ORAL at 21:12

## 2024-07-08 RX ADMIN — DOCUSATE SODIUM 100 MG: 100 CAPSULE, LIQUID FILLED ORAL at 07:25

## 2024-07-08 RX ADMIN — FAMOTIDINE 20 MG: 20 TABLET, FILM COATED ORAL at 21:13

## 2024-07-08 RX ADMIN — TOPIRAMATE 100 MG: 100 TABLET, FILM COATED ORAL at 07:25

## 2024-07-08 RX ADMIN — HYDROCODONE BITARTRATE AND ACETAMINOPHEN 1 TABLET: 7.5; 325 TABLET ORAL at 03:46

## 2024-07-08 RX ADMIN — GABAPENTIN 600 MG: 300 CAPSULE ORAL at 07:21

## 2024-07-08 RX ADMIN — Medication 400 MG: at 07:21

## 2024-07-08 RX ADMIN — HYDROCODONE BITARTRATE AND ACETAMINOPHEN 1 TABLET: 7.5; 325 TABLET ORAL at 21:13

## 2024-07-08 RX ADMIN — PREDNISONE 30 MG: 20 TABLET ORAL at 07:23

## 2024-07-08 ASSESSMENT — PAIN DESCRIPTION - DESCRIPTORS
DESCRIPTORS: PRESSURE
DESCRIPTORS: ACHING
DESCRIPTORS: PRESSURE
DESCRIPTORS: ACHING
DESCRIPTORS: PRESSURE
DESCRIPTORS: ACHING

## 2024-07-08 ASSESSMENT — PAIN DESCRIPTION - PAIN TYPE: TYPE: CHRONIC PAIN

## 2024-07-08 ASSESSMENT — PAIN SCALES - GENERAL
PAINLEVEL_OUTOF10: 8
PAINLEVEL_OUTOF10: 6
PAINLEVEL_OUTOF10: 6
PAINLEVEL_OUTOF10: 9
PAINLEVEL_OUTOF10: 8
PAINLEVEL_OUTOF10: 5
PAINLEVEL_OUTOF10: 2
PAINLEVEL_OUTOF10: 0
PAINLEVEL_OUTOF10: 4
PAINLEVEL_OUTOF10: 7

## 2024-07-08 ASSESSMENT — PAIN - FUNCTIONAL ASSESSMENT
PAIN_FUNCTIONAL_ASSESSMENT: ACTIVITIES ARE NOT PREVENTED
PAIN_FUNCTIONAL_ASSESSMENT: PREVENTS OR INTERFERES SOME ACTIVE ACTIVITIES AND ADLS
PAIN_FUNCTIONAL_ASSESSMENT: PREVENTS OR INTERFERES SOME ACTIVE ACTIVITIES AND ADLS

## 2024-07-08 ASSESSMENT — PAIN DESCRIPTION - LOCATION
LOCATION: HEAD
LOCATION: KNEE
LOCATION: HEAD

## 2024-07-08 ASSESSMENT — PAIN DESCRIPTION - ORIENTATION
ORIENTATION: RIGHT;LEFT
ORIENTATION: RIGHT;LEFT
ORIENTATION: ANTERIOR
ORIENTATION: OTHER (COMMENT)
ORIENTATION: ANTERIOR

## 2024-07-08 ASSESSMENT — PAIN DESCRIPTION - ONSET: ONSET: ON-GOING

## 2024-07-08 ASSESSMENT — PAIN DESCRIPTION - FREQUENCY: FREQUENCY: CONTINUOUS

## 2024-07-08 NOTE — INTERDISCIPLINARY ROUNDS
[facility] Acute Inpatient Rehabilitation  INTERDISCIPLINARY MEETING  Date: 24  Patient Name: Brennen Mcclure       Room: 2635/2635-01  MRN: [id number]       : 1965  (59 y.o.)     Gender: male     Patient's care team, including nursing, speech language pathologist, occupational therapist, and/or physical therapist, met to discuss patient's care needs. Any patient concerns, change in medical status, and current transfer/mobility status were identified at this time.     Any Additional Pertinent Information:   Maxi move vs. Two- person assist with scotty lazaro     Participating Team Members:  Nurse: Deepali Ovalle RN     Occupational Therapist:  Erica Hernandez OT   Physical Therapist: Fany Kelly PT  Speech Therapist:  N/A

## 2024-07-08 NOTE — PATIENT CARE CONFERENCE
[facility] Acute Inpatient Rehabilitation  TEAM CONFERENCE NOTE  Date: 24  Patient Name: Brennen Mcclure       Room: 2635/2635-01  MRN: [id number]       : 1965  (59 y.o.)     Gender: male     Referring Practitioner: Dr. 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 side (HCC)    NURSING    Bladder Continence  Incontinent of Bladder   Bowel Continence Always incontinent     Date of Last BM: 2024    Bladder/Bowel Program Interventions: Both Bowel & Bladder Program In Place     Wounds/Incisions/Ulcers: wound to buttocks - wound care saw - zinc paste     Pain Control:  Neurology attempting to wean from Fiorocet  using [place]     Pain Medication Regimen Usage Pattern: MAR reviewed and pain medications are being used at the following frequency (Specify Medication, # Doses Administered on average per day, identified patterns of use - for example: time of day, prior to activity/therapy)  Tylenol 650 Qhrs PRN  last dose  Gabapentin 600mg TD     Norco Q6hrs PRN- taken last night   Topamax 100mg BID    Fall Risk:  Falling star program initiated    Medication Education Program: Patient currently unable to manage medications and responsible party being educated    Discharge Preparation Patient/Responsible Party Education In Progress:   Wound Care and seizure precautions    Nursing specific communication for TEAM: No additional information identified requiring communication at this time    PHYSICAL THERAPY      Bed Mobility:    Sit to Supine: Moderate assistance (trunk and left LE)  Sit to Supine: Maximum assistance -1 to 2 person assist.(VCs sequencing, right LE assist left)   Scooting: Maximal assistance (to head of bed)  Bed Mobility Comments: bed flat left handrail , left side as home environment      Transfers:  Sit to Stand: Moderate Assistance;Minimal Assistance;Contact guard assistance (WC and cammode to

## 2024-07-09 ENCOUNTER — APPOINTMENT (OUTPATIENT)
Dept: CT IMAGING | Age: 59
DRG: 058 | End: 2024-07-09
Attending: PHYSICAL MEDICINE & REHABILITATION
Payer: MEDICAID

## 2024-07-09 LAB — GLUCOSE BLD-MCNC: 119 MG/DL (ref 75–110)

## 2024-07-09 PROCEDURE — 82947 ASSAY GLUCOSE BLOOD QUANT: CPT

## 2024-07-09 PROCEDURE — 99233 SBSQ HOSP IP/OBS HIGH 50: CPT | Performed by: PHYSICAL MEDICINE & REHABILITATION

## 2024-07-09 PROCEDURE — 97542 WHEELCHAIR MNGMENT TRAINING: CPT

## 2024-07-09 PROCEDURE — 97530 THERAPEUTIC ACTIVITIES: CPT

## 2024-07-09 PROCEDURE — 6370000000 HC RX 637 (ALT 250 FOR IP): Performed by: PHYSICAL MEDICINE & REHABILITATION

## 2024-07-09 PROCEDURE — 97110 THERAPEUTIC EXERCISES: CPT

## 2024-07-09 PROCEDURE — 6370000000 HC RX 637 (ALT 250 FOR IP): Performed by: STUDENT IN AN ORGANIZED HEALTH CARE EDUCATION/TRAINING PROGRAM

## 2024-07-09 PROCEDURE — 70450 CT HEAD/BRAIN W/O DYE: CPT

## 2024-07-09 PROCEDURE — 97112 NEUROMUSCULAR REEDUCATION: CPT

## 2024-07-09 PROCEDURE — 97535 SELF CARE MNGMENT TRAINING: CPT

## 2024-07-09 PROCEDURE — 92507 TX SP LANG VOICE COMM INDIV: CPT

## 2024-07-09 PROCEDURE — 1180000000 HC REHAB R&B

## 2024-07-09 PROCEDURE — 97129 THER IVNTJ 1ST 15 MIN: CPT

## 2024-07-09 PROCEDURE — 6370000000 HC RX 637 (ALT 250 FOR IP): Performed by: INTERNAL MEDICINE

## 2024-07-09 RX ADMIN — VERAPAMIL HYDROCHLORIDE 40 MG: 40 TABLET ORAL at 13:48

## 2024-07-09 RX ADMIN — LACOSAMIDE 100 MG: 100 TABLET, FILM COATED ORAL at 19:47

## 2024-07-09 RX ADMIN — DOCUSATE SODIUM 100 MG: 100 CAPSULE, LIQUID FILLED ORAL at 07:37

## 2024-07-09 RX ADMIN — PREDNISONE 30 MG: 20 TABLET ORAL at 07:37

## 2024-07-09 RX ADMIN — BACLOFEN 10 MG: 10 TABLET ORAL at 07:38

## 2024-07-09 RX ADMIN — GABAPENTIN 600 MG: 300 CAPSULE ORAL at 19:48

## 2024-07-09 RX ADMIN — TOPIRAMATE 100 MG: 100 TABLET, FILM COATED ORAL at 22:35

## 2024-07-09 RX ADMIN — ATORVASTATIN CALCIUM 80 MG: 80 TABLET, FILM COATED ORAL at 19:48

## 2024-07-09 RX ADMIN — GABAPENTIN 600 MG: 300 CAPSULE ORAL at 07:37

## 2024-07-09 RX ADMIN — VENLAFAXINE HYDROCHLORIDE 75 MG: 75 CAPSULE, EXTENDED RELEASE ORAL at 07:37

## 2024-07-09 RX ADMIN — Medication 400 MG: at 07:37

## 2024-07-09 RX ADMIN — AMITRIPTYLINE HYDROCHLORIDE 100 MG: 50 TABLET, FILM COATED ORAL at 19:47

## 2024-07-09 RX ADMIN — FAMOTIDINE 20 MG: 20 TABLET, FILM COATED ORAL at 07:37

## 2024-07-09 RX ADMIN — DICLOFENAC SODIUM 2 G: 10 GEL TOPICAL at 07:44

## 2024-07-09 RX ADMIN — HYDROCODONE BITARTRATE AND ACETAMINOPHEN 1 TABLET: 7.5; 325 TABLET ORAL at 19:48

## 2024-07-09 RX ADMIN — VERAPAMIL HYDROCHLORIDE 40 MG: 40 TABLET ORAL at 22:35

## 2024-07-09 RX ADMIN — VERAPAMIL HYDROCHLORIDE 40 MG: 40 TABLET ORAL at 07:52

## 2024-07-09 RX ADMIN — TOPIRAMATE 100 MG: 100 TABLET, FILM COATED ORAL at 07:52

## 2024-07-09 RX ADMIN — Medication 6 MG: at 19:48

## 2024-07-09 RX ADMIN — BACLOFEN 10 MG: 10 TABLET ORAL at 19:48

## 2024-07-09 RX ADMIN — DOCUSATE SODIUM 100 MG: 100 CAPSULE, LIQUID FILLED ORAL at 19:48

## 2024-07-09 RX ADMIN — DICLOFENAC SODIUM 2 G: 10 GEL TOPICAL at 19:59

## 2024-07-09 RX ADMIN — LACOSAMIDE 100 MG: 100 TABLET, FILM COATED ORAL at 07:38

## 2024-07-09 RX ADMIN — FAMOTIDINE 20 MG: 20 TABLET, FILM COATED ORAL at 19:48

## 2024-07-09 RX ADMIN — TAMSULOSIN HYDROCHLORIDE 0.4 MG: 0.4 CAPSULE ORAL at 07:37

## 2024-07-09 RX ADMIN — POLYETHYLENE GLYCOL 3350 17 G: 17 POWDER, FOR SOLUTION ORAL at 07:37

## 2024-07-09 ASSESSMENT — PAIN SCALES - GENERAL
PAINLEVEL_OUTOF10: 3
PAINLEVEL_OUTOF10: 9

## 2024-07-09 ASSESSMENT — PAIN DESCRIPTION - ORIENTATION: ORIENTATION: MID;LEFT

## 2024-07-09 ASSESSMENT — PAIN DESCRIPTION - DESCRIPTORS: DESCRIPTORS: ACHING;STABBING

## 2024-07-09 ASSESSMENT — PAIN - FUNCTIONAL ASSESSMENT: PAIN_FUNCTIONAL_ASSESSMENT: PREVENTS OR INTERFERES WITH MANY ACTIVE NOT PASSIVE ACTIVITIES

## 2024-07-09 ASSESSMENT — PAIN DESCRIPTION - LOCATION: LOCATION: HEAD

## 2024-07-10 PROCEDURE — 97112 NEUROMUSCULAR REEDUCATION: CPT

## 2024-07-10 PROCEDURE — 6370000000 HC RX 637 (ALT 250 FOR IP): Performed by: STUDENT IN AN ORGANIZED HEALTH CARE EDUCATION/TRAINING PROGRAM

## 2024-07-10 PROCEDURE — 97110 THERAPEUTIC EXERCISES: CPT

## 2024-07-10 PROCEDURE — 1180000000 HC REHAB R&B

## 2024-07-10 PROCEDURE — 6370000000 HC RX 637 (ALT 250 FOR IP): Performed by: PHYSICAL MEDICINE & REHABILITATION

## 2024-07-10 PROCEDURE — 92526 ORAL FUNCTION THERAPY: CPT

## 2024-07-10 PROCEDURE — 99232 SBSQ HOSP IP/OBS MODERATE 35: CPT | Performed by: PHYSICAL MEDICINE & REHABILITATION

## 2024-07-10 PROCEDURE — 97129 THER IVNTJ 1ST 15 MIN: CPT

## 2024-07-10 PROCEDURE — 97530 THERAPEUTIC ACTIVITIES: CPT

## 2024-07-10 PROCEDURE — 99231 SBSQ HOSP IP/OBS SF/LOW 25: CPT | Performed by: INTERNAL MEDICINE

## 2024-07-10 PROCEDURE — 97535 SELF CARE MNGMENT TRAINING: CPT

## 2024-07-10 PROCEDURE — 6370000000 HC RX 637 (ALT 250 FOR IP): Performed by: INTERNAL MEDICINE

## 2024-07-10 RX ADMIN — FAMOTIDINE 20 MG: 20 TABLET, FILM COATED ORAL at 20:50

## 2024-07-10 RX ADMIN — TAMSULOSIN HYDROCHLORIDE 0.4 MG: 0.4 CAPSULE ORAL at 09:16

## 2024-07-10 RX ADMIN — AMITRIPTYLINE HYDROCHLORIDE 100 MG: 50 TABLET, FILM COATED ORAL at 20:53

## 2024-07-10 RX ADMIN — VENLAFAXINE HYDROCHLORIDE 75 MG: 75 CAPSULE, EXTENDED RELEASE ORAL at 09:19

## 2024-07-10 RX ADMIN — DOCUSATE SODIUM 100 MG: 100 CAPSULE, LIQUID FILLED ORAL at 09:15

## 2024-07-10 RX ADMIN — TOPIRAMATE 100 MG: 100 TABLET, FILM COATED ORAL at 09:18

## 2024-07-10 RX ADMIN — Medication 6 MG: at 20:49

## 2024-07-10 RX ADMIN — BACLOFEN 10 MG: 10 TABLET ORAL at 20:49

## 2024-07-10 RX ADMIN — DICLOFENAC SODIUM 2 G: 10 GEL TOPICAL at 09:23

## 2024-07-10 RX ADMIN — GABAPENTIN 600 MG: 300 CAPSULE ORAL at 20:49

## 2024-07-10 RX ADMIN — FAMOTIDINE 20 MG: 20 TABLET, FILM COATED ORAL at 09:16

## 2024-07-10 RX ADMIN — ATORVASTATIN CALCIUM 80 MG: 80 TABLET, FILM COATED ORAL at 20:49

## 2024-07-10 RX ADMIN — VERAPAMIL HYDROCHLORIDE 40 MG: 40 TABLET ORAL at 14:06

## 2024-07-10 RX ADMIN — TOPIRAMATE 100 MG: 100 TABLET, FILM COATED ORAL at 20:53

## 2024-07-10 RX ADMIN — GABAPENTIN 600 MG: 300 CAPSULE ORAL at 14:06

## 2024-07-10 RX ADMIN — BACLOFEN 10 MG: 10 TABLET ORAL at 09:15

## 2024-07-10 RX ADMIN — VERAPAMIL HYDROCHLORIDE 40 MG: 40 TABLET ORAL at 20:56

## 2024-07-10 RX ADMIN — LACOSAMIDE 100 MG: 100 TABLET, FILM COATED ORAL at 09:15

## 2024-07-10 RX ADMIN — PREDNISONE 30 MG: 20 TABLET ORAL at 09:15

## 2024-07-10 RX ADMIN — DOCUSATE SODIUM 100 MG: 100 CAPSULE, LIQUID FILLED ORAL at 20:50

## 2024-07-10 RX ADMIN — LACOSAMIDE 100 MG: 100 TABLET, FILM COATED ORAL at 20:49

## 2024-07-10 RX ADMIN — Medication 400 MG: at 09:15

## 2024-07-10 RX ADMIN — GABAPENTIN 600 MG: 300 CAPSULE ORAL at 09:16

## 2024-07-10 RX ADMIN — VERAPAMIL HYDROCHLORIDE 40 MG: 40 TABLET ORAL at 09:16

## 2024-07-10 ASSESSMENT — PAIN DESCRIPTION - DESCRIPTORS: DESCRIPTORS: ACHING

## 2024-07-10 ASSESSMENT — PAIN SCALES - GENERAL
PAINLEVEL_OUTOF10: 8
PAINLEVEL_OUTOF10: 0
PAINLEVEL_OUTOF10: 8

## 2024-07-10 ASSESSMENT — PAIN - FUNCTIONAL ASSESSMENT: PAIN_FUNCTIONAL_ASSESSMENT: ACTIVITIES ARE NOT PREVENTED

## 2024-07-10 ASSESSMENT — PAIN DESCRIPTION - LOCATION: LOCATION: HEAD

## 2024-07-11 PROCEDURE — 6370000000 HC RX 637 (ALT 250 FOR IP): Performed by: STUDENT IN AN ORGANIZED HEALTH CARE EDUCATION/TRAINING PROGRAM

## 2024-07-11 PROCEDURE — 6370000000 HC RX 637 (ALT 250 FOR IP): Performed by: PHYSICAL MEDICINE & REHABILITATION

## 2024-07-11 PROCEDURE — 94761 N-INVAS EAR/PLS OXIMETRY MLT: CPT

## 2024-07-11 PROCEDURE — 97129 THER IVNTJ 1ST 15 MIN: CPT

## 2024-07-11 PROCEDURE — 92507 TX SP LANG VOICE COMM INDIV: CPT

## 2024-07-11 PROCEDURE — 97535 SELF CARE MNGMENT TRAINING: CPT

## 2024-07-11 PROCEDURE — 1180000000 HC REHAB R&B

## 2024-07-11 PROCEDURE — 97530 THERAPEUTIC ACTIVITIES: CPT

## 2024-07-11 PROCEDURE — 97110 THERAPEUTIC EXERCISES: CPT

## 2024-07-11 PROCEDURE — 94664 DEMO&/EVAL PT USE INHALER: CPT

## 2024-07-11 PROCEDURE — 6370000000 HC RX 637 (ALT 250 FOR IP): Performed by: INTERNAL MEDICINE

## 2024-07-11 PROCEDURE — 99232 SBSQ HOSP IP/OBS MODERATE 35: CPT | Performed by: PHYSICAL MEDICINE & REHABILITATION

## 2024-07-11 PROCEDURE — 94640 AIRWAY INHALATION TREATMENT: CPT

## 2024-07-11 RX ADMIN — BUTALBITA,ACETAMINOPHEN AND CAFFEINE 1 CAPSULE: 50; 300; 40 CAPSULE ORAL at 09:26

## 2024-07-11 RX ADMIN — VENLAFAXINE HYDROCHLORIDE 75 MG: 75 CAPSULE, EXTENDED RELEASE ORAL at 09:27

## 2024-07-11 RX ADMIN — HYDROCODONE BITARTRATE AND ACETAMINOPHEN 1 TABLET: 7.5; 325 TABLET ORAL at 13:15

## 2024-07-11 RX ADMIN — DOCUSATE SODIUM 100 MG: 100 CAPSULE, LIQUID FILLED ORAL at 20:48

## 2024-07-11 RX ADMIN — DICLOFENAC SODIUM 2 G: 10 GEL TOPICAL at 09:28

## 2024-07-11 RX ADMIN — GABAPENTIN 600 MG: 300 CAPSULE ORAL at 09:27

## 2024-07-11 RX ADMIN — ATORVASTATIN CALCIUM 80 MG: 80 TABLET, FILM COATED ORAL at 20:48

## 2024-07-11 RX ADMIN — PREDNISONE 30 MG: 20 TABLET ORAL at 09:27

## 2024-07-11 RX ADMIN — FAMOTIDINE 20 MG: 20 TABLET, FILM COATED ORAL at 20:48

## 2024-07-11 RX ADMIN — VERAPAMIL HYDROCHLORIDE 40 MG: 40 TABLET ORAL at 05:28

## 2024-07-11 RX ADMIN — LACOSAMIDE 100 MG: 100 TABLET, FILM COATED ORAL at 09:27

## 2024-07-11 RX ADMIN — POLYETHYLENE GLYCOL 3350 17 G: 17 POWDER, FOR SOLUTION ORAL at 09:28

## 2024-07-11 RX ADMIN — VERAPAMIL HYDROCHLORIDE 40 MG: 40 TABLET ORAL at 20:50

## 2024-07-11 RX ADMIN — DOCUSATE SODIUM 100 MG: 100 CAPSULE, LIQUID FILLED ORAL at 09:27

## 2024-07-11 RX ADMIN — GABAPENTIN 600 MG: 300 CAPSULE ORAL at 20:48

## 2024-07-11 RX ADMIN — IPRATROPIUM BROMIDE AND ALBUTEROL SULFATE 1 DOSE: 2.5; .5 SOLUTION RESPIRATORY (INHALATION) at 14:06

## 2024-07-11 RX ADMIN — TOPIRAMATE 100 MG: 100 TABLET, FILM COATED ORAL at 09:29

## 2024-07-11 RX ADMIN — FAMOTIDINE 20 MG: 20 TABLET, FILM COATED ORAL at 09:27

## 2024-07-11 RX ADMIN — Medication 400 MG: at 09:27

## 2024-07-11 RX ADMIN — LACOSAMIDE 100 MG: 100 TABLET, FILM COATED ORAL at 20:48

## 2024-07-11 RX ADMIN — AMITRIPTYLINE HYDROCHLORIDE 100 MG: 50 TABLET, FILM COATED ORAL at 20:51

## 2024-07-11 RX ADMIN — BACLOFEN 10 MG: 10 TABLET ORAL at 20:48

## 2024-07-11 RX ADMIN — TAMSULOSIN HYDROCHLORIDE 0.4 MG: 0.4 CAPSULE ORAL at 09:27

## 2024-07-11 RX ADMIN — BACLOFEN 10 MG: 10 TABLET ORAL at 09:27

## 2024-07-11 RX ADMIN — GABAPENTIN 600 MG: 300 CAPSULE ORAL at 13:16

## 2024-07-11 RX ADMIN — TOPIRAMATE 100 MG: 100 TABLET, FILM COATED ORAL at 20:50

## 2024-07-11 RX ADMIN — VERAPAMIL HYDROCHLORIDE 40 MG: 40 TABLET ORAL at 13:16

## 2024-07-11 ASSESSMENT — PAIN DESCRIPTION - LOCATION
LOCATION: OTHER (COMMENT);CHEST
LOCATION: HEAD

## 2024-07-11 ASSESSMENT — PAIN SCALES - GENERAL
PAINLEVEL_OUTOF10: 9
PAINLEVEL_OUTOF10: 9
PAINLEVEL_OUTOF10: 7
PAINLEVEL_OUTOF10: 9
PAINLEVEL_OUTOF10: 8
PAINLEVEL_OUTOF10: 8

## 2024-07-11 ASSESSMENT — PAIN - FUNCTIONAL ASSESSMENT
PAIN_FUNCTIONAL_ASSESSMENT: ACTIVITIES ARE NOT PREVENTED
PAIN_FUNCTIONAL_ASSESSMENT: PREVENTS OR INTERFERES SOME ACTIVE ACTIVITIES AND ADLS

## 2024-07-11 ASSESSMENT — PAIN DESCRIPTION - ORIENTATION
ORIENTATION: OTHER (COMMENT)
ORIENTATION: OTHER (COMMENT)

## 2024-07-11 ASSESSMENT — PAIN DESCRIPTION - DESCRIPTORS
DESCRIPTORS: THROBBING
DESCRIPTORS: ACHING
DESCRIPTORS: THROBBING;PRESSURE

## 2024-07-11 NOTE — CARE COORDINATION
[facility] CASE MANAGEMENT NOTE:    Admission Date:  6/27/2024 Brennen Mcclure is a 59 y.o.  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 The patient will be going home with [facility] on Monday 7/15. Per Mara and [facility], the patient will need to be home by 1-1:30pm on Monday for a SOC for hospice at 2:30pm. RN CM will request transport when able. Mara also provided RNCM with the LSW assigned to the patient for [facility] [place] [phone]. Will call in AM to ensure that the HHA for the patient will be available. Also discussed with Mara whether or not she had any family at all that would be able to help out. Mara states that her niece has been offering to help but she didn't want to impose on her because she has two young kids. However, the niece and her  will be helping move furniture in the house to make room in the bedroom for a hospital bed. Mara states that her niece would likely do anything in her power to help out. RN CM encouraged Mara to accept the help that is being offered. Mara will reach out to the niece and see if she would be able to come in this weekend and do some family training with therapy. In addition, Mara would like to start being trained on the eva lift while the patient is still here as they will have one at home from hospice. No other needs at this time. Will follow up tomorrow.    Will continue to follow for additional discharge needs.    Electronically signed by Christine Gonzalez RN on 7/11/2024 at 5:50 PM

## 2024-07-12 LAB
ABSOLUTE BANDS: 0.43 K/UL (ref 0–1)
ANION GAP SERPL CALCULATED.3IONS-SCNC: 14 MMOL/L (ref 9–17)
BANDS: 3 % (ref 0–10)
BASOPHILS # BLD: 0 K/UL (ref 0–0.2)
BASOPHILS NFR BLD: 0 % (ref 0–2)
BUN SERPL-MCNC: 27 MG/DL (ref 8–23)
CALCIUM SERPL-MCNC: 9.9 MG/DL (ref 8.6–10.4)
CHLORIDE SERPL-SCNC: 102 MMOL/L (ref 98–107)
CO2 SERPL-SCNC: 23 MMOL/L (ref 20–31)
CREAT SERPL-MCNC: 0.7 MG/DL (ref 0.7–1.2)
EOSINOPHIL # BLD: 0.29 K/UL (ref 0–0.4)
EOSINOPHILS RELATIVE PERCENT: 2 % (ref 0–4)
ERYTHROCYTE [DISTWIDTH] IN BLOOD BY AUTOMATED COUNT: 18.5 % (ref 11.5–14.9)
GFR, ESTIMATED: >90 ML/MIN/1.73M2
GLUCOSE SERPL-MCNC: 148 MG/DL (ref 70–99)
HCT VFR BLD AUTO: 44.6 % (ref 41–53)
HGB BLD-MCNC: 13.8 G/DL (ref 13.5–17.5)
LYMPHOCYTES NFR BLD: 1.73 K/UL (ref 1–4.8)
LYMPHOCYTES RELATIVE PERCENT: 12 % (ref 24–44)
MCH RBC QN AUTO: 27.9 PG (ref 26–34)
MCHC RBC AUTO-ENTMCNC: 30.9 G/DL (ref 31–37)
MCV RBC AUTO: 90.3 FL (ref 80–100)
MONOCYTES NFR BLD: 0.86 K/UL (ref 0.1–1.3)
MONOCYTES NFR BLD: 6 % (ref 1–7)
MORPHOLOGY: ABNORMAL
MORPHOLOGY: ABNORMAL
NEUTROPHILS NFR BLD: 77 % (ref 36–66)
NEUTS SEG NFR BLD: 11.09 K/UL (ref 1.3–9.1)
PLATELET # BLD AUTO: 131 K/UL (ref 150–450)
PMV BLD AUTO: 10.4 FL (ref 6–12)
POTASSIUM SERPL-SCNC: 4 MMOL/L (ref 3.7–5.3)
RBC # BLD AUTO: 4.94 M/UL (ref 4.5–5.9)
SODIUM SERPL-SCNC: 139 MMOL/L (ref 135–144)
WBC OTHER # BLD: 14.4 K/UL (ref 3.5–11)

## 2024-07-12 PROCEDURE — 97530 THERAPEUTIC ACTIVITIES: CPT

## 2024-07-12 PROCEDURE — 80048 BASIC METABOLIC PNL TOTAL CA: CPT

## 2024-07-12 PROCEDURE — 6370000000 HC RX 637 (ALT 250 FOR IP): Performed by: PHYSICAL MEDICINE & REHABILITATION

## 2024-07-12 PROCEDURE — 92526 ORAL FUNCTION THERAPY: CPT

## 2024-07-12 PROCEDURE — 6370000000 HC RX 637 (ALT 250 FOR IP): Performed by: INTERNAL MEDICINE

## 2024-07-12 PROCEDURE — 36415 COLL VENOUS BLD VENIPUNCTURE: CPT

## 2024-07-12 PROCEDURE — 99232 SBSQ HOSP IP/OBS MODERATE 35: CPT | Performed by: INTERNAL MEDICINE

## 2024-07-12 PROCEDURE — 1180000000 HC REHAB R&B

## 2024-07-12 PROCEDURE — 97112 NEUROMUSCULAR REEDUCATION: CPT

## 2024-07-12 PROCEDURE — 97535 SELF CARE MNGMENT TRAINING: CPT

## 2024-07-12 PROCEDURE — 85025 COMPLETE CBC W/AUTO DIFF WBC: CPT

## 2024-07-12 PROCEDURE — 6370000000 HC RX 637 (ALT 250 FOR IP): Performed by: STUDENT IN AN ORGANIZED HEALTH CARE EDUCATION/TRAINING PROGRAM

## 2024-07-12 PROCEDURE — 97110 THERAPEUTIC EXERCISES: CPT

## 2024-07-12 PROCEDURE — 99232 SBSQ HOSP IP/OBS MODERATE 35: CPT | Performed by: PHYSICAL MEDICINE & REHABILITATION

## 2024-07-12 PROCEDURE — 97129 THER IVNTJ 1ST 15 MIN: CPT

## 2024-07-12 RX ORDER — PREDNISONE 10 MG/1
10 TABLET ORAL DAILY
Qty: 7 TABLET | Refills: 0 | Status: CANCELLED | OUTPATIENT
Start: 2024-07-19 | End: 2024-07-26

## 2024-07-12 RX ORDER — CALCIUM CARBONATE 500 MG/1
500 TABLET, CHEWABLE ORAL EVERY 6 HOURS PRN
Status: CANCELLED | COMMUNITY
Start: 2024-07-12 | End: 2024-08-11

## 2024-07-12 RX ADMIN — LACOSAMIDE 100 MG: 100 TABLET, FILM COATED ORAL at 07:38

## 2024-07-12 RX ADMIN — GABAPENTIN 600 MG: 300 CAPSULE ORAL at 21:00

## 2024-07-12 RX ADMIN — TAMSULOSIN HYDROCHLORIDE 0.4 MG: 0.4 CAPSULE ORAL at 07:39

## 2024-07-12 RX ADMIN — VERAPAMIL HYDROCHLORIDE 40 MG: 40 TABLET ORAL at 05:35

## 2024-07-12 RX ADMIN — FAMOTIDINE 20 MG: 20 TABLET, FILM COATED ORAL at 21:00

## 2024-07-12 RX ADMIN — BUTALBITA,ACETAMINOPHEN AND CAFFEINE 1 CAPSULE: 50; 300; 40 CAPSULE ORAL at 09:37

## 2024-07-12 RX ADMIN — BACLOFEN 10 MG: 10 TABLET ORAL at 07:38

## 2024-07-12 RX ADMIN — DICLOFENAC SODIUM 2 G: 10 GEL TOPICAL at 07:43

## 2024-07-12 RX ADMIN — GABAPENTIN 600 MG: 300 CAPSULE ORAL at 13:21

## 2024-07-12 RX ADMIN — DICLOFENAC SODIUM 2 G: 10 GEL TOPICAL at 21:05

## 2024-07-12 RX ADMIN — TOPIRAMATE 100 MG: 100 TABLET, FILM COATED ORAL at 21:01

## 2024-07-12 RX ADMIN — GABAPENTIN 600 MG: 300 CAPSULE ORAL at 07:38

## 2024-07-12 RX ADMIN — VERAPAMIL HYDROCHLORIDE 40 MG: 40 TABLET ORAL at 21:01

## 2024-07-12 RX ADMIN — TOPIRAMATE 100 MG: 100 TABLET, FILM COATED ORAL at 09:37

## 2024-07-12 RX ADMIN — Medication 400 MG: at 07:38

## 2024-07-12 RX ADMIN — FAMOTIDINE 20 MG: 20 TABLET, FILM COATED ORAL at 07:39

## 2024-07-12 RX ADMIN — DOCUSATE SODIUM 100 MG: 100 CAPSULE, LIQUID FILLED ORAL at 21:00

## 2024-07-12 RX ADMIN — PREDNISONE 20 MG: 20 TABLET ORAL at 07:38

## 2024-07-12 RX ADMIN — DOCUSATE SODIUM 100 MG: 100 CAPSULE, LIQUID FILLED ORAL at 07:38

## 2024-07-12 RX ADMIN — VENLAFAXINE HYDROCHLORIDE 75 MG: 75 CAPSULE, EXTENDED RELEASE ORAL at 07:38

## 2024-07-12 RX ADMIN — ATORVASTATIN CALCIUM 80 MG: 80 TABLET, FILM COATED ORAL at 21:00

## 2024-07-12 RX ADMIN — VERAPAMIL HYDROCHLORIDE 40 MG: 40 TABLET ORAL at 13:21

## 2024-07-12 RX ADMIN — LACOSAMIDE 100 MG: 100 TABLET, FILM COATED ORAL at 21:00

## 2024-07-12 RX ADMIN — AMITRIPTYLINE HYDROCHLORIDE 100 MG: 50 TABLET, FILM COATED ORAL at 21:01

## 2024-07-12 RX ADMIN — BACLOFEN 10 MG: 10 TABLET ORAL at 21:00

## 2024-07-12 RX ADMIN — POLYETHYLENE GLYCOL 3350 17 G: 17 POWDER, FOR SOLUTION ORAL at 07:38

## 2024-07-12 ASSESSMENT — PAIN DESCRIPTION - ORIENTATION: ORIENTATION: LEFT;RIGHT

## 2024-07-12 ASSESSMENT — PAIN DESCRIPTION - DESCRIPTORS
DESCRIPTORS: ACHING
DESCRIPTORS: DISCOMFORT;ACHING

## 2024-07-12 ASSESSMENT — PAIN SCALES - GENERAL
PAINLEVEL_OUTOF10: 3
PAINLEVEL_OUTOF10: 4
PAINLEVEL_OUTOF10: 2
PAINLEVEL_OUTOF10: 0
PAINLEVEL_OUTOF10: 0
PAINLEVEL_OUTOF10: 3

## 2024-07-12 ASSESSMENT — PAIN SCALES - WONG BAKER
WONGBAKER_NUMERICALRESPONSE: NO HURT
WONGBAKER_NUMERICALRESPONSE: NO HURT

## 2024-07-12 ASSESSMENT — PAIN DESCRIPTION - LOCATION
LOCATION: KNEE
LOCATION: HEAD

## 2024-07-12 NOTE — DISCHARGE SUMMARY
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 Vilchis     Neurosurgery status post right cranioplasty on 6/4,, emergent right craniotomy for hematoma evacuation 6/6 hold all anticoagulation antiplatelets patient noted to be DNR CCA and DNI patient on LTM E to evaluate for seizures    Patient evaluated today:  ***    Rehab course:   Neurology followed to manage seizures and headache. Medications were adjusted and he was weaned from Fioricet to 1 daily prn with Verapamil and prn Norco added with increased doses of topamax and amitriptyline. Headache was mildly improved. He was continuing to have staring spells intermittently during his admission. His left shoulder pain responded to corticosteroid injection on 7/7/24. He was on prednisone taper for thrombocytopenia and used home supply of Promacta. He elected to choose DNR-CC status during admission. He met with palliative care and Hospice during admission and chose to discharge home with Hospice. He had additional episodes of unresponsiveness and staring episodes but patient and wife elected not to pursue further treatment/workup prior to discharge. Chronic conditions were stable on home medications.     The patient participated in an aggressive multidisciplinary inpatient rehabilitation program involving 3 hours per day, 5 days per week of rehabilitation.  Patient benefited from inpatient rehab and was discharged in good stable condition.      Consults:   Hematology/Oncology, Internal Medicine, Neurology, Psychiatry, and Palliative Care    Significant

## 2024-07-12 NOTE — CARE COORDINATION
[facility] CASE MANAGEMENT NOTE:    Admission Date:  6/27/2024 Brennen Mcclure is a 59 y.o.  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 Patient will need to be home by 2pm at the latest as he has a SOC at 2:30pm on Monday. Transport request for 1pm faxed to [facility]. The certificate of medical necessity will need to be faxed Monday.     Will continue to follow for additional discharge needs.    Electronically signed by Christine Gonzalez RN on 7/12/2024 at 2:23 PM

## 2024-07-13 ENCOUNTER — APPOINTMENT (OUTPATIENT)
Dept: GENERAL RADIOLOGY | Age: 59
DRG: 058 | End: 2024-07-13
Attending: PHYSICAL MEDICINE & REHABILITATION
Payer: MEDICAID

## 2024-07-13 LAB
BACTERIA URNS QL MICRO: ABNORMAL
BILIRUB UR QL STRIP: NEGATIVE
CASTS #/AREA URNS LPF: ABNORMAL /LPF
CLARITY UR: CLEAR
COLOR UR: YELLOW
EPI CELLS #/AREA URNS HPF: ABNORMAL /HPF
GLUCOSE UR STRIP-MCNC: NEGATIVE MG/DL
HGB UR QL STRIP.AUTO: NEGATIVE
KETONES UR STRIP-MCNC: NEGATIVE MG/DL
LEUKOCYTE ESTERASE UR QL STRIP: ABNORMAL
NITRITE UR QL STRIP: POSITIVE
PH UR STRIP: 5.5 [PH] (ref 5–8)
PROT UR STRIP-MCNC: NEGATIVE MG/DL
RBC #/AREA URNS HPF: ABNORMAL /HPF
SP GR UR STRIP: 1.02 (ref 1–1.03)
UROBILINOGEN UR STRIP-ACNC: NORMAL EU/DL (ref 0–1)
WBC #/AREA URNS HPF: ABNORMAL /HPF

## 2024-07-13 PROCEDURE — 94640 AIRWAY INHALATION TREATMENT: CPT

## 2024-07-13 PROCEDURE — 6370000000 HC RX 637 (ALT 250 FOR IP): Performed by: PHYSICAL MEDICINE & REHABILITATION

## 2024-07-13 PROCEDURE — 97112 NEUROMUSCULAR REEDUCATION: CPT

## 2024-07-13 PROCEDURE — 1180000000 HC REHAB R&B

## 2024-07-13 PROCEDURE — 6370000000 HC RX 637 (ALT 250 FOR IP): Performed by: INTERNAL MEDICINE

## 2024-07-13 PROCEDURE — 99232 SBSQ HOSP IP/OBS MODERATE 35: CPT | Performed by: INTERNAL MEDICINE

## 2024-07-13 PROCEDURE — 94761 N-INVAS EAR/PLS OXIMETRY MLT: CPT

## 2024-07-13 PROCEDURE — 97530 THERAPEUTIC ACTIVITIES: CPT

## 2024-07-13 PROCEDURE — 6370000000 HC RX 637 (ALT 250 FOR IP): Performed by: STUDENT IN AN ORGANIZED HEALTH CARE EDUCATION/TRAINING PROGRAM

## 2024-07-13 PROCEDURE — 97110 THERAPEUTIC EXERCISES: CPT

## 2024-07-13 PROCEDURE — 97535 SELF CARE MNGMENT TRAINING: CPT

## 2024-07-13 PROCEDURE — 81001 URINALYSIS AUTO W/SCOPE: CPT

## 2024-07-13 PROCEDURE — 71045 X-RAY EXAM CHEST 1 VIEW: CPT

## 2024-07-13 RX ORDER — CEPHALEXIN 250 MG/1
250 CAPSULE ORAL 4 TIMES DAILY
Status: DISCONTINUED | OUTPATIENT
Start: 2024-07-13 | End: 2024-07-15 | Stop reason: HOSPADM

## 2024-07-13 RX ADMIN — DOCUSATE SODIUM 100 MG: 100 CAPSULE, LIQUID FILLED ORAL at 21:28

## 2024-07-13 RX ADMIN — ATORVASTATIN CALCIUM 80 MG: 80 TABLET, FILM COATED ORAL at 21:28

## 2024-07-13 RX ADMIN — CEPHALEXIN 250 MG: 250 CAPSULE ORAL at 17:33

## 2024-07-13 RX ADMIN — GABAPENTIN 600 MG: 300 CAPSULE ORAL at 09:07

## 2024-07-13 RX ADMIN — DICLOFENAC SODIUM 2 G: 10 GEL TOPICAL at 09:08

## 2024-07-13 RX ADMIN — LACOSAMIDE 100 MG: 100 TABLET, FILM COATED ORAL at 21:28

## 2024-07-13 RX ADMIN — FAMOTIDINE 20 MG: 20 TABLET, FILM COATED ORAL at 09:08

## 2024-07-13 RX ADMIN — TAMSULOSIN HYDROCHLORIDE 0.4 MG: 0.4 CAPSULE ORAL at 09:07

## 2024-07-13 RX ADMIN — LACOSAMIDE 100 MG: 100 TABLET, FILM COATED ORAL at 09:08

## 2024-07-13 RX ADMIN — TOPIRAMATE 100 MG: 100 TABLET, FILM COATED ORAL at 09:10

## 2024-07-13 RX ADMIN — Medication 6 MG: at 21:28

## 2024-07-13 RX ADMIN — FAMOTIDINE 20 MG: 20 TABLET, FILM COATED ORAL at 21:28

## 2024-07-13 RX ADMIN — VERAPAMIL HYDROCHLORIDE 40 MG: 40 TABLET ORAL at 14:37

## 2024-07-13 RX ADMIN — TOPIRAMATE 100 MG: 100 TABLET, FILM COATED ORAL at 21:30

## 2024-07-13 RX ADMIN — VERAPAMIL HYDROCHLORIDE 40 MG: 40 TABLET ORAL at 21:30

## 2024-07-13 RX ADMIN — GABAPENTIN 600 MG: 300 CAPSULE ORAL at 14:37

## 2024-07-13 RX ADMIN — DOCUSATE SODIUM 100 MG: 100 CAPSULE, LIQUID FILLED ORAL at 09:08

## 2024-07-13 RX ADMIN — ACETAMINOPHEN 650 MG: 325 TABLET ORAL at 19:43

## 2024-07-13 RX ADMIN — PREDNISONE 20 MG: 20 TABLET ORAL at 09:08

## 2024-07-13 RX ADMIN — VERAPAMIL HYDROCHLORIDE 40 MG: 40 TABLET ORAL at 06:00

## 2024-07-13 RX ADMIN — BACLOFEN 10 MG: 10 TABLET ORAL at 21:28

## 2024-07-13 RX ADMIN — CEPHALEXIN 250 MG: 250 CAPSULE ORAL at 21:30

## 2024-07-13 RX ADMIN — AMITRIPTYLINE HYDROCHLORIDE 100 MG: 50 TABLET, FILM COATED ORAL at 21:30

## 2024-07-13 RX ADMIN — BACLOFEN 10 MG: 10 TABLET ORAL at 09:08

## 2024-07-13 RX ADMIN — IPRATROPIUM BROMIDE AND ALBUTEROL SULFATE 1 DOSE: 2.5; .5 SOLUTION RESPIRATORY (INHALATION) at 15:43

## 2024-07-13 RX ADMIN — GABAPENTIN 600 MG: 300 CAPSULE ORAL at 21:28

## 2024-07-13 RX ADMIN — DICLOFENAC SODIUM 2 G: 10 GEL TOPICAL at 21:31

## 2024-07-13 RX ADMIN — Medication 400 MG: at 09:08

## 2024-07-13 RX ADMIN — VENLAFAXINE HYDROCHLORIDE 75 MG: 75 CAPSULE, EXTENDED RELEASE ORAL at 09:08

## 2024-07-13 ASSESSMENT — PAIN SCALES - GENERAL
PAINLEVEL_OUTOF10: 0
PAINLEVEL_OUTOF10: 5

## 2024-07-13 ASSESSMENT — PAIN DESCRIPTION - LOCATION: LOCATION: HEAD

## 2024-07-13 ASSESSMENT — PAIN DESCRIPTION - ORIENTATION: ORIENTATION: MID

## 2024-07-13 ASSESSMENT — PAIN DESCRIPTION - DESCRIPTORS: DESCRIPTORS: ACHING

## 2024-07-13 ASSESSMENT — PAIN - FUNCTIONAL ASSESSMENT: PAIN_FUNCTIONAL_ASSESSMENT: ACTIVITIES ARE NOT PREVENTED

## 2024-07-14 PROCEDURE — 6370000000 HC RX 637 (ALT 250 FOR IP): Performed by: INTERNAL MEDICINE

## 2024-07-14 PROCEDURE — 97535 SELF CARE MNGMENT TRAINING: CPT

## 2024-07-14 PROCEDURE — 97129 THER IVNTJ 1ST 15 MIN: CPT

## 2024-07-14 PROCEDURE — 6370000000 HC RX 637 (ALT 250 FOR IP): Performed by: PHYSICAL MEDICINE & REHABILITATION

## 2024-07-14 PROCEDURE — 97110 THERAPEUTIC EXERCISES: CPT

## 2024-07-14 PROCEDURE — 97530 THERAPEUTIC ACTIVITIES: CPT

## 2024-07-14 PROCEDURE — 6370000000 HC RX 637 (ALT 250 FOR IP): Performed by: STUDENT IN AN ORGANIZED HEALTH CARE EDUCATION/TRAINING PROGRAM

## 2024-07-14 PROCEDURE — 1180000000 HC REHAB R&B

## 2024-07-14 PROCEDURE — 92507 TX SP LANG VOICE COMM INDIV: CPT

## 2024-07-14 PROCEDURE — 99232 SBSQ HOSP IP/OBS MODERATE 35: CPT | Performed by: INTERNAL MEDICINE

## 2024-07-14 RX ADMIN — CEPHALEXIN 250 MG: 250 CAPSULE ORAL at 20:30

## 2024-07-14 RX ADMIN — LACOSAMIDE 100 MG: 100 TABLET, FILM COATED ORAL at 07:28

## 2024-07-14 RX ADMIN — VERAPAMIL HYDROCHLORIDE 40 MG: 40 TABLET ORAL at 20:31

## 2024-07-14 RX ADMIN — TOPIRAMATE 100 MG: 100 TABLET, FILM COATED ORAL at 20:31

## 2024-07-14 RX ADMIN — VENLAFAXINE HYDROCHLORIDE 75 MG: 75 CAPSULE, EXTENDED RELEASE ORAL at 07:28

## 2024-07-14 RX ADMIN — DICLOFENAC SODIUM 2 G: 10 GEL TOPICAL at 07:30

## 2024-07-14 RX ADMIN — CEPHALEXIN 250 MG: 250 CAPSULE ORAL at 06:11

## 2024-07-14 RX ADMIN — PREDNISONE 20 MG: 20 TABLET ORAL at 07:28

## 2024-07-14 RX ADMIN — FAMOTIDINE 20 MG: 20 TABLET, FILM COATED ORAL at 20:30

## 2024-07-14 RX ADMIN — CEPHALEXIN 250 MG: 250 CAPSULE ORAL at 16:27

## 2024-07-14 RX ADMIN — TOPIRAMATE 100 MG: 100 TABLET, FILM COATED ORAL at 07:29

## 2024-07-14 RX ADMIN — BACLOFEN 10 MG: 10 TABLET ORAL at 20:30

## 2024-07-14 RX ADMIN — GABAPENTIN 600 MG: 300 CAPSULE ORAL at 20:30

## 2024-07-14 RX ADMIN — LACOSAMIDE 100 MG: 100 TABLET, FILM COATED ORAL at 20:30

## 2024-07-14 RX ADMIN — BACLOFEN 10 MG: 10 TABLET ORAL at 07:28

## 2024-07-14 RX ADMIN — DICLOFENAC SODIUM 2 G: 10 GEL TOPICAL at 20:31

## 2024-07-14 RX ADMIN — ACETAMINOPHEN 650 MG: 325 TABLET ORAL at 06:13

## 2024-07-14 RX ADMIN — CEPHALEXIN 250 MG: 250 CAPSULE ORAL at 11:38

## 2024-07-14 RX ADMIN — DOCUSATE SODIUM 100 MG: 100 CAPSULE, LIQUID FILLED ORAL at 20:30

## 2024-07-14 RX ADMIN — GABAPENTIN 600 MG: 300 CAPSULE ORAL at 07:28

## 2024-07-14 RX ADMIN — POLYETHYLENE GLYCOL 3350 17 G: 17 POWDER, FOR SOLUTION ORAL at 07:28

## 2024-07-14 RX ADMIN — DOCUSATE SODIUM 100 MG: 100 CAPSULE, LIQUID FILLED ORAL at 07:28

## 2024-07-14 RX ADMIN — TAMSULOSIN HYDROCHLORIDE 0.4 MG: 0.4 CAPSULE ORAL at 07:28

## 2024-07-14 RX ADMIN — AMITRIPTYLINE HYDROCHLORIDE 100 MG: 50 TABLET, FILM COATED ORAL at 20:31

## 2024-07-14 RX ADMIN — ATORVASTATIN CALCIUM 80 MG: 80 TABLET, FILM COATED ORAL at 20:30

## 2024-07-14 RX ADMIN — Medication 400 MG: at 07:28

## 2024-07-14 RX ADMIN — VERAPAMIL HYDROCHLORIDE 40 MG: 40 TABLET ORAL at 06:11

## 2024-07-14 RX ADMIN — Medication 6 MG: at 20:30

## 2024-07-14 RX ADMIN — FAMOTIDINE 20 MG: 20 TABLET, FILM COATED ORAL at 07:28

## 2024-07-14 ASSESSMENT — PAIN SCALES - GENERAL
PAINLEVEL_OUTOF10: 0
PAINLEVEL_OUTOF10: 1
PAINLEVEL_OUTOF10: 5
PAINLEVEL_OUTOF10: 0
PAINLEVEL_OUTOF10: 0

## 2024-07-14 ASSESSMENT — PAIN DESCRIPTION - ORIENTATION: ORIENTATION: LEFT

## 2024-07-14 ASSESSMENT — PAIN DESCRIPTION - LOCATION: LOCATION: HEAD

## 2024-07-14 ASSESSMENT — PAIN - FUNCTIONAL ASSESSMENT: PAIN_FUNCTIONAL_ASSESSMENT: ACTIVITIES ARE NOT PREVENTED

## 2024-07-14 ASSESSMENT — PAIN DESCRIPTION - DESCRIPTORS: DESCRIPTORS: ACHING

## 2024-07-15 VITALS
BODY MASS INDEX: 25.84 KG/M2 | HEIGHT: 70 IN | WEIGHT: 180.5 LBS | SYSTOLIC BLOOD PRESSURE: 111 MMHG | OXYGEN SATURATION: 97 % | RESPIRATION RATE: 18 BRPM | DIASTOLIC BLOOD PRESSURE: 69 MMHG | HEART RATE: 76 BPM | TEMPERATURE: 98.2 F

## 2024-07-15 PROCEDURE — 92507 TX SP LANG VOICE COMM INDIV: CPT

## 2024-07-15 PROCEDURE — 97530 THERAPEUTIC ACTIVITIES: CPT

## 2024-07-15 PROCEDURE — 97112 NEUROMUSCULAR REEDUCATION: CPT

## 2024-07-15 PROCEDURE — 97535 SELF CARE MNGMENT TRAINING: CPT

## 2024-07-15 PROCEDURE — 97542 WHEELCHAIR MNGMENT TRAINING: CPT

## 2024-07-15 PROCEDURE — 6370000000 HC RX 637 (ALT 250 FOR IP): Performed by: STUDENT IN AN ORGANIZED HEALTH CARE EDUCATION/TRAINING PROGRAM

## 2024-07-15 PROCEDURE — 6370000000 HC RX 637 (ALT 250 FOR IP): Performed by: PHYSICAL MEDICINE & REHABILITATION

## 2024-07-15 PROCEDURE — 6370000000 HC RX 637 (ALT 250 FOR IP): Performed by: INTERNAL MEDICINE

## 2024-07-15 PROCEDURE — 97129 THER IVNTJ 1ST 15 MIN: CPT

## 2024-07-15 RX ORDER — BACLOFEN 10 MG/1
10 TABLET ORAL 2 TIMES DAILY
COMMUNITY
Start: 2024-07-15

## 2024-07-15 RX ORDER — BUTALBITAL, ACETAMINOPHEN AND CAFFEINE 300; 40; 50 MG/1; MG/1; MG/1
1 CAPSULE ORAL DAILY PRN
COMMUNITY
Start: 2024-07-15

## 2024-07-15 RX ORDER — VENLAFAXINE HYDROCHLORIDE 75 MG/1
75 CAPSULE, EXTENDED RELEASE ORAL
COMMUNITY
Start: 2024-07-15

## 2024-07-15 RX ORDER — HYDROCODONE BITARTRATE AND ACETAMINOPHEN 7.5; 325 MG/1; MG/1
1 TABLET ORAL EVERY 6 HOURS PRN
Qty: 4 TABLET | Refills: 0 | Status: SHIPPED | OUTPATIENT
Start: 2024-07-15 | End: 2024-07-16

## 2024-07-15 RX ORDER — PSEUDOEPHEDRINE HCL 30 MG
100 TABLET ORAL 2 TIMES DAILY
COMMUNITY
Start: 2024-07-15

## 2024-07-15 RX ORDER — HYDROCODONE BITARTRATE AND ACETAMINOPHEN 7.5; 325 MG/1; MG/1
1 TABLET ORAL EVERY 6 HOURS PRN
Qty: 4 TABLET | Refills: 0 | Status: SHIPPED | OUTPATIENT
Start: 2024-07-15 | End: 2024-07-15

## 2024-07-15 RX ORDER — VERAPAMIL HYDROCHLORIDE 40 MG/1
40 TABLET ORAL EVERY 8 HOURS SCHEDULED
COMMUNITY
Start: 2024-07-15

## 2024-07-15 RX ORDER — TOPIRAMATE 100 MG/1
100 TABLET, FILM COATED ORAL 2 TIMES DAILY
COMMUNITY
Start: 2024-07-15

## 2024-07-15 RX ORDER — AMITRIPTYLINE HYDROCHLORIDE 100 MG/1
100 TABLET ORAL NIGHTLY
COMMUNITY
Start: 2024-07-15

## 2024-07-15 RX ORDER — CEPHALEXIN 250 MG/1
250 CAPSULE ORAL 4 TIMES DAILY
Qty: 20 CAPSULE | Refills: 0 | Status: SHIPPED | OUTPATIENT
Start: 2024-07-15 | End: 2024-07-15

## 2024-07-15 RX ORDER — ACETAMINOPHEN 325 MG/1
650 TABLET ORAL EVERY 6 HOURS PRN
COMMUNITY
Start: 2024-07-15

## 2024-07-15 RX ORDER — LANOLIN ALCOHOL/MO/W.PET/CERES
6 CREAM (GRAM) TOPICAL NIGHTLY PRN
COMMUNITY
Start: 2024-07-15

## 2024-07-15 RX ORDER — CEPHALEXIN 250 MG/1
250 CAPSULE ORAL 4 TIMES DAILY
Qty: 20 CAPSULE | Refills: 0 | Status: SHIPPED | OUTPATIENT
Start: 2024-07-15 | End: 2024-07-20

## 2024-07-15 RX ORDER — GABAPENTIN 300 MG/1
600 CAPSULE ORAL 3 TIMES DAILY
COMMUNITY
Start: 2024-07-15

## 2024-07-15 RX ORDER — FAMOTIDINE 20 MG/1
20 TABLET, FILM COATED ORAL 2 TIMES DAILY
COMMUNITY
Start: 2024-07-15

## 2024-07-15 RX ORDER — PREDNISONE 10 MG/1
TABLET ORAL
Qty: 13 TABLET | Refills: 0 | Status: SHIPPED | OUTPATIENT
Start: 2024-07-16 | End: 2024-07-26

## 2024-07-15 RX ORDER — PREDNISONE 10 MG/1
TABLET ORAL
Qty: 13 TABLET | Refills: 0 | Status: SHIPPED | OUTPATIENT
Start: 2024-07-16 | End: 2024-07-15

## 2024-07-15 RX ORDER — POLYETHYLENE GLYCOL 3350 17 G/17G
17 POWDER, FOR SOLUTION ORAL DAILY
Qty: 527 G | Refills: 1 | COMMUNITY
Start: 2024-07-15

## 2024-07-15 RX ADMIN — VENLAFAXINE HYDROCHLORIDE 75 MG: 75 CAPSULE, EXTENDED RELEASE ORAL at 07:22

## 2024-07-15 RX ADMIN — BACLOFEN 10 MG: 10 TABLET ORAL at 07:22

## 2024-07-15 RX ADMIN — FAMOTIDINE 20 MG: 20 TABLET, FILM COATED ORAL at 07:22

## 2024-07-15 RX ADMIN — DOCUSATE SODIUM 100 MG: 100 CAPSULE, LIQUID FILLED ORAL at 07:22

## 2024-07-15 RX ADMIN — VERAPAMIL HYDROCHLORIDE 40 MG: 40 TABLET ORAL at 06:02

## 2024-07-15 RX ADMIN — LACOSAMIDE 100 MG: 100 TABLET, FILM COATED ORAL at 07:22

## 2024-07-15 RX ADMIN — Medication 400 MG: at 07:22

## 2024-07-15 RX ADMIN — CEPHALEXIN 250 MG: 250 CAPSULE ORAL at 06:02

## 2024-07-15 RX ADMIN — GABAPENTIN 600 MG: 300 CAPSULE ORAL at 07:22

## 2024-07-15 RX ADMIN — TOPIRAMATE 100 MG: 100 TABLET, FILM COATED ORAL at 07:32

## 2024-07-15 RX ADMIN — TAMSULOSIN HYDROCHLORIDE 0.4 MG: 0.4 CAPSULE ORAL at 07:23

## 2024-07-15 RX ADMIN — PREDNISONE 20 MG: 20 TABLET ORAL at 07:22

## 2024-07-15 ASSESSMENT — PAIN SCALES - GENERAL: PAINLEVEL_OUTOF10: 0

## 2024-07-15 NOTE — DISCHARGE INSTR - COC
18   Ht 1.778 m (5' 10\")   Wt 81.9 kg (180 lb 8 oz)   SpO2 97%   BMI 25.90 kg/m²     Last documented pain score (0-10 scale): Pain Level: 0  Last Weight:   Wt Readings from Last 1 Encounters:   06/27/24 81.9 kg (180 lb 8 oz)     Mental Status:  {IP PT MENTAL STATUS:89809}    IV Access:  { CHANDLER IV ACCESS:668372084}    Nursing Mobility/ADLs:  Walking   {CHP DME ADLs:173892580}  Transfer  {CHP DME ADLs:119438619}  Bathing  {CHP DME ADLs:699488255}  Dressing  {CHP DME ADLs:480926976}  Toileting  {CHP DME ADLs:120293728}  Feeding  {P DME ADLs:086024636}  Med Admin  {P DME ADLs:856299455}  Med Delivery   { CHANDLER MED Delivery:240717950}    Wound Care Documentation and Therapy:  Wound 06/27/24 Sacrum (Active)   Wound Image   06/28/24 1619   Wound Etiology Pressure Stage 1 07/08/24 0810   Dressing Status Other (Comment) 07/12/24 1600   Wound Cleansed Soap and water 07/14/24 2039   Dressing/Treatment Zinc paste 07/14/24 2039   Dressing Change Due 06/28/24 06/28/24 1619   Wound Assessment Non-blanchable erythema 07/14/24 2039   Drainage Amount None (dry) 07/14/24 2039   Drainage Description Other (Comment) 07/13/24 0900   Odor None 07/13/24 0900   Kristel-wound Assessment Warm;Blanchable erythema 07/13/24 0900   Margins Attached edges 07/13/24 0900   Number of days: 17        Elimination:  Continence:   Bowel: {YES / NO:19727}  Bladder: {YES / NO:19727}  Urinary Catheter: {Urinary Catheter:927291380}   Colostomy/Ileostomy/Ileal Conduit: {YES / NO:19727}       Date of Last BM: ***  No intake or output data in the 24 hours ending 07/15/24 0901  No intake/output data recorded.    Safety Concerns:     { CHANDLER Safety Concerns:015329093}    Impairments/Disabilities:      { CHANDLER Impairments/Disabilities:746156994}    Nutrition Therapy:  Current Nutrition Therapy:   { CHANDLER Diet List:059991331}    Routes of Feeding: {CHP DME Other Feedings:481182412}  Liquids: {Slp liquid thickness:75726}  Daily Fluid Restriction: {CHP DME Yes

## 2024-07-15 NOTE — PROGRESS NOTES
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    History Obtained From:  patient, electronic medical record  INTERVAL HISTORY:    Patient seen and examined  No new events   NO bleeding  NO NV    HISTORY OF PRESENT ILLNESS:    The patient is a 59 y.o.  male who is admitted to the [facility] after undergoing right canalplasty.  Patient hospitalization course was complicated by right epidural hematoma and IPH.  Underwent emergent right craniotomy for hematoma evacuation.  Patient has steroid refractory ITP.  Also has a history of VTE but not a candidate for anticoagulation due to recurrent brain bleeds.  Steroid taper plan was to start out with 60 mg daily for 1 week and then decrease to 10 mg weekly.  Patient is now about 2 weeks out from the time of discharge.    Hematology oncology team consulted for further recommendations of prednisone taper.  Patient cleared count 111 now.  Patient current dose of prednisone at 40 mg daily.  Patient scheduled to start 30 mg daily starting 7/5/2024      Past Medical History:   has a past medical history of Anemia, CAD (coronary artery disease), Chronic deep vein thrombosis (DVT) of both lower extremities (HCC), Chronic deep vein thrombosis (DVT) of femoral vein of right lower extremity (HCC), Chronic deep vein thrombosis (DVT) of proximal vein of right lower extremity (HCC), Chronic deep vein thrombosis (DVT) of right iliac vein (HCC), Chronic idiopathic thrombocytopenia (HCC), Chronic idiopathic thrombocytopenic purpura (HCC), CVA (cerebral vascular accident) (HCC), Emphysema lung (HCC), Hemosiderin pigmentation of skin, HLD 
    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    History Obtained From:  patient, electronic medical record  INTERVAL HISTORY:    Patient seen and examined  Platelets improved to 123K  Hemoglobin stable  No evidence of bleeding noted      HISTORY OF PRESENT ILLNESS:    The patient is a 59 y.o.  male who is admitted to the [facility] after undergoing right canalplasty.  Patient hospitalization course was complicated by right epidural hematoma and IPH.  Underwent emergent right craniotomy for hematoma evacuation.  Patient has steroid refractory ITP.  Also has a history of VTE but not a candidate for anticoagulation due to recurrent brain bleeds.  Steroid taper plan was to start out with 60 mg daily for 1 week and then decrease to 10 mg weekly.  Patient is now about 2 weeks out from the time of discharge.    Hematology oncology team consulted for further recommendations of prednisone taper.  Patient cleared count 111 now.  Patient current dose of prednisone at 40 mg daily.  Patient scheduled to start 30 mg daily starting 7/5/2024      Past Medical History:   has a past medical history of Anemia, CAD (coronary artery disease), Chronic deep vein thrombosis (DVT) of both lower extremities (HCC), Chronic deep vein thrombosis (DVT) of femoral vein of right lower extremity (HCC), Chronic deep vein thrombosis (DVT) of proximal vein of right lower extremity (HCC), Chronic deep vein thrombosis (DVT) of right iliac vein (HCC), Chronic idiopathic thrombocytopenia (HCC), Chronic idiopathic thrombocytopenic purpura (HCC), CVA (cerebral vascular accident) (HCC), Emphysema lung 
    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    History Obtained From:  patient, electronic medical record  INTERVAL HISTORY:    Patient seen and examined  No new events  No evidence of bleeding noted      HISTORY OF PRESENT ILLNESS:    The patient is a 59 y.o.  male who is admitted to the [facility] after undergoing right canalplasty.  Patient hospitalization course was complicated by right epidural hematoma and IPH.  Underwent emergent right craniotomy for hematoma evacuation.  Patient has steroid refractory ITP.  Also has a history of VTE but not a candidate for anticoagulation due to recurrent brain bleeds.  Steroid taper plan was to start out with 60 mg daily for 1 week and then decrease to 10 mg weekly.  Patient is now about 2 weeks out from the time of discharge.    Hematology oncology team consulted for further recommendations of prednisone taper.  Patient cleared count 111 now.  Patient current dose of prednisone at 40 mg daily.  Patient scheduled to start 30 mg daily starting 7/5/2024      Past Medical History:   has a past medical history of Anemia, CAD (coronary artery disease), Chronic deep vein thrombosis (DVT) of both lower extremities (HCC), Chronic deep vein thrombosis (DVT) of femoral vein of right lower extremity (HCC), Chronic deep vein thrombosis (DVT) of proximal vein of right lower extremity (HCC), Chronic deep vein thrombosis (DVT) of right iliac vein (HCC), Chronic idiopathic thrombocytopenia (HCC), Chronic idiopathic thrombocytopenic purpura (HCC), CVA (cerebral vascular accident) (HCC), Emphysema lung (HCC), Hemosiderin pigmentation of 
   07/02/24 1130   Encounter Summary   Encounter Overview/Reason Spiritual/Emotional Needs   Service Provided For Patient   Referral/Consult From Palliative Care   Support System Spouse   Last Encounter  07/02/24   Complexity of Encounter Low   Spiritual/Emotional needs   Type Spiritual Support   Assessment/Intervention/Outcome   Assessment Anxious;Impaired resilience;Powerlessness   Intervention Active listening;Discussed illness injury and it’s impact;Explored/Affirmed feelings, thoughts, concerns;Prayer (assurance of)/[place];Sustaining Presence/Ministry of presence   Outcome Engaged in conversation;Expressed feelings, needs, and concerns;Expressed Gratitude       
   07/05/24 2100   Encounter Summary   Encounter Overview/Reason Spiritual/Emotional Needs   Service Provided For Patient and family together   Referral/Consult From Multi-disciplinary team   Support System Spouse   Last Encounter  07/05/24   Complexity of Encounter Moderate   Spiritual/Emotional needs   Type Spiritual Support   Assessment/Intervention/Outcome   Assessment Calm;Coping;Hopeful;Peaceful   Intervention Active listening;Explored/Affirmed feelings, thoughts, concerns;Prayer (assurance of)/[place];Sustaining Presence/Ministry of presence   Outcome Acceptance;Engaged in conversation;Receptive       
   07/10/24 1625   Encounter Summary   Encounter Overview/Reason Spiritual/Emotional Needs   Service Provided For Patient   Referral/Consult From Rounding   Complexity of Encounter Low   Spiritual/Emotional needs   Type Spiritual Support   Assessment/Intervention/Outcome   Assessment Unable to assess  (patient sleeping)   Intervention Prayer (assurance of)/[place]       
   07/14/24 1510   Encounter Summary   Encounter Overview/Reason Attempted Encounter   Service Provided For Patient not available  (Pt with staff)   Referral/Consult From Palliative Care       
  ACUTE INPATIENT REHABILITATION DISCHARGE  [facility]    Patient Name: Brennen Mcclure  MRN: [id number]     Patient discharged in stable condition as per order of attending physician.     AVS provided by nurse at time of discharge, which includes all necessary medical information pertaining to the patients current course of illness, treatment, medications, post-discharge goals of care, and treatment preferences.     Provision of Current Reconciled Medication List to Patient at Discharge   Indicate the route(s) of transmission of the current reconciled medication list to the patient/family/caregiver. Verbal (e.g. in person, telephone, video conferencing) and Paper Based (e.g. fax, copies, print outs)     Availability of \"My Chart\" offered to patient as a tool for updated health record.  Steps for activation discussed with patient as mentioned on AVS.      Patient/responsible party verbalize understanding of discharge plan and are in agreement with goal/plan/treatment preferences.     Belongings including Glasses, clothing, footwear, wheelchair, scotty stedy  sent with patient/responsible party.      Home medications sent home with patient/responsible party yes    Car Transfer   Level of assistance required for patient transfer into vehicle:   DEPENDENT: [place] does ALL the effort. Patient does none of the effort to complete the activity. Or, the assistance of 2 or more helpers is required for the patient to complete the activity     High-Risk Drug Classes: Use and Indication   Check if the patient is taking any medications by pharmacological classification If yes, check if there is an indication noted for all meds in the drug class   Antipsychotic Yes If yes: indication noted?  [] If no indication noted, follow up with provider for order clarification   Anticoagulant Yes If yes: indication noted?  []    Antibiotic Yes If yes: indication noted?  []    Opioid Yes If yes: indication noted?  []    Antiplatelet Yes If 
Attempt to follow up on consult; not family present;     06/28/24 2008   Encounter Summary   Encounter Overview/Reason Spiritual/Emotional Needs   Service Provided For Patient   Referral/Consult From Rounding   Complexity of Encounter Low   Spiritual/Emotional needs   Type Spiritual Support   Assessment/Intervention/Outcome   Assessment Unable to assess  (patient sleeping)   Intervention Prayer (assurance of)/[place]       
[facility]   Acute Rehabilitation Occupational Therapy Daily Treatment Note    Date: 24  Patient Name: Brennen Mcclure       Room: 2635/2635-01  MRN: [id number]  Account: [id number]   : 1965  (59 y.o.) Gender: male       Referring Practitioner: Danica De La Paz MD  Diagnosis: Right epidural hematoma and right temporal hematoma s/p craniotomy for evacuation, cranial defect s/p cranioplasty  Additional Pertinent Hx: Brennen Mcclure is a 59 y.o. right-handed male with history of CVA s/p craniectomy, seizures, CAD, HLD, DVT, chronic thrombocytopenia (on IVIG and Promacta) admitted to [facility] on 2024. He initially presented for cranioplasty, which was completed with synthetic custom peek implant on 24 (Dr. Corbett).  Procedure was initially delayed due to platelet level.  He developed altered mental status with rapidly worsening GCS on 24.  CT head showed acute intraparenchymal hematoma in the right temporal lobe and increased size of extra-axial hemorrhage over the right cerebral convexity with 1.4 cm midline shift.  He required emergency craniotomy for evacuation of epidural hematoma and intracerebral hematoma on 24 (Dr. Almanza).  Extubated 24.  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  He is being treated for aspiration pneumonia as well. He reports ongoing left-sided weakness.  He also continues to report headaches.  His wife notes that he is having numbness/decreased sensation on the left side, which she noticed because he has not been reporting left shoulder and hip pain like he has been for several months.  He also states that he has some shortness of breath and cough. Discharged to [facility] on 2024. Pt admitted to ARU 24    Treatment Diagnosis: Impaired self care status    Past Medical History:  has a past medical history of Anemia, CAD (coronary artery disease), Chronic deep vein thrombosis (DVT) of both lower 
Comprehensive Nutrition Assessment    Type and Reason for Visit:  Consult (ARU)    Nutrition Recommendations/Plan:   Will continue Soft/Bite Sized diet and add Magic Cup twice daily     Malnutrition Assessment:  Malnutrition Status:  Severe malnutrition (06/28/24 1448)    Context:  Acute Illness     Findings of the 6 clinical characteristics of malnutrition:  Energy Intake:  50% or less of estimated energy requirements for 5 or more days  Weight Loss:  Greater than 7.5% over 3 months     Body Fat Loss:  Moderate body fat loss Orbital   Muscle Mass Loss:  Moderate muscle mass loss Hand (interosseous)  Fluid Accumulation:  Mild Extremities   Strength:  Not Performed    Nutrition Assessment:    Pt is s/p 6/4 crinioplasty, 6/6 craniotomy due to CVA with L hemiplegia. He has been advanced to Soft/Bite Sized diet and states he eats all food provided unless \"they put me on that baby food again\". Pt states Ensure is for old people and he won't drink it. It is noted that pt pockets food and needs observation when eating.    Nutrition Related Findings:    Trace BLE edema, Labs: Reviewed, Meds: Prednisone, PMH: CVA, CAD, BM 6/27 Wound Type: Surgical Incision (sacrum is opening)       Current Nutrition Intake & Therapies:    Average Meal Intake: %     ADULT DIET; Dysphagia - Soft and Bite Sized    Anthropometric Measures:  Height: 177.8 cm (5' 10\")  Ideal Body Weight (IBW): 166 lbs (75 kg)    Admission Body Weight: 81.6 kg (180 lb)  Current Body Weight: 84.8 kg (187 lb) (most recent bed scale wt), 112.7 % IBW. Weight Source: Bed Scale  Current BMI (kg/m2): 26.8  Usual Body Weight: 94.8 kg (209 lb) (4/24)  % Weight Change (Calculated): -10.5                    BMI Categories: Overweight (BMI 25.0-29.9)    Estimated Daily Nutrient Needs:  Energy Requirements Based On: Formula  Weight Used for Energy Requirements:  (most recent bed scale)  Energy (kcal/day): [place] x 1.1= 1800 kcal  Weight Used for Protein Requirements: 
Comprehensive Nutrition Assessment    Type and Reason for Visit:  Reassess    Nutrition Recommendations/Plan:   Continue current diet.   Continue oral nutrition supplements.      Malnutrition Assessment:  Malnutrition Status:  Severe malnutrition (06/28/24 1448)    Context:  Acute Illness     Findings of the 6 clinical characteristics of malnutrition:  Energy Intake:  50% or less of estimated energy requirements for 5 or more days  Weight Loss:  Greater than 7.5% over 3 months     Body Fat Loss:  Moderate body fat loss Orbital   Muscle Mass Loss:  Moderate muscle mass loss Hand (interosseous)  Fluid Accumulation:  Mild Extremities   Strength:  Not Performed    Nutrition Assessment:    Pt unavailable at time of attempted visit. Observed lunch tray with over 75% consumed; no Magic Cup taken.    Nutrition Related Findings:    Edema: Trace RLE, LLE. Labs and meds reviewed. Wound Type: Pressure Injury, Stage I       Current Nutrition Intake & Therapies:    Average Meal Intake: %  Average Supplements Intake:  (None consumed at lunch 7/8)  ADULT DIET; Dysphagia - Soft and Bite Sized  ADULT ORAL NUTRITION SUPPLEMENT; Lunch, Dinner; Frozen Oral Supplement    Anthropometric Measures:  Height: 177.8 cm (5' 10\")  Ideal Body Weight (IBW): 166 lbs (75 kg)    Admission Body Weight: 81.6 kg (180 lb)  Current Body Weight: 81.9 kg (180 lb 8.9 oz), 112.7 % IBW. Weight Source: Not Specified  Current BMI (kg/m2): 25.9  Usual Body Weight: 94.8 kg (209 lb) (4/24)  % Weight Change (Calculated): -10.5                    BMI Categories: Overweight (BMI 25.0-29.9)    Estimated Daily Nutrient Needs:  Energy Requirements Based On: Formula  Weight Used for Energy Requirements:  (most recent bed scale)  Energy (kcal/day): [place] x 1.1= 1800 kcal  Weight Used for Protein Requirements:  (most recent bed scale)  Protein (g/day): 1.5g/kg= 125 g  Method Used for Fluid Requirements: 1 ml/kcal    Nutrition Diagnosis:   Severe malnutrition 
Comprehensive Nutrition Assessment    Type and Reason for Visit:  Reassess    Nutrition Recommendations/Plan:   Will provide double portions as able with Magic Cup twice daily     Malnutrition Assessment:  Malnutrition Status:  Severe malnutrition (06/28/24 1448)    Context:  Acute Illness     Findings of the 6 clinical characteristics of malnutrition:  Energy Intake:  50% or less of estimated energy requirements for 5 or more days  Weight Loss:  Greater than 7.5% over 3 months     Body Fat Loss:  Moderate body fat loss Orbital   Muscle Mass Loss:  Moderate muscle mass loss Hand (interosseous)  Fluid Accumulation:  Mild Extremities   Strength:  Not Performed    Nutrition Assessment:    Pt is meeting nutrition needs with intake greater than 75% consistently. Pt is requesting more food. States he is very hungry.    Nutrition Related Findings:    Trace BLE edema, Labs: Reviewed, Meds: Prednisone, BM 7/2 Wound Type: Surgical Incision (sacrum is opening)       Current Nutrition Intake & Therapies:    Average Meal Intake: %  Average Supplements Intake: %  ADULT DIET; Dysphagia - Soft and Bite Sized  ADULT ORAL NUTRITION SUPPLEMENT; Lunch, Dinner; Frozen Oral Supplement    Anthropometric Measures:  Height: 177.8 cm (5' 10\")  Ideal Body Weight (IBW): 166 lbs (75 kg)    Admission Body Weight: 81.6 kg (180 lb)  Current Body Weight: 81.9 kg (180 lb 8.9 oz), 112.7 % IBW. Weight Source: Not Specified  Current BMI (kg/m2): 25.9  Usual Body Weight: 94.8 kg (209 lb) (4/24)  % Weight Change (Calculated): -10.5                    BMI Categories: Overweight (BMI 25.0-29.9)    Estimated Daily Nutrient Needs:  Energy Requirements Based On: Formula  Weight Used for Energy Requirements:  (most recent bed scale)  Energy (kcal/day): [place] x 1.1= 1800 kcal  Weight Used for Protein Requirements:  (most recent bed scale)  Protein (g/day): 1.5g/kg= 125 g  Method Used for Fluid Requirements: 1 ml/kcal    Nutrition Diagnosis: 
Dr. Yun with neurology notified of new consult.     Neurology to see patient tomorrow.   
Facts: When writer entered patient's room, hospice meeting was taking place; wife invited writer to attend hospice meeting; present with hospice nurse, patient, wife, and patient's nurse; wife asked writer to stay at the completion of the hospice meeting;     Feelings: Patient gave writer a \"thumbs up\" regarding meeting with hospice; patient feel asleep as soon as hospice meeting ended; wife venting emotions, tearful, realistic, and experiencing anticipatory grief; wife angry as she shared extensive details regarding patient's most recent surgery and recovery; wife stated she is \"exhausted\";     Family:  Wife is patient's caregiver and has no support from family in this regard;     Kira: Patient stated he \"asked God for forgiveness\" and is looking forward to attending Anabaptism with a family member; wife is \"angry at God\", talked about her belief system and is asking \"why did this happen\";     Follow-up: Chaplains remain available;      07/09/24 2017   Encounter Summary   Encounter Overview/Reason Spiritual/Emotional Needs   Service Provided For Patient and family together   Referral/Consult From [facility]   Support System Spouse   Last Encounter  07/09/24   Complexity of Encounter Moderate   Begin Time 1601   End Time  1717   Total Time Calculated 76 min   Spiritual/Emotional needs   Type Spiritual Support   Grief, Loss, and Adjustments   Type Adjustment to illness;Anticipatory Grief   Assessment/Intervention/Outcome   Assessment Coping;Impaired resilience;Powerlessness;Sad;Tearful;Stress overload;Angry;Loneliness   Intervention Active listening;Discussed belief system/[employer] practices/kira;Discussed illness injury and it’s impact;Discussed relationship with God;Explored/Affirmed feelings, thoughts, concerns;Prayer (assurance of)/[place];Sustaining Presence/Ministry of presence   Outcome Comfort;Coping;Engaged in conversation;Expressed feelings, needs, and concerns;Expressed Gratitude;Receptive;Venting emotion 
[facility]   Acute Rehabilitation Occupational Therapy Daily Treatment Note    Date: 24  Patient Name: Brennen Mcclure       Room: 2635/2635-01  MRN: [id number]  Account: [id number]   : 1965  (59 y.o.) Gender: male       Referring Practitioner: Danica De La Paz MD  Diagnosis: Right epidural hematoma and right temporal hematoma s/p craniotomy for evacuation, cranial defect s/p cranioplasty  Additional Pertinent Hx: Brennen Mcclure is a 59 y.o. right-handed male with history of CVA s/p craniectomy, seizures, CAD, HLD, DVT, chronic thrombocytopenia (on IVIG and Promacta) admitted to [facility] on 2024. He initially presented for cranioplasty, which was completed with synthetic custom peek implant on 24 (Dr. Corbett).  Procedure was initially delayed due to platelet level.  He developed altered mental status with rapidly worsening GCS on 24.  CT head showed acute intraparenchymal hematoma in the right temporal lobe and increased size of extra-axial hemorrhage over the right cerebral convexity with 1.4 cm midline shift.  He required emergency craniotomy for evacuation of epidural hematoma and intracerebral hematoma on 24 (Dr. Almanza).  Extubated 24.  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  He is being treated for aspiration pneumonia as well. He reports ongoing left-sided weakness.  He also continues to report headaches.  His wife notes that he is having numbness/decreased sensation on the left side, which she noticed because he has not been reporting left shoulder and hip pain like he has been for several months.  He also states that he has some shortness of breath and cough. Discharged to [facility] on 2024. Pt admitted to ARU 24    Treatment Diagnosis: Impaired self care status    Past Medical History:  has a past medical history of Anemia, CAD (coronary artery disease), Chronic deep vein thrombosis (DVT) of both lower 
House supervisor nereyda said patients code status would be DNR-CCA and link no intubation, no CPR and NO feeding tube to order  
Injection supplies are locked up in patients nurse .   
[facility]   Acute Rehabilitation Occupational Therapy Daily Treatment Note    Date: 24  Patient Name: Brennen Mcclure       Room: 2635/2635-01  MRN: [id number]  Account: [id number]   : 1965  (59 y.o.) Gender: male       Referring Practitioner: Danica De La Paz MD  Diagnosis: Right epidural hematoma and right temporal hematoma s/p craniotomy for evacuation 24, cranial defect s/p cranioplasty 24.  Aspiration pneumonia, Anemia  5. Hyponatremia,Chronic thrombocytopenia, History of CVA s/p craniectomy, Seizures.  new diagnosis UTI. per MD note 7-13 : UA reviewed, will start antibiotics  Additional Pertinent Hx: Brennen Mcclure is a 59 y.o. right-handed male with history of CVA s/p craniectomy, seizures, CAD, HLD, DVT, chronic thrombocytopenia (on IVIG and Promacta) admitted to [facility] on 2024. He initially presented for cranioplasty, which was completed with synthetic custom peek implant on 24 (Dr. Corbett).  Procedure was initially delayed due to platelet level.  He developed altered mental status with rapidly worsening GCS on 24.  CT head showed acute intraparenchymal hematoma in the right temporal lobe and increased size of extra-axial hemorrhage over the right cerebral convexity with 1.4 cm midline shift.  He required emergency craniotomy for evacuation of epidural hematoma and intracerebral hematoma on 24 (Dr. Almanza).  Extubated 24.  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  He is being treated for aspiration pneumonia as well. He reports ongoing left-sided weakness.  He also continues to report headaches.  His wife notes that he is having numbness/decreased sensation on the left side, which she noticed because he has not been reporting left shoulder and hip pain like he has been for several months.  He also states that he has some shortness of breath and cough. Discharged to [facility] on 2024. Pt admitted to ARU 
[facility]   Acute Rehabilitation Occupational Therapy Evaluation     Date: 24  Patient Name: Brennen Mcclure       Room: 2635/2635-01  MRN: [id number]  Account: [id number]   : 1965  (59 y.o.) Gender: male       Referring Practitioner: Danica De La Paz MD  Diagnosis: Right epidural hematoma and right temporal hematoma s/p craniotomy for evacuation, cranial defect s/p cranioplasty  Additional Pertinent Hx: Brennen Mcclure is a 59 y.o. right-handed male with history of CVA s/p craniectomy, seizures, CAD, HLD, DVT, chronic thrombocytopenia (on IVIG and Promacta) admitted to [facility] on 2024. He initially presented for cranioplasty, which was completed with synthetic custom peek implant on 24 (Dr. Corbett).  Procedure was initially delayed due to platelet level.  He developed altered mental status with rapidly worsening GCS on 24.  CT head showed acute intraparenchymal hematoma in the right temporal lobe and increased size of extra-axial hemorrhage over the right cerebral convexity with 1.4 cm midline shift.  He required emergency craniotomy for evacuation of epidural hematoma and intracerebral hematoma on 24 (Dr. Almanza).  Extubated 24.  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  He is being treated for aspiration pneumonia as well. He reports ongoing left-sided weakness.  He also continues to report headaches.  His wife notes that he is having numbness/decreased sensation on the left side, which she noticed because he has not been reporting left shoulder and hip pain like he has been for several months.  He also states that he has some shortness of breath and cough. Discharged to [facility] on 2024. Pt admitted to ARU 24    Treatment Diagnosis: Impaired self care status    Past Medical History:  has a past medical history of Anemia, CAD (coronary artery disease), Chronic deep vein thrombosis (DVT) of both lower extremities 
[facility]   Acute Rehabilitation Occupational Therapy Daily Treatment Note    Date: 24  Patient Name: Brennen Mcclure       Room: 2635/2635-01  MRN: [id number]  Account: [id number]   : 1965  (59 y.o.) Gender: male       Referring Practitioner: Danica De La Paz MD  Diagnosis: Right epidural hematoma and right temporal hematoma s/p craniotomy for evacuation, cranial defect s/p cranioplasty  Additional Pertinent Hx: Brennen Mcclure is a 59 y.o. right-handed male with history of CVA s/p craniectomy, seizures, CAD, HLD, DVT, chronic thrombocytopenia (on IVIG and Promacta) admitted to [facility] on 2024. He initially presented for cranioplasty, which was completed with synthetic custom peek implant on 24 (Dr. Corbett).  Procedure was initially delayed due to platelet level.  He developed altered mental status with rapidly worsening GCS on 24.  CT head showed acute intraparenchymal hematoma in the right temporal lobe and increased size of extra-axial hemorrhage over the right cerebral convexity with 1.4 cm midline shift.  He required emergency craniotomy for evacuation of epidural hematoma and intracerebral hematoma on 24 (Dr. Almanza).  Extubated 24.  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  He is being treated for aspiration pneumonia as well. He reports ongoing left-sided weakness.  He also continues to report headaches.  His wife notes that he is having numbness/decreased sensation on the left side, which she noticed because he has not been reporting left shoulder and hip pain like he has been for several months.  He also states that he has some shortness of breath and cough. Discharged to [facility] on 2024. Pt admitted to ARU 24    Treatment Diagnosis: Impaired self care status    Past Medical History:  has a past medical history of Anemia, CAD (coronary artery disease), Chronic deep vein thrombosis (DVT) of both lower 
[place] with Admissions spoke to Dr. Mason regarding signed and held orders. RN to only release Dr. De La Paz's order sets, and type in rehab of [facility] orders. Leave Eugene VEGA under sign and held.     Writer verified orders with Dr. Mason.   
Occupational Therapy  [facility]   Acute Rehabilitation Occupational Therapy Daily Treatment Note    Date: 24  Patient Name: Brennen Mcclure       Room: 2635/2635-01  MRN: [id number]  Account: [id number]   : 1965  (59 y.o.) Gender: male       Referring Practitioner: Danica De La Paz MD  Diagnosis: Right epidural hematoma and right temporal hematoma s/p craniotomy for evacuation, cranial defect s/p cranioplasty  Additional Pertinent Hx: Brennen Mcclure is a 59 y.o. right-handed male with history of CVA s/p craniectomy, seizures, CAD, HLD, DVT, chronic thrombocytopenia (on IVIG and Promacta) admitted to [facility] on 2024. He initially presented for cranioplasty, which was completed with synthetic custom peek implant on 24 (Dr. Corbett).  Procedure was initially delayed due to platelet level.  He developed altered mental status with rapidly worsening GCS on 24.  CT head showed acute intraparenchymal hematoma in the right temporal lobe and increased size of extra-axial hemorrhage over the right cerebral convexity with 1.4 cm midline shift.  He required emergency craniotomy for evacuation of epidural hematoma and intracerebral hematoma on 24 (Dr. Almazna).  Extubated 24.  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  He is being treated for aspiration pneumonia as well. He reports ongoing left-sided weakness.  He also continues to report headaches.  His wife notes that he is having numbness/decreased sensation on the left side, which she noticed because he has not been reporting left shoulder and hip pain like he has been for several months.  He also states that he has some shortness of breath and cough. Discharged to [facility] on 2024. Pt admitted to ARU 24    Treatment Diagnosis: Impaired self care status    Past Medical History:  has a past medical history of Anemia, CAD (coronary artery disease), Chronic deep vein thrombosis (DVT) 
Patient Brennen and wife july wish for patient to NOT receive CPR, NO Intubation or any sort of feeding tube.   
Patient provided medical update and welcomed prayer; listening presence and support; got medical update from patient's nurse;     07/12/24 1727   Encounter Summary   Encounter Overview/Reason Spiritual/Emotional Needs   Service Provided For Patient   Referral/Consult From Palliative Care   Support System Spouse   Last Encounter  07/12/24   Complexity of Encounter Moderate   Spiritual/Emotional needs   Type Spiritual Support   Palliative Care   Type Palliative Care, Follow-up   Assessment/Intervention/Outcome   Assessment Powerlessness;Hopeful   Intervention Explored/Affirmed feelings, thoughts, concerns;Prayer (assurance of)/[place];Other (Comment);Discussed illness injury and it’s impact   Outcome Engaged in conversation;Expressed feelings, needs, and concerns;Expressed Gratitude;Receptive       
Patient sitting up upon entry to room. Patient asked for assistance to call his wife. Patient stated several times that he was waiting on the devil to come to take him. Once his wife was on the phone, she stated that Brennen was an atheist for many years. But, he has prayed and asked for forgiveness, she then told patient you don't want the devil to come but angels.  Spiritual care will remain supposed as needed.     07/11/24 1050   Encounter Summary   Encounter Overview/Reason Spiritual/Emotional Needs   Service Provided For Patient   Referral/Consult From Palliative Care   Support System Spouse   Last Encounter  07/11/24   Complexity of Encounter Low   Begin Time 1050   End Time  1105   Total Time Calculated 15 min   Spiritual/Emotional needs   Type Spiritual Support   Palliative Care   Type Palliative Care, Follow-up   Assessment/Intervention/Outcome   Assessment Calm;Coping   Intervention Active listening;Prayer (assurance of)/[place]   Outcome Engaged in conversation;Expressed Gratitude;Comfort       
Patient stated he was having chest pain, described the pain as pressure in the middle of his chest. Writer assessed vitals, all vitals WNL. Writer assisted patient to bed with razia lazaro and another nurse. Patient stated after getting in bed that the pain had gone away completely.   
Patient taken to CT scan via bed with 2 assist    scan completed without incident, returned to room    Patient resting well at this time no seizure activity starting spells noted this shift   
Patient was in a more peaceful mode today. Patient sitting @ EOB to transfer with scotty lazaro with P.T. Patient introduced his granddaughter Sandra to writer.  Spiritual care will continue with spiritual and emotional support as needed.     07/04/24 1430   Encounter Summary   Encounter Overview/Reason Spiritual/Emotional Needs   Service Provided For Patient and family together  (Granddaughter Sandra)   Referral/Consult From Palliative Care   Support System Spouse;Family members   Last Encounter  07/04/24   Complexity of Encounter Low   Spiritual/Emotional needs   Type Spiritual Support   Assessment/Intervention/Outcome   Assessment Calm;Coping   Intervention Active listening;Sustaining Presence/Ministry of presence   Outcome Acceptance;Engaged in conversation;Expressed Gratitude;Peace;Receptive       
Per patients wife she is requesting patient does not get his Fioricet past 6pm. Pharmacy called and message sent to change times.   
Physical Medicine & Rehabilitation  Progress Note      Subjective:      59 year-old male with functional decline in L hemiparesis from CVA after cranioplasty with complications and hematoma evacuation. Patient is alert and reporting improved pain in L shoulder. He does also note some improvement in his headache today.     ROS:  Denies fevers, chills, sweats.  No chest pain, palpitations, lightheadedness.  Denies coughing, wheezing or shortness of breath.  Denies abdominal pain, nausea, diarrhea or constipation.  No new areas of joint pain.  Denies new areas of numbness or weakness.  Denies new anxiety or depression issues.  No new skin problems.    Rehabilitation:       PT:    Bed mobility  Bridging: Moderate assistance (Unable to clear buttocks from bead.)  Rolling to Left: Minimal assistance (VCs with use of bed rail)  Rolling to Right: Maximum assistance (Vcs to use RUE on R rail)  Supine to Sit: Maximum assistance (pt advances BLE to EOB by hooking RLE under LLE, assist trunk progression to sit, maintain balance and scoot to sit up EOB)  Sit to Supine: Moderate assistance (trunk and left LE)  Scooting: Maximal assistance (to head of bed)  Bed Mobility Comments: bed flat left handrail , left side as home environment         Transfers  Sit to Stand: Moderate Assistance, Minimal Assistance, Contact guard assistance (WC and cammode to scotty morrison mod assist , WC to parallel bars right UE assist at parallel bars min assist , CGA from SS paddles)  Stand to Sit: Moderate Assistance (assist with eccentric control)  Bed to Chair: Dependent/Total (with scotty steady,1 assist)  Squat Pivot Transfers: Dependent/Total, 2 Person Assistance, Moderate Assistance, Maximum Assistance (to right max assist x 1 and CGA x 1 , to left mod assist x 2 , VCs and assist setup and technique)  Lateral Transfers:  (recliner to w/c c Scotty Morrison Dependent.)  Comment: LUE sling/support during transfers         Ambulation  Comments: 
Physical Medicine & Rehabilitation  Progress Note      Subjective:      59 year-old male with functional decline in L hemiparesis from CVA after cranioplasty with complications and hematoma evacuation. Patient is continuing to have L shoulder pain and is c/o headache which is stabbing pain that occurs daily since his surgery. No relief from current medications for either his shoulder or his headache. He continues to participate with therapy.     ROS:  Denies fevers, chills, sweats.  No chest pain, palpitations, lightheadedness.  Denies coughing, wheezing or shortness of breath.  Denies abdominal pain, nausea, diarrhea or constipation.  No new areas of joint pain.  Denies new areas of numbness or weakness.  Denies new anxiety or depression issues.  No new skin problems.    Rehabilitation:       PT:    Bed mobility  Bridging: Moderate assistance (Unable to clear buttocks from bead.)  Rolling to Left: Minimal assistance (VCS/TCs right LE position to assist)  Rolling to Right: Moderate assistance (right UE assist at hand rail)  Supine to Sit: Maximum assistance (VCS sequencing , right LE assist left assist for setup)  Sit to Supine: Maximum assistance (VCs sequencing, right LE assist left)  Scooting: Maximal assistance (hips EOB)  Bed Mobility Comments: mat table and bed left handral sit to supine , left side as home environment         Transfers  Sit to Stand: Moderate Assistance, Minimal Assistance (CGA from scotty paddles, WC to parallel bar right UE assist at parallel bar min assist)  Stand to Sit: Maximum Assistance (controlled descent)  Bed to Chair: Dependent/Total (with Soctty Quach, 1 assist)  Squat Pivot Transfers: Dependent/Total, 2 Person Assistance, Moderate Assistance (to right , no AD , VCs sequencing)  Lateral Transfers:  (recliner to w/c raya Morrison Dependent.)  Comment: All transfers performed with pt personal scotty quach located in the room, left UE sling/support during transfers   
Physical Medicine & Rehabilitation  Progress Note      Subjective:      59 year-old male with functional decline in L hemiparesis from CVA after cranioplasty with complications and hematoma evacuation. Patient is doing well today. He notes almost complete resolution of his L shoulder pain after injection yesterday. He is participating with therapies.     ROS:  Denies fevers, chills, sweats.  No chest pain, palpitations, lightheadedness.  Denies coughing, wheezing or shortness of breath.  Denies abdominal pain, nausea, diarrhea or constipation.  No new areas of joint pain.  Denies new areas of numbness or weakness.  Denies new anxiety or depression issues.  No new skin problems.    Rehabilitation:       PT:    Bed mobility  Bridging: Moderate assistance (Unable to clear buttocks from bead.)  Rolling to Left: Minimal assistance (VCs with use of bed rail)  Rolling to Right: Maximum assistance (Vcs to use RUE on R rail)  Supine to Sit: Maximum assistance (pt advances BLE to EOB by hooking RLE under LLE, assist trunk progression to sit, maintain balance and scoot to sit up EOB)  Sit to Supine:  (Pt left up in wheelchair per Dr. De La Paz to receive injection)  Scooting: Maximal assistance (to EOB)  Bed Mobility Comments: bed left handral , left side as home environment         Transfers  Sit to Stand: Moderate Assistance (in SS and in // bars, CGA from SS paddles)  Stand to Sit: Moderate Assistance (assist with eccentric control)  Bed to Chair: Dependent/Total (with scotty almazan, 2A this date due to increased lateral lean to L)  Squat Pivot Transfers: Dependent/Total, 2 Person Assistance, Moderate Assistance (to right , no AD , VCs sequencing)  Lateral Transfers:  (recliner to w/c c Scotty Morrison Dependent.)  Comment: LUE sling/support during transfers         Ambulation  Comments: Non-ambulatory       OT:  Grooming/Oral Hygiene  Assistance Level: Moderate assistance  Skilled Clinical Factors: Pt seated in w/c with Mod A x 1 
Physical Medicine & Rehabilitation  Progress Note      Subjective:      59 year-old male with functional decline in L hemiparesis from CVA after cranioplasty with complications and hematoma evacuation. Patient is doing well. He reports some improvement in his headache pain is under control.    ROS:  Denies fevers, chills, sweats.  No chest pain, palpitations, lightheadedness.  Denies coughing, wheezing or shortness of breath.  Denies abdominal pain, nausea, diarrhea or constipation.  No new areas of joint pain.  Denies new areas of numbness or weakness.  Denies new anxiety or depression issues.  No new skin problems.    Rehabilitation:       PT:      Functional Mobility  Bed mobility  Supine to Sit: Maximum assistance (trunk & bilat LEs, VCs sequencing.)  Sit to Supine: Moderate assistance (trunk & left LE VCs sequencing)  Scooting: Maximal assistance (hips to EOB, VCs sequencing)  Bed Mobility Comments: head of bed elevated , left handrail as home environment  Transfers  Sit to Stand: Moderate Assistance;Minimal Assistance;Contact guard assistance (bed and WC to scotty stedy . Min assist WC  to parallel bars , right UE assist at parallel bars, CGA from scotty paddles)  Stand to Sit: Moderate Assistance (assist with eccentric control)  Bed to Chair: Dependent/Total (Scotty Stedy 1 assist)  Stand Pivot Transfers: Dependent/Total (Scotty Stedy assist)  Comment: left sling donned for transfers  Balance  Posture: Poor  Sitting - Static: Poor;+  Sitting - Dynamic: Poor  Standing - Static: Poor;+ (scotty stedy and parallel bar right UE)  Standing - Dynamic:  (LUIZ)     Environmental Mobility  Ambulation  Comments: Non-ambulatory  Stairs/Curb  Stairs?: No (not stair climbing at baseline)  Wheelchair Activities  Propulsion: Yes  Propulsion 1  Propulsion: Manual  Level: Level Tile  Method: RUE;RLE  Level of Assistance: Moderate assistance  Description/ Details: assist for straight path , pt veers left , visual tracking absent without 
Physical Medicine & Rehabilitation  Progress Note      Subjective:      59 year-old male with functional decline in L hemiparesis from CVA after cranioplasty with complications and hematoma evacuation. Patient is drowsy and depressed today with minimal participation in physical exam.     ROS:  Denies fevers, chills, sweats.  No chest pain, palpitations, lightheadedness.  Denies coughing, wheezing or shortness of breath.  Denies abdominal pain, nausea, diarrhea or constipation.  No new areas of joint pain.  Denies new areas of numbness or weakness.  Denies new anxiety or depression issues.  No new skin problems.    Rehabilitation:       PT:    Bed mobility  Bridging: Moderate assistance (Unable to clear buttocks from bead.)  Rolling to Left: Minimal assistance  Rolling to Right: Moderate assistance (right UE assist at hand rail)  Supine to Sit: Maximum assistance (VCs for sequencing)  Sit to Supine: Maximum assistance (VCs sequencing)  Scooting: Maximal assistance (hips EOB)  Bed Mobility Comments: head elevated and left handrail, left side as home environment         Transfers  Sit to Stand: Maximum Assistance  Stand to Sit: Maximum Assistance  Bed to Chair: Dependent/Total (with Scotty Steady)  Squat Pivot Transfers: 2 Person Assistance, Moderate Assistance (Edge of mat to WC, towards R side)  Lateral Transfers:  (recliner to w/c raya Morrison Dependent.)  Comment: All transfers performed with pt personal scotty steady located in the room, left UE sling/support during transfers         Ambulation  Comments: Non-ambulatory       OT:  Grooming/Oral Hygiene  Assistance Level: Minimal assistance  Skilled Clinical Factors: Min A for sequencing required, pt seated in wc at sink this date  Upper Extremity Bathing  Assistance Level: Dependent  Skilled Clinical Factors: 2 person A while sitting EOB flucuating between Max A x 1 - Max A x 2 to maintain upright position with posterior R side lean. Pt demonstrated pushing to L side 
Physical Medicine & Rehabilitation  Progress Note      Subjective:      59 year-old male with functional decline in L hemiparesis from CVA after cranioplasty with complications and hematoma evacuation. Patient is observed in PT today. He is doing well. He reports some improvement in his headache pain now rating it 8/10 down from 10/10. His L shoulder pain is improved.     ROS:  Denies fevers, chills, sweats.  No chest pain, palpitations, lightheadedness.  Denies coughing, wheezing or shortness of breath.  Denies abdominal pain, nausea, diarrhea or constipation.  No new areas of joint pain.  Denies new areas of numbness or weakness.  Denies new anxiety or depression issues.  No new skin problems.    Rehabilitation:       PT:    Bed mobility  Bridging: Moderate assistance (unable to clear buttocks from mat.)  Rolling to Left: Minimal assistance (VCs with use of bed rail)  Rolling to Right: Maximum assistance (Vcs to use RUE on R rail)  Supine to Sit: Maximum assistance (trunk & bilat LEs, vcs sequencing)  Sit to Supine: Maximum assistance (trunk & bilat LE)  Scooting: Maximal assistance (hips to EOB)  Bed Mobility Comments: completed on mat table in therapy gym         Transfers  Sit to Stand: Moderate Assistance, 2 Person Assistance, Maximum Assistance (modA x2 & maxA x1 from surface<>Radha Stedy)  Stand to Sit: Moderate Assistance (assist with eccentric control)  Bed to Chair: Dependent/Total (Radha Stedy)  Stand Pivot Transfers: Dependent/Total (Radha Stedy)  Squat Pivot Transfers: Dependent/Total, 2 Person Assistance, Moderate Assistance, Maximum Assistance (to right max assist x 1 and CGA x 1 , to left mod assist x 2 , VCs and assist setup and technique)  Lateral Transfers:  (recliner to w/c c Radha Morrison Dependent.)  Comment: left sling donned for transfers, patient declined helmet use, al transfers completed with Radha Morrison         Ambulation  Comments: Non-ambulatory       OT:  Grooming/Oral Hygiene  Assistance 
Physical Medicine & Rehabilitation  Progress Note      Subjective:      59 year-old male with functional decline in L hemiparesis from CVA after cranioplasty with complications and hematoma evacuation. Patient is observed in physical therapy gym today. He continues to have significant L sided lean affecting therapy. He is also having intermittent staring episodes.      ROS:  Denies fevers, chills, sweats.  No chest pain, palpitations, lightheadedness.  Denies coughing, wheezing or shortness of breath.  Denies abdominal pain, nausea, diarrhea or constipation.  No new areas of joint pain.  Denies new areas of numbness or weakness.  Denies new anxiety or depression issues.  No new skin problems.    Rehabilitation:       PT:    Bed mobility  Bridging: Moderate assistance (Unable to clear buttocks from bead.)  Rolling to Left: Minimal assistance (VCs with use of bed rail)  Rolling to Right: Maximum assistance (Vcs to use RUE on R rail)  Supine to Sit: Maximum assistance (trunk and LEs , VCs sequencing)  Sit to Supine: Moderate assistance (trunk and left LE)  Scooting: Maximal assistance (hip EOB)  Bed Mobility Comments: head of bed elevated,  left handrail , left side as home environment.         Transfers  Sit to Stand: Moderate Assistance, Contact guard assistance (WC  and cammode mod assist to scotty morrison , CGA from scotty morrison paddles and WC to parallel bars right UE assist at parallel bars)  Stand to Sit: Moderate Assistance (assist with eccentric control)  Bed to Chair: Dependent/Total (with scotty steady,1 assist)  Squat Pivot Transfers: Dependent/Total, 2 Person Assistance, Moderate Assistance, Maximum Assistance (to right max assist x 1 and CGA x 1 , to left mod assist x 2 , VCs and assist setup and technique)  Lateral Transfers:  (recliner to w/c c Scotty Morrison Dependent.)  Comment: left sling donned during transfers         Ambulation  Comments: Non-ambulatory       OT:  Grooming/Oral Hygiene  Assistance Level: 
Physical Medicine & Rehabilitation  Progress Note      Subjective:      59 year-old male with functional decline in L hemiparesis from CVA after cranioplasty with complications and hematoma evacuation. Patient is stable today. He is eating well. He denies current headache. Participating with therapy.     ROS:  Denies fevers, chills, sweats.  No chest pain, palpitations, lightheadedness.  Denies coughing, wheezing or shortness of breath.  Denies abdominal pain, nausea, diarrhea or constipation.  No new areas of joint pain.  Denies new areas of numbness or weakness.  Denies new anxiety or depression issues.  No new skin problems.    Rehabilitation:       PT:    Bed mobility  Bridging: Moderate assistance (Unable to clear buttocks from bead.)  Rolling to Left: Minimal assistance (VCS/TCs right LE position to assist)  Rolling to Right: Moderate assistance (right UE assist at hand rail)  Supine to Sit: Maximum assistance (VCS sequencing , right LE assist left assist for setup)  Sit to Supine: Maximum assistance (VCs sequencing, right LE assist left)  Scooting: Maximal assistance (hips EOB)  Bed Mobility Comments: mat table and bed left handral sit to supine , left side as home environment         Transfers  Sit to Stand: Moderate Assistance, Minimal Assistance (CGA from scotty paddles, WC to parallel bar right UE assist at parallel bar min assist)  Stand to Sit: Maximum Assistance (controlled descent)  Bed to Chair: Dependent/Total (with Scotty Quach, 1 assist)  Squat Pivot Transfers: Dependent/Total, 2 Person Assistance, Moderate Assistance (to right , no AD , VCs sequencing)  Lateral Transfers:  (recliner to w/c raya Morrison Dependent.)  Comment: All transfers performed with pt personal scotty quach located in the room, left UE sling/support during transfers         Ambulation  Comments: Non-ambulatory       OT:  Grooming/Oral Hygiene  Assistance Level: Minimal assistance  Skilled Clinical Factors: Min A for sequencing 
Physical Medicine & Rehabilitation  Progress Note      Subjective:      59 year-old male with functional decline in L hemiparesis from CVA after cranioplasty with complications and hematoma evacuation. Patient is well, and has had no acute complaints or problems. He reports improvement in his mood. He does continue to have c/o headaches which he states are responding to prn medications including Fioricet.     ROS:  Denies fevers, chills, sweats.  No chest pain, palpitations, lightheadedness.  Denies coughing, wheezing or shortness of breath.  Denies abdominal pain, nausea, diarrhea or constipation.  No new areas of joint pain.  Denies new areas of numbness or weakness.  Denies new anxiety or depression issues.  No new skin problems.    Rehabilitation:       PT:    Bed mobility  Bridging: Moderate assistance (Unable to clear buttocks from bead.)  Rolling to Left: Minimal assistance  Rolling to Right: Maximum assistance, 2 Person assistance  Supine to Sit: Moderate assistance  Sit to Supine: 2 Person assistance, Moderate assistance  Scooting: Dependent/Total (to HOB)  Bed Mobility Comments: HOB flat with use of hand rails for rolling.         Transfers  Sit to Stand: Maximum Assistance  Stand to Sit: Maximum Assistance  Bed to Chair: Dependent/Total (with Scotty Steady)  Squat Pivot Transfers: 2 Person Assistance, Moderate Assistance (Edge of mat to WC, towards R side)  Lateral Transfers:  (recliner to w/c raya Morrison Dependent.)  Comment: All transfers performed with pt personal scotty steady located in the room. Unable to achieve a fully erect posture- pt lacking B TKE and hip ext.         Ambulation  Comments: Non-ambulatory       OT:  Grooming/Oral Hygiene  Assistance Level: Dependent  Skilled Clinical Factors: 2 person A while sitting EOB flucuating between Max A x 1 - Max A x 2 to maintain upright position with posterior R side lean. Pt demonstrated pushing towards L side on this date. Grooming task of washing face 
Physical Medicine & Rehabilitation  Progress Note    2024 11:03 AM     CC: Ambulatory and ADL dysfunction due to history of CVA with left hemiparesis status post craniectomy and hematoma evacuation    Subjective:   Patient bit anxious today, depressed.  Wife on phone notes patient appears more angry she had more difficulty with him.  Appears to be overwhelmed.  Patient denies any other change in symptoms Notes occasional headache    ROS:  Denies fevers, chills, sweats.  No chest pain, palpitations, lightheadedness.  Denies coughing, wheezing or shortness of breath.  Denies abdominal pain, nausea, diarrhea or constipation.  No new areas of joint pain.  Denies new areas of numbness or weakness.  Denies new anxiety or depression issues.  No new skin problems.    Rehabilitation:   PT:    Bed mobility  Bridging: Moderate assistance (unable to fuly clear buttocks 2* 0/5 LLE)  Rolling to Left: Moderate assistance  Rolling to Right: Maximum assistance  Supine to Sit: Maximum assistance  Sit to Supine: 2 Person assistance, Moderate assistance  Scootin Person assistance, Maximal assistance  Bed Mobility Comments: Rolling side to side completed with 2 person A during self care tasks. Pt sat EOB during self care tasks with Max A x 2 for sitting balance anterior and posterior balance during dynamic tasks with heavy posterior L side lean noted. Helmet donned while sitting EOB for safety    Transfers  Sit to Stand: 2 Person Assistance  Stand to Sit: 2 Person Assistance  Bed to Chair: 2 Person Assistance, Dependent/Total (Radha Morrison)  Squat Pivot Transfers: 2 Person Assistance, Moderate Assistance (Edge of mat to WC, towards R side)  Comment: Max cues for safety; pt impulsive needing cues to wait for staff readiness    Ambulation  Comments: Non-ambulatory         Wheelchair Activities  Pressure Relief Type: Tilt Back Manual  Level of Assistance for pressure relief activities: Maximum assistance (for forward lean to offload 
Physical Medicine & Rehabilitation  Progress Note    6/29/2024 1:04 PM     CC: Ambulatory and ADL dysfunction due to history of CVA with left hemiparesis status post craniectomy and hematoma evacuation    Subjective:   Patient bit anxious today, depressed.  Wife on phone notes patient appears more angry she had more difficulty with him.  Appears to be overwhelmed.  Patient denies any other change in symptoms Notes occasional headache    ROS:  Denies fevers, chills, sweats.  No chest pain, palpitations, lightheadedness.  Denies coughing, wheezing or shortness of breath.  Denies abdominal pain, nausea, diarrhea or constipation.  No new areas of joint pain.  Denies new areas of numbness or weakness.  Denies new anxiety or depression issues.  No new skin problems.    Rehabilitation:   PT:    Bed mobility  Bridging: Moderate assistance (unable to fuly clear buttocks 2* 0/5 LLE)  Rolling to Left: Moderate assistance  Rolling to Right: Maximum assistance  Supine to Sit: Moderate assistance  Sit to Supine: 2 Person assistance, Moderate assistance  Scooting: Maximal assistance  Bed Mobility Comments: Rolling side to side completed with 2 person A during self care tasks. Pt sat EOB during self care tasks with Max A x 2 for sitting balance anterior and posterior balance during dynamic tasks with heavy posterior L side lean noted. Helmet donned while sitting EOB for safety    Transfers  Sit to Stand: Maximum Assistance (Progressed to MOD c several sit<>stand c Radha Morrison)  Stand to Sit: Maximum Assistance  Bed to Chair: Dependent/Total (w/c to bed c Radha Morrison)  Squat Pivot Transfers: 2 Person Assistance, Moderate Assistance (Edge of mat to WC, towards R side)  Lateral Transfers:  (recliner to w/c c Radha Stedy Dependent.)  Comment: Max cues for safety; pt impulsive needing cues to wait for staff readiness    Ambulation  Comments: Non-ambulatory         Wheelchair Activities  Pressure Relief Type: Tilt Back Manual  Level of 
Physical Medicine & Rehabilitation  Progress Note    6/30/2024 12:24 PM     CC: Ambulatory and ADL dysfunction due to history of CVA with left hemiparesis status post craniectomy and hematoma evacuation    Subjective:   Patient notes occasional headaches, dizziness notes headache from right scalp and right side of face since surgery    ROS:  Denies fevers, chills, sweats.  No chest pain, palpitations, lightheadedness.  Denies coughing, wheezing or shortness of breath.  Denies abdominal pain, nausea, diarrhea or constipation.  No new areas of joint pain.  Denies new areas of numbness or weakness.  Denies new anxiety or depression issues.  No new skin problems.    Rehabilitation:   PT:    Bed mobility  Bridging: Moderate assistance (unable to fuly clear buttocks 2* 0/5 LLE)  Rolling to Left: Moderate assistance  Rolling to Right: Maximum assistance  Supine to Sit: Moderate assistance  Sit to Supine: 2 Person assistance, Moderate assistance  Scooting: Maximal assistance  Bed Mobility Comments: Rolling side to side completed with 2 person A during self care tasks. Pt sat EOB during self care tasks with Max A x 2 for sitting balance anterior and posterior balance during dynamic tasks with heavy posterior L side lean noted. Helmet donned while sitting EOB for safety    Transfers  Sit to Stand: Maximum Assistance (Progressed to MOD c several sit<>stand c Radha Stedy)  Stand to Sit: Maximum Assistance  Bed to Chair: Dependent/Total (w/c to bed c Radha Stedy)  Squat Pivot Transfers: 2 Person Assistance, Moderate Assistance (Edge of mat to WC, towards R side)  Lateral Transfers:  (recliner to w/c c Radha Stedy Dependent.)  Comment: Max cues for safety; pt impulsive needing cues to wait for staff readiness    Ambulation  Comments: Non-ambulatory         Wheelchair Activities  Pressure Relief Type: Tilt Back Manual  Level of Assistance for pressure relief activities: Maximum assistance (for forward lean to offload buttocks, 
Physical Therapy  Facility/Department: [facility] ACUTE REHAB  Rehabilitation Physical Therapy Progress Note   NAME: Brennen Mcclure  : 1965 (59 y.o.)  MRN: [id number]  CODE STATUS: DNR-CCA    Date of Service: 24        Additional Pertinent Hx: 59-year-old male with history of CVA status post craniectomy, noted initial treatment in [place] subsequently transferred to [facility] rehab, seizures, coronary disease, hyperlipidemia, DVT, chronic thrombocytopenia on IVIG and Promacta, admitted [facility] on 2024-for cranioplasty which was completed with synthetic custom peek implant  by Dr. Corbett.  Procedure initially delayed due to platelet level.  He developed altered mental status with rapidly worsening GCS on 2024.  CT head showed acute intraparenchymal hematoma in the right temporal lobe and increased size of extra-axial hemorrhage over the right cerebral convexity with 1.4 cm midline shift.  He required emergent craniectomy for evacuation of epidural hematoma intercerebral hematoma on 2024 by Dr. Almanza.  Patient extubated 2024. He was treated for aspiration pneumonia. Patient eventually transferred to [facility]  however had elected to continue rehab at [facility] and was transferred on . per Hematology pt unable to tolerate anticoagulation due to recurrent brain bleeds therefore no anticoagulation.  Family / Caregiver Present: Yes (Mara)  Referring Practitioner: Dr. De La Paz  Referral Date : 24  Diagnosis: Right epidural hematoma and right temporal hematoma s/p craniotomy for evacuation, cranial defect s/p cranioplasty    Restrictions:  Restrictions/Precautions: Fall Risk;General Precautions;Seizure;Up as Tolerated  Required Braces or Orthoses  Left Lower Extremity Brace: Katie [employer]otics  Left Upper Extremity Brace/Splint: Resting hand (At night time; givmohr sling for LUE)  Position Activity Restriction  Other position/activity restrictions: Helmet to be on when OOB for 
Physical Therapy  Facility/Department: [facility] ACUTE REHAB  Rehabilitation Physical Therapy Progress Note    NAME: Brennen Mcclure  : 1965 (59 y.o.)  MRN: [id number]  CODE STATUS: DNR-CC    Date of Service: 24      Past Medical History:   Diagnosis Date    Anemia 2024    CAD (coronary artery disease)     Chronic deep vein thrombosis (DVT) of both lower extremities (HCC)     Chronic deep vein thrombosis (DVT) of femoral vein of right lower extremity (HCC) 2024    Chronic deep vein thrombosis (DVT) of proximal vein of right lower extremity (HCC) 2024    Chronic deep vein thrombosis (DVT) of right iliac vein (HCC) 2024    Chronic idiopathic thrombocytopenia (HCC)     Chronic idiopathic thrombocytopenic purpura (HCC) 2024    CVA (cerebral vascular accident) (HCC)     Emphysema lung (HCC)     Hemosiderin pigmentation of skin 2024    HLD (hyperlipidemia)     Intracranial hemorrhage (HCC) 2024    Left hemiplegia (HCC)     Right leg swelling 2024    Thrombocytopenia (HCC) 2024     Past Surgical History:   Procedure Laterality Date    CORONARY ARTERY BYPASS GRAFT      x4    CORONARY STENT PLACEMENT      2 stents    CRANIOPLASTY Right 2024    : CRANIOPLASTY (SUGITA, SUPINE, IMMANUEL BONE FLAP) (Right: Head)    CRANIOTOMY Right     RIGHT CRANIOTOMY, FOR HEMATOMA EVACUATION  of frontal and temporal IPH (Right)    CRANIOTOMY Right 2024    CRANIOPLASTY (SUGITA, SUPINE, IMMANUEL BONE FLAP) performed by Gloria Corbett DO at [facility] [place] OR    CRANIOTOMY Right 2024    CRANIOTOMY FOR INTRASUBDURAL HEMATOMA EVACUATION performed by Cami Almanza DO at [facility] [place] OR    CT BONE MARROW BIOPSY  2024    CT BONE MARROW BIOPSY 3/18/2024 [facility] [place] CT SCAN       Chart Reviewed: Yes  Patient assessed for rehabilitation services?: Yes  Additional Pertinent Hx: 59-year-old male with history of CVA status post craniectomy, noted initial treatment [place] [facility] 
Physical Therapy  Facility/Department: [facility] ACUTE REHAB  Rehabilitation Physical Therapy Daily Treatment Note    NAME: Brennen Mcclure  : 1965 (59 y.o.)  MRN: [id number]  CODE STATUS: DNR-CC    Date of Service: 24      Past Medical History:   Diagnosis Date    Anemia 2024    CAD (coronary artery disease)     Chronic deep vein thrombosis (DVT) of both lower extremities (HCC)     Chronic deep vein thrombosis (DVT) of femoral vein of right lower extremity (HCC) 2024    Chronic deep vein thrombosis (DVT) of proximal vein of right lower extremity (HCC) 2024    Chronic deep vein thrombosis (DVT) of right iliac vein (HCC) 2024    Chronic idiopathic thrombocytopenia (HCC)     Chronic idiopathic thrombocytopenic purpura (HCC) 2024    CVA (cerebral vascular accident) (HCC)     Emphysema lung (HCC)     Hemosiderin pigmentation of skin 2024    HLD (hyperlipidemia)     Intracranial hemorrhage (HCC) 2024    Left hemiplegia (HCC)     Right leg swelling 2024    Thrombocytopenia (HCC) 2024     Past Surgical History:   Procedure Laterality Date    CORONARY ARTERY BYPASS GRAFT      x4    CORONARY STENT PLACEMENT      2 stents    CRANIOPLASTY Right 2024    : CRANIOPLASTY (SUGITA, SUPINE, IMMANUEL BONE FLAP) (Right: Head)    CRANIOTOMY Right     RIGHT CRANIOTOMY, FOR HEMATOMA EVACUATION  of frontal and temporal IPH (Right)    CRANIOTOMY Right 2024    CRANIOPLASTY (SUGITA, SUPINE, IMMANUEL BONE FLAP) performed by Gloria Corbett DO at [facility] OR    CRANIOTOMY Right 2024    CRANIOTOMY FOR INTRASUBDURAL HEMATOMA EVACUATION performed by Cami Almanza DO at [facility] OR    CT BONE MARROW BIOPSY  2024    CT BONE MARROW BIOPSY 3/18/2024 [facility] CT SCAN       Chart Reviewed: Yes  Patient assessed for rehabilitation services?: Yes  Additional Pertinent Hx: 59-year-old male with history of CVA status post craniectomy, noted initial treatment in [place] subsequently transferred to 
Physical Therapy  Facility/Department: [facility] ACUTE REHAB  Rehabilitation Physical Therapy Progress Note   NAME: Brennen Mcclure  : 1965 (59 y.o.)  MRN: [id number]  CODE STATUS: DNR-CC    Date of Service: 24      Additional Pertinent Hx: 59-year-old male with history of CVA status post craniectomy, noted initial treatment in [place] subsequently transferred to [facility] rehab, seizures, coronary disease, hyperlipidemia, DVT, chronic thrombocytopenia on IVIG and Promacta, admitted [facility] on 2024-for cranioplasty which was completed with synthetic custom peek implant  by Dr. Corbett.  Procedure initially delayed due to platelet level.  He developed altered mental status with rapidly worsening GCS on 2024.  CT head showed acute intraparenchymal hematoma in the right temporal lobe and increased size of extra-axial hemorrhage over the right cerebral convexity with 1.4 cm midline shift.  He required emergent craniectomy for evacuation of epidural hematoma intercerebral hematoma on 2024 by Dr. Almanza.  Patient extubated 2024. He was treated for aspiration pneumonia. Patient eventually transferred to [facility]  however had elected to continue rehab at [facility] and was transferred on . per Hematology pt unable to tolerate anticoagulation due to recurrent brain bleeds therefore no anticoagulation.  Family / Caregiver Present: No  Referring Practitioner: Dr. De La Paz  Referral Date : 24  Diagnosis: Right epidural hematoma and right temporal hematoma s/p craniotomy for evacuation, cranial defect s/p cranioplasty  General Comment  Comments: Personal AFO donned , no complants AM/PM    Restrictions:  Restrictions/Precautions: Fall Risk;Seizure;General Precautions;Bed Alarm;Up as Tolerated  Required Braces or Orthoses  Left Lower Extremity Brace: Katie Foot Orthotics (PRAFO boot for night time)  Left Upper Extremity Brace/Splint: Resting hand (At night time; givmohr sling for 
Physical Therapy  Facility/Department: [facility] ACUTE REHAB  Rehabilitation Physical Therapy Daily Treatment Note    NAME: Brennen Mcclure  : 1965 (59 y.o.)  MRN: [id number]  CODE STATUS: DNR-CC    Date of Service: 24      Past Medical History:   Diagnosis Date    Anemia 2024    CAD (coronary artery disease)     Chronic deep vein thrombosis (DVT) of both lower extremities (HCC)     Chronic deep vein thrombosis (DVT) of femoral vein of right lower extremity (HCC) 2024    Chronic deep vein thrombosis (DVT) of proximal vein of right lower extremity (HCC) 2024    Chronic deep vein thrombosis (DVT) of right iliac vein (HCC) 2024    Chronic idiopathic thrombocytopenia (HCC)     Chronic idiopathic thrombocytopenic purpura (HCC) 2024    CVA (cerebral vascular accident) (HCC)     Emphysema lung (HCC)     Hemosiderin pigmentation of skin 2024    HLD (hyperlipidemia)     Intracranial hemorrhage (HCC) 2024    Left hemiplegia (HCC)     Right leg swelling 2024    Thrombocytopenia (HCC) 2024     Past Surgical History:   Procedure Laterality Date    CORONARY ARTERY BYPASS GRAFT      x4    CORONARY STENT PLACEMENT      2 stents    CRANIOPLASTY Right 2024    : CRANIOPLASTY (SUGITA, SUPINE, IMMANUEL BONE FLAP) (Right: Head)    CRANIOTOMY Right     RIGHT CRANIOTOMY, FOR HEMATOMA EVACUATION  of frontal and temporal IPH (Right)    CRANIOTOMY Right 2024    CRANIOPLASTY (SUGITA, SUPINE, IMMANUEL BONE FLAP) performed by Gloria Corbett DO at [facility] OR    CRANIOTOMY Right 2024    CRANIOTOMY FOR INTRASUBDURAL HEMATOMA EVACUATION performed by Cami Almanza DO at [facility] OR    CT BONE MARROW BIOPSY  2024    CT BONE MARROW BIOPSY 3/18/2024 [facility] CT SCAN       Chart Reviewed: Yes  Patient assessed for rehabilitation services?: Yes  Additional Pertinent Hx: 59-year-old male with history of CVA status post craniectomy, noted initial treatment in [place] subsequently transferred to 
Physical Therapy  Facility/Department: [facility] ACUTE REHAB      NAME: Brennen Mcclure  : 1965 (59 y.o.)  MRN: [id number]  CODE STATUS: DNR-CC    Date of Service: 24      Past Medical History:   Diagnosis Date    Anemia 2024    CAD (coronary artery disease)     Chronic deep vein thrombosis (DVT) of both lower extremities (HCC)     Chronic deep vein thrombosis (DVT) of femoral vein of right lower extremity (HCC) 2024    Chronic deep vein thrombosis (DVT) of proximal vein of right lower extremity (HCC) 2024    Chronic deep vein thrombosis (DVT) of right iliac vein (HCC) 2024    Chronic idiopathic thrombocytopenia (HCC)     Chronic idiopathic thrombocytopenic purpura (HCC) 2024    CVA (cerebral vascular accident) (HCC)     Emphysema lung (HCC)     Hemosiderin pigmentation of skin 2024    HLD (hyperlipidemia)     Intracranial hemorrhage ([facility]) 2024    Left hemiplegia ([facility])     Right leg swelling 2024    Thrombocytopenia ([facility]) 2024     Past Surgical History:   Procedure Laterality Date    CORONARY ARTERY BYPASS GRAFT      x4    CORONARY STENT PLACEMENT      2 stents    CRANIOPLASTY Right 2024    : CRANIOPLASTY (SUGITA, SUPINE, IMMANUEL BONE FLAP) (Right: Head)    CRANIOTOMY Right     RIGHT CRANIOTOMY, FOR HEMATOMA EVACUATION  of frontal and temporal IPH (Right)    CRANIOTOMY Right 2024    CRANIOPLASTY (SUGITA, SUPINE, IMMANUEL BONE FLAP) performed by Gloria Corbett DO at [facility] OR    CRANIOTOMY Right 2024    CRANIOTOMY FOR INTRASUBDURAL HEMATOMA EVACUATION performed by Cami Almanza DO at [facility] OR    CT BONE MARROW BIOPSY  2024    CT BONE MARROW BIOPSY 3/18/2024 [facility] CT SCAN       Additional Pertinent Hx: 59-year-old male with history of CVA status post craniectomy, noted initial treatment in [place] subsequently transferred to [facility] rehab, seizures, coronary disease, hyperlipidemia, DVT, chronic thrombocytopenia on IVIG and Promacta, admitted 
Physical Therapy  Facility/Department: [facility] ACUTE REHAB  Rehabilitation Physical Therapy Progress Note     NAME: Brennen Mcclure  : 1965 (59 y.o.)  MRN: [id number]  CODE STATUS: DNR-CC    Date of Service: 24      Additional Pertinent Hx: 59-year-old male with history of CVA status post craniectomy, noted initial treatment in [place] subsequently transferred to [facility] rehab, seizures, coronary disease, hyperlipidemia, DVT, chronic thrombocytopenia on IVIG and Promacta, admitted [facility] on 2024-for cranioplasty which was completed with synthetic custom peek implant  by Dr. Corbett.  Procedure initially delayed due to platelet level.  He developed altered mental status with rapidly worsening GCS on 2024.  CT head showed acute intraparenchymal hematoma in the right temporal lobe and increased size of extra-axial hemorrhage over the right cerebral convexity with 1.4 cm midline shift.  He required emergent craniectomy for evacuation of epidural hematoma intercerebral hematoma on 2024 by Dr. Almanza.  Patient extubated 2024. He was treated for aspiration pneumonia. Patient eventually transferred to [facility]  however had elected to continue rehab at [facility] and was transferred on . per Hematology pt unable to tolerate anticoagulation due to recurrent brain bleeds therefore no anticoagulation.  Family / Caregiver Present: Yes  Referring Practitioner: Dr. De La Paz (Spouse and niece)  Referral Date : 24  Diagnosis: Right epidural hematoma and right temporal hematoma s/p craniotomy for evacuation, cranial defect s/p cranioplasty  General Comment  Comments: AM upon approach pt sleeping , difficult to awake with sternal rubbing , touch and loud voice, when eyes open pt unable to form clear words , GARY Albarran present , reports + UTI , limited treatment due to lack of participation and alertness . Alertness improved PM. Spouse and niece present for demonstration and return 
Physical Therapy  Facility/Department: [facility] ACUTE REHAB  Rehabilitation Physical Therapy Progress Note     NAME: Brennen Mcclure  : 1965 (59 y.o.)  MRN: [id number]  CODE STATUS: DNR-CC    Date of Service: 24      Additional Pertinent Hx: 59-year-old male with history of CVA status post craniectomy, noted initial treatment in [place] subsequently transferred to [facility] rehab, seizures, coronary disease, hyperlipidemia, DVT, chronic thrombocytopenia on IVIG and Promacta, admitted [facility] on 2024-for cranioplasty which was completed with synthetic custom peek implant  by Dr. Corbett.  Procedure initially delayed due to platelet level.  He developed altered mental status with rapidly worsening GCS on 2024.  CT head showed acute intraparenchymal hematoma in the right temporal lobe and increased size of extra-axial hemorrhage over the right cerebral convexity with 1.4 cm midline shift.  He required emergent craniectomy for evacuation of epidural hematoma intercerebral hematoma on 2024 by Dr. Almanza.  Patient extubated 2024. He was treated for aspiration pneumonia. Patient eventually transferred to [facility]  however had elected to continue rehab at [facility] and was transferred on . per Hematology pt unable to tolerate anticoagulation due to recurrent brain bleeds therefore no anticoagulation.  Family / Caregiver Present: No  Referring Practitioner: Dr. De La Paz  Referral Date : 24  Diagnosis: Right epidural hematoma and right temporal hematoma s/p craniotomy for evacuation, cranial defect s/p cranioplasty    Restrictions:  Restrictions/Precautions: Fall Risk;Seizure;General Precautions;Bed Alarm;Up as Tolerated  Required Braces or Orthoses  Left Lower Extremity Brace: [employer]  Left Upper Extremity Brace/Splint: Resting hand  Position Activity Restriction  Other position/activity restrictions: Helmet to be on when OOB for safety per pt's request; Sling for 
Physical Therapy  Facility/Department: [facility] ACUTE REHAB  Rehabilitation Physical Therapy Initial Assessment    NAME: Brennen Mcclure  : 1965 (59 y.o.)  MRN: [id number]  CODE STATUS: DNR-CCA    Date of Service: 24      Past Medical History:   Diagnosis Date    Anemia 2024    CAD (coronary artery disease)     Chronic deep vein thrombosis (DVT) of both lower extremities (HCC)     Chronic deep vein thrombosis (DVT) of femoral vein of right lower extremity (HCC) 2024    Chronic deep vein thrombosis (DVT) of proximal vein of right lower extremity (HCC) 2024    Chronic deep vein thrombosis (DVT) of right iliac vein (HCC) 2024    Chronic idiopathic thrombocytopenia (HCC)     Chronic idiopathic thrombocytopenic purpura (HCC) 2024    CVA (cerebral vascular accident) (HCC)     Emphysema lung (HCC)     Hemosiderin pigmentation of skin 2024    HLD (hyperlipidemia)     Intracranial hemorrhage (HCC) 2024    Left hemiplegia (HCC)     Right leg swelling 2024    Thrombocytopenia (HCC) 2024     Past Surgical History:   Procedure Laterality Date    CORONARY ARTERY BYPASS GRAFT      x4    CORONARY STENT PLACEMENT      2 stents    CRANIOPLASTY Right 2024    : CRANIOPLASTY (SUGITA, SUPINE, IMMANUEL BONE FLAP) (Right: Head)    CRANIOTOMY Right     RIGHT CRANIOTOMY, FOR HEMATOMA EVACUATION  of frontal and temporal IPH (Right)    CRANIOTOMY Right 2024    CRANIOPLASTY (SUGITA, SUPINE, IMMANUEL BONE FLAP) performed by Gloria Corbett DO at [facility] OR    CRANIOTOMY Right 2024    CRANIOTOMY FOR INTRASUBDURAL HEMATOMA EVACUATION performed by Cami Almanza DO at [facility] OR    CT BONE MARROW BIOPSY  2024    CT BONE MARROW BIOPSY 3/18/2024 [facility] CT SCAN       Chart Reviewed: Yes  Patient assessed for rehabilitation services?: Yes  Additional Pertinent Hx: 59-year-old male with history of CVA status post craniectomy, noted initial treatment in [place] subsequently transferred to 
Physical Therapy  Facility/Department: [facility] ACUTE REHAB  Rehabilitation Physical Therapy Progress Note     NAME: Brennen Mcclure  : 1965 (59 y.o.)  MRN: [id number]  CODE STATUS: DNR-CC    Date of Service: 24        Additional Pertinent Hx: 59-year-old male with history of CVA status post craniectomy, noted initial treatment in [place] subsequently transferred to [facility] rehab, seizures, coronary disease, hyperlipidemia, DVT, chronic thrombocytopenia on IVIG and Promacta, admitted [facility] on 2024-for cranioplasty which was completed with synthetic custom peek implant  by Dr. Corbett.  Procedure initially delayed due to platelet level.  He developed altered mental status with rapidly worsening GCS on 2024.  CT head showed acute intraparenchymal hematoma in the right temporal lobe and increased size of extra-axial hemorrhage over the right cerebral convexity with 1.4 cm midline shift.  He required emergent craniectomy for evacuation of epidural hematoma intercerebral hematoma on 2024 by Dr. Almanza.  Patient extubated 2024. He was treated for aspiration pneumonia. Patient eventually transferred to [facility]  however had elected to continue rehab at [facility] and was transferred on . per Hematology pt unable to tolerate anticoagulation due to recurrent brain bleeds therefore no anticoagulation.  Family / Caregiver Present: Yes (july)  Referring Practitioner: Dr. De La Paz  Referral Date : 24  Diagnosis: Right epidural hematoma and right temporal hematoma s/p craniotomy for evacuation, cranial defect s/p cranioplasty    Restrictions:  Restrictions/Precautions: Fall Risk;General Precautions;Seizure;Up as Tolerated  Required Braces or Orthoses  Left Lower Extremity Brace: [employer]  Position Activity Restriction  Other position/activity restrictions: Helmet to be on when OOB for safety per pt's request; Sling for transfers/mobility, Left shoulder subluxation   
Physical Therapy  Facility/Department: [facility] ACUTE REHAB  Rehabilitation Physical Therapy Progress Note    NAME: Brennen Mcclure  : 1965 (59 y.o.)  MRN: [id number]  CODE STATUS: DNR-CCA    Date of Service: 24      Past Medical History:   Diagnosis Date    Anemia 2024    CAD (coronary artery disease)     Chronic deep vein thrombosis (DVT) of both lower extremities (HCC)     Chronic deep vein thrombosis (DVT) of femoral vein of right lower extremity (HCC) 2024    Chronic deep vein thrombosis (DVT) of proximal vein of right lower extremity (HCC) 2024    Chronic deep vein thrombosis (DVT) of right iliac vein (HCC) 2024    Chronic idiopathic thrombocytopenia (HCC)     Chronic idiopathic thrombocytopenic purpura (HCC) 2024    CVA (cerebral vascular accident) (HCC)     Emphysema lung (HCC)     Hemosiderin pigmentation of skin 2024    HLD (hyperlipidemia)     Intracranial hemorrhage (HCC) 2024    Left hemiplegia (HCC)     Right leg swelling 2024    Thrombocytopenia (HCC) 2024     Past Surgical History:   Procedure Laterality Date    CORONARY ARTERY BYPASS GRAFT      x4    CORONARY STENT PLACEMENT      2 stents    CRANIOPLASTY Right 2024    : CRANIOPLASTY (SUGITA, SUPINE, IMMANUEL BONE FLAP) (Right: Head)    CRANIOTOMY Right     RIGHT CRANIOTOMY, FOR HEMATOMA EVACUATION  of frontal and temporal IPH (Right)    CRANIOTOMY Right 2024    CRANIOPLASTY (SUGITA, SUPINE, IMMANUEL BONE FLAP) performed by Gloria Corbett DO at [facility] OR    CRANIOTOMY Right 2024    CRANIOTOMY FOR INTRASUBDURAL HEMATOMA EVACUATION performed by Cami Almanza DO at [facility] OR    CT BONE MARROW BIOPSY  2024    CT BONE MARROW BIOPSY 3/18/2024 [facility] CT SCAN       Referring Practitioner: Dr. De La Paz  Referral Date : 24  Diagnosis: Right epidural hematoma and right temporal hematoma s/p craniotomy for evacuation, cranial defect s/p 
Please do not leave patient alone while eating. Patient pockets foods per daughter and wife Mara.   
[facility]   Acute Rehabilitation Occupational Therapy Daily Treatment Note    Date: 7/10/24  Patient Name: Brennen Mcclure       Room: 2635/2635-01  MRN: [id number]  Account: [id number]   : 1965  (59 y.o.) Gender: male       Referring Practitioner: Danica De La Paz MD  Diagnosis: Right epidural hematoma and right temporal hematoma s/p craniotomy for evacuation, cranial defect s/p cranioplasty  Additional Pertinent Hx: Brennen Mcclure is a 59 y.o. right-handed male with history of CVA s/p craniectomy, seizures, CAD, HLD, DVT, chronic thrombocytopenia (on IVIG and Promacta) admitted to [facility] on 2024. He initially presented for cranioplasty, which was completed with synthetic custom peek implant on 24 (Dr. Corbett).  Procedure was initially delayed due to platelet level.  He developed altered mental status with rapidly worsening GCS on 24.  CT head showed acute intraparenchymal hematoma in the right temporal lobe and increased size of extra-axial hemorrhage over the right cerebral convexity with 1.4 cm midline shift.  He required emergency craniotomy for evacuation of epidural hematoma and intracerebral hematoma on 24 (Dr. Almanza).  Extubated 24.  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  He is being treated for aspiration pneumonia as well. He reports ongoing left-sided weakness.  He also continues to report headaches.  His wife notes that he is having numbness/decreased sensation on the left side, which she noticed because he has not been reporting left shoulder and hip pain like he has been for several months.  He also states that he has some shortness of breath and cough. Discharged to [facility] on 2024. Pt admitted to ARU 24    Treatment Diagnosis: Impaired self care status    Past Medical History:  has a past medical history of Anemia, CAD (coronary artery disease), Chronic deep vein thrombosis (DVT) of both lower 
Pt refusing straight cath for UA to be sent for the lab and states he will urinate in a clean urinal. Pt educated to call out for staff to help him when he needs to urinate.     
Pt said he was tired today and really didn't want to talk about appreciated a prayer.    07/13/24 1350   Encounter Summary   Encounter Overview/Reason Spiritual/Emotional Needs   Service Provided For Patient   Referral/Consult From Palliative Care   Support System Spouse   Last Encounter  07/13/24   Complexity of Encounter Low   Spiritual/Emotional needs   Type Spiritual Support   Palliative Care   Type Palliative Care, Follow-up   Assessment/Intervention/Outcome   Assessment Passive   Intervention Active listening;Prayer (assurance of)/[place];Sustaining Presence/Ministry of presence   Outcome Engaged in conversation;Expressed Gratitude       
Pt was tired but calmer tonight and welcomed prayer.    07/01/24 1928   Encounter Summary   Encounter Overview/Reason Spiritual/Emotional Needs   Service Provided For Patient   Referral/Consult From Palliative Care   Last Encounter  07/01/24   Complexity of Encounter Low   Spiritual/Emotional needs   Type Spiritual Support   Assessment/Intervention/Outcome   Assessment Calm;Impaired resilience   Intervention Active listening;Explored/Affirmed feelings, thoughts, concerns;Prayer (assurance of)/[place];Sustaining Presence/Ministry of presence   Outcome Engaged in conversation;Expressed feelings, needs, and concerns;Expressed Gratitude       
SLP ALL NOTES  Speech Language Pathology  [facility] Acute Rehab Unit at [place]    Cognitive Treatment Note    Date: 6/30/2024  Patient’s Name: Brennen Mcclure  MRN: [id number]  Diagnosis:   Patient Active Problem List   Diagnosis Code    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 (HCC) I69.152    Pseudobulbar affect F48.2    Headache with neurologic deficit R51.9, R29.818    Intracerebral hematoma (HCC) S06.360A    Thrombocytopenia (HCC) D69.6    Anemia D64.9    Chronic ITP (idiopathic thrombocytopenia) (HCC) D69.3    Seizures ([facility]) R56.9    Dural venous sinus thrombosis G08    Chronic deep vein thrombosis (DVT) of proximal vein of right lower extremity ([facility]) I82.5Y1    Chronic deep vein thrombosis (DVT) of right iliac vein ([facility]) I82.521    Chronic deep vein thrombosis (DVT) of femoral vein of right lower extremity ([facility]) I82.511    Chronic venous insufficiency of lower extremity I87.2    Hemosiderin pigmentation of skin L81.8    Right leg swelling M79.89    Dysphagia R13.10    Transient neurological symptoms R29.818    Intraparenchymal hemorrhage of brain ([facility]) I61.9    History of epilepsy Z86.69    Disorientation R41.0    Altered mental status R41.82    Acute cystitis with hematuria N30.01    Migraine G43.909    Acute encephalopathy G93.40    Fever and chills R50.9    Parotitis K11.20    Depression with suicidal ideation F32.A, R45.851    Acute intractable headache R51.9    Chronic idiopathic thrombocytopenic purpura (HCC) D69.3    Intractable headache, unspecified chronicity pattern, unspecified headache type R51.9    History of cerebral venous infarction Z86.73    Acute idiopathic thrombocytopenic purpura (HCC) D69.3    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) J96.01    Brain bleed ([facility]) I61.9    Hemiplegia and 
SPEECH LANGUAGE PATHOLOGY  Speech Language Pathology  [facility] Acute Rehab Unit at [place]    Dysphagia/Speech/Cognitive Treatment Note    Date: 7/12/2024  Patient’s Name: Brennen Mcclure  MRN: [id number]  Diagnosis:   Patient Active Problem List   Diagnosis Code    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 (HCC) I69.152    Pseudobulbar affect F48.2    Headache with neurologic deficit R51.9, R29.818    Intracerebral hematoma (HCC) S06.360A    Thrombocytopenia ([facility]) D69.6    Anemia D64.9    Chronic ITP (idiopathic thrombocytopenia) ([facility]) D69.3    Seizures ([facility]) R56.9    Dural venous sinus thrombosis G08    Chronic deep vein thrombosis (DVT) of proximal vein of right lower extremity ([facility]) I82.5Y1    Chronic deep vein thrombosis (DVT) of right iliac vein ([facility]) I82.521    Chronic deep vein thrombosis (DVT) of femoral vein of right lower extremity ([facility]) I82.511    Chronic venous insufficiency of lower extremity I87.2    Hemosiderin pigmentation of skin L81.8    Right leg swelling M79.89    Dysphagia R13.10    Transient neurological symptoms R29.818    Intraparenchymal hemorrhage of brain ([facility]) I61.9    History of epilepsy Z86.69    Disorientation R41.0    Altered mental status R41.82    Acute cystitis with hematuria N30.01    Migraine G43.909    Acute encephalopathy G93.40    Fever and chills R50.9    Parotitis K11.20    Depression with suicidal ideation F32.A, R45.851    Acute intractable headache R51.9    Chronic idiopathic thrombocytopenic purpura (HCC) D69.3    Intractable headache, unspecified chronicity pattern, unspecified headache type R51.9    History of cerebral venous infarction Z86.73    Acute idiopathic thrombocytopenic purpura (HCC) D69.3    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 
SPEECH LANGUAGE PATHOLOGY  Speech Language Pathology  [facility] Acute Rehab Unit at [place]    Dysphagia/Speech/Cognitive Treatment Note    Date: 7/11/2024  Patient’s Name: Brennen Mcclure  MRN: [id number]  Diagnosis:   Patient Active Problem List   Diagnosis Code    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 (HCC) I69.152    Pseudobulbar affect F48.2    Headache with neurologic deficit R51.9, R29.818    Intracerebral hematoma (HCC) S06.360A    Thrombocytopenia ([facility]) D69.6    Anemia D64.9    Chronic ITP (idiopathic thrombocytopenia) ([facility]) D69.3    Seizures ([facility]) R56.9    Dural venous sinus thrombosis G08    Chronic deep vein thrombosis (DVT) of proximal vein of right lower extremity ([facility]) I82.5Y1    Chronic deep vein thrombosis (DVT) of right iliac vein ([facility]) I82.521    Chronic deep vein thrombosis (DVT) of femoral vein of right lower extremity ([facility]) I82.511    Chronic venous insufficiency of lower extremity I87.2    Hemosiderin pigmentation of skin L81.8    Right leg swelling M79.89    Dysphagia R13.10    Transient neurological symptoms R29.818    Intraparenchymal hemorrhage of brain ([facility]) I61.9    History of epilepsy Z86.69    Disorientation R41.0    Altered mental status R41.82    Acute cystitis with hematuria N30.01    Migraine G43.909    Acute encephalopathy G93.40    Fever and chills R50.9    Parotitis K11.20    Depression with suicidal ideation F32.A, R45.851    Acute intractable headache R51.9    Chronic idiopathic thrombocytopenic purpura (HCC) D69.3    Intractable headache, unspecified chronicity pattern, unspecified headache type R51.9    History of cerebral venous infarction Z86.73    Acute idiopathic thrombocytopenic purpura (HCC) D69.3    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 
SPEECH LANGUAGE PATHOLOGY  Speech Language Pathology  [facility] Acute Rehab Unit at [place]    Cognitive Treatment Note    Date: 7/1/2024  Patient’s Name: Brennen Mcclure  MRN: [id number]  Diagnosis:   Patient Active Problem List   Diagnosis Code    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 (HCC) I69.152    Pseudobulbar affect F48.2    Headache with neurologic deficit R51.9, R29.818    Intracerebral hematoma (HCC) S06.360A    Thrombocytopenia (HCC) D69.6    Anemia D64.9    Chronic ITP (idiopathic thrombocytopenia) (HCC) D69.3    Seizures ([facility]) R56.9    Dural venous sinus thrombosis G08    Chronic deep vein thrombosis (DVT) of proximal vein of right lower extremity ([facility]) I82.5Y1    Chronic deep vein thrombosis (DVT) of right iliac vein ([facility]) I82.521    Chronic deep vein thrombosis (DVT) of femoral vein of right lower extremity ([facility]) I82.511    Chronic venous insufficiency of lower extremity I87.2    Hemosiderin pigmentation of skin L81.8    Right leg swelling M79.89    Dysphagia R13.10    Transient neurological symptoms R29.818    Intraparenchymal hemorrhage of brain ([facility]) I61.9    History of epilepsy Z86.69    Disorientation R41.0    Altered mental status R41.82    Acute cystitis with hematuria N30.01    Migraine G43.909    Acute encephalopathy G93.40    Fever and chills R50.9    Parotitis K11.20    Depression with suicidal ideation F32.A, R45.851    Acute intractable headache R51.9    Chronic idiopathic thrombocytopenic purpura (HCC) D69.3    Intractable headache, unspecified chronicity pattern, unspecified headache type R51.9    History of cerebral venous infarction Z86.73    Acute idiopathic thrombocytopenic purpura (HCC) D69.3    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    
SPEECH LANGUAGE PATHOLOGY  Speech Language Pathology  [facility] Acute Rehab Unit at [place]    Dysphagia/Speech/Cognitive Treatment Note    Date: 7/14/2024  Patient’s Name: Brennen Mcclure  MRN: [id number]  Diagnosis: dysphagia  Patient Active Problem List   Diagnosis Code    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 (HCC) I69.152    Pseudobulbar affect F48.2    Headache with neurologic deficit R51.9, R29.818    Intracerebral hematoma (HCC) S06.360A    Thrombocytopenia ([facility]) D69.6    Anemia D64.9    Chronic ITP (idiopathic thrombocytopenia) ([facility]) D69.3    Seizures ([facility]) R56.9    Dural venous sinus thrombosis G08    Chronic deep vein thrombosis (DVT) of proximal vein of right lower extremity ([facility]) I82.5Y1    Chronic deep vein thrombosis (DVT) of right iliac vein ([facility]) I82.521    Chronic deep vein thrombosis (DVT) of femoral vein of right lower extremity ([facility]) I82.511    Chronic venous insufficiency of lower extremity I87.2    Hemosiderin pigmentation of skin L81.8    Right leg swelling M79.89    Dysphagia R13.10    Transient neurological symptoms R29.818    Intraparenchymal hemorrhage of brain ([facility]) I61.9    History of epilepsy Z86.69    Disorientation R41.0    Altered mental status R41.82    Acute cystitis with hematuria N30.01    Migraine G43.909    Acute encephalopathy G93.40    Fever and chills R50.9    Parotitis K11.20    Depression with suicidal ideation F32.A, R45.851    Acute intractable headache R51.9    Chronic idiopathic thrombocytopenic purpura (HCC) D69.3    Intractable headache, unspecified chronicity pattern, unspecified headache type R51.9    History of cerebral venous infarction Z86.73    Acute idiopathic thrombocytopenic purpura (HCC) D69.3    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 
SPEECH LANGUAGE PATHOLOGY  Speech Language Pathology  [facility] Acute Rehab Unit at [place]    Cognitive Treatment Note    Date: 7/2/2024  Patient’s Name: Brennen Mcclure  MRN: [id number]  Diagnosis:   Patient Active Problem List   Diagnosis Code    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 (HCC) I69.152    Pseudobulbar affect F48.2    Headache with neurologic deficit R51.9, R29.818    Intracerebral hematoma (HCC) S06.360A    Thrombocytopenia (HCC) D69.6    Anemia D64.9    Chronic ITP (idiopathic thrombocytopenia) (HCC) D69.3    Seizures ([facility]) R56.9    Dural venous sinus thrombosis G08    Chronic deep vein thrombosis (DVT) of proximal vein of right lower extremity ([facility]) I82.5Y1    Chronic deep vein thrombosis (DVT) of right iliac vein ([facility]) I82.521    Chronic deep vein thrombosis (DVT) of femoral vein of right lower extremity ([facility]) I82.511    Chronic venous insufficiency of lower extremity I87.2    Hemosiderin pigmentation of skin L81.8    Right leg swelling M79.89    Dysphagia R13.10    Transient neurological symptoms R29.818    Intraparenchymal hemorrhage of brain ([facility]) I61.9    History of epilepsy Z86.69    Disorientation R41.0    Altered mental status R41.82    Acute cystitis with hematuria N30.01    Migraine G43.909    Acute encephalopathy G93.40    Fever and chills R50.9    Parotitis K11.20    Depression with suicidal ideation F32.A, R45.851    Acute intractable headache R51.9    Chronic idiopathic thrombocytopenic purpura (HCC) D69.3    Intractable headache, unspecified chronicity pattern, unspecified headache type R51.9    History of cerebral venous infarction Z86.73    Acute idiopathic thrombocytopenic purpura (HCC) D69.3    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    
SPEECH LANGUAGE PATHOLOGY  Speech Language Pathology  [facility] Acute Rehab Unit at [place]    Dysphagia/Speech/Cognitive Treatment Note    Date: 7/15/2024  Patient’s Name: Brennen Mcclure  MRN: [id number]  Diagnosis:   Patient Active Problem List   Diagnosis Code    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 (HCC) I69.152    Pseudobulbar affect F48.2    Headache with neurologic deficit R51.9, R29.818    Intracerebral hematoma (HCC) S06.360A    Thrombocytopenia ([facility]) D69.6    Anemia D64.9    Chronic ITP (idiopathic thrombocytopenia) ([facility]) D69.3    Seizures ([facility]) R56.9    Dural venous sinus thrombosis G08    Chronic deep vein thrombosis (DVT) of proximal vein of right lower extremity ([facility]) I82.5Y1    Chronic deep vein thrombosis (DVT) of right iliac vein ([facility]) I82.521    Chronic deep vein thrombosis (DVT) of femoral vein of right lower extremity ([facility]) I82.511    Chronic venous insufficiency of lower extremity I87.2    Hemosiderin pigmentation of skin L81.8    Right leg swelling M79.89    Dysphagia R13.10    Transient neurological symptoms R29.818    Intraparenchymal hemorrhage of brain ([facility]) I61.9    History of epilepsy Z86.69    Disorientation R41.0    Altered mental status R41.82    Acute cystitis with hematuria N30.01    Migraine G43.909    Acute encephalopathy G93.40    Fever and chills R50.9    Parotitis K11.20    Depression with suicidal ideation F32.A, R45.851    Acute intractable headache R51.9    Chronic idiopathic thrombocytopenic purpura (HCC) D69.3    Intractable headache, unspecified chronicity pattern, unspecified headache type R51.9    History of cerebral venous infarction Z86.73    Acute idiopathic thrombocytopenic purpura (HCC) D69.3    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 
SPEECH LANGUAGE PATHOLOGY  Speech Language Pathology  [facility] Acute Rehab Unit at [place]    Cognitive Treatment Note    Date: 7/3/2024  Patient’s Name: Brennen Mcclure  MRN: [id number]  Diagnosis:   Patient Active Problem List   Diagnosis Code    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 (HCC) I69.152    Pseudobulbar affect F48.2    Headache with neurologic deficit R51.9, R29.818    Intracerebral hematoma (HCC) S06.360A    Thrombocytopenia (HCC) D69.6    Anemia D64.9    Chronic ITP (idiopathic thrombocytopenia) (HCC) D69.3    Seizures ([facility]) R56.9    Dural venous sinus thrombosis G08    Chronic deep vein thrombosis (DVT) of proximal vein of right lower extremity ([facility]) I82.5Y1    Chronic deep vein thrombosis (DVT) of right iliac vein ([facility]) I82.521    Chronic deep vein thrombosis (DVT) of femoral vein of right lower extremity ([facility]) I82.511    Chronic venous insufficiency of lower extremity I87.2    Hemosiderin pigmentation of skin L81.8    Right leg swelling M79.89    Dysphagia R13.10    Transient neurological symptoms R29.818    Intraparenchymal hemorrhage of brain ([facility]) I61.9    History of epilepsy Z86.69    Disorientation R41.0    Altered mental status R41.82    Acute cystitis with hematuria N30.01    Migraine G43.909    Acute encephalopathy G93.40    Fever and chills R50.9    Parotitis K11.20    Depression with suicidal ideation F32.A, R45.851    Acute intractable headache R51.9    Chronic idiopathic thrombocytopenic purpura (HCC) D69.3    Intractable headache, unspecified chronicity pattern, unspecified headache type R51.9    History of cerebral venous infarction Z86.73    Acute idiopathic thrombocytopenic purpura (HCC) D69.3    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    
SPEECH LANGUAGE PATHOLOGY  Speech Language Pathology  [facility] Acute Re[facility] Unit at [place]    Cognitive/dysphagia Treatment Note    Date: 7/4/2024  Patient’s Name: Brennen Mcclure  MRN: [id number]  Diagnosis:   Patient Active Problem List   Diagnosis Code    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 (HCC) I69.152    Pseudobulbar affect F48.2    Headache with neurologic deficit R51.9, R29.818    Intracerebral hematoma (HCC) S06.360A    Thrombocytopenia (HCC) D69.6    Anemia D64.9    Chronic ITP (idiopathic thrombocytopenia) (HCC) D69.3    Seizures ([facility]) R56.9    Dural venous sinus thrombosis G08    Chronic deep vein thrombosis (DVT) of proximal vein of right lower extremity ([facility]) I82.5Y1    Chronic deep vein thrombosis (DVT) of right iliac vein ([facility]) I82.521    Chronic deep vein thrombosis (DVT) of femoral vein of right lower extremity ([facility]) I82.511    Chronic venous insufficiency of lower extremity I87.2    Hemosiderin pigmentation of skin L81.8    Right leg swelling M79.89    Dysphagia R13.10    Transient neurological symptoms R29.818    Intraparenchymal hemorrhage of brain ([facility]) I61.9    History of epilepsy Z86.69    Disorientation R41.0    Altered mental status R41.82    Acute cystitis with hematuria N30.01    Migraine G43.909    Acute encephalopathy G93.40    Fever and chills R50.9    Parotitis K11.20    Depression with suicidal ideation F32.A, R45.851    Acute intractable headache R51.9    Chronic idiopathic thrombocytopenic purpura (HCC) D69.3    Intractable headache, unspecified chronicity pattern, unspecified headache type R51.9    History of cerebral venous infarction Z86.73    Acute idiopathic thrombocytopenic purpura (HCC) D69.3    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 
SPEECH LANGUAGE PATHOLOGY  Speech Language Pathology  [facility] Acute Rehab Unit at [place]    Cognitive Treatment Note    Date: 7/8/2024  Patient’s Name: Brennen Mcclure  MRN: [id number]  Diagnosis:   Patient Active Problem List   Diagnosis Code    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 (HCC) I69.152    Pseudobulbar affect F48.2    Headache with neurologic deficit R51.9, R29.818    Intracerebral hematoma (HCC) S06.360A    Thrombocytopenia (HCC) D69.6    Anemia D64.9    Chronic ITP (idiopathic thrombocytopenia) (HCC) D69.3    Seizures ([facility]) R56.9    Dural venous sinus thrombosis G08    Chronic deep vein thrombosis (DVT) of proximal vein of right lower extremity ([facility]) I82.5Y1    Chronic deep vein thrombosis (DVT) of right iliac vein ([facility]) I82.521    Chronic deep vein thrombosis (DVT) of femoral vein of right lower extremity ([facility]) I82.511    Chronic venous insufficiency of lower extremity I87.2    Hemosiderin pigmentation of skin L81.8    Right leg swelling M79.89    Dysphagia R13.10    Transient neurological symptoms R29.818    Intraparenchymal hemorrhage of brain ([facility]) I61.9    History of epilepsy Z86.69    Disorientation R41.0    Altered mental status R41.82    Acute cystitis with hematuria N30.01    Migraine G43.909    Acute encephalopathy G93.40    Fever and chills R50.9    Parotitis K11.20    Depression with suicidal ideation F32.A, R45.851    Acute intractable headache R51.9    Chronic idiopathic thrombocytopenic purpura (HCC) D69.3    Intractable headache, unspecified chronicity pattern, unspecified headache type R51.9    History of cerebral venous infarction Z86.73    Acute idiopathic thrombocytopenic purpura (HCC) D69.3    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    
SPEECH LANGUAGE PATHOLOGY  Speech Language Pathology  [facility] Acute Rehab Unit at [place]    Cognitive Treatment Note    Date: 7/5/2024  Patient’s Name: Brennen Mcclure  MRN: [id number]  Diagnosis:   Patient Active Problem List   Diagnosis Code    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 (HCC) I69.152    Pseudobulbar affect F48.2    Headache with neurologic deficit R51.9, R29.818    Intracerebral hematoma (HCC) S06.360A    Thrombocytopenia (HCC) D69.6    Anemia D64.9    Chronic ITP (idiopathic thrombocytopenia) (HCC) D69.3    Seizures ([facility]) R56.9    Dural venous sinus thrombosis G08    Chronic deep vein thrombosis (DVT) of proximal vein of right lower extremity ([facility]) I82.5Y1    Chronic deep vein thrombosis (DVT) of right iliac vein ([facility]) I82.521    Chronic deep vein thrombosis (DVT) of femoral vein of right lower extremity ([facility]) I82.511    Chronic venous insufficiency of lower extremity I87.2    Hemosiderin pigmentation of skin L81.8    Right leg swelling M79.89    Dysphagia R13.10    Transient neurological symptoms R29.818    Intraparenchymal hemorrhage of brain ([facility]) I61.9    History of epilepsy Z86.69    Disorientation R41.0    Altered mental status R41.82    Acute cystitis with hematuria N30.01    Migraine G43.909    Acute encephalopathy G93.40    Fever and chills R50.9    Parotitis K11.20    Depression with suicidal ideation F32.A, R45.851    Acute intractable headache R51.9    Chronic idiopathic thrombocytopenic purpura (HCC) D69.3    Intractable headache, unspecified chronicity pattern, unspecified headache type R51.9    History of cerebral venous infarction Z86.73    Acute idiopathic thrombocytopenic purpura (HCC) D69.3    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    
[facility]   Acute Rehabilitation Occupational Therapy Daily Treatment Note    Date: 24  Patient Name: Brennen Mcclure       Room: 2635/2635-01  MRN: [id number]  Account: [id number]   : 1965  (59 y.o.) Gender: male       Referring Practitioner: Danica De La Paz MD  Diagnosis: Right epidural hematoma and right temporal hematoma s/p craniotomy for evacuation, cranial defect s/p cranioplasty  Additional Pertinent Hx: Brennen Mcclure is a 59 y.o. right-handed male with history of CVA s/p craniectomy, seizures, CAD, HLD, DVT, chronic thrombocytopenia (on IVIG and Promacta) admitted to [facility] on 2024. He initially presented for cranioplasty, which was completed with synthetic custom peek implant on 24 (Dr. Corbett).  Procedure was initially delayed due to platelet level.  He developed altered mental status with rapidly worsening GCS on 24.  CT head showed acute intraparenchymal hematoma in the right temporal lobe and increased size of extra-axial hemorrhage over the right cerebral convexity with 1.4 cm midline shift.  He required emergency craniotomy for evacuation of epidural hematoma and intracerebral hematoma on 24 (Dr. Almanza).  Extubated 24.  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  He is being treated for aspiration pneumonia as well. He reports ongoing left-sided weakness.  He also continues to report headaches.  His wife notes that he is having numbness/decreased sensation on the left side, which she noticed because he has not been reporting left shoulder and hip pain like he has been for several months.  He also states that he has some shortness of breath and cough. Discharged to [facility] on 2024. Pt admitted to ARU 24    Treatment Diagnosis: Impaired self care status    Past Medical History:  has a past medical history of Anemia, CAD (coronary artery disease), Chronic deep vein thrombosis (DVT) of both lower 
[facility]   Acute Rehabilitation Occupational Therapy Daily Treatment Note    Date: 24  Patient Name: Brennen Mcclure       Room: 2635/2635-01  MRN: [id number]  Account: [id number]   : 1965  (59 y.o.) Gender: male       Referring Practitioner: Danica De La Paz MD  Diagnosis: Right epidural hematoma and right temporal hematoma s/p craniotomy for evacuation, cranial defect s/p cranioplasty  Additional Pertinent Hx: Brennen Mcclure is a 59 y.o. right-handed male with history of CVA s/p craniectomy, seizures, CAD, HLD, DVT, chronic thrombocytopenia (on IVIG and Promacta) admitted to [facility] on 2024. He initially presented for cranioplasty, which was completed with synthetic custom peek implant on 24 (Dr. Corbett).  Procedure was initially delayed due to platelet level.  He developed altered mental status with rapidly worsening GCS on 24.  CT head showed acute intraparenchymal hematoma in the right temporal lobe and increased size of extra-axial hemorrhage over the right cerebral convexity with 1.4 cm midline shift.  He required emergency craniotomy for evacuation of epidural hematoma and intracerebral hematoma on 24 (Dr. Almanza).  Extubated 24.  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  He is being treated for aspiration pneumonia as well. He reports ongoing left-sided weakness.  He also continues to report headaches.  His wife notes that he is having numbness/decreased sensation on the left side, which she noticed because he has not been reporting left shoulder and hip pain like he has been for several months.  He also states that he has some shortness of breath and cough. Discharged to [facility] on 2024. Pt admitted to ARU 24    Treatment Diagnosis: Impaired self care status    Past Medical History:  has a past medical history of Anemia, CAD (coronary artery disease), Chronic deep vein thrombosis (DVT) of both lower 
[facility]   Acute Rehabilitation Occupational Therapy Daily Treatment Note    Date: 7/3/24  Patient Name: Brennen Mcclure       Room: 2635/2635-01  MRN: [id number]  Account: [id number]   : 1965  (59 y.o.) Gender: male       Referring Practitioner: Danica De La Paz MD  Diagnosis: Right epidural hematoma and right temporal hematoma s/p craniotomy for evacuation, cranial defect s/p cranioplasty  Additional Pertinent Hx: Brennen Mcclure is a 59 y.o. right-handed male with history of CVA s/p craniectomy, seizures, CAD, HLD, DVT, chronic thrombocytopenia (on IVIG and Promacta) admitted to [facility] on 2024. He initially presented for cranioplasty, which was completed with synthetic custom peek implant on 24 (Dr. Corbett).  Procedure was initially delayed due to platelet level.  He developed altered mental status with rapidly worsening GCS on 24.  CT head showed acute intraparenchymal hematoma in the right temporal lobe and increased size of extra-axial hemorrhage over the right cerebral convexity with 1.4 cm midline shift.  He required emergency craniotomy for evacuation of epidural hematoma and intracerebral hematoma on 24 (Dr. Almanza).  Extubated 24.  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  He is being treated for aspiration pneumonia as well. He reports ongoing left-sided weakness.  He also continues to report headaches.  His wife notes that he is having numbness/decreased sensation on the left side, which she noticed because he has not been reporting left shoulder and hip pain like he has been for several months.  He also states that he has some shortness of breath and cough. Discharged to [facility] on 2024. Pt admitted to ARU 24    Treatment Diagnosis: Impaired self care status    Past Medical History:  has a past medical history of Anemia, CAD (coronary artery disease), Chronic deep vein thrombosis (DVT) of both lower 
Visit with patient and wife Mara; patient tearful and fixated on \"the devil is coming after me\"; discussed with patient his spiritual beliefs in this regard; discussed with patient his beliefs surrounding death; listening presence and support; patient emotional; visit ended when patient stated he wanted to facetime his daughter Anuradha;     07/11/24 1923   Encounter Summary   Encounter Overview/Reason Spiritual/Emotional Needs   Service Provided For Patient and family together   Referral/Consult From Palliative Care   Support System Spouse   Last Encounter  07/11/24   Complexity of Encounter Moderate   Begin Time 1452   End Time  1512   Total Time Calculated 20 min   Spiritual/Emotional needs   Type Spiritual Distress   Palliative Care   Type Palliative Care, Follow-up   Assessment/Intervention/Outcome   Assessment Anxious;Hopeful;Powerlessness;Stress overload   Intervention Discussed belief system/Jehovah's witness practices/alex;Explored/Affirmed feelings, thoughts, concerns;Sustaining Presence/Ministry of presence   Outcome Comfort;Engaged in conversation;Expressed feelings, needs, and concerns;Expressed Gratitude;Receptive       
Wound Ostomy Continence Nursing  Follow Up Visit      NAME:  Brennen Mcclure  MEDICAL RECORD NUMBER:  [id number]  AGE: 59 y.o.   GENDER: male  : 1965  TODAY'S DATE:  2024    Subjective        [facility] nursing consulted for open areas to buttocks.  The patient is alert and oriented.  He has left-sided hemiplegia making self positioning difficult.  He also has some urinary incontinence.    Review of Systems:  Constitutional: negative for chills and fevers  Respiratory: negative for cough and shortness of breath  Cardiovascular: negative  Gastrointestinal: negative  Genitourinary:positive for urinary incontinence  Integument: Nonblanchable erythema to sacral area  Endocrine: negative  Musculoskeletal: General debility, left-sided hemiplegia  Behavioral/Psych: negative  Pain: negative      Objective     Vitals:  /81   Pulse 91   Temp 97.6 °F (36.4 °C) (Oral)   Resp 18   Ht 1.778 m (5' 10\")   Wt 81.9 kg (180 lb 8 oz)   SpO2 99%   BMI 25.90 kg/m²    TEMPERATURE:  Current - Temp: 97.6 °F (36.4 °C); Max - Temp  Av.4 °F (36.9 °C)  Min: 97.6 °F (36.4 °C)  Max: 99.4 °F (37.4 °C)    Masood Risk Score Masood Scale Score: 14    INTAKE/OUTPUT:      Intake/Output Summary (Last 24 hours) at 2024 1621  Last data filed at 2024 2020  Gross per 24 hour   Intake --   Output 200 ml   Net -200 ml                 Physical Exam:  General Appearance: alert and oriented to person, place and time, well-developed and well-nourished, in no acute distress  Head: normocephalic and atraumatic  Pulmonary/Chest: normal air movement, no respiratory distress  Skin: Large area of blanchable erythema across the sacral area.  There is a small area that does not appear to franca on the right side near the coccyx.  Cardiovascular/Circulation: Minimal edema, no cyanosis  Extremities: no cyanosis and no clubbing  Musculoskeletal: no joint deformity or tenderness, no extremity amputations  Neurologic: gait not evaluated, 
Writer attempted to see patient regarding consult where patient asked to speak with writer; patient and his wife are known from previous admissions; patient busy with nurse and briefly engaged in conversation providing medical update; wife not present; writer will follow up.     06/28/24 1400   Encounter Summary   Encounter Overview/Reason Spiritual/Emotional Needs   Service Provided For Patient   Referral/Consult From Patient   Support System Spouse   Last Encounter  06/28/24   Complexity of Encounter Moderate   Grief, Loss, and Adjustments   Type Adjustment to illness   Assessment/Intervention/Outcome   Assessment Calm;Coping;Hopeful   Intervention Discussed illness injury and it’s impact;Sustaining Presence/Ministry of presence   Outcome Engaged in conversation;Expressed feelings, needs, and concerns;Expressed Gratitude;Receptive       
Writer called pharmacy and Patients family must bring in promacta 50mg tablets and Nuedexta capsules as well.   
Writer responded to consult to see a  (specifically Chap Deborah who is on vacation). Pt became very emotional and said he can't control his feeling anymore. His wife came and we attempted to calm pt. Mara indicated it's been a struggle lately and today has been difficult day. Writer provided listening presence and prayer.    06/29/24 1513   Encounter Summary   Encounter Overview/Reason Spiritual/Emotional Needs   Service Provided For Patient and family together   Referral/Consult From Nurse   Support System Spouse   Last Encounter  06/29/24   Complexity of Encounter Moderate   Spiritual/Emotional needs   Type Spiritual Support   Assessment/Intervention/Outcome   Assessment Anxious;Impaired resilience;Sad   Intervention Active listening;Discussed illness injury and it’s impact;Explored/Affirmed feelings, thoughts, concerns;Explored Coping Skills/Resources;Prayer (assurance of)/Spring;Sustaining Presence/Ministry of presence   Outcome Engaged in conversation;Expressed feelings, needs, and concerns;Expressed Gratitude;Receptive;Venting emotion       
Writer went to awake patient up to eat dinner. Patient was sleeping very hard. Patient able to awake up with sternal rub. Was staring off to the right with eyes wide open. Speech was mumbled. Patient kept saying his birthday was tomorrow, able to answer hospital location, year and president. Able to identify pen, glasses and his wife Mara at bedside. Patient states \" I have a funny feeling in my head.\"  Writer immediately called Dr. De La Paz. Dr. De La Paz talked with Mara on the phone. Patient IS DNC-CC and wife does not want a stroke alert called, but does want a head CT preformed to see if changes are happening. Dr. De La Paz put in orders. House supervisor Maya was notified of the situation as well.    
(At night time; givmohr sling for LUE)  Position Activity Restriction  Other position/activity restrictions: Helmet to be on when OOB for safety per pt's request; Sling for transfers/mobility, Left shoulder subluxation     SUBJECTIVE  Pain: 8/10 headache        OBJECTIVE  Vision  Vision: Within Functional Limits    Hearing  Hearing: Within functional limits    Cognition  Overall Cognitive Status: Exceptions  Arousal/Alertness: Delayed responses to stimuli;Inconsistent responses to stimuli  Following Commands: Inconsistently follows commands;Follows one step commands with increased time;Follows one step commands with repetition  Attention Span: Difficulty attending to directions;Difficulty dividing attention  Memory: Impaired  Safety Judgement: Decreased awareness of need for assistance;Decreased awareness of need for safety  Problem Solving: Assistance required to generate solutions;Assistance required to implement solutions;Assistance required to identify errors made;Assistance required to correct errors made  Insights: Decreased awareness of deficits  Initiation: Requires cues for all  Sequencing: Requires cues for all      Sensation  Overall Sensation Status: Impaired (L side of body)    Functional Mobility  Bed mobility  Supine to Sit: Maximum assistance (trunk & bilat LEs, VCs sequencing.)  Sit to Supine: Moderate assistance (trunk & left LE VCs sequencing)  Scooting: Maximal assistance (hips to EOB, VCs sequencing)  Bed Mobility Comments: head of bed elevated , left handrail as home environment  Transfers  Sit to Stand: Moderate Assistance;Minimal Assistance;Contact guard assistance (bed and WC to scotty tejas . Min assist WC  to parallel bars , right UE assist at parallel bars, CGA from scotty paddles)  Stand to Sit: Moderate Assistance (assist with eccentric control)  Bed to Chair: Dependent/Total (Scotty Stedy 1 assist)  Stand Pivot Transfers: Dependent/Total (Scotty Stedy assist)  Comment: left sling donned for 
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  Pt more awake/alert for session.  Pt remains easily distracted by items in immediate environment (e.g., items on tray table).  Frequent redirection and repetition required throughout.     Recall: n/a    Organization: n/a    Problem Solving/Reasoning: Problem solving, \"what would you do if\" task- 75% accuracy (I), increasing to 100% c min cues.      Speech: Mild-moderate dysarthria noted characterized by imprecise articulation, blended word boundaries and rapid rate. Speech intelligibility judged to be ~80% intelligible in conversation.      Dysphagia: Diet tolerance monitoring completed with lunch tray.  Therapeutic feeding trials of puree x13 and thin liquid per cup x7 presented.  No overt s/s of aspiration observed.  Reduced labial seal around spoon and anterior spillage on L side noted.  Pt holding towel and wiping L side throughout meal.  Discussed staff observation of pocketing and use of swallowing safety strategies with all PO (including lingual sweep, liquid wash, reduced rate, bolus size modification and sitting upright with all PO).  Pt verbalized understanding and able to facilitate use of lingual sweep between bites during meal with min A.      Other: No family present.  Pt more awake, conversational during session today.     Plan:  [x] Continue ST services    
Moderate assistance  Skilled Clinical Factors: Pt seated in w/c with Mod A x 1 to Min A x1 for maintaining upright posture to correct L lateral lean. Pt completed face washing with verbal cues to initiate and terminate task. Pt completed oral hygiene with A for facilitating grasp with LUE to stabilize toothpaste for RUE to open. A provided to stabilize toothbrush while pt applied toothpaste with RUE. Pt able to scrub gums/tongue. Pt applied deoderant to axillary region with A provided for raising LUE to apply. Verbal cues provided for sequencing, initiation, and termination of task as pt demo'd fixation during oral hygiene.  Upper Extremity Bathing  Assistance Level: Moderate assistance  Skilled Clinical Factors: Pt completed seated in w/c fluctuating between Mod A x1 for maintaining upright posture to correct L lateral lean. Pt required A for washing RUE. Tactile and verbal cues provided for sequencing, initiating, and terminating tasks.  Lower Extremity Bathing  Assistance Level: Moderate assistance  Skilled Clinical Factors: max A required to roll to R side for rhiannon care after urination in his brief. TA at B foot level, max A for washing  buttocks  Upper Extremity Dressing  Assistance Level: Moderate assistance  Skilled Clinical Factors: Pt seated in w/c with Mod A x1 for maintaining upright posture to correct L lateral lean. Pt able to doff shirt with A provided for doffing overhead. Pt able to prudencio shirt with A provided for threading over L elbow and for donning overhead. Verbal cues provided for sequencing threading of the RUE. Verbal cues provided throughout for initiation.  Lower Extremity Dressing  Assistance Level: Moderate assistance  Skilled Clinical Factors: Required assist for clothing management while rolling side to side in bed.  Putting On/Taking Off Footwear  Assistance Level: Dependent  Skilled Clinical Factors: TA for donning socks and shoes.  Toileting  Assistance Level: Dependent  Skilled 
of both lower extremities (HCC), Chronic deep vein thrombosis (DVT) of femoral vein of right lower extremity (HCC), Chronic deep vein thrombosis (DVT) of proximal vein of right lower extremity (HCC), Chronic deep vein thrombosis (DVT) of right iliac vein (HCC), Chronic idiopathic thrombocytopenia (HCC), Chronic idiopathic thrombocytopenic purpura (HCC), CVA (cerebral vascular accident) (HCC), Emphysema lung (HCC), Hemosiderin pigmentation of skin, HLD (hyperlipidemia), Intracranial hemorrhage (HCC), Left hemiplegia (HCC), Right leg swelling, and Thrombocytopenia (HCC).    Past Surgical History:   has a past surgical history that includes Coronary artery bypass graft; Coronary stent placement; craniotomy (Right); CT BIOPSY BONE MARROW (03/18/2024); Cranioplasty (Right, 06/04/2024); craniotomy (Right, 6/4/2024); and craniotomy (Right, 6/6/2024).    Restrictions  Restrictions/Precautions  Restrictions/Precautions: Fall Risk, Seizure, General Precautions, Bed Alarm, Up as Tolerated  Required Braces or Orthoses?: Yes  Required Braces or Orthoses  Left Lower Extremity Brace: [employer]  Left Upper Extremity Brace/Splint: Resting hand (At night time; givmohr sling for LUE)  Position Activity Restriction  Other position/activity restrictions: Helmet to be on when OOB for safety per pt's request; Sling for transfers/mobility, Left shoulder subluxation     Vitals        Subjective  Subjective  Subjective: AM: \"Sometimes I let my emotions get the best of me\" pt states in regards to being emotionally liable during session. Pt agreeable and cooperative during session. Pt in bed upon arrival and retired in w/c. PM: Pt in bed upon arrival and pt retired in bed.  Pain: Pt does not report pain during session.       Objective  Cognition     Cognition  Overall Cognitive Status: Exceptions  Arousal/Alertness: Appropriate responses to stimuli  Following Commands: Follows one step commands consistently;Follows one step commands 
safety per pt's request; Sling for transfers/mobility, Left shoulder subluxation     SUBJECTIVE  Subjective: Spouse Mara has spoken to [employer]  approved with insurance , will be delivered after DC from [facility].  Pain: head ache 8/10      OBJECTIVE  Vision  Vision: Within Functional Limits    Hearing  Hearing: Within functional limits    Cognition  Overall Cognitive Status: Exceptions  Arousal/Alertness: Appropriate responses to stimuli  Following Commands: Follows one step commands with increased time;Follows one step commands with repetition  Attention Span: Difficulty attending to directions;Difficulty dividing attention;Attends with cues to redirect  Memory: Decreased recall of precautions;Decreased recall of recent events;Impaired  Safety Judgement: Impaired;Decreased awareness of need for assistance;Decreased awareness of need for safety  Problem Solving: Impaired;Assistance required to identify errors made;Assistance required to correct errors made;Assistance required to generate solutions;Assistance required to implement solutions  Insights: Decreased awareness of deficits  Initiation: Requires cues for all  Sequencing: Requires cues for all  Cognition Comment: frequent redirection to task with fair follow through. Out bursts of crying able to be redirected      Sensation  Overall Sensation Status: Impaired (L side of body)    Functional Mobility  Bed mobility  Supine to Sit: Maximum assistance (VCS sequencing , right LE assist left assist for setup)  Sit to Supine: Maximum assistance (VCs sequencing, right LE assist left)  Bed Mobility Comments: mat table and bed left handral sit to supine , left side as home environment  Transfers  Sit to Stand: Moderate Assistance;Minimal Assistance (CGA from scotty paddles, WC to parallel bar right UE assist at parallel bar min assist)  Stand to Sit: Maximum Assistance (controlled descent)  Bed to Chair: Dependent/Total (with Scotty Steady, 1 assist)  Squat Pivot Transfers: 
vascular accident) (HCC), Emphysema lung (HCC), Hemosiderin pigmentation of skin, HLD (hyperlipidemia), Intracranial hemorrhage (HCC), Left hemiplegia (HCC), Right leg swelling, and Thrombocytopenia (HCC).    Past Surgical History:   has a past surgical history that includes Coronary artery bypass graft; Coronary stent placement; craniotomy (Right); CT BIOPSY BONE MARROW (03/18/2024); Cranioplasty (Right, 06/04/2024); craniotomy (Right, 6/4/2024); and craniotomy (Right, 6/6/2024).     Medications:    Prior to Admission medications    Medication Sig Start Date End Date Taking? Authorizing Provider   topiramate (TOPAMAX) 50 MG tablet Take 1 tablet by mouth 2 times daily 6/13/24   Oswaldo Knight APRN - NP   senna (SENOKOT) 8.6 MG tablet Take 1 tablet by mouth daily as needed (Constipation) 6/13/24 7/13/24  Oswaldo Knight APRN - NP   predniSONE (DELTASONE) 20 MG tablet Take 2 tablets by mouth daily for 7 days 6/28/24 7/5/24  Laura Cabral APRN - CNP   Melatonin 10 MG TABS Take 10 mg by mouth nightly    Provider, MD Zeenat   magnesium oxide (MAG-OX) 400 (240 Mg) MG tablet Take 1 tablet by mouth daily 5/15/24   Leonora Brown MD   eltrombopag olamine (PROMACTA) 50 MG TABS tablet Take 1 tablet by mouth daily    Provider, MD Zeenat   lidocaine (LIDODERM) 5 % Place 2 patches onto the skin daily Change daily remove after 12 hours 5/2/24   Danica De La Paz MD   amitriptyline (ELAVIL) 75 MG tablet Take 1 tablet by mouth nightly 4/30/24   Danica De La Paz MD   acetaminophen (TYLENOL) 500 MG tablet Take 2 tablets by mouth in the morning, at noon, and at bedtime 4/30/24   Danica De La Paz MD   gabapentin (NEURONTIN) 300 MG capsule Take 1 capsule by mouth 3 times daily for 90 days. 4/30/24 7/29/24  Danica De La Paz MD   Multiple Vitamins-Minerals (THERAPEUTIC MULTIVITAMIN-MINERALS) tablet Take 1 tablet by mouth daily 4/1/24   Blanca Ren MD   lacosamide (VIMPAT) 100 MG TABS tablet Take 1 tablet by 
vein of right lower extremity (HCC) 03/07/2024    Chronic deep vein thrombosis (DVT) of proximal vein of right lower extremity (HCC) 02/22/2024    Chronic deep vein thrombosis (DVT) of right iliac vein (HCC) 03/07/2024    Chronic idiopathic thrombocytopenia (HCC)     Chronic idiopathic thrombocytopenic purpura (HCC) 02/18/2024    CVA (cerebral vascular accident) (HCC)     Emphysema lung (HCC)     Hemosiderin pigmentation of skin 03/07/2024    HLD (hyperlipidemia)     Intracranial hemorrhage (HCC) 01/31/2024    Left hemiplegia (HCC)     Right leg swelling 03/07/2024    Thrombocytopenia (HCC) 02/12/2024        Past Surgical History:     Past Surgical History:   Procedure Laterality Date    CORONARY ARTERY BYPASS GRAFT      x4    CORONARY STENT PLACEMENT      2 stents    CRANIOPLASTY Right 06/04/2024    : CRANIOPLASTY (SUGITA, SUPINE, IMMANUEL BONE FLAP) (Right: Head)    CRANIOTOMY Right     RIGHT CRANIOTOMY, FOR HEMATOMA EVACUATION  of frontal and temporal IPH (Right)    CRANIOTOMY Right 6/4/2024    CRANIOPLASTY (SUGITA, SUPINE, IMMANUEL BONE FLAP) performed by [facility] Corbett [facility] at [facility]    CRANIOTOMY Right 6/6/2024    CRANIOTOMY FOR INTRASUBDURAL HEMATOMA EVACUATION performed by aCmi Almanza DO at [facility] OR    CT BONE MARROW BIOPSY  03/18/2024    CT BONE MARROW BIOPSY 3/18/2024 [facility] CT SCAN        Medications Prior to Admission:     Prior to Admission medications    Medication Sig Start Date End Date Taking? Authorizing Provider   topiramate (TOPAMAX) 50 MG tablet Take 1 tablet by mouth 2 times daily 6/13/24   Oswaldo Knight APRN - NP   Melatonin 10 MG TABS Take 10 mg by mouth nightly    ProviderZeenat MD   magnesium oxide (MAG-OX) 400 (240 Mg) MG tablet Take 1 tablet by mouth daily 5/15/24   Leonora Brown MD   eltrombopag olamine (PROMACTA) 50 MG TABS tablet Take 1 tablet by mouth daily    ProviderZeenat MD   lidocaine (LIDODERM) 5 % Place 2 patches onto the skin daily Change daily remove after 
Assistance;Moderate Assistance;Maximum Assistance (mod assist to left , max assist to right , VCs for sequencing)  Comment: left sling donned for transfers  Balance  Posture: Poor  Sitting - Static: Fair;-  Sitting - Dynamic: Poor  Standing - Static: Fair;- (scotty stedy)  Standing - Dynamic:  (LUIZ)    Environmental Mobility  Ambulation  Comments: Non-ambulatory  Stairs/Curb  Stairs?: No (not stair climbing at baseline)  Wheelchair Activities  Propulsion: Yes  Propulsion 1  Propulsion: Manual  Level: Level Tile  Method: RUE;RLE  Level of Assistance: Moderate assistance  Description/ Details: assist for straight path , pt veers left , visual tracking absent without VCs  Distance: 100 ft and 2 turns    PT Exercises  Static Sitting Balance Exercises: seated EOM , static right UE support CGA good midline position , dynamic no UE support mod to max assist , VCs to correct with delayed processing  Static Standing Balance Exercises: stand scotty lazaro CGA, VCs increased left hip ext  Postural Correction Exercises: VCs and assist mod. with VCs for technique correction supported sitting posture  Disease-specific Exercises: VCs and positioning to promote visual tracking fwd and left throughout treatment      Activity Tolerance  Activity Tolerance: Patient limited by fatigue;Treatment limited secondary to decreased cognition    Assessment  Treatment Diagnosis: impaired mobility, balance, and safety  Therapy Prognosis: Fair  History: 1. Right epidural hematoma and right temporal hematoma s/p craniotomy for evacuation on 6/6  2. Cranial defect s/p cranioplasty on 6/4  3. Aspiration pneumonia  4. Anemia  5. Hyponatremia  6. Chronic thrombocytopenia  7. History of CVA s/p craniectomy  8. Seizures  9. CAD  10. HLD  PT D/C Equipment  Equipment Needed:  (CTA; pt has WC, Hospital bed)      GOALS  Short Term Goals  Time Frame for Short Term Goals: 7 days  Short Term Goal 1: pt to demo All bed mobility with Mod Ax1 using hospital bed 
Impaired  Orientation Level: Disoriented to time;Oriented to place;Oriented to person;Disoriented to situation    Cognition  Overall Cognitive Status: Exceptions  Arousal/Alertness: Delayed responses to stimuli;Inconsistent responses to stimuli  Following Commands: Follows one step commands with increased time;Follows one step commands with repetition  Attention Span: Difficulty attending to directions;Difficulty dividing attention  Memory: Impaired  Safety Judgement: Decreased awareness of need for assistance;Decreased awareness of need for safety  Problem Solving: Assistance required to generate solutions;Assistance required to implement solutions;Assistance required to identify errors made;Assistance required to correct errors made  Insights: Decreased awareness of deficits  Initiation: Requires cues for all  Sequencing: Requires cues for all  Cognition Comment: Pt demo'd fixation during tasks requiring verbal and tactile cues to redirect.    Activities of Daily Living       Grooming/Oral Hygiene  Assistance Level: Minimal assistance  Skilled Clinical Factors: Pt seated EOB to wash face, throughout activity pt req VCs wash L side of face and demo's R lateral lean this date req Mod-max A to return to midline    Upper Extremity Bathing  Assistance Level: Moderate assistance  Skilled Clinical Factors: pt requires mod A to wash BUEs despite multiple VC/tactile cues, pt able to wash chest/abdomen without physical assist but requires VCs to wash abdomen because pt fixated on chest area. min-max A for sitting balance 2* R lateral lean while sitting EOB this date    Lower Extremity Bathing  Equipment Provided: Long-handled sponge  Assistance Level: Maximum assistance  Skilled Clinical Factors: pt able to cleanse rhiannon area sitting on toilet writer provided A for throughoutness, TA for backside while standing in scotty stedy. pt washes anterior thighs hip>knee, TA for all other parts    Upper Extremity Dressing  Assistance 
Yes, Check All That Apply   []Hemodialysis   []Peritoneal Dialysis   IV Access No  If Yes, Check All That Apply   []Peripheral   []Midline   [](PICC, tunneled, port)       Admission folder with the following documents provided to patient/responsible party:   1. \"[facility] Individualized Disclosure Statement\"  2. \"Data Collection Information Summary for Patients in Inpatient Rehabilitation Facilities\"  3. \"Privacy Act Statement - Health Care Records\"    Care plan was created with patient/responsible party input and goals were agreed upon.      Please refer to the admission navigator for further information.      
activities and mobility prompting this admission.       Pain:  Pain Assessment  Pain Assessment: None - Denies Pain  Pain Level: 8  Pain Location: Head  Pain Orientation: Mid, Upper  Pain Descriptors: Aching    Vision/ Hearing  Hearing  Hearing: Within functional limits    Assessment:      Diagnosis: Pt presents with moderate cognitive communication disorder and minimal dysarthria.    Recommendations:  Recommendations  Requires SLP Intervention: Yes  Recommendations: Dysphagia treatment  Patient Education Response: Verbalizes understanding;Needs reinforcement     D/C Recommendations: Ongoing speech therapy is recommended during this hospitalization;Ongoing speech therapy is recommended at next level of care       Plan:   Speech Therapy Prognosis  Prognosis: Fair  Prognosis Considerations: Participation Level;Potential;Previous Level of Function  Individuals consulted  Consulted and agree with results and recommendations: Patient;RN  RN Name: Sindy    Goals:  Short Term Goals  Goal 1: The patient will implement dysarthria speaking strategies in 90% of opportunities.  Goal 2: The patient will complete STM/delayed recall tasks with 70% accuracy.  Goal 3: The patient will complete problem solving tasks with 70% accuracy.  Goal 4: The patient will complete reasoning/thought organization tasks with 80% accuracy.   Patient/family involved in developing goals and treatment plan: patient    Subjective:   Previous level of function and limitations: assistance required        Hearing  Hearing: Within functional limits           Objective:       Oral Motor   Labial: No impairment  Dentition: Edentulous  Lingual: Decreased rate  Mandible: No impairment    Motor Speech  Apraxic Characteristics: None  Dysarthric Characteristics: Blended word boundaries;Decreased rate  Intelligibility: Impaired  Word Intelligibility (%): 95 %  Sentence Intelligibility (%): 90 %  Overall Impairment Severity: Minimal    Auditory 
falling asleep when formal tasks attempted).     Other: No family present.  Pt lethargic/falling asleep during entire session, reports \"didn't sleep good\".  Pt performance on all tasks (cognitive and dysphagia exercises) limited by lethargy level.     Plan:  [x] Continue ST services    [] Discharge from ST:      Discharge recommendations: []  Further therapy recommended at discharge.The patient should be able to tolerate at least 3 hours of therapy per day over 5 days or 15 hours over 7 days. [] Further therapy recommended at discharge.   [] No therapy recommended at discharge.      Treatment completed by: Zofia Martinez M.A., CCC-SLP  
would like to live his life, and he is clear no resuscitation ever again. I ask him what he would like and he states to be home. I ask him about home with hospice and to live well until the end of his life.  Dr. Chang comes in and we discuss the Promacta and that it would not be covered under hospice care. Dr. Chang thought that would be ok, as he has not been on it lately and his platelets have been ok. He was understanding of the wishes of the patient and the thought of hospice care.     I update  and she states that she has been caring for him, and she did not know what was going on and she is appreciative of the update. The update is that the patient's wishes are very clear and that he does not ever want surgery or assertive treatment again for any other episodes and that he just wants to live his life out.   She signs the DNRCC form and will place the order.     I ask family their choice for Hospice care and Mara states [facility].     I call [facility] and they will call Mara and set up a time for Friday 7-5.     Will follow for support.       Electronically signed by   Estefania Karimi RN  Palliative Care Team  on 7/3/2024 at 2:10 PM   
No  Propulsion: YES  Method R UE / R LE   Distance: 77ft FWD, 85ft retro   Comment: Pt requiring cueing for obstacle clearance, primarily on L side, and to remain on task.   Assist Level: MIN A in retro, SBA in FWD.     PT Exercises  Exercise Treatment: ISO Glutes, quads inlong sitting. ISO Abdominals and hip ext with R LE on ball in sitting  PROM Exercises: Stretching t B LE HS, gastroc, soleus, and IT Band in long sitting. (Stretching prior to donning AFO  d/t limited ROM)  Resistive Exercises: Seated R LE ex's x20 with #1 and OTB  Functional Mobility Circuit Training: Sit <>  Radha Steady x8  Dynamic Sitting Balance Exercises: dynamic reaching while long sitting and in WC reaching OOBOS with MOD A for seated balance.  Static Standing Balance Exercises: Standing within Radha Steady ~20-30sec ficusing on improving postural awareness to achieve erect posture. Focus on TKE and glute activation.  Disease-specific Exercises: Seated L LE ex's with vibration stimulation and PROM, no active muscle contractions noted in tib. anterior, quads, gastroc, or HS.    ASSESSMENT  Vitals  O2 Device: None (Room air)  BP Location: Right upper arm    Activity Tolerance  Activity Tolerance: Patient limited by pain    Assessment  Performance Deficits/Impairments: Decreased safe awareness;Decreased strength;Decreased body mechanics;Decreased functional mobility ;Decreased ROM;Decreased ADL status;Decreased cognition;Decreased sensation;Decreased balance;Decreased posture;Increased pain;Decreased vision/visual deficit  Treatment Diagnosis: impaired mobility, balance, and safety  Therapy Prognosis: Fair  Decision Making: High Complexity  History: 1. Right epidural hematoma and right temporal hematoma s/p craniotomy for evacuation on 6/6  2. Cranial defect s/p cranioplasty on 6/4  3. Aspiration pneumonia  4. Anemia  5. Hyponatremia  6. Chronic thrombocytopenia  7. History of CVA s/p craniectomy  8. Seizures  9. CAD  10. HLD  Exam: 
liquids;Lingual sweep;Eat/Feed slowly;Swallow 2 times per bite/sip;Check for pocketing of food on the Left;Check for pocketing of food on the Right;Remain upright for 30-45 minutes after meals;Upright as possible for all oral intake    Treatment/Goals  Dysphagia Goals: The patient will tolerate recommended diet without observed clinical signs of aspiration;The patient will improve oral motor function via therapeutic oral motor exercises to 5/5 each trial.;The patient/caregiver will demonstrate understanding of compensatory strategies for improved swallowing safety.    General  Chart Reviewed: Yes  Comments: Pt referred for skilled ST swallow ecvaluation following admit to ARU.  Behavior/Cognition: Alert;Cooperative  Respiratory Status: Room air  O2 Device: None (Room air)  Communication Observation: Functional  Follows Directions: Simple  Dentition: Edentulous  Patient Positioning: Upright in bed  Baseline Vocal Quality: Normal    Oral Motor Deficits  Labial: No impairment  Dentition: Edentulous  Lingual: Decreased rate  Mandible: No impairment    Oral/Pharyngeal Phase Dysfunction  Oral Phase  Oral Phase: Exceptions    Pharyngeal Phase   Pharyngeal Phase: Exceptions    PO Trials     Vocal Quality No Impairment   Consistency Presented Regular; Thin; Pills   How Presented Self-fed/presented   Bolus Acceptance No impairment   Bolus Formation/Control Impaired   Type of Impairment Mastication   Propulsion Delayed    Oral Residue 10-50% of bolus   Initiation of Swallow No impairment   Laryngeal Elevation Decreased   Aspiration Signs/Symptoms Delayed cough/throat clear   Pharyngeal Phase Characteristics Suspected pharyngeal residue; Multiple swallows       Prognosis  Prognosis: Fair  Prognosis Considerations: Participation Level;Potential;Previous Level of Function  Consulted and agree with results and recommendations: Patient;RN  RN Name: Sindy Vee  Patient Education Response: Verbalizes understanding;Needs 
   Urobilinogen, Urine Normal 0.0 - 1.0 EU/dL    Nitrite, Urine POSITIVE (A) NEGATIVE    Leukocyte Esterase, Urine TRACE (A) NEGATIVE   Microscopic Urinalysis    Collection Time: 07/13/24  4:00 AM   Result Value Ref Range    WBC, UA 6 TO 9 (A) 0 TO 5 /HPF    RBC, UA 0 TO 2 0 TO 2 /HPF    Casts UA 0 TO 2 (A) None /LPF    Epithelial Cells, UA 0 TO 2 /HPF    Bacteria, UA MANY (A) None         Imaging/Diagonstics:  No results found.    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 (HCC) 6/27/2024 Yes    Severe malnutrition ([facility]) 6/28/2024 Yes    Pressure injury of sacral region, stage 1 6/28/2024 Yes    Severe episode of recurrent major depressive disorder, without psychotic features ([facility]) 6/30/2024 Yes    Medication overuse headache 7/6/2024 Yes    Vascular headache 7/6/2024 Yes    Chronic daily headache 7/6/2024 Yes   Plan:     Hemorrhagic CVA with multiple surgeries for intracranial bleed with cranioplasty, now admitted to acute inpatient rehab for further strengthening,  CVA in the past, continue Lipitor as advised by discharging team,  ITP, hematology on board, IVIG given multiple times,  Pressure injury of the sacral region, wound care on board,  Coronary disease with CABG x 4, stents, stable at this time, continue statins, cannot be on the blood thinners at this time, high risk,  Seizures, on Vimpat and Neurontin,  Long QT, continue monitor,  Major depression, not suicidal at this time,  Hypertension and hyperlipidemia, on statins, will continue that,  DVT prophylaxis with SCDs only at this time,  DNR CCA with no CPR no intubation,  Overall prognosis guarded    July 4  Seen and examined face-to-face  Labs, imaging, medications, vitals reviewed,  Participating with physical therapy,  Last platelet count 123 on July 1 7/6   Patient, clinically doing better  No 
07/10/24 1248 07/10/24 1615   PT Individual Minutes   Time In 1110 1407   Time Out 1213 1430   Minutes 63 23             Bobbi Mccall, VICK, 07/10/24 at 4:17 PM         
Chelle 6/4. Craniotomy evacuation of epidural and intracerebral hematoma by Dr. Almanza 6/6/24.   Dysphagia: SLP treating  Seizures: on Vimpat, Neurontin  Headache: on Fioricet BID prn with times adjusted to 6 am and 6 pm, Tramadol prn, Neuronin, Topamax, Tylenol prn  L shoulder pain: Will monitor and consider corticosteroid injection if needed  CAD: hx CABG x 4, no antiplatelet or anticoagulation due to bleeding risk.  Chronic idiopathic steroid-refractory thrombocytopenia: on Promacta (wife brought home supply). On prednisone taper. Hematology following. Outpatient follow up with Dr. Vilchis.   Emotional lability: Nuedexta contraindicated for prolonged QT.   HTN/HLD: on Lipitor  GERD: on Pepcid  Depression: on amitriptyline. Effexor being titrated by psychiatry. Celexa discontinued.   Insomnia: on melatonin  BPH: on Flomax  Bowel Management: Miralax daily, Senokot prn, Dulcolax prn. Will add Enemeez prn for constipation  Internal medicine for medical management  Chronic LLE DVT/DVT prophylaxis:  Mechanical prophylaxis with TEDs, EPCs.  Follow up: PCP 1-2 weeks, PM&R, Neurosurgery, Neurology  Code status changed to DNR-CC after review/discussion with palliative care. Anticipating possible Hospice for home.         Electronically signed by MUKUND JOHNSON MD on 7/3/2024 at 11:06 AM      This note is created with the assistance of a speech recognition program.  While intending to generate a document that actually reflects the content of the visit, the document can still have some errors including those of syntax and sound a like substitutions which may escape proof reading.  In such instances, actual meaning can be extrapolated by contextual diversion.          
VICK Mccall, 07/02/24 at 4:04 PM         
acute hemorrhage.  Partially calcified dural  thickening subjacent to the patient's right lateral craniotomy and  cranioplasty site.  Involving encephalomalacia in the right middle cerebral  artery territory.  No evidence of new infarct.  No hydrocephalus or  extra-axial fluid collection.  Midline maintained.  Basal cisterns patent.    ORBITS: The visualized portion of the orbits demonstrate no acute abnormality.    SINUSES: The visualized paranasal sinuses and mastoid air cells demonstrate  no acute abnormality.    SOFT TISSUES/SKULL:  Stable changes of prior large right lateral craniotomy  and cranioplasty.   Impression:     1.  No acute intracranial abnormality.  2. Evolving encephalomalacia in the right middle cerebral territory  consistent with sequela of prior infarct.  3. Chronic partially calcified dural thickening subjacent to the patient's  large right lateral craniotomy and cranioplasty site.       Impression/Plan:   Impaired ADLs, gait, and mobility due to:      Functional decline with L hemiparesis from CVA: PT/OT  for gait, mobility, strengthening, endurance, ADLs, and self care.  Post op hematoma: occurred after R cranioplasty with synthetic custom implant performed by Dr. Corbett 6/4. Craniotomy evacuation of epidural and intracerebral hematoma by Dr. Almanza 6/6/24. He wears helmet for comfort when out of bed. CT head negative for acute changes.   Dysphagia: SLP treating  Seizures: on Vimpat, Neurontin. Neurology following.   Headache: Neurology- weaning Fioricet BID and discontinued Tramadol prn for seizure risk. Currently on verapamil and prn Norco. Patient also on Neurontin, Topamax, amitriptyline, Tylenol prn.   L shoulder pain: corticosteroid injection 7/7 with good relief. Will monitor  CAD: hx CABG x 4, no antiplatelet or anticoagulation due to bleeding risk.  Chronic idiopathic steroid-refractory thrombocytopenia: on Promacta (wife brought home supply). On prednisone taper. Hematology following. 
depressive disorder, without psychotic features (HCC) 6/30/2024 Yes    Medication overuse headache 7/6/2024 Yes    Vascular headache 7/6/2024 Yes    Chronic daily headache 7/6/2024 Yes   Plan:     Hemorrhagic CVA with multiple surgeries for intracranial bleed with cranioplasty, now admitted to acute inpatient rehab for further strengthening,  CVA in the past, continue Lipitor as advised by discharging team,  ITP, hematology on board, IVIG given multiple times,  Pressure injury of the sacral region, wound care on board,  Coronary disease with CABG x 4, stents, stable at this time, continue statins, cannot be on the blood thinners at this time, high risk,  Seizures, on Vimpat and Neurontin,  Long QT, continue monitor,  Major depression, not suicidal at this time,  Hypertension and hyperlipidemia, on statins, will continue that,  DVT prophylaxis with SCDs only at this time,  DNR CCA with no CPR no intubation,  Overall prognosis guarded    July 4  Seen and examined face-to-face  Labs, imaging, medications, vitals reviewed,  Participating with physical therapy,  Last platelet count 123 on July 1 7/6   Patient, clinically doing better  No complaints today  Platelets are 135  Oncology following    7/10/2024  Reviewed CT from today, no acute changes.  Patient denies any acute concerns, hemodynamically stable.  Continue current management.   Will follow-up on repeat labs     consultations:   IP CONSULT TO DIETITIAN  IP CONSULT TO SOCIAL WORK  IP CONSULT TO INTERNAL MEDICINE  IP CONSULT TO SPIRITUAL SERVICES  IP CONSULT TO PEDIATRIC HEM/ONC  IP CONSULT TO PSYCHIATRY  IP CONSULT TO PALLIATIVE CARE  IP CONSULT TO NEUROLOGY      Marito Pleitez MD  7/10/2024  9:51 PM    Copy sent to Blanca Tony MD    Please note that this chart was generated using voice recognition Dragon dictation software.  Although every effort was made to ensure the accuracy of this automated transcription, some errors in transcription may have 
during self care tasks with Mod A x 2 and Good safety with use of least restrictive device  Additional Goals?: Yes  Short Term Goal 6: Pt will tolerate standing 3+ mintues during functional activity of choice with Mod A x 2 with use of supportive device with Good safety  Short Term Goal 7: Pt will complete self care routine with no more than 2 verbal cues for attention to task  Short Term Goal 8: Pt will verablize/demonstrate 3+ non pharmaceutical pain management strategies to increase participation in daily activities and improve overall quality of life  Short Term Goal 9: Pt will verbalize/demonstrate Good understanding of AE/adaptive strategies/DME to increase safety and independence with self care and mobility  Short Term Goal 10: Pt will participate in 30+ minutes of therapeutic exercises/functional activities/social participation to increase safety and independence with daily activities and improve overall quality of life    Long Term Goals  Time Frame for Long Term Goals : By discharge  Long Term Goal 1: Pt will complete bed mobility with Mod A to sit EOB unsupported for 15+ minutes with CGA during self care tasks  Long Term Goal 2: Pt will complete self feeding/grooming task with set-up A and Good safety/attention to task  Long Term Goal 3: Pt will complete upper body dressing/bathing with Min A and Good safety with use of adaptive strategies as needed  Long Term Goal 4: Pt will complete lower body dressing/bathing with Mod A and Good safety with use of AE as needed  Long Term Goal 5: Pt will complete functional transfer during self care task with Mod A and Good safety with use of least restrictive device  Additional Goals?: Yes  Long Term Goal 6: Pt will tolerate standing 5+ mintues during functional activity of choice with Mod A and Good safety with use of supportive device  Long Term Goal 7: Pt will attend to L side of visual field/body to increase safety and joint protection during daily activities  Long 
place;Gait belt;Heels elevated for pressure relief;Left in bed;Left in chair (bed rotated right)    EDUCATION  Education  Education Given To: Patient;Caregiver (Mraa)  Education Provided: Precautions;Safety;Transfer Training;Fall Prevention Strategies (transfer WC<> cammode, hygiene post cammode, sit to supine)  Education Method: Verbal  Education Outcome: Demonstrated understanding       07/09/24 1214 07/09/24 1723 07/09/24 1736   PT Individual Minutes   Time In 1004 1516 1613   Time Out 1115 1550 1626   Minutes 71 34 13          Bobbi Mccall, VICK, 07/09/24 at 5:37 PM         
 Normal facial sensation  VII    -     Normal facial symmetry  VIII   -     Intact hearing  IX,X -     Symmetrical palate  XI    -     Symmetrical shoulder shrug  XII   -     Midline tongue, no atrophy    MOTOR FUNCTION:  LUE 0/5 LLE 0/5  RUE 5/5 RLE 4+/5    SENSORY FUNCTION:  Normal touch, normal pin, normal vibration, normal proprioception   CEREBELLAR FUNCTION:  Intact fine motor control over upper limbs   STATION and GAIT  Refer to PT/OT assessments        Investigations:      Laboratory Testing:  No results found for this or any previous visit (from the past 24 hour(s)).      Assessment :      Primary Problem  Hemiplegia and hemiparesis following nontraumatic intracerebral hemorrhage affecting left non-dominant side (HCC)    Active Hospital Problems    Diagnosis Date Noted    Medication overuse headache [G44.40] 07/06/2024    Vascular headache [G44.1] 07/06/2024    Chronic daily headache [R51.9] 07/06/2024    Severe episode of recurrent major depressive disorder, without psychotic features (HCC) [F33.2] 06/30/2024    Severe malnutrition (HCC) [E43] 06/28/2024    Pressure injury of sacral region, stage 1 [L89.151] 06/28/2024    Hemiplegia and hemiparesis following nontraumatic intracerebral hemorrhage affecting left non-dominant side (HCC) [I69.154] 06/27/2024       Plan:   Intractable chronic daily headache with medication overuse headache compounding this  -taper off Fioricet no more than once daily next 5 days and than recommend after that no more than 2 in one day or 5 total in one week  -Start Verapamil 40mg TID and will escalate pending how he tolerates   -previous IV migraine therapies discontinued as patient does not have IV access and IV mag requires continues monitor not available at this time  -DC ultram for concerns of his focal seizure disorder and this lowering seizure threshold  -Start Norco PRN for improved pain control- minimize use of this as can cause rebound headaches  -Continue Topamax 100mg 
Dulcolax prn. Has Enemeez prn for constipation  Internal medicine for medical management  Chronic LLE DVT/DVT prophylaxis:  Mechanical prophylaxis with TEDs, EPCs.  Follow up: PCP 1-2 weeks, PM&R (Brain), Neurosurgery, Neurology, refer to outpatient pulmonologist  Code status changed to DNR-CC 7/3 after review/discussion with palliative care. Anticipating Hospice for home.       Electronically signed by KISHOR MOON MD on 7/14/2024 at 8:59 AM           
and Neurontin,  Long QT, continue monitor,  Major depression, not suicidal at this time,  Hypertension and hyperlipidemia, on statins, will continue that,  DVT prophylaxis with SCDs only at this time,  DNR CCA with no CPR no intubation,  Overall prognosis guarded    July 4  Seen and examined face-to-face  Labs, imaging, medications, vitals reviewed,  Participating with physical therapy,  Last platelet count 123 on July 1 7/6   Patient, clinically doing better  No complaints today  Platelets are 135  Oncology following    7/10/2024  Reviewed CT from today, no acute changes.  Patient denies any acute concerns, hemodynamically stable.  Continue current management.   Will follow-up on repeat labs       7/12  Patient seen and examined  Patient was drowsy but easily arousable followed commands  Discussed with patient bedside nurse, she said that patient did participate with physical therapy   Had leukocytosis today no fever,  Will check UA  Continue to monitor  Oncology on board    consultations:   IP CONSULT TO DIETITIAN  IP CONSULT TO SOCIAL WORK  IP CONSULT TO INTERNAL MEDICINE  IP CONSULT TO SPIRITUAL SERVICES  IP CONSULT TO PEDIATRIC HEM/ONC  IP CONSULT TO PSYCHIATRY  IP CONSULT TO PALLIATIVE CARE  IP CONSULT TO NEUROLOGY      Blanca Ren MD  7/12/2024  4:44 PM    Copy sent to Blanca Tony MD    Please note that this chart was generated using voice recognition Dragon dictation software.  Although every effort was made to ensure the accuracy of this automated transcription, some errors in transcription may have occurred.  
during daily activities  Long Term Goal 8: Pt/family will verbalize/demonstrate Good understanding of home safety/fall prevention strategies to increase safety and independence with self care and mobility  Long Term Goal 9: Pt will verbalzie/demonstrate 3+ positive coping strategies to increase participation in daily activities and improve overall quality of life  Long Term Goal 10: Pt will demonstrate increased FMC and  strength in RUE as evident by 8# improvement in  strength and 10 seconds improvement in 9 hole peg test (Goal updated 6/29 Bhupinder TUCKER/MENDEZ)    Plan  Occupational Therapy Plan  Times Per Week: 5-7  Times Per Day: Twice a day  Current Treatment Recommendations: Self-Care / ADL, Strengthening, ROM, Balance training, Functional mobility training, Endurance training, Wheelchair mobility training, Neuromuscular re-education, Cognitive reorientation, Pain management, Safety education & training, Patient/Caregiver education & training, Equipment evaluation, education, & procurement, Home management training, Cognitive/Perceptual training, Coordination training, Co-Treatment, Group Therapy       07/08/24 0801 07/08/24 1612   OT Individual Minutes   Time In 0800 1300   Time Out 0900 1331   Minutes 60 31         Electronically signed by FORTINO Dave on 7/8/24 at 4:27 PM EDT  
in 30+ minutes of therapeutic exercises/functional activities/social participation to increase safety and independence with daily activities and improve overall quality of life    Long Term Goals  Time Frame for Long Term Goals : By discharge  Long Term Goal 1: Pt will complete bed mobility with Mod A to sit EOB unsupported for 15+ minutes with CGA during self care tasks  Long Term Goal 2: Pt will complete self feeding/grooming task with set-up A and Good safety/attention to task  Long Term Goal 3: Pt will complete upper body dressing/bathing with Min A and Good safety with use of adaptive strategies as needed  Long Term Goal 4: Pt will complete lower body dressing/bathing with Mod A and Good safety with use of AE as needed  Long Term Goal 5: Pt will complete functional transfer during self care task with Mod A and Good safety with use of least restrictive device  Additional Goals?: Yes  Long Term Goal 6: Pt will tolerate standing 5+ mintues during functional activity of choice with Mod A and Good safety with use of supportive device  Long Term Goal 7: Pt will attend to L side of visual field/body to increase safety and joint protection during daily activities  Long Term Goal 8: Pt/family will verbalize/demonstrate Good understanding of home safety/fall prevention strategies to increase safety and independence with self care and mobility  Long Term Goal 9: Pt will verbalzie/demonstrate 3+ positive coping strategies to increase participation in daily activities and improve overall quality of life  Long Term Goal 10: Pt will demonstrate increased FMC and  strength in RUE as evident by 8# improvement in  strength and 10 seconds improvement in 9 hole peg test (Goal updated 6/29 Bhupinder TUCKER/MENDEZ)    Plan  Occupational Therapy Plan  Times Per Week: 5-7  Times Per Day: Twice a day  Current Treatment Recommendations: Self-Care / ADL, Strengthening, ROM, Balance training, Functional mobility training, Endurance 
increase safety and independence with self care and mobility  Long Term Goal 9: Pt will verbalzie/demonstrate 3+ positive coping strategies to increase participation in daily activities and improve overall quality of life  Long Term Goal 10: Pt will demonstrate increased FMC and  strength in RUE as evident by 8# improvement in  strength and 10 seconds improvement in 9 hole peg test (Goal updated 6/29 Bhupinder TUCKER/MENDEZ)    Plan  Occupational Therapy Plan  Times Per Week: 5-7  Times Per Day: Twice a day  Current Treatment Recommendations: Self-Care / ADL, Strengthening, ROM, Balance training, Functional mobility training, Endurance training, Wheelchair mobility training, Neuromuscular re-education, Cognitive reorientation, Pain management, Safety education & training, Patient/Caregiver education & training, Equipment evaluation, education, & procurement, Home management training, Cognitive/Perceptual training, Coordination training, Co-Treatment, Group Therapy     07/02/24 0957 07/02/24 1415   OT Individual Minutes   Time In 0957 1340   Time Out 1102 1415   Minutes 65 35         Electronically signed by FORTINO Dave on 7/2/24 at 4:24 PM EDT  
recognition program.  While intending to generate a document that actually reflects the content of the visit, the document can still have some errors including those of syntax and sound a like substitutions which may escape proof reading.  In such instances, actual meaning can be extrapolated by contextual diversion.          
waxing and wanning confusion, normal speech, normal language, no hallucination or delusion   CRANIAL NERVES: II     -      Visual fields intact to confrontation  III,IV,VI -  EOMs full, no afferent defect, no EPHRAIM, no ptosis  V     -     Normal facial sensation  VII    -     Normal facial symmetry  VIII   -     Intact hearing  IX,X -     Symmetrical palate  XI    -     Symmetrical shoulder shrug  XII   -     Midline tongue, no atrophy    MOTOR FUNCTION:  LUE 0/5 LLE 0/5  RUE 5/5 RLE 4+/5    SENSORY FUNCTION:  Normal touch, normal pin, normal vibration, normal proprioception   CEREBELLAR FUNCTION:  Intact fine motor control over upper limbs   STATION and GAIT  Refer to PT/OT assessments        Investigations:      Laboratory Testing:  No results found for this or any previous visit (from the past 24 hour(s)).      Assessment :      Primary Problem  Hemiplegia and hemiparesis following nontraumatic intracerebral hemorrhage affecting left non-dominant side (HCC)    Active Hospital Problems    Diagnosis Date Noted    Medication overuse headache [G44.40] 07/06/2024    Vascular headache [G44.1] 07/06/2024    Chronic daily headache [R51.9] 07/06/2024    Severe episode of recurrent major depressive disorder, without psychotic features (HCC) [F33.2] 06/30/2024    Severe malnutrition (HCC) [E43] 06/28/2024    Pressure injury of sacral region, stage 1 [L89.151] 06/28/2024    Hemiplegia and hemiparesis following nontraumatic intracerebral hemorrhage affecting left non-dominant side (HCC) [I69.154] 06/27/2024       Plan:   Intractable chronic daily headache with medication overuse headache compounding this  -taper off Fioricet no more than once daily next 5 days and than recommend after that no more than 2 in one day or 5 total in one week  -Start Verapamil 40mg TID and will escalate pending how he tolerates   -previous IV migraine therapies discontinued as patient does not have IV access and IV mag requires continues monitor not 
TECHNIQUE: CT of the head was performed without the administration of intravenous contrast. Automated exposure control, iterative reconstruction, and/or weight based adjustment of the mA/kV was utilized to reduce the radiation dose to as low as reasonably achievable. COMPARISON: 06/11/2024 HISTORY: ORDERING SYSTEM PROVIDED HISTORY: AMS TECHNOLOGIST PROVIDED HISTORY: AMS Reason for Exam: ams FINDINGS: BRAIN/VENTRICLES: Status post right hemispheric cranioplasty.  8 mm thick extra-axial hematoma deep to the cranioplasty is unchanged.  Trace intraventricular and subarachnoid hemorrhage is also unchanged.  There is no herniation or hydrocephalus.  Ventricular size is stable.  Edema or encephalomalacia in the right cerebral hemisphere is unchanged. ORBITS: The visualized portion of the orbits demonstrate no acute abnormality. SINUSES: The visualized paranasal sinuses and mastoid air cells demonstrate no acute abnormality. SOFT TISSUES/SKULL:  No acute abnormality of the visualized skull or soft tissues.     Unchanged intracranial hemorrhage status post cranioplasty.     CT HEAD WO CONTRAST    Result Date: 6/11/2024  EXAMINATION: CT OF THE HEAD WITHOUT CONTRAST  6/11/2024 4:48 am TECHNIQUE: CT of the head was performed without the administration of intravenous contrast. Automated exposure control, iterative reconstruction, and/or weight based adjustment of the mA/kV was utilized to reduce the radiation dose to as low as reasonably achievable. COMPARISON: 06/08/2024 HISTORY: ORDERING SYSTEM PROVIDED HISTORY: F/U post op TECHNOLOGIST PROVIDED HISTORY: F/U post op Reason for Exam: F/U post op FINDINGS: BRAIN/VENTRICLES: Stable changes again noted in right frontal, parietal and temporal lobes with minimal patchy parenchymal hemorrhage in the temporal lobe.  There is unchanged minimal right convexity subdural hematoma and postop pneumocephalus.  There is moderate effacement of the right lateral ventricle.  No significant 
joint protection during daily activities  Long Term Goal 8: Pt/family will verbalize/demonstrate Good understanding of home safety/fall prevention strategies to increase safety and independence with self care and mobility  Long Term Goal 9: Pt will verbalzie/demonstrate 3+ positive coping strategies to increase participation in daily activities and improve overall quality of life  Long Term Goal 10: Pt will demonstrate increased FMC and  strength in RUE as evident by 8# improvement in  strength and 10 seconds improvement in 9 hole peg test (Goal updated 6/29 Bhupinder TUCKER/MENDEZ)    Plan  Occupational Therapy Plan  Times Per Week: 5-7  Times Per Day: Twice a day  Current Treatment Recommendations: Self-Care / ADL, Strengthening, ROM, Balance training, Functional mobility training, Endurance training, Wheelchair mobility training, Neuromuscular re-education, Cognitive reorientation, Pain management, Safety education & training, Patient/Caregiver education & training, Equipment evaluation, education, & procurement, Home management training, Cognitive/Perceptual training, Coordination training, Co-Treatment, Group Therapy         07/11/24 0801 07/11/24 1337   OT Individual Minutes   Time In 0801 1337   Time Out 0907 1404   Minutes 66 27                 Electronically signed by FORTINO Quintana on 7/11/24 at 3:07 PM EDT   
CONTRAST    Result Date: 6/7/2024  EXAMINATION: CT OF THE HEAD WITHOUT CONTRAST  6/7/2024 1:11 am TECHNIQUE: CT of the head was performed without the administration of intravenous contrast. Automated exposure control, iterative reconstruction, and/or weight based adjustment of the mA/kV was utilized to reduce the radiation dose to as low as reasonably achievable. COMPARISON: June 6, 2024 HISTORY: ORDERING SYSTEM PROVIDED HISTORY: POST OP TECHNOLOGIST PROVIDED HISTORY: POST OP Reason for Exam: post op FINDINGS: There has been interval surgical evacuation of the intraparenchymal hematoma as well as the adjacent subdural hematoma with decreased white matter edema and mass effect. 6 mm of right-to-left midline shift is noted. There is postoperative pneumocephalus. The patient is status post right-sided craniectomy.     Interval surgical evacuation of intraparenchymal hematoma and adjacent subdural bleed. Decreased mass effect and midline shift.     CT CHEST WO CONTRAST    Result Date: 6/6/2024  EXAMINATION: CT OF THE CHEST WITHOUT CONTRAST 6/6/2024 8:35 pm TECHNIQUE: CT of the chest was performed without the administration of intravenous contrast. Multiplanar reformatted images are provided for review. Automated exposure control, iterative reconstruction, and/or weight based adjustment of the mA/kV was utilized to reduce the radiation dose to as low as reasonably achievable. COMPARISON: None. HISTORY: ORDERING SYSTEM PROVIDED HISTORY: aspiration TECHNOLOGIST PROVIDED HISTORY: aspiration Reason for Exam: aspiration FINDINGS: Pulmonary Arteries: Main pulmonary artery is normal in caliber. Mediastinum: No evidence of mediastinal lymphadenopathy.  The heart and pericardium demonstrate no acute abnormality. The thoracic aorta is normal in caliber. Lungs/pleura: Endotracheal tube tip terminates in the midthoracic trachea. Feeding tube terminates within the proximal stomach.  Upper lobe predominant emphysematous changes are

## 2024-07-15 NOTE — CARE COORDINATION
[facility]patient [facility]  Case Management Discharge Note    Discharge Disposition: Home with wife    Discharge Transportation Method: [facility] ,  Time: 1300    IMM Letter Status: Patient/Responsible Party agreeable with discharge: Second Signature Obtained, Patient/Responsible Party offered four hours to make informed decision regarding appeal process - Completed Letter Placed in Patient Chart    Home Healthcare Services:  Home Hospice: [facility]    Outpatient Therapy Services: Not Applicable    DME: No DME Needs Identified    Current reconciled medication list sent to subsequent provider via: Electronic Health Record      Patient Health Questionnaire-9 (PHQ-2 to 9)   Over the last 2 weeks, how often have you been bothered by any of the following problems?    1. Little Interest or pleasure in doing things?   2-6 Days (Several Days) - Score 1    2. Feeling down, depressed or hopeless?   2-6 Days (Several Days) - Score 1    3. Trouble falling or staying asleep, or sleeping too much?   Never or 1 Day - Score 0    4. Feeling tired or having little energy?   Never or 1 Day - Score 0    5. Poor appetite or overeating?   Never or 1 Day - Score 0    6. Feeling bad about yourself-or that you are a failure or have let yourself or your family down?   Never or 1 Day - Score 0    7. Trouble concentrating on things, such as reading the newspaper or watching television?    Never or 1 Day - Score 0    8. Moving or speaking so slowly that other people could have noticed? Or the opposite-being so fidgety or restless that you have been moving around a lot more than usual?  Never or 1 Day - Score 0    9. Thoughts that you would be better off dead or of hurting yourself in some way?   Never or 1 Day - Score 0    Total Score: 2  If score is above 15, Notify PM&R Physician    If you checked off any problems, how difficult have these problems made it for you to do your work, take care

## 2024-07-15 NOTE — INTERDISCIPLINARY ROUNDS
[facility] Inpatient Rehabilitation  INTERDISCIPLINARY MEETING  Date: 7/15/24  Patient Name: Brennen Mcclure       Room: 2635/2635-01  MRN: [id number]       : 1965  (59 y.o.)     Gender: male     Patient's care team, including nursing, speech language pathologist, occupational therapist, and/or physical therapist, met to discuss patient's care needs. Any patient concerns, change in medical status, and current transfer/mobility status were identified at this time.     Any Additional Pertinent Information:   Pt to DC today. 2 Assist with Radha     Participating Team Members:  Nurse: Arti Morales RN    Occupational Therapist: FORTINO Woo   Physical Therapist: Fany Kelly  PT  Speech Therapist:  Radha Yarbrough M.A. CCC-SLP

## 2024-07-15 NOTE — PLAN OF CARE
Problem: Chronic Conditions and Co-morbidities  Goal: Patient's chronic conditions and co-morbidity symptoms are monitored and maintained or improved  6/28/2024 2343 by Michelle Ingram, RN  Outcome: Progressing  Flowsheets (Taken 6/28/2024 2323)  Care Plan - Patient's Chronic Conditions and Co-Morbidity Symptoms are Monitored and Maintained or Improved:   Monitor and assess patient's chronic conditions and comorbid symptoms for stability, deterioration, or improvement   Collaborate with multidisciplinary team to address chronic and comorbid conditions and prevent exacerbation or deterioration   Update acute care plan with appropriate goals if chronic or comorbid symptoms are exacerbated and prevent overall improvement and discharge     Problem: Discharge Planning  Goal: Discharge to home or other facility with appropriate resources  6/28/2024 2343 by Michelle Ingram, RN  Outcome: Progressing  Flowsheets (Taken 6/28/2024 2323)  Discharge to home or other facility with appropriate resources:   Identify barriers to discharge with patient and caregiver   Arrange for needed discharge resources and transportation as appropriate   Identify discharge learning needs (meds, wound care, etc)     Problem: Skin/Tissue Integrity  Goal: Absence of new skin breakdown  Description: 1.  Monitor for areas of redness and/or skin breakdown  2.  Assess vascular access sites hourly  3.  Every 4-6 hours minimum:  Change oxygen saturation probe site  4.  Every 4-6 hours:  If on nasal continuous positive airway pressure, respiratory therapy assess nares and determine need for appliance change or resting period.  6/28/2024 2343 by Michelle Ingram, RN  Outcome: Progressing     Problem: ABCDS Injury Assessment  Goal: Absence of physical injury  6/28/2024 2343 by Michelle Ingram, RN  Outcome: Progressing     Problem: Safety - Adult  Goal: Free from fall injury  6/28/2024 2343 by Michelle Ingram, RN  Outcome: Progressing     Problem: Nutrition 
  Problem: Chronic Conditions and Co-morbidities  Goal: Patient's chronic conditions and co-morbidity symptoms are monitored and maintained or improved  6/29/2024 1056 by Maddie Barragan RN  Outcome: Progressing  6/28/2024 2343 by Michelle Ingram RN  Outcome: Progressing  Flowsheets (Taken 6/28/2024 2323)  Care Plan - Patient's Chronic Conditions and Co-Morbidity Symptoms are Monitored and Maintained or Improved:   Monitor and assess patient's chronic conditions and comorbid symptoms for stability, deterioration, or improvement   Collaborate with multidisciplinary team to address chronic and comorbid conditions and prevent exacerbation or deterioration   Update acute care plan with appropriate goals if chronic or comorbid symptoms are exacerbated and prevent overall improvement and discharge     Problem: Discharge Planning  Goal: Discharge to home or other facility with appropriate resources  6/29/2024 1056 by Maddie Barragan RN  Outcome: Progressing  6/28/2024 2343 by Michelle Ingram RN  Outcome: Progressing  Flowsheets (Taken 6/28/2024 2323)  Discharge to home or other facility with appropriate resources:   Identify barriers to discharge with patient and caregiver   Arrange for needed discharge resources and transportation as appropriate   Identify discharge learning needs (meds, wound care, etc)     Problem: Skin/Tissue Integrity  Goal: Absence of new skin breakdown  Description: 1.  Monitor for areas of redness and/or skin breakdown  2.  Assess vascular access sites hourly  3.  Every 4-6 hours minimum:  Change oxygen saturation probe site  4.  Every 4-6 hours:  If on nasal continuous positive airway pressure, respiratory therapy assess nares and determine need for appliance change or resting period.  6/29/2024 1056 by Maddie Barragan RN  Outcome: Progressing  6/28/2024 2343 by Michelle Ingram RN  Outcome: Progressing     Problem: ABCDS Injury Assessment  Goal: Absence of physical injury  6/29/2024 1056 by 
  Problem: Chronic Conditions and Co-morbidities  Goal: Patient's chronic conditions and co-morbidity symptoms are monitored and maintained or improved  6/29/2024 2213 by Neha Avila RN  Outcome: Progressing  6/29/2024 1056 by Maddie Barragan RN  Outcome: Progressing     Problem: Discharge Planning  Goal: Discharge to home or other facility with appropriate resources  6/29/2024 2213 by Neha Avila RN  Outcome: Progressing  6/29/2024 1056 by Maddie Barragan RN  Outcome: Progressing     Problem: Skin/Tissue Integrity  Goal: Absence of new skin breakdown  Description: 1.  Monitor for areas of redness and/or skin breakdown  2.  Assess vascular access sites hourly  3.  Every 4-6 hours minimum:  Change oxygen saturation probe site  4.  Every 4-6 hours:  If on nasal continuous positive airway pressure, respiratory therapy assess nares and determine need for appliance change or resting period.  6/29/2024 2213 by Neha Avila RN  Outcome: Progressing  6/29/2024 1056 by Maddie Barragan RN  Outcome: Progressing     Problem: ABCDS Injury Assessment  Goal: Absence of physical injury  6/29/2024 2213 by Neha Avila RN  Outcome: Progressing  6/29/2024 1056 by Maddie Barragan RN  Outcome: Progressing     Problem: Safety - Adult  Goal: Free from fall injury  6/29/2024 2213 by Neha Avila RN  Outcome: Progressing  6/29/2024 1056 by Maddie Barragan RN  Outcome: Progressing     Problem: Nutrition Deficit:  Goal: Optimize nutritional status  6/29/2024 2213 by Neha Avila RN  Outcome: Progressing  6/29/2024 1056 by Maddie Barragan RN  Outcome: Progressing     Problem: Pain  Goal: Verbalizes/displays adequate comfort level or baseline comfort level  6/29/2024 2213 by Neha Avila RN  Outcome: Progressing  6/29/2024 1056 by Maddie Barragan RN  Outcome: Progressing     
  Problem: Chronic Conditions and Co-morbidities  Goal: Patient's chronic conditions and co-morbidity symptoms are monitored and maintained or improved  6/30/2024 1049 by Maddie Barragan RN  Outcome: Progressing  6/29/2024 2213 by Neha Avila RN  Outcome: Progressing     Problem: Discharge Planning  Goal: Discharge to home or other facility with appropriate resources  6/30/2024 1049 by Maddie Barragan RN  Outcome: Progressing  6/29/2024 2213 by Neha Avila RN  Outcome: Progressing     Problem: Skin/Tissue Integrity  Goal: Absence of new skin breakdown  Description: 1.  Monitor for areas of redness and/or skin breakdown  2.  Assess vascular access sites hourly  3.  Every 4-6 hours minimum:  Change oxygen saturation probe site  4.  Every 4-6 hours:  If on nasal continuous positive airway pressure, respiratory therapy assess nares and determine need for appliance change or resting period.  6/30/2024 1049 by Maddie Barragan RN  Outcome: Progressing  6/29/2024 2213 by Neha Avila RN  Outcome: Progressing     Problem: ABCDS Injury Assessment  Goal: Absence of physical injury  6/30/2024 1049 by Maddie Barragan RN  Outcome: Progressing  6/29/2024 2213 by Neha Avila RN  Outcome: Progressing     Problem: Safety - Adult  Goal: Free from fall injury  6/30/2024 1049 by Maddie Barragan RN  Outcome: Progressing  6/29/2024 2213 by Neha Avila RN  Outcome: Progressing     Problem: Nutrition Deficit:  Goal: Optimize nutritional status  6/30/2024 1049 by Maddie Barragan RN  Outcome: Progressing  6/29/2024 2213 by Neha Avila RN  Outcome: Progressing     Problem: Pain  Goal: Verbalizes/displays adequate comfort level or baseline comfort level  6/30/2024 1049 by Maddie Barragan RN  Outcome: Progressing  6/29/2024 2213 by Neha Avila RN  Outcome: Progressing     
  Problem: Chronic Conditions and Co-morbidities  Goal: Patient's chronic conditions and co-morbidity symptoms are monitored and maintained or improved  6/30/2024 2155 by Neha Avila RN  Outcome: Progressing  6/30/2024 1049 by Maddie Barragan RN  Outcome: Progressing     Problem: Discharge Planning  Goal: Discharge to home or other facility with appropriate resources  6/30/2024 2155 by Neha Avila RN  Outcome: Progressing  6/30/2024 1049 by Maddie Barragan RN  Outcome: Progressing     Problem: Skin/Tissue Integrity  Goal: Absence of new skin breakdown  Description: 1.  Monitor for areas of redness and/or skin breakdown  2.  Assess vascular access sites hourly  3.  Every 4-6 hours minimum:  Change oxygen saturation probe site  4.  Every 4-6 hours:  If on nasal continuous positive airway pressure, respiratory therapy assess nares and determine need for appliance change or resting period.  6/30/2024 2155 by Neha Avila RN  Outcome: Progressing  6/30/2024 1049 by Maddie Barragan RN  Outcome: Progressing     Problem: ABCDS Injury Assessment  Goal: Absence of physical injury  6/30/2024 2155 by Neha Avila RN  Outcome: Progressing  6/30/2024 1049 by Maddie Barragan RN  Outcome: Progressing     Problem: Safety - Adult  Goal: Free from fall injury  6/30/2024 2155 by Neha Avila RN  Outcome: Progressing  6/30/2024 1049 by Maddie Barragan RN  Outcome: Progressing     Problem: Nutrition Deficit:  Goal: Optimize nutritional status  6/30/2024 2155 by Neha Avila RN  Outcome: Progressing  6/30/2024 1049 by Maddie Barragan RN  Outcome: Progressing     Problem: Pain  Goal: Verbalizes/displays adequate comfort level or baseline comfort level  6/30/2024 2155 by Neha Avila RN  Outcome: Progressing  6/30/2024 1049 by Maddie Barragan RN  Outcome: Progressing     
  Problem: Chronic Conditions and Co-morbidities  Goal: Patient's chronic conditions and co-morbidity symptoms are monitored and maintained or improved  7/11/2024 1048 by Fátima Callaway LPN  Outcome: Progressing  Flowsheets (Taken 7/11/2024 0923)  Care Plan - Patient's Chronic Conditions and Co-Morbidity Symptoms are Monitored and Maintained or Improved:   Monitor and assess patient's chronic conditions and comorbid symptoms for stability, deterioration, or improvement   Collaborate with multidisciplinary team to address chronic and comorbid conditions and prevent exacerbation or deterioration   Update acute care plan with appropriate goals if chronic or comorbid symptoms are exacerbated and prevent overall improvement and discharge     Problem: Discharge Planning  Goal: Discharge to home or other facility with appropriate resources  7/11/2024 1048 by Fátima Callaway LPN  Outcome: Progressing  Flowsheets (Taken 7/11/2024 0923)  Discharge to home or other facility with appropriate resources:   Arrange for needed discharge resources and transportation as appropriate   Identify barriers to discharge with patient and caregiver   Identify discharge learning needs (meds, wound care, etc)     Problem: Skin/Tissue Integrity  Goal: Absence of new skin breakdown  Description: 1.  Monitor for areas of redness and/or skin breakdown  2.  Assess vascular access sites hourly  3.  Every 4-6 hours minimum:  Change oxygen saturation probe site  4.  Every 4-6 hours:  If on nasal continuous positive airway pressure, respiratory therapy assess nares and determine need for appliance change or resting period.  7/11/2024 1048 by Fátima Callaway LPN  Outcome: Progressing     Problem: ABCDS Injury Assessment  Goal: Absence of physical injury  7/11/2024 1048 by Fátima Callaway LPN  Outcome: Progressing  Flowsheets (Taken 7/11/2024 1047)  Absence of Physical Injury: Implement safety measures based on patient assessment     Problem: Safety 
  Problem: Chronic Conditions and Co-morbidities  Goal: Patient's chronic conditions and co-morbidity symptoms are monitored and maintained or improved  7/12/2024 1528 by Jaki Flores RN  Outcome: Progressing  Flowsheets (Taken 7/12/2024 0745)  Care Plan - Patient's Chronic Conditions and Co-Morbidity Symptoms are Monitored and Maintained or Improved: Monitor and assess patient's chronic conditions and comorbid symptoms for stability, deterioration, or improvement     Problem: Discharge Planning  Goal: Discharge to home or other facility with appropriate resources  7/12/2024 1528 by Jaki Flores RN  Outcome: Progressing  Flowsheets (Taken 7/12/2024 0745)  Discharge to home or other facility with appropriate resources: Identify barriers to discharge with patient and caregiver     Problem: Skin/Tissue Integrity  Goal: Absence of new skin breakdown  Description: 1.  Monitor for areas of redness and/or skin breakdown  2.  Assess vascular access sites hourly  3.  Every 4-6 hours minimum:  Change oxygen saturation probe site  4.  Every 4-6 hours:  If on nasal continuous positive airway pressure, respiratory therapy assess nares and determine need for appliance change or resting period.  7/12/2024 1528 by Jaki Flores RN  Outcome: Progressing     Problem: ABCDS Injury Assessment  Goal: Absence of physical injury  7/12/2024 1528 by Jaki Flores RN  Outcome: Progressing     Problem: Safety - Adult  Goal: Free from fall injury  7/12/2024 1528 by Jaki Flores RN  Outcome: Progressing     Problem: Nutrition Deficit:  Goal: Optimize nutritional status  7/12/2024 1528 by Jaki Flores RN  Outcome: Progressing     Problem: Pain  Goal: Verbalizes/displays adequate comfort level or baseline comfort level  7/12/2024 1528 by Jaki Flores RN  Outcome: Progressing     
  Problem: Chronic Conditions and Co-morbidities  Goal: Patient's chronic conditions and co-morbidity symptoms are monitored and maintained or improved  7/13/2024 0316 by Nazanin Zhu LPN  Outcome: Progressing     Problem: Discharge Planning  Goal: Discharge to home or other facility with appropriate resources  7/13/2024 0316 by Nazanin Zhu LPN  Outcome: Progressing     Problem: Skin/Tissue Integrity  Goal: Absence of new skin breakdown  Description: 1.  Monitor for areas of redness and/or skin breakdown  2.  Assess vascular access sites hourly  3.  Every 4-6 hours minimum:  Change oxygen saturation probe site  4.  Every 4-6 hours:  If on nasal continuous positive airway pressure, respiratory therapy assess nares and determine need for appliance change or resting period.  7/13/2024 0316 by Nazanin Zhu LPN  Outcome: Progressing     Problem: ABCDS Injury Assessment  Goal: Absence of physical injury  7/13/2024 0316 by Nazanin Zhu LPN  Outcome: Progressing     Problem: Safety - Adult  Goal: Free from fall injury  7/13/2024 0316 by Nazanin Zhu LPN  Outcome: Progressing     Problem: Nutrition Deficit:  Goal: Optimize nutritional status  7/13/2024 0316 by Nazanin Zhu LPN  Outcome: Progressing     Problem: Pain  Goal: Verbalizes/displays adequate comfort level or baseline comfort level  7/13/2024 0316 by Nazanin Zhu LPN  Outcome: Progressing     
  Problem: Chronic Conditions and Co-morbidities  Goal: Patient's chronic conditions and co-morbidity symptoms are monitored and maintained or improved  7/13/2024 1425 by Devora Sam RN  Outcome: Progressing  Flowsheets (Taken 7/13/2024 0900)  Care Plan - Patient's Chronic Conditions and Co-Morbidity Symptoms are Monitored and Maintained or Improved: Monitor and assess patient's chronic conditions and comorbid symptoms for stability, deterioration, or improvement  7/13/2024 0316 by Nazanin Zhu LPN  Outcome: Progressing     Problem: Discharge Planning  Goal: Discharge to home or other facility with appropriate resources  7/13/2024 1425 by Devora Sam RN  Outcome: Progressing  Flowsheets (Taken 7/13/2024 0900)  Discharge to home or other facility with appropriate resources: Identify barriers to discharge with patient and caregiver  7/13/2024 0316 by Nazanin Zhu LPN  Outcome: Progressing     Problem: Skin/Tissue Integrity  Goal: Absence of new skin breakdown  Description: 1.  Monitor for areas of redness and/or skin breakdown  2.  Assess vascular access sites hourly  3.  Every 4-6 hours minimum:  Change oxygen saturation probe site  4.  Every 4-6 hours:  If on nasal continuous positive airway pressure, respiratory therapy assess nares and determine need for appliance change or resting period.  7/13/2024 1425 by Devora Sam RN  Outcome: Progressing  7/13/2024 0316 by Nazanin Zhu LPN  Outcome: Progressing     Problem: ABCDS Injury Assessment  Goal: Absence of physical injury  7/13/2024 1425 by Devora Sam RN  Outcome: Progressing  7/13/2024 0316 by Nazanin Zhu LPN  Outcome: Progressing     Problem: Safety - Adult  Goal: Free from fall injury  7/13/2024 1425 by Devora Sam RN  Outcome: Progressing  7/13/2024 0316 by Nazanin Zhu LPN  Outcome: Progressing     Problem: Nutrition Deficit:  Goal: Optimize nutritional status  7/13/2024 1425 by Devora Sam RN  Outcome: 
  Problem: Chronic Conditions and Co-morbidities  Goal: Patient's chronic conditions and co-morbidity symptoms are monitored and maintained or improved  7/14/2024 0010 by Nazanin Zhu LPN  Outcome: Progressing     Problem: Discharge Planning  Goal: Discharge to home or other facility with appropriate resources  7/14/2024 0010 by Nazanin Zhu LPN  Outcome: Progressing     Problem: Skin/Tissue Integrity  Goal: Absence of new skin breakdown  Description: 1.  Monitor for areas of redness and/or skin breakdown  2.  Assess vascular access sites hourly  3.  Every 4-6 hours minimum:  Change oxygen saturation probe site  4.  Every 4-6 hours:  If on nasal continuous positive airway pressure, respiratory therapy assess nares and determine need for appliance change or resting period.  7/14/2024 0010 by Nazanin Zhu LPN  Outcome: Progressing     Problem: ABCDS Injury Assessment  Goal: Absence of physical injury  7/14/2024 0010 by Nazanin Zhu LPN  Outcome: Progressing     Problem: Safety - Adult  Goal: Free from fall injury  7/14/2024 0010 by Nazanin Zhu LPN  Outcome: Progressing     Problem: Nutrition Deficit:  Goal: Optimize nutritional status  7/14/2024 0010 by Nazanin Zhu LPN  Outcome: Progressing     Problem: Pain  Goal: Verbalizes/displays adequate comfort level or baseline comfort level  7/14/2024 0010 by Nazanin Zhu LPN  Outcome: Progressing     
  Problem: Chronic Conditions and Co-morbidities  Goal: Patient's chronic conditions and co-morbidity symptoms are monitored and maintained or improved  7/2/2024 1500 by Deepali Ovalle RN  Outcome: Progressing  7/2/2024 0134 by Nazanin Zhu LPN  Outcome: Progressing     Problem: Discharge Planning  Goal: Discharge to home or other facility with appropriate resources  7/2/2024 1500 by Deepali Ovalle RN  Outcome: Progressing  7/2/2024 0134 by Nazanin Zhu LPN  Outcome: Progressing     Problem: Skin/Tissue Integrity  Goal: Absence of new skin breakdown  Description: 1.  Monitor for areas of redness and/or skin breakdown  2.  Assess vascular access sites hourly  3.  Every 4-6 hours minimum:  Change oxygen saturation probe site  4.  Every 4-6 hours:  If on nasal continuous positive airway pressure, respiratory therapy assess nares and determine need for appliance change or resting period.  7/2/2024 1500 by Deepali Ovalle RN  Outcome: Progressing  7/2/2024 0134 by Nazanin Zhu LPN  Outcome: Progressing     Problem: Safety - Adult  Goal: Free from fall injury  7/2/2024 1500 by Deepali Ovalle RN  Outcome: Progressing  7/2/2024 0134 by Nazanin Zhu LPN  Outcome: Progressing     Problem: Nutrition Deficit:  Goal: Optimize nutritional status  7/2/2024 1500 by Deepali Ovalle RN  Outcome: Progressing  7/2/2024 0134 by Nazanin Zhu LPN  Outcome: Progressing     Problem: Pain  Goal: Verbalizes/displays adequate comfort level or baseline comfort level  7/2/2024 1500 by Deepali Ovalle RN  Outcome: Progressing  7/2/2024 0134 by Nazanin Zhu LPN  Outcome: Progressing     
  Problem: Chronic Conditions and Co-morbidities  Goal: Patient's chronic conditions and co-morbidity symptoms are monitored and maintained or improved  7/3/2024 0029 by Nazanin Zhu LPN  Outcome: Progressing     Problem: Discharge Planning  Goal: Discharge to home or other facility with appropriate resources  7/3/2024 0029 by Nazanin Zhu LPN  Outcome: Progressing     Problem: Skin/Tissue Integrity  Goal: Absence of new skin breakdown  Description: 1.  Monitor for areas of redness and/or skin breakdown  2.  Assess vascular access sites hourly  3.  Every 4-6 hours minimum:  Change oxygen saturation probe site  4.  Every 4-6 hours:  If on nasal continuous positive airway pressure, respiratory therapy assess nares and determine need for appliance change or resting period.  7/3/2024 0029 by Nazanin Zhu LPN  Outcome: Progressing     Problem: ABCDS Injury Assessment  Goal: Absence of physical injury  7/3/2024 0029 by Nazanin Zhu LPN  Outcome: Progressing     Problem: Safety - Adult  Goal: Free from fall injury  7/3/2024 0029 by Nazanin Zhu LPN  Outcome: Progressing     Problem: Nutrition Deficit:  Goal: Optimize nutritional status  7/3/2024 0029 by Nazanin Zhu LPN  Outcome: Progressing     Problem: Pain  Goal: Verbalizes/displays adequate comfort level or baseline comfort level  7/3/2024 0029 by Nazanin Zhu LPN  Outcome: Progressing     
  Problem: Chronic Conditions and Co-morbidities  Goal: Patient's chronic conditions and co-morbidity symptoms are monitored and maintained or improved  7/3/2024 1056 by Deepali Ovalle RN  Outcome: Progressing  7/3/2024 0029 by Nazanin Zhu LPN  Outcome: Progressing     Problem: Discharge Planning  Goal: Discharge to home or other facility with appropriate resources  7/3/2024 1056 by Deepali Ovalle RN  Outcome: Progressing  7/3/2024 0029 by Nazanin Zhu LPN  Outcome: Progressing     Problem: Skin/Tissue Integrity  Goal: Absence of new skin breakdown  Description: 1.  Monitor for areas of redness and/or skin breakdown  2.  Assess vascular access sites hourly  3.  Every 4-6 hours minimum:  Change oxygen saturation probe site  4.  Every 4-6 hours:  If on nasal continuous positive airway pressure, respiratory therapy assess nares and determine need for appliance change or resting period.  7/3/2024 1056 by Deepali Ovalle RN  Outcome: Progressing  7/3/2024 0029 by Nazanin Zhu LPN  Outcome: Progressing     Problem: ABCDS Injury Assessment  Goal: Absence of physical injury  7/3/2024 1056 by Deepali Ovalle RN  Outcome: Progressing  7/3/2024 0029 by aNzanin Zhu LPN  Outcome: Progressing     
  Problem: Chronic Conditions and Co-morbidities  Goal: Patient's chronic conditions and co-morbidity symptoms are monitored and maintained or improved  7/5/2024 0347 by Huma Garcia RN  Outcome: Progressing  7/4/2024 1839 by Fátima Callaway LPN  Outcome: Progressing  Flowsheets (Taken 7/4/2024 0814)  Care Plan - Patient's Chronic Conditions and Co-Morbidity Symptoms are Monitored and Maintained or Improved:   Monitor and assess patient's chronic conditions and comorbid symptoms for stability, deterioration, or improvement   Collaborate with multidisciplinary team to address chronic and comorbid conditions and prevent exacerbation or deterioration   Update acute care plan with appropriate goals if chronic or comorbid symptoms are exacerbated and prevent overall improvement and discharge     Problem: Discharge Planning  Goal: Discharge to home or other facility with appropriate resources  7/5/2024 0347 by Huma Garcia RN  Outcome: Progressing  7/4/2024 1839 by Fátima Callaway LPN  Outcome: Progressing  Flowsheets (Taken 7/4/2024 0814)  Discharge to home or other facility with appropriate resources:   Identify barriers to discharge with patient and caregiver   Arrange for needed discharge resources and transportation as appropriate   Identify discharge learning needs (meds, wound care, etc)     Problem: Skin/Tissue Integrity  Goal: Absence of new skin breakdown  Description: 1.  Monitor for areas of redness and/or skin breakdown  2.  Assess vascular access sites hourly  3.  Every 4-6 hours minimum:  Change oxygen saturation probe site  4.  Every 4-6 hours:  If on nasal continuous positive airway pressure, respiratory therapy assess nares and determine need for appliance change or resting period.  7/5/2024 0347 by Huma Garcia RN  Outcome: Progressing  7/4/2024 1839 by Fátima Callaway LPN  Outcome: Progressing     Problem: ABCDS Injury Assessment  Goal: Absence of physical injury  7/5/2024 0347 by 
  Problem: Chronic Conditions and Co-morbidities  Goal: Patient's chronic conditions and co-morbidity symptoms are monitored and maintained or improved  7/5/2024 0938 by Sophie Shell RN  Outcome: Progressing     Problem: Discharge Planning  Goal: Discharge to home or other facility with appropriate resources  7/5/2024 0938 by Sophie Shell RN  Outcome: Progressing     Problem: Skin/Tissue Integrity  Goal: Absence of new skin breakdown  Description: 1.  Monitor for areas of redness and/or skin breakdown  2.  Assess vascular access sites hourly  3.  Every 4-6 hours minimum:  Change oxygen saturation probe site  4.  Every 4-6 hours:  If on nasal continuous positive airway pressure, respiratory therapy assess nares and determine need for appliance change or resting period.  7/5/2024 0938 by Sophie Shell RN  Outcome: Progressing     Problem: ABCDS Injury Assessment  Goal: Absence of physical injury  7/5/2024 0938 by Sopihe Shell RN  Outcome: Progressing     Problem: Safety - Adult  Goal: Free from fall injury  7/5/2024 0938 by Sophie Shell RN  Outcome: Progressing     Problem: Nutrition Deficit:  Goal: Optimize nutritional status  7/5/2024 0938 by Sophie Shell RN  Outcome: Progressing     Problem: Pain  Goal: Verbalizes/displays adequate comfort level or baseline comfort level  7/5/2024 0938 by Sophie Shell RN  Outcome: Progressing     
  Problem: Chronic Conditions and Co-morbidities  Goal: Patient's chronic conditions and co-morbidity symptoms are monitored and maintained or improved  7/6/2024 1524 by Deja Ford RN  Outcome: Progressing  Flowsheets (Taken 7/6/2024 0730)  Care Plan - Patient's Chronic Conditions and Co-Morbidity Symptoms are Monitored and Maintained or Improved:   Monitor and assess patient's chronic conditions and comorbid symptoms for stability, deterioration, or improvement   Collaborate with multidisciplinary team to address chronic and comorbid conditions and prevent exacerbation or deterioration   Update acute care plan with appropriate goals if chronic or comorbid symptoms are exacerbated and prevent overall improvement and discharge  7/6/2024 0259 by Nazanin Zhu LPN  Outcome: Progressing     Problem: Discharge Planning  Goal: Discharge to home or other facility with appropriate resources  7/6/2024 1524 by Deja Ford RN  Outcome: Progressing  Flowsheets (Taken 7/6/2024 0730)  Discharge to home or other facility with appropriate resources:   Identify barriers to discharge with patient and caregiver   Arrange for needed discharge resources and transportation as appropriate   Identify discharge learning needs (meds, wound care, etc)   Arrange for interpreters to assist at discharge as needed   Refer to discharge planning if patient needs post-hospital services based on physician order or complex needs related to functional status, cognitive ability or social support system  7/6/2024 0259 by Nazanin Zhu LPN  Outcome: Progressing     Problem: Skin/Tissue Integrity  Goal: Absence of new skin breakdown  Description: 1.  Monitor for areas of redness and/or skin breakdown  2.  Assess vascular access sites hourly  3.  Every 4-6 hours minimum:  Change oxygen saturation probe site  4.  Every 4-6 hours:  If on nasal continuous positive airway pressure, respiratory therapy assess nares and determine need for 
  Problem: Chronic Conditions and Co-morbidities  Goal: Patient's chronic conditions and co-morbidity symptoms are monitored and maintained or improved  7/7/2024 0509 by Lyndsey Claudio LPN  Outcome: Progressing     Problem: Discharge Planning  Goal: Discharge to home or other facility with appropriate resources  7/7/2024 0509 by Lyndsey Claudio LPN  Outcome: Progressing     Problem: Skin/Tissue Integrity  Goal: Absence of new skin breakdown  Description: 1.  Monitor for areas of redness and/or skin breakdown  2.  Assess vascular access sites hourly  3.  Every 4-6 hours minimum:  Change oxygen saturation probe site  4.  Every 4-6 hours:  If on nasal continuous positive airway pressure, respiratory therapy assess nares and determine need for appliance change or resting period.  7/7/2024 0509 by Lyndsey Claudio LPN  Outcome: Progressing     Problem: ABCDS Injury Assessment  Goal: Absence of physical injury  7/7/2024 0509 by Lyndsey Claudio LPN  Outcome: Progressing     Problem: Safety - Adult  Goal: Free from fall injury  7/7/2024 0509 by Lyndsey Claudio LPN  Outcome: Progressing     Problem: Nutrition Deficit:  Goal: Optimize nutritional status  7/7/2024 0509 by Lyndsey Claudio LPN  Outcome: Progressing     Problem: Pain  Goal: Verbalizes/displays adequate comfort level or baseline comfort level  7/7/2024 0509 by Lyndsey Claudio LPN  Outcome: Progressing     
  Problem: Chronic Conditions and Co-morbidities  Goal: Patient's chronic conditions and co-morbidity symptoms are monitored and maintained or improved  7/8/2024 0059 by Lyndsey Claudio LPN  Outcome: Progressing     Problem: Discharge Planning  Goal: Discharge to home or other facility with appropriate resources  7/8/2024 0059 by Lyndsey Claudio LPN  Outcome: Progressing     Problem: Skin/Tissue Integrity  Goal: Absence of new skin breakdown  Description: 1.  Monitor for areas of redness and/or skin breakdown  2.  Assess vascular access sites hourly  3.  Every 4-6 hours minimum:  Change oxygen saturation probe site  4.  Every 4-6 hours:  If on nasal continuous positive airway pressure, respiratory therapy assess nares and determine need for appliance change or resting period.  7/8/2024 0059 by Lyndsey Claudio LPN  Outcome: Progressing     Problem: ABCDS Injury Assessment  Goal: Absence of physical injury  7/8/2024 0059 by Lyndsey Claudio LPN  Outcome: Progressing     Problem: Safety - Adult  Goal: Free from fall injury  7/8/2024 0059 by Lyndsey Claudio LPN  Outcome: Progressing     Problem: Nutrition Deficit:  Goal: Optimize nutritional status  7/8/2024 0059 by Lyndsey lCaudio LPN  Outcome: Progressing     Problem: Pain  Goal: Verbalizes/displays adequate comfort level or baseline comfort level  7/8/2024 0059 by Lyndsey Claudio LPN  Outcome: Progressing     
  Problem: Chronic Conditions and Co-morbidities  Goal: Patient's chronic conditions and co-morbidity symptoms are monitored and maintained or improved  7/8/2024 1410 by Deepali Ovalle RN  Outcome: Progressing  7/8/2024 0059 by Lyndsey Claudio LPN  Outcome: Progressing     Problem: Discharge Planning  Goal: Discharge to home or other facility with appropriate resources  7/8/2024 1410 by Deepali Ovalle RN  Outcome: Progressing  7/8/2024 0059 by Lyndsey Claudio LPN  Outcome: Progressing     Problem: Skin/Tissue Integrity  Goal: Absence of new skin breakdown  Description: 1.  Monitor for areas of redness and/or skin breakdown  2.  Assess vascular access sites hourly  3.  Every 4-6 hours minimum:  Change oxygen saturation probe site  4.  Every 4-6 hours:  If on nasal continuous positive airway pressure, respiratory therapy assess nares and determine need for appliance change or resting period.  7/8/2024 1410 by Deepali Ovalle RN  Outcome: Progressing  7/8/2024 0059 by Lyndsey Claudio LPN  Outcome: Progressing     Problem: ABCDS Injury Assessment  Goal: Absence of physical injury  7/8/2024 1410 by Deepali Ovalle RN  Outcome: Progressing  7/8/2024 0059 by Lyndsey Claudio LPN  Outcome: Progressing     Problem: Safety - Adult  Goal: Free from fall injury  7/8/2024 1410 by Deepali Ovalle RN  Outcome: Progressing  7/8/2024 0059 by Lyndsey Claudio LPN  Outcome: Progressing     Problem: Nutrition Deficit:  Goal: Optimize nutritional status  7/8/2024 1410 by Deepali Ovalle RN  Outcome: Progressing  7/8/2024 0059 by Lyndsey Claudio LPN  Outcome: Progressing     Problem: Pain  Goal: Verbalizes/displays adequate comfort level or baseline comfort level  7/8/2024 1410 by Deepali Ovalle RN  Outcome: Progressing  7/8/2024 0059 by Lyndsey Claudio LPN  Outcome: Progressing     
  Problem: Chronic Conditions and Co-morbidities  Goal: Patient's chronic conditions and co-morbidity symptoms are monitored and maintained or improved  7/9/2024 1111 by Sophie Shell RN  Outcome: Progressing     Problem: Skin/Tissue Integrity  Goal: Absence of new skin breakdown  Description: 1.  Monitor for areas of redness and/or skin breakdown  2.  Assess vascular access sites hourly  3.  Every 4-6 hours minimum:  Change oxygen saturation probe site  4.  Every 4-6 hours:  If on nasal continuous positive airway pressure, respiratory therapy assess nares and determine need for appliance change or resting period.  7/9/2024 1111 by Sophie Shell, RN  Outcome: Progressing     Problem: Safety - Adult  Goal: Free from fall injury  Outcome: Progressing     Problem: Nutrition Deficit:  Goal: Optimize nutritional status  Outcome: Progressing     
  Problem: Chronic Conditions and Co-morbidities  Goal: Patient's chronic conditions and co-morbidity symptoms are monitored and maintained or improved  Outcome: Progressing     Problem: Discharge Planning  Goal: Discharge to home or other facility with appropriate resources  Outcome: Progressing     Problem: Skin/Tissue Integrity  Goal: Absence of new skin breakdown  Description: 1.  Monitor for areas of redness and/or skin breakdown  2.  Assess vascular access sites hourly  3.  Every 4-6 hours minimum:  Change oxygen saturation probe site  4.  Every 4-6 hours:  If on nasal continuous positive airway pressure, respiratory therapy assess nares and determine need for appliance change or resting period.  Outcome: Progressing     Problem: ABCDS Injury Assessment  Goal: Absence of physical injury  Outcome: Progressing     Problem: Safety - Adult  Goal: Free from fall injury  Outcome: Progressing     Problem: Nutrition Deficit:  Goal: Optimize nutritional status  Outcome: Progressing     Problem: Pain  Goal: Verbalizes/displays adequate comfort level or baseline comfort level  Outcome: Progressing     
  Problem: Chronic Conditions and Co-morbidities  Goal: Patient's chronic conditions and co-morbidity symptoms are monitored and maintained or improved  Outcome: Progressing     Problem: Discharge Planning  Goal: Discharge to home or other facility with appropriate resources  Outcome: Progressing  Flowsheets (Taken 7/7/2024 0815 by Deja Ford, RN)  Discharge to home or other facility with appropriate resources:   Identify barriers to discharge with patient and caregiver   Arrange for needed discharge resources and transportation as appropriate   Identify discharge learning needs (meds, wound care, etc)   Arrange for interpreters to assist at discharge as needed   Refer to discharge planning if patient needs post-hospital services based on physician order or complex needs related to functional status, cognitive ability or social support system     Problem: Skin/Tissue Integrity  Goal: Absence of new skin breakdown  Description: 1.  Monitor for areas of redness and/or skin breakdown  2.  Assess vascular access sites hourly  3.  Every 4-6 hours minimum:  Change oxygen saturation probe site  4.  Every 4-6 hours:  If on nasal continuous positive airway pressure, respiratory therapy assess nares and determine need for appliance change or resting period.  Outcome: Progressing  Note: Skin integrity maintained     Problem: ABCDS Injury Assessment  Goal: Absence of physical injury  Outcome: Progressing     Problem: Chronic Conditions and Co-morbidities  Goal: Patient's chronic conditions and co-morbidity symptoms are monitored and maintained or improved  Outcome: Progressing     Problem: Discharge Planning  Goal: Discharge to home or other facility with appropriate resources  Outcome: Progressing  Flowsheets (Taken 7/7/2024 0815 by Deja Ford, RN)  Discharge to home or other facility with appropriate resources:   Identify barriers to discharge with patient and caregiver   Arrange for needed discharge resources and 
  Problem: Chronic Conditions and Co-morbidities  Goal: Patient's chronic conditions and co-morbidity symptoms are monitored and maintained or improved  Outcome: Progressing  Flowsheets (Taken 6/28/2024 0614)  Care Plan - Patient's Chronic Conditions and Co-Morbidity Symptoms are Monitored and Maintained or Improved:   Monitor and assess patient's chronic conditions and comorbid symptoms for stability, deterioration, or improvement   Collaborate with multidisciplinary team to address chronic and comorbid conditions and prevent exacerbation or deterioration   Update acute care plan with appropriate goals if chronic or comorbid symptoms are exacerbated and prevent overall improvement and discharge     Problem: Discharge Planning  Goal: Discharge to home or other facility with appropriate resources  Outcome: Progressing  Flowsheets (Taken 6/28/2024 0614)  Discharge to home or other facility with appropriate resources:   Arrange for needed discharge resources and transportation as appropriate   Identify barriers to discharge with patient and caregiver     
  Problem: Chronic Conditions and Co-morbidities  Goal: Patient's chronic conditions and co-morbidity symptoms are monitored and maintained or improved  Outcome: Progressing  Flowsheets (Taken 7/3/2024 2100)  Care Plan - Patient's Chronic Conditions and Co-Morbidity Symptoms are Monitored and Maintained or Improved:   Monitor and assess patient's chronic conditions and comorbid symptoms for stability, deterioration, or improvement   Collaborate with multidisciplinary team to address chronic and comorbid conditions and prevent exacerbation or deterioration   Update acute care plan with appropriate goals if chronic or comorbid symptoms are exacerbated and prevent overall improvement and discharge     Problem: Discharge Planning  Goal: Discharge to home or other facility with appropriate resources  Outcome: Progressing  Flowsheets (Taken 7/3/2024 2100)  Discharge to home or other facility with appropriate resources:   Identify barriers to discharge with patient and caregiver   Arrange for needed discharge resources and transportation as appropriate   Identify discharge learning needs (meds, wound care, etc)   Refer to discharge planning if patient needs post-hospital services based on physician order or complex needs related to functional status, cognitive ability or social support system     Problem: Skin/Tissue Integrity  Goal: Absence of new skin breakdown  Description: 1.  Monitor for areas of redness and/or skin breakdown  2.  Assess vascular access sites hourly  3.  Every 4-6 hours minimum:  Change oxygen saturation probe site  4.  Every 4-6 hours:  If on nasal continuous positive airway pressure, respiratory therapy assess nares and determine need for appliance change or resting period.  Outcome: Progressing     Problem: ABCDS Injury Assessment  Goal: Absence of physical injury  Outcome: Progressing  Flowsheets (Taken 7/3/2024 2209)  Absence of Physical Injury: Implement safety measures based on patient 
  Problem: Chronic Conditions and Co-morbidities  Goal: Patient's chronic conditions and co-morbidity symptoms are monitored and maintained or improved  Outcome: Progressing  Flowsheets (Taken 7/4/2024 0814)  Care Plan - Patient's Chronic Conditions and Co-Morbidity Symptoms are Monitored and Maintained or Improved:   Monitor and assess patient's chronic conditions and comorbid symptoms for stability, deterioration, or improvement   Collaborate with multidisciplinary team to address chronic and comorbid conditions and prevent exacerbation or deterioration   Update acute care plan with appropriate goals if chronic or comorbid symptoms are exacerbated and prevent overall improvement and discharge     Problem: Discharge Planning  Goal: Discharge to home or other facility with appropriate resources  Outcome: Progressing  Flowsheets (Taken 7/4/2024 0814)  Discharge to home or other facility with appropriate resources:   Identify barriers to discharge with patient and caregiver   Arrange for needed discharge resources and transportation as appropriate   Identify discharge learning needs (meds, wound care, etc)     Problem: Skin/Tissue Integrity  Goal: Absence of new skin breakdown  Description: 1.  Monitor for areas of redness and/or skin breakdown  2.  Assess vascular access sites hourly  3.  Every 4-6 hours minimum:  Change oxygen saturation probe site  4.  Every 4-6 hours:  If on nasal continuous positive airway pressure, respiratory therapy assess nares and determine need for appliance change or resting period.  Outcome: Progressing     Problem: ABCDS Injury Assessment  Goal: Absence of physical injury  Outcome: Progressing  Flowsheets (Taken 7/4/2024 4488)  Absence of Physical Injury: Implement safety measures based on patient assessment     Problem: Safety - Adult  Goal: Free from fall injury  Outcome: Progressing     Problem: Nutrition Deficit:  Goal: Optimize nutritional status  Outcome: Progressing     Problem: 
Psychiatric assessment unable to be performed at this time, per nursing staff, patient was just transferred to gym for therapy.  Will attempt tomorrow morning.  
appliance change or resting period.  7/7/2024 1511 by Deja Ford RN  Outcome: Progressing  7/7/2024 0509 by Lyndsey Claudio LPN  Outcome: Progressing     Problem: ABCDS Injury Assessment  Goal: Absence of physical injury  7/7/2024 1511 by Deja oFrd RN  Outcome: Progressing  Flowsheets (Taken 7/7/2024 0815)  Absence of Physical Injury: Implement safety measures based on patient assessment  7/7/2024 0509 by Lyndsey Claudio LPN  Outcome: Progressing     Problem: Safety - Adult  Goal: Free from fall injury  7/7/2024 1511 by Deja Ford RN  Outcome: Progressing  Flowsheets (Taken 7/7/2024 0815)  Free From Fall Injury:   Instruct family/caregiver on patient safety   Based on caregiver fall risk screen, instruct family/caregiver to ask for assistance with transferring infant if caregiver noted to have fall risk factors  7/7/2024 0509 by Lyndsey Claudio LPN  Outcome: Progressing     Problem: Nutrition Deficit:  Goal: Optimize nutritional status  7/7/2024 1511 by Deja Ford RN  Outcome: Progressing  7/7/2024 0509 by Lyndsey Claudio LPN  Outcome: Progressing     Problem: Pain  Goal: Verbalizes/displays adequate comfort level or baseline comfort level  7/7/2024 1511 by Deja Ford RN  Outcome: Progressing  7/7/2024 0509 by Lyndsey Claudio LPN  Outcome: Progressing     
appropriate resources:   Arrange for needed discharge resources and transportation as appropriate   Identify barriers to discharge with patient and caregiver     Problem: Skin/Tissue Integrity  Goal: Absence of new skin breakdown  Description: 1.  Monitor for areas of redness and/or skin breakdown  2.  Assess vascular access sites hourly  3.  Every 4-6 hours minimum:  Change oxygen saturation probe site  4.  Every 4-6 hours:  If on nasal continuous positive airway pressure, respiratory therapy assess nares and determine need for appliance change or resting period.  Outcome: Progressing  Note: Skin assessment complete. Pt on continuous lateral air movement. Pt turned and repositioned every two hours with assistance from staff.  Area kept free from moisture. Proper nourishment and fluids encouraged, as appropriate. Skin remains clean and dry. See LDAs.  Will continue to monitor for additional needs and changes in skin breakdown.     Problem: ABCDS Injury Assessment  Goal: Absence of physical injury  Outcome: Progressing  Flowsheets (Taken 6/28/2024 1818)  Absence of Physical Injury: Implement safety measures based on patient assessment     Problem: Safety - Adult  Goal: Free from fall injury  Outcome: Progressing  Flowsheets (Taken 6/28/2024 1818)  Free From Fall Injury: Instruct family/caregiver on patient safety  Note: The patient remained free from falls this shift, call light within reach, bed in locked and lowest position.  Side rails up x2.  Continue to monitor closely.     Problem: Nutrition Deficit:  Goal: Optimize nutritional status  Outcome: Progressing  Flowsheets (Taken 6/28/2024 1818)  Nutrient intake appropriate for improving, restoring, or maintaining nutritional needs:   Assess nutritional status and recommend course of action   Monitor oral intake, labs, and treatment plans     
mobility  Short Term Goal 10: Pt will participate in 30+ minutes of therapeutic exercises/functional activities/social participation to increase safety and independence with daily activities and improve overall quality of life  Long Term Goals  Time Frame for Long Term Goals : By discharge  Long Term Goal 1: Pt will complete bed mobility with Mod A to sit EOB unsupported for 15+ minutes with CGA during self care tasks  Long Term Goal 2: Pt will complete self feeding/grooming task with set-up A and Good safety/attention to task  Long Term Goal 3: Pt will complete upper body dressing/bathing with Min A and Good safety with use of adaptive strategies as needed  Long Term Goal 4: Pt will complete lower body dressing/bathing with Mod A and Good safety with use of AE as needed  Long Term Goal 5: Pt will complete functional transfer during self care task with Mod A and Good safety with use of least restrictive device  Long Term Goal 6: Pt will tolerate standing 5+ mintues during functional activity of choice with Mod A and Good safety with use of supportive device  Long Term Goal 7: Pt will attend to L side of visual field/body to increase safety and joint protection during daily activities  Long Term Goal 8: Pt/family will verbalize/demonstrate Good understanding of home safety/fall prevention strategies to increase safety and independence with self care and mobility  Long Term Goal 9: Pt will verbalzie/demonstrate 3+ positive coping strategies to increase participation in daily activities and improve overall quality of life  Long Term Goal 10: OT to further assess FMC,  strength, and vision as able    Plan of Care: Patient to be seen by occupational therapy services 1 Hour per day at least 5 out of 7 days per week     Anticipated interventions may include ADL and IADL retraining, strengthening, safety education and training, patient/caregiver education and training, equipment evaluation/ training/procurement,

## 2024-07-26 ENCOUNTER — TELEPHONE (OUTPATIENT)
Dept: PHYSICAL MEDICINE AND REHAB | Age: 59
End: 2024-07-26

## 2024-07-26 NOTE — TELEPHONE ENCOUNTER
Spoke with Mara and Brennen passed two days ago at home. She thought maybe we would have known.   Expressed our sympathy and if there was anything we could do for her.      
 used

## 2024-08-01 NOTE — PROGRESS NOTES
Writer and pt to ED CT. While waiting pt became more dusky, vomiting episodes and pt was not protecting airway. Vitals WNL. IV access lost. [facility] resident and ED resident elected to emergently intubate pt. Pt brought into ER bay, pt intubated and lines/catheters were placed. See orders. Cami bedside, CT obtained and went emergently to OR.   DISPLAY PLAN FREE TEXT

## 2024-10-25 NOTE — PROGRESS NOTES
Statement Selected those of syntax and \"sound alike\" substitutions which may escape proofreading.  In such instances, original meaning may be extrapolated by contextual derivation.  Nhan Cha MD 1/31/2024 2:31 PM

## (undated) DEVICE — BLADE ES ELASTOMERIC COAT INSUL DURABLE BEND UPTO 90DEG

## (undated) DEVICE — 450 ML BOTTLE OF 0.05% CHLORHEXIDINE GLUCONATE IN 99.95% STERILE WATER FOR IRRIGATION, USP AND APPLICATOR.: Brand: IRRISEPT ANTIMICROBIAL WOUND LAVAGE

## (undated) DEVICE — SPONGE,NEURO,0.5"X0.5",XR,STRL,10/PK: Brand: MEDLINE

## (undated) DEVICE — GLOVE SURG SZ 7 L12IN THK7.5MIL DK GRN LTX FREE MSG6570] MEDLINE INDUSTRIES INC]

## (undated) DEVICE — ZYPHR DISPOSABLE CRANIAL PERFORATOR, LARGE 14/11MM: Brand: ZYPHR

## (undated) DEVICE — DRESSING BORDERED ADH GZ UNIV GEN USE 5IN 4IN AND 2 1/2IN

## (undated) DEVICE — DRAPE,REIN 53X77,STERILE: Brand: MEDLINE

## (undated) DEVICE — COVER,MAYO STAND,XL,STERILE: Brand: MEDLINE

## (undated) DEVICE — SUTURE NRLN SZ 4-0 L18IN NONABSORBABLE BLK L13MM TF 1/2 CIR C584D

## (undated) DEVICE — LARGE BLUNT, DUAL PACK: Brand: LINA SKIN HOOK™

## (undated) DEVICE — 1LYRTR 16FR10ML100%SIL UMS SNP: Brand: MEDLINE INDUSTRIES, INC.

## (undated) DEVICE — DRESSING BORDERED ADH GZ UNIV GEN USE 8INX4IN AND 6INX2IN

## (undated) DEVICE — 3.0MM PRECISION NEURO (MATCH HEAD)

## (undated) DEVICE — RESERVOIR,SUCTION,100CC,SILICONE: Brand: MEDLINE

## (undated) DEVICE — COVER LT HNDL BLU PLAS

## (undated) DEVICE — GARMENT,MEDLINE,DVT,INT,CALF,MED, GEN2: Brand: MEDLINE

## (undated) DEVICE — PAD N ADH W3XL4IN POLY COT SFT PERF FLM EASILY CUT ABSRB

## (undated) DEVICE — AGENT HEMOSTATIC SURGIFLOW MATRIX KIT W/THROMBIN

## (undated) DEVICE — BATTERY PACK FOR VARISPEED: Brand: STRYKER VARISPEED

## (undated) DEVICE — SUTURE D SPEC VCRL 2 0 D8876

## (undated) DEVICE — STRAP ARMBRD W1.5XL32IN FOAM STR YET SFT W/ HK AND LOOP

## (undated) DEVICE — SUTURE VICRYL SZ 2-0 L18IN ABSRB UD CT-1 L36MM 1/2 CIR J839D

## (undated) DEVICE — Device

## (undated) DEVICE — SPONGE,NEURO,1"X1",XR,STRL,LF,10/PK: Brand: MEDLINE

## (undated) DEVICE — CLAMP SCALP STR EDGE HVY

## (undated) DEVICE — SPONGE,NEURO,1"X3",XR,STRL,LF,10/PK: Brand: MEDLINE

## (undated) DEVICE — 2.3MM TAPERED ROUTER

## (undated) DEVICE — APPLICATOR MEDICATED 10.5 CC SOLUTION HI LT ORNG CHLORAPREP

## (undated) DEVICE — CODMAN® SURGICAL PATTIES 1/2" X1 1/2" (1.27CM X 3.81CM): Brand: CODMAN®

## (undated) DEVICE — DRAIN,WOUND,15FR,3/16,FULL-FLUTED: Brand: MEDLINE

## (undated) DEVICE — GOWN,SIRUS,NONRNF,SETINSLV,XL,20/CS: Brand: MEDLINE

## (undated) DEVICE — SVMMC CRANI PK

## (undated) DEVICE — COTTON BALLS-STRUNG 1": Brand: COTTON BALLS

## (undated) DEVICE — SUTURE NONABSORBABLE MONOFILAMENT 3-0 PS-1 18 IN BLK ETHILON 1663H

## (undated) DEVICE — KIT DRN FLAT W/ 100CC EVAC 7MM FULL PERF

## (undated) DEVICE — STERILE POLYISOPRENE POWDER-FREE SURGICAL GLOVES WITH EMOLLIENT COATING: Brand: PROTEXIS

## (undated) DEVICE — PROVE COVER: Brand: UNBRANDED

## (undated) DEVICE — GAUZE,SPONGE,FLUFF,6"X6.75",STRL,5/TRAY: Brand: MEDLINE

## (undated) DEVICE — SURGICAL SUCTION CONNECTING TUBE WITH MALE CONNECTOR AND SUCTION CLAMP, 2 FT. LONG (.6 M), 5 MM I.D.: Brand: CONMED

## (undated) DEVICE — ELECTRODE PT RET AD L9FT HI MOIST COND ADH HYDRGEL CORDED

## (undated) DEVICE — STRAP,POSITIONING,KNEE/BODY,FOAM,4X60": Brand: MEDLINE

## (undated) DEVICE — SOLUTION PREP PAINT POV IOD FOR SKIN MUCOUS MEM

## (undated) DEVICE — GLOVE SURG SZ 75 CRM LTX FREE POLYISOPRENE POLYMER BEAD ANTI

## (undated) DEVICE — AGENT HEMSTAT W2XL4IN OXIDIZED REGENERATED CELOS ABSRB

## (undated) DEVICE — MARKER,SKIN,WI/RULER AND LABELS: Brand: MEDLINE

## (undated) DEVICE — SUTURE ETHILON SZ 3-0 L30IN NONABSORBABLE BLK PSLX L36MM 3/8 1683H